# Patient Record
Sex: MALE | Race: WHITE | NOT HISPANIC OR LATINO | Employment: OTHER | URBAN - METROPOLITAN AREA
[De-identification: names, ages, dates, MRNs, and addresses within clinical notes are randomized per-mention and may not be internally consistent; named-entity substitution may affect disease eponyms.]

---

## 2018-04-09 ENCOUNTER — TELEPHONE (OUTPATIENT)
Dept: INTERNAL MEDICINE | Facility: CLINIC | Age: 68
End: 2018-04-09

## 2018-04-09 NOTE — TELEPHONE ENCOUNTER
Melanie is calling wanting to know if It is ok to take glucosamine with all his other meds for joint pain

## 2018-04-19 ENCOUNTER — HOSPITAL ENCOUNTER (OUTPATIENT)
Facility: HOSPITAL | Age: 68
Discharge: HOME | End: 2018-04-19
Attending: INTERNAL MEDICINE | Admitting: INTERNAL MEDICINE
Payer: COMMERCIAL

## 2018-04-19 ENCOUNTER — ANESTHESIA (OUTPATIENT)
Dept: ENDOSCOPY | Facility: HOSPITAL | Age: 68
End: 2018-04-19
Payer: COMMERCIAL

## 2018-04-19 ENCOUNTER — ANESTHESIA EVENT (OUTPATIENT)
Dept: ENDOSCOPY | Facility: HOSPITAL | Age: 68
End: 2018-04-19
Payer: COMMERCIAL

## 2018-04-19 VITALS
OXYGEN SATURATION: 93 % | DIASTOLIC BLOOD PRESSURE: 67 MMHG | WEIGHT: 184 LBS | BODY MASS INDEX: 29.57 KG/M2 | HEART RATE: 91 BPM | SYSTOLIC BLOOD PRESSURE: 135 MMHG | RESPIRATION RATE: 18 BRPM | HEIGHT: 66 IN | TEMPERATURE: 97.4 F

## 2018-04-19 DIAGNOSIS — K57.90 DIVERTICULOSIS OF INTESTINE WITHOUT BLEEDING, UNSPECIFIED INTESTINAL TRACT LOCATION: ICD-10-CM

## 2018-04-19 DIAGNOSIS — K64.9 HEMORRHOIDS, UNSPECIFIED HEMORRHOID TYPE: ICD-10-CM

## 2018-04-19 DIAGNOSIS — K63.5 POLYP OF COLON, UNSPECIFIED PART OF COLON, UNSPECIFIED TYPE: ICD-10-CM

## 2018-04-19 PROCEDURE — 0DBN8ZX EXCISION OF SIGMOID COLON, VIA NATURAL OR ARTIFICIAL OPENING ENDOSCOPIC, DIAGNOSTIC: ICD-10-PCS | Performed by: INTERNAL MEDICINE

## 2018-04-19 PROCEDURE — 25800000 HC PHARMACY IV SOLUTIONS: Performed by: INTERNAL MEDICINE

## 2018-04-19 PROCEDURE — 0DBK8ZX EXCISION OF ASCENDING COLON, VIA NATURAL OR ARTIFICIAL OPENING ENDOSCOPIC, DIAGNOSTIC: ICD-10-PCS | Performed by: INTERNAL MEDICINE

## 2018-04-19 PROCEDURE — 63600000 HC DRUGS/DETAIL CODE: Performed by: ANESTHESIOLOGY

## 2018-04-19 PROCEDURE — 0DBL8ZX EXCISION OF TRANSVERSE COLON, VIA NATURAL OR ARTIFICIAL OPENING ENDOSCOPIC, DIAGNOSTIC: ICD-10-PCS | Performed by: INTERNAL MEDICINE

## 2018-04-19 PROCEDURE — 37000001 HC ANESTHESIA GENERAL: Performed by: INTERNAL MEDICINE

## 2018-04-19 PROCEDURE — 25000000 HC PHARMACY GENERAL: Performed by: ANESTHESIOLOGY

## 2018-04-19 PROCEDURE — 75000020 HC COLONSCOPY SNARE: Performed by: INTERNAL MEDICINE

## 2018-04-19 PROCEDURE — 88305 TISSUE EXAM BY PATHOLOGIST: CPT | Performed by: INTERNAL MEDICINE

## 2018-04-19 RX ORDER — VALSARTAN AND HYDROCHLOROTHIAZIDE 320; 25 MG/1; MG/1
1 TABLET, FILM COATED ORAL DAILY
COMMUNITY
End: 2019-01-13 | Stop reason: CLARIF

## 2018-04-19 RX ORDER — ALPRAZOLAM 0.5 MG/1
0.5 TABLET ORAL 3 TIMES DAILY PRN
COMMUNITY
Start: 2018-01-18 | End: 2018-07-03 | Stop reason: SDUPTHER

## 2018-04-19 RX ORDER — SODIUM CHLORIDE 9 MG/ML
INJECTION, SOLUTION INTRAVENOUS CONTINUOUS
Status: DISCONTINUED | OUTPATIENT
Start: 2018-04-19 | End: 2018-04-19 | Stop reason: HOSPADM

## 2018-04-19 RX ORDER — FLUTICASONE PROPIONATE 50 MCG
1 SPRAY, SUSPENSION (ML) NASAL DAILY
COMMUNITY
Start: 2017-01-04 | End: 2018-05-17 | Stop reason: SDUPTHER

## 2018-04-19 RX ORDER — ALBUTEROL SULFATE 90 UG/1
2 INHALANT RESPIRATORY (INHALATION) 2 TIMES DAILY PRN
COMMUNITY
End: 2019-09-04 | Stop reason: SDUPTHER

## 2018-04-19 RX ORDER — MIDAZOLAM HYDROCHLORIDE 2 MG/2ML
INJECTION, SOLUTION INTRAMUSCULAR; INTRAVENOUS AS NEEDED
Status: DISCONTINUED | OUTPATIENT
Start: 2018-04-19 | End: 2018-04-19 | Stop reason: SURG

## 2018-04-19 RX ORDER — LIDOCAINE HYDROCHLORIDE 10 MG/ML
INJECTION, SOLUTION INFILTRATION; PERINEURAL AS NEEDED
Status: DISCONTINUED | OUTPATIENT
Start: 2018-04-19 | End: 2018-04-19 | Stop reason: SURG

## 2018-04-19 RX ORDER — ATORVASTATIN CALCIUM 40 MG/1
40 TABLET, FILM COATED ORAL DAILY
COMMUNITY
End: 2018-05-15 | Stop reason: SDUPTHER

## 2018-04-19 RX ORDER — PROPOFOL 10 MG/ML
INJECTION, EMULSION INTRAVENOUS AS NEEDED
Status: DISCONTINUED | OUTPATIENT
Start: 2018-04-19 | End: 2018-04-19 | Stop reason: SURG

## 2018-04-19 RX ORDER — DABIGATRAN ETEXILATE 150 MG/1
150 CAPSULE ORAL 2 TIMES DAILY
Status: ON HOLD | COMMUNITY
End: 2021-09-02 | Stop reason: SDUPTHER

## 2018-04-19 RX ORDER — MULTIVITAMIN
1 TABLET ORAL DAILY
Status: ON HOLD | COMMUNITY
End: 2022-01-11 | Stop reason: SDUPTHER

## 2018-04-19 RX ORDER — PHENYLEPHRINE HYDROCHLORIDE 10 MG/ML
INJECTION INTRAVENOUS AS NEEDED
Status: DISCONTINUED | OUTPATIENT
Start: 2018-04-19 | End: 2018-04-19 | Stop reason: SURG

## 2018-04-19 RX ORDER — HYDROCHLOROTHIAZIDE 25 MG/1
25 TABLET ORAL DAILY
Status: ON HOLD | COMMUNITY
End: 2018-04-19 | Stop reason: ALTCHOICE

## 2018-04-19 RX ORDER — FENTANYL CITRATE 50 UG/ML
INJECTION, SOLUTION INTRAMUSCULAR; INTRAVENOUS AS NEEDED
Status: DISCONTINUED | OUTPATIENT
Start: 2018-04-19 | End: 2018-04-19 | Stop reason: SURG

## 2018-04-19 RX ORDER — METOPROLOL TARTRATE 50 MG/1
25 TABLET ORAL 2 TIMES DAILY
COMMUNITY
End: 2018-09-07 | Stop reason: SDUPTHER

## 2018-04-19 RX ORDER — CETIRIZINE HYDROCHLORIDE 10 MG/1
10 TABLET ORAL DAILY PRN
COMMUNITY
Start: 2017-01-04 | End: 2018-05-15 | Stop reason: SDUPTHER

## 2018-04-19 RX ORDER — UMECLIDINIUM BROMIDE AND VILANTEROL TRIFENATATE 62.5; 25 UG/1; UG/1
1 POWDER RESPIRATORY (INHALATION) DAILY
COMMUNITY
Start: 2017-09-07 | End: 2022-10-25 | Stop reason: SDUPTHER

## 2018-04-19 RX ORDER — ESCITALOPRAM OXALATE 10 MG/1
10 TABLET ORAL DAILY
COMMUNITY
Start: 2017-07-18 | End: 2018-07-23 | Stop reason: SDUPTHER

## 2018-04-19 RX ADMIN — LIDOCAINE HYDROCHLORIDE 5 ML: 10 INJECTION, SOLUTION INFILTRATION; PERINEURAL at 10:36

## 2018-04-19 RX ADMIN — FENTANYL CITRATE 100 MCG: 50 INJECTION INTRAMUSCULAR; INTRAVENOUS at 10:36

## 2018-04-19 RX ADMIN — PHENYLEPHRINE HYDROCHLORIDE 200 MCG: 10 INJECTION INTRAVENOUS at 10:46

## 2018-04-19 RX ADMIN — PHENYLEPHRINE HYDROCHLORIDE 200 MCG: 10 INJECTION INTRAVENOUS at 10:53

## 2018-04-19 RX ADMIN — PROPOFOL 50 MG: 10 INJECTION, EMULSION INTRAVENOUS at 10:47

## 2018-04-19 RX ADMIN — SODIUM CHLORIDE: 9 INJECTION, SOLUTION INTRAVENOUS at 08:56

## 2018-04-19 RX ADMIN — PROPOFOL 100 MG: 10 INJECTION, EMULSION INTRAVENOUS at 10:36

## 2018-04-19 RX ADMIN — MIDAZOLAM HYDROCHLORIDE 2 MG: 1 INJECTION, SOLUTION INTRAMUSCULAR; INTRAVENOUS at 10:36

## 2018-04-19 ASSESSMENT — PAIN - FUNCTIONAL ASSESSMENT
PAIN_FUNCTIONAL_ASSESSMENT: NO/DENIES PAIN

## 2018-04-19 ASSESSMENT — ENCOUNTER SYMPTOMS: DYSRHYTHMIAS: 1

## 2018-04-19 ASSESSMENT — PAIN SCALES - GENERAL: PAIN_LEVEL: 1

## 2018-04-19 ASSESSMENT — COPD QUESTIONNAIRES: COPD: 1

## 2018-04-19 NOTE — ANESTHESIA POSTPROCEDURE EVALUATION
Patient: Melanie Zelaya    Procedure Summary     Date:  04/19/18 Room / Location:   GI 1 / [facility]    Anesthesia Start:  1034 Anesthesia Stop:  1109    Procedure:  Colonoscopy (N/A Anus) Diagnosis:  (Pers Hx Polyps, Family Hx CRC)    Provider:  Violet Zavala DO Responsible Provider:  Flores Martinez MD    Anesthesia Type:  general ASA Status:  3          Anesthesia Type: general  PACU Vitals  4/19/2018 1059 - 4/19/2018 1109      4/19/2018 1105             BP: (!)  (P)  103/55    Temp: 36.3 °C (97.4 °F)            Anesthesia Post Evaluation    Pain score: 1  Pain management: adequate  Patient location during evaluation: PACU  Patient participation: complete - patient participated  Level of consciousness: awake and alert  Cardiovascular status: acceptable  Airway Patency: adequate  Respiratory status: acceptable  Hydration status: acceptable  Anesthetic complications: no
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
## 2018-04-19 NOTE — OP NOTE
_______________________________________________________________________________  Patient Name: Melanie Zelaya         Procedure Date: 4/19/2018 10:30 AM  MRN: [id number]                     Account Number: [id number]  YOB: 1950              Age: 68  Gender: Male                          Note Status: Finalized  Attending MD: MARY KAY FLEMING DO  _______________________________________________________________________________  Procedure:            Colonoscopy  Indications:          High risk colon cancer surveillance: Personal history  of colonic polyps  Providers:            MARY KAY FLEMING DO (Doctor)  Referring MD:         ALEXY ACHARYA DO  Requesting Provider:  Medicines:            See the Anesthesia note for documentation of the  administered medications  Complications:        No immediate complications. Estimated blood loss: None.  _______________________________________________________________________________  Procedure:            After I obtained informed consent, the scope was passed  under direct vision. Throughout the procedure, the  patient's blood pressure, pulse, and oxygen saturations  were monitored continuously. The Colonoscope was  introduced through the anus and advanced to the cecum,  identified by appendiceal orifice and ileocecal valve.  The colonoscopy was performed without difficulty. The  patient tolerated the procedure well. The quality of  the bowel preparation was adequate to identify polyps 6  mm and larger in size.  Estimated Blood Loss: Estimated blood loss: none.  Findings:  A 4 mm polyp was found in the proximal ascending colon. The polyp was  sessile. The polyp was removed with a cold snare. Resection and  retrieval were complete. Estimated blood loss was minimal.  A 5 mm polyp was found in the proximal transverse colon. The polyp was  sessile. The polyp was removed with a cold snare. Resection and  retrieval were complete. Estimated blood loss was minimal.  A 3  mm polyp was found in the sigmoid colon. The polyp was sessile. The  polyp was removed with a cold snare. Resection was complete, but the  polyp tissue was not retrieved. Estimated blood loss was minimal.  Scattered small and large-mouthed diverticula were found in the entire  colon.  Non-bleeding internal hemorrhoids were found during retroflexion. The  hemorrhoids were small and Grade I (internal hemorrhoids that do not  prolapse).  No other significant abnormalities were identified in a careful  examination of the remainder of the colon.  The exam was otherwise without abnormality on direct and retroflexion  views.  Impression:           - One 4 mm polyp in the proximal ascending colon,  removed with a cold snare. Resected and retrieved.  - One 5 mm polyp in the proximal transverse colon,  removed with a cold snare. Resected and retrieved.  - One 3 mm polyp in the sigmoid colon, removed with a  cold snare. Complete resection. Polyp tissue not  retrieved.  - Diverticulosis in the entire examined colon.  - Non-bleeding internal hemorrhoids.  - The examination was otherwise normal on direct and  retroflexion views.  Recommendation:       - Patient has a contact number available for  emergencies. The signs and symptoms of potential  delayed complications were discussed with the patient.  Return to normal activities tomorrow. Written discharge  instructions were provided to the patient.  - High fiber diet daily.  - Repeat colonoscopy in 3 years for surveillance based  on pathology results.  - Return to my office PRN.  Procedure Code(s):    --- Professional ---  63258, Colonoscopy, flexible; with removal of tumor(s),  polyp(s), or other lesion(s) by snare technique  Diagnosis Code(s):    --- Professional ---  Z86.010, Personal history of colonic polyps  D12.2, Benign neoplasm of ascending colon  D12.3, Benign neoplasm of transverse colon (hepatic  flexure or splenic flexure)  D12.5, Benign neoplasm of sigmoid  colon  K64.0, First degree hemorrhoids  K57.30, Diverticulosis of large intestine without  perforation or abscess without bleeding  CPT copyright 2015 [employer]. All rights reserved.  The codes documented in this report are preliminary and upon  review may  be revised to meet current compliance requirements.  _________________  MARY KAY FLEMING DO  4/19/2018 10:59:52 AM  This report has been signed electronically.  Number of Addenda: 0  Note Initiated On: 4/19/2018 10:30 AM

## 2018-04-19 NOTE — ANESTHESIA PREPROCEDURE EVALUATION
Anesthesia ROS/MED HX      Pulmonary    COPD  Cardiovascular   hypertension   Dysrhythmias (A-fib)  GI/Hepatic   GERD      Past Surgical History:   Procedure Laterality Date   • LUNG LOBECTOMY Left 2016   • ROTATOR CUFF REPAIR Right 2001   • SHOULDER SURGERY  1998       Physical Exam    Airway   Mallampati: IV   TM distance: <3 FB   Neck ROM: full  Cardiovascular - normal   Rhythm: regular   Rate: normal  Pulmonary    Decreased breath sounds  Dental - normal        Anesthesia Plan    Plan: general    Technique: total IV anesthesia   ASA 3

## 2018-04-20 ENCOUNTER — TRANSCRIBE ORDERS (OUTPATIENT)
Dept: SCHEDULING | Age: 68
End: 2018-04-20

## 2018-04-20 DIAGNOSIS — C34.32 PRIMARY MALIGNANT NEOPLASM OF BRONCHUS OF LEFT LOWER LOBE (CMS/HCC): Primary | ICD-10-CM

## 2018-04-20 LAB
CASE RPRT: NORMAL
CLINICAL INFO: NORMAL
PATH REPORT.FINAL DX SPEC: NORMAL
PATH REPORT.GROSS SPEC: NORMAL

## 2018-04-23 ENCOUNTER — APPOINTMENT (OUTPATIENT)
Dept: RADIOLOGY | Age: 68
End: 2018-04-23
Attending: THORACIC SURGERY (CARDIOTHORACIC VASCULAR SURGERY)
Payer: COMMERCIAL

## 2018-04-23 DIAGNOSIS — C34.32 PRIMARY MALIGNANT NEOPLASM OF BRONCHUS OF LEFT LOWER LOBE (CMS/HCC): ICD-10-CM

## 2018-04-23 PROCEDURE — 71250 CT THORAX DX C-: CPT

## 2018-05-09 ENCOUNTER — OFFICE VISIT (OUTPATIENT)
Dept: THORACIC SURGERY | Facility: CLINIC | Age: 68
End: 2018-05-09
Payer: COMMERCIAL

## 2018-05-09 VITALS
DIASTOLIC BLOOD PRESSURE: 72 MMHG | WEIGHT: 192.6 LBS | HEART RATE: 60 BPM | OXYGEN SATURATION: 97 % | SYSTOLIC BLOOD PRESSURE: 136 MMHG | BODY MASS INDEX: 30.95 KG/M2 | RESPIRATION RATE: 16 BRPM | HEIGHT: 66 IN

## 2018-05-09 DIAGNOSIS — C34.32 MALIGNANT NEOPLASM OF LOWER LOBE OF LEFT LUNG (CMS/HCC): Primary | ICD-10-CM

## 2018-05-09 PROCEDURE — 99213 OFFICE O/P EST LOW 20 MIN: CPT | Performed by: THORACIC SURGERY (CARDIOTHORACIC VASCULAR SURGERY)

## 2018-05-09 NOTE — PROGRESS NOTES
Patient ID: Melanie Zelaya                              : 1950  MRN: [id number]                                            Visit Date: 2018  Encounter Provider: Blaise Guallpa  Referring Provider: Killian Cotter DO Subjective      Patient ID: Melanie Zelaya is a 68 y.o. male.  Chief Complaint: Follow-up (With CT Chest)      Melanie Zelaya is a 68 y.o. old male presenting today for follow up after VATS left lower lobectomy in 2016 which revealed a 3.2 cm squamous cell carcinoma with visceral pleural invasion.  Nodes were negative.  He did have severe adhesion requiring lysis at that time.  He has had a bit of a clear to light yellow mucus cough over the last 2-3 months and is presently taking Mucinex.  There is no dyspnea at rest, weight loss, new persistent neurological or bony complaints or hemoptysis.    Past Medical History:  has a past medical history of Anxiety; Atrial fibrillation (CMS/HCC) ([facility]); Depression; Fracture of pelvis (CMS/HCC) (HCC) (); GERD (gastroesophageal reflux disease); History of colon polyps; Hyperlipidemia; Hypertension; Lung cancer (CMS/HCC) ([facility]); Lung cancer (CMS/HCC) (HCC); Pneumonia (); and Seasonal allergies.  Past Surgical History:  has a past surgical history that includes Lung lobectomy (Left, ); Shoulder surgery (); Rotator cuff repair (Right, ); Tonsillectomy; and Nasal polyp surgery.  Social History:  reports that he quit smoking about 8 years ago. His smoking use included Cigarettes. He smoked 2.00 packs per day. He has never used smokeless tobacco. He reports that he does not drink alcohol or use drugs.  Family History: family history includes Cirrhosis in his father; Colon cancer in his brother; Diabetes in his mother; Lung cancer in his brother; Prostate cancer in his brother.  Medications:   Current Outpatient Prescriptions:   •  ALPRAZolam (XANAX) 0.5 mg tablet, Take 0.5 mg by mouth 3 (three) times a day as  "needed., Disp: , Rfl:   •  atorvastatin (LIPITOR) 40 mg tablet, Take 40 mg by mouth daily., Disp: , Rfl:   •  cetirizine (ZyrTEC) 10 mg tablet, Take 10 mg by mouth daily as needed., Disp: , Rfl:   •  dabigatran etexilate (PRADAXA) 150 mg capsu, Take 150 mg by mouth 2 (two) times a day., Disp: , Rfl:   •  escitalopram (LEXAPRO) 10 mg tablet, Take 10 mg by mouth daily., Disp: , Rfl:   •  fluticasone (FLONASE) 50 mcg/actuation nasal spray, Administer 1 spray into each nostril daily., Disp: , Rfl:   •  metoprolol tartrate (LOPRESSOR) 50 mg tablet, Take 25 mg by mouth 2 (two) times a day.  , Disp: , Rfl:   •  multivitamin (THERAGRAN) tablet, Take 1 tablet by mouth daily., Disp: , Rfl:   •  omega-3 fatty acids-fish oil 340-1,000 mg capsule, Take 1 capsule by mouth 2 (two) times a day., Disp: , Rfl:   •  ranitidine (ZANTAC) 150 mg tablet, Take 150 mg by mouth 2 (two) times a day., Disp: , Rfl:   •  umeclidinium-vilanterol (ANORO ELLIPTA) 62.5-25 mcg/actuation blister with device, Inhale 1 puff daily., Disp: , Rfl:   •  valsartan-hydrochlorothiazide (DIOVAN-HCT) 320-25 mg per tablet, Take 1 tablet by mouth daily., Disp: , Rfl:   •  albuterol HFA (PROAIR HFA) 90 mcg/actuation inhaler, Inhale 2 puffs 2 (two) times a day as needed., Disp: , Rfl:   •  DOCOSAHEXANOIC ACID/EPA (FISH OIL ORAL), Take 1 tablet by mouth daily. OTC suppliement, Disp: , Rfl:   Allergies: is allergic to no known allergies.        The following have been reviewed and updated as appropriate in this visit:          Review of Systems   All other systems reviewed and are negative.      Objective   Vitals: /72 (BP Location: Right upper arm, Patient Position: Sitting)   Pulse 60   Resp 16   Ht 1.676 m (5' 6\")   Wt 87.4 kg (192 lb 9.6 oz)   SpO2 97%   BMI 31.09 kg/m²     Physical Exam   Constitutional: He appears well-developed and well-nourished.   HENT:   Head: Normocephalic.   Eyes: EOM are normal. Pupils are equal, round, and reactive to light. "   Cardiovascular: Normal rate and regular rhythm.    Pulmonary/Chest: Effort normal and breath sounds normal.   Musculoskeletal: Normal range of motion.   Neurological: He is alert.   Skin: Skin is warm and dry.   Psychiatric: He has a normal mood and affect.   Vitals reviewed.      Assessment/Plan   Independent review of all imaging was done by myself as well as review of the radiologists readings and comparison to prior films.  CAT scan of the chest on April 23 shows a number of very small nodules in the inferior remaining left upper lobe which likely looks infectious or inflammatory to me.  It is new compared to the prior film though.    At the present time I think we should see him in a short interval back in 6-8 weeks with a CAT scan to assure resolution of these nodules.  I spoke patient and wife they are agreeable to this.       Problem List Items Addressed This Visit     None      Visit Diagnoses     Malignant neoplasm of lower lobe of left lung (CMS/HCC) (HCC)    -  Primary          Blaise Guallpa MD

## 2018-05-14 ENCOUNTER — OFFICE VISIT (OUTPATIENT)
Dept: INTERNAL MEDICINE | Facility: CLINIC | Age: 68
End: 2018-05-14
Payer: COMMERCIAL

## 2018-05-14 VITALS
RESPIRATION RATE: 12 BRPM | DIASTOLIC BLOOD PRESSURE: 70 MMHG | HEART RATE: 74 BPM | SYSTOLIC BLOOD PRESSURE: 132 MMHG | WEIGHT: 193.8 LBS | HEIGHT: 66 IN | BODY MASS INDEX: 31.15 KG/M2 | OXYGEN SATURATION: 97 % | TEMPERATURE: 98 F

## 2018-05-14 DIAGNOSIS — J30.2 ACUTE SEASONAL ALLERGIC RHINITIS, UNSPECIFIED TRIGGER: ICD-10-CM

## 2018-05-14 DIAGNOSIS — R05.9 COUGH: Primary | ICD-10-CM

## 2018-05-14 DIAGNOSIS — C34.32 MALIGNANT NEOPLASM OF LOWER LOBE OF LEFT LUNG (CMS/HCC): Chronic | ICD-10-CM

## 2018-05-14 DIAGNOSIS — I50.32 CHRONIC DIASTOLIC HEART FAILURE (CMS/HCC): Chronic | ICD-10-CM

## 2018-05-14 DIAGNOSIS — I10 ESSENTIAL HYPERTENSION: Chronic | ICD-10-CM

## 2018-05-14 DIAGNOSIS — K21.9 GASTROESOPHAGEAL REFLUX DISEASE WITHOUT ESOPHAGITIS: Chronic | ICD-10-CM

## 2018-05-14 DIAGNOSIS — I51.7 LEFT ATRIAL ENLARGEMENT: Chronic | ICD-10-CM

## 2018-05-14 PROBLEM — Z86.0100 HISTORY OF COLON POLYPS: Status: ACTIVE | Noted: 2018-05-14

## 2018-05-14 PROBLEM — F41.9 ANXIETY: Status: ACTIVE | Noted: 2017-05-23

## 2018-05-14 PROBLEM — F32.A DEPRESSION: Status: ACTIVE | Noted: 2017-05-23

## 2018-05-14 PROBLEM — Z86.0100 HISTORY OF COLON POLYPS: Chronic | Status: ACTIVE | Noted: 2018-05-14

## 2018-05-14 PROBLEM — F32.A DEPRESSION: Chronic | Status: ACTIVE | Noted: 2017-05-23

## 2018-05-14 PROCEDURE — 99214 OFFICE O/P EST MOD 30 MIN: CPT | Performed by: NURSE PRACTITIONER

## 2018-05-14 ASSESSMENT — ENCOUNTER SYMPTOMS
EYE ITCHING: 0
HEARTBURN: 0
FEVER: 0
RHINORRHEA: 0
ABDOMINAL PAIN: 0
SWEATS: 0
CHILLS: 0
FATIGUE: 1
SHORTNESS OF BREATH: 0
COUGH: 1
HEMOPTYSIS: 0
WHEEZING: 0
EYE DISCHARGE: 0
DIZZINESS: 0

## 2018-05-14 NOTE — ASSESSMENT & PLAN NOTE
Reviewed last CT chest together today. Advised and he agrees with plan to have reassessed as per Dr. Guallpa. Today's cough appears c/w constant post nasal drip.Advised and he agrees to restart the flonase nasal spray and the zyrtec 10mg one tab at hs.  We talked about allergen avoidance such as showering before bed and closing the windows at night. If no better or worse in 2-3 days, he is aware to let me know. Otherwise, RTO in August as already scheduled.

## 2018-05-14 NOTE — ASSESSMENT & PLAN NOTE
Patient advised and agreed to follow low salt diet (less than 1500mg per day), increase exercise and continue with current medications. Advised and agreed to get labs and RTO as discussed.

## 2018-05-14 NOTE — ASSESSMENT & PLAN NOTE
Patient advised and agrees to follow GERD diet.  We talked about the importance of avoiding fried fatty foods.  Acidic foods such as tomato sauce pizza oranges grapefruit etc.  Encouraged healthy weight to keep BMI less than 25.  Advised not to eat too fast and not to eat 2-3 hours before bedtime. Take medications as prescribed and reviewed today. Advised and patient agrees to let me know if no better or worse in 1-2 weeks.

## 2018-05-15 RX ORDER — CETIRIZINE HYDROCHLORIDE 10 MG/1
TABLET ORAL
Qty: 30 TABLET | Refills: 6 | Status: SHIPPED | OUTPATIENT
Start: 2018-05-15 | End: 2018-08-23 | Stop reason: SDUPTHER

## 2018-05-15 RX ORDER — ATORVASTATIN CALCIUM 40 MG/1
40 TABLET, FILM COATED ORAL DAILY
Qty: 90 TABLET | Refills: 2 | Status: SHIPPED | OUTPATIENT
Start: 2018-05-15 | End: 2019-01-29 | Stop reason: SDUPTHER

## 2018-05-17 RX ORDER — FLUTICASONE PROPIONATE 50 MCG
SPRAY, SUSPENSION (ML) NASAL
Qty: 3 BOTTLE | Refills: 2 | Status: SHIPPED | OUTPATIENT
Start: 2018-05-17 | End: 2018-08-23 | Stop reason: SDUPTHER

## 2018-05-29 ENCOUNTER — TELEPHONE (OUTPATIENT)
Dept: INTERNAL MEDICINE | Facility: CLINIC | Age: 68
End: 2018-05-29

## 2018-05-29 NOTE — TELEPHONE ENCOUNTER
Melanie called and said that he thinks that the zyrtec is causing some weakness and lethargy. He is taking it at bedtime around 8:30pm. He said that he is feeling very drowsy. He is also taking mucinex to help clear up the mucus. He would like to know if it could be the combination with his other meds and what he can do about it.

## 2018-06-08 ENCOUNTER — HOSPITAL ENCOUNTER (OUTPATIENT)
Dept: RADIOLOGY | Age: 68
Discharge: HOME | End: 2018-06-08
Attending: THORACIC SURGERY (CARDIOTHORACIC VASCULAR SURGERY)
Payer: COMMERCIAL

## 2018-06-08 DIAGNOSIS — C34.32 MALIGNANT NEOPLASM OF LOWER LOBE OF LEFT LUNG (CMS/HCC): ICD-10-CM

## 2018-06-08 PROCEDURE — 71250 CT THORAX DX C-: CPT

## 2018-06-13 ENCOUNTER — TELEPHONE (OUTPATIENT)
Dept: INTERNAL MEDICINE | Facility: CLINIC | Age: 68
End: 2018-06-13

## 2018-07-03 RX ORDER — ALPRAZOLAM 0.5 MG/1
0.5 TABLET ORAL 2 TIMES DAILY PRN
Qty: 30 TABLET | Refills: 1 | Status: SHIPPED | OUTPATIENT
Start: 2018-07-03 | End: 2018-10-25 | Stop reason: SDUPTHER

## 2018-07-11 ENCOUNTER — OFFICE VISIT (OUTPATIENT)
Dept: THORACIC SURGERY | Facility: CLINIC | Age: 68
End: 2018-07-11
Payer: COMMERCIAL

## 2018-07-11 VITALS
HEIGHT: 66 IN | HEART RATE: 60 BPM | SYSTOLIC BLOOD PRESSURE: 124 MMHG | RESPIRATION RATE: 18 BRPM | BODY MASS INDEX: 30.15 KG/M2 | OXYGEN SATURATION: 97 % | DIASTOLIC BLOOD PRESSURE: 74 MMHG | WEIGHT: 187.6 LBS

## 2018-07-11 DIAGNOSIS — C34.32 MALIGNANT NEOPLASM OF LOWER LOBE OF LEFT LUNG (CMS/HCC): Primary | Chronic | ICD-10-CM

## 2018-07-11 PROCEDURE — 99213 OFFICE O/P EST LOW 20 MIN: CPT | Performed by: THORACIC SURGERY (CARDIOTHORACIC VASCULAR SURGERY)

## 2018-07-11 NOTE — PROGRESS NOTES
Patient ID: Melanie Zelaya                              : 1950  MRN: [id number]                                            Visit Date: 2018  Encounter Provider: Blaise Guallpa  Referring Provider: No ref. provider found    Subjective      Patient ID: Melanie Zelaya is a 68 y.o. male.  Chief Complaint: Follow-up (CT Chest)      Melanie Zelaya is a 68 y.o. 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  Nodes were negative.  Last time we saw him he had a bunch of little nodules at the inferior portion of the remaining left upper lobe and he had bit of a clear to yellow mucus cough for several months.  Because it looked inflammatory infectious we did a short-term follow-up.  He has seen his pulmonologist at [facility] who is apparently looked at the first disc and the second disc and is not concerned.  He is purposely eating better and much more active and has had a few pound weight loss.  There is no cough or hemoptysis at this time.  There is no new persistent bone or neurological complaints.  He is having no dyspnea on exertion.    Past Medical History:  has a past medical history of Anxiety; Atrial fibrillation (CMS/HCC) ([facility]); Depression; Fracture of pelvis (CMS/HCC) (HCC) (); GERD (gastroesophageal reflux disease); History of colon polyps; Hyperlipidemia; Hypertension; Lung cancer (CMS/HCC) (HCC); Lung cancer (CMS/HCC) (HCC); Pneumonia (); and Seasonal allergies.  Past Surgical History:  has a past surgical history that includes Lung lobectomy (Left, ); Shoulder surgery (); Rotator cuff repair (Right, ); Tonsillectomy; and Nasal polyp surgery.  Social History:  reports that he quit smoking about 8 years ago. His smoking use included Cigarettes. He smoked 2.00 packs per day. He has never used smokeless tobacco. He reports  that he does not drink alcohol or use drugs.  Family History: family history includes Cirrhosis in his father; Colon cancer in his brother; Diabetes in his mother; Lung cancer in his brother; Prostate cancer in his brother.  Medications:   Current Outpatient Prescriptions:   •  albuterol HFA (PROAIR HFA) 90 mcg/actuation inhaler, Inhale 2 puffs 2 (two) times a day as needed., Disp: , Rfl:   •  ALPRAZolam (XANAX) 0.5 mg tablet, Take 1 tablet (0.5 mg total) by mouth 2 (two) times a day as needed for anxiety., Disp: 30 tablet, Rfl: 1  •  atorvastatin (LIPITOR) 40 mg tablet, Take 1 tablet (40 mg total) by mouth daily., Disp: 90 tablet, Rfl: 2  •  cetirizine (ZyrTEC) 10 mg tablet, TAKE ONE TABLET BY MOUTH ONCE DAILY AS NEEDED FOR ALLERGIES, Disp: 30 tablet, Rfl: 6  •  dabigatran etexilate (PRADAXA) 150 mg capsu, Take 150 mg by mouth 2 (two) times a day., Disp: , Rfl:   •  DOCOSAHEXANOIC ACID/EPA (FISH OIL ORAL), Take 4 capsules by mouth daily. OTC suppliement , Disp: , Rfl:   •  escitalopram (LEXAPRO) 10 mg tablet, Take 10 mg by mouth daily., Disp: , Rfl:   •  fluticasone (FLONASE) 50 mcg/actuation nasal spray, USE TWO SPRAYS IN EACH NOSTRIL ONCE DAILY AS NEEDED, Disp: 3 Bottle, Rfl: 2  •  metoprolol tartrate (LOPRESSOR) 50 mg tablet, Take 25 mg by mouth 2 (two) times a day.  , Disp: , Rfl:   •  multivitamin (THERAGRAN) tablet, Take 1 tablet by mouth daily., Disp: , Rfl:   •  omega-3 fatty acids-fish oil 340-1,000 mg capsule, Take 1 capsule by mouth 2 (two) times a day., Disp: , Rfl:   •  ranitidine (ZANTAC) 150 mg tablet, Take 150 mg by mouth 2 (two) times a day., Disp: , Rfl:   •  umeclidinium-vilanterol (ANORO ELLIPTA) 62.5-25 mcg/actuation blister with device, Inhale 1 puff daily., Disp: , Rfl:   •  valsartan-hydrochlorothiazide (DIOVAN-HCT) 320-25 mg per tablet, Take 1 tablet by mouth daily., Disp: , Rfl:   Allergies: is allergic to no known allergies.        The following have been reviewed and updated as  "appropriate in this visit:     Allergies  Meds  Problems       Review of Systems   All other systems reviewed and are negative.      Objective   Vitals: /74 (BP Location: Left upper arm, Patient Position: Sitting)   Pulse 60   Resp 18   Ht 1.676 m (5' 6\")   Wt 85.1 kg (187 lb 9.6 oz)   SpO2 97%   BMI 30.28 kg/m²     Physical Exam   Constitutional: He appears well-developed and well-nourished.   HENT:   Head: Normocephalic.   Eyes: EOM are normal. Pupils are equal, round, and reactive to light.   Cardiovascular: Normal rate and regular rhythm.    Pulmonary/Chest: Effort normal and breath sounds normal.   Musculoskeletal: Normal range of motion.   Neurological: He is alert.   Skin: Skin is warm and dry.   Psychiatric: He has a normal mood and affect.   Vitals reviewed.      Assessment/Plan   Independent review of all imaging was done by myself as well as review of the radiologists readings and comparison to prior films.  Short interval CAT scan of the chest without contrast on June 8 shows a little more inflammatory or infectious abnormalities with a tree in bud pattern of the remaining left upper lobe inferiorly and posteriorly as well as and now in the right lower lobe.  Cancer should not grow this quickly.  His pulmonologist is aware of this and looked at scans according to what Mr. Zelaya says.  We will see him back in 3 months with a CAT scan of the chest without contrast.  If it gets worse I wonder if someone would want to do a bronchoscopy or not, but will defer to the pulmonologist.       Problem List Items Addressed This Visit     Malignant neoplasm of lower lobe of left lung (CMS/HCC) (HCC) - Primary (Chronic)            Blaise Guallpa MD  "

## 2018-07-11 NOTE — LETTER
2018     SANTOS Porter  [address]  [facility], [address]  [facility] [address]    Patient: Melanie Zelaya   YOB: 1950   Date of Visit: 2018       Dear Dr. Gutierrez:    Thank you for referring Melanie Zelaya to me for evaluation. Below are my notes for this consultation.    If you have questions, please do not hesitate to call me. I look forward to following your patient along with you.         Sincerely,        Blaise Guallpa MD        CC: MD Blaise Perera MD  2018  2:26 PM  Sign at close encounter  Patient ID: Melanie Zelaya                              : 1950  MRN: [id number]                                            Visit Date: 2018  Encounter Provider: Blaise Guallpa  Referring Provider: No ref. provider found    Subjective      Patient ID: Melanie Zelaya is a 68 y.o. male.  Chief Complaint: Follow-up (CT Chest)      Melanie Zelaya is a 68 y.o. 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  Nodes were negative.  Last time we saw him he had a bunch of little nodules at the inferior portion of the remaining left upper lobe and he had bit of a clear to yellow mucus cough for several months.  Because it looked inflammatory infectious we did a short-term follow-up.  He has seen his pulmonologist at [facility] who is apparently looked at the first disc and the second disc and is not concerned.  He is purposely eating better and much more active and has had a few pound weight loss.  There is no cough or hemoptysis at this time.  There is no new persistent bone or neurological complaints.  He is having no dyspnea on exertion.    Past Medical History:  has a past medical history of Anxiety; Atrial fibrillation (CMS/HCC) (HCC); Depression; Fracture of pelvis (CMS/HCC) (HCC) ();  GERD (gastroesophageal reflux disease); History of colon polyps; Hyperlipidemia; Hypertension; Lung cancer (CMS/HCC) (HCC); Lung cancer (CMS/HCC) (HCC); Pneumonia (2011); and Seasonal allergies.  Past Surgical History:  has a past surgical history that includes Lung lobectomy (Left, 2016); Shoulder surgery (1998); Rotator cuff repair (Right, 2001); Tonsillectomy; and Nasal polyp surgery.  Social History:  reports that he quit smoking about 8 years ago. His smoking use included Cigarettes. He smoked 2.00 packs per day. He has never used smokeless tobacco. He reports that he does not drink alcohol or use drugs.  Family History: family history includes Cirrhosis in his father; Colon cancer in his brother; Diabetes in his mother; Lung cancer in his brother; Prostate cancer in his brother.  Medications:   Current Outpatient Prescriptions:   •  albuterol HFA (PROAIR HFA) 90 mcg/actuation inhaler, Inhale 2 puffs 2 (two) times a day as needed., Disp: , Rfl:   •  ALPRAZolam (XANAX) 0.5 mg tablet, Take 1 tablet (0.5 mg total) by mouth 2 (two) times a day as needed for anxiety., Disp: 30 tablet, Rfl: 1  •  atorvastatin (LIPITOR) 40 mg tablet, Take 1 tablet (40 mg total) by mouth daily., Disp: 90 tablet, Rfl: 2  •  cetirizine (ZyrTEC) 10 mg tablet, TAKE ONE TABLET BY MOUTH ONCE DAILY AS NEEDED FOR ALLERGIES, Disp: 30 tablet, Rfl: 6  •  dabigatran etexilate (PRADAXA) 150 mg capsu, Take 150 mg by mouth 2 (two) times a day., Disp: , Rfl:   •  DOCOSAHEXANOIC ACID/EPA (FISH OIL ORAL), Take 4 capsules by mouth daily. OTC suppliement , Disp: , Rfl:   •  escitalopram (LEXAPRO) 10 mg tablet, Take 10 mg by mouth daily., Disp: , Rfl:   •  fluticasone (FLONASE) 50 mcg/actuation nasal spray, USE TWO SPRAYS IN EACH NOSTRIL ONCE DAILY AS NEEDED, Disp: 3 Bottle, Rfl: 2  •  metoprolol tartrate (LOPRESSOR) 50 mg tablet, Take 25 mg by mouth 2 (two) times a day.  , Disp: , Rfl:   •  multivitamin (THERAGRAN) tablet, Take 1 tablet by mouth daily.,  "Disp: , Rfl:   •  omega-3 fatty acids-fish oil 340-1,000 mg capsule, Take 1 capsule by mouth 2 (two) times a day., Disp: , Rfl:   •  ranitidine (ZANTAC) 150 mg tablet, Take 150 mg by mouth 2 (two) times a day., Disp: , Rfl:   •  umeclidinium-vilanterol (ANORO ELLIPTA) 62.5-25 mcg/actuation blister with device, Inhale 1 puff daily., Disp: , Rfl:   •  valsartan-hydrochlorothiazide (DIOVAN-HCT) 320-25 mg per tablet, Take 1 tablet by mouth daily., Disp: , Rfl:   Allergies: is allergic to no known allergies.        The following have been reviewed and updated as appropriate in this visit:     Allergies  Meds  Problems       Review of Systems   All other systems reviewed and are negative.      Objective   Vitals: /74 (BP Location: Left upper arm, Patient Position: Sitting)   Pulse 60   Resp 18   Ht 1.676 m (5' 6\")   Wt 85.1 kg (187 lb 9.6 oz)   SpO2 97%   BMI 30.28 kg/m²      Physical Exam   Constitutional: He appears well-developed and well-nourished.   HENT:   Head: Normocephalic.   Eyes: EOM are normal. Pupils are equal, round, and reactive to light.   Cardiovascular: Normal rate and regular rhythm.    Pulmonary/Chest: Effort normal and breath sounds normal.   Musculoskeletal: Normal range of motion.   Neurological: He is alert.   Skin: Skin is warm and dry.   Psychiatric: He has a normal mood and affect.   Vitals reviewed.      Assessment/Plan   Independent review of all imaging was done by myself as well as review of the radiologists readings and comparison to prior films.  Short interval CAT scan of the chest without contrast on June 8 shows a little more inflammatory or infectious abnormalities with a tree in bud pattern of the remaining left upper lobe inferiorly and posteriorly as well as and now in the right lower lobe.  Cancer should not grow this quickly.  His pulmonologist is aware of this and looked at scans according to what Mr. Zelaya says.  We will see him back in 3 months with a CAT scan " of the chest without contrast.  If it gets worse I wonder if someone would want to do a bronchoscopy or not, but will defer to the pulmonologist.       Problem List Items Addressed This Visit     Malignant neoplasm of lower lobe of left lung (CMS/HCC) (HCC) - Primary (Chronic)            Blaise Guallpa MD

## 2018-07-23 RX ORDER — ESCITALOPRAM OXALATE 10 MG/1
10 TABLET ORAL DAILY
Qty: 90 TABLET | Refills: 0 | Status: SHIPPED | OUTPATIENT
Start: 2018-07-23 | End: 2018-10-22 | Stop reason: SDUPTHER

## 2018-08-20 ENCOUNTER — TELEPHONE (OUTPATIENT)
Dept: THORACIC SURGERY | Facility: CLINIC | Age: 68
End: 2018-08-20

## 2018-08-20 NOTE — TELEPHONE ENCOUNTER
AMI FOR REF. REQ TODAY FOR APPT. ON 10.24.18- NPI GIVEN WITH ALL PT INFORMATION. I ASKED THAT REF., ONCE COMPLETED, BE FAXED TO US .[phone]

## 2018-08-23 ENCOUNTER — OFFICE VISIT (OUTPATIENT)
Dept: INTERNAL MEDICINE | Facility: CLINIC | Age: 68
End: 2018-08-23
Payer: COMMERCIAL

## 2018-08-23 VITALS
TEMPERATURE: 98.1 F | RESPIRATION RATE: 12 BRPM | BODY MASS INDEX: 29.89 KG/M2 | SYSTOLIC BLOOD PRESSURE: 136 MMHG | OXYGEN SATURATION: 97 % | WEIGHT: 186 LBS | HEIGHT: 66 IN | HEART RATE: 68 BPM | DIASTOLIC BLOOD PRESSURE: 74 MMHG

## 2018-08-23 DIAGNOSIS — C34.32 MALIGNANT NEOPLASM OF LOWER LOBE OF LEFT LUNG (CMS/HCC): Chronic | ICD-10-CM

## 2018-08-23 DIAGNOSIS — E78.2 MIXED HYPERLIPIDEMIA: Chronic | ICD-10-CM

## 2018-08-23 DIAGNOSIS — I51.7 LEFT ATRIAL ENLARGEMENT: Chronic | ICD-10-CM

## 2018-08-23 DIAGNOSIS — Z12.5 SCREENING FOR PROSTATE CANCER: ICD-10-CM

## 2018-08-23 DIAGNOSIS — R73.9 HIGH BLOOD SUGAR: ICD-10-CM

## 2018-08-23 DIAGNOSIS — Z86.0100 HISTORY OF COLON POLYPS: Chronic | ICD-10-CM

## 2018-08-23 DIAGNOSIS — K21.9 GASTROESOPHAGEAL REFLUX DISEASE WITHOUT ESOPHAGITIS: Chronic | ICD-10-CM

## 2018-08-23 DIAGNOSIS — I50.32 CHRONIC DIASTOLIC HEART FAILURE (CMS/HCC): Chronic | ICD-10-CM

## 2018-08-23 DIAGNOSIS — I10 ESSENTIAL HYPERTENSION: Chronic | ICD-10-CM

## 2018-08-23 DIAGNOSIS — I48.0 PAROXYSMAL ATRIAL FIBRILLATION (CMS/HCC): Primary | Chronic | ICD-10-CM

## 2018-08-23 PROCEDURE — 99214 OFFICE O/P EST MOD 30 MIN: CPT | Performed by: NURSE PRACTITIONER

## 2018-08-23 RX ORDER — FLUTICASONE PROPIONATE 50 MCG
2 SPRAY, SUSPENSION (ML) NASAL DAILY
Qty: 3 BOTTLE | Refills: 2 | Status: SHIPPED | OUTPATIENT
Start: 2018-08-23 | End: 2019-11-05 | Stop reason: SDUPTHER

## 2018-08-23 RX ORDER — CETIRIZINE HYDROCHLORIDE 10 MG/1
10 TABLET ORAL DAILY
Qty: 30 TABLET | Refills: 6 | Status: SHIPPED | OUTPATIENT
Start: 2018-08-23 | End: 2019-03-15 | Stop reason: SDUPTHER

## 2018-08-23 ASSESSMENT — ENCOUNTER SYMPTOMS
VOMITING: 0
COUGH: 0
ACTIVITY CHANGE: 0
PALPITATIONS: 0
SHORTNESS OF BREATH: 0
HEADACHES: 0
HYPERTENSION: 1
ADENOPATHY: 0
NAUSEA: 0
LIGHT-HEADEDNESS: 0
FREQUENCY: 0
ABDOMINAL PAIN: 0
FEVER: 0
COLOR CHANGE: 0
SORE THROAT: 0

## 2018-08-23 NOTE — ASSESSMENT & PLAN NOTE
Patient advised to keep the fat to less than 30 g per day, the sugars to less than 30 g per day and the carbohydrates to less than 300 g per day. Encouraged to add 30-40 minutes of walking or exercise at least 3-4 times per week.  We reviewed how to read food labels.  I wrote down what to look for and gave written instructions on how to read food labels.  Patient stated adequate understanding and all questions were answered.  Next labs due in the near future-slip given today.

## 2018-08-23 NOTE — ASSESSMENT & PLAN NOTE
Advised to follow a low-fat diet.  We talked about avoiding fried, fatty foods as well as any red meat.  Advised keeping the fat to less than 30 g per day.  We discussed reading food labels.  I gave written instructions on how to read food labels. Patient aware and agrees when to get next labs as discussed today.
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
## 2018-08-23 NOTE — ASSESSMENT & PLAN NOTE
Asymptomatic.   Patient advised and agreed to follow low salt diet (less than 1500mg per day), increase exercise and continue with current medications. Advised and agreed to get labs and RTO as discussed.

## 2018-09-07 RX ORDER — METOPROLOL TARTRATE 50 MG/1
TABLET ORAL
Qty: 135 TABLET | Refills: 3 | Status: SHIPPED | OUTPATIENT
Start: 2018-09-07 | End: 2019-02-21 | Stop reason: SDUPTHER

## 2018-09-11 ENCOUNTER — TELEPHONE (OUTPATIENT)
Dept: INTERNAL MEDICINE | Facility: CLINIC | Age: 68
End: 2018-09-11

## 2018-09-11 DIAGNOSIS — E78.00 PURE HYPERCHOLESTEROLEMIA: Primary | ICD-10-CM

## 2018-09-11 DIAGNOSIS — I10 ESSENTIAL HYPERTENSION: ICD-10-CM

## 2018-09-11 LAB
ALBUMIN SERPL-MCNC: 4.4 G/DL (ref 3.6–5.1)
ALBUMIN/GLOB SERPL: 1.9 (CALC) (ref 1–2.5)
ALP SERPL-CCNC: 55 U/L (ref 40–115)
ALT SERPL-CCNC: 16 U/L (ref 9–46)
APPEARANCE UR: CLEAR
AST SERPL-CCNC: 16 U/L (ref 10–35)
BILIRUB SERPL-MCNC: 0.7 MG/DL (ref 0.2–1.2)
BILIRUB UR QL STRIP: NEGATIVE
BUN SERPL-MCNC: 28 MG/DL (ref 7–25)
BUN/CREAT SERPL: 27 (CALC) (ref 6–22)
CALCIUM SERPL-MCNC: 9.3 MG/DL (ref 8.6–10.3)
CHLORIDE SERPL-SCNC: 102 MMOL/L (ref 98–110)
CHOLEST SERPL-MCNC: 131 MG/DL
CHOLEST/HDLC SERPL: 3.6 (CALC)
CO2 SERPL-SCNC: 31 MMOL/L (ref 20–32)
COLOR UR: YELLOW
CREAT SERPL-MCNC: 1.02 MG/DL (ref 0.7–1.18)
ERYTHROCYTE [DISTWIDTH] IN BLOOD BY AUTOMATED COUNT: 13.5 % (ref 11–15)
GFR SERPL CREATININE-BSD FRML MDRD: 74 ML/MIN/1.73M2
GLOBULIN SER CALC-MCNC: 2.3 G/DL (CALC) (ref 1.9–3.7)
GLUCOSE SERPL-MCNC: 94 MG/DL (ref 65–99)
GLUCOSE UR QL STRIP: NEGATIVE
HBA1C MFR BLD: 5.4 % OF TOTAL HGB
HCT VFR BLD AUTO: 42.3 % (ref 38.5–50)
HDLC SERPL-MCNC: 36 MG/DL
HGB BLD-MCNC: 14.5 G/DL (ref 13.2–17.1)
HGB UR QL STRIP: NEGATIVE
KETONES UR QL STRIP: NEGATIVE
LDLC SERPL CALC-MCNC: 73 MG/DL (CALC)
LEUKOCYTE ESTERASE UR QL STRIP: NEGATIVE
MCH RBC QN AUTO: 30.5 PG (ref 27–33)
MCHC RBC AUTO-ENTMCNC: 34.3 G/DL (ref 32–36)
MCV RBC AUTO: 89.1 FL (ref 80–100)
NITRITE UR QL STRIP: NEGATIVE
NONHDLC SERPL-MCNC: 95 MG/DL (CALC)
PH UR STRIP: (no result) [PH] (ref 5–8)
PLATELET # BLD AUTO: 180 THOUSAND/UL (ref 140–400)
PMV BLD REES-ECKER: 10.5 FL (ref 7.5–12.5)
POTASSIUM SERPL-SCNC: 4 MMOL/L (ref 3.5–5.3)
PROT SERPL-MCNC: 6.7 G/DL (ref 6.1–8.1)
PROT UR QL STRIP: NEGATIVE
PSA SERPL-MCNC: 0.9 NG/ML
RBC # BLD AUTO: 4.75 MILLION/UL (ref 4.2–5.8)
SODIUM SERPL-SCNC: 139 MMOL/L (ref 135–146)
SP GR UR STRIP: 1.02 (ref 1–1.03)
TRIGL SERPL-MCNC: 141 MG/DL
WBC # BLD AUTO: 5 THOUSAND/UL (ref 3.8–10.8)

## 2018-09-11 NOTE — TELEPHONE ENCOUNTER
----- Message from SANTOS Porter sent at 9/11/2018  7:58 AM EDT -----  Please tell Melanie that his labs are stable. The one kidney test is just slightly higher than what it was before. Recommend he make sure he is staying adequately hydrated. Recommend he c/w same meds, healthy diet and activities as tolerated. Next labs due in 6 months. Ok to forward the lab slip. RTO 2/2019 as scheduled.

## 2018-09-11 NOTE — PROGRESS NOTES
Please tell Melanie that his labs are stable. The one kidney test is just slightly higher than what it was before. Recommend he make sure he is staying adequately hydrated. Recommend he c/w same meds, healthy diet and activities as tolerated. Next labs due in 6 months. Ok to forward the lab slip. RTO 2/2019 as scheduled.

## 2018-10-08 ENCOUNTER — TELEPHONE (OUTPATIENT)
Dept: THORACIC SURGERY | Facility: CLINIC | Age: 68
End: 2018-10-08

## 2018-10-11 ENCOUNTER — HOSPITAL ENCOUNTER (OUTPATIENT)
Dept: RADIOLOGY | Age: 68
Discharge: HOME | End: 2018-10-11
Attending: THORACIC SURGERY (CARDIOTHORACIC VASCULAR SURGERY)
Payer: COMMERCIAL

## 2018-10-11 DIAGNOSIS — C34.32 MALIGNANT NEOPLASM OF LOWER LOBE OF LEFT LUNG (CMS/HCC): Chronic | ICD-10-CM

## 2018-10-11 PROCEDURE — 71250 CT THORAX DX C-: CPT

## 2018-10-13 ENCOUNTER — HOSPITAL ENCOUNTER (EMERGENCY)
Facility: HOSPITAL | Age: 68
Discharge: HOME | End: 2018-10-13
Attending: FAMILY MEDICINE | Admitting: FAMILY MEDICINE
Payer: COMMERCIAL

## 2018-10-13 VITALS
OXYGEN SATURATION: 97 % | DIASTOLIC BLOOD PRESSURE: 78 MMHG | HEART RATE: 53 BPM | BODY MASS INDEX: 29.09 KG/M2 | WEIGHT: 181 LBS | TEMPERATURE: 98.4 F | HEIGHT: 66 IN | SYSTOLIC BLOOD PRESSURE: 137 MMHG | RESPIRATION RATE: 18 BRPM

## 2018-10-13 DIAGNOSIS — H93.8X1 EAR SWELLING, RIGHT: Primary | ICD-10-CM

## 2018-10-13 DIAGNOSIS — H91.90 HEARING IMPAIRED: ICD-10-CM

## 2018-10-13 PROCEDURE — 96372 THER/PROPH/DIAG INJ SC/IM: CPT

## 2018-10-13 PROCEDURE — 99282 EMERGENCY DEPT VISIT SF MDM: CPT

## 2018-10-13 RX ORDER — NEOMYCIN SULFATE, POLYMYXIN B SULFATE AND HYDROCORTISONE 10; 3.5; 1 MG/ML; MG/ML; [USP'U]/ML
4 SUSPENSION/ DROPS AURICULAR (OTIC) ONCE
Status: COMPLETED | OUTPATIENT
Start: 2018-10-13 | End: 2018-10-13

## 2018-10-13 RX ORDER — FLUTICASONE PROPIONATE 50 MCG
2 SPRAY, SUSPENSION (ML) NASAL
COMMUNITY
Start: 2018-08-23

## 2018-10-13 RX ORDER — CETIRIZINE HYDROCHLORIDE 10 MG/1
10 TABLET ORAL
COMMUNITY
Start: 2018-08-23

## 2018-10-13 RX ORDER — NEOMYCIN SULFATE, POLYMYXIN B SULFATE AND HYDROCORTISONE 10; 3.5; 1 MG/ML; MG/ML; [USP'U]/ML
4 SUSPENSION/ DROPS AURICULAR (OTIC) 3 TIMES DAILY
Qty: 10 ML | Refills: 0 | Status: SHIPPED | OUTPATIENT
Start: 2018-10-13

## 2018-10-13 RX ORDER — DABIGATRAN ETEXILATE 150 MG/1
150 CAPSULE, COATED PELLETS ORAL
COMMUNITY

## 2018-10-13 RX ORDER — ESCITALOPRAM OXALATE 10 MG/1
10 TABLET ORAL
COMMUNITY
Start: 2018-07-23 | End: 2018-10-21

## 2018-10-13 RX ORDER — RANITIDINE 150 MG/1
150 TABLET ORAL
COMMUNITY

## 2018-10-13 RX ORDER — METOPROLOL TARTRATE 50 MG/1
TABLET, FILM COATED ORAL
COMMUNITY
Start: 2018-09-07

## 2018-10-13 RX ORDER — ALPRAZOLAM 0.5 MG/1
0.5 TABLET ORAL
COMMUNITY
Start: 2018-07-03

## 2018-10-13 RX ORDER — VALSARTAN AND HYDROCHLOROTHIAZIDE 320; 25 MG/1; MG/1
1 TABLET, FILM COATED ORAL
COMMUNITY

## 2018-10-13 RX ORDER — ATORVASTATIN CALCIUM 40 MG/1
40 TABLET, FILM COATED ORAL
COMMUNITY
Start: 2018-05-15

## 2018-10-13 RX ADMIN — NEOMYCIN SULFATE, POLYMYXIN B SULFATE AND HYDROCORTISONE 4 DROP: 3.5; 10000; 1 SUSPENSION AURICULAR (OTIC) at 11:15

## 2018-10-13 RX ADMIN — DEXAMETHASONE SODIUM PHOSPHATE 10 MG: 10 INJECTION INTRAMUSCULAR; INTRAVENOUS at 11:12

## 2018-10-13 NOTE — ED PROVIDER NOTES
History  Chief Complaint   Patient presents with   Radha Munoz     pt reports 2 days worth ear pain and hearing loss  states the same thing happened to the lt ear a couple weeks ago, was prescribed antibiotic drops, and it is better       History provided by:  Patient   used: No    Earache   Location:  Right  Behind ear:  Redness and swelling  Quality:  Dull  Severity:  Mild  Onset quality:  Gradual  Duration:  4 days  Timing:  Constant  Progression:  Waxing and waning  Chronicity:  New  Context: water in ear    Context: not direct blow, not elevation change, not loud noise and not recent URI    Relieved by:  Nothing  Worsened by:  Nothing  Ineffective treatments:  OTC medications  Associated symptoms: congestion and hearing loss    Associated symptoms: no cough, no diarrhea, no ear discharge, no rhinorrhea, no sore throat and no tinnitus        Prior to Admission Medications   Prescriptions Last Dose Informant Patient Reported? Taking?    ALPRAZolam (XANAX) 0 5 mg tablet   Yes Yes   Sig: Take 0 5 mg by mouth   Multiple Vitamins-Minerals (MULTIVITAMIN ADULT EXTRA C PO)   Yes No   Sig: Take 1 tablet by mouth   atorvastatin (LIPITOR) 40 mg tablet   Yes Yes   Sig: Take 40 mg by mouth   cetirizine (ZyrTEC) 10 mg tablet   Yes Yes   Sig: Take 10 mg by mouth   dabigatran etexilate (PRADAXA) 150 mg capsu   Yes No   Sig: Take 150 mg by mouth   escitalopram (LEXAPRO) 10 mg tablet   Yes Yes   Sig: Take 10 mg by mouth   fluticasone (FLONASE) 50 mcg/act nasal spray   Yes Yes   Si sprays into each nostril   metoprolol tartrate (LOPRESSOR) 50 mg tablet   Yes Yes   Sig: TAKE ONE TABLET BY MOUTH ONCE DAILY IN THE MORNING (50MG) AND ONE-HALF(25MG) ONCE DAILY IN THE EVENING   ranitidine (ZANTAC) 150 mg tablet   Yes No   Sig: Take 150 mg by mouth   umeclidinium-vilanterol (ANORO ELLIPTA) 62 5-25 MCG/INH inhaler   Yes Yes   Sig: Inhale 1 puff   valsartan-hydrochlorothiazide (DIOVAN-HCT) 320-25 MG per tablet   Yes No   Sig: Take 1 tablet by mouth      Facility-Administered Medications: None       Past Medical History:   Diagnosis Date    Anxiety     Atrial fibrillation (HCC)     Cancer ([place] [address] )     Cardiac disease     CHF (congestive heart failure) (HCC)     Diabetes mellitus ([place] [address] )     Hearing loss depression    Hyperlipidemia     Hypertension        Past Surgical History:   Procedure Laterality Date    LUNG REMOVAL, PARTIAL      SHOULDER SURGERY Right     THROAT SURGERY      TONSILLECTOMY         History reviewed  No pertinent family history  I have reviewed and agree with the history as documented  Social History   Substance Use Topics    Smoking status: Former Smoker    Smokeless tobacco: Never Used    Alcohol use Yes      Comment: occasional        Review of Systems   HENT: Positive for congestion, ear pain and hearing loss  Negative for ear discharge, rhinorrhea, sore throat and tinnitus  Respiratory: Negative for cough  Gastrointestinal: Negative for diarrhea  Genitourinary: Negative  Musculoskeletal: Negative  Physical Exam  Physical Exam   Constitutional: He is oriented to person, place, and time  He appears well-developed and well-nourished  HENT:   Head: Normocephalic and atraumatic  Right Ear: There is drainage, swelling and tenderness  Decreased hearing is noted  Eyes: Pupils are equal, round, and reactive to light  EOM are normal    Cardiovascular: Normal rate, regular rhythm and normal heart sounds  Pulmonary/Chest: Effort normal and breath sounds normal    Neurological: He is alert and oriented to person, place, and time  Skin: Skin is warm  Nursing note and vitals reviewed        Vital Signs  ED Triage Vitals [10/13/18 1044]   Temperature Pulse Respirations Blood Pressure SpO2   98 4 °F (36 9 °C) (!) 54 18 135/78 97 %      Temp Source Heart Rate Source Patient Position - Orthostatic VS BP Location FiO2 (%)   Temporal Monitor Sitting Left arm --      Pain Score 8           Vitals:    10/13/18 1044   BP: 135/78   Pulse: (!) 54   Patient Position - Orthostatic VS: Sitting       Visual Acuity      ED Medications  Medications   neomycin-polymyxin-hydrocortisone (CORTISPORIN) 0 35%-10,000 units/mL-1% otic suspension 4 drop (not administered)   dexamethasone (DECADRON) injection 10 mg (10 mg Intramuscular Given 10/13/18 1112)       Diagnostic Studies  Results Reviewed     None                 No orders to display              Procedures  Procedures       Phone Contacts  ED Phone Contact    ED Course                               MDM  CritCare Time    Disposition  Final diagnoses:   Ear swelling, right   Hearing impaired     Time reflects when diagnosis was documented in both MDM as applicable and the Disposition within this note     Time User Action Codes Description Comment    10/13/2018 11:11 AM Puneet Guerra [S30 4M5] Ear swelling, right     10/13/2018 11:11 AM Puneet Guerra [H91 90] Hearing impaired       ED Disposition     ED Disposition Condition Comment    Discharge  Sam Moseley discharge to home/self care  Condition at discharge: Stable        Follow-up Information     Follow up With Specialties Details Why Contact Info      In 2 days If symptoms worsen ENT          Patient's Medications   Discharge Prescriptions    NEOMYCIN-POLYMYXIN-HYDROCORTISONE (CORTISPORIN) 0 35%-10,000 UNITS/ML-1% OTIC SUSPENSION    Administer 4 drops to the right ear 3 (three) times a day       Start Date: 10/13/2018End Date: --       Order Dose: 4 drops       Quantity: 10 mL    Refills: 0     No discharge procedures on file      ED Provider  Electronically Signed by           Sally Thomas MD  10/13/18 9129

## 2018-10-13 NOTE — DISCHARGE INSTRUCTIONS
Hearing Loss   WHAT YOU SHOULD KNOW:   Hearing loss means you have trouble hearing or you cannot hear at all in one or both ears  Hearing loss can happen suddenly or slowly over time  AFTER YOU LEAVE:   Follow up with your primary healthcare provider or audiologist as directed:  Write down your questions so you remember to ask them during your visits  Manage your hearing loss:   · Protect your hearing:  Use ear plugs or ear protectors if you do activities that are very loud  These include using a lawnmower and power tools or going to a concert that has loud music  Use well-fitting foam earplugs that completely block your ear canal  Do not listen to loud music through headphones or earphones  · If you have hearing aids, wear them regularly:  Talk to your primary healthcare provider if you are having trouble using your hearing aid  · Consider assistive listening devices (ALDs): Whether you wear hearing aids or not, ALDs can help you hear the TV, phone, and doorbell better  You can use ALDs in places such as classrooms, movie theaters, and museums  · Ask about cochlear implants:  If hearing aids do not help you, talk to your primary healthcare provider  Ask if cochlear implants can help you hear better  These implants are placed in your inner ear during surgery  · Tell people that you have hearing loss:  Ask people to face you directly when they speak to you  Ask people not to cover their mouths as they speak  When you are in a group setting, sit in a location where you can clearly see the faces of the people who are talking  Ask people not to speak loudly or shout when they speak to you if they do this  People should speak using their usual tone and volume  Try to talk to others in a quiet place  Background noise makes it harder for you to hear  · Pay close attention to your surroundings when you drive:  Do not talk to people in your car while you are driving   Watch for problems on the road or approaching emergency vehicles  For support and more information:   · [employer]  [address] , [address] [facility]  Phone: [phone]  Web Address: [employer]  · [facility]  [address], [address] [facility] , [address] [facility]  Phone: [phone]  Web Address: [facility]  org  Contact your primary healthcare provider if:   · You have a fever  · You have ear pain that is getting worse  · You have ringing in your ears or dizziness that will not go away  · You have questions or concerns about your condition or care  Seek care immediately or call 911 if:   · You have fluid, pus, or blood leaking from your ear  · You have sudden, severe hearing loss  © 2014 [address] is for End User's use only and may not be sold, redistributed or otherwise used for commercial purposes  All illustrations and images included in CareNotes® are the copyrighted property of A [employer] "[employer] , Inc  or Rodriguez Terrell  The above information is an  only  It is not intended as medical advice for individual conditions or treatments  Talk to your doctor, nurse or pharmacist before following any medical regimen to see if it is safe and effective for you

## 2018-10-19 ENCOUNTER — TELEPHONE (OUTPATIENT)
Dept: INTERNAL MEDICINE | Facility: CLINIC | Age: 68
End: 2018-10-19

## 2018-10-19 NOTE — TELEPHONE ENCOUNTER
Randyronda states he has been having difficulty with his ears.  It started a few weeks ago (9/25)where he states he had fluid in his Left ear and it became painful so he went to [facility] in [facility] and they prescribed him drops.  Then this past weekend (10/13) he had the same problem with his Right ear and he went to [facility] and they also prescribed him drops.  States that his ears are fine now, but he is concerned since there has been 2 episodes.  Wants to know if Shanti thinks he should follow up with ENT? States he also uses hearing aids.

## 2018-10-22 RX ORDER — ESCITALOPRAM OXALATE 10 MG/1
10 TABLET ORAL DAILY
Qty: 90 TABLET | Refills: 0 | Status: SHIPPED | OUTPATIENT
Start: 2018-10-22 | End: 2019-01-21 | Stop reason: SDUPTHER

## 2018-10-24 ENCOUNTER — OFFICE VISIT (OUTPATIENT)
Dept: THORACIC SURGERY | Facility: CLINIC | Age: 68
End: 2018-10-24
Payer: COMMERCIAL

## 2018-10-24 VITALS
DIASTOLIC BLOOD PRESSURE: 66 MMHG | SYSTOLIC BLOOD PRESSURE: 122 MMHG | WEIGHT: 185.8 LBS | HEART RATE: 60 BPM | BODY MASS INDEX: 29.86 KG/M2 | RESPIRATION RATE: 18 BRPM | OXYGEN SATURATION: 97 % | HEIGHT: 66 IN

## 2018-10-24 DIAGNOSIS — R91.8 MULTIPLE PULMONARY NODULES: ICD-10-CM

## 2018-10-24 DIAGNOSIS — C34.32 PRIMARY MALIGNANT NEOPLASM OF LEFT LOWER LOBE OF LUNG (CMS/HCC): Primary | Chronic | ICD-10-CM

## 2018-10-24 PROCEDURE — 99213 OFFICE O/P EST LOW 20 MIN: CPT | Performed by: THORACIC SURGERY (CARDIOTHORACIC VASCULAR SURGERY)

## 2018-10-24 NOTE — LETTER
2018     SANTOS Porter  [address]  [facility]  [facility] [address]    Patient: Melanie Zelaya   YOB: 1950   Date of Visit: 10/24/2018       Dear Dr. Gutierrez:    Thank you for referring Melanie Zelaya to me for evaluation. Below are my notes for this consultation.    If you have questions, please do not hesitate to call me. I look forward to following your patient along with you.         Sincerely,        Blaise Guallpa MD        CC: MD Ramu Perera Michael J, MD  10/24/2018  2:38 PM  Sign at close encounter  Patient ID: Melanie Zelaya                              : 1950  MRN: [id number]                                            Visit Date: 10/24/2018  Encounter Provider: Blaise Guallpa  Referring Provider: No ref. provider found    Subjective      Patient ID: Melanie Zelaya is a 68 y.o. male.  Chief Complaint: Follow-up (CT Chest)      Melanie Zelaya is a 68 y.o. 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  Nodes were negative.  He had some abnormalities which were likely inflammatory infectious last time so we saw him back in a shorter time interval with a CAT scan.  The present time he has no dyspnea on exertion.  When he was doing heavy exertion had a little dyspnea as he was putting up a fence with his wife.  There is no cough, hemoptysis, weight loss, new persistent bony or neurological complaints.    Past Medical History:  has a past medical history of Anxiety; Atrial fibrillation (CMS/HCC); Depression; Fracture of pelvis (CMS/HCC) (HCC) (); GERD (gastroesophageal reflux disease); Hearing loss; History of colon polyps; Hyperlipidemia; Hypertension; Left atrial enlargement; Lung cancer (CMS/HCC) (HCC); Lung cancer (CMS/HCC) (HCC); Pneumonia (); and Seasonal allergies.  Past Surgical History:  has a  past surgical history that includes Lung lobectomy (Left, 2016); Shoulder surgery (1998); Rotator cuff repair (Right, 2001); Tonsillectomy; and Nasal polyp surgery.  Social History:  reports that he quit smoking about 8 years ago. His smoking use included Cigarettes. He smoked 2.00 packs per day. He has never used smokeless tobacco. He reports that he does not drink alcohol or use drugs.  Family History: family history includes Cirrhosis in his father; Colon cancer in his brother; Diabetes in his mother; Lung cancer in his brother; Prostate cancer in his brother.  Medications:   Current Outpatient Prescriptions:   •  albuterol HFA (PROAIR HFA) 90 mcg/actuation inhaler, Inhale 2 puffs 2 (two) times a day as needed., Disp: , Rfl:   •  atorvastatin (LIPITOR) 40 mg tablet, Take 1 tablet (40 mg total) by mouth daily., Disp: 90 tablet, Rfl: 2  •  cetirizine (ZyrTEC) 10 mg tablet, Take 1 tablet (10 mg total) by mouth daily., Disp: 30 tablet, Rfl: 6  •  dabigatran etexilate (PRADAXA) 150 mg capsu, Take 150 mg by mouth 2 (two) times a day., Disp: , Rfl:   •  DOCOSAHEXANOIC ACID/EPA (FISH OIL ORAL), Take 4 capsules by mouth daily. OTC suppliement , Disp: , Rfl:   •  escitalopram (LEXAPRO) 10 mg tablet, Take 1 tablet (10 mg total) by mouth daily., Disp: 90 tablet, Rfl: 0  •  fluticasone (FLONASE) 50 mcg/actuation nasal spray, Administer 2 sprays into each nostril daily., Disp: 3 Bottle, Rfl: 2  •  metoprolol tartrate (LOPRESSOR) 50 mg tablet, TAKE ONE TABLET BY MOUTH ONCE DAILY IN THE MORNING (50MG) AND ONE-HALF(25MG) ONCE DAILY IN THE EVENING, Disp: 135 tablet, Rfl: 3  •  multivitamin (THERAGRAN) tablet, Take 1 tablet by mouth daily., Disp: , Rfl:   •  ranitidine (ZANTAC) 150 mg tablet, Take 150 mg by mouth 2 (two) times a day., Disp: , Rfl:   •  umeclidinium-vilanterol (ANORO ELLIPTA) 62.5-25 mcg/actuation blister with device, Inhale 1 puff daily., Disp: , Rfl:   •  valsartan-hydrochlorothiazide (DIOVAN-HCT) 320-25 mg per  "tablet, Take 1 tablet by mouth daily., Disp: , Rfl:   •  ALPRAZolam (XANAX) 0.5 mg tablet, Take 1 tablet (0.5 mg total) by mouth 2 (two) times a day as needed for anxiety. (Patient not taking: Reported on 10/24/2018 ), Disp: 30 tablet, Rfl: 1  Allergies: is allergic to no known allergies.        The following have been reviewed and updated as appropriate in this visit:     Allergies  Meds  Problems       Review of Systems   All other systems reviewed and are negative.      Objective   Vitals: /66 (BP Location: Left upper arm, Patient Position: Sitting)   Pulse 60   Resp 18   Ht 1.676 m (5' 6\")   Wt 84.3 kg (185 lb 12.8 oz)   SpO2 97%   BMI 29.99 kg/m²      Physical Exam   Constitutional: He appears well-developed and well-nourished.   HENT:   Head: Normocephalic.   Eyes: EOM are normal. Pupils are equal, round, and reactive to light.   Cardiovascular: Normal rate and regular rhythm.    Pulmonary/Chest: Effort normal and breath sounds normal.   Musculoskeletal: Normal range of motion.   Neurological: He is alert.   Skin: Skin is warm and dry.   Psychiatric: He has a normal mood and affect.   Vitals reviewed.      Assessment/Plan   Independent review of all imaging was done by myself as well as review of the radiologists readings and comparison to prior films.  Recent CAT scan performed October 11 shows improvement in the tree in bud nodularity of the remaining upper lobe.  There is a little bit of a central nodule which may be bigger but this could be a coalescence of the inflammatory abnormalities but will still have to keep an eye on it.  There is to minuscule groundglass opacities that developed on the right side which are almost certainly inflammatory infectious.    We will see him back in 4 months with another CAT scan of the chest without contrast mainly to follow the left upper lobe nodule       Problem List Items Addressed This Visit     Primary malignant neoplasm of left lower lobe of lung " (CMS/HCC) (HCC) - Primary (Chronic)    Relevant Orders    CT CHEST WITHOUT IV CONTRAST    Multiple pulmonary nodules            Blaise Guallpa MD

## 2018-10-24 NOTE — PROGRESS NOTES
Patient ID: Melanie Zelaya                              : 1950  MRN: [id number]                                            Visit Date: 10/24/2018  Encounter Provider: Blaise Guallpa  Referring Provider: No ref. provider found    Subjective      Patient ID: Melanie Zelaya is a 68 y.o. male.  Chief Complaint: Follow-up (CT Chest)      Melanie Zelaya is a 68 y.o. 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  Nodes were negative.  He had some abnormalities which were likely inflammatory infectious last time so we saw him back in a shorter time interval with a CAT scan.  The present time he has no dyspnea on exertion.  When he was doing heavy exertion had a little dyspnea as he was putting up a fence with his wife.  There is no cough, hemoptysis, weight loss, new persistent bony or neurological complaints.    Past Medical History:  has a past medical history of Anxiety; Atrial fibrillation (CMS/HCC); Depression; Fracture of pelvis (CMS/HCC) (HCC) (); GERD (gastroesophageal reflux disease); Hearing loss; History of colon polyps; Hyperlipidemia; Hypertension; Left atrial enlargement; Lung cancer (CMS/HCC) (HCC); Lung cancer (CMS/HCC) (HCC); Pneumonia (); and Seasonal allergies.  Past Surgical History:  has a past surgical history that includes Lung lobectomy (Left, ); Shoulder surgery (); Rotator cuff repair (Right, ); Tonsillectomy; and Nasal polyp surgery.  Social History:  reports that he quit smoking about 8 years ago. His smoking use included Cigarettes. He smoked 2.00 packs per day. He has never used smokeless tobacco. He reports that he does not drink alcohol or use drugs.  Family History: family history includes Cirrhosis in his father; Colon cancer in his brother; Diabetes in his mother; Lung cancer in his brother; Prostate cancer in his  brother.  Medications:   Current Outpatient Prescriptions:   •  albuterol HFA (PROAIR HFA) 90 mcg/actuation inhaler, Inhale 2 puffs 2 (two) times a day as needed., Disp: , Rfl:   •  atorvastatin (LIPITOR) 40 mg tablet, Take 1 tablet (40 mg total) by mouth daily., Disp: 90 tablet, Rfl: 2  •  cetirizine (ZyrTEC) 10 mg tablet, Take 1 tablet (10 mg total) by mouth daily., Disp: 30 tablet, Rfl: 6  •  dabigatran etexilate (PRADAXA) 150 mg capsu, Take 150 mg by mouth 2 (two) times a day., Disp: , Rfl:   •  DOCOSAHEXANOIC ACID/EPA (FISH OIL ORAL), Take 4 capsules by mouth daily. OTC suppliement , Disp: , Rfl:   •  escitalopram (LEXAPRO) 10 mg tablet, Take 1 tablet (10 mg total) by mouth daily., Disp: 90 tablet, Rfl: 0  •  fluticasone (FLONASE) 50 mcg/actuation nasal spray, Administer 2 sprays into each nostril daily., Disp: 3 Bottle, Rfl: 2  •  metoprolol tartrate (LOPRESSOR) 50 mg tablet, TAKE ONE TABLET BY MOUTH ONCE DAILY IN THE MORNING (50MG) AND ONE-HALF(25MG) ONCE DAILY IN THE EVENING, Disp: 135 tablet, Rfl: 3  •  multivitamin (THERAGRAN) tablet, Take 1 tablet by mouth daily., Disp: , Rfl:   •  ranitidine (ZANTAC) 150 mg tablet, Take 150 mg by mouth 2 (two) times a day., Disp: , Rfl:   •  umeclidinium-vilanterol (ANORO ELLIPTA) 62.5-25 mcg/actuation blister with device, Inhale 1 puff daily., Disp: , Rfl:   •  valsartan-hydrochlorothiazide (DIOVAN-HCT) 320-25 mg per tablet, Take 1 tablet by mouth daily., Disp: , Rfl:   •  ALPRAZolam (XANAX) 0.5 mg tablet, Take 1 tablet (0.5 mg total) by mouth 2 (two) times a day as needed for anxiety. (Patient not taking: Reported on 10/24/2018 ), Disp: 30 tablet, Rfl: 1  Allergies: is allergic to no known allergies.        The following have been reviewed and updated as appropriate in this visit:     Allergies  Meds  Problems       Review of Systems   All other systems reviewed and are negative.      Objective   Vitals: /66 (BP Location: Left upper arm, Patient Position:  "Sitting)   Pulse 60   Resp 18   Ht 1.676 m (5' 6\")   Wt 84.3 kg (185 lb 12.8 oz)   SpO2 97%   BMI 29.99 kg/m²     Physical Exam   Constitutional: He appears well-developed and well-nourished.   HENT:   Head: Normocephalic.   Eyes: EOM are normal. Pupils are equal, round, and reactive to light.   Cardiovascular: Normal rate and regular rhythm.    Pulmonary/Chest: Effort normal and breath sounds normal.   Musculoskeletal: Normal range of motion.   Neurological: He is alert.   Skin: Skin is warm and dry.   Psychiatric: He has a normal mood and affect.   Vitals reviewed.      Assessment/Plan   Independent review of all imaging was done by myself as well as review of the radiologists readings and comparison to prior films.  Recent CAT scan performed October 11 shows improvement in the tree in bud nodularity of the remaining upper lobe.  There is a little bit of a central nodule which may be bigger but this could be a coalescence of the inflammatory abnormalities but will still have to keep an eye on it.  There is to minuscule groundglass opacities that developed on the right side which are almost certainly inflammatory infectious.    We will see him back in 4 months with another CAT scan of the chest without contrast mainly to follow the left upper lobe nodule       Problem List Items Addressed This Visit     Primary malignant neoplasm of left lower lobe of lung (CMS/HCC) (HCC) - Primary (Chronic)    Relevant Orders    CT CHEST WITHOUT IV CONTRAST    Multiple pulmonary nodules            Blaise Guallpa MD  "

## 2018-10-25 RX ORDER — ALPRAZOLAM 0.5 MG/1
0.5 TABLET ORAL 2 TIMES DAILY PRN
Qty: 30 TABLET | Refills: 1 | Status: SHIPPED | OUTPATIENT
Start: 2018-10-25 | End: 2020-10-13 | Stop reason: SDUPTHER

## 2019-01-11 ENCOUNTER — TELEPHONE (OUTPATIENT)
Dept: INTERNAL MEDICINE | Facility: CLINIC | Age: 69
End: 2019-01-11

## 2019-01-11 NOTE — TELEPHONE ENCOUNTER
Melanie called and said that since the valsartan is recalled his Cardiologist prescribed Ibersartan/hctz 300-12.5mg in its place.

## 2019-01-21 RX ORDER — ESCITALOPRAM OXALATE 10 MG/1
10 TABLET ORAL DAILY
Qty: 90 TABLET | Refills: 0 | Status: SHIPPED | OUTPATIENT
Start: 2019-01-21 | End: 2019-01-29 | Stop reason: SDUPTHER

## 2019-01-21 NOTE — TELEPHONE ENCOUNTER
Also, Melanie is looking for clarification on his metoprolol.  He believes he is supposed to take 25 mg in the morning and 25 mg in the evening.  Prescription directions state for him to take 50 mg in the morning and 25 mg in the evening.

## 2019-01-29 RX ORDER — ESCITALOPRAM OXALATE 10 MG/1
10 TABLET ORAL DAILY
Qty: 90 TABLET | Refills: 0 | Status: SHIPPED | OUTPATIENT
Start: 2019-01-29 | End: 2019-04-19 | Stop reason: SDUPTHER

## 2019-01-29 RX ORDER — ATORVASTATIN CALCIUM 40 MG/1
40 TABLET, FILM COATED ORAL DAILY
Qty: 90 TABLET | Refills: 2 | Status: SHIPPED | OUTPATIENT
Start: 2019-01-29 | End: 2020-01-20 | Stop reason: SDUPTHER

## 2019-01-29 NOTE — TELEPHONE ENCOUNTER
[facility] pharmacy called asking for refill for patient.       LOV: 8/23/18.    Labs: 09/10/18.

## 2019-01-31 LAB
ALBUMIN SERPL-MCNC: 4.7 G/DL (ref 3.6–5.1)
ALBUMIN/GLOB SERPL: 2.1 (CALC) (ref 1–2.5)
ALP SERPL-CCNC: 62 U/L (ref 40–115)
ALT SERPL-CCNC: 23 U/L (ref 9–46)
AST SERPL-CCNC: 23 U/L (ref 10–35)
BILIRUB SERPL-MCNC: 1 MG/DL (ref 0.2–1.2)
BUN SERPL-MCNC: 23 MG/DL (ref 7–25)
BUN/CREAT SERPL: ABNORMAL (CALC) (ref 6–22)
CALCIUM SERPL-MCNC: 9.6 MG/DL (ref 8.6–10.3)
CHLORIDE SERPL-SCNC: 104 MMOL/L (ref 98–110)
CHOLEST SERPL-MCNC: 140 MG/DL
CHOLEST/HDLC SERPL: 3.6 (CALC)
CO2 SERPL-SCNC: 30 MMOL/L (ref 20–32)
CREAT SERPL-MCNC: 1.04 MG/DL (ref 0.7–1.18)
GLOBULIN SER CALC-MCNC: 2.2 G/DL (CALC) (ref 1.9–3.7)
GLUCOSE SERPL-MCNC: 110 MG/DL (ref 65–99)
HDLC SERPL-MCNC: 39 MG/DL
LDLC SERPL CALC-MCNC: 79 MG/DL (CALC)
NONHDLC SERPL-MCNC: 101 MG/DL (CALC)
POTASSIUM SERPL-SCNC: 4.5 MMOL/L (ref 3.5–5.3)
PROT SERPL-MCNC: 6.9 G/DL (ref 6.1–8.1)
QUEST EGFR NON-AFR. AMERICAN: 72 ML/MIN/1.73M2
SODIUM SERPL-SCNC: 142 MMOL/L (ref 135–146)
TRIGL SERPL-MCNC: 119 MG/DL

## 2019-01-31 NOTE — PROGRESS NOTES
Please tell Melanie that his labs show his sugar is higher than what it was before. The rest of the labs are stable. Recommend he follow up with Dr. Guallpa and me as scheduled in Feb.

## 2019-02-01 ENCOUNTER — TELEPHONE (OUTPATIENT)
Dept: INTERNAL MEDICINE | Facility: CLINIC | Age: 69
End: 2019-02-01

## 2019-02-01 NOTE — TELEPHONE ENCOUNTER
----- Message from SANTOS Porter sent at 1/31/2019  7:57 AM EST -----  Please tell Melanie that his labs show his sugar is higher than what it was before. The rest of the labs are stable. Recommend he follow up with Dr. Guallpa and me as scheduled in Feb.

## 2019-02-11 ENCOUNTER — HOSPITAL ENCOUNTER (OUTPATIENT)
Dept: RADIOLOGY | Age: 69
Discharge: HOME | End: 2019-02-11
Attending: THORACIC SURGERY (CARDIOTHORACIC VASCULAR SURGERY)
Payer: COMMERCIAL

## 2019-02-11 DIAGNOSIS — C34.32 PRIMARY MALIGNANT NEOPLASM OF LEFT LOWER LOBE OF LUNG (CMS/HCC): Chronic | ICD-10-CM

## 2019-02-11 PROCEDURE — 71250 CT THORAX DX C-: CPT

## 2019-02-13 ENCOUNTER — OFFICE VISIT (OUTPATIENT)
Dept: THORACIC SURGERY | Facility: CLINIC | Age: 69
End: 2019-02-13
Payer: COMMERCIAL

## 2019-02-13 VITALS
DIASTOLIC BLOOD PRESSURE: 64 MMHG | WEIGHT: 190 LBS | RESPIRATION RATE: 18 BRPM | TEMPERATURE: 97.6 F | OXYGEN SATURATION: 97 % | HEIGHT: 66 IN | HEART RATE: 58 BPM | BODY MASS INDEX: 30.53 KG/M2 | SYSTOLIC BLOOD PRESSURE: 124 MMHG

## 2019-02-13 DIAGNOSIS — C34.32 PRIMARY MALIGNANT NEOPLASM OF LEFT LOWER LOBE OF LUNG (CMS/HCC): Primary | Chronic | ICD-10-CM

## 2019-02-13 PROCEDURE — 99213 OFFICE O/P EST LOW 20 MIN: CPT | Performed by: THORACIC SURGERY (CARDIOTHORACIC VASCULAR SURGERY)

## 2019-02-13 RX ORDER — IRBESARTAN AND HYDROCHLOROTHIAZIDE 300; 12.5 MG/1; MG/1
1 TABLET, FILM COATED ORAL
Refills: 3 | COMMUNITY
Start: 2019-02-01 | End: 2020-11-12 | Stop reason: ALTCHOICE

## 2019-02-13 NOTE — PROGRESS NOTES
Patient ID: Melanie Zelaya                              : 1950  MRN: [id number]                                            Visit Date: 2019  Encounter Provider: Blaise Guallpa  Referring Provider: No ref. provider found    Subjective      Patient ID: Melanie Zelaya is a 68 y.o. male.  Chief Complaint: Follow-up (CT Chest)      Melanie Zelaya is a 68 y.o. 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  He had a new inferior small spiculated lesion for which we had a shorter term 4-month follow-up with imaging.  He has no cough, hemoptysis, weight loss, new persistent neurologic or bony complaints.  There is some dyspnea with exertion and is gaining weight over the wintertime    Past Medical History:  has a past medical history of Anxiety; Atrial fibrillation (CMS/HCC); Depression; Fracture of pelvis (CMS/HCC) (HCC) (); GERD (gastroesophageal reflux disease); Hearing loss; History of colon polyps; Hyperlipidemia; Hypertension; Left atrial enlargement; Lung cancer (CMS/HCC) (HCC); Lung cancer (CMS/HCC) (HCC); Pneumonia (); and Seasonal allergies.  Past Surgical History:  has a past surgical history that includes Lung lobectomy (Left, ); Shoulder surgery (); Rotator cuff repair (Right, ); Tonsillectomy; and Nasal polyp surgery.  Social History:  reports that he quit smoking about 8 years ago. His smoking use included Cigarettes. He smoked 2.00 packs per day. He has never used smokeless tobacco. He reports that he does not drink alcohol or use drugs.  Family History: family history includes Cirrhosis in his father; Colon cancer in his brother; Diabetes in his mother; Lung cancer in his brother; Prostate cancer in his brother.  Medications:   Current Outpatient Prescriptions:   •  ALPRAZolam (XANAX) 0.5 mg tablet, Take 1 tablet (0.5 mg total)  "by mouth 2 (two) times a day as needed for anxiety., Disp: 30 tablet, Rfl: 1  •  atorvastatin (LIPITOR) 40 mg tablet, Take 1 tablet (40 mg total) by mouth daily., Disp: 90 tablet, Rfl: 2  •  cetirizine (ZyrTEC) 10 mg tablet, Take 1 tablet (10 mg total) by mouth daily., Disp: 30 tablet, Rfl: 6  •  dabigatran etexilate (PRADAXA) 150 mg capsu, Take 150 mg by mouth 2 (two) times a day., Disp: , Rfl:   •  DOCOSAHEXANOIC ACID/EPA (FISH OIL ORAL), Take 4 capsules by mouth daily. OTC suppliement , Disp: , Rfl:   •  escitalopram (LEXAPRO) 10 mg tablet, Take 1 tablet (10 mg total) by mouth daily., Disp: 90 tablet, Rfl: 0  •  fluticasone (FLONASE) 50 mcg/actuation nasal spray, Administer 2 sprays into each nostril daily., Disp: 3 Bottle, Rfl: 2  •  irbesartan-hydrochlorothiazide (AVALIDE) 300-12.5 mg per tablet, Take 1 tablet by mouth once daily., Disp: , Rfl: 3  •  metoprolol tartrate (LOPRESSOR) 50 mg tablet, TAKE ONE TABLET BY MOUTH ONCE DAILY IN THE MORNING (50MG) AND ONE-HALF(25MG) ONCE DAILY IN THE EVENING, Disp: 135 tablet, Rfl: 3  •  multivitamin (THERAGRAN) tablet, Take 1 tablet by mouth daily., Disp: , Rfl:   •  ranitidine (ZANTAC) 150 mg tablet, Take 150 mg by mouth 2 (two) times a day., Disp: , Rfl:   •  umeclidinium-vilanterol (ANORO ELLIPTA) 62.5-25 mcg/actuation blister with device, Inhale 1 puff daily., Disp: , Rfl:   •  albuterol HFA (PROAIR HFA) 90 mcg/actuation inhaler, Inhale 2 puffs 2 (two) times a day as needed., Disp: , Rfl:   Allergies: is allergic to no known allergies.        The following have been reviewed and updated as appropriate in this visit:     Allergies  Meds  Problems       Review of Systems   All other systems reviewed and are negative.      Objective   Vitals: /64 (BP Location: Right upper arm, Patient Position: Sitting)   Pulse (!) 58   Temp 36.4 °C (97.6 °F) (Oral)   Resp 18   Ht 1.676 m (5' 6\")   Wt 86.2 kg (190 lb)   SpO2 97%   BMI 30.67 kg/m²     Physical Exam "   Constitutional: He appears well-developed and well-nourished.   HENT:   Head: Normocephalic.   Eyes: EOM are normal. Pupils are equal, round, and reactive to light.   Cardiovascular: Normal rate and regular rhythm.    Pulmonary/Chest: Effort normal and breath sounds normal.   Musculoskeletal: Normal range of motion.   Neurological: He is alert.   Skin: Skin is warm and dry.   Psychiatric: He has a normal mood and affect.   Vitals reviewed.      Assessment/Plan   Independent review of all imaging was done by myself as well as review of the radiologists readings and comparison to prior films.  CAT scan of the chest on February 11 shows resolution of the inferior left nodule.  There is a little atelectasis versus a minuscule groundglass opacity of the right lower lobe.  There is no evidence recurrence.  As per NCC and guidelines we will go back to yearly imaging and see him back with a CAT scan of the chest without contrast at that time.       Problem List Items Addressed This Visit     Primary malignant neoplasm of left lower lobe of lung (CMS/HCC) (HCC) - Primary (Chronic)    Relevant Orders    CT CHEST WITHOUT IV CONTRAST          Blaise Guallpa MD

## 2019-02-13 NOTE — LETTER
2019     SANTOS Porter  [address]  [facility]  [facility] [address]    Patient: Melanie Zelaya   YOB: 1950   Date of Visit: 2019       Dear Dr. Gutierrez:    Thank you for referring Melanie Zelaya to me for evaluation. Below are my notes for this consultation.    If you have questions, please do not hesitate to call me. I look forward to following your patient along with you.         Sincerely,        Blaise Guallpa MD        CC: MD Ramu Perera Michael J, MD  2019  1:16 PM  Sign at close encounter  Patient ID: Melanie Zelaya                              : 1950  MRN: [id number]                                            Visit Date: 2019  Encounter Provider: Blaise Guallpa  Referring Provider: No ref. provider found    Subjective      Patient ID: Melanie Zelaya is a 68 y.o. male.  Chief Complaint: Follow-up (CT Chest)      Melanie Zelaya is a 68 y.o. 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  He had a new inferior small spiculated lesion for which we had a shorter term 4-month follow-up with imaging.  He has no cough, hemoptysis, weight loss, new persistent neurologic or bony complaints.  There is some dyspnea with exertion and is gaining weight over the wintertime    Past Medical History:  has a past medical history of Anxiety; Atrial fibrillation (CMS/HCC); Depression; Fracture of pelvis (CMS/HCC) (HCC) (); GERD (gastroesophageal reflux disease); Hearing loss; History of colon polyps; Hyperlipidemia; Hypertension; Left atrial enlargement; Lung cancer (CMS/HCC) (HCC); Lung cancer (CMS/HCC) (HCC); Pneumonia (); and Seasonal allergies.  Past Surgical History:  has a past surgical history that includes Lung lobectomy (Left, ); Shoulder surgery (); Rotator cuff repair  (Right, 2001); Tonsillectomy; and Nasal polyp surgery.  Social History:  reports that he quit smoking about 8 years ago. His smoking use included Cigarettes. He smoked 2.00 packs per day. He has never used smokeless tobacco. He reports that he does not drink alcohol or use drugs.  Family History: family history includes Cirrhosis in his father; Colon cancer in his brother; Diabetes in his mother; Lung cancer in his brother; Prostate cancer in his brother.  Medications:   Current Outpatient Prescriptions:   •  ALPRAZolam (XANAX) 0.5 mg tablet, Take 1 tablet (0.5 mg total) by mouth 2 (two) times a day as needed for anxiety., Disp: 30 tablet, Rfl: 1  •  atorvastatin (LIPITOR) 40 mg tablet, Take 1 tablet (40 mg total) by mouth daily., Disp: 90 tablet, Rfl: 2  •  cetirizine (ZyrTEC) 10 mg tablet, Take 1 tablet (10 mg total) by mouth daily., Disp: 30 tablet, Rfl: 6  •  dabigatran etexilate (PRADAXA) 150 mg capsu, Take 150 mg by mouth 2 (two) times a day., Disp: , Rfl:   •  DOCOSAHEXANOIC ACID/EPA (FISH OIL ORAL), Take 4 capsules by mouth daily. OTC suppliement , Disp: , Rfl:   •  escitalopram (LEXAPRO) 10 mg tablet, Take 1 tablet (10 mg total) by mouth daily., Disp: 90 tablet, Rfl: 0  •  fluticasone (FLONASE) 50 mcg/actuation nasal spray, Administer 2 sprays into each nostril daily., Disp: 3 Bottle, Rfl: 2  •  irbesartan-hydrochlorothiazide (AVALIDE) 300-12.5 mg per tablet, Take 1 tablet by mouth once daily., Disp: , Rfl: 3  •  metoprolol tartrate (LOPRESSOR) 50 mg tablet, TAKE ONE TABLET BY MOUTH ONCE DAILY IN THE MORNING (50MG) AND ONE-HALF(25MG) ONCE DAILY IN THE EVENING, Disp: 135 tablet, Rfl: 3  •  multivitamin (THERAGRAN) tablet, Take 1 tablet by mouth daily., Disp: , Rfl:   •  ranitidine (ZANTAC) 150 mg tablet, Take 150 mg by mouth 2 (two) times a day., Disp: , Rfl:   •  umeclidinium-vilanterol (ANORO ELLIPTA) 62.5-25 mcg/actuation blister with device, Inhale 1 puff daily., Disp: , Rfl:   •  albuterol HFA (PROAIR  "HFA) 90 mcg/actuation inhaler, Inhale 2 puffs 2 (two) times a day as needed., Disp: , Rfl:   Allergies: is allergic to no known allergies.        The following have been reviewed and updated as appropriate in this visit:     Allergies  Meds  Problems       Review of Systems   All other systems reviewed and are negative.      Objective   Vitals: /64 (BP Location: Right upper arm, Patient Position: Sitting)   Pulse (!) 58   Temp 36.4 °C (97.6 °F) (Oral)   Resp 18   Ht 1.676 m (5' 6\")   Wt 86.2 kg (190 lb)   SpO2 97%   BMI 30.67 kg/m²      Physical Exam   Constitutional: He appears well-developed and well-nourished.   HENT:   Head: Normocephalic.   Eyes: EOM are normal. Pupils are equal, round, and reactive to light.   Cardiovascular: Normal rate and regular rhythm.    Pulmonary/Chest: Effort normal and breath sounds normal.   Musculoskeletal: Normal range of motion.   Neurological: He is alert.   Skin: Skin is warm and dry.   Psychiatric: He has a normal mood and affect.   Vitals reviewed.      Assessment/Plan   Independent review of all imaging was done by myself as well as review of the radiologists readings and comparison to prior films.  CAT scan of the chest on February 11 shows resolution of the inferior left nodule.  There is a little atelectasis versus a minuscule groundglass opacity of the right lower lobe.  There is no evidence recurrence.  As per NCC and guidelines we will go back to yearly imaging and see him back with a CAT scan of the chest without contrast at that time.       Problem List Items Addressed This Visit     Primary malignant neoplasm of left lower lobe of lung (CMS/HCC) (HCC) - Primary (Chronic)    Relevant Orders    CT CHEST WITHOUT IV CONTRAST          Blaise Guallpa MD                 "

## 2019-02-21 ENCOUNTER — OFFICE VISIT (OUTPATIENT)
Dept: INTERNAL MEDICINE | Facility: CLINIC | Age: 69
End: 2019-02-21
Payer: COMMERCIAL

## 2019-02-21 VITALS
RESPIRATION RATE: 16 BRPM | HEART RATE: 74 BPM | DIASTOLIC BLOOD PRESSURE: 80 MMHG | HEIGHT: 66 IN | SYSTOLIC BLOOD PRESSURE: 146 MMHG | WEIGHT: 196 LBS | OXYGEN SATURATION: 97 % | TEMPERATURE: 97.8 F | BODY MASS INDEX: 31.5 KG/M2

## 2019-02-21 DIAGNOSIS — Z86.0100 HISTORY OF COLON POLYPS: Chronic | ICD-10-CM

## 2019-02-21 DIAGNOSIS — F41.9 ANXIETY: ICD-10-CM

## 2019-02-21 DIAGNOSIS — I48.0 PAROXYSMAL ATRIAL FIBRILLATION (CMS/HCC): Chronic | ICD-10-CM

## 2019-02-21 DIAGNOSIS — R73.09 INCREASED GLUCOSE LEVEL: Chronic | ICD-10-CM

## 2019-02-21 DIAGNOSIS — Z11.59 NEED FOR HEPATITIS C SCREENING TEST: ICD-10-CM

## 2019-02-21 DIAGNOSIS — Z00.00 ENCOUNTER FOR GENERAL ADULT MEDICAL EXAMINATION WITHOUT ABNORMAL FINDINGS: Primary | ICD-10-CM

## 2019-02-21 DIAGNOSIS — E78.2 MIXED HYPERLIPIDEMIA: Chronic | ICD-10-CM

## 2019-02-21 DIAGNOSIS — C34.32 PRIMARY MALIGNANT NEOPLASM OF LEFT LOWER LOBE OF LUNG (CMS/HCC): Chronic | ICD-10-CM

## 2019-02-21 DIAGNOSIS — J43.8 OTHER EMPHYSEMA (CMS/HCC): ICD-10-CM

## 2019-02-21 DIAGNOSIS — I50.32 CHRONIC DIASTOLIC HEART FAILURE (CMS/HCC): Chronic | ICD-10-CM

## 2019-02-21 DIAGNOSIS — I10 ESSENTIAL HYPERTENSION: Chronic | ICD-10-CM

## 2019-02-21 DIAGNOSIS — R73.9 HIGH BLOOD SUGAR: Chronic | ICD-10-CM

## 2019-02-21 DIAGNOSIS — K21.9 GASTROESOPHAGEAL REFLUX DISEASE WITHOUT ESOPHAGITIS: Chronic | ICD-10-CM

## 2019-02-21 PROCEDURE — 99397 PER PM REEVAL EST PAT 65+ YR: CPT | Performed by: NURSE PRACTITIONER

## 2019-02-21 RX ORDER — METOPROLOL TARTRATE 50 MG/1
50 TABLET ORAL DAILY
Qty: 135 TABLET | Refills: 3 | Status: SHIPPED | OUTPATIENT
Start: 2019-02-21 | End: 2019-02-21 | Stop reason: SDUPTHER

## 2019-02-21 RX ORDER — METOPROLOL TARTRATE 50 MG/1
TABLET ORAL
Qty: 135 TABLET | Refills: 3 | Status: SHIPPED | OUTPATIENT
Start: 2019-02-21 | End: 2019-10-22

## 2019-02-21 ASSESSMENT — ENCOUNTER SYMPTOMS
ACTIVITY CHANGE: 0
ABDOMINAL PAIN: 0
FEVER: 0
HYPERTENSION: 1
VOMITING: 0
PALPITATIONS: 0
SORE THROAT: 0
NAUSEA: 0
FREQUENCY: 0
COLOR CHANGE: 0
SHORTNESS OF BREATH: 0
HEADACHES: 0
ADENOPATHY: 0
LIGHT-HEADEDNESS: 0
COUGH: 0

## 2019-02-21 ASSESSMENT — VISUAL ACUITY
OD_CC: 20/20
OS_CC: 20/25

## 2019-02-21 NOTE — ASSESSMENT & PLAN NOTE
Anticipatory guidance was given and reviewed.  Immunizations were addressed and reviewed today as well.  Advised to continue with healthy diet and increased exercise.  Will send Hep C test out today as he is due.  Next PE due in one year.

## 2019-02-21 NOTE — ASSESSMENT & PLAN NOTE
Stable.  Rate controlled.  Taking and tolerating pradaxa.  Patient advised and agreed to follow low salt diet (less than 1500mg per day), increase exercise and continue with current medications. Advised and agreed to get labs and RTO as discussed.

## 2019-02-21 NOTE — ASSESSMENT & PLAN NOTE
Higher than before. Agree to getting back on track.  Patient advised to keep the fat to less than 30 g per day, the sugars to less than 30 g per day and the carbohydrates to less than 300 g per day. Encouraged to add 30-40 minutes of walking or exercise at least 3-4 times per week.  We reviewed how to read food labels.  I wrote down what to look for and gave written instructions on how to read food labels.  Patient stated adequate understanding and all questions were answered.

## 2019-02-21 NOTE — ASSESSMENT & PLAN NOTE
Controlled on current regimen.  Patient advised and agrees to continue with current medication regimen.  Patient is aware to let me know if any side effects develop.   Patient aware to let me know if there is any thoughts of harming themselves or others.  Patient understands and is agreeable to the treatment plan.  All questions were answered.

## 2019-02-21 NOTE — ASSESSMENT & PLAN NOTE
Patient advised and agrees to continue with current medication regimen.  Patient is aware to let me know if any side effects develop.  Encouraged to follow-up with the therapist as well.  Patient aware to let me know if there is any thoughts of harming themselves or others.  Patient understands and is agreeable to the treatment plan.  All questions were answered.

## 2019-02-21 NOTE — ASSESSMENT & PLAN NOTE
Reviewed the most recent lab test results.  Advised to follow a low-fat diet.  We talked about avoiding fried, fatty foods as well as any red meat.  Advised keeping the fat to less than 30 g per day.  We discussed reading food labels.  I gave written instructions on how to read food labels.
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
## 2019-02-26 DIAGNOSIS — R76.8 POSITIVE HEPATITIS C ANTIBODY TEST: Primary | ICD-10-CM

## 2019-02-26 LAB
HCV AB S/CO SERPL IA: 7.57
HCV AB SERPL QL IA: REACTIVE
HCV RNA SERPL NAA+PROBE-ACNC: NORMAL IU/ML
HCV RNA SERPL NAA+PROBE-LOG IU: NORMAL LOG IU/ML
QUEST (ALWAYS MESSAGE): NORMAL

## 2019-02-27 NOTE — PROGRESS NOTES
Please tell Melanie that his Hepatitis C test (HCV RNA PCR QN ) is negative.   (FYI-initial Hep C screen test was reactive, but confirmation test was negative.LFT's are normal.  Will repeat the Hep C test in one year)

## 2019-02-28 ENCOUNTER — TELEPHONE (OUTPATIENT)
Dept: INTERNAL MEDICINE | Facility: CLINIC | Age: 69
End: 2019-02-28

## 2019-03-15 RX ORDER — CETIRIZINE HYDROCHLORIDE 10 MG/1
10 TABLET ORAL DAILY
Qty: 90 TABLET | Refills: 1 | Status: SHIPPED | OUTPATIENT
Start: 2019-03-15 | End: 2019-09-13 | Stop reason: SDUPTHER

## 2019-04-19 RX ORDER — ESCITALOPRAM OXALATE 10 MG/1
10 TABLET ORAL DAILY
Qty: 90 TABLET | Refills: 0 | Status: SHIPPED | OUTPATIENT
Start: 2019-04-19 | End: 2019-07-25 | Stop reason: SDUPTHER

## 2019-05-06 RX ORDER — METOPROLOL TARTRATE 50 MG/1
TABLET ORAL
Qty: 135 TABLET | Refills: 3 | Status: SHIPPED | OUTPATIENT
Start: 2019-05-06 | End: 2019-09-04 | Stop reason: SDUPTHER

## 2019-07-25 RX ORDER — ESCITALOPRAM OXALATE 10 MG/1
10 TABLET ORAL DAILY
Qty: 90 TABLET | Refills: 0 | Status: SHIPPED | OUTPATIENT
Start: 2019-07-25 | End: 2019-10-21 | Stop reason: SDUPTHER

## 2019-07-25 NOTE — TELEPHONE ENCOUNTER
Medicine Refill Request    Last Office Visit: 2/21/2019  Next Office Visit: 8/21/2019        Current Outpatient Prescriptions:   •  albuterol HFA (PROAIR HFA) 90 mcg/actuation inhaler, Inhale 2 puffs 2 (two) times a day as needed., Disp: , Rfl:   •  ALPRAZolam (XANAX) 0.5 mg tablet, Take 1 tablet (0.5 mg total) by mouth 2 (two) times a day as needed for anxiety., Disp: 30 tablet, Rfl: 1  •  atorvastatin (LIPITOR) 40 mg tablet, Take 1 tablet (40 mg total) by mouth daily., Disp: 90 tablet, Rfl: 2  •  cetirizine (ZyrTEC) 10 mg tablet, Take 1 tablet (10 mg total) by mouth daily., Disp: 90 tablet, Rfl: 1  •  dabigatran etexilate (PRADAXA) 150 mg capsu, Take 150 mg by mouth 2 (two) times a day., Disp: , Rfl:   •  DOCOSAHEXANOIC ACID/EPA (FISH OIL ORAL), Take 4 capsules by mouth daily. OTC suppliement , Disp: , Rfl:   •  escitalopram (LEXAPRO) 10 mg tablet, Take 1 tablet (10 mg total) by mouth daily., Disp: 90 tablet, Rfl: 0  •  fluticasone (FLONASE) 50 mcg/actuation nasal spray, Administer 2 sprays into each nostril daily., Disp: 3 Bottle, Rfl: 2  •  irbesartan-hydrochlorothiazide (AVALIDE) 300-12.5 mg per tablet, Take 1 tablet by mouth once daily., Disp: , Rfl: 3  •  metoprolol tartrate (LOPRESSOR) 50 mg tablet, Take one tab (50mg ) and then one-half (25mg) tab at hs, Disp: 135 tablet, Rfl: 3  •  metoprolol tartrate (LOPRESSOR) 50 mg tablet, TAKE 1 TABLET BY MOUTH ONCE DAILY IN THE MORNING AND 1/2 (ONE-HALF) TABLET IN THE EVENING, Disp: 135 tablet, Rfl: 3  •  multivitamin (THERAGRAN) tablet, Take 1 tablet by mouth daily., Disp: , Rfl:   •  ranitidine (ZANTAC) 150 mg tablet, Take 150 mg by mouth 2 (two) times a day., Disp: , Rfl:   •  umeclidinium-vilanterol (ANORO ELLIPTA) 62.5-25 mcg/actuation blister with device, Inhale 1 puff daily., Disp: , Rfl:       BP Readings from Last 3 Encounters:   02/21/19 (!) 146/80   02/13/19 124/64   10/24/18 122/66       Recent Lab results:  Lab Results   Component Value Date    CHOL 140  01/30/2019   ,   Lab Results   Component Value Date    HDL 39 (L) 01/30/2019   ,   Lab Results   Component Value Date    LDLCALC 79 01/30/2019   ,   Lab Results   Component Value Date    TRIG 119 01/30/2019        Lab Results   Component Value Date    GLUCOSE 110 (H) 01/30/2019   ,   Lab Results   Component Value Date    HGBA1C 5.4 09/10/2018         Lab Results   Component Value Date    CREATININE 1.04 01/30/2019       Lab Results   Component Value Date    TSH 2.09 02/23/2018

## 2019-09-04 ENCOUNTER — OFFICE VISIT (OUTPATIENT)
Dept: INTERNAL MEDICINE | Facility: CLINIC | Age: 69
End: 2019-09-04
Payer: COMMERCIAL

## 2019-09-04 VITALS
TEMPERATURE: 98 F | DIASTOLIC BLOOD PRESSURE: 72 MMHG | HEART RATE: 72 BPM | OXYGEN SATURATION: 97 % | RESPIRATION RATE: 14 BRPM | BODY MASS INDEX: 30.44 KG/M2 | HEIGHT: 66 IN | SYSTOLIC BLOOD PRESSURE: 106 MMHG | WEIGHT: 189.4 LBS

## 2019-09-04 DIAGNOSIS — K21.9 GASTROESOPHAGEAL REFLUX DISEASE WITHOUT ESOPHAGITIS: ICD-10-CM

## 2019-09-04 DIAGNOSIS — M79.642 PAIN IN BOTH HANDS: ICD-10-CM

## 2019-09-04 DIAGNOSIS — E78.2 MIXED HYPERLIPIDEMIA: Chronic | ICD-10-CM

## 2019-09-04 DIAGNOSIS — M79.641 PAIN IN BOTH HANDS: ICD-10-CM

## 2019-09-04 DIAGNOSIS — Z12.5 SCREENING FOR PROSTATE CANCER: ICD-10-CM

## 2019-09-04 DIAGNOSIS — I10 ESSENTIAL HYPERTENSION: Chronic | ICD-10-CM

## 2019-09-04 DIAGNOSIS — C34.32 PRIMARY MALIGNANT NEOPLASM OF LEFT LOWER LOBE OF LUNG (CMS/HCC): Chronic | ICD-10-CM

## 2019-09-04 DIAGNOSIS — F41.9 ANXIETY: ICD-10-CM

## 2019-09-04 DIAGNOSIS — J43.8 OTHER EMPHYSEMA (CMS/HCC): ICD-10-CM

## 2019-09-04 DIAGNOSIS — I51.7 LEFT ATRIAL ENLARGEMENT: Chronic | ICD-10-CM

## 2019-09-04 DIAGNOSIS — I48.0 PAROXYSMAL ATRIAL FIBRILLATION (CMS/HCC): Chronic | ICD-10-CM

## 2019-09-04 DIAGNOSIS — Z00.00 MEDICARE ANNUAL WELLNESS VISIT, SUBSEQUENT: Primary | ICD-10-CM

## 2019-09-04 PROBLEM — R05.9 COUGH: Status: RESOLVED | Noted: 2018-05-14 | Resolved: 2019-09-04

## 2019-09-04 PROCEDURE — G0008 ADMIN INFLUENZA VIRUS VAC: HCPCS | Performed by: NURSE PRACTITIONER

## 2019-09-04 PROCEDURE — G0439 PPPS, SUBSEQ VISIT: HCPCS | Performed by: NURSE PRACTITIONER

## 2019-09-04 PROCEDURE — 99214 OFFICE O/P EST MOD 30 MIN: CPT | Mod: 25 | Performed by: NURSE PRACTITIONER

## 2019-09-04 PROCEDURE — 90686 IIV4 VACC NO PRSV 0.5 ML IM: CPT | Performed by: NURSE PRACTITIONER

## 2019-09-04 RX ORDER — METHYLPREDNISOLONE 4 MG/1
TABLET ORAL
Qty: 21 TABLET | Refills: 0 | Status: SHIPPED | OUTPATIENT
Start: 2019-09-04 | End: 2019-10-22 | Stop reason: SDUPTHER

## 2019-09-04 RX ORDER — ALBUTEROL SULFATE 90 UG/1
2 INHALANT RESPIRATORY (INHALATION) 2 TIMES DAILY PRN
Qty: 1 INHALER | Refills: 3 | Status: SHIPPED | OUTPATIENT
Start: 2019-09-04 | End: 2019-10-22 | Stop reason: SDUPTHER

## 2019-09-04 ASSESSMENT — ENCOUNTER SYMPTOMS
LIGHT-HEADEDNESS: 0
SORE THROAT: 0
NAUSEA: 0
ACTIVITY CHANGE: 0
TINGLING: 0
COUGH: 0
VOMITING: 0
NUMBNESS: 1
ADENOPATHY: 0
SHORTNESS OF BREATH: 0
FREQUENCY: 0
COLOR CHANGE: 0
ABDOMINAL PAIN: 0
PALPITATIONS: 0
FEVER: 0
HEADACHES: 0

## 2019-09-04 ASSESSMENT — MINI COG
COMPLETED: YES
TOTAL SCORE: 4

## 2019-09-04 NOTE — ASSESSMENT & PLAN NOTE
New. Worsening.  Advised and he agrees to start Medrol dose pack #1 as directed with food.  Will send for xray of both hands and give referral to hand specialist as well.

## 2019-09-04 NOTE — ASSESSMENT & PLAN NOTE
Patient advised to continue with same inhalers as reviewed today. Advised and agrees to let me know if using the rescue inhaler more than 2 times a week that is unexplained.  Discussed the importance of annual flu shot and pneumonia immunizations as appropriate.

## 2019-09-04 NOTE — ASSESSMENT & PLAN NOTE
Will call for test results from Dr. Kern.  Advised to follow a low-fat diet.  We talked about avoiding fried, fatty foods as well as any red meat.  Advised keeping the fat to less than 30 g per day.  We discussed reading food labels.  I gave written instructions on how to read food labels. Patient aware and agrees when to get next labs as discussed today.
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
## 2019-09-04 NOTE — PATIENT INSTRUCTIONS
Men[facility] Personalized Prevention Plan Services (PPPS)        Preventive Services Checklist (Assumes Average Risk Unless Otherwise Noted)  Preventive Service Target Population and Frequency Last Done Date Due   Abdominal Aortic Aneurysm Screening Any 1 risk factor: 1) family history of AAA or 2) 65-76yo and smoked 100+ cigarettes in a lifetime (covered once) 9/2019 no risk 9/2020   Alcohol Misuse Screening As necessary for those at risk (covered annually) 9/2019 no risk  9/2020   Cholesterol Screening As necessary >=36yo; 20-36yo if increased risk coronary artery disease (covered every 5 years)  1/2019 9/2019   Colorectal Cancer Screening >=49yo: Colonoscopy every 10 years, FIT annually or Cologuard every 3 years (up to 86yo for Cologuard) 4/2011 2021   Diabetes Screening Any 1 risk factor: hypertension, dyslipidemia, obesity, high glucose; or Any 2 risk factors: >=64 yo, overweight, family history diabetes (covered every 6 months) 1/2019 9/2019   Glaucoma Screening Any 1 risk factor: 1) diabetes, 2) family history glaucoma, 3)  >=49yo, 4)  American >=64yo (covered annually) 6/2019 [facility] 6/2020   Hepatitis C Screening Any 1 risk factor: 1) born between 0765-8267, 2) blood transfusion before 1992, 3) current or past injection drug use (covered once for average risk, annually for high risk) 2/2019    Lung Cancer Screening Low-dose chest CT if all 3 risk factors: 1) 55-76yo, 2) smoker or quit within last 15y, 3) >=30 pack years (covered annually) Followed by Dr. Guallpa annually with CT chest     Prostate Cancer Screening 55-69 years old if patient desires test after weighing potential harms and benefits (covered annually) 9/2018 9/2019   Sexually Transmitted Diseases (STDs) As necessary chlamydia, gonorrhea, syphilis, hepatitis B (covered annually)  HIV if any 1 risk factor present: 1) <14yo or >64yo and at increased risk or 2) 15-64yo and ask for it (covered  "annually) 9/2019 no risk 9/2020   Vaccine: Hepatitis B As necessary if medium or high risk (series covered once)     Vaccine: Influenza All without contraindications (covered annually.)     Vaccine: Pneumococcal Pneumovax + Prevnar (both covered, >=11 months apart) Prevnar 9/20/2015  Pneumovax   10/31/2016    Vaccine: Shingrix (Shingles) >= 49yo without contraindications   (2 doses, 2 months apart) Shingrix 10/31/2016    Other Services:                     Men's Personalized Prevention Plan Services (PPPS)                                          Health Risk Factors and Interventions  \"x\" Indicates risk is associated with this patient  Risk Factor Recommended Interventions     Family history of      Obesity     Hypertension     Diabetes     Fall Risk     Tobacco Use     Other:       VACCINE INFORMATION STATEMENT     Influenza (Flu) Vaccine (Inactivated or Recombinant): What you need to know     Many Vaccine Information Statements are available in Urdu and other languages. See  www.immunize.org/vis    Hojas de información sobre vacunas están disponibles en español y en muchos otrosidiomas. Visite www.immunize.org/vis     1. Why Get Vaccinated?  Influenza vaccine can prevent influenza (flu).  Flu is a contagious disease that spreads around the [place] every year, usually between October and May. Anyone can get the flu, but it is more dangerous for some people. Infants and young children, people 65 years of age and older, pregnant women, and people with certain health conditions or a weakened immune system are at greatest risk of flu complications.  Pneumonia, bronchitis, sinus infections and ear infections are examples of flu-related complications. If you have a medical condition, such as heart disease, cancer or diabetes, flu can make it worse.  Flu can cause fever and chills, sore throat, muscle aches, fatigue, cough, headache, and runny or stuffy nose. Some people may have vomiting and diarrhea, though " this is more common in children than adults.  Each year thousands of people in the [place] die from flu, and many more are hospitalized. Flu vaccine prevents millions of illnesses and flu-related visits to the doctor each year.    2. Influenza vaccine  CDC recommends everyone 6 months of age and older get vaccinated every flu season. Children  6 months through 8 years of age may need 2 doses during a single flu season. Everyone else needs only 1 dose each flu season.  It takes about 2 weeks for protection to develop after vaccination.  There are many flu viruses, and they are always changing. Each year a new flu vaccine is made to protect against three or four viruses that are likely to cause disease in the upcoming flu season. Even when the vaccine doesn't exactly match these viruses, it may still provide some protection.  Influenza vaccine does not cause flu.  Influenza vaccine may be given at the same time as other vaccines.    3. Talk with your health care provider   Tell your vaccine provider if the person getting the vaccine:  Has had an allergic reaction after a previous dose of influenza vaccine, or has any severe, life- threatening allergies.  Has ever had Guillain-Barré Syndrome (also called GBS).  In some cases, your health care provider may decide to postpone influenza vaccination to a future visit.  People with minor illnesses, such as a cold, may be vaccinated. People who are moderately or severely ill should usually wait until they recover before getting influenza vaccine.  Your health care provider can give you more information.    4. Risks of vaccine reaction  Soreness, redness, and swelling where shot is given, fever, muscle aches, and headache can happen after influenza vaccine.  There may be a very small increased risk of Guillain-Barré Syndrome (GBS) after inactivated influenza vaccine (the flu shot).      Young children who get the flu shot along with pneumococcal vaccine (PCV13), and/or  DTaP vaccine at the same time might be slightly more likely to have a seizure caused by fever. Tell your health care provider if a child who is getting flu vaccine has ever had a seizure.  People sometimes faint after medical procedures, including vaccination. Tell your provider if you feel dizzy or have vision changes or ringing in the ears.  As with any medicine, there is a very remote chance of a vaccine causing a severe allergic reaction, other serious injury, or death.     5. What if there is a serious problem?  An allergic reaction could occur after the vaccinated person leaves the clinic. If you see signs of a  severe allergic reaction (hives, swelling of the face and throat, difficulty breathing, a fast heartbeat, dizziness, or weakness), call 9-1-1 and get the person to the nearest hospital.  For other signs that concern you, call your health care provider.  Adverse reactions should be reported to the Vaccine Adverse Event Reporting System (VAERS). Your health care provider will usually file this report, or you can do it yourself. Visit the VAERS website at www.vaers.hhs.gov or call 1-398.574.7711. VAERS is only for reporting reactions, and VAERS staff do not give medical advice.    6. The National Vaccine Injury Compensation Program  The National Vaccine Injury Compensation Program (VICP) is a federal program that was created to compensate people who may have been injured by certain vaccines. Visit the VICP website at www.hrsa.gov/vaccinecompensation or call 1-188.491.1563 to learn about the program and about filing a claim. There is a time limit to file a claim for compensation.    7. How can I learn more?  Ask your healthcare provider.  Call your local or state health department.  Contact the Centers for Disease Control and Prevention (CDC):  - Call 1-922.641.5517 (5-385-WVM-INFO) or  - Visit CDC's www.cdc.gov/flu       U.S. Department of  Health and Human Services  Centers for Disease Control and  Prevention  Vaccine Information Statement (Interim)  Inactivated Influenza Vaccine  8/15/2019  42 U.S.C. § 300aa-26         Patient Education

## 2019-09-04 NOTE — ASSESSMENT & PLAN NOTE
Anticipatory guidance was given and reviewed.  Immunizations were addressed and reviewed today as well.  Advised to continue with healthy diet and increased exercise.  Next MAW due in one year.

## 2019-09-06 LAB — PSA SERPL-MCNC: 0.9 NG/ML

## 2019-09-09 ENCOUNTER — TELEPHONE (OUTPATIENT)
Dept: INTERNAL MEDICINE | Facility: CLINIC | Age: 69
End: 2019-09-09

## 2019-09-09 NOTE — TELEPHONE ENCOUNTER
----- Message from SANTOS Porter sent at 9/6/2019 10:14 AM EDT -----  Please tell Tuillio the PSA is normal.

## 2019-09-13 RX ORDER — CETIRIZINE HYDROCHLORIDE 10 MG/1
10 TABLET ORAL DAILY
Qty: 90 TABLET | Refills: 1 | Status: SHIPPED | OUTPATIENT
Start: 2019-09-13 | End: 2019-12-23 | Stop reason: CLARIF

## 2019-09-13 NOTE — TELEPHONE ENCOUNTER
Medicine Refill Request    Last Office Visit: 9/4/2019  Next Office Visit: 3/4/2020        Current Outpatient Prescriptions:   •  albuterol HFA (PROAIR HFA) 90 mcg/actuation inhaler, Inhale 2 puffs 2 (two) times a day as needed for wheezing or shortness of breath., Disp: 1 Inhaler, Rfl: 3  •  ALPRAZolam (XANAX) 0.5 mg tablet, Take 1 tablet (0.5 mg total) by mouth 2 (two) times a day as needed for anxiety., Disp: 30 tablet, Rfl: 1  •  atorvastatin (LIPITOR) 40 mg tablet, Take 1 tablet (40 mg total) by mouth daily., Disp: 90 tablet, Rfl: 2  •  cetirizine (ZyrTEC) 10 mg tablet, Take 1 tablet (10 mg total) by mouth daily., Disp: 90 tablet, Rfl: 1  •  dabigatran etexilate (PRADAXA) 150 mg capsu, Take 150 mg by mouth 2 (two) times a day., Disp: , Rfl:   •  DOCOSAHEXANOIC ACID/EPA (FISH OIL ORAL), Take 4 capsules by mouth daily. OTC suppliement , Disp: , Rfl:   •  escitalopram (LEXAPRO) 10 mg tablet, Take 1 tablet (10 mg total) by mouth daily., Disp: 90 tablet, Rfl: 0  •  fluticasone (FLONASE) 50 mcg/actuation nasal spray, Administer 2 sprays into each nostril daily., Disp: 3 Bottle, Rfl: 2  •  irbesartan-hydrochlorothiazide (AVALIDE) 300-12.5 mg per tablet, Take 1 tablet by mouth once daily., Disp: , Rfl: 3  •  metoprolol tartrate (LOPRESSOR) 50 mg tablet, Take one tab (50mg ) and then one-half (25mg) tab at hs, Disp: 135 tablet, Rfl: 3  •  multivitamin (THERAGRAN) tablet, Take 1 tablet by mouth daily., Disp: , Rfl:   •  ranitidine (ZANTAC) 150 mg tablet, Take 150 mg by mouth 2 (two) times a day., Disp: , Rfl:   •  umeclidinium-vilanterol (ANORO ELLIPTA) 62.5-25 mcg/actuation blister with device, Inhale 1 puff daily., Disp: , Rfl:       BP Readings from Last 3 Encounters:   09/04/19 106/72   02/21/19 (!) 146/80   02/13/19 124/64       Recent Lab results:  Lab Results   Component Value Date    CHOL 140 01/30/2019   ,   Lab Results   Component Value Date    HDL 39 (L) 01/30/2019   ,   Lab Results   Component Value Date     LDLCALC 79 01/30/2019   ,   Lab Results   Component Value Date    TRIG 119 01/30/2019        Lab Results   Component Value Date    GLUCOSE 110 (H) 01/30/2019   ,   Lab Results   Component Value Date    HGBA1C 5.4 09/10/2018         Lab Results   Component Value Date    CREATININE 1.04 01/30/2019       Lab Results   Component Value Date    TSH 2.09 02/23/2018

## 2019-10-17 ENCOUNTER — TELEPHONE (OUTPATIENT)
Dept: INTERNAL MEDICINE | Facility: CLINIC | Age: 69
End: 2019-10-17

## 2019-10-17 NOTE — TELEPHONE ENCOUNTER
Patient called says that he has gone to three different pharmacy to try and get zantac but that they do not have it in stock so instead he bought omeprazole 20 mg. Patient would like to know if this is okay to take and also will it be enough since the zantac was 150 mg? If this medication is okay to take he would like to know how much should he take of this and how many times a day? Please advise, thank you!

## 2019-10-21 RX ORDER — ESCITALOPRAM OXALATE 10 MG/1
10 TABLET ORAL DAILY
Qty: 90 TABLET | Refills: 0 | Status: SHIPPED | OUTPATIENT
Start: 2019-10-21 | End: 2020-01-20 | Stop reason: SDUPTHER

## 2019-10-21 NOTE — TELEPHONE ENCOUNTER
Medicine Refill Request    Last Office Visit: 9/4/2019  Next Office Visit: 3/4/2020        Current Outpatient Medications:   •  albuterol HFA (PROAIR HFA) 90 mcg/actuation inhaler, Inhale 2 puffs 2 (two) times a day as needed for wheezing or shortness of breath., Disp: 1 Inhaler, Rfl: 3  •  ALPRAZolam (XANAX) 0.5 mg tablet, Take 1 tablet (0.5 mg total) by mouth 2 (two) times a day as needed for anxiety., Disp: 30 tablet, Rfl: 1  •  atorvastatin (LIPITOR) 40 mg tablet, Take 1 tablet (40 mg total) by mouth daily., Disp: 90 tablet, Rfl: 2  •  cetirizine (ZyrTEC) 10 mg tablet, Take 1 tablet (10 mg total) by mouth daily., Disp: 90 tablet, Rfl: 1  •  dabigatran etexilate (PRADAXA) 150 mg capsu, Take 150 mg by mouth 2 (two) times a day., Disp: , Rfl:   •  DOCOSAHEXANOIC ACID/EPA (FISH OIL ORAL), Take 4 capsules by mouth daily. OTC suppliement , Disp: , Rfl:   •  escitalopram (LEXAPRO) 10 mg tablet, Take 1 tablet (10 mg total) by mouth daily., Disp: 90 tablet, Rfl: 0  •  fluticasone (FLONASE) 50 mcg/actuation nasal spray, Administer 2 sprays into each nostril daily., Disp: 3 Bottle, Rfl: 2  •  irbesartan-hydrochlorothiazide (AVALIDE) 300-12.5 mg per tablet, Take 1 tablet by mouth once daily., Disp: , Rfl: 3  •  metoprolol tartrate (LOPRESSOR) 50 mg tablet, Take one tab (50mg ) and then one-half (25mg) tab at hs, Disp: 135 tablet, Rfl: 3  •  multivitamin (THERAGRAN) tablet, Take 1 tablet by mouth daily., Disp: , Rfl:   •  ranitidine (ZANTAC) 150 mg tablet, Take 150 mg by mouth 2 (two) times a day., Disp: , Rfl:   •  umeclidinium-vilanterol (ANORO ELLIPTA) 62.5-25 mcg/actuation blister with device, Inhale 1 puff daily., Disp: , Rfl:       BP Readings from Last 3 Encounters:   09/04/19 106/72   02/21/19 (!) 146/80   02/13/19 124/64       Recent Lab results:  Lab Results   Component Value Date    CHOL 140 01/30/2019   ,   Lab Results   Component Value Date    HDL 39 (L) 01/30/2019   ,   Lab Results   Component Value Date     LDLCALC 79 01/30/2019   ,   Lab Results   Component Value Date    TRIG 119 01/30/2019        Lab Results   Component Value Date    GLUCOSE 110 (H) 01/30/2019   ,   Lab Results   Component Value Date    HGBA1C 5.4 09/10/2018         Lab Results   Component Value Date    CREATININE 1.04 01/30/2019       Lab Results   Component Value Date    TSH 2.09 02/23/2018

## 2019-10-22 ENCOUNTER — OFFICE VISIT (OUTPATIENT)
Dept: INTERNAL MEDICINE | Facility: CLINIC | Age: 69
End: 2019-10-22
Payer: COMMERCIAL

## 2019-10-22 VITALS
RESPIRATION RATE: 14 BRPM | TEMPERATURE: 98.1 F | HEART RATE: 74 BPM | SYSTOLIC BLOOD PRESSURE: 106 MMHG | HEIGHT: 66 IN | DIASTOLIC BLOOD PRESSURE: 68 MMHG | WEIGHT: 191.2 LBS | BODY MASS INDEX: 30.73 KG/M2 | OXYGEN SATURATION: 98 %

## 2019-10-22 DIAGNOSIS — J45.40 MODERATE PERSISTENT ASTHMATIC BRONCHITIS WITHOUT COMPLICATION: Primary | ICD-10-CM

## 2019-10-22 DIAGNOSIS — I48.0 PAROXYSMAL ATRIAL FIBRILLATION (CMS/HCC): Chronic | ICD-10-CM

## 2019-10-22 DIAGNOSIS — J43.8 OTHER EMPHYSEMA (CMS/HCC): ICD-10-CM

## 2019-10-22 DIAGNOSIS — I50.32 CHRONIC DIASTOLIC HEART FAILURE (CMS/HCC): Chronic | ICD-10-CM

## 2019-10-22 PROBLEM — J45.909 ASTHMATIC BRONCHITIS: Status: ACTIVE | Noted: 2019-10-22

## 2019-10-22 PROBLEM — J45.909 ASTHMATIC BRONCHITIS WITHOUT COMPLICATION: Status: ACTIVE | Noted: 2019-10-22

## 2019-10-22 PROBLEM — M79.642 PAIN IN BOTH HANDS: Status: RESOLVED | Noted: 2019-09-04 | Resolved: 2019-10-22

## 2019-10-22 PROBLEM — J45.909 ASTHMATIC BRONCHITIS WITHOUT COMPLICATION: Status: RESOLVED | Noted: 2019-10-22 | Resolved: 2019-10-22

## 2019-10-22 PROBLEM — M79.641 PAIN IN BOTH HANDS: Status: RESOLVED | Noted: 2019-09-04 | Resolved: 2019-10-22

## 2019-10-22 PROCEDURE — 99214 OFFICE O/P EST MOD 30 MIN: CPT | Performed by: NURSE PRACTITIONER

## 2019-10-22 RX ORDER — METHYLPREDNISOLONE 4 MG/1
TABLET ORAL
Qty: 21 TABLET | Refills: 0 | Status: SHIPPED | OUTPATIENT
Start: 2019-10-22 | End: 2019-12-05 | Stop reason: ALTCHOICE

## 2019-10-22 RX ORDER — ALBUTEROL SULFATE 90 UG/1
2 INHALANT RESPIRATORY (INHALATION) 2 TIMES DAILY PRN
Qty: 1 INHALER | Refills: 3 | Status: SHIPPED | OUTPATIENT
Start: 2019-10-22 | End: 2023-01-18 | Stop reason: SDUPTHER

## 2019-10-22 RX ORDER — CEFUROXIME AXETIL 500 MG/1
500 TABLET ORAL 2 TIMES DAILY
Qty: 20 TABLET | Refills: 0 | Status: SHIPPED | OUTPATIENT
Start: 2019-10-22 | End: 2019-12-05 | Stop reason: ALTCHOICE

## 2019-10-22 RX ORDER — FAMOTIDINE 20 MG/1
20 TABLET, FILM COATED ORAL 2 TIMES DAILY
COMMUNITY
End: 2020-11-12 | Stop reason: ALTCHOICE

## 2019-10-22 RX ORDER — METOPROLOL TARTRATE 50 MG/1
TABLET ORAL
Qty: 135 TABLET | Refills: 3 | Status: SHIPPED | OUTPATIENT
Start: 2019-10-22 | End: 2020-11-12 | Stop reason: ALTCHOICE

## 2019-10-22 ASSESSMENT — ENCOUNTER SYMPTOMS
EYE DISCHARGE: 0
EYE ITCHING: 0
SPUTUM PRODUCTION: 1
HEMOPTYSIS: 0
COUGH: 1
WHEEZING: 1
FATIGUE: 1
ABDOMINAL PAIN: 0
SHORTNESS OF BREATH: 0
DIZZINESS: 0
RHINORRHEA: 1

## 2019-10-22 ASSESSMENT — COPD QUESTIONNAIRES: COPD: 1

## 2019-10-22 NOTE — ASSESSMENT & PLAN NOTE
Advised and agrees to take antibiotics (Ceftin) as prescribed.  Recommend decongestant (muincex) and fever reducer as needed.   Advised and he agrees to start Medrol dose pack #1 to take as directed with food.    Rest.  Increase fluids and let us know if no better or worse in 2-3 days
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
## 2019-11-05 RX ORDER — FLUTICASONE PROPIONATE 50 MCG
2 SPRAY, SUSPENSION (ML) NASAL DAILY
Qty: 3 BOTTLE | Refills: 2 | Status: SHIPPED | OUTPATIENT
Start: 2019-11-05 | End: 2020-12-29 | Stop reason: SDUPTHER

## 2019-11-05 NOTE — TELEPHONE ENCOUNTER
Medicine Refill Request    Last Office Visit: 10/22/2019  Next Office Visit: 3/4/2020        Current Outpatient Medications:   •  albuterol HFA (PROAIR HFA) 90 mcg/actuation inhaler, Inhale 2 puffs 2 (two) times a day as needed for wheezing or shortness of breath., Disp: 1 Inhaler, Rfl: 3  •  ALPRAZolam (XANAX) 0.5 mg tablet, Take 1 tablet (0.5 mg total) by mouth 2 (two) times a day as needed for anxiety., Disp: 30 tablet, Rfl: 1  •  atorvastatin (LIPITOR) 40 mg tablet, Take 1 tablet (40 mg total) by mouth daily., Disp: 90 tablet, Rfl: 2  •  cetirizine (ZyrTEC) 10 mg tablet, Take 1 tablet (10 mg total) by mouth daily., Disp: 90 tablet, Rfl: 1  •  dabigatran etexilate (PRADAXA) 150 mg capsu, Take 150 mg by mouth 2 (two) times a day., Disp: , Rfl:   •  DOCOSAHEXANOIC ACID/EPA (FISH OIL ORAL), Take 4 capsules by mouth daily. OTC suppliement , Disp: , Rfl:   •  escitalopram (LEXAPRO) 10 mg tablet, Take 1 tablet (10 mg total) by mouth daily., Disp: 90 tablet, Rfl: 0  •  famotidine (PEPCID) 20 mg tablet, Take 20 mg by mouth 2 (two) times a day., Disp: , Rfl:   •  fluticasone (FLONASE) 50 mcg/actuation nasal spray, Administer 2 sprays into each nostril daily., Disp: 3 Bottle, Rfl: 2  •  irbesartan-hydrochlorothiazide (AVALIDE) 300-12.5 mg per tablet, Take 1 tablet by mouth once daily., Disp: , Rfl: 3  •  metoprolol tartrate (LOPRESSOR) 50 mg tablet, Take one tab (50mg ) and then one-half (25mg) tab at hs, Disp: 135 tablet, Rfl: 3  •  multivitamin (THERAGRAN) tablet, Take 1 tablet by mouth daily., Disp: , Rfl:   •  umeclidinium-vilanterol (ANORO ELLIPTA) 62.5-25 mcg/actuation blister with device, Inhale 1 puff daily., Disp: , Rfl:       BP Readings from Last 3 Encounters:   10/22/19 106/68   09/04/19 106/72   02/21/19 (!) 146/80       Recent Lab results:  Lab Results   Component Value Date    CHOL 140 01/30/2019   ,   Lab Results   Component Value Date    HDL 39 (L) 01/30/2019   ,   Lab Results   Component Value Date     LDLCALC 79 01/30/2019   ,   Lab Results   Component Value Date    TRIG 119 01/30/2019        Lab Results   Component Value Date    GLUCOSE 110 (H) 01/30/2019   ,   Lab Results   Component Value Date    HGBA1C 5.4 09/10/2018         Lab Results   Component Value Date    CREATININE 1.04 01/30/2019       Lab Results   Component Value Date    TSH 2.09 02/23/2018

## 2019-12-05 ENCOUNTER — OFFICE VISIT (OUTPATIENT)
Dept: INTERNAL MEDICINE | Facility: CLINIC | Age: 69
End: 2019-12-05
Payer: COMMERCIAL

## 2019-12-05 VITALS
DIASTOLIC BLOOD PRESSURE: 70 MMHG | HEART RATE: 78 BPM | SYSTOLIC BLOOD PRESSURE: 130 MMHG | BODY MASS INDEX: 32.11 KG/M2 | RESPIRATION RATE: 14 BRPM | TEMPERATURE: 97.9 F | WEIGHT: 199.8 LBS | OXYGEN SATURATION: 97 % | HEIGHT: 66 IN

## 2019-12-05 DIAGNOSIS — R14.0 ABDOMINAL BLOATING: ICD-10-CM

## 2019-12-05 DIAGNOSIS — C34.32 PRIMARY MALIGNANT NEOPLASM OF LEFT LOWER LOBE OF LUNG (CMS/HCC): Chronic | ICD-10-CM

## 2019-12-05 DIAGNOSIS — R05.9 COUGH: Primary | ICD-10-CM

## 2019-12-05 DIAGNOSIS — J43.8 OTHER EMPHYSEMA (CMS/HCC): ICD-10-CM

## 2019-12-05 DIAGNOSIS — I48.0 PAROXYSMAL ATRIAL FIBRILLATION (CMS/HCC): Chronic | ICD-10-CM

## 2019-12-05 DIAGNOSIS — I50.32 CHRONIC DIASTOLIC HEART FAILURE (CMS/HCC): Chronic | ICD-10-CM

## 2019-12-05 PROCEDURE — 99213 OFFICE O/P EST LOW 20 MIN: CPT | Performed by: NURSE PRACTITIONER

## 2019-12-05 ASSESSMENT — ENCOUNTER SYMPTOMS
SHORTNESS OF BREATH: 0
ABDOMINAL PAIN: 0
BELCHING: 0
FREQUENCY: 0
COUGH: 1
WHEEZING: 0
HEARTBURN: 0
NAUSEA: 0
PALPITATIONS: 0
RHINORRHEA: 0
CONSTIPATION: 0
ADENOPATHY: 0
VOMITING: 0
SORE THROAT: 0
FEVER: 0
COLOR CHANGE: 0
LIGHT-HEADEDNESS: 0
DIARRHEA: 0
HEADACHES: 0
ACTIVITY CHANGE: 0
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
## 2019-12-05 NOTE — ASSESSMENT & PLAN NOTE
Intermittent, mild and only after he overeats. Offered to send for US abdomen, which he declined today. Agree with watching portions and getting weight back down. Ok to get labs as scheduled in the new year. Advised and he agrees to let me know if anything changes or worsens in the meantime.

## 2019-12-05 NOTE — ASSESSMENT & PLAN NOTE
Already greatly improved. Taking and tolerating his inhalers and not requiring the use of his rescue inhaler.Advised and he agrees to c/w same meds, activities as tolerated and let me know if anything changes or worsens.

## 2019-12-23 ENCOUNTER — TELEPHONE (OUTPATIENT)
Dept: INTERNAL MEDICINE | Facility: CLINIC | Age: 69
End: 2019-12-23

## 2019-12-23 RX ORDER — LORATADINE 10 MG/1
10 TABLET ORAL DAILY
Qty: 90 TABLET | Refills: 1 | Status: SHIPPED | OUTPATIENT
Start: 2019-12-23 | End: 2020-10-13 | Stop reason: SDUPTHER

## 2019-12-23 NOTE — TELEPHONE ENCOUNTER
Melanie's insurance will no longer cover his Cetirizine 10mg tablet. He is asking if there is anything similar that can be sent in for him.

## 2019-12-23 NOTE — TELEPHONE ENCOUNTER
Ok to get zyrtec (Cetirizine 10 mg) one tab daily which is available OTC   OR  Ok to take the Claritin 10 mg one tab daily prn allergies. I sent RX over for the Claritin.

## 2020-01-20 RX ORDER — ATORVASTATIN CALCIUM 40 MG/1
40 TABLET, FILM COATED ORAL DAILY
Qty: 90 TABLET | Refills: 0 | Status: SHIPPED | OUTPATIENT
Start: 2020-01-20 | End: 2020-04-14 | Stop reason: SDUPTHER

## 2020-01-20 RX ORDER — ESCITALOPRAM OXALATE 10 MG/1
10 TABLET ORAL DAILY
Qty: 90 TABLET | Refills: 0 | Status: SHIPPED | OUTPATIENT
Start: 2020-01-20 | End: 2020-04-14 | Stop reason: SDUPTHER

## 2020-01-20 NOTE — TELEPHONE ENCOUNTER
Medicine Refill Request    Last Office Visit: 12/5/2019  Next Office Visit: 3/4/2020        Current Outpatient Medications:   •  albuterol HFA (PROAIR HFA) 90 mcg/actuation inhaler, Inhale 2 puffs 2 (two) times a day as needed for wheezing or shortness of breath., Disp: 1 Inhaler, Rfl: 3  •  ALPRAZolam (XANAX) 0.5 mg tablet, Take 1 tablet (0.5 mg total) by mouth 2 (two) times a day as needed for anxiety., Disp: 30 tablet, Rfl: 1  •  atorvastatin (LIPITOR) 40 mg tablet, Take 1 tablet (40 mg total) by mouth daily., Disp: 90 tablet, Rfl: 2  •  dabigatran etexilate (PRADAXA) 150 mg capsu, Take 150 mg by mouth 2 (two) times a day., Disp: , Rfl:   •  DOCOSAHEXANOIC ACID/EPA (FISH OIL ORAL), Take 4 capsules by mouth daily. OTC suppliement , Disp: , Rfl:   •  escitalopram (LEXAPRO) 10 mg tablet, Take 1 tablet (10 mg total) by mouth daily., Disp: 90 tablet, Rfl: 0  •  famotidine (PEPCID) 20 mg tablet, Take 20 mg by mouth 2 (two) times a day., Disp: , Rfl:   •  fluticasone propionate (FLONASE) 50 mcg/actuation nasal spray, Administer 2 sprays into each nostril daily., Disp: 3 Bottle, Rfl: 2  •  irbesartan-hydrochlorothiazide (AVALIDE) 300-12.5 mg per tablet, Take 1 tablet by mouth once daily., Disp: , Rfl: 3  •  loratadine (CLARITIN) 10 mg tablet, Take 1 tablet (10 mg total) by mouth daily., Disp: 90 tablet, Rfl: 1  •  metoprolol tartrate (LOPRESSOR) 50 mg tablet, Take one tab (50mg ) and then one-half (25mg) tab at hs, Disp: 135 tablet, Rfl: 3  •  multivitamin (THERAGRAN) tablet, Take 1 tablet by mouth daily., Disp: , Rfl:   •  umeclidinium-vilanterol (ANORO ELLIPTA) 62.5-25 mcg/actuation blister with device, Inhale 1 puff daily., Disp: , Rfl:       BP Readings from Last 3 Encounters:   12/05/19 130/70   10/22/19 106/68   09/04/19 106/72       Recent Lab results:  Lab Results   Component Value Date    CHOL 140 01/30/2019   ,   Lab Results   Component Value Date    HDL 39 (L) 01/30/2019   ,   Lab Results   Component Value  Date    LDLCALC 79 01/30/2019   ,   Lab Results   Component Value Date    TRIG 119 01/30/2019        Lab Results   Component Value Date    GLUCOSE 110 (H) 01/30/2019   ,   Lab Results   Component Value Date    HGBA1C 5.4 09/10/2018         Lab Results   Component Value Date    CREATININE 1.04 01/30/2019       Lab Results   Component Value Date    TSH 2.09 02/23/2018

## 2020-02-03 ENCOUNTER — TELEPHONE (OUTPATIENT)
Dept: THORACIC SURGERY | Facility: CLINIC | Age: 70
End: 2020-02-03

## 2020-02-14 ENCOUNTER — APPOINTMENT (OUTPATIENT)
Dept: LAB | Age: 70
End: 2020-02-14
Attending: NURSE PRACTITIONER
Payer: COMMERCIAL

## 2020-02-14 ENCOUNTER — HOSPITAL ENCOUNTER (OUTPATIENT)
Dept: RADIOLOGY | Age: 70
Discharge: HOME | End: 2020-02-14
Attending: THORACIC SURGERY (CARDIOTHORACIC VASCULAR SURGERY)
Payer: COMMERCIAL

## 2020-02-14 ENCOUNTER — TRANSCRIBE ORDERS (OUTPATIENT)
Dept: REGISTRATION | Age: 70
End: 2020-02-14

## 2020-02-14 DIAGNOSIS — I48.0 PAROXYSMAL ATRIAL FIBRILLATION (CMS/HCC): ICD-10-CM

## 2020-02-14 DIAGNOSIS — E78.00 PURE HYPERCHOLESTEROLEMIA: Primary | ICD-10-CM

## 2020-02-14 DIAGNOSIS — R14.0 ABDOMINAL BLOATING: ICD-10-CM

## 2020-02-14 DIAGNOSIS — E78.00 PURE HYPERCHOLESTEROLEMIA: ICD-10-CM

## 2020-02-14 DIAGNOSIS — C34.32 PRIMARY MALIGNANT NEOPLASM OF LEFT LOWER LOBE OF LUNG (CMS/HCC): Chronic | ICD-10-CM

## 2020-02-14 LAB
ALBUMIN SERPL-MCNC: 4.3 G/DL (ref 3.4–5)
ALP SERPL-CCNC: 56 IU/L (ref 35–126)
ALT SERPL-CCNC: 27 IU/L (ref 16–63)
ANION GAP SERPL CALC-SCNC: 10 MEQ/L (ref 3–15)
AST SERPL-CCNC: 26 IU/L (ref 15–41)
BACTERIA URNS QL MICRO: NORMAL /HPF
BILIRUB SERPL-MCNC: 0.9 MG/DL (ref 0.3–1.2)
BILIRUB UR QL STRIP.AUTO: NEGATIVE MG/DL
BUN SERPL-MCNC: 14 MG/DL (ref 8–20)
CALCIUM SERPL-MCNC: 9.7 MG/DL (ref 8.9–10.3)
CHLORIDE SERPL-SCNC: 107 MEQ/L (ref 98–109)
CHOLEST SERPL-MCNC: 152 MG/DL
CLARITY UR REFRACT.AUTO: CLEAR
CO2 SERPL-SCNC: 26 MEQ/L (ref 22–32)
COLOR UR AUTO: YELLOW
CREAT SERPL-MCNC: 1.1 MG/DL (ref 0.8–1.3)
ERYTHROCYTE [DISTWIDTH] IN BLOOD BY AUTOMATED COUNT: 13.3 % (ref 11.6–14.4)
GFR SERPL CREATININE-BSD FRML MDRD: >60 ML/MIN/1.73M*2
GLUCOSE SERPL-MCNC: 119 MG/DL (ref 70–99)
GLUCOSE UR STRIP.AUTO-MCNC: NEGATIVE MG/DL
HCT VFR BLDCO AUTO: 45.4 % (ref 40.1–51)
HDLC SERPL-MCNC: 37 MG/DL
HDLC SERPL: 4.1 {RATIO}
HGB BLD-MCNC: 15 G/DL (ref 13.7–17.5)
HGB UR QL STRIP.AUTO: NEGATIVE
HYALINE CASTS #/AREA URNS LPF: NORMAL /LPF
KETONES UR STRIP.AUTO-MCNC: NEGATIVE MG/DL
LDLC SERPL CALC-MCNC: 95 MG/DL
LEUKOCYTE ESTERASE UR QL STRIP.AUTO: NEGATIVE
MCH RBC QN AUTO: 29.5 PG (ref 28–33.2)
MCHC RBC AUTO-ENTMCNC: 33 G/DL (ref 32.2–36.5)
MCV RBC AUTO: 89.2 FL (ref 83–98)
NITRITE UR QL STRIP.AUTO: NEGATIVE
NONHDLC SERPL-MCNC: 115 MG/DL
PDW BLD AUTO: 10.5 FL (ref 9.4–12.4)
PH UR STRIP.AUTO: 6 [PH]
PLATELET # BLD AUTO: 202 K/UL (ref 150–350)
POTASSIUM SERPL-SCNC: 5.1 MEQ/L (ref 3.6–5.1)
PROT SERPL-MCNC: 6.2 G/DL (ref 6–8.2)
PROT UR QL STRIP.AUTO: ABNORMAL
RBC # BLD AUTO: 5.09 M/UL (ref 4.5–5.8)
RBC #/AREA URNS HPF: NORMAL /HPF
SODIUM SERPL-SCNC: 143 MEQ/L (ref 136–144)
SP GR UR REFRACT.AUTO: 1.02
SQUAMOUS URNS QL MICRO: NORMAL /HPF
TRIGL SERPL-MCNC: 102 MG/DL (ref 30–149)
UROBILINOGEN UR STRIP-ACNC: 0.2 EU/DL
WBC # BLD AUTO: 4.98 K/UL (ref 3.8–10.5)
WBC #/AREA URNS HPF: NORMAL /HPF

## 2020-02-14 PROCEDURE — 36415 COLL VENOUS BLD VENIPUNCTURE: CPT

## 2020-02-14 PROCEDURE — 80061 LIPID PANEL: CPT

## 2020-02-14 PROCEDURE — 71250 CT THORAX DX C-: CPT

## 2020-02-14 PROCEDURE — 81001 URINALYSIS AUTO W/SCOPE: CPT

## 2020-02-14 PROCEDURE — 80053 COMPREHEN METABOLIC PANEL: CPT

## 2020-02-14 PROCEDURE — 85027 COMPLETE CBC AUTOMATED: CPT

## 2020-03-11 ENCOUNTER — OFFICE VISIT (OUTPATIENT)
Dept: PRIMARY CARE | Facility: CLINIC | Age: 70
End: 2020-03-11
Payer: COMMERCIAL

## 2020-03-11 VITALS
HEART RATE: 61 BPM | TEMPERATURE: 97.6 F | BODY MASS INDEX: 31.82 KG/M2 | DIASTOLIC BLOOD PRESSURE: 72 MMHG | OXYGEN SATURATION: 96 % | RESPIRATION RATE: 13 BRPM | WEIGHT: 198 LBS | HEIGHT: 66 IN | SYSTOLIC BLOOD PRESSURE: 126 MMHG

## 2020-03-11 DIAGNOSIS — I10 ESSENTIAL HYPERTENSION: Chronic | ICD-10-CM

## 2020-03-11 DIAGNOSIS — I48.0 PAROXYSMAL ATRIAL FIBRILLATION (CMS/HCC): Primary | Chronic | ICD-10-CM

## 2020-03-11 DIAGNOSIS — I50.32 CHRONIC DIASTOLIC HEART FAILURE (CMS/HCC): Chronic | ICD-10-CM

## 2020-03-11 DIAGNOSIS — Z12.11 COLON CANCER SCREENING: ICD-10-CM

## 2020-03-11 DIAGNOSIS — R73.9 HIGH BLOOD SUGAR: Chronic | ICD-10-CM

## 2020-03-11 DIAGNOSIS — C34.32 PRIMARY MALIGNANT NEOPLASM OF LEFT LOWER LOBE OF LUNG (CMS/HCC): Chronic | ICD-10-CM

## 2020-03-11 DIAGNOSIS — J43.8 OTHER EMPHYSEMA (CMS/HCC): ICD-10-CM

## 2020-03-11 DIAGNOSIS — E78.2 MIXED HYPERLIPIDEMIA: Chronic | ICD-10-CM

## 2020-03-11 DIAGNOSIS — Z12.5 SCREENING FOR PROSTATE CANCER: ICD-10-CM

## 2020-03-11 PROCEDURE — 99214 OFFICE O/P EST MOD 30 MIN: CPT | Performed by: INTERNAL MEDICINE

## 2020-03-11 ASSESSMENT — ENCOUNTER SYMPTOMS
COUGH: 0
HEADACHES: 0
PALPITATIONS: 0
EYE PAIN: 0
DIZZINESS: 0
ACTIVITY CHANGE: 0
ENDOCRINE NEGATIVE: 1
WEAKNESS: 0
FEVER: 0
SHORTNESS OF BREATH: 0
FATIGUE: 0
APPETITE CHANGE: 0
CHEST TIGHTNESS: 0
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
## 2020-03-13 PROBLEM — Z12.11 COLON CANCER SCREENING: Status: ACTIVE | Noted: 2020-03-13
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
## 2020-04-02 ENCOUNTER — TELEPHONE (OUTPATIENT)
Dept: THORACIC SURGERY | Facility: CLINIC | Age: 70
End: 2020-04-02

## 2020-04-02 NOTE — TELEPHONE ENCOUNTER
LVM FOR PT TO CALL OUR OFFICE. WOULD LIKE TO CONVERT TO TELE-MED VISIT ON 4.14.20 AFTER 10:45 AM

## 2020-04-14 ENCOUNTER — TELEMEDICINE (OUTPATIENT)
Dept: SURGERY | Facility: CLINIC | Age: 70
End: 2020-04-14
Payer: COMMERCIAL

## 2020-04-14 ENCOUNTER — TELEPHONE (OUTPATIENT)
Dept: PRIMARY CARE | Facility: CLINIC | Age: 70
End: 2020-04-14

## 2020-04-14 DIAGNOSIS — C34.32 PRIMARY MALIGNANT NEOPLASM OF LEFT LOWER LOBE OF LUNG (CMS/HCC): Primary | Chronic | ICD-10-CM

## 2020-04-14 PROCEDURE — 99441 PR PHYS/QHP TELEPHONE EVALUATION 5-10 MIN: CPT | Performed by: THORACIC SURGERY (CARDIOTHORACIC VASCULAR SURGERY)

## 2020-04-14 NOTE — TELEPHONE ENCOUNTER
Do you have enough medication for the next 5 days?: NO     Did you request this medication through your pharmacy or our patient portal in the last day or two? no    Name of medication requested: escitalopram (LEXAPRO) 10 mg tablet   Mediation Directions:  1 daily   Quantity Requested (example 30/90): 90  Number of refills requested:     System Generated Preferred Pharmacy(s)  are as follows.  If more than one pharmacy displays please identify which one should be used for this call    Has the pharmacy information below been confirmed with the patient?    Yes [facility] [address] [place] [address]  Phone: [phone] Fax: [phone]        If necessary, provide pharmacy details below.     Is this pharmacy a mail order pharmacy?:   Pharmacy Name:   Pharmacy City:   Pharmacy Telephone:     Additional Comments:    Next Encounter with this provider: 9/8/2020
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
## 2020-05-19 ENCOUNTER — TELEPHONE (OUTPATIENT)
Dept: PRIMARY CARE | Facility: CLINIC | Age: 70
End: 2020-05-19

## 2020-05-19 NOTE — TELEPHONE ENCOUNTER
Patient calling to inform  Dr Cotter that he will be getting surgery this Friday on his right wrist.

## 2020-05-29 NOTE — TELEPHONE ENCOUNTER
Pt called again to let office know he had a second surgery on his right hand for carpal tunnel on Friday 5/22/2020 at [facility] and is doing fine so far.  Also wanted to let office know that he tested negative for covid19.

## 2020-08-28 LAB
ALBUMIN SERPL-MCNC: 4.5 G/DL (ref 3.6–5.1)
ALBUMIN/GLOB SERPL: 2.3 (CALC) (ref 1–2.5)
ALP SERPL-CCNC: 61 U/L (ref 35–144)
ALT SERPL-CCNC: 20 U/L (ref 9–46)
APPEARANCE UR: CLEAR
AST SERPL-CCNC: 20 U/L (ref 10–35)
BACTERIA #/AREA URNS HPF: NORMAL /HPF
BACTERIA UR CULT: NORMAL
BASOPHILS # BLD AUTO: 11 CELLS/UL (ref 0–200)
BASOPHILS NFR BLD AUTO: 0.2 %
BILIRUB SERPL-MCNC: 0.9 MG/DL (ref 0.2–1.2)
BILIRUB UR QL STRIP: NEGATIVE
BUN SERPL-MCNC: 23 MG/DL (ref 7–25)
BUN/CREAT SERPL: ABNORMAL (CALC) (ref 6–22)
CALCIUM SERPL-MCNC: 9.1 MG/DL (ref 8.6–10.3)
CHLORIDE SERPL-SCNC: 104 MMOL/L (ref 98–110)
CHOLEST SERPL-MCNC: 137 MG/DL
CHOLEST/HDLC SERPL: 3.3 (CALC)
CO2 SERPL-SCNC: 32 MMOL/L (ref 20–32)
COLOR UR: YELLOW
CREAT SERPL-MCNC: 1.11 MG/DL (ref 0.7–1.18)
EOSINOPHIL # BLD AUTO: 32 CELLS/UL (ref 15–500)
EOSINOPHIL NFR BLD AUTO: 0.6 %
ERYTHROCYTE [DISTWIDTH] IN BLOOD BY AUTOMATED COUNT: 14 % (ref 11–15)
GLOBULIN SER CALC-MCNC: 2 G/DL (CALC) (ref 1.9–3.7)
GLUCOSE SERPL-MCNC: 101 MG/DL (ref 65–99)
GLUCOSE UR QL STRIP: NEGATIVE
HBA1C MFR BLD: 5.6 % OF TOTAL HGB
HCT VFR BLD AUTO: 45 % (ref 38.5–50)
HDLC SERPL-MCNC: 42 MG/DL
HGB BLD-MCNC: 15.2 G/DL (ref 13.2–17.1)
HGB UR QL STRIP: NEGATIVE
HYALINE CASTS #/AREA URNS LPF: NORMAL /LPF
KETONES UR QL STRIP: NEGATIVE
LDLC SERPL CALC-MCNC: 75 MG/DL (CALC)
LEUKOCYTE ESTERASE UR QL STRIP: NEGATIVE
LYMPHOCYTES # BLD AUTO: 1542 CELLS/UL (ref 850–3900)
LYMPHOCYTES NFR BLD AUTO: 29.1 %
MCH RBC QN AUTO: 29.8 PG (ref 27–33)
MCHC RBC AUTO-ENTMCNC: 33.8 G/DL (ref 32–36)
MCV RBC AUTO: 88.2 FL (ref 80–100)
MONOCYTES # BLD AUTO: 588 CELLS/UL (ref 200–950)
MONOCYTES NFR BLD AUTO: 11.1 %
NEUTROPHILS # BLD AUTO: 3127 CELLS/UL (ref 1500–7800)
NEUTROPHILS NFR BLD AUTO: 59 %
NITRITE UR QL STRIP: NEGATIVE
NONHDLC SERPL-MCNC: 95 MG/DL (CALC)
PH UR STRIP: 5.5 [PH] (ref 5–8)
PLATELET # BLD AUTO: 184 THOUSAND/UL (ref 140–400)
PMV BLD REES-ECKER: 10.1 FL (ref 7.5–12.5)
POTASSIUM SERPL-SCNC: 4.3 MMOL/L (ref 3.5–5.3)
PROT SERPL-MCNC: 6.5 G/DL (ref 6.1–8.1)
PROT UR QL STRIP: NEGATIVE
PSA SERPL-MCNC: 1.2 NG/ML
QUEST EGFR NON-AFR. AMERICAN: 66 ML/MIN/1.73M2
RBC # BLD AUTO: 5.1 MILLION/UL (ref 4.2–5.8)
RBC #/AREA URNS HPF: NORMAL /HPF
SODIUM SERPL-SCNC: 142 MMOL/L (ref 135–146)
SP GR UR STRIP: 1.01 (ref 1–1.03)
SQUAMOUS #/AREA URNS HPF: NORMAL /HPF
T4 FREE SERPL-MCNC: 1.1 NG/DL (ref 0.8–1.8)
TRIGL SERPL-MCNC: 117 MG/DL
TSH SERPL-ACNC: 5.26 MIU/L (ref 0.4–4.5)
WBC # BLD AUTO: 5.3 THOUSAND/UL (ref 3.8–10.8)
WBC #/AREA URNS HPF: NORMAL /HPF

## 2020-09-24 ENCOUNTER — TELEPHONE (OUTPATIENT)
Dept: PRIMARY CARE | Facility: CLINIC | Age: 70
End: 2020-09-24

## 2020-09-29 ENCOUNTER — OFFICE VISIT (OUTPATIENT)
Dept: PRIMARY CARE | Facility: CLINIC | Age: 70
End: 2020-09-29
Payer: COMMERCIAL

## 2020-09-29 VITALS
SYSTOLIC BLOOD PRESSURE: 130 MMHG | TEMPERATURE: 98.1 F | WEIGHT: 189.4 LBS | BODY MASS INDEX: 30.44 KG/M2 | RESPIRATION RATE: 14 BRPM | OXYGEN SATURATION: 97 % | DIASTOLIC BLOOD PRESSURE: 80 MMHG | HEART RATE: 76 BPM | HEIGHT: 66 IN

## 2020-09-29 DIAGNOSIS — I10 ESSENTIAL HYPERTENSION: Chronic | ICD-10-CM

## 2020-09-29 DIAGNOSIS — K21.9 GASTROESOPHAGEAL REFLUX DISEASE WITHOUT ESOPHAGITIS: ICD-10-CM

## 2020-09-29 DIAGNOSIS — F33.0 MILD EPISODE OF RECURRENT MAJOR DEPRESSIVE DISORDER (CMS/HCC): Chronic | ICD-10-CM

## 2020-09-29 DIAGNOSIS — Z00.00 MEDICARE ANNUAL WELLNESS VISIT, SUBSEQUENT: Primary | ICD-10-CM

## 2020-09-29 DIAGNOSIS — I48.0 PAROXYSMAL ATRIAL FIBRILLATION (CMS/HCC): Chronic | ICD-10-CM

## 2020-09-29 DIAGNOSIS — J43.8 OTHER EMPHYSEMA (CMS/HCC): ICD-10-CM

## 2020-09-29 DIAGNOSIS — Z23 NEED FOR IMMUNIZATION AGAINST INFLUENZA: ICD-10-CM

## 2020-09-29 DIAGNOSIS — F41.9 ANXIETY: ICD-10-CM

## 2020-09-29 DIAGNOSIS — R20.0 NUMBNESS AND TINGLING IN RIGHT HAND: ICD-10-CM

## 2020-09-29 DIAGNOSIS — H91.92 HEARING LOSS OF LEFT EAR, UNSPECIFIED HEARING LOSS TYPE: Chronic | ICD-10-CM

## 2020-09-29 DIAGNOSIS — C34.32 PRIMARY MALIGNANT NEOPLASM OF LEFT LOWER LOBE OF LUNG (CMS/HCC): Chronic | ICD-10-CM

## 2020-09-29 DIAGNOSIS — R20.2 NUMBNESS AND TINGLING IN RIGHT HAND: ICD-10-CM

## 2020-09-29 DIAGNOSIS — I50.32 CHRONIC DIASTOLIC HEART FAILURE (CMS/HCC): Chronic | ICD-10-CM

## 2020-09-29 DIAGNOSIS — Z86.0100 HISTORY OF COLON POLYPS: Chronic | ICD-10-CM

## 2020-09-29 PROBLEM — Z12.5 SCREENING FOR PROSTATE CANCER: Status: RESOLVED | Noted: 2018-08-23 | Resolved: 2020-09-29

## 2020-09-29 PROBLEM — Z11.59 NEED FOR HEPATITIS C SCREENING TEST: Status: RESOLVED | Noted: 2019-02-21 | Resolved: 2020-09-29

## 2020-09-29 PROBLEM — R73.9 HIGH BLOOD SUGAR: Chronic | Status: RESOLVED | Noted: 2018-08-23 | Resolved: 2020-09-29

## 2020-09-29 PROBLEM — R05.9 COUGH: Status: RESOLVED | Noted: 2018-05-14 | Resolved: 2020-09-29

## 2020-09-29 PROBLEM — J30.2 ACUTE SEASONAL ALLERGIC RHINITIS: Status: RESOLVED | Noted: 2018-05-14 | Resolved: 2020-09-29

## 2020-09-29 PROBLEM — R14.0 ABDOMINAL BLOATING: Status: RESOLVED | Noted: 2019-12-05 | Resolved: 2020-09-29

## 2020-09-29 PROBLEM — Z12.11 COLON CANCER SCREENING: Status: RESOLVED | Noted: 2020-03-13 | Resolved: 2020-09-29

## 2020-09-29 PROCEDURE — 90662 IIV NO PRSV INCREASED AG IM: CPT | Performed by: NURSE PRACTITIONER

## 2020-09-29 PROCEDURE — G0008 ADMIN INFLUENZA VIRUS VAC: HCPCS | Performed by: NURSE PRACTITIONER

## 2020-09-29 PROCEDURE — G0439 PPPS, SUBSEQ VISIT: HCPCS | Performed by: NURSE PRACTITIONER

## 2020-09-29 RX ORDER — OFLOXACIN 3 MG/ML
SOLUTION AURICULAR (OTIC)
Qty: 5 ML | Refills: 0 | Status: SHIPPED | OUTPATIENT
Start: 2020-09-29 | End: 2020-11-12 | Stop reason: ALTCHOICE

## 2020-09-29 RX ORDER — ESCITALOPRAM OXALATE 5 MG/1
5 TABLET ORAL DAILY
Qty: 30 TABLET | Refills: 0 | Status: SHIPPED | OUTPATIENT
Start: 2020-09-29 | End: 2020-11-02

## 2020-09-29 SDOH — SOCIAL STABILITY: SOCIAL INSECURITY
WITHIN THE LAST YEAR, HAVE YOU BEEN KICKED, HIT, SLAPPED, OR OTHERWISE PHYSICALLY HURT BY YOUR PARTNER OR EX-PARTNER?: NO

## 2020-09-29 SDOH — HEALTH STABILITY: MENTAL HEALTH
DO YOU FEEL STRESS - TENSE, RESTLESS, NERVOUS, OR ANXIOUS, OR UNABLE TO SLEEP AT NIGHT BECAUSE YOUR MIND IS TROUBLED ALL THE TIME - THESE DAYS?: NOT AT ALL

## 2020-09-29 SDOH — SOCIAL STABILITY: SOCIAL INSECURITY: WITHIN THE LAST YEAR, HAVE YOU BEEN AFRAID OF YOUR PARTNER OR EX-PARTNER?: NO

## 2020-09-29 SDOH — SOCIAL STABILITY: SOCIAL INSECURITY
WITHIN THE LAST YEAR, HAVE YOU BEEN RAPED OR FORCED TO HAVE ANY KIND OF SEXUAL ACTIVITY BY YOUR PARTNER OR EX-PARTNER?: NO

## 2020-09-29 SDOH — HEALTH STABILITY: PHYSICAL HEALTH: ON AVERAGE, HOW MANY MINUTES DO YOU ENGAGE IN EXERCISE AT THIS LEVEL?: 60 MIN

## 2020-09-29 SDOH — ECONOMIC STABILITY: FOOD INSECURITY: WITHIN THE PAST 12 MONTHS, THE FOOD YOU BOUGHT JUST DIDN'T LAST AND YOU DIDN'T HAVE MONEY TO GET MORE.: NEVER TRUE

## 2020-09-29 SDOH — SOCIAL STABILITY: SOCIAL INSECURITY: WITHIN THE LAST YEAR, HAVE YOU BEEN HUMILIATED OR EMOTIONALLY ABUSED IN OTHER WAYS BY YOUR PARTNER OR EX-PARTNER?: NO

## 2020-09-29 SDOH — SOCIAL STABILITY: SOCIAL NETWORK: HOW OFTEN DO YOU GET TOGETHER WITH FRIENDS OR RELATIVES?: MORE THAN THREE TIMES A WEEK

## 2020-09-29 SDOH — SOCIAL STABILITY: SOCIAL NETWORK
DO YOU BELONG TO ANY CLUBS OR ORGANIZATIONS SUCH AS CHURCH GROUPS, UNIONS, FRATERNAL OR ATHLETIC GROUPS, OR SCHOOL GROUPS?: YES

## 2020-09-29 SDOH — SOCIAL STABILITY: SOCIAL INSECURITY: ARE YOU MARRIED, WIDOWED, DIVORCED, SEPARATED, NEVER MARRIED, OR LIVING WITH A PARTNER?: DIVORCED

## 2020-09-29 SDOH — SOCIAL STABILITY: SOCIAL NETWORK
IN A TYPICAL WEEK, HOW MANY TIMES DO YOU TALK ON THE PHONE WITH FAMILY, FRIENDS, OR NEIGHBORS?: MORE THAN THREE TIMES A WEEK

## 2020-09-29 SDOH — ECONOMIC STABILITY: FOOD INSECURITY: WITHIN THE PAST 12 MONTHS, YOU WORRIED THAT YOUR FOOD WOULD RUN OUT BEFORE YOU GOT THE MONEY TO BUY MORE.: NEVER TRUE

## 2020-09-29 SDOH — SOCIAL STABILITY: SOCIAL NETWORK: HOW OFTEN DO YOU ATTEND MEETINGS OF THE CLUBS OR ORGANIZATIONS YOU BELONG TO?: MORE THAN 4 TIMES PER YEAR

## 2020-09-29 SDOH — ECONOMIC STABILITY: TRANSPORTATION INSECURITY: IN THE PAST 12 MONTHS, HAS LACK OF TRANSPORTATION KEPT YOU FROM MEDICAL APPOINTMENTS OR FROM GETTING MEDICATIONS?: NO

## 2020-09-29 SDOH — HEALTH STABILITY: PHYSICAL HEALTH: ON AVERAGE, HOW MANY DAYS PER WEEK DO YOU ENGAGE IN MODERATE TO STRENUOUS EXERCISE (LIKE A BRISK WALK)?: 5 DAYS

## 2020-09-29 SDOH — SOCIAL STABILITY: SOCIAL NETWORK: HOW OFTEN DO YOU ATTEND CHURCH OR RELIGIOUS SERVICES?: NEVER

## 2020-09-29 SDOH — ECONOMIC STABILITY: FOOD INSECURITY: HOW HARD IS IT FOR YOU TO PAY FOR THE VERY BASICS LIKE FOOD, HOUSING, MEDICAL CARE, AND HEATING?: NOT HARD AT ALL

## 2020-09-29 ASSESSMENT — ENCOUNTER SYMPTOMS
SHORTNESS OF BREATH: 0
COUGH: 0
BRUISES/BLEEDS EASILY: 0
FEVER: 0
HEADACHES: 0
PALPITATIONS: 0
LIGHT-HEADEDNESS: 0
NECK STIFFNESS: 0
CHILLS: 0
DIZZINESS: 0
BACK PAIN: 0
ADENOPATHY: 0
ABDOMINAL PAIN: 0
ARTHRALGIAS: 0
DYSURIA: 0
FATIGUE: 0
TROUBLE SWALLOWING: 0
NECK PAIN: 0

## 2020-09-29 ASSESSMENT — MINI COG
COMPLETED: YES
TOTAL SCORE: 5

## 2020-09-29 ASSESSMENT — ACTIVITIES OF DAILY LIVING (ADL): LACK_OF_TRANSPORTATION: NO
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
## 2020-09-29 NOTE — ASSESSMENT & PLAN NOTE
Will give trial of decreasing lexapro from 10mg to 5mg.  He will follow up in 4-6 weeks to see how he is feeling and if doing well we may discontinue.

## 2020-09-29 NOTE — PATIENT INSTRUCTIONS
We will decrease lexapro (escitalopram) from 10mg to 5mg- This is for your mood.     Colonoscopy due again April 2021                            Your Personalized Prevention Plan Services (PPPS)    Preventive Services Checklist (Assumes Average Risk Unless Otherwise Noted):    Health Maintenance Topics with due status: Overdue       Topic Date Due    Varicella Vaccines 11/30/2015    Annual Falls Risk Screening 01/01/2020    Influenza Vaccine 08/01/2020    Medicare Annual Wellness Visit 09/04/2020     Health Maintenance Topics with due status: Due Soon       Topic Date Due    Colonoscopy 04/19/2021     Health Maintenance Topics with due status: Not Due       Topic Last Completion Date    DTaP, Tdap, and Td Vaccines 10/26/2015     Health Maintenance Topics with due status: Completed       Topic Last Completion Date    Pneumococcal 10/31/2016    Pneumococcal (65 years and older) 10/31/2016    Hepatitis C Screening 02/21/2019    Zoster Vaccine 12/28/2019     Health Maintenance Topics with due status: Aged Out       Topic Date Due    Meningococcal ACWY Aged Out    HIB Vaccines Aged Out    IPV Vaccines Aged Out    HPV Vaccines Aged Out       You May Be Eligible for These Additional Preventive Services   (Assumes Average Risk Unless Otherwise Noted)  Diabetes Screening Any 1 risk factor: hypertension, dyslipidemia, obesity, high glucose; or Any 2 risk factors: >=66yo, overweight, family history diabetes (covered every 6 months)   Hepatitis C Screening Any 1 risk factor: 1) blood transfusion before 1992,   2) current or past injection drug use (annually for high risk; if born between [birth year]47-68[birth year], see above for status).   Vaccine: Hepatitis B As necessary if at-risk: hemophilia, ESRD, diabetes, living with individual infected with hep B, healthcare worker with frequent contact with blood/bodily fluids (series covered once)   Sexually Transmitted Diseases (STDs) As necessary chlamydia, gonorrhea, syphilis, hepatitis B (covered  annually)  HIV if any 1 risk factor present: 1) <14yo or >64yo and at increased risk or 2) 15-64yo and ask for it (covered annually)   Lung Cancer Screening Low dose chest CT if all 3 risk factors: 1) 55-78yo, 2) smoker or quit within last 15y, 3) >=30 pack years (covered annually).  No results found for this or any previous visit.     Cholesterol Screening No signs or symptoms of cardiovascular disease (screening covered every 5 years).     Prostate Cancer Screening >= 51yo if patient desires test after weighting potential harms and benefits (covered annually)     Abdominal Aortic Aneurysm Screening Any 1 risk factor: 1) family history of AAA or 2) 65-76yo and smoked 100+ cigarettes in a lifetime (covered once).   No results found for this or any previous visit. No results found for this or any previous visit.         Health Risk Factors with Personalized Education:  ----------------------------------------------------------------------------------------------------------------------  Stress management:  Understanding Your Stress  · Think about how you know when you’re stressed.  · Think about how your thoughts or behaviors are different when you’re stressed.  · Think about what triggers stress for you.  · Think about how you handle stress, and whether you’re making unhealthy choices in response to stress (like smoking, drinking alcohol or overeating).  Managing Stress  · Take a break from the stressful situation.  · Reduce your stress levels by exercising, talking with family/friends, ensuring adequate sleep.  · Consider meditation or yoga.  · Make sure you plan time to do things you enjoy.  · Let your PCP know if you’re having problems limiting your stress.  ----------------------------------------------------------------------------------------------------------------------  Controlling Your Blood Pressure  · Maintain a normal weight (body mass index between 18.5 and 24.9).  · Eat more fruit, vegetables and  low-fat dairy.  · Eat less saturated fat and total fat.  · Lower your sodium (salt) intake.  Try to stay under 1500 mg per day, but if you cannot get your intake to be that low, at least lower it by 1000 mg.  · Stay active.  Try to get at least 90 to 150 minutes of exercise per week.  Try brisk walking, swimming, bicycling or dancing.  · Limit alcohol intake.  When you do consume alcohol, drink no more than 2 drinks per day.  · If you have been prescribed medication, take it regularly and exactly as prescribed.  Let your PCP know if you have any problems or questions about your medication.  · Check your blood pressure at home or at the store.  Write down your readings and share them with your PCP  ----------------------------------------------------------------------------------------------------------------------  Controlling Your Cholesterol  · Reduce the amount of saturated and trans fat in your diet.  Limit intake of red meat.  Consume only low-fat or non-fat/skim dairy.  Limit fried food.  Cook with vegetable oils.  · Reduce your intake of sugary foods, sugary drinks and alcohol.  · Eat a diet high in fruit, vegetables and whole grains.  · Get protein from fish, poultry and a small portion of nuts.  · Stay active.  Try to get at least 90 to 150 minutes of exercise per week.  Try brisk walking, swimming, bicycling or dancing.  · Maintain a healthy weight by balancing your diet and exercise.  · If you have been prescribed medication, take it regularly and exactly as prescribed. Let your PCP know if you have any problems or questions about your medication.  · It’s important to know your cholesterol numbers.  When recommended by your PCP, get the cholesterol blood test.  ----------------------------------------------------------------------------------------------------------------------  Reducing Your Risk of Falls  · Tell your PCP if any of your medications make you feel tired, dizzy, lightheaded or  off-balance.  · Maintain coordination, flexibility and balance by ensuring regular physical activity.  · Limit alcohol intake to 1 drink per day.  Consider avoiding all alcohol intake.  · Ensure good vision.  Visit an ophthalmologist or optometrist regularly for vision screening or to make sure your glasses / contact lens prescription is correct.  If you need glasses or contacts, wear them.  When you get new glasses or contacts, take time to get used to them.  Do not wear sunglasses or tinted lenses when indoors.  · Ensure good hearing.  Have your hearing checked if you are having trouble hearing, or family and friends think you cannot hear them.  If you need a hearing aid, be sure it fits well and wear it.  · Get enough rest.  Ensure about 7-9 hours of sleep every day.  · Get up slowly from your bed or chairs.  Do not start walking until you are sure you feel steady.  · Wear non-skid, rubber-soled, low-heeled shoes.  Do not walk in socks, or in shoes and slippers with smooth soles.  · If your PCP or therapist recommends using a cane or walker, use it regularly.  · Make your home safer.  Increase lighting throughout the house, especially at the top and bottom of stairs.  Ensure lighting is easily turned on when getting up in the middle of the night.  Make sure there are two secure rails on all stairs.  Install grab bars in the bathtub / shower and near the toilet.  Consider using a shower chair and / or a hand-held shower.  · Spread sand or salt on icy surfaces.  Beware of wet surfaces, which can be icy.  · Tell your PCP if you have fallen.

## 2020-09-30 NOTE — ASSESSMENT & PLAN NOTE
Will give trial of ofloxacin ear drops given possible perforated TM.  He will keep me posted if any worsening symptoms and avoid hearing aids until improved.  Aware if still no improvement, he will need to return to ENT.

## 2020-10-13 RX ORDER — ATORVASTATIN CALCIUM 40 MG/1
40 TABLET, FILM COATED ORAL DAILY
Qty: 90 TABLET | Refills: 1 | Status: SHIPPED | OUTPATIENT
Start: 2020-10-13 | End: 2021-04-09

## 2020-10-13 RX ORDER — ALPRAZOLAM 0.5 MG/1
0.5 TABLET ORAL 2 TIMES DAILY PRN
Qty: 30 TABLET | Refills: 1 | Status: SHIPPED | OUTPATIENT
Start: 2020-10-13 | End: 2021-09-29 | Stop reason: ALTCHOICE

## 2020-10-13 RX ORDER — LORATADINE 10 MG/1
10 TABLET ORAL DAILY
Qty: 90 TABLET | Refills: 1 | Status: SHIPPED | OUTPATIENT
Start: 2020-10-13 | End: 2022-01-25 | Stop reason: HOSPADM

## 2020-10-13 NOTE — TELEPHONE ENCOUNTER
Patient needs a medication refill on his....    (Atorvastatin 40 mg)  (Loratadine 10 mg)    He also would like to speak to someone about continuing his Xanax.    He uses the [facility] pharmacy in [place] ([phone]).

## 2020-10-13 NOTE — TELEPHONE ENCOUNTER
Pt called back. Per Pt he states his Xanax  is as needed.  He wanted to see if he needs to continue taking it as such. Pt states he is available all day for a return call.

## 2020-10-13 NOTE — TELEPHONE ENCOUNTER
I called patient to clarify what questions/concerns he may have had regarding his Xanax however, he did not answer.  I did leave a vm for him to call back for clarification.

## 2020-10-13 NOTE — TELEPHONE ENCOUNTER
LVM for patient to call back so that we can clarify any questions or concerns he may have regarding his Xanax

## 2020-10-16 NOTE — TELEPHONE ENCOUNTER
Mr. Zelaya returned call to the office as requested from the message left regarding the Xanax. Mr. Zelaya also has a question pertaining to his allergy medication.    Mr. Zelaya said his sister passed last night, so if he doesn't answer, he has asked for the person to please leave a message and he will callback as soon as he can.

## 2020-10-27 ENCOUNTER — TELEPHONE (OUTPATIENT)
Dept: THORACIC SURGERY | Facility: CLINIC | Age: 70
End: 2020-10-27

## 2020-10-27 NOTE — TELEPHONE ENCOUNTER
JUDY-Pt called to let you know his sister just passed from pancreatic cancer, and his brother in on hospice and going down hill fast. PT asked that you be told in the event you would like any other tests ordered for him?

## 2020-10-30 ENCOUNTER — TELEPHONE (OUTPATIENT)
Dept: PRIMARY CARE | Facility: CLINIC | Age: 70
End: 2020-10-30

## 2020-10-30 NOTE — TELEPHONE ENCOUNTER
Do you have enough medication for the next 5 days?: No    Did you request this medication through your pharmacy or our patient portal in the last day or two? No    Name of medication requested: Loratadine  Medication Strength: 10mg  Mediation Directions: 1 per day  Quantity Requested (example 30/90): 30  Number of refills requested: 1      System Generated Preferred Pharmacy(s)  are as follows.  If more than one pharmacy displays please identify which one should be used for this call    Has the pharmacy information below been confirmed with the patient?  Yes      [facility] [address] [place] [address]  Phone: [phone] Fax: [phone]       If necessary, provide pharmacy details below.     Is this pharmacy a mail order pharmacy?:   Pharmacy Name:   Pharmacy City:   Pharmacy Telephone:     Additional Comments:    Next Encounter with this provider: 11/12/2020

## 2020-10-30 NOTE — TELEPHONE ENCOUNTER
Spoke with Melanie , he did decrease his Lexapro script to 5 mg. He states that he only has 1 pill left. He is unsure of the name of the medication that was prescribed by [facility]. He wants to come completely off that medication. Melanie reports that he feels great. No problems currently. Will be coming in for a follow up 11/12

## 2020-10-30 NOTE — TELEPHONE ENCOUNTER
Pt wanted to let Veena know that he got her message and will call Dr. South.  Also wants to know if he should still take the allergy medication.    Also does not feel that he needs the medication he was taking for his mood.  He does not remember what it is called.    Does not want to continue medication he was taking for pain in his hand prescribed by [facility].  He does not want to deal with that office anymore.

## 2020-10-30 NOTE — TELEPHONE ENCOUNTER
Right hand numb an tingling  Had surgery earlier this year an when he comes in to see rosio he wants to see about PT or getting other options are see another hand doctor

## 2020-10-30 NOTE — TELEPHONE ENCOUNTER
Left a detailed message for Melanie , let him know that Domonique recommends Dr. South. Had a location in [place] , [place], [place]. Phone number is [phone]

## 2020-10-30 NOTE — TELEPHONE ENCOUNTER
Please let him know that at the last visit we discussed decreaseing the lexapro from 10mg ot 5mg and he is following up in 2 weeks--did he stop taking it all together now?   I would rather him use the lexapro than xanax daily.     What med was he given?

## 2020-10-31 NOTE — TELEPHONE ENCOUNTER
Ok, have him bring his meds with him to his appointment so we can make sure he is taking everything he is supposed to be taking!

## 2020-11-02 RX ORDER — ESCITALOPRAM OXALATE 5 MG/1
TABLET ORAL
Qty: 30 TABLET | Refills: 0 | Status: SHIPPED | OUTPATIENT
Start: 2020-11-02 | End: 2020-12-04 | Stop reason: SDUPTHER

## 2020-11-02 NOTE — TELEPHONE ENCOUNTER
Spoke with Melanie , he will bring all his medications and a list of medications that he has been taking since his last visit

## 2020-11-03 NOTE — TELEPHONE ENCOUNTER
Patient  Call to ask adriana if he should Take the OTC meds for his allergies or get his Flonase  and  His other allergy medicine filled. He is starting to have a runny nose.

## 2020-11-12 ENCOUNTER — OFFICE VISIT (OUTPATIENT)
Dept: PRIMARY CARE | Facility: CLINIC | Age: 70
End: 2020-11-12
Payer: COMMERCIAL

## 2020-11-12 VITALS
HEART RATE: 73 BPM | OXYGEN SATURATION: 95 % | DIASTOLIC BLOOD PRESSURE: 110 MMHG | TEMPERATURE: 97.7 F | BODY MASS INDEX: 31.6 KG/M2 | SYSTOLIC BLOOD PRESSURE: 160 MMHG | HEIGHT: 66 IN | RESPIRATION RATE: 14 BRPM | WEIGHT: 196.6 LBS

## 2020-11-12 DIAGNOSIS — F33.0 MILD EPISODE OF RECURRENT MAJOR DEPRESSIVE DISORDER (CMS/HCC): Chronic | ICD-10-CM

## 2020-11-12 DIAGNOSIS — I10 ESSENTIAL HYPERTENSION: Chronic | ICD-10-CM

## 2020-11-12 DIAGNOSIS — I50.32 CHRONIC DIASTOLIC HEART FAILURE (CMS/HCC): Chronic | ICD-10-CM

## 2020-11-12 DIAGNOSIS — M79.641 RIGHT HAND PAIN: ICD-10-CM

## 2020-11-12 DIAGNOSIS — F41.9 ANXIETY: ICD-10-CM

## 2020-11-12 DIAGNOSIS — K21.9 GASTROESOPHAGEAL REFLUX DISEASE WITHOUT ESOPHAGITIS: Primary | ICD-10-CM

## 2020-11-12 DIAGNOSIS — I48.0 PAROXYSMAL ATRIAL FIBRILLATION (CMS/HCC): Chronic | ICD-10-CM

## 2020-11-12 PROCEDURE — 99213 OFFICE O/P EST LOW 20 MIN: CPT | Performed by: NURSE PRACTITIONER

## 2020-11-12 RX ORDER — HYDROCHLOROTHIAZIDE 12.5 MG/1
12.5 TABLET ORAL DAILY
COMMUNITY
End: 2022-01-25 | Stop reason: HOSPADM

## 2020-11-12 RX ORDER — ZINC GLUCONATE 13.3 MG
200 LOZENGE ORAL AS NEEDED
COMMUNITY
End: 2022-01-25 | Stop reason: HOSPADM

## 2020-11-12 RX ORDER — IRBESARTAN 300 MG/1
300 TABLET ORAL DAILY
COMMUNITY
End: 2022-01-25 | Stop reason: HOSPADM

## 2020-11-12 RX ORDER — METOPROLOL TARTRATE 25 MG/1
25 TABLET, FILM COATED ORAL 2 TIMES DAILY
COMMUNITY
End: 2021-12-01

## 2020-11-12 NOTE — ASSESSMENT & PLAN NOTE
Elevated today but admits he had trouble parking.  He will check at home and let me know if still elevated.
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
## 2020-12-04 ENCOUNTER — TELEPHONE (OUTPATIENT)
Dept: PRIMARY CARE | Facility: CLINIC | Age: 70
End: 2020-12-04

## 2020-12-04 RX ORDER — ESCITALOPRAM OXALATE 5 MG/1
5 TABLET ORAL
Qty: 90 TABLET | Refills: 1 | Status: SHIPPED | OUTPATIENT
Start: 2020-12-04 | End: 2021-03-01 | Stop reason: SDUPTHER

## 2020-12-04 NOTE — TELEPHONE ENCOUNTER
Do you have enough medication for the next 5 days?: NO    Did you request this medication through your pharmacy or our patient portal in the last day or two? NO    Name of medication requested: escitalopram (LEXAPRO)  Medication Strength: 5 mg  Mediation Directions: Take 1 tablet by mouth once daily  Quantity Requested (example 30/90):   Number of refills requested: 0      System Generated Preferred Pharmacy(s)  are as follows.  If more than one pharmacy displays please identify which one should be used for this call    Has the pharmacy information below been confirmed with the patient?  YES        [facility] [address]  Phone: [phone] Fax: [phone]      If necessary, provide pharmacy details below.     Is this pharmacy a mail order pharmacy?:   Pharmacy Name:   Pharmacy City:   Pharmacy Telephone:     Additional Comments:    Next Encounter with this provider: Visit date not found

## 2020-12-08 ENCOUNTER — TELEMEDICINE (OUTPATIENT)
Dept: PRIMARY CARE | Facility: CLINIC | Age: 70
End: 2020-12-08
Payer: COMMERCIAL

## 2020-12-08 DIAGNOSIS — I10 ESSENTIAL HYPERTENSION: Chronic | ICD-10-CM

## 2020-12-08 DIAGNOSIS — R51.9 NONINTRACTABLE HEADACHE, UNSPECIFIED CHRONICITY PATTERN, UNSPECIFIED HEADACHE TYPE: Primary | ICD-10-CM

## 2020-12-08 PROCEDURE — 99212 OFFICE O/P EST SF 10 MIN: CPT | Mod: 95 | Performed by: NURSE PRACTITIONER

## 2020-12-08 ASSESSMENT — ENCOUNTER SYMPTOMS
SHORTNESS OF BREATH: 0
COUGH: 1
CHILLS: 0
MYALGIAS: 1
SORE THROAT: 0
FATIGUE: 0
DIARRHEA: 0
SINUS PRESSURE: 0
FEVER: 0
LIGHT-HEADEDNESS: 1
PALPITATIONS: 0
HEADACHES: 1
ABDOMINAL PAIN: 0
NAUSEA: 0
DIZZINESS: 0
RHINORRHEA: 1
ADENOPATHY: 0
VOMITING: 0
BRUISES/BLEEDS EASILY: 0
SINUS PAIN: 0

## 2020-12-08 NOTE — PROGRESS NOTES
Verification of Patient Location:  The patient affirms they are currently located in the following state: [place]    Request for Consent:    Audio Only Encounter   You and I are about to have a telemedicine check-in or visit. This is allowed because you have requested it. This telemedicine visit will be billed to your health insurance or you, if you are self-insured. You understand you will be responsible for any copayments or coinsurances that apply to your telemedicine visit. Before starting our telemedicine visit, I am required to get your consent for this virtual check-in or visit by telemedicine. Do you consent?    Patient Response to Request for Consent:  Yes      Visit Documentation:  Subjective     Patient ID: Melanie Zelaya is a 70 y.o. male.  1950      HPI    The following have been reviewed and updated as appropriate in this visit:  Tobacco  Allergies  Meds  Problems  Med Hx  Surg Hx  Fam Hx  Soc Hx        Review of Systems      Assessment/Plan   Diagnoses and all orders for this visit:    Nonintractable headache, unspecified chronicity pattern, unspecified headache type (Primary)  Comments:  Given exposure and mild symptoms, will test for COVID and Flu. Recommend to remain self-isolate until results have come. If worsening Shortness of breath, go di  Orders:  -     COVID-19 [facility] ([facility]); Future  -     POCT Influenza A/B    Essential hypertension  Assessment & Plan:  Blood pressures are still elevated, will schedule sooner appointment with cardiology once COVID test has resulted.           Time Spent in Medical Discussion During This Encounter:     10 minutes

## 2020-12-08 NOTE — ASSESSMENT & PLAN NOTE
Blood pressures are still elevated, will schedule sooner appointment with cardiology once COVID test has resulted.

## 2020-12-12 LAB — SARS-COV-2 RNA RESP QL NAA+PROBE: DETECTED

## 2020-12-29 RX ORDER — FLUTICASONE PROPIONATE 50 MCG
2 SPRAY, SUSPENSION (ML) NASAL DAILY
Qty: 3 BOTTLE | Refills: 1 | Status: SHIPPED | OUTPATIENT
Start: 2020-12-29 | End: 2021-05-26 | Stop reason: SDUPTHER

## 2020-12-29 NOTE — TELEPHONE ENCOUNTER
Do you have enough medication for the next 5 days?: Yes    Did you request this medication through your pharmacy or our patient portal in the last day or two? No    Name of medication requested: fluticasone propionate (FLONASE)   Medication Strength: 50mcg  Mediation Directions: Administer 2 sprays into each nostril daily  Quantity Requested (example 30/90): 3 bottle  Number of refills requested: 2      System Generated Preferred Pharmacy(s)  are as follows.  If more than one pharmacy displays please identify which one should be used for this call    Has the pharmacy information below been confirmed with the patient?       [facility] [address] [place] [address]  Phone: [phone] Fax: [phone]        If necessary, provide pharmacy details below.     Is this pharmacy a mail order pharmacy?:   Pharmacy Name:   Pharmacy City:   Pharmacy Telephone:     Additional Comments:    Next Encounter with this provider: Visit date not found

## 2021-01-18 ENCOUNTER — TELEPHONE (OUTPATIENT)
Dept: PRIMARY CARE | Facility: CLINIC | Age: 71
End: 2021-01-18

## 2021-01-18 DIAGNOSIS — U07.1 COVID-19: Primary | ICD-10-CM

## 2021-01-18 NOTE — TELEPHONE ENCOUNTER
- Patient had covid in November. He wants to know if you think he should take the Covid Antibody test. Please call him at [phone]

## 2021-01-18 NOTE — TELEPHONE ENCOUNTER
Please let Melanie know that evidence has shown that people can have immunity for COVID up to 90 days after COVID infection--if he wants to check, we can order, but I would make sure insurance will cover; and it is still recommended he receive the COVID vaccine even if he had COVID

## 2021-01-18 NOTE — TELEPHONE ENCOUNTER
Tanesha Navarro and he will check with his insurance to see if they cover the antibody test and he will let us know he states that he is on the list for the covid vaccine

## 2021-01-20 ENCOUNTER — HOSPITAL ENCOUNTER (OUTPATIENT)
Dept: RADIOLOGY | Age: 71
Discharge: HOME | End: 2021-01-20
Attending: THORACIC SURGERY (CARDIOTHORACIC VASCULAR SURGERY)
Payer: COMMERCIAL

## 2021-01-20 ENCOUNTER — APPOINTMENT (OUTPATIENT)
Dept: LAB | Age: 71
End: 2021-01-20
Attending: NURSE PRACTITIONER
Payer: COMMERCIAL

## 2021-01-20 DIAGNOSIS — C34.32 PRIMARY MALIGNANT NEOPLASM OF LEFT LOWER LOBE OF LUNG (CMS/HCC): Chronic | ICD-10-CM

## 2021-01-20 DIAGNOSIS — U07.1 COVID-19: ICD-10-CM

## 2021-01-20 PROCEDURE — 36415 COLL VENOUS BLD VENIPUNCTURE: CPT

## 2021-01-20 PROCEDURE — 86769 SARS-COV-2 COVID-19 ANTIBODY: CPT

## 2021-01-20 PROCEDURE — G1004 CDSM NDSC: HCPCS

## 2021-01-21 LAB — SARS-COV-2 IGG SERPL QL IA: POSITIVE

## 2021-02-05 ENCOUNTER — TELEPHONE (OUTPATIENT)
Dept: PRIMARY CARE | Facility: CLINIC | Age: 71
End: 2021-02-05

## 2021-02-05 NOTE — TELEPHONE ENCOUNTER
Patient wants Dr Schultz to know he received his first round of the vaccine today and he is scheduled for the 2nd round on 3/5.

## 2021-02-05 NOTE — TELEPHONE ENCOUNTER
Spoke with Melanie , he is unsure which COVID 19 vaccine he got but will call us back in the office with that information

## 2021-02-08 ENCOUNTER — DOCUMENTATION (OUTPATIENT)
Dept: PRIMARY CARE | Facility: CLINIC | Age: 71
End: 2021-02-08

## 2021-02-08 ENCOUNTER — TELEPHONE (OUTPATIENT)
Dept: PRIMARY CARE | Facility: CLINIC | Age: 71
End: 2021-02-08

## 2021-02-08 NOTE — TELEPHONE ENCOUNTER
Myriam called to advise he has gotten his first dose of the vaccine  Which was moderma  Which he got on 02/05/2021 second will be 03/05/2021 at  [facility]

## 2021-02-10 ENCOUNTER — OFFICE VISIT (OUTPATIENT)
Dept: THORACIC SURGERY | Facility: CLINIC | Age: 71
End: 2021-02-10
Payer: COMMERCIAL

## 2021-02-10 VITALS
HEIGHT: 66 IN | SYSTOLIC BLOOD PRESSURE: 142 MMHG | TEMPERATURE: 98.1 F | DIASTOLIC BLOOD PRESSURE: 88 MMHG | OXYGEN SATURATION: 98 % | BODY MASS INDEX: 31.02 KG/M2 | HEART RATE: 61 BPM | WEIGHT: 193 LBS | RESPIRATION RATE: 16 BRPM

## 2021-02-10 DIAGNOSIS — C34.32 PRIMARY MALIGNANT NEOPLASM OF LEFT LOWER LOBE OF LUNG (CMS/HCC): Primary | Chronic | ICD-10-CM

## 2021-02-10 PROCEDURE — 99213 OFFICE O/P EST LOW 20 MIN: CPT | Performed by: THORACIC SURGERY (CARDIOTHORACIC VASCULAR SURGERY)

## 2021-02-10 NOTE — PROGRESS NOTES
Patient ID: Melanie Zelaya                              : 1950  MRN: [id number]                                            Visit Date: 2/10/2021  Encounter Provider: Blaise Guallpa  Referring Provider: Killian Cotter DO Subjective      Patient ID: Melanie Zelaya is a 70 y.o. male.  Chief Complaint: follow up      Melanie Zelaya is a 70 y.o. 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  There was indeterminate lymphovascular invasion and no lymph node involvement.  At the present time he has no cough, hemoptysis, significant weight loss over the last 3 months, new persistent bony or neurological complaints, fever, chills, sweats.  6 to 8 weeks ago he had Covid and his symptoms were mostly headache and nausea.    Past Medical History:  has a past medical history of Anxiety, Atrial fibrillation (CMS/HCC), Depression, Fracture of pelvis (CMS/HCC) (), GERD (gastroesophageal reflux disease), Hearing loss, History of colon polyps, Hyperlipidemia, Hypertension, Left atrial enlargement, Lung cancer (CMS/HCC), Lung cancer (CMS/HCC), Pneumonia (), and Seasonal allergies.  Past Surgical History:  has a past surgical history that includes Lung lobectomy (Left, ); Shoulder surgery (); Rotator cuff repair (Right, ); Tonsillectomy; Nasal polyp surgery; and Hand surgery (Bilateral).  Social History:  reports that he quit smoking about 10 years ago. His smoking use included cigarettes. He smoked 2.00 packs per day. He has never used smokeless tobacco. He reports that he does not drink alcohol or use drugs.  Family History: family history includes Cancer in his nephew; Cirrhosis in his biological father; Colon cancer in his biological brother; Diabetes in his biological mother; Lung cancer in his biological brother; No Known Problems in his maternal  grandfather, maternal grandmother, paternal grandfather, and paternal grandmother; Pancreatic cancer in his biological sister; Prostate cancer in his biological brother.  Medications:   Current Outpatient Medications:   •  albuterol HFA (PROAIR HFA) 90 mcg/actuation inhaler, Inhale 2 puffs 2 (two) times a day as needed for wheezing or shortness of breath. (Patient taking differently: Inhale 2 puffs as needed for wheezing or shortness of breath.  ), Disp: 1 Inhaler, Rfl: 3  •  ALPRAZolam (XANAX) 0.5 mg tablet, Take 1 tablet (0.5 mg total) by mouth 2 (two) times a day as needed for anxiety., Disp: 30 tablet, Rfl: 1  •  atorvastatin (LIPITOR) 40 mg tablet, Take 1 tablet (40 mg total) by mouth daily., Disp: 90 tablet, Rfl: 1  •  cimetidine (TAGAMET HB) 200 mg tablet, Take 200 mg by mouth as needed., Disp: , Rfl:   •  dabigatran etexilate (PRADAXA) 150 mg capsu, Take 150 mg by mouth 2 (two) times a day., Disp: , Rfl:   •  DOCOSAHEXANOIC ACID/EPA (FISH OIL ORAL), Take 4 capsules by mouth daily. OTC suppliement , Disp: , Rfl:   •  escitalopram (LEXAPRO) 5 mg tablet, Take 1 tablet (5 mg total) by mouth once daily., Disp: 90 tablet, Rfl: 1  •  fluticasone propionate (FLONASE) 50 mcg/actuation nasal spray, Administer 2 sprays into each nostril daily., Disp: 3 Bottle, Rfl: 1  •  hydrochlorothiazide (HYDRODIURIL) 12.5 mg tablet, Take 12.5 mg by mouth daily., Disp: , Rfl:   •  irbesartan (AVAPRO) 300 mg tablet, Take 300 mg by mouth nightly., Disp: , Rfl:   •  loratadine (CLARITIN) 10 mg tablet, Take 1 tablet (10 mg total) by mouth daily. (Patient taking differently: Take 10 mg by mouth daily as needed.  ), Disp: 90 tablet, Rfl: 1  •  metoprolol tartrate (LOPRESSOR) 25 mg tablet, Take 25 mg by mouth 2 (two) times a day., Disp: , Rfl:   •  multivitamin (THERAGRAN) tablet, Take 1 tablet by mouth daily., Disp: , Rfl:   •  umeclidinium-vilanterol (ANORO ELLIPTA) 62.5-25 mcg/actuation blister with device, Inhale 1 puff daily., Disp: ,  "Rfl:   Allergies: is allergic to no known allergies.   E-cigarette/Vaping     Questions Responses    E-cigarette/Vaping Use Never User           The following have been reviewed and updated as appropriate in this visit:     Allergies  Meds  Problems       Review of Systems   All other systems reviewed and are negative.      Objective   Vitals:   Visit Vitals  BP (!) 142/88 (BP Location: Right upper arm, Patient Position: Sitting)   Pulse 61   Temp 36.7 °C (98.1 °F) (Temporal)   Resp 16   Ht 1.676 m (5' 6\")   Wt 87.5 kg (193 lb)   SpO2 98%   BMI 31.15 kg/m²       Physical Exam  Vitals signs reviewed.   Constitutional:       Appearance: He is well-developed.   HENT:      Head: Normocephalic.   Eyes:      Pupils: Pupils are equal, round, and reactive to light.   Cardiovascular:      Rate and Rhythm: Normal rate and regular rhythm.   Pulmonary:      Effort: Pulmonary effort is normal.      Breath sounds: Normal breath sounds.   Musculoskeletal: Normal range of motion.   Skin:     General: Skin is warm and dry.   Neurological:      Mental Status: He is alert.         Assessment/Plan   Independent review of all imaging was done by myself as well as review of the radiologists readings and comparison to prior films.  CAT scan on January 20 shows no evidence of definitive recurrence but there is hazy opacities bilaterally.  I think we need to perform a short interval CAT scan of the chest without contrast so we will see him in 3 months with that.  This may well be due to his prior Covid infection.       Problem List Items Addressed This Visit        Respiratory    Primary malignant neoplasm of left lower lobe of lung (CMS/HCC) - Primary (Chronic)    Relevant Orders    CT CHEST WITHOUT IV CONTRAST            Blaise Guallpa MD  "

## 2021-02-11 ENCOUNTER — TELEPHONE (OUTPATIENT)
Dept: PULMONOLOGY | Facility: CLINIC | Age: 71
End: 2021-02-11

## 2021-02-11 NOTE — TELEPHONE ENCOUNTER
Patient was seen yesterday but has additional questions regarding his CT results. Would like a call back at your convenience.

## 2021-03-08 ENCOUNTER — TELEPHONE (OUTPATIENT)
Dept: PRIMARY CARE | Facility: CLINIC | Age: 71
End: 2021-03-08

## 2021-03-08 NOTE — TELEPHONE ENCOUNTER
Patient needs a call back regarding the  Date of Service on 1.20.21  For the Antibody test. He received  EOB from [facility] and they will be sending one to the office as well  He was denied coverage for the study.

## 2021-03-09 NOTE — TELEPHONE ENCOUNTER
It is noted in Patient's chart that he checked with his insurance and says he was told that they would cover this test. Is there anything we need to do on our end or does Melanie have to take this up with his insurance?

## 2021-03-09 NOTE — TELEPHONE ENCOUNTER
TANESHA Basilio V from [facility] and she states that the covid antibodies test was covered    Tanesha Monteiro and he is aware covid testing was covered

## 2021-03-09 NOTE — TELEPHONE ENCOUNTER
Would you mind taking a look at this? Is this just a routine billing item that can be reviewed by the MA team?

## 2021-04-09 RX ORDER — ATORVASTATIN CALCIUM 40 MG/1
TABLET, FILM COATED ORAL
Qty: 90 TABLET | Refills: 0 | Status: SHIPPED | OUTPATIENT
Start: 2021-04-09 | End: 2021-07-14

## 2021-04-23 ENCOUNTER — APPOINTMENT (EMERGENCY)
Dept: RADIOLOGY | Facility: HOSPITAL | Age: 71
DRG: 086 | End: 2021-04-23
Attending: EMERGENCY MEDICINE
Payer: COMMERCIAL

## 2021-04-23 ENCOUNTER — HOSPITAL ENCOUNTER (INPATIENT)
Facility: HOSPITAL | Age: 71
LOS: 5 days | Discharge: HOME HEALTH | DRG: 086 | End: 2021-04-28
Attending: EMERGENCY MEDICINE | Admitting: SURGERY
Payer: COMMERCIAL

## 2021-04-23 DIAGNOSIS — S12.091K: ICD-10-CM
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
## 2021-04-23 DIAGNOSIS — S42.101A CLOSED FRACTURE OF RIGHT SCAPULA, UNSPECIFIED PART OF SCAPULA, INITIAL ENCOUNTER: ICD-10-CM

## 2021-04-23 DIAGNOSIS — I48.20 CHRONIC ATRIAL FIBRILLATION (CMS/HCC): ICD-10-CM

## 2021-04-23 DIAGNOSIS — R03.0 ELEVATED BLOOD PRESSURE READING: ICD-10-CM

## 2021-04-23 DIAGNOSIS — I16.0 HYPERTENSIVE URGENCY: ICD-10-CM

## 2021-04-23 DIAGNOSIS — I10 ESSENTIAL HYPERTENSION: ICD-10-CM

## 2021-04-23 DIAGNOSIS — S09.90XA CLOSED HEAD INJURY, INITIAL ENCOUNTER: ICD-10-CM

## 2021-04-23 DIAGNOSIS — S12.191A: ICD-10-CM

## 2021-04-23 PROBLEM — S12.9XXA CERVICAL SPINE FRACTURE (CMS/HCC): Status: ACTIVE | Noted: 2021-04-23

## 2021-04-23 LAB
ABO + RH BLD: NORMAL
ALBUMIN SERPL-MCNC: 4.7 G/DL (ref 3.4–5)
ALP SERPL-CCNC: 56 IU/L (ref 35–126)
ALT SERPL-CCNC: 27 IU/L (ref 16–63)
ANION GAP SERPL CALC-SCNC: 10 MEQ/L (ref 3–15)
APTT PPP: 40 SEC (ref 23–35)
AST SERPL-CCNC: 31 IU/L (ref 15–41)
BASOPHILS # BLD: 0.01 K/UL (ref 0.01–0.1)
BASOPHILS NFR BLD: 0.1 %
BILIRUB SERPL-MCNC: 0.9 MG/DL (ref 0.3–1.2)
BLD GP AB SCN SERPL QL: NEGATIVE
BUN SERPL-MCNC: 19 MG/DL (ref 8–20)
CALCIUM SERPL-MCNC: 9.3 MG/DL (ref 8.9–10.3)
CHLORIDE SERPL-SCNC: 101 MEQ/L (ref 98–109)
CO2 SERPL-SCNC: 27 MEQ/L (ref 22–32)
CREAT SERPL-MCNC: 1 MG/DL (ref 0.8–1.3)
D AG BLD QL: POSITIVE
DIFFERENTIAL METHOD BLD: ABNORMAL
EOSINOPHIL # BLD: 0 K/UL (ref 0.04–0.54)
EOSINOPHIL NFR BLD: 0 %
ERYTHROCYTE [DISTWIDTH] IN BLOOD BY AUTOMATED COUNT: 14 % (ref 11.6–14.4)
ETHANOL SERPL-MCNC: <5 MG/DL
GFR SERPL CREATININE-BSD FRML MDRD: >60 ML/MIN/1.73M*2
GLUCOSE SERPL-MCNC: 123 MG/DL (ref 70–99)
HCT VFR BLDCO AUTO: 45.9 % (ref 40.1–51)
HGB BLD-MCNC: 15.4 G/DL (ref 13.7–17.5)
IMM GRANULOCYTES # BLD AUTO: 0.04 K/UL (ref 0–0.08)
IMM GRANULOCYTES NFR BLD AUTO: 0.4 %
INR PPP: 1.2
INR PPP: 1.3
LABORATORY COMMENT REPORT: NORMAL
LIPASE SERPL-CCNC: 37 U/L (ref 20–51)
LYMPHOCYTES # BLD: 0.95 K/UL (ref 1.2–3.5)
LYMPHOCYTES NFR BLD: 8.5 %
MCH RBC QN AUTO: 29.6 PG (ref 28–33.2)
MCHC RBC AUTO-ENTMCNC: 33.6 G/DL (ref 32.2–36.5)
MCV RBC AUTO: 88.1 FL (ref 83–98)
MONOCYTES # BLD: 0.73 K/UL (ref 0.3–1)
MONOCYTES NFR BLD: 6.5 %
NEUTROPHILS # BLD: 9.45 K/UL (ref 1.7–7)
NEUTS SEG NFR BLD: 84.5 %
NRBC BLD-RTO: 0 %
PDW BLD AUTO: 9.9 FL (ref 9.4–12.4)
PLATELET # BLD AUTO: 198 K/UL (ref 150–350)
POTASSIUM SERPL-SCNC: 3.5 MEQ/L (ref 3.6–5.1)
PROT SERPL-MCNC: 7.5 G/DL (ref 6–8.2)
PROTHROMBIN TIME: 15.6 SEC (ref 12.2–14.5)
PROTHROMBIN TIME: 16.2 SEC (ref 12.2–14.5)
RBC # BLD AUTO: 5.21 M/UL (ref 4.5–5.8)
SARS-COV-2 RNA RESP QL NAA+PROBE: NEGATIVE
SODIUM SERPL-SCNC: 138 MEQ/L (ref 136–144)
WBC # BLD AUTO: 11.18 K/UL (ref 3.8–10.5)

## 2021-04-23 PROCEDURE — 96375 TX/PRO/DX INJ NEW DRUG ADDON: CPT

## 2021-04-23 PROCEDURE — 85610 PROTHROMBIN TIME: CPT | Performed by: PHYSICIAN ASSISTANT

## 2021-04-23 PROCEDURE — 70450 CT HEAD/BRAIN W/O DYE: CPT | Mod: ME

## 2021-04-23 PROCEDURE — 72125 CT NECK SPINE W/O DYE: CPT | Mod: ME

## 2021-04-23 PROCEDURE — 63600000 HC DRUGS/DETAIL CODE: Performed by: PHYSICIAN ASSISTANT

## 2021-04-23 PROCEDURE — 90715 TDAP VACCINE 7 YRS/> IM: CPT | Performed by: PHYSICIAN ASSISTANT

## 2021-04-23 PROCEDURE — 99285 EMERGENCY DEPT VISIT HI MDM: CPT | Mod: 25

## 2021-04-23 PROCEDURE — 63600000 HC DRUGS/DETAIL CODE: Performed by: EMERGENCY MEDICINE

## 2021-04-23 PROCEDURE — 85730 THROMBOPLASTIN TIME PARTIAL: CPT | Performed by: EMERGENCY MEDICINE

## 2021-04-23 PROCEDURE — 85610 PROTHROMBIN TIME: CPT | Performed by: EMERGENCY MEDICINE

## 2021-04-23 PROCEDURE — G1004 CDSM NDSC: HCPCS

## 2021-04-23 PROCEDURE — U0002 COVID-19 LAB TEST NON-CDC: HCPCS | Performed by: PHYSICIAN ASSISTANT

## 2021-04-23 PROCEDURE — 90471 IMMUNIZATION ADMIN: CPT | Performed by: PHYSICIAN ASSISTANT

## 2021-04-23 PROCEDURE — 96374 THER/PROPH/DIAG INJ IV PUSH: CPT

## 2021-04-23 PROCEDURE — 83690 ASSAY OF LIPASE: CPT | Performed by: EMERGENCY MEDICINE

## 2021-04-23 PROCEDURE — 70486 CT MAXILLOFACIAL W/O DYE: CPT | Mod: MG

## 2021-04-23 PROCEDURE — G0480 DRUG TEST DEF 1-7 CLASSES: HCPCS | Performed by: EMERGENCY MEDICINE

## 2021-04-23 PROCEDURE — 74177 CT ABD & PELVIS W/CONTRAST: CPT | Mod: ME

## 2021-04-23 PROCEDURE — 20000000 HC ROOM AND CARE ICU

## 2021-04-23 PROCEDURE — 93005 ELECTROCARDIOGRAM TRACING: CPT | Performed by: EMERGENCY MEDICINE

## 2021-04-23 PROCEDURE — 86901 BLOOD TYPING SEROLOGIC RH(D): CPT

## 2021-04-23 PROCEDURE — 36415 COLL VENOUS BLD VENIPUNCTURE: CPT | Performed by: EMERGENCY MEDICINE

## 2021-04-23 PROCEDURE — 63700000 HC SELF-ADMINISTRABLE DRUG: Performed by: PHYSICIAN ASSISTANT

## 2021-04-23 PROCEDURE — 80053 COMPREHEN METABOLIC PANEL: CPT | Performed by: EMERGENCY MEDICINE

## 2021-04-23 PROCEDURE — 72220 X-RAY EXAM SACRUM TAILBONE: CPT

## 2021-04-23 PROCEDURE — 86900 BLOOD TYPING SEROLOGIC ABO: CPT

## 2021-04-23 PROCEDURE — 73030 X-RAY EXAM OF SHOULDER: CPT | Mod: RT

## 2021-04-23 PROCEDURE — L0172 CERV COL SR FOAM 2PC PRE OTS: HCPCS

## 2021-04-23 PROCEDURE — 71260 CT THORAX DX C+: CPT | Mod: ME

## 2021-04-23 PROCEDURE — 85025 COMPLETE CBC W/AUTO DIFF WBC: CPT | Performed by: EMERGENCY MEDICINE

## 2021-04-23 PROCEDURE — 93005 ELECTROCARDIOGRAM TRACING: CPT | Performed by: PHYSICIAN ASSISTANT

## 2021-04-23 PROCEDURE — 63600105 HC IODINE BASED CONTRAST: Performed by: PHYSICIAN ASSISTANT

## 2021-04-23 PROCEDURE — 73502 X-RAY EXAM HIP UNI 2-3 VIEWS: CPT | Mod: LT

## 2021-04-23 RX ORDER — HYDROMORPHONE HYDROCHLORIDE 1 MG/ML
0.5 INJECTION, SOLUTION INTRAMUSCULAR; INTRAVENOUS; SUBCUTANEOUS ONCE
Status: COMPLETED | OUTPATIENT
Start: 2021-04-23 | End: 2021-04-23

## 2021-04-23 RX ORDER — DEXTROSE 40 %
15-30 GEL (GRAM) ORAL AS NEEDED
Status: DISCONTINUED | OUTPATIENT
Start: 2021-04-23 | End: 2021-04-28 | Stop reason: HOSPADM

## 2021-04-23 RX ORDER — OXYCODONE HYDROCHLORIDE 5 MG/1
5 TABLET ORAL EVERY 4 HOURS PRN
Status: DISCONTINUED | OUTPATIENT
Start: 2021-04-23 | End: 2021-04-24

## 2021-04-23 RX ORDER — DEXTROSE 50 % IN WATER (D50W) INTRAVENOUS SYRINGE
25 AS NEEDED
Status: DISCONTINUED | OUTPATIENT
Start: 2021-04-23 | End: 2021-04-28 | Stop reason: HOSPADM

## 2021-04-23 RX ORDER — POTASSIUM CHLORIDE 750 MG/1
20 TABLET, FILM COATED, EXTENDED RELEASE ORAL AS NEEDED
Status: DISCONTINUED | OUTPATIENT
Start: 2021-04-23 | End: 2021-04-28 | Stop reason: HOSPADM

## 2021-04-23 RX ORDER — HYDROMORPHONE HYDROCHLORIDE 1 MG/ML
.25-.5 INJECTION, SOLUTION INTRAMUSCULAR; INTRAVENOUS; SUBCUTANEOUS EVERY 4 HOURS PRN
Status: DISCONTINUED | OUTPATIENT
Start: 2021-04-23 | End: 2021-04-23

## 2021-04-23 RX ORDER — HYDRALAZINE HYDROCHLORIDE 20 MG/ML
10 INJECTION INTRAMUSCULAR; INTRAVENOUS ONCE
Status: COMPLETED | OUTPATIENT
Start: 2021-04-23 | End: 2021-04-23

## 2021-04-23 RX ORDER — POTASSIUM CHLORIDE 750 MG/1
40 TABLET, FILM COATED, EXTENDED RELEASE ORAL AS NEEDED
Status: DISCONTINUED | OUTPATIENT
Start: 2021-04-23 | End: 2021-04-28 | Stop reason: HOSPADM

## 2021-04-23 RX ORDER — POLYETHYLENE GLYCOL 3350 17 G/17G
17 POWDER, FOR SOLUTION ORAL DAILY
Status: DISCONTINUED | OUTPATIENT
Start: 2021-04-23 | End: 2021-04-28 | Stop reason: HOSPADM

## 2021-04-23 RX ORDER — ACETAMINOPHEN 325 MG/1
975 TABLET ORAL EVERY 6 HOURS
Status: DISCONTINUED | OUTPATIENT
Start: 2021-04-23 | End: 2021-04-28 | Stop reason: HOSPADM

## 2021-04-23 RX ORDER — AMOXICILLIN 250 MG
1 CAPSULE ORAL 2 TIMES DAILY
Status: DISCONTINUED | OUTPATIENT
Start: 2021-04-23 | End: 2021-04-28 | Stop reason: HOSPADM

## 2021-04-23 RX ORDER — HEPARIN SODIUM 5000 [USP'U]/ML
5000 INJECTION, SOLUTION INTRAVENOUS; SUBCUTANEOUS EVERY 8 HOURS
Status: DISCONTINUED | OUTPATIENT
Start: 2021-04-24 | End: 2021-04-25

## 2021-04-23 RX ORDER — ONDANSETRON HYDROCHLORIDE 2 MG/ML
4 INJECTION, SOLUTION INTRAVENOUS EVERY 8 HOURS PRN
Status: DISCONTINUED | OUTPATIENT
Start: 2021-04-23 | End: 2021-04-28 | Stop reason: HOSPADM

## 2021-04-23 RX ORDER — OXYCODONE HYDROCHLORIDE 5 MG/1
5 TABLET ORAL EVERY 4 HOURS PRN
Status: DISCONTINUED | OUTPATIENT
Start: 2021-04-23 | End: 2021-04-23

## 2021-04-23 RX ORDER — ALBUTEROL SULFATE 0.83 MG/ML
2.5 SOLUTION RESPIRATORY (INHALATION) EVERY 6 HOURS PRN
Status: DISCONTINUED | OUTPATIENT
Start: 2021-04-23 | End: 2021-04-28 | Stop reason: HOSPADM

## 2021-04-23 RX ORDER — MORPHINE SULFATE 4 MG/ML
4 INJECTION, SOLUTION INTRAMUSCULAR; INTRAVENOUS ONCE
Status: COMPLETED | OUTPATIENT
Start: 2021-04-23 | End: 2021-04-23

## 2021-04-23 RX ORDER — SODIUM CHLORIDE, SODIUM LACTATE, POTASSIUM CHLORIDE, CALCIUM CHLORIDE 600; 310; 30; 20 MG/100ML; MG/100ML; MG/100ML; MG/100ML
INJECTION, SOLUTION INTRAVENOUS CONTINUOUS
Status: ACTIVE | OUTPATIENT
Start: 2021-04-23 | End: 2021-04-25

## 2021-04-23 RX ORDER — ONDANSETRON 4 MG/1
4 TABLET, ORALLY DISINTEGRATING ORAL EVERY 8 HOURS PRN
Status: DISCONTINUED | OUTPATIENT
Start: 2021-04-23 | End: 2021-04-28 | Stop reason: HOSPADM

## 2021-04-23 RX ORDER — POTASSIUM CHLORIDE 14.9 MG/ML
20 INJECTION INTRAVENOUS AS NEEDED
Status: DISCONTINUED | OUTPATIENT
Start: 2021-04-23 | End: 2021-04-28 | Stop reason: HOSPADM

## 2021-04-23 RX ORDER — IBUPROFEN 200 MG
16-32 TABLET ORAL AS NEEDED
Status: DISCONTINUED | OUTPATIENT
Start: 2021-04-23 | End: 2021-04-28 | Stop reason: HOSPADM

## 2021-04-23 RX ORDER — HYDROMORPHONE HYDROCHLORIDE 1 MG/ML
1 INJECTION, SOLUTION INTRAMUSCULAR; INTRAVENOUS; SUBCUTANEOUS
Status: DISCONTINUED | OUTPATIENT
Start: 2021-04-23 | End: 2021-04-24

## 2021-04-23 RX ADMIN — HYDROMORPHONE HYDROCHLORIDE 0.5 MG: 1 INJECTION, SOLUTION INTRAMUSCULAR; INTRAVENOUS; SUBCUTANEOUS at 18:59

## 2021-04-23 RX ADMIN — HYDRALAZINE HYDROCHLORIDE 10 MG: 20 INJECTION, SOLUTION INTRAMUSCULAR; INTRAVENOUS at 19:00

## 2021-04-23 RX ADMIN — IOHEXOL 135 ML: 350 INJECTION, SOLUTION INTRAVENOUS at 17:47

## 2021-04-23 RX ADMIN — ACETAMINOPHEN 975 MG: 325 TABLET ORAL at 20:42

## 2021-04-23 RX ADMIN — DOCUSATE SODIUM AND SENNOSIDES 1 TABLET: 50; 8.6 TABLET ORAL at 20:42

## 2021-04-23 RX ADMIN — MORPHINE SULFATE 4 MG: 4 INJECTION, SOLUTION INTRAMUSCULAR; INTRAVENOUS at 18:00

## 2021-04-23 RX ADMIN — TETANUS TOXOID, REDUCED DIPHTHERIA TOXOID AND ACELLULAR PERTUSSIS VACCINE, ADSORBED 0.5 ML: 5; 2.5; 8; 8; 2.5 SUSPENSION INTRAMUSCULAR at 18:00

## 2021-04-23 RX ADMIN — SODIUM CHLORIDE, POTASSIUM CHLORIDE, SODIUM LACTATE AND CALCIUM CHLORIDE: 600; 310; 30; 20 INJECTION, SOLUTION INTRAVENOUS at 20:42

## 2021-04-23 RX ADMIN — OXYCODONE HYDROCHLORIDE 5 MG: 5 TABLET ORAL at 22:29

## 2021-04-23 ASSESSMENT — ENCOUNTER SYMPTOMS
ARTHRALGIAS: 1
FACIAL SWELLING: 0
WHEEZING: 0
SORE THROAT: 0
DIARRHEA: 0
NAUSEA: 0
MYALGIAS: 1
COUGH: 0
FEVER: 0
SPEECH DIFFICULTY: 0
SEIZURES: 0
DIAPHORESIS: 0
DIFFICULTY URINATING: 0
AGITATION: 0
VOMITING: 0
BACK PAIN: 0
HEMATURIA: 0
WOUND: 1
ABDOMINAL PAIN: 0
ACTIVITY CHANGE: 0
NECK PAIN: 1
WEAKNESS: 0
COLOR CHANGE: 0
HEADACHES: 1
SHORTNESS OF BREATH: 0

## 2021-04-23 ASSESSMENT — COGNITIVE AND FUNCTIONAL STATUS - GENERAL
MOVING TO AND FROM BED TO CHAIR: 2 - A LOT
DRESSING REGULAR UPPER BODY CLOTHING: 2 - A LOT
TOILETING: 3 - A LITTLE
HELP NEEDED FOR PERSONAL GROOMING: 2 - A LOT
STANDING UP FROM CHAIR USING ARMS: 2 - A LOT
CLIMB 3 TO 5 STEPS WITH RAILING: 2 - A LOT
DRESSING REGULAR LOWER BODY CLOTHING: 2 - A LOT
EATING MEALS: 4 - NONE
HELP NEEDED FOR BATHING: 2 - A LOT
WALKING IN HOSPITAL ROOM: 2 - A LOT

## 2021-04-23 ASSESSMENT — PATIENT HEALTH QUESTIONNAIRE - PHQ9: SUM OF ALL RESPONSES TO PHQ9 QUESTIONS 1 & 2: 0

## 2021-04-23 NOTE — ASSESSMENT & PLAN NOTE
Pt with C1 and C2 fx and occipital condyle fracture  Pt is neuro intact  Cervical collar  Neurosurgery consult  Mri today

## 2021-04-23 NOTE — ED PROVIDER NOTES
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    COMMENT:    Cervicothoracic alignment: Anatomic.    Prevertebral soft tissues: There is prevertebral soft tissue thickening at the  C1-C2 articulation with right greater than left edema.    Vertebral bodies: Acute fractures involving the anterior and posterior arches of  C1.  Acute fracture involving the base of the dens with displaced osseous  fragments located adjacent to the bilateral there is osseous fusion of the C2  and C3 vertebra and its posterior elements.  There is a linear lucency along the  left occipital condyle likely representing a nondisplaced fracture deformity.  Remaining vertebral bodies are intact with degenerative changes.  Lateral mass  of C1 and C2 vertebra.    Intervertebral discs: Severe intervertebral disc space narrowing with partial  fusion at C5-C6.    Cervical and upper thoracic spinal canal: Patent the level of C5.  Below which,  evaluation for    Axial images:    Skull base: See above    C1-2: See above    C2-3: Fusion of the C2-C3 disc space with left greater than right uncovertebral  and facet hypertrophy.  Moderate left foraminal narrowing.    C3-4: Posterior disc osteophyte complex with uncovertebral and facet  hypertrophy.  Severe left and moderate right foraminal narrowing.  Moderate  central canal narrowing.    C4-5: Posterior disc osteophyte complex with uncovertebral and  facet  hypertrophy.  Severe left and moderate right foraminal narrowing.  Moderate  central canal narrowing..    C5-6: Fusion of the C5-C6 disc space with uncovertebral and facet hypertrophy.  Moderate left  greater right foraminal narrowing. Moderate central canal  narrowing.    C6-7: Posterior disc osteophyte complex with uncovertebral and facet  hypertrophy.  Severe left and moderate right foraminal narrowing.  Moderate  central canal narrowing..    C7-T1: Posterior disc osteophyte complex with uncovertebral and facet  hypertrophy.  Severe left and moderate right foraminal narrowing.  Moderate  central canal narrowing.    Extra vertebral soft tissues: Biapical pleural scarring.                               CT FACIAL BONES WITHOUT IV CONTRAST (Final result)  Result time 04/23/21 18:42:47    Final result                 Impression:    IMPRESSION:  1.  No acute facial bone fracture.  2.  Acute fracture involving the anterior and posterior arches of C1. Acute  fracture through the base of the dens of the C2 vertebra.  There is edema in the  central and right paracentral prevertebral soft tissues.    Finding:    New cervical spine fracture   Acuity: Critical  Status:  CLOSED    Critical read back was performed and results were read back by YESENIA Ruiz  in the emergency department on 4/23/2021.                     Narrative:    CLINICAL HISTORY: Status post fall from ladder    STUDY: Noncontrast CT examination of the facial bones performed by obtaining  direct axial images and generating coronal reformations. Images reviewed in bone  and soft tissue windows.    CT DOSE:  One or more dose reduction techniques (e.g. automated exposure  control, adjustment of the mA and/or kV according to patient size, use of  iterative reconstruction technique) utilized for this examination.    COMPARISON: None.    COMMENT:    Frontal bones:  Supraorbital soft tissues: Normal without swelling, laceration or foreign body.  Sinuses:  Mild mucosal thickening in the left frontal sinus.    Orbits:  Preseptal soft tissues: Normal without swelling, laceration or foreign body.  Walls: Intact without evidence of fracture.  Globes: Normal without proptosis or evidence of disruption or intraocular  foreign body.  Retrobulbar fat: Normal without mass or hematoma.  Extraocular muscles: Normal and symmetric without prolapse or evidence of  entrapment.  Optic nerves: Normal without mass-effect or evidence of disruption.    Ethmoid sinuses:  Lamina papyracea:  Intact bilaterally.  Air cells: Mild mucosal thickening is noted anteriorly/superiorly.    Nose:  Soft tissues: Normal.  Nasal bones: Intact without fracture or displacement bilaterally.  Nasal process of maxilla: Intact bilaterally.  Nasal Septum: Nasal septum is intact but deviated to the left    Maxillary bones:  Premalar soft tissues:  Normal without swelling, laceration or foreign body.  Sinuses: Left greater than right mild mucosal thickening.  Alveolus: Intact bilaterally without fracture or avulsed teeth.    Zygoma bones: Intact bilaterally.  Pterygoid plates: Intact bilaterally.    Visualized mandible: Intact bilaterally without fracture, dislocation, or  avulsed teeth.    Visualized brain:  Parenchyma: Normal CT appearance.  Ventricles, cisterns, and sulci: Prominent, consistent with involutional  changes.  Partially visualized cervical spine: There is prevertebral and right  paravertebral edema with an underlying acute fracture through the anterior and  posterior arches of C1.  Additional acute fracture is noted through the base of  the dens.  The fracture line does not appear to extend into the body of the  dens.                                Electrocardiogram, 12-lead    (Results Pending)       Scoring tools                                 ED Course & MDM   MDM / ED COURSE and CLINICAL IMPRESSIONS     [facility]    ED Course as of Apr 23 2127 Fri Apr 23, 2021   1700 Impression: Fall with head  injury, neck pain, right shoulder pain, tailbone pain, and left buttock pain following fall from 6 feet on ladder 2 hours prior to arrivalPlan: Tier 3 trauma; patient examined by attending physician; CT noncontrast facial bones, head, C-spine, x-rays right shoulder, sacrum and coccyx, left hip and pelvis, labs, coags, reeval; unknown last tetanus administer booster    [RH]   1710 We will continue to monitor   BP(!): 214/102 [RH]   1720 Mildly elevated   WBC(!): 11.18 [RH]   1728 Last creatinine from August 2020 was 1.1 and GFR greater than 60 instructed to scan without waiting for labs as patient is a tier 3 trauma and head injury on Pradaxa    [RH]   1813 Mildly low   Potassium(!): 3.5 [RH]   1840 C1 and c2 fx; avulsion fx left occipital condyle; cta of vertebral arteries    R scapula linear irregularities suspect nondisplaced fx    Call from radiologist    [RH]   1843 Paged trauma and neurosurgery; patient and family updated on findings; patient to be placed in [place] collar    Patient admits that pain has returned and ordered 0.5 Dilaudid.  Patient continues to be hypertensive even with pain meds and 10 mg IV hydralazine ordered    [RH]   1900 Case was discussed with trauma surgeon, Dr. Bowling, who will evaluate patient    [RH]   1910 I discussed with neurosurgery. Recommended MRI and MRA of brain and neck without contrast non-emergently; trauma surgeon requests ICU bed.    [RH]      ED Course User Index  [RH] Angeli Florence PA C         Clinical Impressions as of Apr 23 2127   Fall, initial encounter   Closed head injury, initial encounter   Elevated blood pressure reading   Hypertensive urgency   Other closed nondisplaced fracture of first cervical vertebra with nonunion, subsequent encounter   Other nondisplaced fracture of second cervical vertebra, initial encounter for closed fracture (CMS/HCC)   Closed fracture of right scapula, unspecified part of scapula, initial encounter            Angeli Florence  YESENIA KOROMA  04/23/21 [id number]

## 2021-04-23 NOTE — ED ATTESTATION NOTE
Procedures  Physical Exam  Review of Systems    4/23/20215:17 PM  I have personally seen and examined the patient.  I reviewed and agree with the PA/NP/Resident's assessment and plan of care.    I examined the patient immediately upon presentation alongside the physician assistant.  My examination, assessment, and plan of care of Melanie Zelaya is as follows:  The patient presents with injuries from a fall out of a tree.  This is a 71-year-[facility] who was on a ladder approximately 6 feet up trying to trim his [place].  He fell off the ladder into the posterior and then rolled over his neighbors fence and fell 6 feet onto his face and his chest.  He states he lost consciousness for an uncertain amount of time.  When he first came to, he could not move his legs.  He is now moving his legs normally and was able to walk to her car and have his son bring him to the hospital.  He has multiple abrasions and is not up-to-date with tetanus prophylaxis.  Exam: The patient is alert in no acute distress.  He is complaining of neck pain and headache.  He denies chest or abdominal pain, he denies back pain.  Examination reveals abrasions over the forehead and nasal bridge without active bleeding.  There is no bony deformity or tenderness.  The patient's cervical spine is tender in the midline without deformity.  His heart is regular and his lungs are clear.  His chest is intact and nontender.  He has a protuberant abdomen that is soft and nontender.  His pelvis is nontender and he is moving all extremities equally.  He is neurovascularly intact.  Impression/Plan: The patient will be evaluated with pan scanning, lab work and he will be updated with Boostrix.  We will give him a dose of morphine for pain.    7:20 PM  The patient's CAT scans revealed a C1 arch fracture and there is a type II dens fracture.  We have discussed the case with the on-call neurosurgeon who is requesting MRI/MRA without contrast to  assess the spinal cord and the vasculature in the neck.  The patient will be admitted to the trauma service.  Critical care was provided for 30 minutes.    Vital Sign Review: Vital signs have been ordered and reviewed. The oxygen saturation is 97% on room air, normal.     I was physically present for the key/critical portions of the following procedures: None    This document was created using dragon dictation software.  There might be some typographical errors due to this technology.     Paco Brice, DO  04/23/21 1921
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
## 2021-04-24 ENCOUNTER — APPOINTMENT (INPATIENT)
Dept: RADIOLOGY | Facility: HOSPITAL | Age: 71
DRG: 086 | End: 2021-04-24
Attending: PHYSICIAN ASSISTANT
Payer: COMMERCIAL

## 2021-04-24 LAB
ANION GAP SERPL CALC-SCNC: 9 MEQ/L (ref 3–15)
ATRIAL RATE: 49
BASOPHILS # BLD: 0.01 K/UL (ref 0.01–0.1)
BASOPHILS NFR BLD: 0.1 %
BUN SERPL-MCNC: 16 MG/DL (ref 8–20)
CA-I BLD-SCNC: 1.14 MMOL/L (ref 1.15–1.27)
CALCIUM SERPL-MCNC: 9 MG/DL (ref 8.9–10.3)
CHLORIDE SERPL-SCNC: 101 MEQ/L (ref 98–109)
CO2 SERPL-SCNC: 27 MEQ/L (ref 22–32)
CREAT SERPL-MCNC: 0.9 MG/DL (ref 0.8–1.3)
DIFFERENTIAL METHOD BLD: ABNORMAL
EOSINOPHIL # BLD: 0.01 K/UL (ref 0.04–0.54)
EOSINOPHIL NFR BLD: 0.1 %
ERYTHROCYTE [DISTWIDTH] IN BLOOD BY AUTOMATED COUNT: 14.2 % (ref 11.6–14.4)
GFR SERPL CREATININE-BSD FRML MDRD: >60 ML/MIN/1.73M*2
GLUCOSE SERPL-MCNC: 114 MG/DL (ref 70–99)
HCT VFR BLDCO AUTO: 44 % (ref 40.1–51)
HGB BLD-MCNC: 14.4 G/DL (ref 13.7–17.5)
IMM GRANULOCYTES # BLD AUTO: 0.02 K/UL (ref 0–0.08)
IMM GRANULOCYTES NFR BLD AUTO: 0.2 %
LYMPHOCYTES # BLD: 1.15 K/UL (ref 1.2–3.5)
LYMPHOCYTES NFR BLD: 14.2 %
MAGNESIUM SERPL-MCNC: 2.1 MG/DL (ref 1.8–2.5)
MCH RBC QN AUTO: 29.2 PG (ref 28–33.2)
MCHC RBC AUTO-ENTMCNC: 32.7 G/DL (ref 32.2–36.5)
MCV RBC AUTO: 89.2 FL (ref 83–98)
MONOCYTES # BLD: 1.2 K/UL (ref 0.3–1)
MONOCYTES NFR BLD: 14.9 %
NEUTROPHILS # BLD: 5.69 K/UL (ref 1.7–7)
NEUTS SEG NFR BLD: 70.5 %
NRBC BLD-RTO: 0 %
PDW BLD AUTO: 9.8 FL (ref 9.4–12.4)
PHOSPHATE SERPL-MCNC: 3.8 MG/DL (ref 2.4–4.7)
PLATELET # BLD AUTO: 168 K/UL (ref 150–350)
POTASSIUM SERPL-SCNC: 3.8 MEQ/L (ref 3.6–5.1)
QRS DURATION: 84
QT INTERVAL: 452
QTC CALCULATION(BAZETT): 466
R AXIS: 36
RBC # BLD AUTO: 4.93 M/UL (ref 4.5–5.8)
SODIUM SERPL-SCNC: 137 MEQ/L (ref 136–144)
T WAVE AXIS: -28
VENTRICULAR RATE: 64
WBC # BLD AUTO: 8.08 K/UL (ref 3.8–10.5)

## 2021-04-24 PROCEDURE — 84100 ASSAY OF PHOSPHORUS: CPT | Performed by: PHYSICIAN ASSISTANT

## 2021-04-24 PROCEDURE — 25000000 HC PHARMACY GENERAL: Performed by: PHYSICIAN ASSISTANT

## 2021-04-24 PROCEDURE — 63600000 HC DRUGS/DETAIL CODE: Performed by: PHYSICIAN ASSISTANT

## 2021-04-24 PROCEDURE — 72141 MRI NECK SPINE W/O DYE: CPT | Mod: MG

## 2021-04-24 PROCEDURE — 99223 1ST HOSP IP/OBS HIGH 75: CPT | Performed by: NEUROLOGICAL SURGERY

## 2021-04-24 PROCEDURE — G1004 CDSM NDSC: HCPCS

## 2021-04-24 PROCEDURE — 83735 ASSAY OF MAGNESIUM: CPT | Performed by: PHYSICIAN ASSISTANT

## 2021-04-24 PROCEDURE — 80048 BASIC METABOLIC PNL TOTAL CA: CPT | Performed by: PHYSICIAN ASSISTANT

## 2021-04-24 PROCEDURE — 63700000 HC SELF-ADMINISTRABLE DRUG: Performed by: PHYSICIAN ASSISTANT

## 2021-04-24 PROCEDURE — 94640 AIRWAY INHALATION TREATMENT: CPT

## 2021-04-24 PROCEDURE — L0172 CERV COL SR FOAM 2PC PRE OTS: HCPCS

## 2021-04-24 PROCEDURE — 63700000 HC SELF-ADMINISTRABLE DRUG: Performed by: INTERNAL MEDICINE

## 2021-04-24 PROCEDURE — 82330 ASSAY OF CALCIUM: CPT | Performed by: PHYSICIAN ASSISTANT

## 2021-04-24 PROCEDURE — 20600000 HC ROOM AND CARE INTERMEDIATE/TELEMETRY

## 2021-04-24 PROCEDURE — 36415 COLL VENOUS BLD VENIPUNCTURE: CPT | Performed by: PHYSICIAN ASSISTANT

## 2021-04-24 PROCEDURE — 85025 COMPLETE CBC W/AUTO DIFF WBC: CPT | Performed by: PHYSICIAN ASSISTANT

## 2021-04-24 RX ORDER — FLUTICASONE PROPIONATE 50 MCG
2 SPRAY, SUSPENSION (ML) NASAL DAILY
Status: DISCONTINUED | OUTPATIENT
Start: 2021-04-24 | End: 2021-04-28 | Stop reason: HOSPADM

## 2021-04-24 RX ORDER — METOPROLOL TARTRATE 25 MG/1
25 TABLET, FILM COATED ORAL 2 TIMES DAILY
Status: DISCONTINUED | OUTPATIENT
Start: 2021-04-24 | End: 2021-04-24

## 2021-04-24 RX ORDER — FAMOTIDINE 20 MG/1
20 TABLET, FILM COATED ORAL DAILY
Status: DISCONTINUED | OUTPATIENT
Start: 2021-04-24 | End: 2021-04-28 | Stop reason: HOSPADM

## 2021-04-24 RX ORDER — METOPROLOL TARTRATE 25 MG/1
25 TABLET, FILM COATED ORAL 4 TIMES DAILY
Status: DISCONTINUED | OUTPATIENT
Start: 2021-04-24 | End: 2021-04-28 | Stop reason: HOSPADM

## 2021-04-24 RX ORDER — ATORVASTATIN CALCIUM 40 MG/1
40 TABLET, FILM COATED ORAL DAILY
Status: DISCONTINUED | OUTPATIENT
Start: 2021-04-24 | End: 2021-04-28 | Stop reason: HOSPADM

## 2021-04-24 RX ORDER — HYDROCHLOROTHIAZIDE 12.5 MG/1
12.5 TABLET ORAL DAILY
Status: DISCONTINUED | OUTPATIENT
Start: 2021-04-24 | End: 2021-04-28 | Stop reason: HOSPADM

## 2021-04-24 RX ORDER — HYDROMORPHONE HYDROCHLORIDE 2 MG/1
2-4 TABLET ORAL EVERY 4 HOURS PRN
Status: DISCONTINUED | OUTPATIENT
Start: 2021-04-24 | End: 2021-04-28 | Stop reason: HOSPADM

## 2021-04-24 RX ORDER — FORMOTEROL FUMARATE DIHYDRATE 20 UG/2ML
20 SOLUTION RESPIRATORY (INHALATION) 2 TIMES DAILY
Status: DISCONTINUED | OUTPATIENT
Start: 2021-04-24 | End: 2021-04-28 | Stop reason: HOSPADM

## 2021-04-24 RX ORDER — LOSARTAN POTASSIUM 100 MG/1
100 TABLET ORAL DAILY
Status: DISCONTINUED | OUTPATIENT
Start: 2021-04-24 | End: 2021-04-28 | Stop reason: HOSPADM

## 2021-04-24 RX ORDER — HYDROMORPHONE HYDROCHLORIDE 1 MG/ML
.25-.5 INJECTION, SOLUTION INTRAMUSCULAR; INTRAVENOUS; SUBCUTANEOUS
Status: DISCONTINUED | OUTPATIENT
Start: 2021-04-24 | End: 2021-04-28 | Stop reason: HOSPADM

## 2021-04-24 RX ORDER — ESCITALOPRAM OXALATE 5 MG/1
5 TABLET ORAL DAILY
Status: DISCONTINUED | OUTPATIENT
Start: 2021-04-24 | End: 2021-04-28 | Stop reason: HOSPADM

## 2021-04-24 RX ADMIN — ATORVASTATIN CALCIUM 40 MG: 40 TABLET, FILM COATED ORAL at 09:01

## 2021-04-24 RX ADMIN — OXYCODONE HYDROCHLORIDE 5 MG: 5 TABLET ORAL at 05:28

## 2021-04-24 RX ADMIN — HYDROMORPHONE HYDROCHLORIDE 0.5 MG: 1 INJECTION, SOLUTION INTRAMUSCULAR; INTRAVENOUS; SUBCUTANEOUS at 23:00

## 2021-04-24 RX ADMIN — ACETAMINOPHEN 975 MG: 325 TABLET ORAL at 02:40

## 2021-04-24 RX ADMIN — METOPROLOL TARTRATE 25 MG: 25 TABLET, FILM COATED ORAL at 09:01

## 2021-04-24 RX ADMIN — HYDROMORPHONE HYDROCHLORIDE 1 MG: 1 INJECTION, SOLUTION INTRAMUSCULAR; INTRAVENOUS; SUBCUTANEOUS at 12:30

## 2021-04-24 RX ADMIN — METOPROLOL TARTRATE 25 MG: 25 TABLET, FILM COATED ORAL at 21:23

## 2021-04-24 RX ADMIN — HYDROMORPHONE HYDROCHLORIDE 1 MG: 1 INJECTION, SOLUTION INTRAMUSCULAR; INTRAVENOUS; SUBCUTANEOUS at 09:00

## 2021-04-24 RX ADMIN — FORMOTEROL FUMARATE DIHYDRATE 20 MCG: 20 SOLUTION RESPIRATORY (INHALATION) at 09:01

## 2021-04-24 RX ADMIN — TIOTROPIUM BROMIDE INHALATION SPRAY 2 PUFF: 3.12 SPRAY, METERED RESPIRATORY (INHALATION) at 09:08

## 2021-04-24 RX ADMIN — HYDROCHLOROTHIAZIDE 12.5 MG: 12.5 TABLET ORAL at 09:01

## 2021-04-24 RX ADMIN — HEPARIN SODIUM 5000 UNITS: 5000 INJECTION INTRAVENOUS; SUBCUTANEOUS at 05:29

## 2021-04-24 RX ADMIN — DOCUSATE SODIUM AND SENNOSIDES 1 TABLET: 50; 8.6 TABLET ORAL at 20:20

## 2021-04-24 RX ADMIN — ACETAMINOPHEN 975 MG: 325 TABLET ORAL at 21:22

## 2021-04-24 RX ADMIN — OXYCODONE HYDROCHLORIDE 5 MG: 5 TABLET ORAL at 12:05

## 2021-04-24 RX ADMIN — ACETAMINOPHEN 975 MG: 325 TABLET ORAL at 09:00

## 2021-04-24 RX ADMIN — HEPARIN SODIUM 5000 UNITS: 5000 INJECTION INTRAVENOUS; SUBCUTANEOUS at 21:22

## 2021-04-24 RX ADMIN — HYDROMORPHONE HYDROCHLORIDE 2 MG: 2 TABLET ORAL at 20:07

## 2021-04-24 RX ADMIN — HEPARIN SODIUM 5000 UNITS: 5000 INJECTION INTRAVENOUS; SUBCUTANEOUS at 13:47

## 2021-04-24 RX ADMIN — ACETAMINOPHEN 975 MG: 325 TABLET ORAL at 13:45

## 2021-04-24 RX ADMIN — FLUTICASONE PROPIONATE 2 SPRAY: 50 SPRAY, METERED NASAL at 20:00

## 2021-04-24 RX ADMIN — FAMOTIDINE 20 MG: 20 TABLET ORAL at 09:01

## 2021-04-24 RX ADMIN — LOSARTAN POTASSIUM 100 MG: 100 TABLET, FILM COATED ORAL at 09:01

## 2021-04-24 RX ADMIN — METOPROLOL TARTRATE 25 MG: 25 TABLET, FILM COATED ORAL at 17:21

## 2021-04-24 RX ADMIN — POLYETHYLENE GLYCOL 3350 17 G: 17 POWDER, FOR SOLUTION ORAL at 09:01

## 2021-04-24 RX ADMIN — HYDROMORPHONE HYDROCHLORIDE 1 MG: 1 INJECTION, SOLUTION INTRAMUSCULAR; INTRAVENOUS; SUBCUTANEOUS at 06:19

## 2021-04-24 RX ADMIN — HYDROMORPHONE HYDROCHLORIDE 2 MG: 2 TABLET ORAL at 17:20

## 2021-04-24 RX ADMIN — ESCITALOPRAM 5 MG: 5 TABLET, FILM COATED ORAL at 09:01

## 2021-04-24 RX ADMIN — HYDROMORPHONE HYDROCHLORIDE 1 MG: 1 INJECTION, SOLUTION INTRAMUSCULAR; INTRAVENOUS; SUBCUTANEOUS at 00:08
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
## 2021-04-24 NOTE — PROGRESS NOTES
Patient: Melanie Zelaya  Location: [facility] Intensive Care Unit 3206  MRN: [id number]  Today's date: 4/24/2021    Attempted to see patient for therapy. Unable due to medical hold (discussed w/RN, leaving for MRI shortly. also awaiting clarification of NWB Status BUE/BLE. Neurosurgery consult pending. Paged trauma PA. Will follow for therapy needs.).

## 2021-04-24 NOTE — PROGRESS NOTES
Patient: Melanie Zelaya  Location: [facility] Intensive Care Unit 3206  MRN: [id number]  Today's date: 4/24/2021    Attempted to see patient for therapy. Unable due to medical hold (Await MRI and clarification of WB status. OT to f/u as able.).

## 2021-04-24 NOTE — PROGRESS NOTES
"Daily Progress Note    Daily Progress Note    Subjective     Interval History: has no complaint of shortness of breath or difficulty breathing.     Complete Review of Systems - Negative except as stated in interval history      Objective     Vital signs in last 24 hours:  Temp:  [36.6 °C (97.9 °F)-36.9 °C (98.5 °F)] 36.6 °C (97.9 °F)  Heart Rate:  [60-79] 79  Resp:  [12-21] 20  BP: (118-243)/() 162/76      Intake/Output Summary (Last 24 hours) at 4/24/2021 0733  Last data filed at 4/24/2021 0700  Gross per 24 hour   Intake 824 ml   Output 600 ml   Net 224 ml     Intake/Output this shift:  I/O this shift:  In: 80 [I.V.:80]  Out: -     Labs  Reviewed  Cr=0.9    Imaging  I have indepedently reviewed patient's imaging; any concerning findings adressed below    VTE Assessment  Increased risk from baseline; VTE Prophylaxis as ordered      Physical Exam:  Visit Vitals  BP (!) 162/76   Pulse 79   Temp 36.6 °C (97.9 °F) (Oral)   Resp 20   Ht 1.676 m (5' 6\")   Wt 88.7 kg (195 lb 8.8 oz)   SpO2 94%   BMI 31.56 kg/m²       General Appearance:    Alert, cooperative, no distress, appears stated age   Head:    Normocephalic, without obvious abnormality, atraumatic   Eyes:    EOM's intact, fundi     benign, both eyes        Ears:    Normal external ear canals, both ears   Nose:   Nares normal, septum midline, mucosa normal, no drainage    or sinus   tenderness   Throat:   Lips, mucosa, and tongue normal; teeth and gums normal   Neck:   Supple, symmetrical, trachea midline, collar in place   Back:     Symmetric, no curvature, ROM normal, no CVA tenderness   Lungs:     Clear to auscultation bilaterally, respirations unlabored   Chest wall:    No tenderness or deformity   Heart:    Regular rate and rhythm, S1 and S2 normal, no murmur, rub   or gallop   Abdomen:     Soft, non-tender, bowel sounds active all four quadrants,     no masses, no organomegaly           Extremities:  Musculoskeletal:   Extremities normal, atraumatic, no " cyanosis or edema    No injury or deformity   Pulses:   2+ and symmetric all extremities   Skin:   Skin color, texture, turgor normal, no rashes or lesions       Neurologic:    Behavior/  Emotional:   CNII-XII intact. Normal strength, sensation and reflexes       throughout    Appropriate, cooperative         Assessment & Plan  Fall  Assessment & Plan  Mechanical fall  Pt/ot    Hypertension  Assessment & Plan  Continue irbesartan and hydrochlorothiazide  Hemodynamic monitoring  Cardiology consult    Fracture of right scapula  Assessment & Plan  Analgesia  Orthopedic consult    Cervical spine fracture (CMS/[facility])  Assessment & Plan  Pt with C1 and C2 fx and occipital condyle fracture  Pt is neuro intact  Cervical collar  Neurosurgery consult  Mri today    Asthma  Assessment & Plan  Continue albuterol and other nebs    Hyperlipidemia  Assessment & Plan  Continue statin    A-fib (CMS/[facility])  Assessment & Plan  Continue beta blocker  Will be able to resume pradaxa if okay with neurosurgery        Erlin Bowling MD
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
## 2021-04-24 NOTE — SUBJECTIVE & OBJECTIVE
"Daily Progress Note    Subjective     Interval History: has no complaint of shortness of breath or difficulty breathing.     Complete Review of Systems - Negative except as stated in interval history      Objective     Vital signs in last 24 hours:  Temp:  [36.6 °C (97.9 °F)-36.9 °C (98.5 °F)] 36.6 °C (97.9 °F)  Heart Rate:  [60-79] 79  Resp:  [12-21] 20  BP: (118-243)/() 162/76      Intake/Output Summary (Last 24 hours) at 4/24/2021 0733  Last data filed at 4/24/2021 0700  Gross per 24 hour   Intake 824 ml   Output 600 ml   Net 224 ml     Intake/Output this shift:  I/O this shift:  In: 80 [I.V.:80]  Out: -     Labs  Reviewed  Cr=0.9    Imaging  I have indepedently reviewed patient's imaging; any concerning findings adressed below    VTE Assessment  Increased risk from baseline; VTE Prophylaxis as ordered      Physical Exam:  Visit Vitals  BP (!) 162/76   Pulse 79   Temp 36.6 °C (97.9 °F) (Oral)   Resp 20   Ht 1.676 m (5' 6\")   Wt 88.7 kg (195 lb 8.8 oz)   SpO2 94%   BMI 31.56 kg/m²       General Appearance:    Alert, cooperative, no distress, appears stated age   Head:    Normocephalic, without obvious abnormality, atraumatic   Eyes:    EOM's intact, fundi     benign, both eyes        Ears:    Normal external ear canals, both ears   Nose:   Nares normal, septum midline, mucosa normal, no drainage    or sinus   tenderness   Throat:   Lips, mucosa, and tongue normal; teeth and gums normal   Neck:   Supple, symmetrical, trachea midline, collar in place   Back:     Symmetric, no curvature, ROM normal, no CVA tenderness   Lungs:     Clear to auscultation bilaterally, respirations unlabored   Chest wall:    No tenderness or deformity   Heart:    Regular rate and rhythm, S1 and S2 normal, no murmur, rub   or gallop   Abdomen:     Soft, non-tender, bowel sounds active all four quadrants,     no masses, no organomegaly           Extremities:  Musculoskeletal:   Extremities normal, atraumatic, no cyanosis or edema    " No injury or deformity   Pulses:   2+ and symmetric all extremities   Skin:   Skin color, texture, turgor normal, no rashes or lesions       Neurologic:    Behavior/  Emotional:   CNII-XII intact. Normal strength, sensation and reflexes       throughout    Appropriate, cooperative

## 2021-04-25 LAB
ANION GAP SERPL CALC-SCNC: 9 MEQ/L (ref 3–15)
ATRIAL RATE: 51
BASOPHILS # BLD: 0.02 K/UL (ref 0.01–0.1)
BASOPHILS NFR BLD: 0.3 %
BUN SERPL-MCNC: 13 MG/DL (ref 8–20)
CA-I BLD-SCNC: 1.11 MMOL/L (ref 1.15–1.27)
CALCIUM SERPL-MCNC: 9.1 MG/DL (ref 8.9–10.3)
CHLORIDE SERPL-SCNC: 98 MEQ/L (ref 98–109)
CO2 SERPL-SCNC: 29 MEQ/L (ref 22–32)
CREAT SERPL-MCNC: 0.9 MG/DL (ref 0.8–1.3)
DIFFERENTIAL METHOD BLD: ABNORMAL
EOSINOPHIL # BLD: 0.01 K/UL (ref 0.04–0.54)
EOSINOPHIL NFR BLD: 0.1 %
ERYTHROCYTE [DISTWIDTH] IN BLOOD BY AUTOMATED COUNT: 14.3 % (ref 11.6–14.4)
GFR SERPL CREATININE-BSD FRML MDRD: >60 ML/MIN/1.73M*2
GLUCOSE SERPL-MCNC: 115 MG/DL (ref 70–99)
HCT VFR BLDCO AUTO: 43.9 % (ref 40.1–51)
HGB BLD-MCNC: 14.5 G/DL (ref 13.7–17.5)
IMM GRANULOCYTES # BLD AUTO: 0.02 K/UL (ref 0–0.08)
IMM GRANULOCYTES NFR BLD AUTO: 0.3 %
LYMPHOCYTES # BLD: 1.27 K/UL (ref 1.2–3.5)
LYMPHOCYTES NFR BLD: 16.1 %
MAGNESIUM SERPL-MCNC: 2.1 MG/DL (ref 1.8–2.5)
MCH RBC QN AUTO: 29.3 PG (ref 28–33.2)
MCHC RBC AUTO-ENTMCNC: 33 G/DL (ref 32.2–36.5)
MCV RBC AUTO: 88.7 FL (ref 83–98)
MONOCYTES # BLD: 1.39 K/UL (ref 0.3–1)
MONOCYTES NFR BLD: 17.6 %
NEUTROPHILS # BLD: 5.17 K/UL (ref 1.7–7)
NEUTS SEG NFR BLD: 65.6 %
NRBC BLD-RTO: 0 %
PDW BLD AUTO: 10.2 FL (ref 9.4–12.4)
PHOSPHATE SERPL-MCNC: 3.1 MG/DL (ref 2.4–4.7)
PLATELET # BLD AUTO: 179 K/UL (ref 150–350)
POTASSIUM SERPL-SCNC: 3.9 MEQ/L (ref 3.6–5.1)
QRS DURATION: 76
QT INTERVAL: 416
QTC CALCULATION(BAZETT): 425
R AXIS: 65
RBC # BLD AUTO: 4.95 M/UL (ref 4.5–5.8)
SODIUM SERPL-SCNC: 136 MEQ/L (ref 136–144)
T WAVE AXIS: 0
TROPONIN I SERPL-MCNC: <0.02 NG/ML
TROPONIN I SERPL-MCNC: <0.02 NG/ML
VENTRICULAR RATE: 63
WBC # BLD AUTO: 7.88 K/UL (ref 3.8–10.5)

## 2021-04-25 PROCEDURE — 20600000 HC ROOM AND CARE INTERMEDIATE/TELEMETRY

## 2021-04-25 PROCEDURE — 84100 ASSAY OF PHOSPHORUS: CPT | Performed by: PHYSICIAN ASSISTANT

## 2021-04-25 PROCEDURE — 63700000 HC SELF-ADMINISTRABLE DRUG: Performed by: INTERNAL MEDICINE

## 2021-04-25 PROCEDURE — 63600000 HC DRUGS/DETAIL CODE: Performed by: PHYSICIAN ASSISTANT

## 2021-04-25 PROCEDURE — 80048 BASIC METABOLIC PNL TOTAL CA: CPT | Performed by: PHYSICIAN ASSISTANT

## 2021-04-25 PROCEDURE — 36415 COLL VENOUS BLD VENIPUNCTURE: CPT | Performed by: PHYSICIAN ASSISTANT

## 2021-04-25 PROCEDURE — 25000000 HC PHARMACY GENERAL: Performed by: PHYSICIAN ASSISTANT

## 2021-04-25 PROCEDURE — 82330 ASSAY OF CALCIUM: CPT | Performed by: PHYSICIAN ASSISTANT

## 2021-04-25 PROCEDURE — 63700000 HC SELF-ADMINISTRABLE DRUG: Performed by: SURGERY

## 2021-04-25 PROCEDURE — 84484 ASSAY OF TROPONIN QUANT: CPT | Performed by: INTERNAL MEDICINE

## 2021-04-25 PROCEDURE — 85025 COMPLETE CBC W/AUTO DIFF WBC: CPT | Performed by: PHYSICIAN ASSISTANT

## 2021-04-25 PROCEDURE — 83735 ASSAY OF MAGNESIUM: CPT | Performed by: PHYSICIAN ASSISTANT

## 2021-04-25 PROCEDURE — 97165 OT EVAL LOW COMPLEX 30 MIN: CPT | Mod: GO

## 2021-04-25 PROCEDURE — 63700000 HC SELF-ADMINISTRABLE DRUG: Performed by: PHYSICIAN ASSISTANT

## 2021-04-25 PROCEDURE — L0172 CERV COL SR FOAM 2PC PRE OTS: HCPCS

## 2021-04-25 PROCEDURE — 97162 PT EVAL MOD COMPLEX 30 MIN: CPT | Mod: GP

## 2021-04-25 RX ORDER — HYDRALAZINE HYDROCHLORIDE 25 MG/1
25 TABLET, FILM COATED ORAL EVERY 8 HOURS
Status: DISCONTINUED | OUTPATIENT
Start: 2021-04-25 | End: 2021-04-28 | Stop reason: HOSPADM

## 2021-04-25 RX ORDER — DABIGATRAN ETEXILATE 75 MG/1
150 CAPSULE ORAL 2 TIMES DAILY
Status: DISCONTINUED | OUTPATIENT
Start: 2021-04-25 | End: 2021-04-28 | Stop reason: HOSPADM

## 2021-04-25 RX ORDER — HYDROCORTISONE AND ACETIC ACID 20.75; 10.375 MG/ML; MG/ML
4 SOLUTION AURICULAR (OTIC) 3 TIMES DAILY
Status: COMPLETED | OUTPATIENT
Start: 2021-04-25 | End: 2021-04-28

## 2021-04-25 RX ADMIN — ALBUTEROL SULFATE 2.5 MG: 2.5 SOLUTION RESPIRATORY (INHALATION) at 05:26

## 2021-04-25 RX ADMIN — METOPROLOL TARTRATE 25 MG: 25 TABLET, FILM COATED ORAL at 14:45

## 2021-04-25 RX ADMIN — HYDROMORPHONE HYDROCHLORIDE 2 MG: 2 TABLET ORAL at 12:25

## 2021-04-25 RX ADMIN — FLUTICASONE PROPIONATE 2 SPRAY: 50 SPRAY, METERED NASAL at 07:52

## 2021-04-25 RX ADMIN — LOSARTAN POTASSIUM 100 MG: 100 TABLET, FILM COATED ORAL at 07:50

## 2021-04-25 RX ADMIN — TIOTROPIUM BROMIDE INHALATION SPRAY 2 PUFF: 3.12 SPRAY, METERED RESPIRATORY (INHALATION) at 10:19

## 2021-04-25 RX ADMIN — ACETAMINOPHEN 975 MG: 325 TABLET ORAL at 21:43

## 2021-04-25 RX ADMIN — DABIGATRAN ETEXILATE MESYLATE 150 MG: 75 CAPSULE ORAL at 12:25

## 2021-04-25 RX ADMIN — HYDRALAZINE HYDROCHLORIDE 25 MG: 25 TABLET, FILM COATED ORAL at 21:45

## 2021-04-25 RX ADMIN — HYDROMORPHONE HYDROCHLORIDE 2 MG: 2 TABLET ORAL at 05:20

## 2021-04-25 RX ADMIN — POLYETHYLENE GLYCOL 3350 17 G: 17 POWDER, FOR SOLUTION ORAL at 07:52

## 2021-04-25 RX ADMIN — ACETAMINOPHEN 975 MG: 325 TABLET ORAL at 14:44

## 2021-04-25 RX ADMIN — HYDROMORPHONE HYDROCHLORIDE 2 MG: 2 TABLET ORAL at 18:24

## 2021-04-25 RX ADMIN — METOPROLOL TARTRATE 25 MG: 25 TABLET, FILM COATED ORAL at 07:51

## 2021-04-25 RX ADMIN — POTASSIUM CHLORIDE 20 MEQ: 750 TABLET, EXTENDED RELEASE ORAL at 18:25

## 2021-04-25 RX ADMIN — HYDRALAZINE HYDROCHLORIDE 25 MG: 25 TABLET, FILM COATED ORAL at 12:24

## 2021-04-25 RX ADMIN — HYDROCHLOROTHIAZIDE 12.5 MG: 12.5 TABLET ORAL at 07:51

## 2021-04-25 RX ADMIN — HYDROMORPHONE HYDROCHLORIDE 2 MG: 2 TABLET ORAL at 17:10

## 2021-04-25 RX ADMIN — DOCUSATE SODIUM AND SENNOSIDES 1 TABLET: 50; 8.6 TABLET ORAL at 07:50

## 2021-04-25 RX ADMIN — METOPROLOL TARTRATE 25 MG: 25 TABLET, FILM COATED ORAL at 18:25

## 2021-04-25 RX ADMIN — FAMOTIDINE 20 MG: 20 TABLET ORAL at 07:51

## 2021-04-25 RX ADMIN — FORMOTEROL FUMARATE DIHYDRATE 20 MCG: 20 SOLUTION RESPIRATORY (INHALATION) at 07:52

## 2021-04-25 RX ADMIN — METOPROLOL TARTRATE 25 MG: 25 TABLET, FILM COATED ORAL at 21:44

## 2021-04-25 RX ADMIN — DABIGATRAN ETEXILATE MESYLATE 150 MG: 75 CAPSULE ORAL at 20:09

## 2021-04-25 RX ADMIN — ATORVASTATIN CALCIUM 40 MG: 40 TABLET, FILM COATED ORAL at 07:50

## 2021-04-25 RX ADMIN — FORMOTEROL FUMARATE DIHYDRATE 20 MCG: 20 SOLUTION RESPIRATORY (INHALATION) at 20:14

## 2021-04-25 RX ADMIN — ACETAMINOPHEN 975 MG: 325 TABLET ORAL at 07:49

## 2021-04-25 RX ADMIN — HEPARIN SODIUM 5000 UNITS: 5000 INJECTION INTRAVENOUS; SUBCUTANEOUS at 05:19

## 2021-04-25 RX ADMIN — ESCITALOPRAM 5 MG: 5 TABLET, FILM COATED ORAL at 07:50

## 2021-04-25 RX ADMIN — DOCUSATE SODIUM AND SENNOSIDES 1 TABLET: 50; 8.6 TABLET ORAL at 20:09

## 2021-04-25 RX ADMIN — HYDROCORTISONE AND ACETIC ACID 4 DROP: 20.75; 10.375 SOLUTION AURICULAR (OTIC) at 21:58

## 2021-04-25 ASSESSMENT — COGNITIVE AND FUNCTIONAL STATUS - GENERAL
CLIMB 3 TO 5 STEPS WITH RAILING: 3 - A LITTLE
TOILETING: 3 - A LITTLE
DRESSING REGULAR LOWER BODY CLOTHING: 3 - A LITTLE
HELP NEEDED FOR BATHING: 3 - A LITTLE
MOVING TO AND FROM BED TO CHAIR: 3 - A LITTLE
AFFECT: WFL
HELP NEEDED FOR PERSONAL GROOMING: 3 - A LITTLE
WALKING IN HOSPITAL ROOM: 3 - A LITTLE
EATING MEALS: 4 - NONE
STANDING UP FROM CHAIR USING ARMS: 3 - A LITTLE
AFFECT: WFL
DRESSING REGULAR UPPER BODY CLOTHING: 2 - A LOT

## 2021-04-25 NOTE — ASSESSMENT & PLAN NOTE
Pt with C1 and C2 fx and occipital condyle fracture  Pt is neuro intact  Cervical collar  Neurosurgery following-nonop

## 2021-04-25 NOTE — PROGRESS NOTES
SUBJECTIVE     Sitting in his bedside chair with [place] collar on reports congestion and chronic pain from the traumatic fall-denies chest pain, shortness of breath, orthopnea, lightheadedness or palpitations.    Telemetry demonstrated 27 beats of nonsustained monomorphic ventricular tachycardia noted at 11:41 PM on April 24, 2021.  The patient reported no symptoms and was alerted only by the nursing staff came to check on him.  Review of 7-lead telemetry tracing suggests right bundle branch block morphology, superior axis suggesting perhaps left posterior fascicular source.    OBJECTIVE     VITAL SIGNS:  Temp:  [36.7 °C (98.1 °F)-37.1 °C (98.7 °F)] 37 °C (98.6 °F)  Heart Rate:  [60-78] 72  Resp:  [18-24] 20  BP: (141-207)/() 167/77  SPO2 96%    Intake/Output Summary (Last 24 hours) at 4/25/2021 0911  Last data filed at 4/24/2021 1000  Gross per 24 hour   Intake 80 ml   Output --   Net 80 ml     PHYSICAL EXAM:  General Appearance: No apparent distress.  Cooperative.  Neck: [place] collar.  Unable to assess jugular venous pulse.  Lungs: Clear to auscultation bilaterally  Heart: Irregular S1 and S2 without new / changing murmur, gallop or rub  Abdomen: Bowel sounds are present.  Extremities: No significant peripheral edema is appreciated.  Skin: Warm.  Neurologic: Alert and oriented.    LABS / IMAGING / EKG / TELEMETRY     LABS:  Results from last 7 days   Lab Units 04/25/21  0508 04/24/21  0540 04/23/21  1700   SODIUM mEQ/L 136 137 138   POTASSIUM mEQ/L 3.9 3.8 3.5*   MAGNESIUM mg/dL 2.1 2.1  --    CALCIUM mg/dL 9.1 9.0 9.3   CHLORIDE mEQ/L 98 101 101   CO2 mEQ/L 29 27 27   BUN mg/dL 13 16 19   CREATININE mg/dL 0.9 0.9 1.0   AST IU/L  --   --  31   ALT IU/L  --   --  27     Results from last 7 days   Lab Units 04/25/21  0508 04/24/21  0540 04/23/21  2140 04/23/21  1700   WBC K/uL 7.88 8.08  --  11.18*   HEMOGLOBIN g/dL 14.5 14.4  --  15.4   HEMATOCRIT % 43.9 44.0  --  45.9   PLATELETS K/uL 179 168  --  198    INR   --   --  1.2 1.3     Lab Results   Component Value Date    HGBA1C 5.6 08/27/2020    TSH 5.26 (H) 08/27/2020     Lab Results   Component Value Date    CHOL 137 08/27/2020    LDLCALC 75 08/27/2020    HDL 42 08/27/2020    TRIG 117 08/27/2020     No results found for: BNP    TELEMETRY:  Atrial fibrillation with controlled ventricular response.  On 4/24/2021, 11:41 PM, 27 beat caroline of monomorphic nonsustained ventricular tachycardia with a right bundle branch block superior axis    Echo 3/6/2020:  1. Normal LV size, with mild LVH, with normal LV function with ejection fraction 55-60%.  2. No regional wall motion abnormalities seen within limits of the study.  3. Moderate biatrial enlargement.  4. No evidence pulmonary hypertension.  5. Focally calcified, trileaflet aortic valve with adequate opening and mild AI.  6. Low septal e' consistent with abnormal diastolic function.  7. Atrial fibrillation noted.     Stress test 12/22/2015:  1. Fair performance status.  2. Borderline hypertensive response to exercise.  3. Normal myocardial perfusion.  4. EF of 54% with normal segmental wall motion.    MEDICATIONS        • acetaminophen  975 mg oral q6h MARIAN   • atorvastatin  40 mg oral Daily   • escitalopram  5 mg oral Daily   • famotidine  20 mg oral Daily   • fluticasone propionate  2 spray Each Nostril Daily   • formoterol  20 mcg nebulization BID   • heparin (porcine)  5,000 Units subcutaneous q8h MARIAN   • hydrochlorothiazide  12.5 mg oral Daily   • losartan  100 mg oral Daily   • metoprolol tartrate  25 mg oral QID   • polyethylene glycol  17 g oral Daily   • sennosides-docusate sodium  1 tablet oral BID   • tiotropium bromide  2 puff inhalation Daily       ASSESSMENT AND PLAN     Fall versus syncope: The patient had posttraumatic syncope, however, based on the history he did not have loss of consciousness leading to the fall.  Loss of consciousness followed striking the head on the ground.      NSVT: 27 beat caroline of  monomorphic nonsustained ventricular tachycardia identified on telemetry on 4/24/2021, 11:41 PM.  This was asymptomatic.  This may be secondary to pain and catecholamine release.  Potassium and magnesium levels reasonable.  At present, telemetry demonstrates atrial fibrillation with controlled ventricular response.  The patient denies anginal symptoms and I have a low suspicion that this was ischemic especially in the context of monomorphic, not polymorphic, VT.  Nonetheless the patient has had no assessment of troponin levels and 2 will be ordered at this time.  An echocardiogram to reassess cardiac structure and function is recommended.  Dose of metoprolol was increased from 25 mg twice daily to 25 mg every 6 hours on 4/24/2021 to optimize blood pressure management.  The patient informs me that he had an outpatient sleep apnea assessment which was not consistent with LIYHA.  EP consultation will also be requested in the context of the patient's traumatic fall.  Continue telemetry monitoring.     Coronary artery calcification: Noted incidentally on CT imaging.  By nuclear stress test performed on 12/22/2015 the LVEF was 54% with normal segmental wall motion and normal myocardial perfusion.  The patient denies anginal symptoms.  Medical management includes statin, beta-blocker, and ARB.  The patient is currently not on aspirin since on systemic anticoagulation to mitigate risks of bleeding.  Consideration may be given to stress testing when the patient able.       Atrial fibrillation: Permanent.  [id number] score 3 (hypertension, age, vascular).  Managed with rate control and anticoagulation.  The patient is prescribed metoprolol 25 mg twice daily and Pradaxa 150 mg twice daily (on hold).     Anticoagulation: Pradaxa 150 mg twice daily, on hold.  Resume when the benefits exceed the risks from the trauma and neurosurgical services standpoint.     Hypertension: The patient is managed with irbesartan 300 mg daily,  hydrochlorothiazide 12.5 mg daily, and metoprolol tartrate 25 mg twice daily daily.  In the hospital irbesartan has been replaced by losartan.  Blood pressures are elevated but may be related to increased physiologic stress, pain, etc.  Increased metoprolol to 25 mg every 6 hours on 4/24/2021.  Optimize pain management and observe for now on the higher dose of metoprolol with plans to titrate medical therapy, if indicated, for persistent hypertension.     Hyperlipidemia: The patient is managed with atorvastatin 40 mg daily    The patient will be followed by Dr. Kern on his return 4/26/2021.    Unruly Apple MD  4/25/2021    Primary Care Doctor: Keisha Schultz CRNP

## 2021-04-25 NOTE — HOSPITAL COURSE
Melanie is a 71 y.o. male admitted on 4/23/2021 with Essential hypertension [I10]  Elevated blood pressure reading [R03.0]  Hypertensive urgency [I16.0]  Closed head injury, initial encounter [S09.90XA]  Fall, initial encounter [W19.XXXA]  Other nondisplaced fracture of second cervical vertebra, initial encounter for closed fracture (CMS/HCC) [S12.191A]  Closed fracture of right scapula, unspecified part of scapula, initial encounter [S42.101A]  Other closed nondisplaced fracture of first cervical vertebra with nonunion, subsequent encounter [S12.091K]. Principal problem is Fall.    Past Medical History  Melanie has a past medical history of Anxiety, Atrial fibrillation (CMS/HCC), Depression, Fracture of pelvis (CMS/HCC) (1968), GERD (gastroesophageal reflux disease), Hearing loss, History of colon polyps, Hyperlipidemia, Hypertension, Left atrial enlargement, Lung cancer (CMS/HCC), Lung cancer (CMS/HCC), Pneumonia (2011), and Seasonal allergies.    History of Present Illness  Melanie Zelaya is a 71 y.o. male who unfortunately fell off a ladder while doing yard work yesterday.  Patient states that he lost consciousness after the fall.  Patient is complaining of right shoulder pain along with neck pain.  Patient denies any other injuries.  Patient denies any numbness or paresthesias that are new since the fall.  The patient does have a history of carpal tunnel syndrome bilaterally at which he has residual decreased sensation on the right hand in the median distribution.  Also has a history of a open right shoulder rotator cuff repair and distal clavicle resection performed by Dr. Shepherd.  Orthopedics was consulted for possible right scapular fracture read on the CT chest by radiology. Right shoulder contusion with no evidence of scapular fracture.  Clinical correlation to chest CT does not indicate the patient has a right scapular body fracture. CT imaging of the cervical spine disclosed C1, C2 fractures. Rigidly  immobilized in a hard cervical collar, non-op interventions

## 2021-04-25 NOTE — NURSING NOTE
Pt had 27 beats of V tach on the monitor. When entered the room pt alert and oriented. B/p initially elevated all other v/s stable. Trauma NP paged, called and aware. No further interventions needed at this time. Will continue to monitor.

## 2021-04-25 NOTE — PLAN OF CARE
Problem: Adult Inpatient Plan of Care  Goal: Plan of Care Review  Outcome: Progressing  Flowsheets (Taken 4/25/2021 4846)  Progress: improving  Plan of Care Reviewed With: patient  Outcome Summary:   PT evaluation completed   supervision functional transfers   limited by hypertension   acute PT to follow

## 2021-04-25 NOTE — PROGRESS NOTES
"Daily Progress Note    Daily Progress Note    Subjective     Interval History: Pt with mild pain in neck. He has no problem breathing or swallowing.    Complete Review of Systems - Negative except as stated in interval history        Objective     Vital signs in last 24 hours:  Temp:  [36.7 °C (98.1 °F)-37.1 °C (98.7 °F)] 37 °C (98.6 °F)  Heart Rate:  [56-78] 56  Resp:  [18-24] 20  BP: (141-207)/() 170/78    No intake or output data in the 24 hours ending 04/25/21 1135  Intake/Output this shift:  No intake/output data recorded.    Labs    Reviewed  Hgb=14.5    Imaging  I have indepedently reviewed patient's imaging; any concerning findings adressed below    VTE Assessment  Increased risk from baseline; VTE Prophylaxis as ordered      Physical Exam:  Visit Vitals  BP (!) 192/78 (BP Location: Right upper arm, Patient Position: Sitting)   Pulse (!) 56   Temp 37 °C (98.6 °F) (Oral)   Resp 20   Ht 1.676 m (5' 6\")   Wt 88.7 kg (195 lb 8.8 oz)   SpO2 96%   BMI 31.56 kg/m²       General Appearance:    Alert, cooperative, no distress, appears stated age   Head:    Normocephalic, without obvious abnormality, atraumatic   Eyes:    EOM's intact, fundi     benign, both eyes        Ears:    Normal external ear canals, both ears   Nose:   Nares normal, septum midline, mucosa normal, no drainage    or sinus   tenderness   Throat:   Lips, mucosa, and tongue normal; teeth and gums normal   Neck:   Supple, symmetrical, trachea midline, collar in place   Back:     Symmetric, no curvature, ROM normal, no CVA tenderness   Lungs:     Clear to auscultation bilaterally, respirations unlabored   Chest wall:    No tenderness or deformity   Heart:    Regular rate and rhythm, S1 and S2 normal, no murmur, rub   or gallop   Abdomen:     Soft, non-tender, bowel sounds active all four quadrants,     no masses, no organomegaly           Extremities:  Musculoskeletal:   Extremities normal, atraumatic, no cyanosis or edema    No injury or " deformity   Pulses:   2+ and symmetric all extremities   Skin:   Skin color, texture, turgor normal, no rashes or lesions       Neurologic:    Behavior/  Emotional:   Normal strength, sensation and reflexes       throughout    Appropriate, cooperative         Assessment & Plan  Fall  Assessment & Plan  Mechanical fall  Pt/ot    Hypertension  Assessment & Plan  Continue irbesartan and hydrochlorothiazide  Hemodynamic monitoring  Cardiology consult    Fracture of right scapula  Assessment & Plan  Analgesia  Orthopedic evaluated  Non op    Cervical spine fracture (CMS/[facility])  Assessment & Plan  Pt with C1 and C2 fx and occipital condyle fracture  Pt is neuro intact  Cervical collar  Neurosurgery following-nonop      Asthma  Assessment & Plan  Continue albuterol and other nebs    Hyperlipidemia  Assessment & Plan  Continue statin    A-fib (CMS/[facility])  Assessment & Plan  Continue beta blocker  PT is non op per neurosurgery - will resume pradaxa        Erlin Bowling MD

## 2021-04-25 NOTE — PLAN OF CARE
Problem: Adult Inpatient Plan of Care  Goal: Readiness for Transition of Care  Intervention: Mutually Develop Transition Plan  Flowsheets (Taken 4/25/2021 1704)  Anticipated Discharge Disposition: home without services  Discharge Coordination/Progress: pt denies any d/c needs at this time.  Pt was cleared by OT/PT  Assistive Device/Animal Currently Used at Home: none  Concerns Comments: pt denies any d/c needs at this time  Readmission Within the Last 30 Days: no previous admission in last 30 days  Patient/Family Anticipated Services at Transition: none  Patient/Family Anticipates Transition to: home with family  Transportation Anticipated: family or friend will provide  Concerns to be Addressed: denies needs/concerns at this time   Chart reviewed and met with pt.  Pt resides with his girlfriend in a 2 story home with 3 steps into it.  Pt's bed and bath are on 2nd floor.  Pt does NOT have a bathroom on 1st floor.  Pt was I amb and ADL prior to admission.  Pt drives and is retired.  Pt had homecare in past after a lung surgery and pt stated he had [facility] at that time.  Pt denies any DME at home.  Pt was cleared for home by OT/PT, no needs.  PLAN home with significant other, no needs.  Cee davidson MSW LSW

## 2021-04-25 NOTE — SUBJECTIVE & OBJECTIVE
"Daily Progress Note    Subjective     Interval History: Pt with mild pain in neck. He has no problem breathing or swallowing.    Complete Review of Systems - Negative except as stated in interval history        Objective     Vital signs in last 24 hours:  Temp:  [36.7 °C (98.1 °F)-37.1 °C (98.7 °F)] 37 °C (98.6 °F)  Heart Rate:  [56-78] 56  Resp:  [18-24] 20  BP: (141-207)/() 170/78    No intake or output data in the 24 hours ending 04/25/21 1135  Intake/Output this shift:  No intake/output data recorded.    Labs    Reviewed  Hgb=14.5    Imaging  I have indepedently reviewed patient's imaging; any concerning findings adressed below    VTE Assessment  Increased risk from baseline; VTE Prophylaxis as ordered      Physical Exam:  Visit Vitals  BP (!) 192/78 (BP Location: Right upper arm, Patient Position: Sitting)   Pulse (!) 56   Temp 37 °C (98.6 °F) (Oral)   Resp 20   Ht 1.676 m (5' 6\")   Wt 88.7 kg (195 lb 8.8 oz)   SpO2 96%   BMI 31.56 kg/m²       General Appearance:    Alert, cooperative, no distress, appears stated age   Head:    Normocephalic, without obvious abnormality, atraumatic   Eyes:    EOM's intact, fundi     benign, both eyes        Ears:    Normal external ear canals, both ears   Nose:   Nares normal, septum midline, mucosa normal, no drainage    or sinus   tenderness   Throat:   Lips, mucosa, and tongue normal; teeth and gums normal   Neck:   Supple, symmetrical, trachea midline, collar in place   Back:     Symmetric, no curvature, ROM normal, no CVA tenderness   Lungs:     Clear to auscultation bilaterally, respirations unlabored   Chest wall:    No tenderness or deformity   Heart:    Regular rate and rhythm, S1 and S2 normal, no murmur, rub   or gallop   Abdomen:     Soft, non-tender, bowel sounds active all four quadrants,     no masses, no organomegaly           Extremities:  Musculoskeletal:   Extremities normal, atraumatic, no cyanosis or edema    No injury or deformity   Pulses:   2+ and " symmetric all extremities   Skin:   Skin color, texture, turgor normal, no rashes or lesions       Neurologic:    Behavior/  Emotional:   Normal strength, sensation and reflexes       throughout    Appropriate, cooperative

## 2021-04-25 NOTE — PLAN OF CARE
Problem: Adult Inpatient Plan of Care  Goal: Plan of Care Review  Outcome: Progressing  Flowsheets (Taken 4/25/2021 1303)  Progress: improving  Plan of Care Reviewed With: patient  Outcome Summary: SHAHNAZ santillan complete.

## 2021-04-25 NOTE — PROGRESS NOTES
Patient: Melanie Zelaya  Location: [facility] 4A 4021  MRN: [id number]  Today's date: 4/25/2021    Attempted to see patient for therapy. Unable due to medical hold ; still awaiting clarification of WB status. Secure chat to trauma PA and discussed w/ Dr. Bowling for clairification. OT will continue to follow for therapy needs.

## 2021-04-25 NOTE — PROGRESS NOTES
Patient: Melanie Zelaya  Location: [facility] 4A 4021  MRN: [id number]  Today's date: 4/25/2021    Melanie is a 71 y.o. male admitted on 4/23/2021 with Essential hypertension [I10]  Elevated blood pressure reading [R03.0]  Hypertensive urgency [I16.0]  Closed head injury, initial encounter [S09.90XA]  Fall, initial encounter [W19.XXXA]  Other nondisplaced fracture of second cervical vertebra, initial encounter for closed fracture (CMS/HCC) [S12.191A]  Closed fracture of right scapula, unspecified part of scapula, initial encounter [S42.101A]  Other closed nondisplaced fracture of first cervical vertebra with nonunion, subsequent encounter [S12.091K]. Principal problem is Fall.    Past Medical History  Melanie has a past medical history of Anxiety, Atrial fibrillation (CMS/HCC), Depression, Fracture of pelvis (CMS/HCC) (1968), GERD (gastroesophageal reflux disease), Hearing loss, History of colon polyps, Hyperlipidemia, Hypertension, Left atrial enlargement, Lung cancer (CMS/HCC), Lung cancer (CMS/HCC), Pneumonia (2011), and Seasonal allergies.    History of Present Illness  Melanie Zelaya is a 71 y.o. male who unfortunately fell off a ladder while doing yard work yesterday.  Patient states that he lost consciousness after the fall.  Patient is complaining of right shoulder pain along with neck pain.  Patient denies any other injuries.  Patient denies any numbness or paresthesias that are new since the fall.  The patient does have a history of carpal tunnel syndrome bilaterally at which he has residual decreased sensation on the right hand in the median distribution.  Also has a history of a open right shoulder rotator cuff repair and distal clavicle resection performed by Dr. Shepherd.  Orthopedics was consulted for possible right scapular fracture read on the CT chest by radiology. Right shoulder contusion with no evidence of scapular fracture.  Clinical correlation to chest CT does not indicate the patient  has a right scapular body fracture. CT imaging of the cervical spine disclosed C1, C2 fractures. Rigidly immobilized in a hard cervical collar, non-op interventions     RN cleared pt for PT evaluation; upon completion of task, pt seated in chair, chair alarm activated, call bell in reach, RN aware of elevated BP     PT Vitals    Date/Time BP BP Location BP Method Pt Position Observations [facility]   04/25/21 1123 192/78 Right upper arm Manual Sitting after ambulating from bathroom, PT/OT  [facility]   04/25/21 1125 190/79 Right upper arm Automatic Sitting -- [facility]      PT Pain    Date/Time Pain Type Pref Pain Scale Orientation Location Rating: [facility]   04/25/21 1123 Pain Assessment number (Numeric Rating Pain Scale) neck 5 5 [facility]          Prior Living Environment      Most Recent Value   Current Living Arrangements  home/apartment/condo   Living Environment Comment  [facility] w/ girlfriend steps to bed/bath 2nd floor           Prior Level of Function      Most Recent Value   Ambulation  independent   Transferring  independent   Toileting  independent   Bathing  independent   Dressing  independent   Prior Level of Function Comment  No AD, retired and IND ADL/IADL    Assistive Device Currently Used at Home  none          PT Evaluation and Treatment - 04/25/21 1121        PT Time Calculation    Start Time  1121     Stop Time  1134     Time Calculation (min)  13 min        Session Details    Document Type  initial evaluation     Mode of Treatment  physical therapy        General Information    Patient Profile Reviewed  yes     General Observations of Patient  pt received ambulating from bathroom with PCT, agreeable to PT     Existing Precautions/Restrictions  brace worn at all times;weight bearing;other (see comments)    hard collar       Cognition/Psychosocial    Affect/Mental Status (Cognition)  WFL     Orientation Status (Cognition)  oriented x 4     Follows Commands (Cognition)  WFL     Cognitive Function  WFL     Comment, Cognition   pleasant and cooperative        Sensory    Hearing Status  other (see comments)     Impact of Hearing Impairment on Functional Status  [place]        Sensory Assessment (Somatosensory)    Sensory Assessment (Somatosensory)  LE sensation intact;bilateral LE     Bilateral LE Sensory Assessment  light touch awareness;intact     Sensory Subjective Reports  other (see comments)    denies       Range of Motion (ROM)    Range of Motion  ROM is WFL;bilateral lower extremities        Strength (Manual Muscle Testing)    Strength (Manual Muscle Testing)  strength is WFL;bilateral lower extremities        Bed Mobility    Comment (Bed Mobility)  NT, pt received OOB        Sit to Stand Transfer    [place], Sit to Stand Transfer  supervision;1 person assist     Assistive Device  none     Comment  from recliner        Stand to Sit Transfer    [place], Stand to Sit Transfer  supervision;1 person assist     Assistive Device  none     Comment  to recliner        Gait Training    [place], Gait  supervision     Assistive Device  none     Distance in Feet  10 feet     Comment (Gait/Stairs)  ambulation distance limited by hypertension, observed pt ambulating from bathroom to recliner        Balance    Static Standing Balance  WFL;unsupported;standing     Dynamic Standing Balance  WFL;unsupported;standing        Orthotics & Prosthetics Management    Orthosis Location  spinal orthosis        Spinal Orthosis Management    Type (Spinal Orthosis)  cervical collar, hard     Functional Design (Spinal Orthosis)  serial casting     Therapeutic Indications Spinal Orthosis  fracture immobilization;rest/inflammation reduction     Wearing Schedule (Spinal Orthosis)  wear full-time     Orthosis Training (Spinal Orthosis)  patient;donning/doffing orthosis;orthosis adjustment;able to verbalize training     Compliance/Wearing Issues (Spinal Orthosis)  follow-up training required        AM-PAC (TM) - Mobility (Current Function)    Turning from  your back to your side while in a flat bed without using bedrails?  3 - A Little     Moving from lying on your back to sitting on the side of a flat bed without using bedrails?  3 - A Little     Moving to and from a bed to a chair?  3 - A Little     Standing up from a chair using your arms?  3 - A Little     To walk in a hospital room?  3 - A Little     Climbing 3-5 steps with a railing?  3 - A Little     AM-PAC (TM) Mobility Score  18        Therapy Assessment/Plan (PT)    Rehab Potential (PT)  good, to achieve stated therapy goals     Therapy Frequency (PT)  5 times/wk        Progress Summary (PT)    Daily Outcome Statement (PT)  Mr. Zelaya seen for PT evaluation. Supervision functional transfers and ambulation. Progression of functional ambulation assessment limited by hypertension, RN present and aware. Will require further assessment. Acute PT to follow.     Symptoms Noted During/After Treatment  significant change in vital signs;other (see comments)    hypertension, RN present       Therapy Plan Review/Discharge Plan (PT)    PT Recommended Discharge Disposition  home;home with assist;other (see comments)    per pt, his girlfriend can assist prn    Anticipated Equipment Needs at Discharge (PT)  none                       Education provided this session. See the Patient Education summary report for full details.    PT Goals      Most Recent Value   Bed Mobility Goal 1   Activity/Assistive Device  sit to supine/supine to sit at 04/25/2021 1121   [place]  modified independence at 04/25/2021 1121   Time Frame  by discharge at 04/25/2021 1121   Progress/Outcome  goal ongoing at 04/25/2021 1121   Transfer Goal 1   Activity/Assistive Device  sit-to-stand/stand-to-sit, bed-to-chair/chair-to-bed at 04/25/2021 1121   [place]  modified independence at 04/25/2021 1121   Time Frame  by discharge at 04/25/2021 1121   Progress/Outcome  goal ongoing at 04/25/2021 1121   Gait Training Goal 1   Activity/Assistive  Device  gait (walking locomotion) at 04/25/2021 1121   [place]  modified independence at 04/25/2021 1121   Distance  50 at 04/25/2021 1121   Time Frame  by discharge at 04/25/2021 1121   Progress/Outcome  goal ongoing at 04/25/2021 1121   Stairs Goal 1   Activity/Assistive Device  ascending stairs, descending stairs at 04/25/2021 1121   [place]  supervision required at 04/25/2021 1121   Number of Stairs  12 at 04/25/2021 1121   Time Frame  by discharge at 04/25/2021 1121   Progress/Outcome  goal ongoing at 04/25/2021 1121

## 2021-04-25 NOTE — PLAN OF CARE
Problem: Adult Inpatient Plan of Care  Goal: Plan of Care Review  Outcome: Progressing  Flowsheets (Taken 4/25/2021 0230)  Progress: no change  Plan of Care Reviewed With: patient  Outcome Summary: Pt expressed discomfort r/t Aspen collar in use. Pt states pain to neck and back. Dilaudid given. A fib on the monitor  Goal: Patient-Specific Goal (Individualized)  Outcome: Progressing  Goal: Absence of Hospital-Acquired Illness or Injury  Outcome: Progressing  Goal: Optimal Comfort and Wellbeing  Outcome: Progressing  Goal: Readiness for Transition of Care  Outcome: Progressing     Problem: Fall Injury Risk  Goal: Absence of Fall and Fall-Related Injury  Outcome: Progressing   Plan of Care Review  Plan of Care Reviewed With: patient  Progress: no change  Outcome Summary: Pt expressed discomfort r/t [place]en collar in use. Pt states pain to neck and back. Dilaudid given. A fib on the monitor

## 2021-04-25 NOTE — PROGRESS NOTES
Patient: Melanie Zelaya  Location: [facility] 4A 4021  MRN: [id number]  Today's date: 4/25/2021     Pt resting in chair at end of session with chair alarm on, incontinence pad in place and call bell in reach. RN notified ; c-collar in place       Melanie is a 71 y.o. male admitted on 4/23/2021 with Essential hypertension [I10]  Elevated blood pressure reading [R03.0]  Hypertensive urgency [I16.0]  Closed head injury, initial encounter [S09.90XA]  Fall, initial encounter [W19.XXXA]  Other nondisplaced fracture of second cervical vertebra, initial encounter for closed fracture (CMS/HCC) [S12.191A]  Closed fracture of right scapula, unspecified part of scapula, initial encounter [S42.101A]  Other closed nondisplaced fracture of first cervical vertebra with nonunion, subsequent encounter [S12.091K]. Principal problem is Fall.    Past Medical History  Melanie has a past medical history of Anxiety, Atrial fibrillation (CMS/HCC), Depression, Fracture of pelvis (CMS/HCC) (1968), GERD (gastroesophageal reflux disease), Hearing loss, History of colon polyps, Hyperlipidemia, Hypertension, Left atrial enlargement, Lung cancer (CMS/HCC), Lung cancer (CMS/HCC), Pneumonia (2011), and Seasonal allergies.    History of Present Illness  Melanie Zelaya is a 71 y.o. male who unfortunately fell off a ladder while doing yard work yesterday.  Patient states that he lost consciousness after the fall.  Patient is complaining of right shoulder pain along with neck pain.  Patient denies any other injuries.  Patient denies any numbness or paresthesias that are new since the fall.  The patient does have a history of carpal tunnel syndrome bilaterally at which he has residual decreased sensation on the right hand in the median distribution.  Also has a history of a open right shoulder rotator cuff repair and distal clavicle resection performed by Dr. Shepherd.  Orthopedics was consulted for possible right scapular fracture read on the CT  chest by radiology. Right shoulder contusion with no evidence of scapular fracture.  Clinical correlation to chest CT does not indicate the patient has a right scapular body fracture. CT imaging of the cervical spine disclosed C1, C2 fractures. Rigidly immobilized in a hard cervical collar, non-op interventions       OT Vitals    Date/Time BP BP Location BP Method Pt Position Observations [facility]   04/25/21 1123 192/78 Right upper arm Manual Sitting after ambulating from bathroom, PT/OT  [facility]   04/25/21 1125 190/79 Right upper arm Automatic Sitting -- ECM      OT Pain    Date/Time Pain Type Pref Pain Scale Orientation Location Rating: Rest [facility]   04/25/21 1123 Pain Assessment number (Numeric Rating Pain Scale) neck 5 5 ECM          Prior Living Environment      Most Recent Value   Current Living Arrangements  home/apartment/condo   Living Environment Comment  [facility] w/ girlfriend steps to bed/bath 2nd floor           Prior Level of Function      Most Recent Value   Ambulation  independent   Transferring  independent   Toileting  independent   Bathing  independent   Dressing  independent   Prior Level of Function Comment  No AD, retired and IND ADL/IADL    Assistive Device Currently Used at Home  none          Occupational Profile      Most Recent Value   Reason for Services/Referral  s/p fall from a ladder    Successful Occupations  retired    Occupational History/Life Experiences  lives w/ GF   Performance Patterns  reports IND and active    Patient Goals  go home soon          OT Evaluation and Treatment - 04/25/21 1123        OT Time Calculation    Start Time  1123     Stop Time  1135     Time Calculation (min)  12 min        Session Details    Document Type  initial evaluation     Mode of Treatment  occupational therapy        General Information    Patient Profile Reviewed  yes     Onset of Illness/Injury or Date of Surgery  04/23/21     Referring Physician  trauma     Patient/Family/Caregiver Comments/Observations  RN  cleared for OT/PT      General Observations of Patient  Pt rec'd c-collar donned, ambulating from toilet w/ PCT, agreeable to OT/PT      Existing Precautions/Restrictions  weight bearing;brace worn at all times    c-collar; R UE sling for comfort        Weight-bearing Status    Left UE Weight-Bearing Status  weight-bearing as tolerated (WBAT)     Right UE Weight-Bearing Status  weight-bearing as tolerated (WBAT)     Left LE Weight-Bearing Status  weight-bearing as tolerated (WBAT)     Right LE Weight-Bearing Status  weight-bearing as tolerated (WBAT)        Cognition/Psychosocial    Affect/Mental Status (Cognition)  WFL     Orientation Status (Cognition)  oriented x 4     Follows Commands (Cognition)  WFL     Cognitive Function  WFL     Comment, Cognition  very pleasant and cooperative      Cognitive Interventions/Strategies  occupation/activity based interventions        Hearing Assessment    Hearing Status  hearing impairment, bilaterally     Impact of Hearing Impairment on Functional Status  MILD [place] ; no augmentation        Vision Assessment/Intervention    Visual Impairment/Limitations  corrective lenses full-time        Sensory Assessment (Somatosensory)    Sensory Assessment (Somatosensory)  right UE     Left UE Sensory Assessment  general sensation;intact     Right UE Sensory Assessment  general sensation;impaired    baseline carpal tunnel h/o surgery       Range of Motion (ROM)    Range of Motion  bilateral upper extremities;ROM is WFL        Strength (Manual Muscle Testing)    Strength (Manual Muscle Testing)  bilateral upper extremities;strength is WFL    observed in function       Bed Mobility    Comment (Bed Mobility)  rec'd OOB        Transfers    Transfers  toilet transfer     Comment  Pt performs short household distance mobility w/ SPV no device from bathroom        Bed to Chair Transfer    [place], Bed to Chair  supervision     Verbal Cues  safety     Comment  no device         Sit to Stand  Transfer    [place], Sit to Stand Transfer  supervision     Comment  from EOBx2        Stand to Sit Transfer    [place], Stand to Sit Transfer  supervision     Assistive Device  none     Comment  to EOB, chair         Toilet Transfer    Transfer Technique  stand-sit     [place], Toilet Transfer  supervision     Comment  at start of session observed standing from toilet w/ PCT SPV level        Safety Issues, Functional Mobility    Impairments Affecting Function (Mobility)  pain        Balance    Balance Assessment  sitting static balance;sitting dynamic balance;sit to stand dynamic balance;standing static balance;standing dynamic balance     Static Sitting Balance  WFL;sitting, edge of bed     Dynamic Sitting Balance  WFL;sitting, edge of bed     Sit to Stand Dynamic Balance  WFL;unsupported     Static Standing Balance  mild impairment;unsupported     Dynamic Standing Balance  mild impairment;unsupported     Comment, Balance  slight sway in stance         Motor Skills    Motor Skills  functional endurance     Functional Endurance  fair ; limited by hypertension on eval        Lower Body Dressing    Tasks  underwear     Self-Performance  threads left leg, underpants;threads right leg, underpants;pulls underpants up or down     [place] Assistance  threads left leg, underpants     [place]  minimum assist (75% or more patient effort)     Position  edge of bed sitting        BADL Safety/Performance    Impairments, BADL Safety/Performance  pain;range of motion     Skilled BADL Treatment/Intervention  BADL process/adaptation training     Progress in BADL Status  level of supervision required        Orthotics & Prosthetics Management    Orthosis Location  spinal orthosis        Spinal Orthosis Management    Type (Spinal Orthosis)  cervical collar, hard     Functional Design (Spinal Orthosis)  static orthosis     Therapeutic Indications Spinal Orthosis  fracture immobilization     Wearing Schedule (Spinal  Orthosis)  wear full-time     Orthosis Training (Spinal Orthosis)  patient;able to verbalize training;activity limitations/precautions;donning/doffing orthosis;purpose/goals of orthosis     Compliance/Wearing Issues (Spinal Orthosis)  follow-up training required        Coping    Observed Emotional State  calm;cooperative     Verbalized Emotional State  acceptance     Trust Relationship/Rapport  care explained        AM-PAC (TM) - ADL (Current Function)    Putting on and taking off regular lower body clothing?  3 - A Little     Bathing?  3 - A Little     Toileting?  3 - A Little     Putting on/taking off regular upper body clothing?  2 - A Lot     How much help for taking care of personal grooming?  3 - A Little     Eating meals?  4 - None     AM-PAC (TM) ADL Score  18        Therapy Assessment/Plan (OT)    Rehab Potential (OT)  good, to achieve stated therapy goals     Therapy Frequency (OT)  3 times/wk        Progress Summary (OT)    Daily Outcome Statement (OT)  OT eval complete. Pt admitted s/p fall from ladder w/ C1, C2 fxs and R scap fx non-op mgmt. Pt currently in c-collar w/ limitations in pain and ROM  d/t c-collar. Eval limited d/t hypertension, observed at start of session to be ambulating from bathroom w/ PCT SPV level, had pt sit in chair and then assessed vitals ; hold further mobility d/t hypertension. Pt completes LBD w/ MIN A . Will continue to follow for d/c needs.      Symptoms Noted During/After Treatment  none        Therapy Plan Review/Discharge Plan (OT)    OT Recommended Discharge Disposition  home with assist     Anticipated Equipment Needs At Discharge (OT)  none                  Education provided this session. See the Patient Education summary report for full details.         OT Goals      Most Recent Value   Bed Mobility Goal 1   Activity/Assistive Device  bed mobility activities, all at 04/25/2021 1123   [place]  supervision required at 04/25/2021 1123   Time Frame  by discharge at  04/25/2021 1123   Progress/Outcome  goal ongoing at 04/25/2021 1123   Transfer Goal 1   Activity/Assistive Device  sit-to-stand/stand-to-sit, bed-to-chair/chair-to-bed, toilet at 04/25/2021 1123   [place]  modified independence at 04/25/2021 1123   Time Frame  by discharge at 04/25/2021 1123   Progress/Outcome  goal ongoing at 04/25/2021 1123   Dressing Goal 1   Activity/Adaptive Equipment  lower body dressing, sock-aid, reacher at 04/25/2021 1123   [place]  supervision required at 04/25/2021 1123   Time Frame  by discharge at 04/25/2021 1123   Progress/Outcome  goal ongoing at 04/25/2021 1123   Toileting Goal 1   Activity/Assistive Device  perform perineal hygiene, adjust/manage clothing at 04/25/2021 1123   [place]  supervision required at 04/25/2021 1123   Time Frame  by discharge at 04/25/2021 1123   Progress/Outcome  goal ongoing at 04/25/2021 1123

## 2021-04-25 NOTE — PROGRESS NOTES
Patient: Melanie Zelaya  Location: [facility] 4A 4021  MRN: [id number]  Today's date: 4/25/2021    Attempted to see patient for therapy. Unable due to medical hold.     Continue to await clarification of NWB Status BUE/BLE and Neurosurgery consult pending. Paged trauma PA.

## 2021-04-26 ENCOUNTER — APPOINTMENT (INPATIENT)
Dept: CARDIOLOGY | Facility: HOSPITAL | Age: 71
DRG: 086 | End: 2021-04-26
Attending: INTERNAL MEDICINE
Payer: COMMERCIAL

## 2021-04-26 LAB
ANION GAP SERPL CALC-SCNC: 8 MEQ/L (ref 3–15)
AORTIC ROOT ANNULUS: 3 CM
ASCENDING AORTA: 3.6 CM
AV REG PEAK VEL: 4.15 M/S
AV REGURGITATION PRESSURE HALF TIME: 1380 MS
BASOPHILS # BLD: 0.01 K/UL (ref 0.01–0.1)
BASOPHILS NFR BLD: 0.1 %
BSA FOR ECHO PROCEDURE: 2.03 M2
BUN SERPL-MCNC: 14 MG/DL (ref 8–20)
CA-I BLD-SCNC: 1.15 MMOL/L (ref 1.15–1.27)
CALCIUM SERPL-MCNC: 9 MG/DL (ref 8.9–10.3)
CHLORIDE SERPL-SCNC: 100 MEQ/L (ref 98–109)
CO2 SERPL-SCNC: 31 MEQ/L (ref 22–32)
CREAT SERPL-MCNC: 0.8 MG/DL (ref 0.8–1.3)
DIFFERENTIAL METHOD BLD: ABNORMAL
EOSINOPHIL # BLD: 0.02 K/UL (ref 0.04–0.54)
EOSINOPHIL NFR BLD: 0.3 %
ERYTHROCYTE [DISTWIDTH] IN BLOOD BY AUTOMATED COUNT: 14.3 % (ref 11.6–14.4)
EST RIGHT VENT SYSTOLIC PRESSURE BY TRICUSPID REGURGITATION JET: 45 MMHG
FRACTIONAL SHORTENING: 24.8 %
GFR SERPL CREATININE-BSD FRML MDRD: >60 ML/MIN/1.73M*2
GLUCOSE SERPL-MCNC: 125 MG/DL (ref 70–99)
HCT VFR BLDCO AUTO: 43.6 % (ref 40.1–51)
HGB BLD-MCNC: 14.4 G/DL (ref 13.7–17.5)
IMM GRANULOCYTES # BLD AUTO: 0.01 K/UL (ref 0–0.08)
IMM GRANULOCYTES NFR BLD AUTO: 0.1 %
INTERVENTRICULAR SEPTUM: 1.26 CM
LA ESV (BP): 81.6 CM3
LA ESV INDEX (A2C): 34.53 CM3/M2
LA ESV INDEX (BP): 40.2 CM3/M2
LA/AORTA RATIO: 1.77
LAAS-AP2: 24.2 CM2
LAAS-AP4: 28.2 CM2
LAD 2D: 5.3 CM
LALD A4C: 6.84 CM
LALD A4C: 7.2 CM
LAV-S: 70.1 CM3
LEFT ATRIUM VOLUME INDEX: 45.12 CM3/M2
LEFT ATRIUM VOLUME: 91.6 CM3
LEFT INTERNAL DIMENSION IN SYSTOLE: 3.58 CM (ref 2.86–4.32)
LEFT VENTRICULAR INTERNAL DIMENSION IN DIASTOLE: 4.76 CM (ref 4.85–6.74)
LEFT VENTRICULAR POSTERIOR WALL IN END DIASTOLE: 1.23 CM (ref 0.63–1.17)
LYMPHOCYTES # BLD: 1.3 K/UL (ref 1.2–3.5)
LYMPHOCYTES NFR BLD: 19.3 %
MAGNESIUM SERPL-MCNC: 2.2 MG/DL (ref 1.8–2.5)
MCH RBC QN AUTO: 29.3 PG (ref 28–33.2)
MCHC RBC AUTO-ENTMCNC: 33 G/DL (ref 32.2–36.5)
MCV RBC AUTO: 88.8 FL (ref 83–98)
MONOCYTES # BLD: 1.05 K/UL (ref 0.3–1)
MONOCYTES NFR BLD: 15.6 %
NEUTROPHILS # BLD: 4.34 K/UL (ref 1.7–7)
NEUTS SEG NFR BLD: 64.6 %
NRBC BLD-RTO: 0 %
PDW BLD AUTO: 10 FL (ref 9.4–12.4)
PHOSPHATE SERPL-MCNC: 3.1 MG/DL (ref 2.4–4.7)
PLATELET # BLD AUTO: 183 K/UL (ref 150–350)
POSTERIOR WALL: 1.23 CM
POTASSIUM SERPL-SCNC: 3.9 MEQ/L (ref 3.6–5.1)
RBC # BLD AUTO: 4.91 M/UL (ref 4.5–5.8)
SODIUM SERPL-SCNC: 139 MEQ/L (ref 136–144)
TR MAX PG: 30 MMHG
TRICUSPID VALVE PEAK REGURGITATION VELOCITY: 2.74 M/S
WBC # BLD AUTO: 6.73 K/UL (ref 3.8–10.5)
Z-SCORE OF LEFT VENTRICULAR DIMENSION IN END DIASTOLE: -1.83
Z-SCORE OF LEFT VENTRICULAR DIMENSION IN END SYSTOLE: 0.15
Z-SCORE OF LEFT VENTRICULAR POSTERIOR WALL IN END DIASTOLE: 1.87

## 2021-04-26 PROCEDURE — 63700000 HC SELF-ADMINISTRABLE DRUG: Performed by: PHYSICIAN ASSISTANT

## 2021-04-26 PROCEDURE — 63700000 HC SELF-ADMINISTRABLE DRUG: Performed by: INTERNAL MEDICINE

## 2021-04-26 PROCEDURE — 97530 THERAPEUTIC ACTIVITIES: CPT | Mod: GP

## 2021-04-26 PROCEDURE — 85025 COMPLETE CBC W/AUTO DIFF WBC: CPT | Performed by: PHYSICIAN ASSISTANT

## 2021-04-26 PROCEDURE — 84100 ASSAY OF PHOSPHORUS: CPT | Performed by: PHYSICIAN ASSISTANT

## 2021-04-26 PROCEDURE — 83735 ASSAY OF MAGNESIUM: CPT | Performed by: PHYSICIAN ASSISTANT

## 2021-04-26 PROCEDURE — 63700000 HC SELF-ADMINISTRABLE DRUG: Performed by: SURGERY

## 2021-04-26 PROCEDURE — 93306 TTE W/DOPPLER COMPLETE: CPT

## 2021-04-26 PROCEDURE — 20600000 HC ROOM AND CARE INTERMEDIATE/TELEMETRY

## 2021-04-26 PROCEDURE — 25000000 HC PHARMACY GENERAL: Performed by: PHYSICIAN ASSISTANT

## 2021-04-26 PROCEDURE — 80048 BASIC METABOLIC PNL TOTAL CA: CPT | Performed by: PHYSICIAN ASSISTANT

## 2021-04-26 PROCEDURE — 82330 ASSAY OF CALCIUM: CPT | Performed by: PHYSICIAN ASSISTANT

## 2021-04-26 PROCEDURE — 97535 SELF CARE MNGMENT TRAINING: CPT | Mod: GO

## 2021-04-26 PROCEDURE — 36415 COLL VENOUS BLD VENIPUNCTURE: CPT | Performed by: PHYSICIAN ASSISTANT

## 2021-04-26 RX ADMIN — HYDRALAZINE HYDROCHLORIDE 25 MG: 25 TABLET, FILM COATED ORAL at 21:19

## 2021-04-26 RX ADMIN — DOCUSATE SODIUM AND SENNOSIDES 1 TABLET: 50; 8.6 TABLET ORAL at 21:20

## 2021-04-26 RX ADMIN — METOPROLOL TARTRATE 25 MG: 25 TABLET, FILM COATED ORAL at 09:11

## 2021-04-26 RX ADMIN — HYDROMORPHONE HYDROCHLORIDE 2 MG: 2 TABLET ORAL at 02:06

## 2021-04-26 RX ADMIN — FORMOTEROL FUMARATE DIHYDRATE 20 MCG: 20 SOLUTION RESPIRATORY (INHALATION) at 09:11

## 2021-04-26 RX ADMIN — DABIGATRAN ETEXILATE MESYLATE 150 MG: 75 CAPSULE ORAL at 21:07

## 2021-04-26 RX ADMIN — ACETAMINOPHEN 975 MG: 325 TABLET ORAL at 09:12

## 2021-04-26 RX ADMIN — ESCITALOPRAM 5 MG: 5 TABLET, FILM COATED ORAL at 09:11

## 2021-04-26 RX ADMIN — HYDRALAZINE HYDROCHLORIDE 25 MG: 25 TABLET, FILM COATED ORAL at 15:10

## 2021-04-26 RX ADMIN — FORMOTEROL FUMARATE DIHYDRATE 20 MCG: 20 SOLUTION RESPIRATORY (INHALATION) at 21:14

## 2021-04-26 RX ADMIN — ACETAMINOPHEN 975 MG: 325 TABLET ORAL at 02:07

## 2021-04-26 RX ADMIN — HYDROMORPHONE HYDROCHLORIDE 4 MG: 2 TABLET ORAL at 21:13

## 2021-04-26 RX ADMIN — HYDRALAZINE HYDROCHLORIDE 25 MG: 25 TABLET, FILM COATED ORAL at 05:47

## 2021-04-26 RX ADMIN — DABIGATRAN ETEXILATE MESYLATE 150 MG: 75 CAPSULE ORAL at 09:11

## 2021-04-26 RX ADMIN — HYDROMORPHONE HYDROCHLORIDE 4 MG: 2 TABLET ORAL at 16:41

## 2021-04-26 RX ADMIN — FAMOTIDINE 20 MG: 20 TABLET ORAL at 09:11

## 2021-04-26 RX ADMIN — FLUTICASONE PROPIONATE 2 SPRAY: 50 SPRAY, METERED NASAL at 09:12

## 2021-04-26 RX ADMIN — DOCUSATE SODIUM AND SENNOSIDES 1 TABLET: 50; 8.6 TABLET ORAL at 09:11

## 2021-04-26 RX ADMIN — HYDROMORPHONE HYDROCHLORIDE 4 MG: 2 TABLET ORAL at 05:47

## 2021-04-26 RX ADMIN — LOSARTAN POTASSIUM 100 MG: 100 TABLET, FILM COATED ORAL at 09:11

## 2021-04-26 RX ADMIN — HYDROCHLOROTHIAZIDE 12.5 MG: 12.5 TABLET ORAL at 09:11

## 2021-04-26 RX ADMIN — TIOTROPIUM BROMIDE INHALATION SPRAY 2 PUFF: 3.12 SPRAY, METERED RESPIRATORY (INHALATION) at 09:12

## 2021-04-26 RX ADMIN — HYDROCORTISONE AND ACETIC ACID 4 DROP: 20.75; 10.375 SOLUTION AURICULAR (OTIC) at 09:13

## 2021-04-26 RX ADMIN — POLYETHYLENE GLYCOL 3350 17 G: 17 POWDER, FOR SOLUTION ORAL at 09:12

## 2021-04-26 RX ADMIN — ATORVASTATIN CALCIUM 40 MG: 40 TABLET, FILM COATED ORAL at 09:11

## 2021-04-26 RX ADMIN — HYDROMORPHONE HYDROCHLORIDE 2 MG: 2 TABLET ORAL at 01:28

## 2021-04-26 RX ADMIN — ACETAMINOPHEN 975 MG: 325 TABLET ORAL at 15:09

## 2021-04-26 RX ADMIN — HYDROCORTISONE AND ACETIC ACID 4 DROP: 20.75; 10.375 SOLUTION AURICULAR (OTIC) at 15:11

## 2021-04-26 RX ADMIN — HYDROCORTISONE AND ACETIC ACID 4 DROP: 20.75; 10.375 SOLUTION AURICULAR (OTIC) at 21:14

## 2021-04-26 ASSESSMENT — COGNITIVE AND FUNCTIONAL STATUS - GENERAL
TOILETING: 4 - NONE
DRESSING REGULAR UPPER BODY CLOTHING: 3 - A LITTLE
MOVING TO AND FROM BED TO CHAIR: 3 - A LITTLE
EATING MEALS: 4 - NONE
HELP NEEDED FOR BATHING: 3 - A LITTLE
AFFECT: WFL
CLIMB 3 TO 5 STEPS WITH RAILING: 3 - A LITTLE
STANDING UP FROM CHAIR USING ARMS: 3 - A LITTLE
WALKING IN HOSPITAL ROOM: 3 - A LITTLE
DRESSING REGULAR LOWER BODY CLOTHING: 3 - A LITTLE
HELP NEEDED FOR PERSONAL GROOMING: 4 - NONE

## 2021-04-26 NOTE — PLAN OF CARE
Plan of Care Review  Plan of Care Reviewed With: patient  Progress: improving  Outcome Summary: Pt pain will be controlled at 4/10 or less

## 2021-04-26 NOTE — PLAN OF CARE
Plan of Care Review  Plan of Care Reviewed With: patient  Progress: improving  Outcome Summary: Patient resting in bed. Patient continues to have left shoulder pain; scheduled tylenol and prn pain meds given. Afib with HR 40-50s at times. Aspen collar in place. Echo this morning. VSS. Will continue to monitor patient.

## 2021-04-26 NOTE — PROGRESS NOTES
"Daily Progress Note    Daily Progress Note    Subjective     Interval History: complains of mild pain.     Complete Review of Systems - Negative except as stated in interval history      Objective     Vital signs in last 24 hours:  Temp:  [36.3 °C (97.4 °F)-37.1 °C (98.7 °F)] 36.3 °C (97.4 °F)  Heart Rate:  [50-72] 65  Resp:  [20] 20  BP: (147-200)/(67-99) 164/79      Intake/Output Summary (Last 24 hours) at 4/26/2021 0716  Last data filed at 4/25/2021 2143  Gross per 24 hour   Intake --   Output 1 ml   Net -1 ml     Intake/Output this shift:  No intake/output data recorded.    Labs    Reviewed  Hgb=14.4    Imaging  I have indepedently reviewed patient's imaging; any concerning findings adressed below    VTE Assessment  Increased risk from baseline; VTE Prophylaxis as ordered      Physical Exam:  Visit Vitals  BP (!) 164/79 (BP Location: Left upper arm, Patient Position: Lying)   Pulse 65   Temp 36.3 °C (97.4 °F) (Oral)   Resp 20   Ht 1.676 m (5' 6\")   Wt 88.7 kg (195 lb 8.8 oz)   SpO2 98%   BMI 31.56 kg/m²       General Appearance:    Alert, cooperative, no distress, appears stated age   Head:    Normocephalic, without obvious abnormality, atraumatic   Eyes:    EOM's intact, fundi     benign, both eyes        Ears:    Normal external ear canals, both ears   Nose:   Nares normal, septum midline, mucosa normal, no drainage    or sinus   tenderness   Throat:   Lips, mucosa, and tongue normal; teeth and gums normal   Neck:   Supple, symmetrical, trachea midline   Back:     Symmetric, no curvature, ROM normal, no CVA tenderness   Lungs:     Clear to auscultation bilaterally, respirations unlabored   Chest wall:    No tenderness or deformity   Heart:    Regular rate and rhythm, S1 and S2 normal, no murmur, rub   or gallop   Abdomen:     Soft, non-tender, bowel sounds active all four quadrants,     no masses, no organomegaly           Extremities:  Musculoskeletal:   Extremities normal, atraumatic, no cyanosis or edema    " No injury or deformity   Pulses:   2+ and symmetric all extremities   Skin:   Skin color, texture, turgor normal, no rashes or lesions       Neurologic:    Behavior/  Emotional:   Normal strength, sensation and reflexes       throughout    Appropriate, cooperative         Assessment & Plan  Fall  Assessment & Plan  Mechanical fall  Pt/ot    Hypertension  Assessment & Plan  Continue irbesartan and hydrochlorothiazide  Cardiology following    Fracture of right scapula  Assessment & Plan  Analgesia  Orthopedic evaluated  Non op    Cervical spine fracture (CMS/[facility])  Assessment & Plan  Pt with C1 and C2 fx and occipital condyle fracture  Pt is neuro intact  Cervical collar  Neurosurgery following-nonop      Asthma  Assessment & Plan  Continue albuterol and other nebs    Hyperlipidemia  Assessment & Plan  Continue statin    A-fib (CMS/[facility])  Assessment & Plan  Continue beta blocker  continue pradaxa        Erlin Bowling MD

## 2021-04-26 NOTE — PATIENT CARE CONFERENCE
Care Progression Rounds Note  Date: 4/26/2021  Time: 10:14 AM     Patient Name: Melanie Zelaya     Medical Record Number: [id number]   YOB: 1950  Sex: Male      Room/Bed: 4021    Admitting Diagnosis: Essential hypertension [I10]  Elevated blood pressure reading [R03.0]  Hypertensive urgency [I16.0]  Closed head injury, initial encounter [S09.90XA]  Fall, initial encounter [W19.XXXA]  Other nondisplaced fracture of second cervical vertebra, initial encounter for closed fracture (CMS/HCC) [S12.191A]  Closed fracture of right scapula, unspecified part of scapula, initial encounter [S42.101A]  Other closed nondisplaced fracture of first cervical vertebra with nonunion, subsequent encounter [S12.091K]   Admit Date/Time: 4/23/2021  4:49 PM    Primary Diagnosis: Fall  Principal Problem: Fall    GMLOS: 3  Anticipated Discharge Date: 4/27/2021    AM-PAC:  Mobility Score: 18    Discharge Planning:  Current Living Arrangements: home/apartment/condo  Anticipated Discharge Disposition: home without services    Barriers to Discharge:  Barriers to Discharge: Medical issues not resolved    Participants:  nursing, social work/services,

## 2021-04-26 NOTE — PLAN OF CARE
Problem: Adult Inpatient Plan of Care  Goal: Plan of Care Review  Outcome: Progressing  Flowsheets (Taken 4/26/2021 1235)  Progress: improving  Plan of Care Reviewed With: patient  Outcome Summary:   OT tx complete   Anticipate home w/ family assist as needed for ADL/IADL

## 2021-04-26 NOTE — SUBJECTIVE & OBJECTIVE
"Daily Progress Note    Subjective     Interval History: complains of mild pain.     Complete Review of Systems - Negative except as stated in interval history      Objective     Vital signs in last 24 hours:  Temp:  [36.3 °C (97.4 °F)-37.1 °C (98.7 °F)] 36.3 °C (97.4 °F)  Heart Rate:  [50-72] 65  Resp:  [20] 20  BP: (147-200)/(67-99) 164/79      Intake/Output Summary (Last 24 hours) at 4/26/2021 0716  Last data filed at 4/25/2021 2143  Gross per 24 hour   Intake --   Output 1 ml   Net -1 ml     Intake/Output this shift:  No intake/output data recorded.    Labs    Reviewed  Hgb=14.4    Imaging  I have indepedently reviewed patient's imaging; any concerning findings adressed below    VTE Assessment  Increased risk from baseline; VTE Prophylaxis as ordered      Physical Exam:  Visit Vitals  BP (!) 164/79 (BP Location: Left upper arm, Patient Position: Lying)   Pulse 65   Temp 36.3 °C (97.4 °F) (Oral)   Resp 20   Ht 1.676 m (5' 6\")   Wt 88.7 kg (195 lb 8.8 oz)   SpO2 98%   BMI 31.56 kg/m²       General Appearance:    Alert, cooperative, no distress, appears stated age   Head:    Normocephalic, without obvious abnormality, atraumatic   Eyes:    EOM's intact, fundi     benign, both eyes        Ears:    Normal external ear canals, both ears   Nose:   Nares normal, septum midline, mucosa normal, no drainage    or sinus   tenderness   Throat:   Lips, mucosa, and tongue normal; teeth and gums normal   Neck:   Supple, symmetrical, trachea midline   Back:     Symmetric, no curvature, ROM normal, no CVA tenderness   Lungs:     Clear to auscultation bilaterally, respirations unlabored   Chest wall:    No tenderness or deformity   Heart:    Regular rate and rhythm, S1 and S2 normal, no murmur, rub   or gallop   Abdomen:     Soft, non-tender, bowel sounds active all four quadrants,     no masses, no organomegaly           Extremities:  Musculoskeletal:   Extremities normal, atraumatic, no cyanosis or edema    No injury or deformity "   Pulses:   2+ and symmetric all extremities   Skin:   Skin color, texture, turgor normal, no rashes or lesions       Neurologic:    Behavior/  Emotional:   Normal strength, sensation and reflexes       throughout    Appropriate, cooperative
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
## 2021-04-26 NOTE — PLAN OF CARE
Problem: Adult Inpatient Plan of Care  Goal: Plan of Care Review  Outcome: Progressing  Flowsheets (Taken 4/26/2021 1316)  Progress: improving  Plan of Care Reviewed With: patient  Outcome Summary: Sup for amb w/o AD

## 2021-04-26 NOTE — NURSING NOTE
Pt reporting moderate neck pain today, administered PRN Dilaudid and scheduled Tylenol as ordered. Pt on tele - afib, HR sharifa at times as low as 44 - Dr. Apple aware. Per orders, RUE is to be in sling - applied sling as ordered. Pt OOB to chair today for most of shift. Pt impulsive at times and attempts to ambulate independently. Reviewed fall precautions with pt, and reminded pt to ring for assistance.  Pt's daughter at bedside today. Reviewed plan of care with pt and daughter - WCM.

## 2021-04-26 NOTE — PROGRESS NOTES
Patient: Melanie Zelaya  Location: [facility] 4A 4021  MRN: [id number]  Today's date: 4/26/2021     Pt resting in chair at end of session with chair alarm on, incontinence pad in place and call bell in reach. RN notified      Melanie is a 71 y.o. male admitted on 4/23/2021 with Essential hypertension [I10]  Elevated blood pressure reading [R03.0]  Hypertensive urgency [I16.0]  Closed head injury, initial encounter [S09.90XA]  Fall, initial encounter [W19.XXXA]  Other nondisplaced fracture of second cervical vertebra, initial encounter for closed fracture (CMS/HCC) [S12.191A]  Closed fracture of right scapula, unspecified part of scapula, initial encounter [S42.101A]  Other closed nondisplaced fracture of first cervical vertebra with nonunion, subsequent encounter [S12.091K]. Principal problem is Fall.    Past Medical History  Melanie has a past medical history of Anxiety, Atrial fibrillation (CMS/HCC), Depression, Fracture of pelvis (CMS/HCC) (1968), GERD (gastroesophageal reflux disease), Hearing loss, History of colon polyps, Hyperlipidemia, Hypertension, Left atrial enlargement, Lung cancer (CMS/HCC), Lung cancer (CMS/HCC), Pneumonia (2011), and Seasonal allergies.    History of Present Illness  Melanie Zelaya is a 71 y.o. male who unfortunately fell off a ladder while doing yard work yesterday.  Patient states that he lost consciousness after the fall.  Patient is complaining of right shoulder pain along with neck pain.  Patient denies any other injuries.  Patient denies any numbness or paresthesias that are new since the fall.  The patient does have a history of carpal tunnel syndrome bilaterally at which he has residual decreased sensation on the right hand in the median distribution.  Also has a history of a open right shoulder rotator cuff repair and distal clavicle resection performed by Dr. Shepherd.  Orthopedics was consulted for possible right scapular fracture read on the CT chest by radiology.  Right shoulder contusion with no evidence of scapular fracture.  Clinical correlation to chest CT does not indicate the patient has a right scapular body fracture. CT imaging of the cervical spine disclosed C1, C2 fractures. Rigidly immobilized in a hard cervical collar, non-op interventions       OT Vitals    Date/Time Pulse SpO2 Pt Activity O2 Therapy BP BP Location BP Method Pt Position [facility]   04/26/21 1103 46 98 % At rest None (Room air) 174/80 Left upper arm Automatic Sitting ECM   04/26/21 1114 55 -- -- -- 148/74 Left upper arm Automatic Sitting ECM      OT Pain    Date/Time Pain Type Pref Pain Scale Side Orientation Location Rating: Rest Onset/Duration [facility]   04/26/21 1103 Pain Assessment number (Numeric Rating Pain Scale) right shoulder 5 6 premedicated for activity ECM          Prior Living Environment      Most Recent Value   Current Living Arrangements  home/apartment/condo   Living Environment Comment  [facility] w/ girlfriend steps to bed/bath 2nd floor           Prior Level of Function      Most Recent Value   Ambulation  independent   Transferring  independent   Toileting  independent   Bathing  independent   Dressing  independent   Prior Level of Function Comment  No AD, retired and IND ADL/IADL    Assistive Device Currently Used at Home  none          Occupational Profile      Most Recent Value   Reason for Services/Referral  s/p fall from a ladder    Successful Occupations  retired    Occupational History/Life Experiences  lives w/ GF   Performance Patterns  reports IND and active    Patient Goals  go home soon          OT Evaluation and Treatment - 04/26/21 1059        OT Time Calculation    Start Time  1059     Stop Time  1119     Time Calculation (min)  20 min        Session Details    Document Type  daily treatment/progress note     Mode of Treatment  occupational therapy        General Information    Patient Profile Reviewed  yes     Onset of Illness/Injury or Date of Surgery  04/23/21     Referring  Physician  trauma     Patient/Family/Caregiver Comments/Observations  RN cleared for OT/PT      General Observations of Patient  Pt resting in bedside chair c-collar donned R UE in sling      Existing Precautions/Restrictions  weight bearing;brace worn at all times    R UE sling        Weight-bearing Status    Left UE Weight-Bearing Status  weight-bearing as tolerated (WBAT)     Right UE Weight-Bearing Status  weight-bearing as tolerated (WBAT)     Left LE Weight-Bearing Status  weight-bearing as tolerated (WBAT)     Right LE Weight-Bearing Status  weight-bearing as tolerated (WBAT)        Bed Mobility    Comment (Bed Mobility)  rec'd OOB ; reports sleeping in recliner at home         Transfers    Transfers  tub transfer        Bed to Chair Transfer    [place], Bed to Chair  supervision     Verbal Cues  safety     Comment  from mobility to chair cues for safe hand placement         Sit to Stand Transfer    [place], Sit to Stand Transfer  supervision     Assistive Device  none     Comment  from chair         Stand to Sit Transfer    [place], Stand to Sit Transfer  supervision     Assistive Device  none     Comment  to chair         Tub Transfer    Comment  reports walk-in shower         Balance    Balance Assessment  sitting static balance;sitting dynamic balance;sit to stand dynamic balance;standing static balance;standing dynamic balance     Static Sitting Balance  WFL;sitting in chair     Dynamic Sitting Balance  WFL;sitting in chair     Sit to Stand Dynamic Balance  WFL;supported     Static Standing Balance  WFL;unsupported     Dynamic Standing Balance  WFL;unsupported     Comment, Balance  no LOB noted         Motor Skills    Motor Skills  functional endurance     Functional Endurance  fair ; fatigues following mobiltiy         Upper Body Dressing    Tasks  --    R UE sling    [place]  maximum assist (25-49% patient effort)     Position  supported sitting     Comment  educated on UBD dressing  technique and donning/doffing sling; verbalizes understanding         Lower Body Dressing    Tasks  underwear     Self-Performance  threads left leg, underpants;pulls underpants up or down     [place] Assistance  threads left leg, underpants;threads right leg, underpants;pulls underpants up or down     [place]  moderate assist (50-74% patient effort)     Position  unsupported standing;unsupported sitting     Comment  assist to thread L LE and hike to waist, reports assist at home if needed         BADL Safety/Performance    Impairments, BADL Safety/Performance  endurance/activity tolerance;pain;range of motion     Skilled BADL Treatment/Intervention  BADL process/adaptation training     Progress in BADL Status  level of supervision required        Spinal Orthosis Management    Type (Spinal Orthosis)  cervical collar, hard     Functional Design (Spinal Orthosis)  static orthosis     Therapeutic Indications Spinal Orthosis  fracture immobilization;pain management;stabilization and support     Wearing Schedule (Spinal Orthosis)  wear full-time     Orthosis Training (Spinal Orthosis)  patient;able to verbalize training     Compliance/Wearing Issues (Spinal Orthosis)  patient/caregiver comprehend strategies        Coping    Observed Emotional State  cooperative     Verbalized Emotional State  acceptance        AM-PAC (TM) - ADL (Current Function)    Putting on and taking off regular lower body clothing?  3 - A Little     Bathing?  3 - A Little     Toileting?  4 - None     Putting on/taking off regular upper body clothing?  3 - A Little     How much help for taking care of personal grooming?  4 - None     Eating meals?  4 - None     AM-PAC (TM) ADL Score  21        Therapy Assessment/Plan (OT)    Rehab Potential (OT)  good, to achieve stated therapy goals     Therapy Frequency (OT)  3 times/wk        Progress Summary (OT)    Daily Outcome Statement (OT)  OT tx complete. Pt completing mobility w/ SPV for safety no  devices. Pt educated on c-collar wearing schedule verbalizing understanding. Pt educated on UBD technique and donning/doffing sling rewq'ing assist reports assist as home as needed. Anticipate d/c home w/ A from family as needed for IADL/ADL tasks.      Symptoms Noted During/After Treatment  none        Therapy Plan Review/Discharge Plan (OT)    OT Recommended Discharge Disposition  home with assist     Anticipated Equipment Needs At Discharge (OT)  none                  Education Documentation  Treatment Plan, taught by Latia Hearn, OT at 4/26/2021 12:36 PM.  Learner: Patient  Readiness: Acceptance  Method: Explanation  Response: Verbalizes Understanding, Needs Reinforcement  Comment: donning/doffing sling, c-collar AAT, safe txfer technique, ADL safety               OT Goals      Most Recent Value   Bed Mobility Goal 1   Activity/Assistive Device  bed mobility activities, all at 04/25/2021 1123   [place]  supervision required at 04/25/2021 1123   Time Frame  by discharge at 04/25/2021 1123   Progress/Outcome  goal ongoing at 04/26/2021 1059   Transfer Goal 1   Activity/Assistive Device  sit-to-stand/stand-to-sit, bed-to-chair/chair-to-bed, toilet at 04/25/2021 1123   [place]  modified independence at 04/25/2021 1123   Time Frame  by discharge at 04/25/2021 1123   Progress/Outcome  good progress toward goal at 04/26/2021 1059   Dressing Goal 1   Activity/Adaptive Equipment  lower body dressing, sock-aid, reacher at 04/25/2021 1123   [place]  supervision required at 04/25/2021 1123   Time Frame  by discharge at 04/25/2021 1123   Progress/Outcome  good progress toward goal at 04/26/2021 1059   Toileting Goal 1   Activity/Assistive Device  perform perineal hygiene, adjust/manage clothing at 04/25/2021 1123   [place]  supervision required at 04/25/2021 1123   Time Frame  by discharge at 04/25/2021 1123   Progress/Outcome  good progress toward goal at 04/26/2021 1059

## 2021-04-26 NOTE — PROGRESS NOTES
Patient: Melanie Zelaya  Location: [facility] 4A 4021  MRN: [id number]  Today's date: 4/26/2021  Pt left seated in chair as found, no alarm on prior to session or post session,incont pad underneath, call bell in reach. RN notified.    Melanie is a 71 y.o. male admitted on 4/23/2021 with Essential hypertension [I10]  Elevated blood pressure reading [R03.0]  Hypertensive urgency [I16.0]  Closed head injury, initial encounter [S09.90XA]  Fall, initial encounter [W19.XXXA]  Other nondisplaced fracture of second cervical vertebra, initial encounter for closed fracture (CMS/HCC) [S12.191A]  Closed fracture of right scapula, unspecified part of scapula, initial encounter [S42.101A]  Other closed nondisplaced fracture of first cervical vertebra with nonunion, subsequent encounter [S12.091K]. Principal problem is Fall.    Past Medical History  Melanie has a past medical history of Anxiety, Atrial fibrillation (CMS/HCC), Depression, Fracture of pelvis (CMS/HCC) (1968), GERD (gastroesophageal reflux disease), Hearing loss, History of colon polyps, Hyperlipidemia, Hypertension, Left atrial enlargement, Lung cancer (CMS/HCC), Lung cancer (CMS/HCC), Pneumonia (2011), and Seasonal allergies.    History of Present Illness  Melanie Zelaya is a 71 y.o. male who unfortunately fell off a ladder while doing yard work yesterday.  Patient states that he lost consciousness after the fall.  Patient is complaining of right shoulder pain along with neck pain.  Patient denies any other injuries.  Patient denies any numbness or paresthesias that are new since the fall.  The patient does have a history of carpal tunnel syndrome bilaterally at which he has residual decreased sensation on the right hand in the median distribution.  Also has a history of a open right shoulder rotator cuff repair and distal clavicle resection performed by Dr. Shepherd.  Orthopedics was consulted for possible right scapular fracture read on the CT chest by  radiology. Right shoulder contusion with no evidence of scapular fracture.  Clinical correlation to chest CT does not indicate the patient has a right scapular body fracture. CT imaging of the cervical spine disclosed C1, C2 fractures. Rigidly immobilized in a hard cervical collar, non-op interventions       PT Vitals    Date/Time Pulse SpO2 Pt Activity O2 Therapy BP BP Location BP Method Pt Position [facility]   04/26/21 1103 46 98 % At rest None (Room air) 174/80 Left upper arm Automatic Sitting ECM   04/26/21 1114 55 -- -- -- 148/74 Left upper arm Automatic Sitting ECM      PT Pain    Date/Time Pain Type Pref Pain Scale Side Orientation Location Rating: Rest Onset/Duration [facility]   04/26/21 1103 Pain Assessment number (Numeric Rating Pain Scale) right shoulder 5 6 premedicated for activity ECM          Prior Living Environment      Most Recent Value   Current Living Arrangements  home/apartment/condo   Living Environment Comment  [facility] w/ girlfriend steps to bed/bath 2nd floor           Prior Level of Function      Most Recent Value   Ambulation  independent   Transferring  independent   Toileting  independent   Bathing  independent   Dressing  independent   Prior Level of Function Comment  No AD, retired and IND ADL/IADL    Assistive Device Currently Used at Home  none          PT Evaluation and Treatment - 04/26/21 1100        PT Time Calculation    Start Time  1059     Stop Time  1119     Time Calculation (min)  20 min        Session Details    Document Type  daily treatment/progress note     Mode of Treatment  physical therapy        General Information    Patient Profile Reviewed  yes     Onset of Illness/Injury or Date of Surgery  04/23/21     Referring Physician  trauma     Patient/Family/Caregiver Comments/Observations  cleared for session by RN.      General Observations of Patient  resting in chair, hard collar in place, sling RUE. willing to participate     Existing Precautions/Restrictions  brace worn at all  times;weight bearing        Weight-bearing Status    Left UE Weight-Bearing Status  weight-bearing as tolerated (WBAT)    sling    Right UE Weight-Bearing Status  weight-bearing as tolerated (WBAT)     Left LE Weight-Bearing Status  weight-bearing as tolerated (WBAT)     Right LE Weight-Bearing Status  weight-bearing as tolerated (WBAT)        Cognition/Psychosocial    Affect/Mental Status (Cognition)  WFL     Orientation Status (Cognition)  oriented x 4        Sensory    Hearing Status  hearing impairment, bilaterally        Vision Assessment/Intervention    Visual Impairment/Limitations  --    pt repairing glasses upon PT entrance to room       Sensory Assessment (Somatosensory)    Sensory Assessment (Somatosensory)  --    pt denied altered LE sensation       Range of Motion (ROM)    Range of Motion  ROM is WFL;bilateral lower extremities        Strength (Manual Muscle Testing)    Strength (Manual Muscle Testing)  strength is WFL;bilateral lower extremities    >3/5 t/o       Bed Mobility    Comment (Bed Mobility)  OOB in chair at time of session. pt verbalizing plan to sleep in recliner for comfort upon return home        Sit to Stand Transfer    [place], Sit to Stand Transfer  supervision     Assistive Device  none     Comment  from chair. 2 trials        Stand to Sit Transfer    [place], Stand to Sit Transfer  supervision     Assistive Device  none     Comment  to chair, 2trials        Gait Training    [place], Gait  supervision     Assistive Device  none     Distance in Feet  84 feet     Deviations/Abnormal Patterns (Gait)  gait speed decreased     Comment (Gait/Stairs)  gait steady w/o AD, distance limited by fatigue. cues for direction in unfamilar envrionment        Stairs Training    [place], Stairs  supervision;verbal cues     Assistive Device  railing     Handrail Location (Stairs)  left side (ascending);left side (descending)     Number of Stairs  5     Ascending Stairs Technique   step-over-step     Descending Stairs Technique  step-over-step     Comment  2 trials of stairs, cues to count stairs w/ascent so as to not miss w/descent due to impaired ability to look down secondary to hard collar in place. pt verbalized understanding        Balance    Balance Assessment  sitting static balance;sitting dynamic balance;sit to stand dynamic balance;standing static balance;standing dynamic balance     Static Sitting Balance  WFL;sitting in chair;supported;unsupported     Dynamic Sitting Balance  WFL;supported;unsupported;sitting in chair     Sit to Stand Dynamic Balance  WFL;unsupported     Static Standing Balance  WFL;unsupported     Dynamic Standing Balance  WFL;unsupported        Spinal Orthosis Management    Type (Spinal Orthosis)  cervical collar, hard     Functional Design (Spinal Orthosis)  static orthosis     Therapeutic Indications Spinal Orthosis  fracture immobilization     Wearing Schedule (Spinal Orthosis)  wear full-time     Orthosis Training (Spinal Orthosis)  patient;able to verbalize training        AM-PAC (TM) - Mobility (Current Function)    Turning from your back to your side while in a flat bed without using bedrails?  3 - A Little     Moving from lying on your back to sitting on the side of a flat bed without using bedrails?  3 - A Little     Moving to and from a bed to a chair?  3 - A Little     Standing up from a chair using your arms?  3 - A Little     To walk in a hospital room?  3 - A Little     Climbing 3-5 steps with a railing?  3 - A Little     AM-PAC (TM) Mobility Score  18        Therapy Assessment/Plan (PT)    Rehab Potential (PT)  good, to achieve stated therapy goals     Therapy Frequency (PT)  5 times/wk     Planned Therapy Interventions (PT)  balance training;gait training;patient/family education;transfer training;stair training        Progress Summary (PT)    Daily Outcome Statement (PT)  pt w/improving mobility/activity tolerance w/o AD. will benefit from cont  skilled therapy to maximize independence prior to antiicpated return home upon discharge. [facility] 18     Symptoms Noted During/After Treatment  none        Therapy Plan Review/Discharge Plan (PT)    PT Recommended Discharge Disposition  home     Anticipated Equipment Needs at Discharge (PT)  none                       Education Documentation  Signs/Symptoms, taught by Mirella Irby, PT at 4/26/2021  1:17 PM.  Learner: Patient  Readiness: Eager  Method: Explanation  Response: Verbalizes Understanding  Comment: sequencing w/stairs, technique for stair safety, amb w/assist in hospital, use of hard collar    Risk Factors, taught by Mirella Irby, PT at 4/26/2021  1:17 PM.  Learner: Patient  Readiness: Eager  Method: Explanation  Response: Verbalizes Understanding  Comment: sequencing w/stairs, technique for stair safety, amb w/assist in hospital, use of hard collar          PT Goals      Most Recent Value   Bed Mobility Goal 1   Activity/Assistive Device  sit to supine/supine to sit at 04/25/2021 1121   [place]  modified independence at 04/25/2021 1121   Time Frame  by discharge at 04/25/2021 1121   Progress/Outcome  goal ongoing at 04/26/2021 1100   Transfer Goal 1   Activity/Assistive Device  sit-to-stand/stand-to-sit, bed-to-chair/chair-to-bed at 04/25/2021 1121   [place]  modified independence at 04/25/2021 1121   Time Frame  by discharge at 04/25/2021 1121   Progress/Outcome  goal ongoing at 04/25/2021 1121   Gait Training Goal 1   Activity/Assistive Device  gait (walking locomotion) at 04/25/2021 1121   [place]  modified independence at 04/25/2021 1121   Distance  50 at 04/25/2021 1121   Time Frame  by discharge at 04/25/2021 1121   Progress/Outcome  goal ongoing at 04/26/2021 1100   Stairs Goal 1   Activity/Assistive Device  ascending stairs, descending stairs at 04/25/2021 1121   [place]  supervision required at 04/25/2021 1121   Number of Stairs  12 at 04/25/2021 1121   Time Frame  by  discharge at 04/25/2021 1121   Progress/Outcome  goal ongoing at 04/26/2021 1100

## 2021-04-27 LAB
BASOPHILS # BLD: 0.01 K/UL (ref 0.01–0.1)
BASOPHILS NFR BLD: 0.2 %
DIFFERENTIAL METHOD BLD: NORMAL
EOSINOPHIL # BLD: 0.04 K/UL (ref 0.04–0.54)
EOSINOPHIL NFR BLD: 0.7 %
ERYTHROCYTE [DISTWIDTH] IN BLOOD BY AUTOMATED COUNT: 14.2 % (ref 11.6–14.4)
HCT VFR BLDCO AUTO: 45.6 % (ref 40.1–51)
HGB BLD-MCNC: 15.3 G/DL (ref 13.7–17.5)
IMM GRANULOCYTES # BLD AUTO: 0.02 K/UL (ref 0–0.08)
IMM GRANULOCYTES NFR BLD AUTO: 0.3 %
LYMPHOCYTES # BLD: 1.31 K/UL (ref 1.2–3.5)
LYMPHOCYTES NFR BLD: 21.5 %
MCH RBC QN AUTO: 29.4 PG (ref 28–33.2)
MCHC RBC AUTO-ENTMCNC: 33.6 G/DL (ref 32.2–36.5)
MCV RBC AUTO: 87.5 FL (ref 83–98)
MONOCYTES # BLD: 0.77 K/UL (ref 0.3–1)
MONOCYTES NFR BLD: 12.6 %
NEUTROPHILS # BLD: 3.95 K/UL (ref 1.7–7)
NEUTS SEG NFR BLD: 64.7 %
NRBC BLD-RTO: 0 %
PDW BLD AUTO: 9.7 FL (ref 9.4–12.4)
PLATELET # BLD AUTO: 196 K/UL (ref 150–350)
RBC # BLD AUTO: 5.21 M/UL (ref 4.5–5.8)
WBC # BLD AUTO: 6.1 K/UL (ref 3.8–10.5)

## 2021-04-27 PROCEDURE — 63700000 HC SELF-ADMINISTRABLE DRUG: Performed by: PHYSICIAN ASSISTANT

## 2021-04-27 PROCEDURE — 63700000 HC SELF-ADMINISTRABLE DRUG: Performed by: SURGERY

## 2021-04-27 PROCEDURE — L0172 CERV COL SR FOAM 2PC PRE OTS: HCPCS

## 2021-04-27 PROCEDURE — 36415 COLL VENOUS BLD VENIPUNCTURE: CPT | Performed by: PHYSICIAN ASSISTANT

## 2021-04-27 PROCEDURE — 20600000 HC ROOM AND CARE INTERMEDIATE/TELEMETRY

## 2021-04-27 PROCEDURE — 63700000 HC SELF-ADMINISTRABLE DRUG: Performed by: INTERNAL MEDICINE

## 2021-04-27 PROCEDURE — 85025 COMPLETE CBC W/AUTO DIFF WBC: CPT | Performed by: PHYSICIAN ASSISTANT

## 2021-04-27 PROCEDURE — 25000000 HC PHARMACY GENERAL: Performed by: PHYSICIAN ASSISTANT

## 2021-04-27 PROCEDURE — 63600000 HC DRUGS/DETAIL CODE: Performed by: SURGERY

## 2021-04-27 RX ORDER — HYDRALAZINE HYDROCHLORIDE 20 MG/ML
10 INJECTION INTRAMUSCULAR; INTRAVENOUS EVERY 2 HOUR PRN
Status: DISCONTINUED | OUTPATIENT
Start: 2021-04-27 | End: 2021-04-28 | Stop reason: HOSPADM

## 2021-04-27 RX ADMIN — HYDROCHLOROTHIAZIDE 12.5 MG: 12.5 TABLET ORAL at 08:21

## 2021-04-27 RX ADMIN — HYDROMORPHONE HYDROCHLORIDE 4 MG: 2 TABLET ORAL at 01:56

## 2021-04-27 RX ADMIN — FLUTICASONE PROPIONATE 2 SPRAY: 50 SPRAY, METERED NASAL at 08:24

## 2021-04-27 RX ADMIN — ACETAMINOPHEN 975 MG: 325 TABLET ORAL at 21:40

## 2021-04-27 RX ADMIN — HYDRALAZINE HYDROCHLORIDE 25 MG: 25 TABLET, FILM COATED ORAL at 21:41

## 2021-04-27 RX ADMIN — ATORVASTATIN CALCIUM 40 MG: 40 TABLET, FILM COATED ORAL at 08:20

## 2021-04-27 RX ADMIN — METOPROLOL TARTRATE 25 MG: 25 TABLET, FILM COATED ORAL at 08:22

## 2021-04-27 RX ADMIN — ESCITALOPRAM 5 MG: 5 TABLET, FILM COATED ORAL at 08:20

## 2021-04-27 RX ADMIN — DOCUSATE SODIUM AND SENNOSIDES 1 TABLET: 50; 8.6 TABLET ORAL at 08:20

## 2021-04-27 RX ADMIN — HYDROMORPHONE HYDROCHLORIDE 2 MG: 2 TABLET ORAL at 05:27

## 2021-04-27 RX ADMIN — HYDROMORPHONE HYDROCHLORIDE 2 MG: 2 TABLET ORAL at 18:52

## 2021-04-27 RX ADMIN — ACETAMINOPHEN 975 MG: 325 TABLET ORAL at 08:21

## 2021-04-27 RX ADMIN — METOPROLOL TARTRATE 25 MG: 25 TABLET, FILM COATED ORAL at 14:15

## 2021-04-27 RX ADMIN — HYDROCORTISONE AND ACETIC ACID 4 DROP: 20.75; 10.375 SOLUTION AURICULAR (OTIC) at 08:27

## 2021-04-27 RX ADMIN — HYDRALAZINE HYDROCHLORIDE 10 MG: 20 INJECTION INTRAMUSCULAR; INTRAVENOUS at 03:28

## 2021-04-27 RX ADMIN — POLYETHYLENE GLYCOL 3350 17 G: 17 POWDER, FOR SOLUTION ORAL at 08:24

## 2021-04-27 RX ADMIN — DOCUSATE SODIUM AND SENNOSIDES 1 TABLET: 50; 8.6 TABLET ORAL at 21:41

## 2021-04-27 RX ADMIN — ACETAMINOPHEN 975 MG: 325 TABLET ORAL at 14:15

## 2021-04-27 RX ADMIN — TIOTROPIUM BROMIDE INHALATION SPRAY 2 PUFF: 3.12 SPRAY, METERED RESPIRATORY (INHALATION) at 08:28

## 2021-04-27 RX ADMIN — HYDROCORTISONE AND ACETIC ACID 4 DROP: 20.75; 10.375 SOLUTION AURICULAR (OTIC) at 14:16

## 2021-04-27 RX ADMIN — FAMOTIDINE 20 MG: 20 TABLET ORAL at 08:20

## 2021-04-27 RX ADMIN — HYDROMORPHONE HYDROCHLORIDE 2 MG: 2 TABLET ORAL at 21:53

## 2021-04-27 RX ADMIN — HYDRALAZINE HYDROCHLORIDE 25 MG: 25 TABLET, FILM COATED ORAL at 14:15

## 2021-04-27 RX ADMIN — DABIGATRAN ETEXILATE MESYLATE 150 MG: 75 CAPSULE ORAL at 08:20

## 2021-04-27 RX ADMIN — FORMOTEROL FUMARATE DIHYDRATE 20 MCG: 20 SOLUTION RESPIRATORY (INHALATION) at 08:23

## 2021-04-27 RX ADMIN — DABIGATRAN ETEXILATE MESYLATE 150 MG: 75 CAPSULE ORAL at 21:40

## 2021-04-27 RX ADMIN — HYDROCORTISONE AND ACETIC ACID 4 DROP: 20.75; 10.375 SOLUTION AURICULAR (OTIC) at 21:57

## 2021-04-27 RX ADMIN — ACETAMINOPHEN 975 MG: 325 TABLET ORAL at 02:00

## 2021-04-27 RX ADMIN — LOSARTAN POTASSIUM 100 MG: 100 TABLET, FILM COATED ORAL at 08:20

## 2021-04-27 NOTE — PROGRESS NOTES
Referral received and chart reviewed. Spoke with patient and agreeable to services with [facility] for RN/PT when discharged. Will follow until discharged.

## 2021-04-27 NOTE — PROGRESS NOTES
TRAUMA SURGERY DAILY PROGRESS NOTE     PATIENT NAME:  Melanie Zelaya        YOB: 1950  AGE:  71 y.o.     GENDER: male  MRN:  [id number]          PATIENT #: [id number]    CHIEF COMPLAINT     Chief Complaint   Patient presents with   • Fall     6 ft fall       PRIMARY CARE PHYSICIAN   Keisha Schultz CRNP    SUBJECTIVE   Pain improving slowly.   No events.     REVIEW OF SYSTEMS   Review of Systems    As above.   VITAL SIGNS   Temperature: Temp (24hrs), Av.4 °C (97.6 °F), Min:36.3 °C (97.3 °F), Max:36.7 °C (98 °F)     BP Max:  Systolic (24hrs), Av , Min:135 , Max:192      BP [place]:  Diastolic (24hrs), Av, Min:74, Max:89    Recent:    Patient Vitals for the past 4 hrs:   BP Pulse   21 0822 138/78 64   21 0801 -- 74        I/Os:  I/O last 3 completed shifts:  In: 360 [P.O.:360]  Out: 1 [Urine:1]  No intake/output data recorded.     MEDICATIONS     Current Facility-Administered Medications:   •  acetaminophen (TYLENOL) tablet 975 mg, 975 mg, oral, q6h MARIAN, Juan Jose Aldana PA C, 975 mg at 21 0821  •  acetic acid-hydrocortisone (VOSOL-HC) otic solution 4 drop, 4 drop, Right Ear, TID, Janice Spencer PA C, 4 drop at 21 0827  •  albuterol nebulizer solution 2.5 mg, 2.5 mg, nebulization, q6h PRN, Juan Jose Aldana PA C, 2.5 mg at 21 0526  •  atorvastatin (LIPITOR) tablet 40 mg, 40 mg, oral, Daily, Juan Jose Aldana PA C, 40 mg at 21 0820  •  calcium gluconate 1,000 mg in sodium chloride 0.9 % 50 mL IVPB, 1 g, intravenous, PRN, Juan Jose Aldana PA C  •  dabigatran etexilate (PRADAXA) capsule 150 mg, 150 mg, oral, BID, Erlin Bowling MD, 150 mg at 21 0820  •  glucose chewable tablet 16-32 g of dextrose, 16-32 g of dextrose, oral, PRN **OR** dextrose 40 % oral gel 15-30 g of dextrose, 15-30 g of dextrose, oral, PRN **OR** glucagon (GLUCAGEN) injection 1 mg, 1 mg, intramuscular, PRN **OR** dextrose in water injection 12.5 g, 25 mL, intravenous, PRN, Sale,  YESENIA Don  •  escitalopram (LEXAPRO) tablet 5 mg, 5 mg, oral, Daily, Juan Jose Aldana PA C, 5 mg at 04/27/21 0820  •  famotidine (PEPCID) tablet 20 mg, 20 mg, oral, Daily, Juan Jose Aldana, YESENIA KOROMA, 20 mg at 04/27/21 0820  •  fluticasone propionate (FLONASE) 50 mcg/actuation nasal spray 2 spray, 2 spray, Each Nostril, Daily, Juan Jose Aldana PA C, 2 spray at 04/27/21 0824  •  formoterol (PERFOROMIST) 20 mcg/2 mL nebulizer solution 20 mcg, 20 mcg, nebulization, BID, Juan Jose Aldana PA C, 20 mcg at 04/27/21 0823  •  hydrALAZINE (APRESOLINE) injection 10 mg, 10 mg, intravenous, q2h PRN, Erlin Bowling MD, 10 mg at 04/27/21 0328  •  hydrALAZINE (APRESOLINE) tablet 25 mg, 25 mg, oral, q8h MARIAN, Unruly Apple MD, 25 mg at 04/26/21 2119  •  hydrochlorothiazide (HYDRODIURIL) tablet 12.5 mg, 12.5 mg, oral, Daily, Juan Jose Aldana PA C, 12.5 mg at 04/27/21 0821  •  HYDROmorphone (DILAUDID) tablet 2-4 mg, 2-4 mg, oral, q4h PRN, 2 mg at 04/27/21 0527 **OR** HYDROmorphone (DILAUDID) injection 0.25-0.5 mg, 0.25-0.5 mg, intravenous, q3h PRN, Juan Jose Aldana PA C, 0.5 mg at 04/24/21 2300  •  losartan (COZAAR) tablet 100 mg, 100 mg, oral, Daily, Juan Jose Aldana PA C, 100 mg at 04/27/21 0820  •  magnesium sulfate IVPB 1g in 100 mL NSS/D5W/SWFI, 1 g, intravenous, PRN **OR** magnesium sulfate IVPB 2g in 50 mL NSS/D5W/SWFI, 2 g, intravenous, PRN, Juan Jose Aldana PA C  •  metoprolol tartrate (LOPRESSOR) tablet 25 mg, 25 mg, oral, QID, Unruly Apple MD, 25 mg at 04/27/21 0822  •  ondansetron ODT (ZOFRAN-ODT) disintegrating tablet 4 mg, 4 mg, oral, q8h PRN **OR** ondansetron (ZOFRAN) injection 4 mg, 4 mg, intravenous, q8h PRN, Juan Jose Aldana PA C  •  polyethylene glycol (MIRALAX) 17 gram packet 17 g, 17 g, oral, Daily, Juan Jose Aldana PA C, 17 g at 04/27/21 0824  •  potassium chloride (KLOR-CON) tablet extended release 20 mEq, 20 mEq, oral, PRN, 20 mEq at 04/25/21 1825 **OR** potassium chloride (KLOR-CON) tablet extended release 40 mEq, 40 mEq, oral, PRN **OR**  potassium chloride 20 mEq in 100 mL IVPB  (premix), 20 mEq, intravenous, PRN **OR** potassium chloride (KCL) 40 mEq/250 mL IVPB in NSS 40 mEq, 40 mEq, intravenous, PRN, Juan Jose Aldana PA C  •  sennosides-docusate sodium (SENOKOT-S) 8.6-50 mg per tablet 1 tablet, 1 tablet, oral, BID, Juan Jose Aldana PA C, 1 tablet at 04/27/21 0820  •  tiotropium bromide (SPIRIVA RESPIMAT) 2.5 mcg/actuation inhaler 2 puff, 2 puff, inhalation, Daily, Juan Jose Aldana PA C, 2 puff at 04/27/21 0828    MEDICATIONS:  Infusions:         Scheduled:   • acetaminophen  975 mg oral q6h MARIAN   • acetic acid-hydrocortisone  4 drop Right Ear TID   • atorvastatin  40 mg oral Daily   • dabigatran etexilate  150 mg oral BID   • escitalopram  5 mg oral Daily   • famotidine  20 mg oral Daily   • fluticasone propionate  2 spray Each Nostril Daily   • formoterol  20 mcg nebulization BID   • hydrALAZINE  25 mg oral q8h MARIAN   • hydrochlorothiazide  12.5 mg oral Daily   • losartan  100 mg oral Daily   • metoprolol tartrate  25 mg oral QID   • polyethylene glycol  17 g oral Daily   • sennosides-docusate sodium  1 tablet oral BID   • tiotropium bromide  2 puff inhalation Daily     PRN:   albuterol, 2.5 mg, q6h PRN  calcium gluconate, 1 g, PRN  glucose, 16-32 g of dextrose, PRN   Or  dextrose, 15-30 g of dextrose, PRN   Or  glucagon, 1 mg, PRN   Or  dextrose in water, 25 mL, PRN  hydrALAZINE, 10 mg, q2h PRN  HYDROmorphone, 2-4 mg, q4h PRN   Or  HYDROmorphone, 0.25-0.5 mg, q3h PRN  magnesium sulfate, 1 g, PRN   Or  magnesium sulfate, 2 g, PRN  ondansetron ODT, 4 mg, q8h PRN   Or  ondansetron, 4 mg, q8h PRN  potassium chloride, 20 mEq, PRN   Or  potassium chloride, 40 mEq, PRN   Or  potassium chloride, 20 mEq, PRN   Or  potassium chloride, 40 mEq, PRN         PHYSICAL EXAM    Physical Exam    NAD; AAO x 3  RRR  CTAB/L  Soft, NT  Extremities NVI.   IMPRESSION/PLAN   71 y.o. y/o male s/p fall from ladder.   1. Fall from ladder.   2. Cardiology input appreciated - likely not  syncope.   3. C1/C2 fracture - neurosurgery input appreciated. Non-operative mgmt. SANDOVAL - no evidence of SCI.   4. Scapula fracture - sling. Non-operative mgmt.   5. Regular diet.  6. Atrial fibrillation - back on Pradaxa.   7. VTE prophylaxis - Pradaxa on for atrial fibrillation.   8. PT/OT  9. Disposition      AUTHOR:  Pillo Ortiz MD  Trauma Service pager #[phone]  4/27/2021  9:34 AM

## 2021-04-27 NOTE — PLAN OF CARE
Plan of Care Review  Plan of Care Reviewed With: patient  Progress: improving  Outcome Summary: Pt with c/o of pain in L shoulder but states that its controlled with scheduled tylenol. Aspen collar and sling remain in place. Pts VSS this shift. Pt has been OOB in chair tolerating well. All meals eaten while OOB in chair. Chair alarm on for safety. Call bell within reach. Will continue to monitor pt.

## 2021-04-27 NOTE — PATIENT CARE CONFERENCE
Care Progression Rounds Note  Date: 4/27/2021  Time: 10:22 AM     Patient Name: Melanie Zelaya     Medical Record Number: [id number]   YOB: 1950  Sex: Male      Room/Bed: 4021    Admitting Diagnosis: Essential hypertension [I10]  Elevated blood pressure reading [R03.0]  Hypertensive urgency [I16.0]  Closed head injury, initial encounter [S09.90XA]  Fall, initial encounter [W19.XXXA]  Other nondisplaced fracture of second cervical vertebra, initial encounter for closed fracture (CMS/HCC) [S12.191A]  Closed fracture of right scapula, unspecified part of scapula, initial encounter [S42.101A]  Other closed nondisplaced fracture of first cervical vertebra with nonunion, subsequent encounter [S12.091K]   Admit Date/Time: 4/23/2021  4:49 PM    Primary Diagnosis: Fall  Principal Problem: Fall    GMLOS: 3  Anticipated Discharge Date: 4/29/2021    AM-PAC:  Mobility Score: 18    Discharge Planning:  Current Living Arrangements: home/apartment/condo  Anticipated Discharge Disposition: home without services    Barriers to Discharge:  Barriers to Discharge: Medical issues not resolved    Participants:  , social work/services, nursing

## 2021-04-27 NOTE — PLAN OF CARE
Problem: Adult Inpatient Plan of Care  Goal: Plan of Care Review  Outcome: Progressing  Flowsheets (Taken 4/27/2021 2246)  Progress: improving  Plan of Care Reviewed With: patient  Outcome Summary: Pt had restful night. Scheduled tylenol and PRN diluadid given this shift. Aspen collar and sling in place. 10mg PRN hydralazine given for HTN. Bed alarm on, call bell in reach, will monitor.  Goal: Patient-Specific Goal (Individualized)  Outcome: Progressing  Goal: Absence of Hospital-Acquired Illness or Injury  Outcome: Progressing  Goal: Optimal Comfort and Wellbeing  Outcome: Progressing  Goal: Readiness for Transition of Care  Outcome: Progressing     Problem: Fall Injury Risk  Goal: Absence of Fall and Fall-Related Injury  Outcome: Progressing     Problem: Skin Injury Risk Increased  Goal: Skin Health and Integrity  Outcome: Progressing

## 2021-04-28 ENCOUNTER — APPOINTMENT (INPATIENT)
Dept: RADIOLOGY | Facility: HOSPITAL | Age: 71
DRG: 086 | End: 2021-04-28
Attending: SURGERY
Payer: COMMERCIAL

## 2021-04-28 ENCOUNTER — APPOINTMENT (INPATIENT)
Dept: RADIOLOGY | Facility: HOSPITAL | Age: 71
DRG: 086 | End: 2021-04-28
Attending: NURSE PRACTITIONER
Payer: COMMERCIAL

## 2021-04-28 VITALS
HEIGHT: 66 IN | DIASTOLIC BLOOD PRESSURE: 78 MMHG | RESPIRATION RATE: 20 BRPM | HEART RATE: 79 BPM | TEMPERATURE: 97.8 F | WEIGHT: 195 LBS | OXYGEN SATURATION: 97 % | SYSTOLIC BLOOD PRESSURE: 160 MMHG | BODY MASS INDEX: 31.34 KG/M2
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
## 2021-04-28 LAB
ANION GAP SERPL CALC-SCNC: 12 MEQ/L (ref 3–15)
BASOPHILS # BLD: 0.03 K/UL (ref 0.01–0.1)
BASOPHILS NFR BLD: 0.5 %
BUN SERPL-MCNC: 16 MG/DL (ref 8–20)
CALCIUM SERPL-MCNC: 9.2 MG/DL (ref 8.9–10.3)
CHLORIDE SERPL-SCNC: 98 MEQ/L (ref 98–109)
CO2 SERPL-SCNC: 26 MEQ/L (ref 22–32)
CREAT SERPL-MCNC: 0.8 MG/DL (ref 0.8–1.3)
DIFFERENTIAL METHOD BLD: ABNORMAL
EOSINOPHIL # BLD: 0.04 K/UL (ref 0.04–0.54)
EOSINOPHIL NFR BLD: 0.6 %
ERYTHROCYTE [DISTWIDTH] IN BLOOD BY AUTOMATED COUNT: 14.2 % (ref 11.6–14.4)
GFR SERPL CREATININE-BSD FRML MDRD: >60 ML/MIN/1.73M*2
GLUCOSE SERPL-MCNC: 124 MG/DL (ref 70–99)
HCT VFR BLDCO AUTO: 45.7 % (ref 40.1–51)
HGB BLD-MCNC: 15.3 G/DL (ref 13.7–17.5)
IMM GRANULOCYTES # BLD AUTO: 0.02 K/UL (ref 0–0.08)
IMM GRANULOCYTES NFR BLD AUTO: 0.3 %
LYMPHOCYTES # BLD: 1.07 K/UL (ref 1.2–3.5)
LYMPHOCYTES NFR BLD: 16.7 %
MCH RBC QN AUTO: 29.7 PG (ref 28–33.2)
MCHC RBC AUTO-ENTMCNC: 33.5 G/DL (ref 32.2–36.5)
MCV RBC AUTO: 88.6 FL (ref 83–98)
MONOCYTES # BLD: 0.78 K/UL (ref 0.3–1)
MONOCYTES NFR BLD: 12.2 %
NEUTROPHILS # BLD: 4.46 K/UL (ref 1.7–7)
NEUTS SEG NFR BLD: 69.7 %
NRBC BLD-RTO: 0 %
PDW BLD AUTO: 9.5 FL (ref 9.4–12.4)
PLATELET # BLD AUTO: 217 K/UL (ref 150–350)
POTASSIUM SERPL-SCNC: 3.9 MEQ/L (ref 3.6–5.1)
RBC # BLD AUTO: 5.16 M/UL (ref 4.5–5.8)
SODIUM SERPL-SCNC: 136 MEQ/L (ref 136–144)
WBC # BLD AUTO: 6.4 K/UL (ref 3.8–10.5)

## 2021-04-28 PROCEDURE — 25000000 HC PHARMACY GENERAL: Performed by: PHYSICIAN ASSISTANT

## 2021-04-28 PROCEDURE — 73020 X-RAY EXAM OF SHOULDER: CPT | Mod: LT

## 2021-04-28 PROCEDURE — 80048 BASIC METABOLIC PNL TOTAL CA: CPT | Performed by: PHYSICIAN ASSISTANT

## 2021-04-28 PROCEDURE — 36415 COLL VENOUS BLD VENIPUNCTURE: CPT | Performed by: PHYSICIAN ASSISTANT

## 2021-04-28 PROCEDURE — 63700000 HC SELF-ADMINISTRABLE DRUG: Performed by: INTERNAL MEDICINE

## 2021-04-28 PROCEDURE — 63700000 HC SELF-ADMINISTRABLE DRUG: Performed by: SURGERY

## 2021-04-28 PROCEDURE — 63700000 HC SELF-ADMINISTRABLE DRUG: Performed by: PHYSICIAN ASSISTANT

## 2021-04-28 PROCEDURE — 85025 COMPLETE CBC W/AUTO DIFF WBC: CPT | Performed by: PHYSICIAN ASSISTANT

## 2021-04-28 PROCEDURE — 71045 X-RAY EXAM CHEST 1 VIEW: CPT

## 2021-04-28 PROCEDURE — L0172 CERV COL SR FOAM 2PC PRE OTS: HCPCS

## 2021-04-28 RX ORDER — AMOXICILLIN 250 MG
1 CAPSULE ORAL 2 TIMES DAILY
Qty: 60 TABLET | Refills: 0 | COMMUNITY
Start: 2021-04-28 | End: 2022-03-15 | Stop reason: ALTCHOICE

## 2021-04-28 RX ORDER — ONDANSETRON 4 MG/1
4 TABLET, ORALLY DISINTEGRATING ORAL EVERY 8 HOURS PRN
Qty: 15 TABLET | Refills: 0 | Status: SHIPPED | OUTPATIENT
Start: 2021-04-28 | End: 2021-09-29 | Stop reason: ALTCHOICE

## 2021-04-28 RX ORDER — HYDROMORPHONE HYDROCHLORIDE 2 MG/1
2-4 TABLET ORAL EVERY 4 HOURS PRN
Qty: 30 TABLET | Refills: 0 | Status: SHIPPED | OUTPATIENT
Start: 2021-04-28 | End: 2021-05-03

## 2021-04-28 RX ORDER — POLYETHYLENE GLYCOL 3350 17 G/17G
17 POWDER, FOR SOLUTION ORAL DAILY
Qty: 3 PACKET | Refills: 0 | COMMUNITY
Start: 2021-04-28 | End: 2021-05-03 | Stop reason: ALTCHOICE

## 2021-04-28 RX ORDER — ACETAMINOPHEN 325 MG/1
650 TABLET ORAL EVERY 6 HOURS
Qty: 240 TABLET | Refills: 0 | COMMUNITY
Start: 2021-04-28 | End: 2021-05-28

## 2021-04-28 RX ADMIN — METOPROLOL TARTRATE 25 MG: 25 TABLET, FILM COATED ORAL at 08:57

## 2021-04-28 RX ADMIN — FORMOTEROL FUMARATE DIHYDRATE 20 MCG: 20 SOLUTION RESPIRATORY (INHALATION) at 06:56

## 2021-04-28 RX ADMIN — DABIGATRAN ETEXILATE MESYLATE 150 MG: 75 CAPSULE ORAL at 08:57

## 2021-04-28 RX ADMIN — TIOTROPIUM BROMIDE INHALATION SPRAY 2 PUFF: 3.12 SPRAY, METERED RESPIRATORY (INHALATION) at 09:06

## 2021-04-28 RX ADMIN — HYDROCORTISONE AND ACETIC ACID 4 DROP: 20.75; 10.375 SOLUTION AURICULAR (OTIC) at 13:56

## 2021-04-28 RX ADMIN — HYDROMORPHONE HYDROCHLORIDE 4 MG: 2 TABLET ORAL at 06:00

## 2021-04-28 RX ADMIN — ESCITALOPRAM 5 MG: 5 TABLET, FILM COATED ORAL at 08:57

## 2021-04-28 RX ADMIN — HYDRALAZINE HYDROCHLORIDE 25 MG: 25 TABLET, FILM COATED ORAL at 05:59

## 2021-04-28 RX ADMIN — ACETAMINOPHEN 975 MG: 325 TABLET ORAL at 03:00

## 2021-04-28 RX ADMIN — HYDROCORTISONE AND ACETIC ACID 4 DROP: 20.75; 10.375 SOLUTION AURICULAR (OTIC) at 09:03

## 2021-04-28 RX ADMIN — HYDROCHLOROTHIAZIDE 12.5 MG: 12.5 TABLET ORAL at 08:57

## 2021-04-28 RX ADMIN — ATORVASTATIN CALCIUM 40 MG: 40 TABLET, FILM COATED ORAL at 08:58

## 2021-04-28 RX ADMIN — LOSARTAN POTASSIUM 100 MG: 100 TABLET, FILM COATED ORAL at 08:57

## 2021-04-28 RX ADMIN — FAMOTIDINE 20 MG: 20 TABLET ORAL at 08:57

## 2021-04-28 RX ADMIN — METOPROLOL TARTRATE 25 MG: 25 TABLET, FILM COATED ORAL at 13:55

## 2021-04-28 RX ADMIN — ACETAMINOPHEN 975 MG: 325 TABLET ORAL at 15:25

## 2021-04-28 RX ADMIN — HYDRALAZINE HYDROCHLORIDE 25 MG: 25 TABLET, FILM COATED ORAL at 13:55

## 2021-04-28 RX ADMIN — FLUTICASONE PROPIONATE 2 SPRAY: 50 SPRAY, METERED NASAL at 09:03

## 2021-04-28 RX ADMIN — ACETAMINOPHEN 975 MG: 325 TABLET ORAL at 09:01

## 2021-04-28 RX ADMIN — HYDROMORPHONE HYDROCHLORIDE 4 MG: 2 TABLET ORAL at 13:59

## 2021-04-28 RX ADMIN — DOCUSATE SODIUM AND SENNOSIDES 1 TABLET: 50; 8.6 TABLET ORAL at 09:08

## 2021-04-28 RX ADMIN — HYDROMORPHONE HYDROCHLORIDE 4 MG: 2 TABLET ORAL at 01:50

## 2021-04-28 RX ADMIN — POLYETHYLENE GLYCOL 3350 17 G: 17 POWDER, FOR SOLUTION ORAL at 09:08

## 2021-04-28 NOTE — PLAN OF CARE
Problem: Adult Inpatient Plan of Care  Goal: Plan of Care Review  Outcome: Progressing  Flowsheets (Taken 4/28/2021 0442)  Progress: improving  Plan of Care Reviewed With: patient  Outcome Summary: Pt had restful night. Pain managed w/ sched tylenol and PRN 2-4 mg PO dilaudid. Ambulated x1 asisst to bathroom. Bed alarm on, call bell in reach, will monitor.  Goal: Patient-Specific Goal (Individualized)  Outcome: Progressing  Goal: Absence of Hospital-Acquired Illness or Injury  Outcome: Progressing  Goal: Optimal Comfort and Wellbeing  Outcome: Progressing  Goal: Readiness for Transition of Care  Outcome: Progressing     Problem: Fall Injury Risk  Goal: Absence of Fall and Fall-Related Injury  Outcome: Progressing     Problem: Self-Care Deficit  Goal: Improved Ability to Complete Activities of Daily Living  Outcome: Progressing     Problem: Mobility Impairment  Goal: Optimal Mobility  Outcome: Progressing     Problem: Skin Injury Risk Increased  Goal: Skin Health and Integrity  Outcome: Progressing

## 2021-04-28 NOTE — PROGRESS NOTES
TRAUMA SURGERY DAILY PROGRESS NOTE     PATIENT NAME:  Melanie Zelaya        YOB: 1950  AGE:  71 y.o.     GENDER: male  MRN:  [id number]          PATIENT #: [id number]    CHIEF COMPLAINT     Chief Complaint   Patient presents with   • Fall     6 ft fall       PRIMARY CARE PHYSICIAN   Keisha Schultz CRNP    SUBJECTIVE   Ongoing left shoulder pain but able to find comfortable positions.   Otherwise feeling better.   Tolerating diet.     REVIEW OF SYSTEMS   Review of Systems    As above.     VITAL SIGNS   Temperature: Temp (24hrs), Av.4 °C (97.6 °F), Min:36.1 °C (97 °F), Max:36.8 °C (98.2 °F)     BP Max:  Systolic (24hrs), Av , Min:114 , Max:201      BP [place]:  Diastolic (24hrs), Av, Min:58, Max:94    Recent:    Patient Vitals for the past 4 hrs:   BP Temp Temp src Pulse Resp SpO2   21 0648 127/64 -- -- 66 -- --   21 0547 (!) 201/94 36.4 °C (97.5 °F) Oral 79 20 95 %        I/Os:  I/O last 3 completed shifts:  In: 720 [P.O.:720]  Out: -   No intake/output data recorded.     MEDICATIONS     Current Facility-Administered Medications:   •  acetaminophen (TYLENOL) tablet 975 mg, 975 mg, oral, q6h MARIAN, Juan Jose Aldana PA C, 975 mg at 21 0300  •  acetic acid-hydrocortisone (VOSOL-HC) otic solution 4 drop, 4 drop, Right Ear, TID, Janice Spencer PA C, 4 drop at 21 2157  •  albuterol nebulizer solution 2.5 mg, 2.5 mg, nebulization, q6h PRN, Juan Jose Aldana PA C, 2.5 mg at 21 0526  •  atorvastatin (LIPITOR) tablet 40 mg, 40 mg, oral, Daily, Juan Jose Aldana PA C, 40 mg at 21 0820  •  calcium gluconate 1,000 mg in sodium chloride 0.9 % 50 mL IVPB, 1 g, intravenous, PRN, Juan Jose Aldana PA C  •  dabigatran etexilate (PRADAXA) capsule 150 mg, 150 mg, oral, BID, Erlin Bowling MD, 150 mg at 210  •  glucose chewable tablet 16-32 g of dextrose, 16-32 g of dextrose, oral, PRN **OR** dextrose 40 % oral gel 15-30 g of dextrose, 15-30 g of dextrose, oral,  PRN **OR** glucagon (GLUCAGEN) injection 1 mg, 1 mg, intramuscular, PRN **OR** dextrose in water injection 12.5 g, 25 mL, intravenous, PRN, Juan Jose Aldana PA C  •  escitalopram (LEXAPRO) tablet 5 mg, 5 mg, oral, Daily, Juan Jose Aladna PA GA, 5 mg at 04/27/21 0820  •  famotidine (PEPCID) tablet 20 mg, 20 mg, oral, Daily, Juan Jose Aldana PA C, 20 mg at 04/27/21 0820  •  fluticasone propionate (FLONASE) 50 mcg/actuation nasal spray 2 spray, 2 spray, Each Nostril, Daily, Juan Jose Aldana PA C, 2 spray at 04/27/21 0824  •  formoterol (PERFOROMIST) 20 mcg/2 mL nebulizer solution 20 mcg, 20 mcg, nebulization, BID, Juan Jose Aldana PA C, 20 mcg at 04/28/21 0656  •  hydrALAZINE (APRESOLINE) injection 10 mg, 10 mg, intravenous, q2h PRN, Erlin Bowling MD, 10 mg at 04/27/21 0328  •  hydrALAZINE (APRESOLINE) tablet 25 mg, 25 mg, oral, q8h MARIAN, Unruly Apple MD, 25 mg at 04/28/21 0559  •  hydrochlorothiazide (HYDRODIURIL) tablet 12.5 mg, 12.5 mg, oral, Daily, Juan Jose Aldana PA C, 12.5 mg at 04/27/21 0821  •  HYDROmorphone (DILAUDID) tablet 2-4 mg, 2-4 mg, oral, q4h PRN, 4 mg at 04/28/21 0600 **OR** HYDROmorphone (DILAUDID) injection 0.25-0.5 mg, 0.25-0.5 mg, intravenous, q3h PRN, Juan Jose Aldana PA GA, 0.5 mg at 04/24/21 2300  •  losartan (COZAAR) tablet 100 mg, 100 mg, oral, Daily, Juan Jose Aldana PA C, 100 mg at 04/27/21 0820  •  magnesium sulfate IVPB 1g in 100 mL NSS/D5W/SWFI, 1 g, intravenous, PRN **OR** magnesium sulfate IVPB 2g in 50 mL NSS/D5W/SWFI, 2 g, intravenous, PRN, Juan Jose Aldana PA C  •  metoprolol tartrate (LOPRESSOR) tablet 25 mg, 25 mg, oral, QID, Unruly Apple MD, 25 mg at 04/27/21 1415  •  ondansetron ODT (ZOFRAN-ODT) disintegrating tablet 4 mg, 4 mg, oral, q8h PRN **OR** ondansetron (ZOFRAN) injection 4 mg, 4 mg, intravenous, q8h PRN, Juan Jose Aldana PA C  •  polyethylene glycol (MIRALAX) 17 gram packet 17 g, 17 g, oral, Daily, Juan Jose Aldana PA C, 17 g at 04/27/21 0824  •  potassium chloride (KLOR-CON) tablet extended release  20 mEq, 20 mEq, oral, PRN, 20 mEq at 04/25/21 1825 **OR** potassium chloride (KLOR-CON) tablet extended release 40 mEq, 40 mEq, oral, PRN **OR** potassium chloride 20 mEq in 100 mL IVPB  (premix), 20 mEq, intravenous, PRN **OR** potassium chloride (KCL) 40 mEq/250 mL IVPB in NSS 40 mEq, 40 mEq, intravenous, PRN, Juan Jose Aldana PA C  •  sennosides-docusate sodium (SENOKOT-S) 8.6-50 mg per tablet 1 tablet, 1 tablet, oral, BID, Juan Jose Aldana PA C, 1 tablet at 04/27/21 2141  •  tiotropium bromide (SPIRIVA RESPIMAT) 2.5 mcg/actuation inhaler 2 puff, 2 puff, inhalation, Daily, Juan Jose Aldana PA C, 2 puff at 04/27/21 0828    MEDICATIONS:  Infusions:         Scheduled:   • acetaminophen  975 mg oral q6h MARIAN   • acetic acid-hydrocortisone  4 drop Right Ear TID   • atorvastatin  40 mg oral Daily   • dabigatran etexilate  150 mg oral BID   • escitalopram  5 mg oral Daily   • famotidine  20 mg oral Daily   • fluticasone propionate  2 spray Each Nostril Daily   • formoterol  20 mcg nebulization BID   • hydrALAZINE  25 mg oral q8h MARIAN   • hydrochlorothiazide  12.5 mg oral Daily   • losartan  100 mg oral Daily   • metoprolol tartrate  25 mg oral QID   • polyethylene glycol  17 g oral Daily   • sennosides-docusate sodium  1 tablet oral BID   • tiotropium bromide  2 puff inhalation Daily     PRN:   albuterol, 2.5 mg, q6h PRN  calcium gluconate, 1 g, PRN  glucose, 16-32 g of dextrose, PRN   Or  dextrose, 15-30 g of dextrose, PRN   Or  glucagon, 1 mg, PRN   Or  dextrose in water, 25 mL, PRN  hydrALAZINE, 10 mg, q2h PRN  HYDROmorphone, 2-4 mg, q4h PRN   Or  HYDROmorphone, 0.25-0.5 mg, q3h PRN  magnesium sulfate, 1 g, PRN   Or  magnesium sulfate, 2 g, PRN  ondansetron ODT, 4 mg, q8h PRN   Or  ondansetron, 4 mg, q8h PRN  potassium chloride, 20 mEq, PRN   Or  potassium chloride, 40 mEq, PRN   Or  potassium chloride, 20 mEq, PRN   Or  potassium chloride, 40 mEq, PRN         PHYSICAL EXAM    Physical Exam    NAD: AAO x 3  GCS  15  Irreg  CTAB/L  Soft  No real tenderness on palpation of left shoulder.   IMPRESSION/PLAN   71 y.o. y/o male s/p ladder.   1. Fall from ladder.   2. C1/C2 fracture - neurosurgery input appreciated. Non-operative mgmt.   3. Scapula fracture - right. Sling. Non-operative mgmt. RUE NVI.   4. Left shoulder pain on ROM - will check plain film to ensure no fracture. If not, he may need outpatient follow-up with orthopedic surgery.   5. Continue regular diet - tolerating.   6. Atrial fibrillation - on Pradaxa for CVA prevention.   7. PT/OT  8. Disposition  9. No separate VTE prophylaxis given Pradaxa use for atrial fibrillation.   10. Laboratory evaluation reviewed to time of note.       AUTHOR:  Pillo Ortiz MD  Trauma Service pager #[phone], [phone]  4/28/2021  8:44 AM

## 2021-04-28 NOTE — PROGRESS NOTES
1439- Confirmed with pt discharge plan.  Pt is home with [facility].  IMM reviewed with pt, pt verbalized understanding and signed.  Elyssa Mckeon RN

## 2021-04-28 NOTE — DISCHARGE INSTRUCTIONS
Trauma  Discharge Instructions      Patient Active Problem List   Diagnosis   • Anxiety   • Chronic diastolic heart failure (CMS/HCC)   • Depression   • Hearing loss   • Hyperlipidemia   • Hypertension   • Primary malignant neoplasm of left lower lobe of lung (CMS/HCC)   • History of colon polyps   • Multiple pulmonary nodules   • Other emphysema (CMS/HCC)   • Gastroesophageal reflux disease without esophagitis   • Asthma   • Cervical spine fracture (CMS/HCC)   • Fracture of right scapula   • Hypertension   • Fall         YOU WERE GIVEN A PRESCRIPTION FOR:      ___ X-ray for Cervical Spine -AP, Lateral, Flexion and Extension      **Please have the above tests done before follow-up visit**      ACTIVITY:     Avoid strenuous activity, no heavy lifting and no straining.                Sling for comfort to right arm.     Do not drive or operate heavy machinery until cleared by physician.     You may have a mild headache, mild depression or trouble concentrating or dizziness.               If symptoms get worse or last more than 2 weeks call physician.     Walk as much as possible. Your goal should be 150 feet 3 times a day.     You have an [place], wear at all times.     Use incentive spirometer as directed 10 times every hour while awake.     DO NOT DRINK ALCOHOL!      DO NOT SMOKE! Smoking delays healing and increases the risk of complications.       MEDICATIONS:    If you were given a prescription for pain medication, take your pain medicine as prescribed with food if possible. You can expect to have pain during the healing process and it will improve.     DO NOT TAKE PAIN OR SEDATIVE MEDICATIONS NOT PRESCRIBED AT YOUR DISCHARGE.    DO NOT DRIVE WHILE TAKING NARCOTIC PAIN MEDICATION                                **See electronic medication reconciliation form**                 **Please be aware, the TRAUMA team does NOT refill prescriptions without an in-person follow up visit**                 **Routine prescriptions  should be resume and refilled by your Primary Care Team, please make the your primary pharmacy aware**      You may take Motrin, Advil, Ibuprofen every 4-6 hours if you DO NOT have a head bleed. If you DO have a head bleed - you will need to be cleared by Neurosurgery prior using any NSAID medication.    Tylenol every 4 - 6 hours as needed for pain. DO NOT EXCEED 4000 MG OR 4 GRAMS OF TYLENOL IN 1 DAY.     Some patients find that they have some difficulty with constipation. This is due to  a combination of things but mostly due to the pain medication you are taking. The pain medications, along with a decrease in activity, can sometimes lead to discomfort from constipation. You should eat plenty of fresh fruits, vegetables, drink fruit juices and drink plenty of water.    You may take Colace 1 tablet 2 times a day as need for constipation. DO NOT TAKE IF DIARRHEA DEVELOPS.    You may take Senokot 1 tablet 2 times a day as needed for constipation. DO NOT TAKE IF DIARRHEA DEVELOPS.        FOLLOW-UP:    Call Nick Saavedra MD (Trauma) at [phone] (office number) to make an appointment in 2 weeks or if you have primary care only as needed.    Call Elgin Morejon MD (Neurosurgery) at [phone] to make an appointment in 6 weeks. Inquire regarding X-ray orders.    Call Alejandro Butler MD (Orthopedics) at [phone] for follow up appointment as needed.    [facility]: [phone], please call with any questions.  PT and Nurse visit.     Call your family doctor in 1-2 weeks for general follow up of your injuries and admission to the hospital.    Call your primary cardiologist for outpatient follow up as previously scheduled.    Call [phone] to schedule any future radiology studies.      Incidental Findings:       The following will require follow up with your Primary Care Physician (if applicable)      Call your doctor or return to the Emergency Room of you develop any of the following:     Persistent  nausea and vomiting   Increasing headache pain   Chest Pain   Shortness of Breath   Drainage from incision or wound   Increased redness around incision or wound   Foul odor from incision or wound   Fever above 101 degrees or chills   Difficulty or inability to urinate   Intense pain not relieved with pain medication   Change in mental status    Difficulty or decreased ability performing activities of daily living

## 2021-04-28 NOTE — PLAN OF CARE
Problem: Adult Inpatient Plan of Care  Goal: Plan of Care Review  Outcome: Progressing  Flowsheets  Taken 4/28/2021 1505 by Kadi Alvares, RN  Progress: improving  Outcome Summary: Patient resting comfortably. Minimal discomfort with aspen collar but tolerable. Sling in place. DC to home this afternoon  Taken 4/28/2021 0442 by Kamila Nguyen, RN  Plan of Care Reviewed With: patient

## 2021-04-28 NOTE — PATIENT CARE CONFERENCE
Care Progression Rounds Note  Date: 4/28/2021  Time: 10:24 AM     Patient Name: Melanie Zelaya     Medical Record Number: [id number]   YOB: 1950  Sex: Male      Room/Bed: 4021    Admitting Diagnosis: Essential hypertension [I10]  Elevated blood pressure reading [R03.0]  Hypertensive urgency [I16.0]  Closed head injury, initial encounter [S09.90XA]  Fall, initial encounter [W19.XXXA]  Other nondisplaced fracture of second cervical vertebra, initial encounter for closed fracture (CMS/HCC) [S12.191A]  Closed fracture of right scapula, unspecified part of scapula, initial encounter [S42.101A]  Other closed nondisplaced fracture of first cervical vertebra with nonunion, subsequent encounter [S12.091K]   Admit Date/Time: 4/23/2021  4:49 PM    Primary Diagnosis: Fall  Principal Problem: Fall    GMLOS: 3  Anticipated Discharge Date: 4/28/2021    AM-PAC:  Mobility Score: 18    Discharge Planning:  Current Living Arrangements: home/apartment/condo  Anticipated Discharge Disposition: home without services    Barriers to Discharge:  Barriers to Discharge: None    Participants:  nursing, social work/services,

## 2021-04-28 NOTE — DISCHARGE SUMMARY
[facility] DISCHARGE SUMMARY     PATIENT NAME:  Melanie Zelaya YOB: 1950    AGE:  71 y.o.  GENDER: male   MRN:  [id number]  PATIENT #: [id number]     Admission date: 4/23/2021    Discharge date: 4/28/2021     Admitting Physician: Erlin Bowling MD     Discharge Providers: SANTOS Riley; Pillo Ortiz MD     Admitting Diagnoses:   1. Fall, initial encounter    2. Closed head injury, initial encounter    3. Elevated blood pressure reading    4. Hypertensive urgency    5. Other closed nondisplaced fracture of first cervical vertebra with nonunion, subsequent encounter    6. Other nondisplaced fracture of second cervical vertebra, initial encounter for closed fracture (CMS/HCC)    7. Closed fracture of right scapula, unspecified part of scapula, initial encounter    8. Essential hypertension    9. Chronic atrial fibrillation (CMS/HCC)        Discharge Diagnoses:    Patient Active Problem List   Diagnosis   • Anxiety   • Chronic diastolic heart failure (CMS/HCC)   • Depression   • Hearing loss   • Hyperlipidemia   • Hypertension   • Primary malignant neoplasm of left lower lobe of lung (CMS/HCC)   • History of colon polyps   • Multiple pulmonary nodules   • Other emphysema (CMS/HCC)   • Gastroesophageal reflux disease without esophagitis   • Asthma   • Cervical spine fracture (CMS/HCC)   • Fracture of right scapula   • Hypertension   • Fall       Procedures:    * Surgery not found *    Allergies:  No Known Allergies    Hospital Course: 71 y.o. male who was brought to [facility]'s ED on 4/23/2021 s/p fall from ladder with a positive loss of consciousness. Workup revealed fractures of anterior, posterior arch of C1, base of the dens of C2, left occipital condyle as well as a right shoulder continues    Neurosurgery was consulted and recommended non-operative treatment. Aspen collar at all times and follow up X-rays in 6 weeks.    Orthopedics was consulted and recommended slight for  comfort to the right shoulder. Left should was evaluated and no definitive fracture identified.       PT/OT/PMR was consulted and recommended home health.      The patient was discharged on 4/28/2021 to home health in stable condition.     Trauma CPGs followed as per Protocol:    Emergent reversal of OAC in life-threatening bleeds:  N/A     Emergent reversal of antiplatelets in life-threatening bleeds:  N/A     Appropriate Antibiotics for Open fractures: N/A     C-Spine Clearance:  No- [place] at all times    VTE Prophylaxis:  YES.  If NO, why? Patients Pradaxa restarted    Antibiotic Use in Abdominal Trauma:  N/A     Splenic Vaccines given:  N/A     Discharge Medications:     Medication List      START taking these medications    acetaminophen 325 mg tablet  Commonly known as: TYLENOL  Take 2 tablets (650 mg total) by mouth every 6 (six) hours.  Dose: 650 mg     HYDROmorphone 2 mg tablet  Commonly known as: DILAUDID  Take 1-2 tablets (2-4 mg total) by mouth every 4 (four) hours as needed for moderate pain or severe pain for up to 5 days.  Dose: 2-4 mg     ondansetron ODT 4 mg disintegrating tablet  Commonly known as: ZOFRAN-ODT  Take 1 tablet (4 mg total) by mouth every 8 (eight) hours as needed for nausea or vomiting (Nausea/Vomiting) for up to 7 days.  Dose: 4 mg     polyethylene glycol 17 gram packet  Commonly known as: MIRALAX  Take 17 g by mouth daily for 3 days.  Dose: 17 g     sennosides-docusate sodium 8.6-50 mg  Commonly known as: SENOKOT-S  Take 1 tablet by mouth 2 (two) times a day.  Dose: 1 tablet        CONTINUE taking these medications    albuterol HFA 90 mcg/actuation inhaler  Commonly known as: PROAIR HFA  Inhale 2 puffs 2 (two) times a day as needed for wheezing or shortness of breath.  Dose: 2 puff     ALPRAZolam 0.5 mg tablet  Commonly known as: XANAX  Take 1 tablet (0.5 mg total) by mouth 2 (two) times a day as needed for anxiety.  Dose: 0.5 mg     ANORO ELLIPTA 62.5-25 mcg/actuation blister with  device  Inhale 1 puff daily.  Dose: 1 puff  Generic drug: umeclidinium-vilanteroL     atorvastatin 40 mg tablet  Commonly known as: LIPITOR  Take 1 tablet by mouth once daily     dabigatran etexilate 150 mg capsu  Commonly known as: PRADAXA  Take 150 mg by mouth 2 (two) times a day.  Dose: 150 mg     escitalopram 5 mg tablet  Commonly known as: LEXAPRO  Take 1 tablet (5 mg total) by mouth once daily.  Dose: 5 mg     FISH OIL ORAL  Take 4 capsules by mouth daily. OTC suppliement  Dose: 4 capsule     fluticasone propionate 50 mcg/actuation nasal spray  Commonly known as: FLONASE  Administer 2 sprays into each nostril daily.  Dose: 2 spray     hydrochlorothiazide 12.5 mg tablet  Commonly known as: HYDRODIURIL  Take 12.5 mg by mouth daily.  Dose: 12.5 mg     HYDROCORTISONE-ACETIC ACID OTIC  Administer into affected ear(s).     irbesartan 300 mg tablet  Commonly known as: AVAPRO  Take 300 mg by mouth nightly.  Dose: 300 mg     loratadine 10 mg tablet  Commonly known as: CLARITIN  Take 1 tablet (10 mg total) by mouth daily.  Dose: 10 mg     metoprolol tartrate 25 mg tablet  Commonly known as: LOPRESSOR  Take 25 mg by mouth 2 (two) times a day.  Dose: 25 mg     multivitamin tablet  Commonly known as: THERAGRAN  Take 1 tablet by mouth daily.  Dose: 1 tablet     TAGAMET  mg tablet  Take 200 mg by mouth as needed.  Dose: 200 mg  Generic drug: cimetidine             Home Medications:  •  atorvastatin, Take 1 tablet by mouth once daily  •  dabigatran etexilate, Take 150 mg by mouth 2 (two) times a day.  •  DOCOSAHEXANOIC ACID/EPA (FISH OIL ORAL), Take 4 capsules by mouth daily. OTC suppliement   •  escitalopram, Take 1 tablet (5 mg total) by mouth once daily.  •  hydrochlorothiazide, Take 12.5 mg by mouth daily.  •  HYDROCORTISONE-ACETIC ACID OTIC, Administer into affected ear(s).  •  irbesartan, Take 300 mg by mouth nightly.  •  metoprolol tartrate, Take 25 mg by mouth 2 (two) times a day.  •  multivitamin, Take 1 tablet  by mouth daily.  •  albuterol HFA, Inhale 2 puffs 2 (two) times a day as needed for wheezing or shortness of breath. (Patient taking differently: Inhale 2 puffs as needed for wheezing or shortness of breath.  )  •  ALPRAZolam, Take 1 tablet (0.5 mg total) by mouth 2 (two) times a day as needed for anxiety.  •  cimetidine, Take 200 mg by mouth as needed.  •  fluticasone propionate, Administer 2 sprays into each nostril daily.  •  loratadine, Take 1 tablet (10 mg total) by mouth daily. (Patient taking differently: Take 10 mg by mouth daily as needed.  )  •  umeclidinium-vilanteroL, Inhale 1 puff daily.    Imaging:  X-RAY SACRUM AND COCCYX    Result Date: 4/23/2021  CLINICAL HISTORY: Status post fall. COMPARISON:  None available TECHNIQUE:  AP and left lateral decubitus radiograph of the sacrum and coccyx were obtained.  AP radiograph the pelvis as well as AP and lateral radiographs of the left hip joint was obtained. FINDINGS:  No obvious acute fracture in the sacrum or coccyx..  Sacral foramina are symmetric.  Degenerative changes are noted in the lumbosacral spine. Bilateral pars defects are noted at L5-S1. No obvious pelvic bone fracture.  Bilateral hip joints are intact with degenerative joint space narrowing and sclerosis.  No soft tissue abnormality.     IMPRESSION: 1.  No acute sacrococcygeal or pelvic bone abnormality. 2.  No hip dislocation.    X-RAY SHOULDER LEFT 1 VIEW    Result Date: 4/28/2021  CLINICAL HISTORY: Trauma PRIOR STUDY:  None relevant TECHNIQUE:  AP image of the left shoulder COMMENT:  No definite fracture or dislocation.  Mild degenerative changes noted. There is poor visualization of the left lung base.     IMPRESSION: 1.  No definite fracture noted. 2.  Poor visualization of the left lung base which may be secondary to overlying soft tissues but precautionary chest x-ray suggested.    X-RAY SHOULDER RIGHT 2+ VIEWS    Result Date: 4/23/2021  CLINICAL HISTORY:   Status post fall. COMMENT: AP,  lateral, and Y view of the right shoulder was obtained. COMPARISON: None available FINDINGS: No acute fracture or dislocation.  Right glenohumeral and acromioclavicular articulations appear normal.  No degenerative or destructive changes.  No soft tissue calcification is noted.     IMPRESSION: No acute right shoulder fracture or dislocation.    CT HEAD WITHOUT IV CONTRAST    Result Date: 4/23/2021  CLINICAL HISTORY: Status post fall STUDY: CT examination of the head was performed without the administration of intravenous contrast. Sagittal and coronal reformations  were rendered from axial source images, and all images were reviewed in bone and soft tissue windows. CT DOSE:  One or more dose reduction techniques (e.g. automated exposure control, adjustment of the mA and/or kV according to patient size, use of iterative reconstruction technique) utilized for this examination. COMPARISON: No prior studies are available for comparison. COMMENT: Brain parenchyma demonstrates maintenance of gray-white matter differentiation.  No acute intracranial parenchymal hemorrhage, mass effect, midline shift, or extra-axial fluid collection.  No large vascular territorial infarct.  Ventricles, basal cisterns and cortical sulci are prominent, consistent with involutional changes.  Hypodensity in the periventricular and subcortical white matter, consistent with microangiopathic changes. Visualized paranasal sinuses and mastoid air cells are clear bilaterally. The calvarium is unremarkable.  Partially visualized fractures of the anterior and posterior arches of C1 and the base of the dens.     IMPRESSION: 1.  No acute intracranial hemorrhage, large vascular territorial infarct, or mass effect. 2.  Partially visualized presumed acute fractures of the anterior and posterior arches of C1 and base of dens. Finding:    New cervical spine fracture   Acuity: Critical  Status:  CLOSED Critical read back was performed and results were read  back by YESENIA Ruiz in the emergency department on 4/23/2021.    CT FACIAL BONES WITHOUT IV CONTRAST    Result Date: 4/23/2021  CLINICAL HISTORY: Status post fall from ladder STUDY: Noncontrast CT examination of the facial bones performed by obtaining direct axial images and generating coronal reformations. Images reviewed in bone and soft tissue windows. CT DOSE:  One or more dose reduction techniques (e.g. automated exposure control, adjustment of the mA and/or kV according to patient size, use of iterative reconstruction technique) utilized for this examination. COMPARISON: None. COMMENT: Frontal bones: Supraorbital soft tissues: Normal without swelling, laceration or foreign body. Sinuses: Mild mucosal thickening in the left frontal sinus. Orbits: Preseptal soft tissues: Normal without swelling, laceration or foreign body. Walls: Intact without evidence of fracture. Globes: Normal without proptosis or evidence of disruption or intraocular foreign body. Retrobulbar fat: Normal without mass or hematoma. Extraocular muscles: Normal and symmetric without prolapse or evidence of entrapment. Optic nerves: Normal without mass-effect or evidence of disruption. Ethmoid sinuses: Lamina papyracea:  Intact bilaterally. Air cells: Mild mucosal thickening is noted anteriorly/superiorly. Nose: Soft tissues: Normal. Nasal bones: Intact without fracture or displacement bilaterally. Nasal process of maxilla: Intact bilaterally. Nasal Septum: Nasal septum is intact but deviated to the left Maxillary bones: Premalar soft tissues:  Normal without swelling, laceration or foreign body. Sinuses: Left greater than right mild mucosal thickening. Alveolus: Intact bilaterally without fracture or avulsed teeth. Zygoma bones: Intact bilaterally. Pterygoid plates: Intact bilaterally. Visualized mandible: Intact bilaterally without fracture, dislocation, or avulsed teeth. Visualized brain: Parenchyma: Normal CT appearance. Ventricles,  cisterns, and sulci: Prominent, consistent with involutional changes. Partially visualized cervical spine: There is prevertebral and right paravertebral edema with an underlying acute fracture through the anterior and posterior arches of C1.  Additional acute fracture is noted through the base of the dens.  The fracture line does not appear to extend into the body of the dens.     IMPRESSION: 1.  No acute facial bone fracture. 2.  Acute fracture involving the anterior and posterior arches of C1. Acute fracture through the base of the dens of the C2 vertebra.  There is edema in the central and right paracentral prevertebral soft tissues. Finding:    New cervical spine fracture   Acuity: Critical  Status:  CLOSED Critical read back was performed and results were read back by YESENIA Ruiz in the emergency department on 4/23/2021.     CT CHEST WITH IV CONTRAST    Result Date: 4/23/2021  CLINICAL HISTORY: Blunt abdominal trauma. COMMENT: Technique: CT examination of the chest, abdomen, and pelvis was performed utilizing contiguous transaxial sections. Images were acquired following the administration of 130 cc Omnipaque 350 intravenous contrast.  Oral contrast was not administered. Coronal and sagittal reformations are obtained. CT DOSE:  One or more dose reduction techniques (e.g. automated exposure control, adjustment of the mA and/or kV according to patient size, use of iterative reconstruction technique) utilized for this examination Comparison studies: None. Heart and pericardium:The heart is normal in size.  There is no pericardial effusion.  Atherosclerotic vascular calcifications are noted. Great vessels:There is no evidence for thoracic aortic aneurysm.  The main pulmonary artery is normal in caliber.  No large or central filling defects are identified. Mediastinum:No mediastinal adenopathy Riya:No hilar adenopathy. Axilla:No axillary adenopathy. Lung, airway, and pleura:Biapical pleural parenchymal  scarring.  Trace paraseptal emphysematous changes.  No consolidation.  Trachea and bronchi are patent.  Linear scarring results atelectasis in the left lung base.  Mild bullous emphysematous changes are noted in the left lower lobe.  There is mild pleural parenchymal scarring in the left lung with volume loss compared to the right lung. Chest wall: Within normal limits. Liver: The liver is normal in size.  There is no focal mass.  Hepatic veins and portal veins are patent without focal filling defects to suggest thrombosis.. Gallbladder:  No gallstones.  No gallbladder wall thickening.. Spleen: Spleen is normal in size without focal mass lesion.. Pancreas: Cystic lesions are noted in the pancreatic head measuring 2.3 x 1.7 cm (images 199 and 200) and measuring 1.6 x 1.0 cm (image 205).  These may represent sidebranch IPMN.  No pancreatic ductal dilatation. Adrenals: The adrenals are normal bilaterally without focal mass.. Kidneys, ureters and bladder: No hydronephrosis.  No renal calculi.  No evidence for focal mass lesion.. Hypodensity is noted in the right upper pole which may represent a small cyst.  Bladder is fluid-filled. Retroperitoneal structures: There is no abdominal aortic aneurysm.  There is no retroperitoneal adenopathy or retroperitoneal mass.. Atherosclerotic vascular calcifications are noted. Bowel and mesentery: Esophagus is within normal limits.  Stomach, small bowel loops and colon are normal in appearance.  Scattered few colonic diverticula. No acute diverticulitis.  Terminal ileum is normal.  Appendix is normal.  There is mild stranding in the mesentery with a few scattered mesenteric lymph nodes. This could be secondary to mesenteric panniculitis.. Lymph nodes: No pathologic lymphadenopathy.. Pelvis: No pelvic mass. Bones: There are linear lucencies noted through the right scapula without significant displacement that could represent nondisplaced fracture deformities. Multilevel degenerative  changes of thoracolumbar spine with anterior bridging osteophytes, which can be associated with diffuse idiopathic skeletal hyperostosis.  No pars defects are noted in the bilateral L5-S1 region. Subcutaneous tissues: Bilateral fat-containing inguinal hernias..     IMPRESSION: 1.  Linear irregularities in the right scapula, suspicious for nondisplaced right scapular fracture. 2.  Otherwise, no acute posttraumatic abnormalities in the chest, abdomen, or pelvis. 3.  Please see the body of the report for additional chronic findings Finding: Other Acuity: Critical  Status:  CLOSED Critical read back was performed and results were read back by YESENIA Ruiz in the emergency department on 4/23/2021.    CT CERVICAL SPINE WITHOUT IV CONTRAST    Result Date: 4/23/2021  CLINICAL HISTORY: Status post fall from ladder COMPARISON: None. STUDY: Noncontrast CT examination of the cervical spine performed following the standard protocol. Sagittal and coronal reformations rendered from axial source images. Images reviewed in bone and soft tissue windows. CT DOSE:  One or more dose reduction techniques (e.g. automated exposure control, adjustment of the mA and/or kV according to patient size, use of iterative reconstruction technique) utilized for this examination. COMMENT: Cervicothoracic alignment: Anatomic. Prevertebral soft tissues: There is prevertebral soft tissue thickening at the C1-C2 articulation with right greater than left edema. Vertebral bodies: Acute fractures involving the anterior and posterior arches of C1.  Acute fracture involving the base of the dens with displaced osseous fragments located adjacent to the bilateral there is osseous fusion of the C2 and C3 vertebra and its posterior elements.  There is a linear lucency along the left occipital condyle likely representing a nondisplaced fracture deformity. Remaining vertebral bodies are intact with degenerative changes.  Lateral mass of C1 and C2 vertebra.  Intervertebral discs: Severe intervertebral disc space narrowing with partial fusion at C5-C6. Cervical and upper thoracic spinal canal: Patent the level of C5.  Below which, evaluation for Axial images: Skull base: See above C1-2: See above C2-3: Fusion of the C2-C3 disc space with left greater than right uncovertebral and facet hypertrophy.  Moderate left foraminal narrowing. C3-4: Posterior disc osteophyte complex with uncovertebral and facet hypertrophy.  Severe left and moderate right foraminal narrowing.  Moderate central canal narrowing. C4-5: Posterior disc osteophyte complex with uncovertebral and facet hypertrophy.  Severe left and moderate right foraminal narrowing.  Moderate central canal narrowing.. C5-6: Fusion of the C5-C6 disc space with uncovertebral and facet hypertrophy. Moderate left  greater right foraminal narrowing. Moderate central canal narrowing. C6-7: Posterior disc osteophyte complex with uncovertebral and facet hypertrophy.  Severe left and moderate right foraminal narrowing.  Moderate central canal narrowing.. C7-T1: Posterior disc osteophyte complex with uncovertebral and facet hypertrophy.  Severe left and moderate right foraminal narrowing.  Moderate central canal narrowing. Extra vertebral soft tissues: Biapical pleural scarring.     IMPRESSION: 1.  Acute fractures of the anterior and posterior arches of C1 vertebra. Acute fracture through the base of the dens at the C2 vertebra.  Fractured bone fragments are noted adjacent to the bilateral lateral masses of C1 and C2 vertebra. 2.  Linear lucency through the left occipital condyle, suspicious for an avulsion fracture. 3.  Prevertebral soft tissue edema with extension of the right paravertebral region at C1-C2. 4.  Multilevel cervical spondylosis. Finding:    New cervical spine fracture   Acuity: Critical  Status:  CLOSED Critical read back was performed and results were read back by YESENIA Ruiz in the emergency department on  4/23/2021.    MRI ANGIOGRAM NECK WITHOUT CONTRAST    Result Date: 4/24/2021  MR angiography neck without contrast CLINICAL HISTORY: C-spine fracture. COMMENT: Technique: An MRA of the neck was performed utilizing a 2-D and 3D time-of-flight technique.  Axial T1 fat sat images of the neck for dissection protocol were also obtained. No prior studies are available for comparison.  Cervical spine CT from previous day. Findings: MR angiography of the neck demonstrates patency of the common carotid arteries, internal carotid arteries bilaterally as well as the left vertebral artery.  No evidence for vessel dissection, cutoff, hemodynamically significant stenoses by NASCET criteria.  Although no definite intramural hematoma identified, difficult to evaluation of the right vertebral artery due to its marked attenuation and diminished caliber, felt to be throughout the entire length of the vertebral artery although there is increased T1 signal on the fat sat images in the V3 and V4 segment of the right vertebral artery, again this is likely due to the chronic appearing attenuation in the right vertebral artery.     IMPRESSION: The right vertebral artery is markedly attenuated and diminutive throughout its entire length, felt to be chronic or congenital in nature.  No definite intramural hematoma to suggest acute dissection.  Carotid arteries are patent.    MRI CERVICAL SPINE WITHOUT CONTRAST    Result Date: 4/24/2021  Cervical spine MRI CLINICAL HISTORY: Trauma. COMPARISON: 4/23/2021. TECHNIQUE: Sagittal T1, T2, STIR, axial 2D MEDIC, T2, coronal STIR, sagittal T1 fat sat images of the cervical spine acquired. COMMENT: Fusion of C2 and C3 as well as C5 and C6.  Fracture deformity is noted through C2 as well as arch of C1.  There is a large amount of prevertebral soft tissue fluid from C2 to C5, consistent with anterior longitudinal ligament injury.  No cord signal abnormality.  No mark anthony cord compression.  Disc osteophyte  complex at C4-C5 with severe facet arthrosis, and uncovertebral hypertrophic changes, contributing to severe left and mild right foraminal narrowing.  Disc osteophyte complex at C6-C7 with severe left facet arthrosis contributing to moderate foraminal narrowing.  Disc osteophyte complex at C3-C4 with facet arthrosis bilaterally, contributing to moderate left foraminal narrowing.  No epidural collection or hematoma.     IMPRESSION: C1 and C2 fractures with findings concerning for anterior longitudinal ligament rupture.  Degenerative spondylosis at C4-C5.    CT ABDOMEN PELVIS WITH IV CONTRAST    Result Date: 4/23/2021  CLINICAL HISTORY: Blunt abdominal trauma. COMMENT: Technique: CT examination of the chest, abdomen, and pelvis was performed utilizing contiguous transaxial sections. Images were acquired following the administration of 130 cc Omnipaque 350 intravenous contrast.  Oral contrast was not administered. Coronal and sagittal reformations are obtained. CT DOSE:  One or more dose reduction techniques (e.g. automated exposure control, adjustment of the mA and/or kV according to patient size, use of iterative reconstruction technique) utilized for this examination Comparison studies: None. Heart and pericardium:The heart is normal in size.  There is no pericardial effusion.  Atherosclerotic vascular calcifications are noted. Great vessels:There is no evidence for thoracic aortic aneurysm.  The main pulmonary artery is normal in caliber.  No large or central filling defects are identified. Mediastinum:No mediastinal adenopathy Irya:No hilar adenopathy. Axilla:No axillary adenopathy. Lung, airway, and pleura:Biapical pleural parenchymal scarring.  Trace paraseptal emphysematous changes.  No consolidation.  Trachea and bronchi are patent.  Linear scarring results atelectasis in the left lung base.  Mild bullous emphysematous changes are noted in the left lower lobe.  There is mild pleural parenchymal scarring in the  left lung with volume loss compared to the right lung. Chest wall: Within normal limits. Liver: The liver is normal in size.  There is no focal mass.  Hepatic veins and portal veins are patent without focal filling defects to suggest thrombosis.. Gallbladder:  No gallstones.  No gallbladder wall thickening.. Spleen: Spleen is normal in size without focal mass lesion.. Pancreas: Cystic lesions are noted in the pancreatic head measuring 2.3 x 1.7 cm (images 199 and 200) and measuring 1.6 x 1.0 cm (image 205).  These may represent sidebranch IPMN.  No pancreatic ductal dilatation. Adrenals: The adrenals are normal bilaterally without focal mass.. Kidneys, ureters and bladder: No hydronephrosis.  No renal calculi.  No evidence for focal mass lesion.. Hypodensity is noted in the right upper pole which may represent a small cyst.  Bladder is fluid-filled. Retroperitoneal structures: There is no abdominal aortic aneurysm.  There is no retroperitoneal adenopathy or retroperitoneal mass.. Atherosclerotic vascular calcifications are noted. Bowel and mesentery: Esophagus is within normal limits.  Stomach, small bowel loops and colon are normal in appearance.  Scattered few colonic diverticula. No acute diverticulitis.  Terminal ileum is normal.  Appendix is normal.  There is mild stranding in the mesentery with a few scattered mesenteric lymph nodes. This could be secondary to mesenteric panniculitis.. Lymph nodes: No pathologic lymphadenopathy.. Pelvis: No pelvic mass. Bones: There are linear lucencies noted through the right scapula without significant displacement that could represent nondisplaced fracture deformities. Multilevel degenerative changes of thoracolumbar spine with anterior bridging osteophytes, which can be associated with diffuse idiopathic skeletal hyperostosis.  No pars defects are noted in the bilateral L5-S1 region. Subcutaneous tissues: Bilateral fat-containing inguinal hernias..     IMPRESSION: 1.   Linear irregularities in the right scapula, suspicious for nondisplaced right scapular fracture. 2.  Otherwise, no acute posttraumatic abnormalities in the chest, abdomen, or pelvis. 3.  Please see the body of the report for additional chronic findings Finding: Other Acuity: Critical  Status:  CLOSED Critical read back was performed and results were read back by YESENIA Ruiz in the emergency department on 4/23/2021.    X-RAY CHEST 1 VIEW    Result Date: 4/28/2021  CLINICAL HISTORY: Poor visualization of the left lung base   . COMMENT: Comparison: Multiple prior studies, the most recent being the CT 4/23/2021. Frontal view chest x-ray was obtained. Lungs/Pleura: There is stable chronic volume loss within the left hemithorax in keeping with the patient's history of prior left lower lobectomy.  There is stable mild chronic left basilar pleural thickening.  No focal consolidation is identified.  No pulmonary edema or significant pleural effusion is identified. There is no pneumothorax. Cardiomediastinal and hilar silhouettes: Grossly stable Bones: No acute abnormality seen.  Recently suggested right scapular fracture is not well-demonstrated on this exam.     IMPRESSION: No acute pulmonary disease seen.    Transthoracic echo (TTE) complete    Result Date: 4/26/2021  1.  Good quality study. 2.  Normal LV size, with mild LVH, with normal LV function with ejection fraction of 60 to 65% 3.  No regional wall motion abnormalities identified . 4.  Mild to moderate tricuspid regurgitation with mild estimated pulmonary hypertension. 5.  Moderate biatrial enlargement. 6.  Focally thickened mitral valve with adequate excursion mild to moderate mitral regurgutation. 7.  Small, inferolateral pericardial effusion.    X-RAY HIP WITH OR WITHOUT PELVIS 2-3 VW LEFT    Result Date: 4/23/2021  CLINICAL HISTORY: Status post fall. COMPARISON:  None available TECHNIQUE:  AP and left lateral decubitus radiograph of the sacrum and coccyx  were obtained.  AP radiograph the pelvis as well as AP and lateral radiographs of the left hip joint was obtained. FINDINGS:  No obvious acute fracture in the sacrum or coccyx..  Sacral foramina are symmetric.  Degenerative changes are noted in the lumbosacral spine. Bilateral pars defects are noted at L5-S1. No obvious pelvic bone fracture.  Bilateral hip joints are intact with degenerative joint space narrowing and sclerosis.  No soft tissue abnormality.     IMPRESSION: 1.  No acute sacrococcygeal or pelvic bone abnormality. 2.  No hip dislocation.      Follow-Up Appointments:    Call Nick Saavedra MD (Trauma) at [phone] (office number) to make an appointment in 2 weeks or if you have primary care only as needed.     Call Elgin Morejon MD (Neurosurgery) at [phone] to make an appointment in 6 weeks. Inquire regarding X-ray orders.     Call Alejandro Butler MD (Orthopedics) at [phone] for follow up appointment as needed.     [facility]: [phone], please call with any questions.  PT and Nurse visit.      Call your family doctor in 1-2 weeks for general follow up of your injuries and admission to the hospital.     Call your primary cardiologist for outpatient follow up as previously scheduled.     Call [phone] to schedule any future radiology studies.       Disposition:   home    The patient's medication instructions, follow-up instructions, activity restrictions, and symptom management were explained to the patient and/or family prior to discharge and patient/family demonstrated a satisfactory understanding.

## 2021-04-29 ENCOUNTER — PATIENT OUTREACH (OUTPATIENT)
Dept: CASE MANAGEMENT | Facility: CLINIC | Age: 71
End: 2021-04-29

## 2021-04-29 ASSESSMENT — ENCOUNTER SYMPTOMS
FATIGUE: 0
DIZZINESS: 0
WEAKNESS: 0
SHORTNESS OF BREATH: 0
PALPITATIONS: 0
FEVER: 0
DIARRHEA: 0
LIGHT-HEADEDNESS: 0
NUMBNESS: 0
HEADACHES: 0
NAUSEA: 0
CONSTIPATION: 0
NECK PAIN: 1
VOMITING: 0

## 2021-04-29 NOTE — PROGRESS NOTES
NAME: Melanie Zelaya    MRN: [id number]    YOB: 1950    Event Review:    Assessment completed with: Patient  Patient stated reason for hospitalization: Fall  Discharge Diagnosis: Fall    Patient readmitted in the last 30 days: No  Discharging Facility: [facility]  Date of Admission: 04/23/21  Date of Discharge: 04/28/21      Patient's perception of their health status since discharge: Improving    HPI: Spoke with pt. Pt says he was up on a ladder doing some work outside. He fell backwards landing in the bushes, then hit the ground. Thinks he fell about 6 feet. Pt says he lost consciousness. Pt found to have closed non-displaced fracture of first and second cervical vertebra, closed fracture of right scapula.     Pt says he is feeling ok. Denies significant neck pain or numbness into arms/hands. Pt says his shoulder hurts more. He is wearing an Aspen collar and a right sling.     Review of Systems   Constitutional: Negative for fatigue and fever.   Respiratory: Negative for shortness of breath.    Cardiovascular: Negative for chest pain and palpitations.   Gastrointestinal: Negative for constipation, diarrhea, nausea and vomiting.   Musculoskeletal: Positive for neck pain.        Right scapular pain   Neurological: Negative for dizziness, weakness, light-headedness, numbness and headaches.       Medication Review:    Medication Review: Yes     Reported by: Patient  Any new medications prescribed at discharge?: Yes  Is the patient having any side effects they believe may be caused by any medication additions or changes?: No  New prescriptions filled?: Yes     Do you have enough of your regularly prescribed medications?: Yes       Medication adherence problem?: No  Was a medication discrepancy indentified?: No          Reconciled the current and discharge medications: Yes  Reviewed AVS (Discharge Instructions)?: Yes         Acute Pain:    Acute pain: Yes     Acute pain severity: 3  Acute pain  timing: intermittent  Location of acute pain: Right shoulder    Chronic Pain:    Chronic pain: No     Diet/Nutrition:    Type of Diet: Regular, Cardiac 2mg sodium  Diet Adherence: Adherent with diet    Home Care Services:    Home Care Agency: [facility]  Type of Home Care Services: Home PT, Home Nursing  Home Care Interventions: No intervention required     Post-Discharge Durable Medical Equipment::    Durable Medical Equipment: None  Oxygen Use: No              DME Interventions: No intervention required    Home Management:    Living Arrangement: Significant Other (Pt lives with his girlfriend.)  Support System:: Significant Other, Child/Children  Type of Residence: [address] [facility]\""  Home Monitoring: None  Any patient reported falls in the last 3 months?: No  Bahai or spiritual beliefs that impact treatment?: No    Appointment Scheduling:     Patient Scheduling Dispositions: Pt prefers to see specialist     Follow-Ups:    Dr. Morejon (Neurosurgeon): needs to schedule  Dr. Butler (Ortho): needs to schedule  Dr. Kern (Cardio): needs to schedule  X-ray C-spine 4-6 week    Interventions/ Care Coordination:    Interventions/ Care Coordination: Addressed a knowledge deficit, Encouraged patient to schedule with the PCP/Specialist  General Education: Falls/Home safety       Reviewed signs/symptoms of worsening condition or complication that necessitate a call to the Physician's office.  Educated patient on access to care.  RN phone number given for future care management.    Mary Olvera RN  [phone]

## 2021-04-30 ENCOUNTER — TELEPHONE (OUTPATIENT)
Dept: PRIMARY CARE | Facility: CLINIC | Age: 71
End: 2021-04-30

## 2021-04-30 DIAGNOSIS — S32.9XXS CLOSED NONDISPLACED FRACTURE OF PELVIS, UNSPECIFIED PART OF PELVIS, SEQUELA: ICD-10-CM

## 2021-04-30 DIAGNOSIS — M48.42XS STRESS FRACTURE OF CERVICAL VERTEBRA, SEQUELA: Primary | ICD-10-CM

## 2021-04-30 NOTE — TELEPHONE ENCOUNTER
I called Melanie and his daughter. They were very unhappy and frustrated. Lita is need of a camode and a hospital bed. He can not sleep in a regular bed because he thinks he is still in the hospital and they are afraid he will fall off the bed.  They said the  was supposed to meet them at home which that did not happen. The visit nurses will not come till Monday and they will not be able to get a bed order till 4 days after that.  They also said Melanie is taking is diluadid 4-6 times a day due to the pain and the pharmacy dispensed 30 tablets which is not enough because he is in pain.    I told them I would call them when I find out more information and spoke with Keisha.  I also called earlier in the day and left a voicemail but they said no one called him.

## 2021-04-30 NOTE — TELEPHONE ENCOUNTER
Brian Curiel and/clinical team. Please see previous note. Patient is asking for a bed and commode to be ordered today. They need to call the orthopedic doctor from the hospital if Keisha Schultz as per patient can not order bed.

## 2021-04-30 NOTE — TELEPHONE ENCOUNTER
Detail Level: Detailed I placed ordered for bedside commode and we may need to check with [employer] or a supply company, but we cannot guarantee delivery for today.     -If the pain is severe that is he requiring that much dilaudid he may need to return back to the hospital or call ortho/neurosurgery. Is he taking the Tylenol?  The only thing we can try is tramadol, as he should not be continuing to take dilaudid.

## 2021-04-30 NOTE — TELEPHONE ENCOUNTER
Attn Veena or Keisha, Please call patient back about ordering a  Hospital Bed and bed side commode. They are trying to order before the end of the day and need to call Orthopedic doctor from hospital if Keisha can not order.

## 2021-04-30 NOTE — TELEPHONE ENCOUNTER
Do you have enough medication for the next 5 days?: no    Did you request this medication through your pharmacy or our patient portal in the last day or two? no    Name of medication requested: HYDROmorphone (DILAUDID)  Medication Strength: 2 mg tablet  Mediation Directions: Take 1-2 tablets (2-4 mg total) by mouth every 4 (four) hours as needed for moderate pain or severe pain    Quantity Requested (example 30/90): 30  Number of refills requested: 1      System Generated Preferred Pharmacy(s)  are as follows.  If more than one pharmacy displays please identify which one should be used for this call    Has the pharmacy information below been confirmed with the patient?  yes       [facility] [address] [facility] [address] [address]  Phone: [phone] Fax: [phone]          Additional Comments: If possible can more prescribable per refill?    Next Encounter with this provider: Visit date not found

## 2021-04-30 NOTE — TELEPHONE ENCOUNTER
Discharged from [facility] on 4/28/21 with with broken neck due to a fall. Requesting a hospital bed. At this time patient can not come to the office or equipped to have a video call. Please call patient directly at [phone]

## 2021-04-30 NOTE — TELEPHONE ENCOUNTER
Called and talked to patient and Toro, patient family member. Aware that paperwork and orders for commode and hospital bed faxed to [facility] DME supply. Called the DME and next step is they will call the patient and look at insurance information. If patient qualifies and approved with insurance. Unsure when delivery of products will be. Called [facility] and PT and nursing will be out to see patient Sunday 5/2/21 to evaluate patient. Instructed patient that a commode can be bought at local pharmacy as well. Patient states that a  from the hospital was to set all this up and was supposed to have the commode and bed delivered Wednesday to home after discharge. Instructed patient to look through discharge instructions for case workers number and give them a call to follow up as well. Did go over usage of dilaudid and our office could order tramadol for pain. Patient states he does not want the tramadol at this time, but pain is severe. Instructed patient if pain continues and worsening to go back to ER for treatment.

## 2021-04-30 NOTE — TELEPHONE ENCOUNTER
Ruthy called and is adding to her initial not that she put in that patient was discharged from hospital and needs a hospital bed and commode.  Patients family would like to take to you to see if you can help with these issues.

## 2021-05-03 ENCOUNTER — TELEMEDICINE (OUTPATIENT)
Dept: PRIMARY CARE | Facility: CLINIC | Age: 71
End: 2021-05-03
Payer: COMMERCIAL

## 2021-05-03 ENCOUNTER — TELEPHONE (OUTPATIENT)
Dept: THORACIC SURGERY | Facility: CLINIC | Age: 71
End: 2021-05-03

## 2021-05-03 DIAGNOSIS — I50.32 CHRONIC DIASTOLIC HEART FAILURE (CMS/HCC): ICD-10-CM

## 2021-05-03 DIAGNOSIS — J43.8 OTHER EMPHYSEMA (CMS/HCC): ICD-10-CM

## 2021-05-03 DIAGNOSIS — S42.101D CLOSED FRACTURE OF RIGHT SCAPULA WITH ROUTINE HEALING, UNSPECIFIED PART OF SCAPULA, SUBSEQUENT ENCOUNTER: ICD-10-CM

## 2021-05-03 DIAGNOSIS — S12.112A CLOSED NONDISPLACED ODONTOID FRACTURE WITH TYPE II MORPHOLOGY, INITIAL ENCOUNTER (CMS/HCC): Primary | ICD-10-CM

## 2021-05-03 PROBLEM — F33.0 MILD EPISODE OF RECURRENT MAJOR DEPRESSIVE DISORDER (CMS/HCC): Status: ACTIVE | Noted: 2021-05-03

## 2021-05-03 PROCEDURE — 99443 PR PHYS/QHP TELEPHONE EVALUATION 21-30 MIN: CPT | Performed by: NURSE PRACTITIONER

## 2021-05-03 RX ORDER — TRAMADOL HYDROCHLORIDE 50 MG/1
50 TABLET ORAL EVERY 8 HOURS PRN
Qty: 25 TABLET | Refills: 0 | Status: SHIPPED | OUTPATIENT
Start: 2021-05-03 | End: 2021-05-13

## 2021-05-03 ASSESSMENT — ENCOUNTER SYMPTOMS
BRUISES/BLEEDS EASILY: 0
FATIGUE: 0
HEADACHES: 0
COUGH: 0
ADENOPATHY: 0
SHORTNESS OF BREATH: 0
ARTHRALGIAS: 1
LIGHT-HEADEDNESS: 0
ABDOMINAL PAIN: 0
PALPITATIONS: 0
CHILLS: 0
FEVER: 0
DIZZINESS: 0
NECK PAIN: 1
NECK STIFFNESS: 1
DYSURIA: 0

## 2021-05-03 NOTE — PROGRESS NOTES
Verification of Patient Location:  The patient affirms they are currently located in the following state:[place]     Request for Consent:  You and I are about to have a telemedicine check-in or visit. This is allowed because you are already my patient, and you have requested it.  This telemedicine visit will be billed to your health insurance or you, if you are self-insured.  You understand you will be responsible for any copayments or coinsurances that apply to your telemedicine visit.  Before starting our telemedicine visit, I am required to get your consent for this virtual check-in or visit by telemedicine. Do you consent?      Patient Response to Request for Consent: Yes    The following have been reviewed and updated as appropriate in this visit:  Tobacco  Allergies  Meds  Problems  Med Hx  Surg Hx  Fam Hx  Soc Hx          Visit Documentation:          Time Spent:  I spent 27 minutes on this date of service performing the following activities: obtaining history, entering orders, documenting, preparing for visit, obtaining / reviewing records and providing counseling and education.
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
## 2021-05-03 NOTE — TELEPHONE ENCOUNTER
[phone], FRACTURED NECK, IN A SOLID BRACE, FELL OFF LADDER, JUST GOT OUT OF [facility], AND HE IS INTERESTED IN HIS CT AND HOW HIS LUNGS ARE DOING AND ASKED THAT YOU PLEASE REVIEW AND CALL.    PT IS R/S IN June WITH US AT THE PH OFFICE.      I spoke with patient and he is now home the hospital after falling and breaking his neck and scapula.  He is quite sore but he is doing okay.    We did discuss the fact that Dr. Guallpa wanted him to have a 3-month office visit and he has been scheduled in early June for the [facility] office.  I do not think that he will need a new scan given he was just scanned after the fall.

## 2021-05-03 NOTE — TELEPHONE ENCOUNTER
Mary from [facility] called, commode and hospital bed not being covered by insurance because they need more information on why these are being ordered, hospital records not sufficient, needs to have an office visit to explain why. [facility] number is [phone]. Patient has telephone visit today. JASON Domínguez aware more documentation needed on why patient would need these supplies.

## 2021-05-05 ENCOUNTER — TELEPHONE (OUTPATIENT)
Dept: PRIMARY CARE | Facility: CLINIC | Age: 71
End: 2021-05-05

## 2021-05-05 NOTE — TELEPHONE ENCOUNTER
Gracia Case Care Nurse    Kavon; called to request office contact the patient to set up a HFU.    Discharged from [facility]. Date of  4-32-4-28 for Hypertension     No recommended time frame     [employer]: [phone]    I called the patent to schedule an appointment, but had to leave him a message to call us back.

## 2021-05-05 NOTE — TELEPHONE ENCOUNTER
Spoke to Gracia, Melanie had a telemed medicine with Domonique on May 3rd as his HFU. Confirmed with Keisha that he does not need to be seen again for this. Gracia will reach out to patient to let him that he does not need another appointment.

## 2021-05-18 ENCOUNTER — TELEPHONE (OUTPATIENT)
Dept: PRIMARY CARE | Facility: CLINIC | Age: 71
End: 2021-05-18

## 2021-05-18 NOTE — TELEPHONE ENCOUNTER
Spoke with tae and informed her that per the tele med visit with rosio 5/3 a hospital bed was not needed for the patient.

## 2021-05-18 NOTE — TELEPHONE ENCOUNTER
- Mary from [facility] called for Visit notes from 5/3 for hospital bed to be faxed to [phone].

## 2021-05-26 ENCOUNTER — TELEPHONE (OUTPATIENT)
Dept: PRIMARY CARE | Facility: CLINIC | Age: 71
End: 2021-05-26

## 2021-05-26 RX ORDER — FLUTICASONE PROPIONATE 50 MCG
2 SPRAY, SUSPENSION (ML) NASAL DAILY
Qty: 16 G | Refills: 2 | Status: SHIPPED | OUTPATIENT
Start: 2021-05-26 | End: 2021-08-20 | Stop reason: SDUPTHER

## 2021-05-26 NOTE — TELEPHONE ENCOUNTER
fluticasone propionate (FLONASE) 50 mcg/actuation nasal spray     [facility] [address] [place] [address] - [address] [place] [id number]   Phone:  [phone]  Fax:  [phone]

## 2021-06-03 ENCOUNTER — TELEPHONE (OUTPATIENT)
Dept: THORACIC SURGERY | Facility: CLINIC | Age: 71
End: 2021-06-03

## 2021-06-03 NOTE — TELEPHONE ENCOUNTER
Melanie called the office in [facility] to our phones calls confirming the appointment and CT Scan scheduling. He asked about the CT Scan that was done during his hospital admission. Dr. Guallpa reviewed the CT Chest films from 4/23 and compared the films to his 1/2021 films. He has instructed the patient to repeat CT Chest w/o 6 months from the 4/23 CT Scan.

## 2021-06-04 DIAGNOSIS — C34.32 PRIMARY MALIGNANT NEOPLASM OF LEFT LOWER LOBE OF LUNG (CMS/HCC): Primary | ICD-10-CM

## 2021-06-09 ENCOUNTER — OFFICE VISIT (OUTPATIENT)
Dept: NEUROSURGERY | Facility: CLINIC | Age: 71
End: 2021-06-09
Payer: COMMERCIAL

## 2021-06-09 VITALS
BODY MASS INDEX: 30.53 KG/M2 | DIASTOLIC BLOOD PRESSURE: 79 MMHG | HEIGHT: 66 IN | SYSTOLIC BLOOD PRESSURE: 160 MMHG | OXYGEN SATURATION: 96 % | HEART RATE: 70 BPM | WEIGHT: 190 LBS

## 2021-06-09 DIAGNOSIS — S12.000A CLOSED DISPLACED FRACTURE OF FIRST CERVICAL VERTEBRA, UNSPECIFIED FRACTURE MORPHOLOGY, INITIAL ENCOUNTER (CMS/HCC): Primary | ICD-10-CM

## 2021-06-09 PROCEDURE — 3008F BODY MASS INDEX DOCD: CPT | Performed by: NEUROLOGICAL SURGERY

## 2021-06-09 PROCEDURE — 99215 OFFICE O/P EST HI 40 MIN: CPT | Performed by: NEUROLOGICAL SURGERY

## 2021-06-09 RX ORDER — CYCLOBENZAPRINE HCL 5 MG
5 TABLET ORAL 3 TIMES DAILY PRN
Qty: 30 TABLET | Refills: 0 | Status: SHIPPED | OUTPATIENT
Start: 2021-06-09 | End: 2021-09-29 | Stop reason: ALTCHOICE

## 2021-06-09 RX ORDER — TRAMADOL HYDROCHLORIDE 50 MG/1
50 TABLET ORAL EVERY 6 HOURS PRN
Qty: 30 TABLET | Refills: 0 | Status: SHIPPED | OUTPATIENT
Start: 2021-06-09 | End: 2021-06-19

## 2021-06-09 NOTE — LETTER
2021     SANTOS Giles  [address] [facility] [address]  [facility]     Patient: Melanie Zelaya  YOB: 1950  Date of Visit: 2021      Dear Dr. Schultz:    Thank you for referring Melanie Zelaya to me for evaluation. Below are my notes for this consultation.    If you have questions, please do not hesitate to call me. I look forward to following your patient along with you.         Sincerely,        Chaparro Morejon MD        CC: Chaparro Schneider MD  2021  1:52 PM  Signed      [facility]  [facility]  Medical Office [address], [address]  [place] [address]  Phone: [phone]  Fax: [phone]        21      Re: Melanie Zelaya  : 1950      Chief Complaint:  Hospital follow up    History of Present Illness:   Melanie Zelaya is a 71 y.o. right handed male who presents in neurosurgical follow up consultation. The patient presented to [facility] after a fall from a ladder on 21.  He sustained loss of consciousness.  He was able to arise on his own volition thereafter.  He was triaged to the emergency department by his son.  On arrival he was at his neurologic baseline.  CT imaging of the cervical spine disclosed C1, C2 fractures.  He was rigidly immobilized in a hard cervical collar.  He presents today for follow up.     Since last being seen he reports moderate intermittent neck pain.  There is no radicular extension into his upper extremities.  His pain vacillates in severity between a 3-7 out of 10.  He has been faithfully utilizing his rigid cervical collar.  He denies any change in his strength, sensation, bowel, bladder, gait function.  He ambulates without the need for an assistive device.    Medical History:  has a past medical history of Anxiety, Atrial fibrillation (CMS/HCC), Depression, Fracture of pelvis (CMS/HCC) (), GERD (gastroesophageal reflux disease), Hearing loss,  History of colon polyps, Hyperlipidemia, Hypertension, Left atrial enlargement, Lung cancer (CMS/HCC), Lung cancer (CMS/HCC), Pneumonia (2011), and Seasonal allergies.    Surgical History:  has a past surgical history that includes Lung lobectomy (Left, 2016); Shoulder surgery (); Rotator cuff repair (Right, ); Tonsillectomy; Nasal polyp surgery; and Hand surgery (Bilateral).    Family History: family history includes Cancer in his nephew; Cirrhosis in his biological father; Colon cancer in his biological brother; Diabetes in his biological mother; Lung cancer in his biological brother; No Known Problems in his maternal grandfather, maternal grandmother, paternal grandfather, and paternal grandmother; Pancreatic cancer in his biological sister; Prostate cancer in his biological brother.  Family history non-contributory to neurological condition.    Social History:   Social History     Socioeconomic History   • Marital status:      Spouse name: None   • Number of children: None   • Years of education: None   • Highest education level: None   Occupational History   • None   Tobacco Use   • Smoking status: Former Smoker     Packs/day: 2.00     Types: Cigarettes     Quit date: 2010     Years since quittin.1   • Smokeless tobacco: Never Used   Vaping Use   • Vaping Use: Never used   Substance and Sexual Activity   • Alcohol use: No   • Drug use: No   • Sexual activity: Yes     Partners: Female     Birth control/protection: None   Other Topics Concern   • None   Social History Narrative    Retired     Social Determinants of Health     Financial Resource Strain: Low Risk    • Difficulty of Paying Living Expenses: Not hard at all   Food Insecurity: No Food Insecurity   • Worried About Running Out of Food in the Last Year: Never true   • Ran Out of Food in the Last Year: Never true   Transportation Needs: No Transportation Needs   • Lack of Transportation (Medical): No   • Lack of Transportation  (Non-Medical): No   Physical Activity: Sufficiently Active   • Days of Exercise per Week: 5 days   • Minutes of Exercise per Session: 60 min   Stress: No Stress Concern Present   • Feeling of Stress : Not at all   Social Connections: Moderately Isolated   • Frequency of Communication with Friends and Family: More than three times a week   • Frequency of Social Gatherings with Friends and Family: More than three times a week   • Attends [employer] Services: Never   • Active Member of Clubs or Organizations: Yes   • Attends Club or Organization Meetings: More than 4 times per year   • Marital Status:    Intimate Partner Violence: Not At Risk   • Fear of Current or Ex-Partner: No   • Emotionally Abused: No   • Physically Abused: No   • Sexually Abused: No        Allergies: No Known Allergies    Medications:   Current Outpatient Medications   Medication Sig Dispense Refill   • albuterol HFA (PROAIR HFA) 90 mcg/actuation inhaler Inhale 2 puffs 2 (two) times a day as needed for wheezing or shortness of breath. (Patient taking differently: Inhale 2 puffs as needed for wheezing or shortness of breath.  ) 1 Inhaler 3   • ALPRAZolam (XANAX) 0.5 mg tablet Take 1 tablet (0.5 mg total) by mouth 2 (two) times a day as needed for anxiety. 30 tablet 1   • atorvastatin (LIPITOR) 40 mg tablet Take 1 tablet by mouth once daily 90 tablet 0   • cimetidine (TAGAMET HB) 200 mg tablet Take 200 mg by mouth as needed.     • dabigatran etexilate (PRADAXA) 150 mg capsu Take 150 mg by mouth 2 (two) times a day.     • DOCOSAHEXANOIC ACID/EPA (FISH OIL ORAL) Take 4 capsules by mouth daily. OTC suppliement      • escitalopram (LEXAPRO) 5 mg tablet Take 1 tablet (5 mg total) by mouth once daily. 90 tablet 1   • fluticasone propionate (FLONASE) 50 mcg/actuation nasal spray Administer 2 sprays into each nostril daily. 16 g 2   • hydrochlorothiazide (HYDRODIURIL) 12.5 mg tablet Take 12.5 mg by mouth daily.     • HYDROCORTISONE-ACETIC ACID OTIC  "Administer into affected ear(s).     • irbesartan (AVAPRO) 300 mg tablet Take 300 mg by mouth nightly.     • loratadine (CLARITIN) 10 mg tablet Take 1 tablet (10 mg total) by mouth daily. (Patient taking differently: Take 10 mg by mouth daily as needed.  ) 90 tablet 1   • metoprolol tartrate (LOPRESSOR) 25 mg tablet Take 25 mg by mouth 2 (two) times a day.     • multivitamin (THERAGRAN) tablet Take 1 tablet by mouth daily.     • ondansetron ODT (ZOFRAN-ODT) 4 mg disintegrating tablet Take 1 tablet (4 mg total) by mouth every 8 (eight) hours as needed for nausea or vomiting (Nausea/Vomiting) for up to 7 days. 15 tablet 0   • sennosides-docusate sodium (SENOKOT-S) 8.6-50 mg Take 1 tablet by mouth 2 (two) times a day. 60 tablet 0   • umeclidinium-vilanterol (ANORO ELLIPTA) 62.5-25 mcg/actuation blister with device Inhale 1 puff daily.       No current facility-administered medications for this visit.       Review of Systems:   A 14 point review of systems was performed and aside from what is mentioned above is otherwise negative.    General physical examination:  The patient is well appearing male, sitting upright in his chair in no acute distress, he appears his stated age.  His head is atraumatic, normocephalic. His neck is supple without obvious adenopathy. Oral mucosa is moist. His peripheral pulses are symmetric and palpable. Extremities without peripheral edema. His breathing is normal and unlabored.     Vitals:    06/09/21 1302   BP: (!) 160/79   BP Location: Right upper arm   Patient Position: Sitting   Pulse: 70   SpO2: 96%   Weight: 86.2 kg (190 lb)   Height: 1.676 m (5' 6\")          Neurologic examination:  Mental status:  The patient is alert, attentive, and oriented. Speech is clear and fluent with good repetition, comprehension, and naming.  Remote and recent memory are normal as well as fund of knowledge.    Cranial nerves:  CN II: Visual fields are full to confrontation.  Pupils are equal and briskly " reactive to light.   CN III, IV, VI: Extra-occular muscles are intact  CN V: Facial sensation is intact and equal in V1-V3 distributions bilaterally.   CN VII: Face is symmetric with normal eye closure and smile.  CN VIII: Hearing is normal to rubbing fingers  CN IX, X: Palate elevates symmetrically. Phonation is normal.  CN XI: Head turning and shoulder shrug are intact  CN XII: Tongue is midline with normal movements and no atrophy.    Motor:  There is no pronator drift.  Muscle bulk and tone are normal.    Deltoid Biceps Triceps Wrist ext Hand  Finger Spread Hip flexion Knee ext Dorsi-  flexion EHL Plantar Flexion   L 5 5 5 5 5 5 5 5 5 5 5   R 5 5 5 5 5 5 5 5 5 5 5     Reflexes:  Reflexes are 2+ and symmetric at the biceps, brachialis, triceps, patellar, and Achilli's. There is no Romo's sign or ankle clonus.    Sensory:   Intact light touch, pinprick, and position sense, throughout all 4 extremities.    Coordination:  There is no dysmetria on finger-to-nose testing.  Romberg is absent.    Gait:  Gait is steady with normal steps.  Heel and toe walking are normal. Tandem gait is normal.      Data Review:    MRI Cervical Spine 4/24/21  COMMENT: Fusion of C2 and C3 as well as C5 and C6.  Fracture deformity is noted through C2 as well as arch of C1.  There is a large amount of prevertebral soft tissue fluid from C2 to C5, consistent with anterior longitudinal ligament injury.  No cord signal abnormality.  No mark anthony cord compression.  Disc osteophyte complex at C4-C5 with severe facet arthrosis, and uncovertebral hypertrophic changes, contributing to severe left and mild right foraminal narrowing.  Disc osteophyte complex at C6-C7 with severe left facet arthrosis contributing to moderate foraminal narrowing.  Disc osteophyte complex at C3-C4 with facet arthrosis bilaterally, contributing to moderate left foraminal narrowing.  No epidural collection or hematoma.   --  IMPRESSION: C1 and C2 fractures with findings  concerning for anterior  longitudinal ligament rupture.  Degenerative spondylosis at C4-C5.    MRI Angiogram Neck wo contrast 4/24/21  Findings: MR angiography of the neck demonstrates patency of the common carotid arteries, internal carotid arteries bilaterally as well as the left vertebral artery.  No evidence for vessel dissection, cutoff, hemodynamically significant stenoses by NASCET criteria.  Although no definite intramural hematoma identified, difficult to evaluation of the right vertebral artery due to its marked attenuation and diminished caliber, felt to be throughout the entire length of the vertebral artery although there is increased T1 signal on the fat sat images in the V3 and V4 segment of the right vertebral artery, again this is likely due to the chronic appearing attenuation in the right vertebral artery.  --  IMPRESSION: The right vertebral artery is markedly attenuated and diminutive throughout its entire length, felt to be chronic or congenital in nature.  No definite intramural hematoma to suggest acute dissection.  Carotid arteries are patent.    CT Cervical spine 4/23/21  COMMENT:  Cervicothoracic alignment: Anatomic.  Prevertebral soft tissues: There is prevertebral soft tissue thickening at the  C1-C2 articulation with right greater than left edema.  Vertebral bodies: Acute fractures involving the anterior and posterior arches of C1.  Acute fracture involving the base of the dens with displaced osseous  fragments located adjacent to the bilateral there is osseous fusion of the C2  and C3 vertebra and its posterior elements.  There is a linear lucency along the left occipital condyle likely representing a nondisplaced fracture deformity.  Remaining vertebral bodies are intact with degenerative changes.  Lateral mass of C1 and C2 vertebra.  Intervertebral discs: Severe intervertebral disc space narrowing with partial  fusion at C5-C6.  Cervical and upper thoracic spinal canal: Patent the  level of C5.  Below which, evaluation for  Axial images:   Skull base: See above  C1-2: See above   C2-3: Fusion of the C2-C3 disc space with left greater than right uncovertebral and facet hypertrophy.  Moderate left foraminal narrowing.  C3-4: Posterior disc osteophyte complex with uncovertebral and facet  hypertrophy.  Severe left and moderate right foraminal narrowing.  Moderate  central canal narrowing.  C4-5: Posterior disc osteophyte complex with uncovertebral and facet  hypertrophy.  Severe left and moderate right foraminal narrowing.  Moderate  central canal narrowing.  C5-6: Fusion of the C5-C6 disc space with uncovertebral and facet hypertrophy.  Moderate left  greater right foraminal narrowing. Moderate central canal  narrowing.   C6-7: Posterior disc osteophyte complex with uncovertebral and facet  hypertrophy.  Severe left and moderate right foraminal narrowing.  Moderate  central canal narrowing..  C7-T1: Posterior disc osteophyte complex with uncovertebral and facet  hypertrophy.  Severe left and moderate right foraminal narrowing.  Moderate  central canal narrowing.  Extra vertebral soft tissues: Biapical pleural scarring.  --  IMPRESSION:  1.  Acute fractures of the anterior and posterior arches of C1 vertebra. Acute  fracture through the base of the dens at the C2 vertebra.  Fractured bone  fragments are noted adjacent to the bilateral lateral masses of C1 and C2  vertebra.  2.  Linear lucency through the left occipital condyle, suspicious for an  avulsion fracture.  3.  Prevertebral soft tissue edema with extension of the right paravertebral  region at C1-C2.  4.  Multilevel cervical spondylosis.     CT Head without contrast 4/23/21  COMMENT: Brain parenchyma demonstrates maintenance of gray-white matter differentiation.  No acute intracranial parenchymal hemorrhage, mass effect, midline shift, or extra-axial fluid collection.  No large vascular territorial infarct.  Ventricles, basal cisterns  and cortical sulci are prominent,  consistent with involutional changes.  Hypodensity in the periventricular and  subcortical white matter, consistent with microangiopathic changes.     Visualized paranasal sinuses and mastoid air cells are clear bilaterally. The  calvarium is unremarkable.  Partially visualized fractures of the anterior and  posterior arches of C1 and the base of the dens.  --  IMPRESSION:  1.  No acute intracranial hemorrhage, large vascular territorial infarct, or  mass effect.  2.  Partially visualized presumed acute fractures of the anterior and posterior  arches of C1 and base of dens.    Images were personally reviewed by myself and with the patient.      Assessment and Plan:    In summary, Melanie Zelaya is a 71 y.o. male who fell on April 2021 sustained significant head, neck trauma.  He has radiographic evidence of C1, C2 fractures.  Specifically his C2 fracture involves the odontoid process.  There is mild distraction of the odontoid process from the body of C2 with posterior angulation.  He was advised to remain rigidly immobilized in a hard cervical collar.  We will assess for interval osseous healing in 6 weeks time with a CT scan of the cervical spine.    The patient was counseled length around the natural history of C1, C2 fractures.  He was asked to utilize his collar at all times as this is an unstable fracture pattern.  We have prescribed him tramadol, Flexeril to assist with pain relief.  In the interim should his symptomatology evolve or worsen, I have asked he return sooner for prompt reevaluation.    Thank you for referring Melanie Zelaya  to my attention.  Please feel free to contact me anytime if I can be of further assistance.      IVanessa PA-C, am scribing for, and in the presence of, Dr. Chaparro Morejon MD.    Chaparro HARDY MD, personally performed the services described in this documentation as scribed by YESENIA Orr in my presence, and it is accurate and  complete.     I spent 40 minutes on this date of service performing the following activities: obtaining history, performing examination, entering orders, documenting, preparing for visit, obtaining / reviewing records, providing counseling and education, independently reviewing study/studies, communicating results and coordinating care.     Patient seen earlier today. Signature timestamp does not reflect patient encounter time.   More than 50% of the time is spent counseling the patient and family and coordinating care.

## 2021-06-09 NOTE — PROGRESS NOTES
[facility]  Medical Office [address], [address]  [place] [address]  Phone: [phone]  Fax: [phone]        21      Re: Melanie Zelaya  : 1950      Chief Complaint:  Hospital follow up    History of Present Illness:   Melanie Zelaya is a 71 y.o. right handed male who presents in neurosurgical follow up consultation. The patient presented to [facility] after a fall from a ladder on 21.  He sustained loss of consciousness.  He was able to arise on his own volition thereafter.  He was triaged to the emergency department by his son.  On arrival he was at his neurologic baseline.  CT imaging of the cervical spine disclosed C1, C2 fractures.  He was rigidly immobilized in a hard cervical collar.  He presents today for follow up.     Since last being seen he reports moderate intermittent neck pain.  There is no radicular extension into his upper extremities.  His pain vacillates in severity between a 3-7 out of 10.  He has been faithfully utilizing his rigid cervical collar.  He denies any change in his strength, sensation, bowel, bladder, gait function.  He ambulates without the need for an assistive device.    Medical History:  has a past medical history of Anxiety, Atrial fibrillation (CMS/HCC), Depression, Fracture of pelvis (CMS/HCC) (), GERD (gastroesophageal reflux disease), Hearing loss, History of colon polyps, Hyperlipidemia, Hypertension, Left atrial enlargement, Lung cancer (CMS/HCC), Lung cancer (CMS/HCC), Pneumonia (), and Seasonal allergies.    Surgical History:  has a past surgical history that includes Lung lobectomy (Left, ); Shoulder surgery (); Rotator cuff repair (Right, ); Tonsillectomy; Nasal polyp surgery; and Hand surgery (Bilateral).    Family History: family history includes Cancer in his nephew; Cirrhosis in his biological father; Colon cancer in his biological brother; Diabetes in  his biological mother; Lung cancer in his biological brother; No Known Problems in his maternal grandfather, maternal grandmother, paternal grandfather, and paternal grandmother; Pancreatic cancer in his biological sister; Prostate cancer in his biological brother.  Family history non-contributory to neurological condition.    Social History:   Social History     Socioeconomic History   • Marital status:      Spouse name: None   • Number of children: None   • Years of education: None   • Highest education level: None   Occupational History   • None   Tobacco Use   • Smoking status: Former Smoker     Packs/day: 2.00     Types: Cigarettes     Quit date: 2010     Years since quittin.1   • Smokeless tobacco: Never Used   Vaping Use   • Vaping Use: Never used   Substance and Sexual Activity   • Alcohol use: No   • Drug use: No   • Sexual activity: Yes     Partners: Female     Birth control/protection: None   Other Topics Concern   • None   Social History Narrative    Retired     Social Determinants of Health     Financial Resource Strain: Low Risk    • Difficulty of Paying Living Expenses: Not hard at all   Food Insecurity: No Food Insecurity   • Worried About Running Out of Food in the Last Year: Never true   • Ran Out of Food in the Last Year: Never true   Transportation Needs: No Transportation Needs   • Lack of Transportation (Medical): No   • Lack of Transportation (Non-Medical): No   Physical Activity: Sufficiently Active   • Days of Exercise per Week: 5 days   • Minutes of Exercise per Session: 60 min   Stress: No Stress Concern Present   • Feeling of Stress : Not at all   Social Connections: Moderately Isolated   • Frequency of Communication with Friends and Family: More than three times a week   • Frequency of Social Gatherings with Friends and Family: More than three times a week   • Attends Anabaptism Services: Never   • Active Member of Clubs or Organizations: Yes   • Attends Club or  Organization Meetings: More than 4 times per year   • Marital Status:    Intimate Partner Violence: Not At Risk   • Fear of Current or Ex-Partner: No   • Emotionally Abused: No   • Physically Abused: No   • Sexually Abused: No        Allergies: No Known Allergies    Medications:   Current Outpatient Medications   Medication Sig Dispense Refill   • albuterol HFA (PROAIR HFA) 90 mcg/actuation inhaler Inhale 2 puffs 2 (two) times a day as needed for wheezing or shortness of breath. (Patient taking differently: Inhale 2 puffs as needed for wheezing or shortness of breath.  ) 1 Inhaler 3   • ALPRAZolam (XANAX) 0.5 mg tablet Take 1 tablet (0.5 mg total) by mouth 2 (two) times a day as needed for anxiety. 30 tablet 1   • atorvastatin (LIPITOR) 40 mg tablet Take 1 tablet by mouth once daily 90 tablet 0   • cimetidine (TAGAMET HB) 200 mg tablet Take 200 mg by mouth as needed.     • dabigatran etexilate (PRADAXA) 150 mg capsu Take 150 mg by mouth 2 (two) times a day.     • DOCOSAHEXANOIC ACID/EPA (FISH OIL ORAL) Take 4 capsules by mouth daily. OTC suppliement      • escitalopram (LEXAPRO) 5 mg tablet Take 1 tablet (5 mg total) by mouth once daily. 90 tablet 1   • fluticasone propionate (FLONASE) 50 mcg/actuation nasal spray Administer 2 sprays into each nostril daily. 16 g 2   • hydrochlorothiazide (HYDRODIURIL) 12.5 mg tablet Take 12.5 mg by mouth daily.     • HYDROCORTISONE-ACETIC ACID OTIC Administer into affected ear(s).     • irbesartan (AVAPRO) 300 mg tablet Take 300 mg by mouth nightly.     • loratadine (CLARITIN) 10 mg tablet Take 1 tablet (10 mg total) by mouth daily. (Patient taking differently: Take 10 mg by mouth daily as needed.  ) 90 tablet 1   • metoprolol tartrate (LOPRESSOR) 25 mg tablet Take 25 mg by mouth 2 (two) times a day.     • multivitamin (THERAGRAN) tablet Take 1 tablet by mouth daily.     • ondansetron ODT (ZOFRAN-ODT) 4 mg disintegrating tablet Take 1 tablet (4 mg total) by mouth every 8  "(eight) hours as needed for nausea or vomiting (Nausea/Vomiting) for up to 7 days. 15 tablet 0   • sennosides-docusate sodium (SENOKOT-S) 8.6-50 mg Take 1 tablet by mouth 2 (two) times a day. 60 tablet 0   • umeclidinium-vilanterol (ANORO ELLIPTA) 62.5-25 mcg/actuation blister with device Inhale 1 puff daily.       No current facility-administered medications for this visit.       Review of Systems:   A 14 point review of systems was performed and aside from what is mentioned above is otherwise negative.    General physical examination:  The patient is well appearing male, sitting upright in his chair in no acute distress, he appears his stated age.  His head is atraumatic, normocephalic. His neck is supple without obvious adenopathy. Oral mucosa is moist. His peripheral pulses are symmetric and palpable. Extremities without peripheral edema. His breathing is normal and unlabored.     Vitals:    06/09/21 1302   BP: (!) 160/79   BP Location: Right upper arm   Patient Position: Sitting   Pulse: 70   SpO2: 96%   Weight: 86.2 kg (190 lb)   Height: 1.676 m (5' 6\")          Neurologic examination:  Mental status:  The patient is alert, attentive, and oriented. Speech is clear and fluent with good repetition, comprehension, and naming.  Remote and recent memory are normal as well as fund of knowledge.    Cranial nerves:  CN II: Visual fields are full to confrontation.  Pupils are equal and briskly reactive to light.   CN III, IV, VI: Extra-occular muscles are intact  CN V: Facial sensation is intact and equal in V1-V3 distributions bilaterally.   CN VII: Face is symmetric with normal eye closure and smile.  CN VIII: Hearing is normal to rubbing fingers  CN IX, X: Palate elevates symmetrically. Phonation is normal.  CN XI: Head turning and shoulder shrug are intact  CN XII: Tongue is midline with normal movements and no atrophy.    Motor:  There is no pronator drift.  Muscle bulk and tone are normal.    Deltoid Biceps " Triceps Wrist ext Hand  Finger Spread Hip flexion Knee ext Dorsi-  flexion EHL Plantar Flexion   L 5 5 5 5 5 5 5 5 5 5 5   R 5 5 5 5 5 5 5 5 5 5 5     Reflexes:  Reflexes are 2+ and symmetric at the biceps, brachialis, triceps, patellar, and Achilli's. There is no Romo's sign or ankle clonus.    Sensory:   Intact light touch, pinprick, and position sense, throughout all 4 extremities.    Coordination:  There is no dysmetria on finger-to-nose testing.  Romberg is absent.    Gait:  Gait is steady with normal steps.  Heel and toe walking are normal. Tandem gait is normal.      Data Review:    MRI Cervical Spine 4/24/21  COMMENT: Fusion of C2 and C3 as well as C5 and C6.  Fracture deformity is noted through C2 as well as arch of C1.  There is a large amount of prevertebral soft tissue fluid from C2 to C5, consistent with anterior longitudinal ligament injury.  No cord signal abnormality.  No mark anthony cord compression.  Disc osteophyte complex at C4-C5 with severe facet arthrosis, and uncovertebral hypertrophic changes, contributing to severe left and mild right foraminal narrowing.  Disc osteophyte complex at C6-C7 with severe left facet arthrosis contributing to moderate foraminal narrowing.  Disc osteophyte complex at C3-C4 with facet arthrosis bilaterally, contributing to moderate left foraminal narrowing.  No epidural collection or hematoma.   --  IMPRESSION: C1 and C2 fractures with findings concerning for anterior  longitudinal ligament rupture.  Degenerative spondylosis at C4-C5.    MRI Angiogram Neck wo contrast 4/24/21  Findings: MR angiography of the neck demonstrates patency of the common carotid arteries, internal carotid arteries bilaterally as well as the left vertebral artery.  No evidence for vessel dissection, cutoff, hemodynamically significant stenoses by NASCET criteria.  Although no definite intramural hematoma identified, difficult to evaluation of the right vertebral artery due to its marked  attenuation and diminished caliber, felt to be throughout the entire length of the vertebral artery although there is increased T1 signal on the fat sat images in the V3 and V4 segment of the right vertebral artery, again this is likely due to the chronic appearing attenuation in the right vertebral artery.  --  IMPRESSION: The right vertebral artery is markedly attenuated and diminutive throughout its entire length, felt to be chronic or congenital in nature.  No definite intramural hematoma to suggest acute dissection.  Carotid arteries are patent.    CT Cervical spine 4/23/21  COMMENT:  Cervicothoracic alignment: Anatomic.  Prevertebral soft tissues: There is prevertebral soft tissue thickening at the  C1-C2 articulation with right greater than left edema.  Vertebral bodies: Acute fractures involving the anterior and posterior arches of C1.  Acute fracture involving the base of the dens with displaced osseous  fragments located adjacent to the bilateral there is osseous fusion of the C2  and C3 vertebra and its posterior elements.  There is a linear lucency along the left occipital condyle likely representing a nondisplaced fracture deformity.  Remaining vertebral bodies are intact with degenerative changes.  Lateral mass of C1 and C2 vertebra.  Intervertebral discs: Severe intervertebral disc space narrowing with partial  fusion at C5-C6.  Cervical and upper thoracic spinal canal: Patent the level of C5.  Below which, evaluation for  Axial images:   Skull base: See above  C1-2: See above   C2-3: Fusion of the C2-C3 disc space with left greater than right uncovertebral and facet hypertrophy.  Moderate left foraminal narrowing.  C3-4: Posterior disc osteophyte complex with uncovertebral and facet  hypertrophy.  Severe left and moderate right foraminal narrowing.  Moderate  central canal narrowing.  C4-5: Posterior disc osteophyte complex with uncovertebral and facet  hypertrophy.  Severe left and moderate right  foraminal narrowing.  Moderate  central canal narrowing.  C5-6: Fusion of the C5-C6 disc space with uncovertebral and facet hypertrophy.  Moderate left  greater right foraminal narrowing. Moderate central canal  narrowing.   C6-7: Posterior disc osteophyte complex with uncovertebral and facet  hypertrophy.  Severe left and moderate right foraminal narrowing.  Moderate  central canal narrowing..  C7-T1: Posterior disc osteophyte complex with uncovertebral and facet  hypertrophy.  Severe left and moderate right foraminal narrowing.  Moderate  central canal narrowing.  Extra vertebral soft tissues: Biapical pleural scarring.  --  IMPRESSION:  1.  Acute fractures of the anterior and posterior arches of C1 vertebra. Acute  fracture through the base of the dens at the C2 vertebra.  Fractured bone  fragments are noted adjacent to the bilateral lateral masses of C1 and C2  vertebra.  2.  Linear lucency through the left occipital condyle, suspicious for an  avulsion fracture.  3.  Prevertebral soft tissue edema with extension of the right paravertebral  region at C1-C2.  4.  Multilevel cervical spondylosis.     CT Head without contrast 4/23/21  COMMENT: Brain parenchyma demonstrates maintenance of gray-white matter differentiation.  No acute intracranial parenchymal hemorrhage, mass effect, midline shift, or extra-axial fluid collection.  No large vascular territorial infarct.  Ventricles, basal cisterns and cortical sulci are prominent,  consistent with involutional changes.  Hypodensity in the periventricular and  subcortical white matter, consistent with microangiopathic changes.     Visualized paranasal sinuses and mastoid air cells are clear bilaterally. The  calvarium is unremarkable.  Partially visualized fractures of the anterior and  posterior arches of C1 and the base of the dens.  --  IMPRESSION:  1.  No acute intracranial hemorrhage, large vascular territorial infarct, or  mass effect.  2.  Partially visualized  presumed acute fractures of the anterior and posterior  arches of C1 and base of dens.    Images were personally reviewed by myself and with the patient.      Assessment and Plan:    In summary, Melanie Zelaya is a 71 y.o. male who fell on April 2021 sustained significant head, neck trauma.  He has radiographic evidence of C1, C2 fractures.  Specifically his C2 fracture involves the odontoid process.  There is mild distraction of the odontoid process from the body of C2 with posterior angulation.  He was advised to remain rigidly immobilized in a hard cervical collar.  We will assess for interval osseous healing in 6 weeks time with a CT scan of the cervical spine.    The patient was counseled length around the natural history of C1, C2 fractures.  He was asked to utilize his collar at all times as this is an unstable fracture pattern.  We have prescribed him tramadol, Flexeril to assist with pain relief.  In the interim should his symptomatology evolve or worsen, I have asked he return sooner for prompt reevaluation.    Thank you for referring Melanie Zelaya  to my attention.  Please feel free to contact me anytime if I can be of further assistance.      Vanessa HARDY PA-C, am scribing for, and in the presence of, Dr. Chaparro Morejon MD.    IChaparro MD, personally performed the services described in this documentation as scribed by YESENIA Orr in my presence, and it is accurate and complete.     I spent 40 minutes on this date of service performing the following activities: obtaining history, performing examination, entering orders, documenting, preparing for visit, obtaining / reviewing records, providing counseling and education, independently reviewing study/studies, communicating results and coordinating care.     Patient seen earlier today. Signature timestamp does not reflect patient encounter time.   More than 50% of the time is spent counseling the patient and family and coordinating care.

## 2021-06-14 ENCOUNTER — TRANSCRIBE ORDERS (OUTPATIENT)
Dept: SCHEDULING | Age: 71
End: 2021-06-14

## 2021-06-14 DIAGNOSIS — Z11.59 ENCOUNTER FOR SCREENING FOR OTHER VIRAL DISEASES: Primary | ICD-10-CM

## 2021-06-15 ENCOUNTER — TELEPHONE (OUTPATIENT)
Dept: PRIMARY CARE | Facility: CLINIC | Age: 71
End: 2021-06-15

## 2021-06-15 NOTE — TELEPHONE ENCOUNTER
Pt would like a callback he is unsure as to when he needs to schedule his next visit with Keisha . Please call Pt back [phone]

## 2021-06-16 ENCOUNTER — HOSPITAL ENCOUNTER (OUTPATIENT)
Dept: RADIOLOGY | Age: 71
Discharge: HOME | End: 2021-06-16
Attending: NEUROLOGICAL SURGERY
Payer: COMMERCIAL

## 2021-06-16 DIAGNOSIS — S12.000A CLOSED DISPLACED FRACTURE OF FIRST CERVICAL VERTEBRA, UNSPECIFIED FRACTURE MORPHOLOGY, INITIAL ENCOUNTER (CMS/HCC): ICD-10-CM

## 2021-06-16 PROCEDURE — 72040 X-RAY EXAM NECK SPINE 2-3 VW: CPT

## 2021-06-16 NOTE — TELEPHONE ENCOUNTER
Left a detailed message. He is due for his MAW in September.  Unless there is something specific he needs to go over with Keisha earlier.

## 2021-07-14 RX ORDER — ATORVASTATIN CALCIUM 40 MG/1
TABLET, FILM COATED ORAL
Qty: 90 TABLET | Refills: 0 | Status: SHIPPED | OUTPATIENT
Start: 2021-07-14 | End: 2021-10-14 | Stop reason: SDUPTHER

## 2021-07-20 NOTE — TELEPHONE ENCOUNTER
Does not look like Keisha has ever ordered this medication, called and left message to discuss with patient

## 2021-07-20 NOTE — TELEPHONE ENCOUNTER
** PLEASE SEND TO [facility] [address] [place] [address]**        Do you have enough medication for the next 5 days?: yes    Did you request this medication through your pharmacy or our patient portal in the last day or two? yes    Name of medication requested:   hydrochlorothiazide (HYDRODIURIL) 12.5 mg tablet       Medication Strength:   Mediation Directions:   Quantity Requested (example 30/90):   Number of refills requested:       System Generated Preferred Pharmacy(s)  are as follows.  If more than one pharmacy displays please identify which one should be used for this call    Has the pharmacy information below been confirmed with the patient?        ** PLEASE SEND TO [facility] [address] [place] [address]**         [facility] [address] [place] [address]  Phone: [phone] Fax: [phone]    [facility] [address] [place] [address]  Phone: [phone] Fax: [phone]          If necessary, provide pharmacy details below.     Is this pharmacy a mail order pharmacy?:   Pharmacy Name:   Pharmacy City:   Pharmacy Telephone:     Additional Comments:    Next Encounter with this provider: 9/29/2021

## 2021-07-21 RX ORDER — HYDROCHLOROTHIAZIDE 12.5 MG/1
12.5 TABLET ORAL DAILY
OUTPATIENT
Start: 2021-07-21

## 2021-07-21 NOTE — TELEPHONE ENCOUNTER
Talked to patient. Ordered by cardiologist. Was able to get medication refilled from pharmacy and does not need medication at this time.

## 2021-07-29 ENCOUNTER — HOSPITAL ENCOUNTER (OUTPATIENT)
Dept: RADIOLOGY | Age: 71
Discharge: HOME | End: 2021-07-29
Attending: NEUROLOGICAL SURGERY
Payer: COMMERCIAL

## 2021-07-29 DIAGNOSIS — S12.000A CLOSED DISPLACED FRACTURE OF FIRST CERVICAL VERTEBRA, UNSPECIFIED FRACTURE MORPHOLOGY, INITIAL ENCOUNTER (CMS/HCC): ICD-10-CM

## 2021-07-29 PROCEDURE — 72125 CT NECK SPINE W/O DYE: CPT | Mod: MG

## 2021-08-09 ENCOUNTER — TELEPHONE (OUTPATIENT)
Dept: NEUROSURGERY | Facility: CLINIC | Age: 71
End: 2021-08-09

## 2021-08-09 ENCOUNTER — OFFICE VISIT (OUTPATIENT)
Dept: NEUROSURGERY | Facility: CLINIC | Age: 71
End: 2021-08-09
Payer: COMMERCIAL

## 2021-08-09 ENCOUNTER — HOSPITAL ENCOUNTER (OUTPATIENT)
Dept: RADIOLOGY | Facility: HOSPITAL | Age: 71
Discharge: HOME | End: 2021-08-09
Attending: PHYSICIAN ASSISTANT
Payer: COMMERCIAL

## 2021-08-09 DIAGNOSIS — S12.112A CLOSED NONDISPLACED ODONTOID FRACTURE WITH TYPE II MORPHOLOGY, INITIAL ENCOUNTER (CMS/HCC): Primary | ICD-10-CM

## 2021-08-09 DIAGNOSIS — S12.112A CLOSED NONDISPLACED ODONTOID FRACTURE WITH TYPE II MORPHOLOGY, INITIAL ENCOUNTER (CMS/HCC): ICD-10-CM

## 2021-08-09 PROCEDURE — 99214 OFFICE O/P EST MOD 30 MIN: CPT | Performed by: NEUROLOGICAL SURGERY

## 2021-08-09 PROCEDURE — 72050 X-RAY EXAM NECK SPINE 4/5VWS: CPT

## 2021-08-09 NOTE — TELEPHONE ENCOUNTER
Reviewed x-rays with Dr. Morejon no abnormal movement on flex/ex's. Okay with removing collar when sedentary. Relayed to patient.     Please schedule him for 3 months from now. He should obtain CT prior. Please let him know when you schedule him to obtain this CT right before his visit. Order placed.

## 2021-08-09 NOTE — LETTER
2021     SANTOS Giles  [address] [facility] [address]  [facility]     Patient: Melanie Zelaya  YOB: 1950  Date of Visit: 2021      Dear Dr. Schultz:    Thank you for referring Melanie Zelaya to me for evaluation. Below are my notes for this consultation.    If you have questions, please do not hesitate to call me. I look forward to following your patient along with you.         Sincerely,        Chaparro Morejon MD        CC: Chaparro Schneider MD  2021  8:15 AM  Signed      [facility]  [facility]  Medical [address], [address]  [place] [address]  Phone: [phone]  Fax: [phone]        21      Re: Melanie Zelaya  : 1950      Chief Complaint:  Hospital follow up    History of Present Illness:   Melanie Zelaya is a 71 y.o. right handed male who presents in neurosurgical follow up consultation. The patient presented to [facility] after a fall from a ladder on 21.  He sustained loss of consciousness.  He was able to arise on his own volition thereafter.  He was triaged to the emergency department by his son.  On arrival he was at his neurologic baseline.  CT imaging of the cervical spine disclosed C1, C2 fractures.  He was rigidly immobilized in a hard cervical collar.  He presents today for follow up.     Since last being seen he reports minimal neck discomfort. There is no radicular extension into his upper extremities.  He has been faithfully utilizing his rigid cervical collar.  He denies changes in his strength, sensation, bowel, bladder, gait function.  He ambulates without the need for an assistive device.    Medical History:  has a past medical history of Anxiety, Atrial fibrillation (CMS/HCC), Depression, Fracture of pelvis (CMS/HCC) (), GERD (gastroesophageal reflux disease), Hearing loss, History of colon polyps, Hyperlipidemia, Hypertension, Left atrial  enlargement, Lung cancer (CMS/HCC), Lung cancer (CMS/HCC), Pneumonia (), and Seasonal allergies.    Surgical History:  has a past surgical history that includes Lung lobectomy (Left, 2016); Shoulder surgery (); Rotator cuff repair (Right, ); Tonsillectomy; Nasal polyp surgery; and Hand surgery (Bilateral).    Family History: family history includes Cancer in his nephew; Cirrhosis in his biological father; Colon cancer in his biological brother; Diabetes in his biological mother; Lung cancer in his biological brother; No Known Problems in his maternal grandfather, maternal grandmother, paternal grandfather, and paternal grandmother; Pancreatic cancer in his biological sister; Prostate cancer in his biological brother.  Family history non-contributory to neurological condition.    Social History:   Social History     Socioeconomic History   • Marital status:      Spouse name: Not on file   • Number of children: Not on file   • Years of education: Not on file   • Highest education level: Not on file   Occupational History   • Not on file   Tobacco Use   • Smoking status: Former Smoker     Packs/day: 2.00     Types: Cigarettes     Quit date: 2010     Years since quittin.3   • Smokeless tobacco: Never Used   Vaping Use   • Vaping Use: Never used   Substance and Sexual Activity   • Alcohol use: No   • Drug use: No   • Sexual activity: Yes     Partners: Female     Birth control/protection: None   Other Topics Concern   • Not on file   Social History Narrative    Retired     Social Determinants of Health     Financial Resource Strain: Low Risk    • Difficulty of Paying Living Expenses: Not hard at all   Food Insecurity: No Food Insecurity   • Worried About Running Out of Food in the Last Year: Never true   • Ran Out of Food in the Last Year: Never true   Transportation Needs: No Transportation Needs   • Lack of Transportation (Medical): No   • Lack of Transportation (Non-Medical): No   Physical  Activity: Sufficiently Active   • Days of Exercise per Week: 5 days   • Minutes of Exercise per Session: 60 min   Stress: No Stress Concern Present   • Feeling of Stress : Not at all   Social Connections: Moderately Isolated   • Frequency of Communication with Friends and Family: More than three times a week   • Frequency of Social Gatherings with Friends and Family: More than three times a week   • Attends Yarsani Services: Never   • Active Member of Clubs or Organizations: Yes   • Attends Club or Organization Meetings: More than 4 times per year   • Marital Status:    Intimate Partner Violence: Not At Risk   • Fear of Current or Ex-Partner: No   • Emotionally Abused: No   • Physically Abused: No   • Sexually Abused: No        Allergies: No Known Allergies    Medications:   Current Outpatient Medications   Medication Sig Dispense Refill   • albuterol HFA (PROAIR HFA) 90 mcg/actuation inhaler Inhale 2 puffs 2 (two) times a day as needed for wheezing or shortness of breath. (Patient taking differently: Inhale 2 puffs as needed for wheezing or shortness of breath.  ) 1 Inhaler 3   • ALPRAZolam (XANAX) 0.5 mg tablet Take 1 tablet (0.5 mg total) by mouth 2 (two) times a day as needed for anxiety. 30 tablet 1   • atorvastatin (LIPITOR) 40 mg tablet Take 1 tablet by mouth once daily 90 tablet 0   • cimetidine (TAGAMET HB) 200 mg tablet Take 200 mg by mouth as needed.     • cyclobenzaprine (FLEXERIL) 5 mg tablet Take 1 tablet (5 mg total) by mouth 3 (three) times a day as needed for muscle spasms for up to 14 days. 30 tablet 0   • dabigatran etexilate (PRADAXA) 150 mg capsu Take 150 mg by mouth 2 (two) times a day.     • DOCOSAHEXANOIC ACID/EPA (FISH OIL ORAL) Take 4 capsules by mouth daily. OTC suppliement      • escitalopram (LEXAPRO) 5 mg tablet Take 1 tablet (5 mg total) by mouth once daily. 90 tablet 1   • fluticasone propionate (FLONASE) 50 mcg/actuation nasal spray Administer 2 sprays into each nostril  daily. 16 g 2   • hydrochlorothiazide (HYDRODIURIL) 12.5 mg tablet Take 12.5 mg by mouth daily.     • HYDROCORTISONE-ACETIC ACID OTIC Administer into affected ear(s).     • irbesartan (AVAPRO) 300 mg tablet Take 300 mg by mouth nightly.     • loratadine (CLARITIN) 10 mg tablet Take 1 tablet (10 mg total) by mouth daily. (Patient taking differently: Take 10 mg by mouth daily as needed.  ) 90 tablet 1   • metoprolol tartrate (LOPRESSOR) 25 mg tablet Take 25 mg by mouth 2 (two) times a day.     • multivitamin (THERAGRAN) tablet Take 1 tablet by mouth daily.     • ondansetron ODT (ZOFRAN-ODT) 4 mg disintegrating tablet Take 1 tablet (4 mg total) by mouth every 8 (eight) hours as needed for nausea or vomiting (Nausea/Vomiting) for up to 7 days. 15 tablet 0   • sennosides-docusate sodium (SENOKOT-S) 8.6-50 mg Take 1 tablet by mouth 2 (two) times a day. 60 tablet 0   • umeclidinium-vilanterol (ANORO ELLIPTA) 62.5-25 mcg/actuation blister with device Inhale 1 puff daily.       No current facility-administered medications for this visit.       Review of Systems:   A 14 point review of systems was performed and aside from what is mentioned above is otherwise negative.    General physical examination:  The patient is well appearing male, sitting upright in his chair in no acute distress, he appears his stated age.  His head is atraumatic, normocephalic. His neck is supple without obvious adenopathy. Oral mucosa is moist. His peripheral pulses are symmetric and palpable. Extremities without peripheral edema. His breathing is normal and unlabored.     Vital signs were reviewed and within normal limits.    Neurologic examination:  Mental status:  The patient is alert, attentive, and oriented. Speech is clear and fluent with good repetition, comprehension, and naming.  Remote and recent memory are normal as well as fund of knowledge.    Cranial nerves:  CN II: Visual fields are full to confrontation.  Pupils are equal and briskly  reactive to light.   CN III, IV, VI: Extra-occular muscles are intact  CN V: Facial sensation is intact and equal in V1-V3 distributions bilaterally.   CN VII: Face is symmetric with normal eye closure and smile.  CN VIII: Hearing is normal to rubbing fingers  CN IX, X: Palate elevates symmetrically. Phonation is normal.  CN XI: Head turning and shoulder shrug are intact  CN XII: Tongue is midline with normal movements and no atrophy.    Motor:  There is no pronator drift.  Muscle bulk and tone are normal.    Deltoid Biceps Triceps Wrist ext Hand  Finger Spread Hip flexion Knee ext Dorsi-  flexion EHL Plantar Flexion   L 5 5 5 5 5 5 5 5 5 5 5   R 5 5 5 5 5 5 5 5 5 5 5     Reflexes:  Reflexes are 2+ and symmetric at the biceps, brachialis, triceps, patellar, and Achilli's. There is no Romo's sign or ankle clonus.    Sensory:   Intact light touch, pinprick, and position sense, throughout all 4 extremities.    Coordination:  There is no dysmetria on finger-to-nose testing.  Romberg is absent.    Gait:  Gait is steady with normal steps.  Heel and toe walking are normal. Tandem gait is normal.      Data Review:  CT cervical spine 7/29/21  CT DOSE:  One or more dose reduction techniques (e.g. automated exposure  control, adjustment of the mA and/or kV according to patient size, use of  iterative reconstruction technique) utilized for this examination.     COMMENT: There is a normal cervical lordosis.  The vertebral bodies are  maintained in height and alignment.  Again noted is a type II dens fracture with  improvement in angulation and alignment but no significant osseous bridging  noted.  There is improved alignment and healing of the posterior elements and  anterior arch of C1.  No other definite fracture is noted.  There is fusion of  C2-C3 and C5-C6..  Multilevel degenerative change noted.  There is no  prevertebral soft tissue swelling obvious epidural hemorrhage.     Evaluation the intraspinal soft tissues  is highly limited on non-myelographic CT  examination.     IMPRESSION:  Healing C1 and C2 fractures.    MRI Cervical Spine 4/24/21  COMMENT: Fusion of C2 and C3 as well as C5 and C6.  Fracture deformity is noted through C2 as well as arch of C1.  There is a large amount of prevertebral soft tissue fluid from C2 to C5, consistent with anterior longitudinal ligament injury.  No cord signal abnormality.  No mark anthony cord compression.  Disc osteophyte complex at C4-C5 with severe facet arthrosis, and uncovertebral hypertrophic changes, contributing to severe left and mild right foraminal narrowing.  Disc osteophyte complex at C6-C7 with severe left facet arthrosis contributing to moderate foraminal narrowing.  Disc osteophyte complex at C3-C4 with facet arthrosis bilaterally, contributing to moderate left foraminal narrowing.  No epidural collection or hematoma.     IMPRESSION: C1 and C2 fractures with findings concerning for anterior  longitudinal ligament rupture.  Degenerative spondylosis at C4-C5.    MRI Angiogram Neck wo contrast 4/24/21  Findings: MR angiography of the neck demonstrates patency of the common carotid arteries, internal carotid arteries bilaterally as well as the left vertebral artery.  No evidence for vessel dissection, cutoff, hemodynamically significant stenoses by NASCET criteria.  Although no definite intramural hematoma identified, difficult to evaluation of the right vertebral artery due to its marked attenuation and diminished caliber, felt to be throughout the entire length of the vertebral artery although there is increased T1 signal on the fat sat images in the V3 and V4 segment of the right vertebral artery, again this is likely due to the chronic appearing attenuation in the right vertebral artery.  --  IMPRESSION: The right vertebral artery is markedly attenuated and diminutive throughout its entire length, felt to be chronic or congenital in nature.  No definite intramural hematoma to  suggest acute dissection.  Carotid arteries are patent.    CT Cervical spine 4/23/21  COMMENT:  Cervicothoracic alignment: Anatomic.  Prevertebral soft tissues: There is prevertebral soft tissue thickening at the  C1-C2 articulation with right greater than left edema.  Vertebral bodies: Acute fractures involving the anterior and posterior arches of C1.  Acute fracture involving the base of the dens with displaced osseous  fragments located adjacent to the bilateral there is osseous fusion of the C2  and C3 vertebra and its posterior elements.  There is a linear lucency along the left occipital condyle likely representing a nondisplaced fracture deformity.  Remaining vertebral bodies are intact with degenerative changes.  Lateral mass of C1 and C2 vertebra.  Intervertebral discs: Severe intervertebral disc space narrowing with partial  fusion at C5-C6.  Cervical and upper thoracic spinal canal: Patent the level of C5.  Below which, evaluation for  Axial images:   Skull base: See above  C1-2: See above   C2-3: Fusion of the C2-C3 disc space with left greater than right uncovertebral and facet hypertrophy.  Moderate left foraminal narrowing.  C3-4: Posterior disc osteophyte complex with uncovertebral and facet  hypertrophy.  Severe left and moderate right foraminal narrowing.  Moderate  central canal narrowing.  C4-5: Posterior disc osteophyte complex with uncovertebral and facet  hypertrophy.  Severe left and moderate right foraminal narrowing.  Moderate  central canal narrowing.  C5-6: Fusion of the C5-C6 disc space with uncovertebral and facet hypertrophy.  Moderate left  greater right foraminal narrowing. Moderate central canal  narrowing.   C6-7: Posterior disc osteophyte complex with uncovertebral and facet  hypertrophy.  Severe left and moderate right foraminal narrowing.  Moderate  central canal narrowing..  C7-T1: Posterior disc osteophyte complex with uncovertebral and facet  hypertrophy.  Severe left and  moderate right foraminal narrowing.  Moderate  central canal narrowing.  Extra vertebral soft tissues: Biapical pleural scarring.  --  IMPRESSION:  1.  Acute fractures of the anterior and posterior arches of C1 vertebra. Acute  fracture through the base of the dens at the C2 vertebra.  Fractured bone  fragments are noted adjacent to the bilateral lateral masses of C1 and C2  vertebra.  2.  Linear lucency through the left occipital condyle, suspicious for an  avulsion fracture.  3.  Prevertebral soft tissue edema with extension of the right paravertebral  region at C1-C2.  4.  Multilevel cervical spondylosis.     CT Head without contrast 4/23/21  COMMENT: Brain parenchyma demonstrates maintenance of gray-white matter differentiation.  No acute intracranial parenchymal hemorrhage, mass effect, midline shift, or extra-axial fluid collection.  No large vascular territorial infarct.  Ventricles, basal cisterns and cortical sulci are prominent,  consistent with involutional changes.  Hypodensity in the periventricular and  subcortical white matter, consistent with microangiopathic changes.     Visualized paranasal sinuses and mastoid air cells are clear bilaterally. The  calvarium is unremarkable.  Partially visualized fractures of the anterior and  posterior arches of C1 and the base of the dens.  --  IMPRESSION:  1.  No acute intracranial hemorrhage, large vascular territorial infarct, or  mass effect.  2.  Partially visualized presumed acute fractures of the anterior and posterior  arches of C1 and base of dens.    Images were personally reviewed by myself and with the patient.      Assessment and Plan:    In summary, Melanie Zelaya is a 71 y.o. male who fell on April 2021 sustained significant head, neck trauma.  He has radiographic evidence of C1, C2 fractures.  Specifically his C2 fracture involves the odontoid process.  There is mild distraction of the odontoid process from the body of C2 with posterior  angulation.  CT scan demonstrated osseous healing of C1 however the there is still minimal healing of the odontoid process.    We have asked he obtain a flexion / extension x-ray to evaluate for dynamic instability. We have asked he utilize his rigid cervical collar until completion of the aforementioned study.      The patient was counseled length around the natural history of C1, C2 fractures.  He was asked to utilize his collar at all times as this is an unstable fracture pattern. In the interim should his symptomatology evolve or worsen, I have asked he return sooner for prompt reevaluation.    Thank you for referring Melanie Zelaya  to my attention.  Please feel free to contact me anytime if I can be of further assistance.      Mariaa HARDY PA-C, am scribing for, and in the presence of, Dr. Chaparro Morejon MD.    Chaparro HARDY MD, personally performed the services described in this documentation as scribed by YESENIA Ortiz in my presence, and it is accurate and complete.     I spent 30 minutes on this date of service performing the following activities: obtaining history, performing examination, entering orders, documenting, preparing for visit, obtaining / reviewing records, providing counseling and education, independently reviewing study/studies, communicating results and coordinating care.

## 2021-08-09 NOTE — PROGRESS NOTES
[facility]  [facility], [place]  Medical Office [address], [address]  [place] [address]  Phone: [phone]  Fax: [phone]        21      Re: Melanie Zelaya  : 1950      Chief Complaint:  Hospital follow up    History of Present Illness:   Melanie Zelaya is a 71 y.o. right handed male who presents in neurosurgical follow up consultation. The patient presented to [facility] after a fall from a ladder on 21.  He sustained loss of consciousness.  He was able to arise on his own volition thereafter.  He was triaged to the emergency department by his son.  On arrival he was at his neurologic baseline.  CT imaging of the cervical spine disclosed C1, C2 fractures.  He was rigidly immobilized in a hard cervical collar.  He presents today for follow up.     Since last being seen he reports minimal neck discomfort. There is no radicular extension into his upper extremities.  He has been faithfully utilizing his rigid cervical collar.  He denies changes in his strength, sensation, bowel, bladder, gait function.  He ambulates without the need for an assistive device.    Medical History:  has a past medical history of Anxiety, Atrial fibrillation (CMS/HCC), Depression, Fracture of pelvis (CMS/HCC) (), GERD (gastroesophageal reflux disease), Hearing loss, History of colon polyps, Hyperlipidemia, Hypertension, Left atrial enlargement, Lung cancer (CMS/HCC), Lung cancer (CMS/HCC), Pneumonia (), and Seasonal allergies.    Surgical History:  has a past surgical history that includes Lung lobectomy (Left, ); Shoulder surgery (); Rotator cuff repair (Right, ); Tonsillectomy; Nasal polyp surgery; and Hand surgery (Bilateral).    Family History: family history includes Cancer in his nephew; Cirrhosis in his biological father; Colon cancer in his biological brother; Diabetes in his biological mother; Lung cancer in his biological brother; No Known  Problems in his maternal grandfather, maternal grandmother, paternal grandfather, and paternal grandmother; Pancreatic cancer in his biological sister; Prostate cancer in his biological brother.  Family history non-contributory to neurological condition.    Social History:   Social History     Socioeconomic History   • Marital status:      Spouse name: Not on file   • Number of children: Not on file   • Years of education: Not on file   • Highest education level: Not on file   Occupational History   • Not on file   Tobacco Use   • Smoking status: Former Smoker     Packs/day: 2.00     Types: Cigarettes     Quit date: 2010     Years since quittin.3   • Smokeless tobacco: Never Used   Vaping Use   • Vaping Use: Never used   Substance and Sexual Activity   • Alcohol use: No   • Drug use: No   • Sexual activity: Yes     Partners: Female     Birth control/protection: None   Other Topics Concern   • Not on file   Social History Narrative    Retired     Social Determinants of Health     Financial Resource Strain: Low Risk    • Difficulty of Paying Living Expenses: Not hard at all   Food Insecurity: No Food Insecurity   • Worried About Running Out of Food in the Last Year: Never true   • Ran Out of Food in the Last Year: Never true   Transportation Needs: No Transportation Needs   • Lack of Transportation (Medical): No   • Lack of Transportation (Non-Medical): No   Physical Activity: Sufficiently Active   • Days of Exercise per Week: 5 days   • Minutes of Exercise per Session: 60 min   Stress: No Stress Concern Present   • Feeling of Stress : Not at all   Social Connections: Moderately Isolated   • Frequency of Communication with Friends and Family: More than three times a week   • Frequency of Social Gatherings with Friends and Family: More than three times a week   • Attends Jew Services: Never   • Active Member of Clubs or Organizations: Yes   • Attends Club or Organization Meetings: More than 4  times per year   • Marital Status:    Intimate Partner Violence: Not At Risk   • Fear of Current or Ex-Partner: No   • Emotionally Abused: No   • Physically Abused: No   • Sexually Abused: No        Allergies: No Known Allergies    Medications:   Current Outpatient Medications   Medication Sig Dispense Refill   • albuterol HFA (PROAIR HFA) 90 mcg/actuation inhaler Inhale 2 puffs 2 (two) times a day as needed for wheezing or shortness of breath. (Patient taking differently: Inhale 2 puffs as needed for wheezing or shortness of breath.  ) 1 Inhaler 3   • ALPRAZolam (XANAX) 0.5 mg tablet Take 1 tablet (0.5 mg total) by mouth 2 (two) times a day as needed for anxiety. 30 tablet 1   • atorvastatin (LIPITOR) 40 mg tablet Take 1 tablet by mouth once daily 90 tablet 0   • cimetidine (TAGAMET HB) 200 mg tablet Take 200 mg by mouth as needed.     • cyclobenzaprine (FLEXERIL) 5 mg tablet Take 1 tablet (5 mg total) by mouth 3 (three) times a day as needed for muscle spasms for up to 14 days. 30 tablet 0   • dabigatran etexilate (PRADAXA) 150 mg capsu Take 150 mg by mouth 2 (two) times a day.     • DOCOSAHEXANOIC ACID/EPA (FISH OIL ORAL) Take 4 capsules by mouth daily. OTC suppliement      • escitalopram (LEXAPRO) 5 mg tablet Take 1 tablet (5 mg total) by mouth once daily. 90 tablet 1   • fluticasone propionate (FLONASE) 50 mcg/actuation nasal spray Administer 2 sprays into each nostril daily. 16 g 2   • hydrochlorothiazide (HYDRODIURIL) 12.5 mg tablet Take 12.5 mg by mouth daily.     • HYDROCORTISONE-ACETIC ACID OTIC Administer into affected ear(s).     • irbesartan (AVAPRO) 300 mg tablet Take 300 mg by mouth nightly.     • loratadine (CLARITIN) 10 mg tablet Take 1 tablet (10 mg total) by mouth daily. (Patient taking differently: Take 10 mg by mouth daily as needed.  ) 90 tablet 1   • metoprolol tartrate (LOPRESSOR) 25 mg tablet Take 25 mg by mouth 2 (two) times a day.     • multivitamin (THERAGRAN) tablet Take 1 tablet  by mouth daily.     • ondansetron ODT (ZOFRAN-ODT) 4 mg disintegrating tablet Take 1 tablet (4 mg total) by mouth every 8 (eight) hours as needed for nausea or vomiting (Nausea/Vomiting) for up to 7 days. 15 tablet 0   • sennosides-docusate sodium (SENOKOT-S) 8.6-50 mg Take 1 tablet by mouth 2 (two) times a day. 60 tablet 0   • umeclidinium-vilanterol (ANORO ELLIPTA) 62.5-25 mcg/actuation blister with device Inhale 1 puff daily.       No current facility-administered medications for this visit.       Review of Systems:   A 14 point review of systems was performed and aside from what is mentioned above is otherwise negative.    General physical examination:  The patient is well appearing male, sitting upright in his chair in no acute distress, he appears his stated age.  His head is atraumatic, normocephalic. His neck is supple without obvious adenopathy. Oral mucosa is moist. His peripheral pulses are symmetric and palpable. Extremities without peripheral edema. His breathing is normal and unlabored.     Vital signs were reviewed and within normal limits.    Neurologic examination:  Mental status:  The patient is alert, attentive, and oriented. Speech is clear and fluent with good repetition, comprehension, and naming.  Remote and recent memory are normal as well as fund of knowledge.    Cranial nerves:  CN II: Visual fields are full to confrontation.  Pupils are equal and briskly reactive to light.   CN III, IV, VI: Extra-occular muscles are intact  CN V: Facial sensation is intact and equal in V1-V3 distributions bilaterally.   CN VII: Face is symmetric with normal eye closure and smile.  CN VIII: Hearing is normal to rubbing fingers  CN IX, X: Palate elevates symmetrically. Phonation is normal.  CN XI: Head turning and shoulder shrug are intact  CN XII: Tongue is midline with normal movements and no atrophy.    Motor:  There is no pronator drift.  Muscle bulk and tone are normal.    Deltoid Biceps Triceps Wrist  ext Hand  Finger Spread Hip flexion Knee ext Dorsi-  flexion EHL Plantar Flexion   L 5 5 5 5 5 5 5 5 5 5 5   R 5 5 5 5 5 5 5 5 5 5 5     Reflexes:  Reflexes are 2+ and symmetric at the biceps, brachialis, triceps, patellar, and Achilli's. There is no Romo's sign or ankle clonus.    Sensory:   Intact light touch, pinprick, and position sense, throughout all 4 extremities.    Coordination:  There is no dysmetria on finger-to-nose testing.  Romberg is absent.    Gait:  Gait is steady with normal steps.  Heel and toe walking are normal. Tandem gait is normal.      Data Review:  CT cervical spine 7/29/21  CT DOSE:  One or more dose reduction techniques (e.g. automated exposure  control, adjustment of the mA and/or kV according to patient size, use of  iterative reconstruction technique) utilized for this examination.     COMMENT: There is a normal cervical lordosis.  The vertebral bodies are  maintained in height and alignment.  Again noted is a type II dens fracture with  improvement in angulation and alignment but no significant osseous bridging  noted.  There is improved alignment and healing of the posterior elements and  anterior arch of C1.  No other definite fracture is noted.  There is fusion of  C2-C3 and C5-C6..  Multilevel degenerative change noted.  There is no  prevertebral soft tissue swelling obvious epidural hemorrhage.     Evaluation the intraspinal soft tissues is highly limited on non-myelographic CT  examination.     IMPRESSION:  Healing C1 and C2 fractures.    MRI Cervical Spine 4/24/21  COMMENT: Fusion of C2 and C3 as well as C5 and C6.  Fracture deformity is noted through C2 as well as arch of C1.  There is a large amount of prevertebral soft tissue fluid from C2 to C5, consistent with anterior longitudinal ligament injury.  No cord signal abnormality.  No mark anthony cord compression.  Disc osteophyte complex at C4-C5 with severe facet arthrosis, and uncovertebral hypertrophic changes,  contributing to severe left and mild right foraminal narrowing.  Disc osteophyte complex at C6-C7 with severe left facet arthrosis contributing to moderate foraminal narrowing.  Disc osteophyte complex at C3-C4 with facet arthrosis bilaterally, contributing to moderate left foraminal narrowing.  No epidural collection or hematoma.     IMPRESSION: C1 and C2 fractures with findings concerning for anterior  longitudinal ligament rupture.  Degenerative spondylosis at C4-C5.    MRI Angiogram Neck wo contrast 4/24/21  Findings: MR angiography of the neck demonstrates patency of the common carotid arteries, internal carotid arteries bilaterally as well as the left vertebral artery.  No evidence for vessel dissection, cutoff, hemodynamically significant stenoses by NASCET criteria.  Although no definite intramural hematoma identified, difficult to evaluation of the right vertebral artery due to its marked attenuation and diminished caliber, felt to be throughout the entire length of the vertebral artery although there is increased T1 signal on the fat sat images in the V3 and V4 segment of the right vertebral artery, again this is likely due to the chronic appearing attenuation in the right vertebral artery.  --  IMPRESSION: The right vertebral artery is markedly attenuated and diminutive throughout its entire length, felt to be chronic or congenital in nature.  No definite intramural hematoma to suggest acute dissection.  Carotid arteries are patent.    CT Cervical spine 4/23/21  COMMENT:  Cervicothoracic alignment: Anatomic.  Prevertebral soft tissues: There is prevertebral soft tissue thickening at the  C1-C2 articulation with right greater than left edema.  Vertebral bodies: Acute fractures involving the anterior and posterior arches of C1.  Acute fracture involving the base of the dens with displaced osseous  fragments located adjacent to the bilateral there is osseous fusion of the C2  and C3 vertebra and its  posterior elements.  There is a linear lucency along the left occipital condyle likely representing a nondisplaced fracture deformity.  Remaining vertebral bodies are intact with degenerative changes.  Lateral mass of C1 and C2 vertebra.  Intervertebral discs: Severe intervertebral disc space narrowing with partial  fusion at C5-C6.  Cervical and upper thoracic spinal canal: Patent the level of C5.  Below which, evaluation for  Axial images:   Skull base: See above  C1-2: See above   C2-3: Fusion of the C2-C3 disc space with left greater than right uncovertebral and facet hypertrophy.  Moderate left foraminal narrowing.  C3-4: Posterior disc osteophyte complex with uncovertebral and facet  hypertrophy.  Severe left and moderate right foraminal narrowing.  Moderate  central canal narrowing.  C4-5: Posterior disc osteophyte complex with uncovertebral and facet  hypertrophy.  Severe left and moderate right foraminal narrowing.  Moderate  central canal narrowing.  C5-6: Fusion of the C5-C6 disc space with uncovertebral and facet hypertrophy.  Moderate left  greater right foraminal narrowing. Moderate central canal  narrowing.   C6-7: Posterior disc osteophyte complex with uncovertebral and facet  hypertrophy.  Severe left and moderate right foraminal narrowing.  Moderate  central canal narrowing..  C7-T1: Posterior disc osteophyte complex with uncovertebral and facet  hypertrophy.  Severe left and moderate right foraminal narrowing.  Moderate  central canal narrowing.  Extra vertebral soft tissues: Biapical pleural scarring.  --  IMPRESSION:  1.  Acute fractures of the anterior and posterior arches of C1 vertebra. Acute  fracture through the base of the dens at the C2 vertebra.  Fractured bone  fragments are noted adjacent to the bilateral lateral masses of C1 and C2  vertebra.  2.  Linear lucency through the left occipital condyle, suspicious for an  avulsion fracture.  3.  Prevertebral soft tissue edema with extension  of the right paravertebral  region at C1-C2.  4.  Multilevel cervical spondylosis.     CT Head without contrast 4/23/21  COMMENT: Brain parenchyma demonstrates maintenance of gray-white matter differentiation.  No acute intracranial parenchymal hemorrhage, mass effect, midline shift, or extra-axial fluid collection.  No large vascular territorial infarct.  Ventricles, basal cisterns and cortical sulci are prominent,  consistent with involutional changes.  Hypodensity in the periventricular and  subcortical white matter, consistent with microangiopathic changes.     Visualized paranasal sinuses and mastoid air cells are clear bilaterally. The  calvarium is unremarkable.  Partially visualized fractures of the anterior and  posterior arches of C1 and the base of the dens.  --  IMPRESSION:  1.  No acute intracranial hemorrhage, large vascular territorial infarct, or  mass effect.  2.  Partially visualized presumed acute fractures of the anterior and posterior  arches of C1 and base of dens.    Images were personally reviewed by myself and with the patient.      Assessment and Plan:    In summary, Melanie Zelaya is a 71 y.o. male who fell on April 2021 sustained significant head, neck trauma.  He has radiographic evidence of C1, C2 fractures.  Specifically his C2 fracture involves the odontoid process.  There is mild distraction of the odontoid process from the body of C2 with posterior angulation.  CT scan demonstrated osseous healing of C1 however the there is still minimal healing of the odontoid process.    We have asked he obtain a flexion / extension x-ray to evaluate for dynamic instability. We have asked he utilize his rigid cervical collar until completion of the aforementioned study.      The patient was counseled length around the natural history of C1, C2 fractures.  He was asked to utilize his collar at all times as this is an unstable fracture pattern. In the interim should his symptomatology evolve or  worsen, I have asked he return sooner for prompt reevaluation.    Thank you for referring Melanie Zelaya  to my attention.  Please feel free to contact me anytime if I can be of further assistance.      I, Mariaa Davalos PA-C, am scribing for, and in the presence of, Dr. Chaparro Morejon MD.    I, Chaparro Morejon MD, personally performed the services described in this documentation as scribed by YESENIA Ortiz in my presence, and it is accurate and complete.     I spent 30 minutes on this date of service performing the following activities: obtaining history, performing examination, entering orders, documenting, preparing for visit, obtaining / reviewing records, providing counseling and education, independently reviewing study/studies, communicating results and coordinating care.

## 2021-08-10 NOTE — TELEPHONE ENCOUNTER
Delayed entry  S/w patient, wants to schedule later, told him to get CT scan 1-2 weeks prior to visit and schedule appt in November

## 2021-08-20 ENCOUNTER — TELEPHONE (OUTPATIENT)
Dept: NEUROLOGY | Facility: CLINIC | Age: 71
End: 2021-08-20

## 2021-08-20 RX ORDER — FLUTICASONE PROPIONATE 50 MCG
2 SPRAY, SUSPENSION (ML) NASAL DAILY
Qty: 16 G | Refills: 2 | Status: SHIPPED | OUTPATIENT
Start: 2021-08-20 | End: 2022-04-14 | Stop reason: SDUPTHER

## 2021-08-20 NOTE — TELEPHONE ENCOUNTER
Do you have enough medication for the next 5 days?: no    Did you request this medication through your pharmacy or our patient portal in the last day or two? Pharmacy said they had something ready but they did not have anytihng so he called to request flonase.     Name of medication requested: fluticasone propionate (FLONASE) 50 mcg/actuation nasal spray  Medication Strength:   Mediation Directions: 2 spray each nostril   Quantity Requested (example 30/90): 90  Number of refills requested:     [facility] [address] [place] [id number]  Phone: [phone] Fax: [phone]    Additional Comments:    Next Encounter with this provider: 9/29/2021

## 2021-08-20 NOTE — TELEPHONE ENCOUNTER
DR CRUMP  CT CERVICAL SPINE W/O CONTRAST   AUTHORIZATION #: [id number]   EXP: 11/15/2021  [facility] [phone]

## 2021-08-24 ENCOUNTER — TELEPHONE (OUTPATIENT)
Dept: NEUROSURGERY | Facility: CLINIC | Age: 71
End: 2021-08-24

## 2021-08-24 RX ORDER — TRAMADOL HYDROCHLORIDE 50 MG/1
50 TABLET ORAL EVERY 8 HOURS PRN
Qty: 30 TABLET | Refills: 0 | Status: SHIPPED | OUTPATIENT
Start: 2021-08-24 | End: 2021-09-03

## 2021-08-24 NOTE — TELEPHONE ENCOUNTER
Prescription electronically sent to pharmacy on file. The [place] PDMP was queried prior to providing the patient with a prescription for a controlled substance.

## 2021-08-30 ENCOUNTER — APPOINTMENT (OUTPATIENT)
Dept: LAB | Facility: HOSPITAL | Age: 71
End: 2021-08-30
Attending: INTERNAL MEDICINE
Payer: COMMERCIAL

## 2021-08-30 ENCOUNTER — TELEPHONE (OUTPATIENT)
Dept: PULMONOLOGY | Facility: CLINIC | Age: 71
End: 2021-08-30

## 2021-08-30 ENCOUNTER — TELEPHONE (OUTPATIENT)
Dept: THORACIC SURGERY | Facility: CLINIC | Age: 71
End: 2021-08-30

## 2021-08-30 DIAGNOSIS — Z11.59 ENCOUNTER FOR SCREENING FOR OTHER VIRAL DISEASES: ICD-10-CM

## 2021-08-30 LAB — SARS-COV-2 RNA RESP QL NAA+PROBE: NEGATIVE

## 2021-08-30 PROCEDURE — U0003 INFECTIOUS AGENT DETECTION BY NUCLEIC ACID (DNA OR RNA); SEVERE ACUTE RESPIRATORY SYNDROME CORONAVIRUS 2 (SARS-COV-2) (CORONAVIRUS DISEASE [COVID-19]), AMPLIFIED PROBE TECHNIQUE, MAKING USE OF HIGH THROUGHPUT TECHNOLOGIES AS DESCRIBED BY CMS-2020-01-R: HCPCS

## 2021-08-30 NOTE — TELEPHONE ENCOUNTER
Pt is calling to find out why he needs a pre-cert he stated he never needed one. Before. He would like a callback at [phone]

## 2021-09-02 ENCOUNTER — ANESTHESIA EVENT (OUTPATIENT)
Dept: ENDOSCOPY | Facility: HOSPITAL | Age: 71
End: 2021-09-02
Payer: COMMERCIAL

## 2021-09-02 ENCOUNTER — HOSPITAL ENCOUNTER (OUTPATIENT)
Facility: HOSPITAL | Age: 71
Discharge: HOME | End: 2021-09-02
Attending: INTERNAL MEDICINE | Admitting: INTERNAL MEDICINE
Payer: COMMERCIAL

## 2021-09-02 ENCOUNTER — ANESTHESIA (OUTPATIENT)
Dept: ENDOSCOPY | Facility: HOSPITAL | Age: 71
End: 2021-09-02
Payer: COMMERCIAL

## 2021-09-02 VITALS
BODY MASS INDEX: 30.53 KG/M2 | HEART RATE: 63 BPM | OXYGEN SATURATION: 97 % | TEMPERATURE: 97 F | WEIGHT: 190 LBS | DIASTOLIC BLOOD PRESSURE: 80 MMHG | HEIGHT: 66 IN | SYSTOLIC BLOOD PRESSURE: 140 MMHG | RESPIRATION RATE: 18 BRPM

## 2021-09-02 DIAGNOSIS — Z12.11 SCREEN FOR COLON CANCER: ICD-10-CM

## 2021-09-02 PROCEDURE — 0DBM8ZX EXCISION OF DESCENDING COLON, VIA NATURAL OR ARTIFICIAL OPENING ENDOSCOPIC, DIAGNOSTIC: ICD-10-PCS | Performed by: INTERNAL MEDICINE

## 2021-09-02 PROCEDURE — 25800000 HC PHARMACY IV SOLUTIONS: Performed by: INTERNAL MEDICINE

## 2021-09-02 PROCEDURE — 75000011 HC COLONSCOPY BIOPSY: Performed by: INTERNAL MEDICINE

## 2021-09-02 PROCEDURE — 88305 TISSUE EXAM BY PATHOLOGIST: CPT | Performed by: INTERNAL MEDICINE

## 2021-09-02 PROCEDURE — 75000020 HC COLONSCOPY SNARE: Performed by: INTERNAL MEDICINE

## 2021-09-02 PROCEDURE — 71000001 HC PACU PHASE 1 INITIAL 30MIN: Performed by: INTERNAL MEDICINE

## 2021-09-02 PROCEDURE — 37000002 HC ANESTHESIA MAC: Performed by: INTERNAL MEDICINE

## 2021-09-02 PROCEDURE — 63600000 HC DRUGS/DETAIL CODE: Performed by: ANESTHESIOLOGY

## 2021-09-02 PROCEDURE — 71000011 HC PACU PHASE 1 EA ADDL MIN: Performed by: INTERNAL MEDICINE

## 2021-09-02 PROCEDURE — 0DBK8ZX EXCISION OF ASCENDING COLON, VIA NATURAL OR ARTIFICIAL OPENING ENDOSCOPIC, DIAGNOSTIC: ICD-10-PCS | Performed by: INTERNAL MEDICINE

## 2021-09-02 PROCEDURE — 27200000 HC STERILE SUPPLY: Performed by: INTERNAL MEDICINE

## 2021-09-02 RX ORDER — SODIUM CHLORIDE 9 MG/ML
INJECTION, SOLUTION INTRAVENOUS CONTINUOUS
Status: DISCONTINUED | OUTPATIENT
Start: 2021-09-02 | End: 2021-09-02 | Stop reason: HOSPADM

## 2021-09-02 RX ORDER — PROPOFOL 200MG/20ML
SYRINGE (ML) INTRAVENOUS AS NEEDED
Status: DISCONTINUED | OUTPATIENT
Start: 2021-09-02 | End: 2021-09-02 | Stop reason: SURG

## 2021-09-02 RX ORDER — DABIGATRAN ETEXILATE 150 MG/1
150 CAPSULE ORAL 2 TIMES DAILY
Qty: 60 CAPSULE | Refills: 0 | Status: ON HOLD | OUTPATIENT
Start: 2021-09-04 | End: 2022-01-11

## 2021-09-02 RX ORDER — PROPOFOL 10 MG/ML
INJECTION, EMULSION INTRAVENOUS CONTINUOUS PRN
Status: DISCONTINUED | OUTPATIENT
Start: 2021-09-02 | End: 2021-09-02 | Stop reason: SURG

## 2021-09-02 RX ADMIN — PROPOFOL 50 MG: 10 INJECTION, EMULSION INTRAVENOUS at 08:43

## 2021-09-02 RX ADMIN — SODIUM CHLORIDE: 9 INJECTION, SOLUTION INTRAVENOUS at 07:57

## 2021-09-02 RX ADMIN — SODIUM CHLORIDE: 9 INJECTION, SOLUTION INTRAVENOUS at 08:44

## 2021-09-02 RX ADMIN — PROPOFOL 100 MCG/KG/MIN: 10 INJECTION, EMULSION INTRAVENOUS at 08:42

## 2021-09-02 ASSESSMENT — COPD QUESTIONNAIRES: COPD: 1

## 2021-09-02 NOTE — ANESTHESIA POSTPROCEDURE EVALUATION
"Patient: Melanie Zelaya    Procedure Summary     Date: 09/02/21 Room / Location:  GI 1 / [facility]    Anesthesia Start: 0835 Anesthesia Stop: 0919    Procedures:       Colonoscopy (N/A Anus)      FLEXIBLE COLONOSCOPY PROXIMAL TO SPLENIC FLEXURE WITH REMOVAL OF TUMOR USING SNARE (N/A Anus) Diagnosis:       Screen for colon cancer      (Screening Colon)    Providers: Violet Zavala DO Responsible Provider: Kiran Yip DO    Anesthesia Type: MAC ASA Status: 3          Anesthesia Type: MAC  PACU Vitals  9/2/2021 0902 - 9/2/2021 0919      9/2/2021  0915             Temp:  36.1 °C (97 °F)  (Pended)     Resp:  18  (Pended)           Blood pressure (!) 164/79, pulse 78, temperature (P) 36.1 °C (97 °F), temperature source (P) Temporal, resp. rate (P) 18, height 1.676 m (5' 6\"), weight 86.2 kg (190 lb), SpO2 98 %.      Anesthesia Post Evaluation    Pain management: adequate  Patient location during evaluation: PACU  Patient participation: complete - patient participated  Level of consciousness: awake and alert  Cardiovascular status: acceptable  Airway Patency: adequate  Respiratory status: acceptable  Hydration status: acceptable  Anesthetic complications: no      "

## 2021-09-02 NOTE — ANESTHESIA PREPROCEDURE EVALUATION
Relevant Problems   CARDIOVASCULAR   (+) Hypertension      GASTROINTESTINAL   (+) Gastroesophageal reflux disease without esophagitis      NEUROLOGY   (+) Anxiety   (+) Depression   (+) History of colon polyps   (+) Mild episode of recurrent major depressive disorder (CMS/HCC)      RESPIRATORY SYSTEM   (+) Asthma   (+) Other emphysema (CMS/HCC)      Other   (+) Primary malignant neoplasm of left lower lobe of lung (CMS/HCC)       Anesthesia ROS/MED HX      Pulmonary    Pneumonia   asthma   COPD  Cardiovascular   hypertension  GI/Hepatic   GERD       Past Surgical History:   Procedure Laterality Date   • COLONOSCOPY     • HAND SURGERY Bilateral    • LUNG LOBECTOMY Left 2016   • NASAL POLYP SURGERY     • ROTATOR CUFF REPAIR Right 2001   • SHOULDER SURGERY  1998   • TONSILLECTOMY       Patient Active Problem List   Diagnosis   • Anxiety   • Chronic diastolic heart failure (CMS/HCC)   • Depression   • Hearing loss   • Hyperlipidemia   • Hypertension   • Primary malignant neoplasm of left lower lobe of lung (CMS/HCC)   • History of colon polyps   • Multiple pulmonary nodules   • Other emphysema (CMS/HCC)   • Gastroesophageal reflux disease without esophagitis   • Asthma   • Cervical spine fracture (CMS/HCC)   • Fracture of right scapula   • Hypertension   • Fall   • Mild episode of recurrent major depressive disorder (CMS/HCC)       Past Surgical History:   Procedure Laterality Date   • COLONOSCOPY     • HAND SURGERY Bilateral    • LUNG LOBECTOMY Left 2016   • NASAL POLYP SURGERY     • ROTATOR CUFF REPAIR Right 2001   • SHOULDER SURGERY  1998   • TONSILLECTOMY         Current Facility-Administered Medications   Medication Dose Route Frequency   • sodium chloride 0.9 %   intravenous Continuous       Prior to Admission medications    Medication Sig Start Date End Date Taking? Authorizing Provider   atorvastatin (LIPITOR) 40 mg tablet Take 1 tablet by mouth once daily 7/14/21  Yes Keisha Schultz CRNP   escitalopram  (LEXAPRO) 5 mg tablet Take 1 tablet (5 mg total) by mouth once daily. 3/1/21  Yes Salina Ernandez CRNP   fluticasone propionate (FLONASE) 50 mcg/actuation nasal spray Administer 2 sprays into each nostril daily. 8/20/21  Yes Keisha Schultz CRNP   hydrochlorothiazide (HYDRODIURIL) 12.5 mg tablet Take 12.5 mg by mouth daily.   Yes ProviderPete MD   irbesartan (AVAPRO) 300 mg tablet Take 300 mg by mouth nightly.   Yes Provider, MD Pete   metoprolol tartrate (LOPRESSOR) 25 mg tablet Take 25 mg by mouth 2 (two) times a day.   Yes ProviderPete MD   multivitamin (THERAGRAN) tablet Take 1 tablet by mouth daily.   Yes ProviderPete MD   traMADoL (ULTRAM) 50 mg tablet Take 1 tablet (50 mg total) by mouth every 8 (eight) hours as needed for moderate pain for up to 10 days. 8/24/21 9/3/21 Yes Vanessa Spain PA C   umeclidinium-vilanterol (ANORO ELLIPTA) 62.5-25 mcg/actuation blister with device Inhale 1 puff daily. 9/7/17  Yes Pete Foote MD   albuterol HFA (PROAIR HFA) 90 mcg/actuation inhaler Inhale 2 puffs 2 (two) times a day as needed for wheezing or shortness of breath.  Patient taking differently: Inhale 2 puffs as needed for wheezing or shortness of breath.   10/22/19   Shanti Gutierrez CRNP   ALPRAZolam (XANAX) 0.5 mg tablet Take 1 tablet (0.5 mg total) by mouth 2 (two) times a day as needed for anxiety. 10/13/20   Keisha Schultz CRNP   cimetidine (TAGAMET HB) 200 mg tablet Take 200 mg by mouth as needed.    ProviderPete MD   cyclobenzaprine (FLEXERIL) 5 mg tablet Take 1 tablet (5 mg total) by mouth 3 (three) times a day as needed for muscle spasms for up to 14 days. 6/9/21 6/23/21  Yochim, Vanessa J, PA C   dabigatran etexilate (PRADAXA) 150 mg capsu Take 150 mg by mouth 2 (two) times a day.    Provider, MD Pete   DOCOSAHEXANOIC ACID/EPA (FISH OIL ORAL) Take 4 capsules by mouth daily. OTC suppliement     Provider, MD Pete   loratadine  (CLARITIN) 10 mg tablet Take 1 tablet (10 mg total) by mouth daily.  Patient taking differently: Take 10 mg by mouth daily as needed.   10/13/20 5/3/21  Keisha Schultz CRNP   ondansetron ODT (ZOFRAN-ODT) 4 mg disintegrating tablet Take 1 tablet (4 mg total) by mouth every 8 (eight) hours as needed for nausea or vomiting (Nausea/Vomiting) for up to 7 days. 4/28/21 5/5/21  Dyan Caruos CRNP   sennosides-docusate sodium (SENOKOT-S) 8.6-50 mg Take 1 tablet by mouth 2 (two) times a day. 4/28/21 5/28/21  Dyan Caruso CRNP   HYDROCORTISONE-ACETIC ACID OTIC Administer into affected ear(s).  9/2/21  Provider, MD Pete       CBC Results       04/28/21 04/27/21 04/26/21                    0553 0437 0500         WBC 6.40 6.10 6.73         RBC 5.16 5.21 4.91         HGB 15.3 15.3 14.4         HCT 45.7 45.6 43.6         MCV 88.6 87.5 88.8         MCH 29.7 29.4 29.3         MCHC 33.5 33.6 33.0          196 183                       BMP Results       04/28/21 04/26/21 04/25/21                    0553 0500 0508          139 136         K 3.9 3.9 3.9         Cl 98 100 98         CO2 26 31 29         Glucose 124 125 115         BUN 16 14 13         Creatinine 0.8 0.8 0.9         Calcium 9.2 9.0 9.1         Anion Gap 12 8 9         EGFR >60.0 >60.0 >60.0                       No results found for: HCGPREGUR, PREGSERUM, HCG, HCGQUANT          No orders to display         Physical Exam    Airway   Mallampati: II   TM distance: <3 FB   Neck ROM: full        Anesthesia Plan    Plan: MAC    Technique: total IV anesthesia   ASA 3  Anesthetic plan and risks discussed with: patient

## 2021-09-02 NOTE — OP NOTE
_______________________________________________________________________________  Patient Name: Melanie Zelaya           Procedure Date: 9/2/2021 8:25 AM  MRN: [id number]                     Account Number: [id number]  YOB: 1950              Age: 71  Gender: Male                          Note Status: Finalized  Attending MD: SANTOS BOYD  _______________________________________________________________________________  Procedure:             Colonoscopy  Indications:           High risk colon cancer surveillance: Personal history  of colonic polyps  Providers:             SANTOS ARIAS (Doctor)  Referring MD:          SANTOS RAY  Requesting Provider:  Medicines:             See the Anesthesia note for documentation of the  administered medications  Complications:         No immediate complications. Estimated blood loss: None.  _______________________________________________________________________________  Procedure:             After I obtained informed consent, the scope was  passed under direct vision. Throughout the procedure,  the patient's blood pressure, pulse, and oxygen  saturations were monitored continuously. The  Colonoscope was introduced through the anus and  advanced to the cecum, identified by appendiceal  orifice and ileocecal valve. The colonoscopy was  performed without difficulty. The patient tolerated  the procedure well. The quality of the bowel  preparation was adequate to identify polyps 6 mm and  larger in size.  Estimated Blood Loss:  Estimated blood loss: none.  Findings:  Two sessile polyps were found in the proximal ascending colon. The  polyps were 2 to 3 mm in size. These polyps were removed with a cold  biopsy forceps. Resection and retrieval were complete. Estimated blood  loss was minimal.  Two sessile polyps were found in the descending colon. The polyps were 6  to 8 mm in size. These polyps were removed with a cold snare.  Resection  and retrieval were complete. To prevent bleeding after the polypectomy,  two hemostatic clips were successfully placed. There was no bleeding at  the end of the procedure.  Small and large-mouthed diverticula were found in the entire colon.  Non-bleeding external and internal hemorrhoids were found during  retroflexion. The hemorrhoids were small and Grade I (internal  hemorrhoids that do not prolapse).  No other significant abnormalities were identified in a careful  examination of the remainder of the colon.  The exam was otherwise without abnormality on direct and retroflexion  views.  Impression:            - Two 2 to 3 mm polyps in the proximal ascending  colon, removed with a cold biopsy forceps. Resected  and retrieved.  - Two 6 to 8 mm polyps in the descending colon,  removed with a cold snare. Resected and retrieved.  Clips were placed.  - Diverticulosis in the entire examined colon.  - Non-bleeding external and internal hemorrhoids.  - The examination was otherwise normal on direct and  retroflexion views.  Recommendation:        - Patient has a contact number available for  emergencies. The signs and symptoms of potential  delayed complications were discussed with the patient.  Return to normal activities tomorrow. Written  discharge instructions were provided to the patient.  - High fiber diet daily.  - Repeat colonoscopy in 3 years for surveillance.  - Resume Pradaxa (dabigatran) at prior dose in 2 days.  Refer to managing physician for further adjustment of  therapy.  - Return to my office PRN.  Procedure Code(s):     --- Professional ---  24480, Colonoscopy, flexible; with removal of  tumor(s), polyp(s), or other lesion(s) by snare  technique  62162, 59, Colonoscopy, flexible; with biopsy, single  or multiple  Diagnosis Code(s):     --- Professional ---  Z86.010, Personal history of colonic polyps  K63.5, Polyp of colon  K64.0, First degree hemorrhoids  K57.30, Diverticulosis of large  intestine without  perforation or abscess without bleeding  CPT copyright 2020 [employer]. All rights reserved.  The codes documented in this report are preliminary and upon  review may  be revised to meet current compliance requirements.  ___________________________  MARY KAY FLEMING DO~[place]  9/2/2021 9:11:23 AM  This report has been signed electronically.  Number of Addenda: 0  Note Initiated On: 9/2/2021 8:25 AM

## 2021-09-02 NOTE — ANESTHESIOLOGIST PRE-PROCEDURE ATTESTATION
Pre-Procedure Patient Identification:  I am the Primary Anesthesiologist and have identified the patient on 09/02/21 at 7:58 AM.   I have confirmed the procedure(s) will be performed by the following surgeon/proceduralist Violet Zavala DO.
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
## 2021-09-07 LAB
CASE RPRT: NORMAL
CLINICAL INFO: NORMAL
PATH REPORT.FINAL DX SPEC: NORMAL
PATH REPORT.FINAL DX SPEC: NORMAL
PATH REPORT.GROSS SPEC: NORMAL

## 2021-09-28 ENCOUNTER — APPOINTMENT (OUTPATIENT)
Dept: LAB | Age: 71
End: 2021-09-28
Attending: INTERNAL MEDICINE
Payer: COMMERCIAL

## 2021-09-28 ENCOUNTER — HOSPITAL ENCOUNTER (OUTPATIENT)
Dept: RADIOLOGY | Age: 71
Discharge: HOME | End: 2021-09-28
Attending: PHYSICIAN ASSISTANT
Payer: COMMERCIAL

## 2021-09-28 ENCOUNTER — TRANSCRIBE ORDERS (OUTPATIENT)
Dept: LAB | Age: 71
End: 2021-09-28
Payer: COMMERCIAL

## 2021-09-28 DIAGNOSIS — I34.0 NONRHEUMATIC MITRAL (VALVE) INSUFFICIENCY: ICD-10-CM

## 2021-09-28 DIAGNOSIS — I10 ESSENTIAL (PRIMARY) HYPERTENSION: ICD-10-CM

## 2021-09-28 DIAGNOSIS — E78.00 PURE HYPERCHOLESTEROLEMIA: ICD-10-CM

## 2021-09-28 DIAGNOSIS — C34.32 PRIMARY MALIGNANT NEOPLASM OF LEFT LOWER LOBE OF LUNG (CMS/HCC): ICD-10-CM

## 2021-09-28 LAB
ALBUMIN SERPL-MCNC: 4.2 G/DL (ref 3.4–5)
ALP SERPL-CCNC: 54 IU/L (ref 35–126)
ALT SERPL-CCNC: 21 IU/L (ref 16–63)
ANION GAP SERPL CALC-SCNC: 9 MEQ/L (ref 3–15)
AST SERPL-CCNC: 23 IU/L (ref 15–41)
BASOPHILS # BLD: 0.01 K/UL (ref 0.01–0.1)
BASOPHILS NFR BLD: 0.2 %
BILIRUB SERPL-MCNC: 0.8 MG/DL (ref 0.3–1.2)
BUN SERPL-MCNC: 18 MG/DL (ref 8–20)
CALCIUM SERPL-MCNC: 9.5 MG/DL (ref 8.9–10.3)
CHLORIDE SERPL-SCNC: 102 MEQ/L (ref 98–109)
CHOLEST SERPL-MCNC: 141 MG/DL
CO2 SERPL-SCNC: 30 MEQ/L (ref 22–32)
CREAT SERPL-MCNC: 1 MG/DL (ref 0.8–1.3)
DIFFERENTIAL METHOD BLD: ABNORMAL
EOSINOPHIL # BLD: 0.02 K/UL (ref 0.04–0.54)
EOSINOPHIL NFR BLD: 0.4 %
ERYTHROCYTE [DISTWIDTH] IN BLOOD BY AUTOMATED COUNT: 13.8 % (ref 11.6–14.4)
GFR SERPL CREATININE-BSD FRML MDRD: >60 ML/MIN/1.73M*2
GLUCOSE SERPL-MCNC: 97 MG/DL (ref 70–99)
HCT VFR BLDCO AUTO: 43.9 % (ref 40.1–51)
HDLC SERPL-MCNC: 40 MG/DL
HDLC SERPL: 3.5 {RATIO}
HGB BLD-MCNC: 14.3 G/DL (ref 13.7–17.5)
IMM GRANULOCYTES # BLD AUTO: 0.02 K/UL (ref 0–0.08)
IMM GRANULOCYTES NFR BLD AUTO: 0.4 %
LDLC SERPL CALC-MCNC: 82 MG/DL
LYMPHOCYTES # BLD: 1.02 K/UL (ref 1.2–3.5)
LYMPHOCYTES NFR BLD: 20.6 %
MCH RBC QN AUTO: 29 PG (ref 28–33.2)
MCHC RBC AUTO-ENTMCNC: 32.6 G/DL (ref 32.2–36.5)
MCV RBC AUTO: 89 FL (ref 83–98)
MONOCYTES # BLD: 0.54 K/UL (ref 0.3–1)
MONOCYTES NFR BLD: 10.9 %
NEUTROPHILS # BLD: 3.34 K/UL (ref 1.7–7)
NEUTS SEG NFR BLD: 67.5 %
NONHDLC SERPL-MCNC: 101 MG/DL
NRBC BLD-RTO: 0 %
PDW BLD AUTO: 10.6 FL (ref 9.4–12.4)
PLATELET # BLD AUTO: 197 K/UL (ref 150–350)
POTASSIUM SERPL-SCNC: 3.9 MEQ/L (ref 3.6–5.1)
PROT SERPL-MCNC: 6.5 G/DL (ref 6–8.2)
RBC # BLD AUTO: 4.93 M/UL (ref 4.5–5.8)
SODIUM SERPL-SCNC: 141 MEQ/L (ref 136–144)
TRIGL SERPL-MCNC: 95 MG/DL (ref 30–149)
WBC # BLD AUTO: 4.95 K/UL (ref 3.8–10.5)

## 2021-09-28 PROCEDURE — 80053 COMPREHEN METABOLIC PANEL: CPT

## 2021-09-28 PROCEDURE — 80061 LIPID PANEL: CPT

## 2021-09-28 PROCEDURE — 36415 COLL VENOUS BLD VENIPUNCTURE: CPT

## 2021-09-28 PROCEDURE — G1004 CDSM NDSC: HCPCS

## 2021-09-28 PROCEDURE — 85025 COMPLETE CBC W/AUTO DIFF WBC: CPT

## 2021-09-29 ENCOUNTER — OFFICE VISIT (OUTPATIENT)
Dept: PRIMARY CARE | Facility: CLINIC | Age: 71
End: 2021-09-29
Payer: COMMERCIAL

## 2021-09-29 VITALS
OXYGEN SATURATION: 98 % | BODY MASS INDEX: 30.86 KG/M2 | HEIGHT: 66 IN | SYSTOLIC BLOOD PRESSURE: 140 MMHG | HEART RATE: 62 BPM | DIASTOLIC BLOOD PRESSURE: 84 MMHG | WEIGHT: 192 LBS

## 2021-09-29 DIAGNOSIS — J43.8 OTHER EMPHYSEMA (CMS/HCC): ICD-10-CM

## 2021-09-29 DIAGNOSIS — I10 HYPERTENSION, UNSPECIFIED TYPE: ICD-10-CM

## 2021-09-29 DIAGNOSIS — I50.32 CHRONIC DIASTOLIC HEART FAILURE (CMS/HCC): Chronic | ICD-10-CM

## 2021-09-29 DIAGNOSIS — F41.9 ANXIETY: ICD-10-CM

## 2021-09-29 DIAGNOSIS — R20.0 NUMBNESS AND TINGLING IN RIGHT HAND: ICD-10-CM

## 2021-09-29 DIAGNOSIS — Z00.00 MEDICARE ANNUAL WELLNESS VISIT, SUBSEQUENT: Primary | ICD-10-CM

## 2021-09-29 DIAGNOSIS — R20.2 NUMBNESS AND TINGLING IN RIGHT HAND: ICD-10-CM

## 2021-09-29 DIAGNOSIS — S12.112A CLOSED NONDISPLACED ODONTOID FRACTURE WITH TYPE II MORPHOLOGY, INITIAL ENCOUNTER (CMS/HCC): ICD-10-CM

## 2021-09-29 DIAGNOSIS — K21.9 GASTROESOPHAGEAL REFLUX DISEASE WITHOUT ESOPHAGITIS: ICD-10-CM

## 2021-09-29 DIAGNOSIS — H91.92 HEARING LOSS OF LEFT EAR, UNSPECIFIED HEARING LOSS TYPE: Chronic | ICD-10-CM

## 2021-09-29 DIAGNOSIS — F33.0 MILD EPISODE OF RECURRENT MAJOR DEPRESSIVE DISORDER (CMS/HCC): ICD-10-CM

## 2021-09-29 DIAGNOSIS — Z23 NEED FOR VACCINATION: ICD-10-CM

## 2021-09-29 DIAGNOSIS — J45.40 MODERATE PERSISTENT ASTHMA WITHOUT COMPLICATION: ICD-10-CM

## 2021-09-29 DIAGNOSIS — C34.32 PRIMARY MALIGNANT NEOPLASM OF LEFT LOWER LOBE OF LUNG (CMS/HCC): Chronic | ICD-10-CM

## 2021-09-29 PROBLEM — F32.A DEPRESSION: Chronic | Status: RESOLVED | Noted: 2017-05-23 | Resolved: 2021-09-29

## 2021-09-29 PROBLEM — Z86.0100 HISTORY OF COLON POLYPS: Chronic | Status: RESOLVED | Noted: 2018-05-14 | Resolved: 2021-09-29

## 2021-09-29 PROBLEM — S42.101A FRACTURE OF RIGHT SCAPULA: Status: RESOLVED | Noted: 2021-04-23 | Resolved: 2021-09-29
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
## 2021-09-29 PROCEDURE — G0008 ADMIN INFLUENZA VIRUS VAC: HCPCS | Performed by: NURSE PRACTITIONER

## 2021-09-29 PROCEDURE — 90694 VACC AIIV4 NO PRSRV 0.5ML IM: CPT | Performed by: NURSE PRACTITIONER

## 2021-09-29 PROCEDURE — G0439 PPPS, SUBSEQ VISIT: HCPCS | Performed by: NURSE PRACTITIONER

## 2021-09-29 RX ORDER — ESCITALOPRAM OXALATE 10 MG/1
10 TABLET ORAL DAILY
Qty: 90 TABLET | Refills: 1 | Status: SHIPPED | OUTPATIENT
Start: 2021-09-29 | End: 2022-03-15 | Stop reason: SDUPTHER

## 2021-09-29 ASSESSMENT — MINI COG
TOTAL SCORE: 5
COMPLETED: YES

## 2021-09-29 ASSESSMENT — ENCOUNTER SYMPTOMS
DYSURIA: 0
COUGH: 0
NECK STIFFNESS: 0
HEADACHES: 0
NECK PAIN: 0
FEVER: 0
SHORTNESS OF BREATH: 0
ABDOMINAL PAIN: 0
LIGHT-HEADEDNESS: 0
PALPITATIONS: 0
ADENOPATHY: 0
ARTHRALGIAS: 0
BRUISES/BLEEDS EASILY: 0
DIZZINESS: 0
CHILLS: 0
BACK PAIN: 0
FATIGUE: 0

## 2021-09-29 ASSESSMENT — PATIENT HEALTH QUESTIONNAIRE - PHQ9: SUM OF ALL RESPONSES TO PHQ9 QUESTIONS 1 & 2: 0
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
## 2021-10-04 ENCOUNTER — TELEPHONE (OUTPATIENT)
Dept: PRIMARY CARE | Facility: CLINIC | Age: 71
End: 2021-10-04

## 2021-10-04 NOTE — TELEPHONE ENCOUNTER
Please call patient to confirm it is ok to  completed form/letter to submit to his insurance for his wellness 360. Patient dropped form off 9/30 for SANTOS Jeffries to complete.

## 2021-10-18 ENCOUNTER — TELEPHONE (OUTPATIENT)
Dept: PRIMARY CARE | Facility: CLINIC | Age: 71
End: 2021-10-18

## 2021-10-18 NOTE — TELEPHONE ENCOUNTER
Patient was advised by [facility] physical therapy that they are unable to provide therapy for his hand and he is requesting a call back with the doctors recommendation for another physical therapist .

## 2021-10-19 ENCOUNTER — OFFICE VISIT (OUTPATIENT)
Dept: THORACIC SURGERY | Facility: CLINIC | Age: 71
End: 2021-10-19
Payer: COMMERCIAL

## 2021-10-19 VITALS
HEIGHT: 66 IN | OXYGEN SATURATION: 98 % | RESPIRATION RATE: 20 BRPM | DIASTOLIC BLOOD PRESSURE: 60 MMHG | HEART RATE: 77 BPM | WEIGHT: 194 LBS | BODY MASS INDEX: 31.18 KG/M2 | SYSTOLIC BLOOD PRESSURE: 142 MMHG

## 2021-10-19 DIAGNOSIS — C34.32 PRIMARY MALIGNANT NEOPLASM OF LEFT LOWER LOBE OF LUNG (CMS/HCC): Primary | Chronic | ICD-10-CM

## 2021-10-19 PROCEDURE — 99213 OFFICE O/P EST LOW 20 MIN: CPT | Performed by: THORACIC SURGERY (CARDIOTHORACIC VASCULAR SURGERY)

## 2021-10-19 PROCEDURE — 3008F BODY MASS INDEX DOCD: CPT | Performed by: THORACIC SURGERY (CARDIOTHORACIC VASCULAR SURGERY)

## 2021-10-19 NOTE — PROGRESS NOTES
Patient ID: Melanie Zelaya                              : 1950  MRN: [id number]                                            Visit Date: 10/19/2021  Encounter Provider: Blaise Guallpa  Referring Provider: No ref. provider found    Subjective      Patient ID: Melanie Zelaya is a 71 y.o. male.  Chief Complaint: Follow-up (Primary malignant neoplasm left lower lobe of lung)      Melanie Zelaya is a 71 y.o. 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  Fell off a ladder earlier t his year with C1 fx trerated with collar so f/u was delayed.  No f/c/s/wt loss, linares, new pers bone pain or neuro symptoms, cough, hemoptysis.  He had Covid and his last imaging done in January showed new changes so he was supposed to get a short-term interval but because of his injury were seeing him now with a CAT scan.    Past Medical History:  has a past medical history of Anxiety, Atrial fibrillation (CMS/HCC), C1 cervical fracture (CMS/HCC), Colon polyp, COPD (chronic obstructive pulmonary disease) (CMS/HCC), Depression, Diverticulosis, Fracture of pelvis (CMS/HCC) (), GERD (gastroesophageal reflux disease), Hearing loss, History of colon polyps, History of COVID-19 (), Hyperlipidemia, Hypertension, Left atrial enlargement, Lung cancer (CMS/HCC), Lung cancer (CMS/HCC), Pneumonia (), Seasonal allergies, and Wrist fracture.  Past Surgical History:  has a past surgical history that includes Lung lobectomy (Left, ); Shoulder surgery (); Rotator cuff repair (Right, ); Tonsillectomy; Nasal polyp surgery; Hand surgery (Bilateral); and Colonoscopy.  Social History:  reports that he quit smoking about 11 years ago. His smoking use included cigarettes. He smoked 2.00 packs per day. He has never used smokeless tobacco. He reports that he does not drink alcohol and does not use drugs.  Family History: family  history includes Cancer in his nephew; Cirrhosis in his biological father; Colon cancer in his biological brother; Diabetes in his biological mother; Lung cancer in his biological brother; No Known Problems in his maternal grandfather, maternal grandmother, paternal grandfather, and paternal grandmother; Pancreatic cancer in his biological sister; Prostate cancer in his biological brother.  Medications:   Current Outpatient Medications:   •  albuterol HFA (PROAIR HFA) 90 mcg/actuation inhaler, Inhale 2 puffs 2 (two) times a day as needed for wheezing or shortness of breath. (Patient taking differently: Inhale 2 puffs as needed for wheezing or shortness of breath.  ), Disp: 1 Inhaler, Rfl: 3  •  atorvastatin 40 mg tablet, Take 1 tablet (40 mg total) by mouth once daily., Disp: 90 tablet, Rfl: 1  •  cimetidine (TAGAMET HB) 200 mg tablet, Take 200 mg by mouth as needed., Disp: , Rfl:   •  DOCOSAHEXANOIC ACID/EPA (FISH OIL ORAL), Take 4 capsules by mouth daily. OTC suppliement , Disp: , Rfl:   •  escitalopram (LEXAPRO) 10 mg tablet, Take 1 tablet (10 mg total) by mouth daily., Disp: 90 tablet, Rfl: 1  •  fluticasone propionate (FLONASE) 50 mcg/actuation nasal spray, Administer 2 sprays into each nostril daily., Disp: 16 g, Rfl: 2  •  hydrochlorothiazide (HYDRODIURIL) 12.5 mg tablet, Take 12.5 mg by mouth daily., Disp: , Rfl:   •  irbesartan (AVAPRO) 300 mg tablet, Take 300 mg by mouth nightly., Disp: , Rfl:   •  metoprolol tartrate (LOPRESSOR) 25 mg tablet, Take 25 mg by mouth 2 (two) times a day., Disp: , Rfl:   •  multivitamin (THERAGRAN) tablet, Take 1 tablet by mouth daily., Disp: , Rfl:   •  umeclidinium-vilanterol (ANORO ELLIPTA) 62.5-25 mcg/actuation blister with device, Inhale 1 puff daily., Disp: , Rfl:   •  dabigatran etexilate (PRADAXA) 150 mg capsu, Take 1 capsule (150 mg total) by mouth 2 (two) times a day., Disp: 60 capsule, Rfl: 0  •  loratadine (CLARITIN) 10 mg tablet, Take 1 tablet (10 mg total) by  "mouth daily. (Patient taking differently: Take 10 mg by mouth daily as needed.  ), Disp: 90 tablet, Rfl: 1  •  sennosides-docusate sodium (SENOKOT-S) 8.6-50 mg, Take 1 tablet by mouth 2 (two) times a day., Disp: 60 tablet, Rfl: 0  Allergies: has No Known Allergies.   E-cigarette/Vaping     Questions Responses    E-cigarette/Vaping Use Never User           The following have been reviewed and updated as appropriate in this visit:          Review of Systems   All other systems reviewed and are negative.      Objective   Vitals:   Visit Vitals  BP (!) 142/60 (BP Location: Right upper arm, Patient Position: Sitting)   Pulse 77   Resp 20   Ht 1.676 m (5' 6\")   Wt 88 kg (194 lb)   SpO2 98%   BMI 31.31 kg/m²       Physical Exam  Vitals reviewed.   Constitutional:       Appearance: He is well-developed.   HENT:      Head: Normocephalic.   Eyes:      Pupils: Pupils are equal, round, and reactive to light.   Cardiovascular:      Rate and Rhythm: Normal rate and regular rhythm.   Pulmonary:      Effort: Pulmonary effort is normal.      Breath sounds: Normal breath sounds.   Musculoskeletal:         General: Normal range of motion.   Skin:     General: Skin is warm and dry.   Neurological:      Mental Status: He is alert.         Assessment/Plan   Independent review of all imaging was done by myself as well as review of the radiologists readings and comparison to prior films.  Independent review of the September 28 CAT scan shows no evidence recurrence.  Small areas of inflammatory changes likely related to his Covid have resolved.  We will continue our follow-up and see him back in a year with a CAT scan of the chest without contrast low-dose protocol to assess for new primaries.       Problem List Items Addressed This Visit     None            Blaise Guallpa MD  "

## 2021-10-19 NOTE — LETTER
2021     SANTOS Giles  [address] [facility] [address]  [facility]     Patient: Melanie Zelaya  YOB: 1950  Date of Visit: 10/19/2021      Dear Dr. Schultz:    Thank you for referring Melanie Zelaya to me for evaluation. Below are my notes for this consultation.    If you have questions, please do not hesitate to call me. I look forward to following your patient along with you.         Sincerely,        Blaise Guallpa MD        CC: MD Ramu Perera Michael J, MD  10/19/2021  2:54 PM  Sign when Signing Visit  Patient ID: Melanie Zelaya                              : 1950  MRN: [id number]                                            Visit Date: 10/19/2021  Encounter Provider: Blaise Guallpa  Referring Provider: No ref. provider found    Subjective      Patient ID: Melanie Zelaya is a 71 y.o. male.  Chief Complaint: Follow-up (Primary malignant neoplasm left lower lobe of lung)      Melanie Zelaya is a 71 y.o. 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  Fell off a ladder earlier t his year with C1 fx trerated with collar so f/u was delayed.  No f/c/s/wt loss, linares, new pers bone pain or neuro symptoms, cough, hemoptysis.  He had Covid and his last imaging done in January showed new changes so he was supposed to get a short-term interval but because of his injury were seeing him now with a CAT scan.    Past Medical History:  has a past medical history of Anxiety, Atrial fibrillation (CMS/HCC), C1 cervical fracture (CMS/HCC), Colon polyp, COPD (chronic obstructive pulmonary disease) (CMS/HCC), Depression, Diverticulosis, Fracture of pelvis (CMS/HCC) (), GERD (gastroesophageal reflux disease), Hearing loss, History of colon polyps, History of COVID-19 (), Hyperlipidemia, Hypertension, Left atrial enlargement, Lung cancer  (CMS/[facility]), Lung cancer (CMS/[facility]), Pneumonia (2011), Seasonal allergies, and Wrist fracture.  Past Surgical History:  has a past surgical history that includes Lung lobectomy (Left, 2016); Shoulder surgery (1998); Rotator cuff repair (Right, 2001); Tonsillectomy; Nasal polyp surgery; Hand surgery (Bilateral); and Colonoscopy.  Social History:  reports that he quit smoking about 11 years ago. His smoking use included cigarettes. He smoked 2.00 packs per day. He has never used smokeless tobacco. He reports that he does not drink alcohol and does not use drugs.  Family History: family history includes Cancer in his nephew; Cirrhosis in his biological father; Colon cancer in his biological brother; Diabetes in his biological mother; Lung cancer in his biological brother; No Known Problems in his maternal grandfather, maternal grandmother, paternal grandfather, and paternal grandmother; Pancreatic cancer in his biological sister; Prostate cancer in his biological brother.  Medications:   Current Outpatient Medications:   •  albuterol HFA (PROAIR HFA) 90 mcg/actuation inhaler, Inhale 2 puffs 2 (two) times a day as needed for wheezing or shortness of breath. (Patient taking differently: Inhale 2 puffs as needed for wheezing or shortness of breath.  ), Disp: 1 Inhaler, Rfl: 3  •  atorvastatin 40 mg tablet, Take 1 tablet (40 mg total) by mouth once daily., Disp: 90 tablet, Rfl: 1  •  cimetidine (TAGAMET HB) 200 mg tablet, Take 200 mg by mouth as needed., Disp: , Rfl:   •  DOCOSAHEXANOIC ACID/EPA (FISH OIL ORAL), Take 4 capsules by mouth daily. OTC suppliement , Disp: , Rfl:   •  escitalopram (LEXAPRO) 10 mg tablet, Take 1 tablet (10 mg total) by mouth daily., Disp: 90 tablet, Rfl: 1  •  fluticasone propionate (FLONASE) 50 mcg/actuation nasal spray, Administer 2 sprays into each nostril daily., Disp: 16 g, Rfl: 2  •  hydrochlorothiazide (HYDRODIURIL) 12.5 mg tablet, Take 12.5 mg by mouth daily., Disp: , Rfl:   •  irbesartan  "(AVAPRO) 300 mg tablet, Take 300 mg by mouth nightly., Disp: , Rfl:   •  metoprolol tartrate (LOPRESSOR) 25 mg tablet, Take 25 mg by mouth 2 (two) times a day., Disp: , Rfl:   •  multivitamin (THERAGRAN) tablet, Take 1 tablet by mouth daily., Disp: , Rfl:   •  umeclidinium-vilanterol (ANORO ELLIPTA) 62.5-25 mcg/actuation blister with device, Inhale 1 puff daily., Disp: , Rfl:   •  dabigatran etexilate (PRADAXA) 150 mg capsu, Take 1 capsule (150 mg total) by mouth 2 (two) times a day., Disp: 60 capsule, Rfl: 0  •  loratadine (CLARITIN) 10 mg tablet, Take 1 tablet (10 mg total) by mouth daily. (Patient taking differently: Take 10 mg by mouth daily as needed.  ), Disp: 90 tablet, Rfl: 1  •  sennosides-docusate sodium (SENOKOT-S) 8.6-50 mg, Take 1 tablet by mouth 2 (two) times a day., Disp: 60 tablet, Rfl: 0  Allergies: has No Known Allergies.   E-cigarette/Vaping     Questions Responses    E-cigarette/Vaping Use Never User           The following have been reviewed and updated as appropriate in this visit:          Review of Systems   All other systems reviewed and are negative.      Objective   Vitals:   Visit Vitals  BP (!) 142/60 (BP Location: Right upper arm, Patient Position: Sitting)   Pulse 77   Resp 20   Ht 1.676 m (5' 6\")   Wt 88 kg (194 lb)   SpO2 98%   BMI 31.31 kg/m²       Physical Exam  Vitals reviewed.   Constitutional:       Appearance: He is well-developed.   HENT:      Head: Normocephalic.   Eyes:      Pupils: Pupils are equal, round, and reactive to light.   Cardiovascular:      Rate and Rhythm: Normal rate and regular rhythm.   Pulmonary:      Effort: Pulmonary effort is normal.      Breath sounds: Normal breath sounds.   Musculoskeletal:         General: Normal range of motion.   Skin:     General: Skin is warm and dry.   Neurological:      Mental Status: He is alert.         Assessment/Plan   Independent review of all imaging was done by myself as well as review of the radiologists readings and " comparison to prior films.  Independent review of the September 28 CAT scan shows no evidence recurrence.  Small areas of inflammatory changes likely related to his Covid have resolved.  We will continue our follow-up and see him back in a year with a CAT scan of the chest without contrast low-dose protocol to assess for new primaries.       Problem List Items Addressed This Visit     None            Blaise Guallpa MD

## 2021-10-25 NOTE — TELEPHONE ENCOUNTER
Left a message explaining pt should call insurance company to see if they suggest anywhere that takes insurance.  I suggested [facility], [facility] physical therapy.

## 2021-10-25 NOTE — TELEPHONE ENCOUNTER
Pt called awaiting call back with alternate  Physical Therapy location . Please call Pt back phone # [phone]

## 2021-11-01 ENCOUNTER — HOSPITAL ENCOUNTER (OUTPATIENT)
Dept: RADIOLOGY | Age: 71
Discharge: HOME | End: 2021-11-01
Attending: PHYSICIAN ASSISTANT
Payer: COMMERCIAL

## 2021-11-01 DIAGNOSIS — S12.112A CLOSED NONDISPLACED ODONTOID FRACTURE WITH TYPE II MORPHOLOGY, INITIAL ENCOUNTER (CMS/HCC): ICD-10-CM

## 2021-11-01 PROCEDURE — G1004 CDSM NDSC: HCPCS

## 2021-11-02 ENCOUNTER — TELEPHONE (OUTPATIENT)
Dept: PRIMARY CARE | Facility: CLINIC | Age: 71
End: 2021-11-02

## 2021-11-02 NOTE — TELEPHONE ENCOUNTER
Myriam called and states he tried to get PT but they told him he would have to go to [place] which is to far. He called his insurance and would like to do home care therapy for his hand but would like to speak to Domonique about getting approved

## 2021-11-03 NOTE — TELEPHONE ENCOUNTER
Patient is requesting a call back today in regards to rx for home therapy , states hand is in a lot of pain.

## 2021-11-08 ENCOUNTER — OFFICE VISIT (OUTPATIENT)
Dept: NEUROSURGERY | Facility: CLINIC | Age: 71
End: 2021-11-08
Payer: COMMERCIAL

## 2021-11-08 DIAGNOSIS — S12.112K CLOSED NONDISPLACED ODONTOID FRACTURE WITH TYPE II MORPHOLOGY AND NONUNION, SUBSEQUENT ENCOUNTER: ICD-10-CM

## 2021-11-08 DIAGNOSIS — S12.112A CLOSED NONDISPLACED ODONTOID FRACTURE WITH TYPE II MORPHOLOGY, INITIAL ENCOUNTER (CMS/HCC): Primary | ICD-10-CM

## 2021-11-08 PROCEDURE — 99214 OFFICE O/P EST MOD 30 MIN: CPT | Performed by: NEUROLOGICAL SURGERY

## 2021-11-08 NOTE — LETTER
2021     SANTOS Giles  [address] [facility] [address]  [facility]     Patient: Melanie Zelaya  YOB: 1950  Date of Visit: 2021      Dear Dr. Schultz:    Thank you for referring Melanie Zelaya to me for evaluation. Below are my notes for this consultation.    If you have questions, please do not hesitate to call me. I look forward to following your patient along with you.         Sincerely,        Chaparro Morejon MD        CC: MD Jean-Pierre Perera Gaurav, MD  2021  8:40 AM  Signed      [facility]  [facility]  Medical Office Building [address], [address]  [place] [address]  Phone: [phone]  Fax: [phone]        21      Re: Melanie Zelaya  : 1950      Chief Complaint:  Hospital follow up     History of Present Illness:   Melanie Zelaya is a 71 y.o. right handed male who presents in neurosurgical follow up consultation. The patient presented to [facility] after a fall from a ladder on 21.  He sustained loss of consciousness.  He was able to arise on his own volition thereafter.  He was triaged to the emergency department by his son.  On arrival he was at his neurologic baseline.  CT imaging of the cervical spine disclosed C1, C2 fractures.  He was rigidly immobilized in a hard cervical collar.  He presents today for follow up.     Since last being seen he reports minimal neck discomfort about 1 - 3 out 10 on average. There is no radicular extension into his upper extremities.  He has been faithfully utilizing his rigid cervical collar. Today he is mostly bothered by pain in right shoulder. He has limited ability to reach behind his back due to discomfort. He denies changes in his strength, sensation, bowel, bladder, gait function.  He ambulates without the need for an assistive device.    Medical History:  has a past medical history of Anxiety, Atrial fibrillation (CMS/HCC), C1  cervical fracture (CMS/HCC), Colon polyp, COPD (chronic obstructive pulmonary disease) (CMS/HCC), Depression, Diverticulosis, Fracture of pelvis (CMS/HCC) (1968), GERD (gastroesophageal reflux disease), Hearing loss, History of colon polyps, History of COVID-19 (), Hyperlipidemia, Hypertension, Left atrial enlargement, Lung cancer (CMS/HCC), Lung cancer (CMS/HCC), Pneumonia (), Seasonal allergies, and Wrist fracture.    Surgical History:  has a past surgical history that includes Lung lobectomy (Left, ); Shoulder surgery (); Rotator cuff repair (Right, ); Tonsillectomy; Nasal polyp surgery; Hand surgery (Bilateral); and Colonoscopy.    Family History: family history includes Cancer in his nephew; Cirrhosis in his biological father; Colon cancer in his biological brother; Diabetes in his biological mother; Lung cancer in his biological brother; No Known Problems in his maternal grandfather, maternal grandmother, paternal grandfather, and paternal grandmother; Pancreatic cancer in his biological sister; Prostate cancer in his biological brother.  Family history non-contributory to neurological condition.    Social History:   Social History     Socioeconomic History   • Marital status:      Spouse name: Not on file   • Number of children: Not on file   • Years of education: Not on file   • Highest education level: Not on file   Occupational History   • Not on file   Tobacco Use   • Smoking status: Former Smoker     Packs/day: 2.00     Types: Cigarettes     Quit date: 2010     Years since quittin.5   • Smokeless tobacco: Never Used   Vaping Use   • Vaping Use: Never used   Substance and Sexual Activity   • Alcohol use: No     Comment: RARE   • Drug use: No   • Sexual activity: Yes     Partners: Female     Birth control/protection: None   Other Topics Concern   • Not on file   Social History Narrative    Retired     Social Determinants of Health     Financial Resource Strain:    •  Difficulty of Paying Living Expenses: Not on file   Food Insecurity:    • Worried About Running Out of Food in the Last Year: Not on file   • Ran Out of Food in the Last Year: Not on file   Transportation Needs:    • Lack of Transportation (Medical): Not on file   • Lack of Transportation (Non-Medical): Not on file   Physical Activity:    • Days of Exercise per Week: Not on file   • Minutes of Exercise per Session: Not on file   Stress:    • Feeling of Stress : Not on file   Social Connections:    • Frequency of Communication with Friends and Family: Not on file   • Frequency of Social Gatherings with Friends and Family: Not on file   • Attends [employer] Services: Not on file   • Active Member of Clubs or Organizations: Not on file   • Attends Club or Organization Meetings: Not on file   • Marital Status: Not on file   Intimate Partner Violence:    • Fear of Current or Ex-Partner: Not on file   • Emotionally Abused: Not on file   • Physically Abused: Not on file   • Sexually Abused: Not on file   Housing Stability:    • Unable to Pay for Housing in the Last Year: Not on file   • Number of Places Lived in the Last Year: Not on file   • Unstable Housing in the Last Year: Not on file        Allergies: No Known Allergies    Medications:   Current Outpatient Medications   Medication Sig Dispense Refill   • albuterol HFA (PROAIR HFA) 90 mcg/actuation inhaler Inhale 2 puffs 2 (two) times a day as needed for wheezing or shortness of breath. (Patient taking differently: Inhale 2 puffs as needed for wheezing or shortness of breath.  ) 1 Inhaler 3   • atorvastatin 40 mg tablet Take 1 tablet (40 mg total) by mouth once daily. 90 tablet 1   • cimetidine (TAGAMET HB) 200 mg tablet Take 200 mg by mouth as needed.     • dabigatran etexilate (PRADAXA) 150 mg capsu Take 1 capsule (150 mg total) by mouth 2 (two) times a day. 60 capsule 0   • DOCOSAHEXANOIC ACID/EPA (FISH OIL ORAL) Take 4 capsules by mouth daily. OTC suppliement      •  escitalopram (LEXAPRO) 10 mg tablet Take 1 tablet (10 mg total) by mouth daily. 90 tablet 1   • fluticasone propionate (FLONASE) 50 mcg/actuation nasal spray Administer 2 sprays into each nostril daily. 16 g 2   • hydrochlorothiazide (HYDRODIURIL) 12.5 mg tablet Take 12.5 mg by mouth daily.     • irbesartan (AVAPRO) 300 mg tablet Take 300 mg by mouth nightly.     • loratadine (CLARITIN) 10 mg tablet Take 1 tablet (10 mg total) by mouth daily. (Patient taking differently: Take 10 mg by mouth daily as needed.  ) 90 tablet 1   • metoprolol tartrate (LOPRESSOR) 25 mg tablet Take 25 mg by mouth 2 (two) times a day.     • multivitamin (THERAGRAN) tablet Take 1 tablet by mouth daily.     • sennosides-docusate sodium (SENOKOT-S) 8.6-50 mg Take 1 tablet by mouth 2 (two) times a day. 60 tablet 0   • umeclidinium-vilanterol (ANORO ELLIPTA) 62.5-25 mcg/actuation blister with device Inhale 1 puff daily.       No current facility-administered medications for this visit.       Review of Systems:   A 14 point review of systems was performed and aside from what is mentioned above is otherwise negative.    General physical examination:  The patient is well appearing male, sitting upright in his chair in no acute distress, he appears his stated age.  His head is atraumatic, normocephalic. His neck is supple without obvious adenopathy. Oral mucosa is moist. His peripheral pulses are symmetric and palpable. Extremities without peripheral edema. His breathing is normal and unlabored.     Vital signs were reviewed and within normal limits.    Neurologic examination:  Mental status:  The patient is alert, attentive, and oriented. Speech is clear and fluent with good repetition, comprehension, and naming.  Remote and recent memory are normal as well as fund of knowledge.    Cranial nerves:  CN II: Visual fields are full to confrontation.  Pupils are equal and briskly reactive to light.   CN III, IV, VI: Extra-occular muscles are intact  CN  V: Facial sensation is intact and equal in V1-V3 distributions bilaterally.   CN VII: Face is symmetric with normal eye closure and smile.  CN VIII: Hearing is normal to rubbing fingers  CN IX, X: Palate elevates symmetrically. Phonation is normal.  CN XI: Head turning and shoulder shrug are intact  CN XII: Tongue is midline with normal movements and no atrophy.    Motor:  There is no pronator drift.  Muscle bulk and tone are normal.    Deltoid Biceps Triceps Wrist ext Hand  Finger Spread Hip flexion Knee ext Dorsi-  flexion EHL Plantar Flexion   L 5 5 5 5 5 5 5 5 5 5 5   R 4+ PL 5 5 5 5 5 5 5 5 5 5     Reflexes:  Reflexes are 2+ and symmetric at the biceps, brachialis, triceps, patellar, and Achilli's. There is no Romo's sign or ankle clonus.    Sensory:   Intact light touch, pinprick, and position sense, throughout all 4 extremities.    Coordination:  There is no dysmetria on finger-to-nose testing.  Romberg is absent.    Gait:  Gait is steady with normal steps.  Heel and toe walking are normal. Tandem gait is normal.      Data Review:  CT CERVICAL SPINE WITHOUT IV CONTRAST (11/1/2021)    Narrative: CLINICAL HISTORY:  C-spine fracture, known (Age => 12y)  c1/c2   .    COMMENT:    Comparison: CT cervical spine from July 20 9/21 and 4/23/2021    Technique: CT of the cervical spine was performed without contrast. Sagittal and  coronal reconstructions were obtained. CT DOSE:  One or more dose reduction  techniques (e.g. automated exposure control, adjustment of the mA and/or kV  according to patient size, use of iterative reconstruction technique) was  utilized for this examination.    Findings:  Cervical lordosis is slightly straightened similar to previous studies.  Redemonstration of fractures at C1 and C2.  The posterior bilateral C1 laminar  fractures demonstrate further osseous bridging compared to the 7/29/2021 study.  An anterior ring C1 fracture also appears to demonstrate some further  osseous  bridging is seen in the coronal series.  There is developing fusion between the  anterior arch of C1 and the upper portions of the fractured dens.  The dens fracture is again noted to have improved alignment compared to the  4/23/2021 CT although unchanged in alignment and appearance compared to the  7/29/2021 study with no appreciable osseous bridging across the fracture site.  C2-C3 vertebral bodies and posterior elements are again noted to be fused.  Large bridging osteophytes noted extending from C4 through C7 on the right  anteriorly.  Bony fusion of the C5 and C6 vertebral bodies are also  redemonstrated.  Partially visualized bridging osteophytes at T2-T3.  No evidence of new fracture.  No prevertebral soft tissue swelling.  No obvious epidural fluid collection.  Assessment of the central canal is very  limited with this non-myelographic CT.  No high-grade bony stenosis of the  central canal.    No findings of concern in the upper lung fields.  Limited assessment of the  unenhanced soft tissues of the neck reveals atherosclerotic changes in the  carotids.  Impression: IMPRESSION:  Further osseous bridging across the anterior and posterior C1 fractures.  No significant interval change in the appearance of the dens fracture which  demonstrates no appreciable osseous bridging.    X-ray Cervical (08/09/2021):    C7 is completely obscured on lateral projections due to shoulder density. The  known dens fracture is again noted without gross change within the limitations  of plain radiographs. The C1 fracture is not well evaluated by plain  radiographs. No gross identifiable motion of the fracture fragment or gross  cervical instability is noted in the attempted flexion and extension views.  There unchanged minimal prevertebral soft tissue prominence anterior to C1.  Cervical disc spaces are mildly reduced, probably greatest at C5-C6, with  partial fusion of C5 and C6 again noted. There is advanced  multilevel cervical  facet arthrosis.     --  IMPRESSION: Dens fracture again noted, grossly unchanged from prior CT, with no  obvious motion in flexion and extension. C1 fracture not well evaluated by plain  radiographs. C7 completely obscured on lateral views due to shoulder density.    CT cervical spine 7/29/21  CT DOSE:  One or more dose reduction techniques (e.g. automated exposure  control, adjustment of the mA and/or kV according to patient size, use of  iterative reconstruction technique) utilized for this examination.     COMMENT: There is a normal cervical lordosis.  The vertebral bodies are  maintained in height and alignment.  Again noted is a type II dens fracture with  improvement in angulation and alignment but no significant osseous bridging  noted.  There is improved alignment and healing of the posterior elements and  anterior arch of C1.  No other definite fracture is noted.  There is fusion of  C2-C3 and C5-C6..  Multilevel degenerative change noted.  There is no  prevertebral soft tissue swelling obvious epidural hemorrhage.     Evaluation the intraspinal soft tissues is highly limited on non-myelographic CT  examination.     IMPRESSION:  Healing C1 and C2 fractures.    MRI Cervical Spine 4/24/21  COMMENT: Fusion of C2 and C3 as well as C5 and C6.  Fracture deformity is noted through C2 as well as arch of C1.  There is a large amount of prevertebral soft tissue fluid from C2 to C5, consistent with anterior longitudinal ligament injury.  No cord signal abnormality.  No mark anthony cord compression.  Disc osteophyte complex at C4-C5 with severe facet arthrosis, and uncovertebral hypertrophic changes, contributing to severe left and mild right foraminal narrowing.  Disc osteophyte complex at C6-C7 with severe left facet arthrosis contributing to moderate foraminal narrowing.  Disc osteophyte complex at C3-C4 with facet arthrosis bilaterally, contributing to moderate left foraminal narrowing.  No epidural  collection or hematoma.     IMPRESSION: C1 and C2 fractures with findings concerning for anterior  longitudinal ligament rupture.  Degenerative spondylosis at C4-C5.    MRI Angiogram Neck wo contrast 4/24/21  Findings: MR angiography of the neck demonstrates patency of the common carotid arteries, internal carotid arteries bilaterally as well as the left vertebral artery.  No evidence for vessel dissection, cutoff, hemodynamically significant stenoses by NASCET criteria.  Although no definite intramural hematoma identified, difficult to evaluation of the right vertebral artery due to its marked attenuation and diminished caliber, felt to be throughout the entire length of the vertebral artery although there is increased T1 signal on the fat sat images in the V3 and V4 segment of the right vertebral artery, again this is likely due to the chronic appearing attenuation in the right vertebral artery.  --  IMPRESSION: The right vertebral artery is markedly attenuated and diminutive throughout its entire length, felt to be chronic or congenital in nature.  No definite intramural hematoma to suggest acute dissection.  Carotid arteries are patent.    CT Cervical spine 4/23/21  COMMENT:  Cervicothoracic alignment: Anatomic.  Prevertebral soft tissues: There is prevertebral soft tissue thickening at the  C1-C2 articulation with right greater than left edema.  Vertebral bodies: Acute fractures involving the anterior and posterior arches of C1.  Acute fracture involving the base of the dens with displaced osseous  fragments located adjacent to the bilateral there is osseous fusion of the C2  and C3 vertebra and its posterior elements.  There is a linear lucency along the left occipital condyle likely representing a nondisplaced fracture deformity.  Remaining vertebral bodies are intact with degenerative changes.  Lateral mass of C1 and C2 vertebra.  Intervertebral discs: Severe intervertebral disc space narrowing with  partial  fusion at C5-C6.  Cervical and upper thoracic spinal canal: Patent the level of C5.  Below which, evaluation for  Axial images:   Skull base: See above  C1-2: See above   C2-3: Fusion of the C2-C3 disc space with left greater than right uncovertebral and facet hypertrophy.  Moderate left foraminal narrowing.  C3-4: Posterior disc osteophyte complex with uncovertebral and facet  hypertrophy.  Severe left and moderate right foraminal narrowing.  Moderate  central canal narrowing.  C4-5: Posterior disc osteophyte complex with uncovertebral and facet  hypertrophy.  Severe left and moderate right foraminal narrowing.  Moderate  central canal narrowing.  C5-6: Fusion of the C5-C6 disc space with uncovertebral and facet hypertrophy.  Moderate left  greater right foraminal narrowing. Moderate central canal  narrowing.   C6-7: Posterior disc osteophyte complex with uncovertebral and facet  hypertrophy.  Severe left and moderate right foraminal narrowing.  Moderate  central canal narrowing..  C7-T1: Posterior disc osteophyte complex with uncovertebral and facet  hypertrophy.  Severe left and moderate right foraminal narrowing.  Moderate  central canal narrowing.  Extra vertebral soft tissues: Biapical pleural scarring.  --  IMPRESSION:  1.  Acute fractures of the anterior and posterior arches of C1 vertebra. Acute  fracture through the base of the dens at the C2 vertebra.  Fractured bone  fragments are noted adjacent to the bilateral lateral masses of C1 and C2  vertebra.  2.  Linear lucency through the left occipital condyle, suspicious for an  avulsion fracture.  3.  Prevertebral soft tissue edema with extension of the right paravertebral  region at C1-C2.  4.  Multilevel cervical spondylosis.     CT Head without contrast 4/23/21  COMMENT: Brain parenchyma demonstrates maintenance of gray-white matter differentiation.  No acute intracranial parenchymal hemorrhage, mass effect, midline shift, or extra-axial fluid  collection.  No large vascular territorial infarct.  Ventricles, basal cisterns and cortical sulci are prominent,  consistent with involutional changes.  Hypodensity in the periventricular and  subcortical white matter, consistent with microangiopathic changes.     Visualized paranasal sinuses and mastoid air cells are clear bilaterally. The  calvarium is unremarkable.  Partially visualized fractures of the anterior and  posterior arches of C1 and the base of the dens.  --  IMPRESSION:  1.  No acute intracranial hemorrhage, large vascular territorial infarct, or  mass effect.  2.  Partially visualized presumed acute fractures of the anterior and posterior  arches of C1 and base of dens.    Images were personally reviewed by myself and with the patient.      Assessment and Plan:    In summary, Melanie Zelaya is a 71 y.o. male who fell on April 2021 sustained significant head, neck trauma.  He has radiographic evidence of C1, C2 fractures.  Specifically his C2 fracture involves the odontoid process.  There is mild distraction of the odontoid process from the body of C2 with posterior angulation.  CT scan demonstrated osseous healing of C1 however the there is still minimal healing of the odontoid process.    Patient was instructed to obtain MR images of the cervical spine and MR Angiogram of the neck to evaluate the course of his vertebral arteries. He was also instructed to discuss with his cardiologist regarding his risk stratification prior to proceeding with a surgical procedure. Thereafter I look forward to seeing him return for follow-up consultation to outline the most appropriate course of treatment.    The patient was counseled length around the natural history of C1, C2 fractures.  He was asked to utilize his collar at all times as this is an unstable fracture pattern. In the interim should his symptomatology evolve or worsen, I have asked he return sooner for prompt reevaluation.    Thank you for referring  Melanie Zelaya  to my attention.  Please feel free to contact me anytime if I can be of further assistance.      I, John Ogden PA-C, am scribing for and in the presence of Dr. Chaparro Morejon I, Chaparro Morejon MD, personally performed the services described in this documentation as scribed by John Ogden PA-C in my presence, and it is accurate and complete.     I spent 30 minutes on this date of service performing the following activities: obtaining history, performing examination, entering orders, documenting, preparing for visit, obtaining / reviewing records, providing counseling and education, independently reviewing study/studies, communicating results and coordinating care.

## 2021-11-08 NOTE — PROGRESS NOTES
[facility] Neurosurgery  [facility]  Medical Office Building [address], [address]  [place] [id number]  Phone: [phone]  Fax: [phone]        21      Re: Melanie Zelaya  : 1950      Chief Complaint:  Hospital follow up     History of Present Illness:   Melanie Zelaya is a 71 y.o. right handed male who presents in neurosurgical follow up consultation. The patient presented to [facility] after a fall from a ladder on 21.  He sustained loss of consciousness.  He was able to arise on his own volition thereafter.  He was triaged to the emergency department by his son.  On arrival he was at his neurologic baseline.  CT imaging of the cervical spine disclosed C1, C2 fractures.  He was rigidly immobilized in a hard cervical collar.  He presents today for follow up.     Since last being seen he reports minimal neck discomfort about 1 - 3 out 10 on average. There is no radicular extension into his upper extremities.  He has been faithfully utilizing his rigid cervical collar. Today he is mostly bothered by pain in right shoulder. He has limited ability to reach behind his back due to discomfort. He denies changes in his strength, sensation, bowel, bladder, gait function.  He ambulates without the need for an assistive device.    Medical History:  has a past medical history of Anxiety, Atrial fibrillation (CMS/HCC), C1 cervical fracture (CMS/HCC), Colon polyp, COPD (chronic obstructive pulmonary disease) (CMS/HCC), Depression, Diverticulosis, Fracture of pelvis (CMS/HCC) (), GERD (gastroesophageal reflux disease), Hearing loss, History of colon polyps, History of COVID-19 (), Hyperlipidemia, Hypertension, Left atrial enlargement, Lung cancer (CMS/HCC), Lung cancer (CMS/HCC), Pneumonia (), Seasonal allergies, and Wrist fracture.    Surgical History:  has a past surgical history that includes Lung lobectomy (Left, 2016); Shoulder surgery (); Rotator cuff  repair (Right, ); Tonsillectomy; Nasal polyp surgery; Hand surgery (Bilateral); and Colonoscopy.    Family History: family history includes Cancer in his nephew; Cirrhosis in his biological father; Colon cancer in his biological brother; Diabetes in his biological mother; Lung cancer in his biological brother; No Known Problems in his maternal grandfather, maternal grandmother, paternal grandfather, and paternal grandmother; Pancreatic cancer in his biological sister; Prostate cancer in his biological brother.  Family history non-contributory to neurological condition.    Social History:   Social History     Socioeconomic History   • Marital status:      Spouse name: Not on file   • Number of children: Not on file   • Years of education: Not on file   • Highest education level: Not on file   Occupational History   • Not on file   Tobacco Use   • Smoking status: Former Smoker     Packs/day: 2.00     Types: Cigarettes     Quit date: 2010     Years since quittin.5   • Smokeless tobacco: Never Used   Vaping Use   • Vaping Use: Never used   Substance and Sexual Activity   • Alcohol use: No     Comment: RARE   • Drug use: No   • Sexual activity: Yes     Partners: Female     Birth control/protection: None   Other Topics Concern   • Not on file   Social History Narrative    Retired     Social Determinants of Health     Financial Resource Strain:    • Difficulty of Paying Living Expenses: Not on file   Food Insecurity:    • Worried About Running Out of Food in the Last Year: Not on file   • Ran Out of Food in the Last Year: Not on file   Transportation Needs:    • Lack of Transportation (Medical): Not on file   • Lack of Transportation (Non-Medical): Not on file   Physical Activity:    • Days of Exercise per Week: Not on file   • Minutes of Exercise per Session: Not on file   Stress:    • Feeling of Stress : Not on file   Social Connections:    • Frequency of Communication with Friends and Family: Not  on file   • Frequency of Social Gatherings with Friends and Family: Not on file   • Attends Jehovah's witness Services: Not on file   • Active Member of Clubs or Organizations: Not on file   • Attends Club or Organization Meetings: Not on file   • Marital Status: Not on file   Intimate Partner Violence:    • Fear of Current or Ex-Partner: Not on file   • Emotionally Abused: Not on file   • Physically Abused: Not on file   • Sexually Abused: Not on file   Housing Stability:    • Unable to Pay for Housing in the Last Year: Not on file   • Number of Places Lived in the Last Year: Not on file   • Unstable Housing in the Last Year: Not on file        Allergies: No Known Allergies    Medications:   Current Outpatient Medications   Medication Sig Dispense Refill   • albuterol HFA (PROAIR HFA) 90 mcg/actuation inhaler Inhale 2 puffs 2 (two) times a day as needed for wheezing or shortness of breath. (Patient taking differently: Inhale 2 puffs as needed for wheezing or shortness of breath.  ) 1 Inhaler 3   • atorvastatin 40 mg tablet Take 1 tablet (40 mg total) by mouth once daily. 90 tablet 1   • cimetidine (TAGAMET HB) 200 mg tablet Take 200 mg by mouth as needed.     • dabigatran etexilate (PRADAXA) 150 mg capsu Take 1 capsule (150 mg total) by mouth 2 (two) times a day. 60 capsule 0   • DOCOSAHEXANOIC ACID/EPA (FISH OIL ORAL) Take 4 capsules by mouth daily. OTC suppliement      • escitalopram (LEXAPRO) 10 mg tablet Take 1 tablet (10 mg total) by mouth daily. 90 tablet 1   • fluticasone propionate (FLONASE) 50 mcg/actuation nasal spray Administer 2 sprays into each nostril daily. 16 g 2   • hydrochlorothiazide (HYDRODIURIL) 12.5 mg tablet Take 12.5 mg by mouth daily.     • irbesartan (AVAPRO) 300 mg tablet Take 300 mg by mouth nightly.     • loratadine (CLARITIN) 10 mg tablet Take 1 tablet (10 mg total) by mouth daily. (Patient taking differently: Take 10 mg by mouth daily as needed.  ) 90 tablet 1   • metoprolol tartrate  (LOPRESSOR) 25 mg tablet Take 25 mg by mouth 2 (two) times a day.     • multivitamin (THERAGRAN) tablet Take 1 tablet by mouth daily.     • sennosides-docusate sodium (SENOKOT-S) 8.6-50 mg Take 1 tablet by mouth 2 (two) times a day. 60 tablet 0   • umeclidinium-vilanterol (ANORO ELLIPTA) 62.5-25 mcg/actuation blister with device Inhale 1 puff daily.       No current facility-administered medications for this visit.       Review of Systems:   A 14 point review of systems was performed and aside from what is mentioned above is otherwise negative.    General physical examination:  The patient is well appearing male, sitting upright in his chair in no acute distress, he appears his stated age.  His head is atraumatic, normocephalic. His neck is supple without obvious adenopathy. Oral mucosa is moist. His peripheral pulses are symmetric and palpable. Extremities without peripheral edema. His breathing is normal and unlabored.     Vital signs were reviewed and within normal limits.    Neurologic examination:  Mental status:  The patient is alert, attentive, and oriented. Speech is clear and fluent with good repetition, comprehension, and naming.  Remote and recent memory are normal as well as fund of knowledge.    Cranial nerves:  CN II: Visual fields are full to confrontation.  Pupils are equal and briskly reactive to light.   CN III, IV, VI: Extra-occular muscles are intact  CN V: Facial sensation is intact and equal in V1-V3 distributions bilaterally.   CN VII: Face is symmetric with normal eye closure and smile.  CN VIII: Hearing is normal to rubbing fingers  CN IX, X: Palate elevates symmetrically. Phonation is normal.  CN XI: Head turning and shoulder shrug are intact  CN XII: Tongue is midline with normal movements and no atrophy.    Motor:  There is no pronator drift.  Muscle bulk and tone are normal.    Deltoid Biceps Triceps Wrist ext Hand  Finger Spread Hip flexion Knee ext Dorsi-  flexion EHL Plantar  Flexion   L 5 5 5 5 5 5 5 5 5 5 5   R 4+ PL 5 5 5 5 5 5 5 5 5 5     Reflexes:  Reflexes are 2+ and symmetric at the biceps, brachialis, triceps, patellar, and Achilli's. There is no Romo's sign or ankle clonus.    Sensory:   Intact light touch, pinprick, and position sense, throughout all 4 extremities.    Coordination:  There is no dysmetria on finger-to-nose testing.  Romberg is absent.    Gait:  Gait is steady with normal steps.  Heel and toe walking are normal. Tandem gait is normal.      Data Review:  CT CERVICAL SPINE WITHOUT IV CONTRAST (11/1/2021)    Narrative: CLINICAL HISTORY:  C-spine fracture, known (Age => 12y)  c1/c2   .    COMMENT:    Comparison: CT cervical spine from July 20 9/21 and 4/23/2021    Technique: CT of the cervical spine was performed without contrast. Sagittal and  coronal reconstructions were obtained. CT DOSE:  One or more dose reduction  techniques (e.g. automated exposure control, adjustment of the mA and/or kV  according to patient size, use of iterative reconstruction technique) was  utilized for this examination.    Findings:  Cervical lordosis is slightly straightened similar to previous studies.  Redemonstration of fractures at C1 and C2.  The posterior bilateral C1 laminar  fractures demonstrate further osseous bridging compared to the 7/29/2021 study.  An anterior ring C1 fracture also appears to demonstrate some further osseous  bridging is seen in the coronal series.  There is developing fusion between the  anterior arch of C1 and the upper portions of the fractured dens.  The dens fracture is again noted to have improved alignment compared to the  4/23/2021 CT although unchanged in alignment and appearance compared to the  7/29/2021 study with no appreciable osseous bridging across the fracture site.  C2-C3 vertebral bodies and posterior elements are again noted to be fused.  Large bridging osteophytes noted extending from C4 through C7 on the right  anteriorly.  Bony  fusion of the C5 and C6 vertebral bodies are also  redemonstrated.  Partially visualized bridging osteophytes at T2-T3.  No evidence of new fracture.  No prevertebral soft tissue swelling.  No obvious epidural fluid collection.  Assessment of the central canal is very  limited with this non-myelographic CT.  No high-grade bony stenosis of the  central canal.    No findings of concern in the upper lung fields.  Limited assessment of the  unenhanced soft tissues of the neck reveals atherosclerotic changes in the  carotids.  Impression: IMPRESSION:  Further osseous bridging across the anterior and posterior C1 fractures.  No significant interval change in the appearance of the dens fracture which  demonstrates no appreciable osseous bridging.    X-ray Cervical (08/09/2021):    C7 is completely obscured on lateral projections due to shoulder density. The  known dens fracture is again noted without gross change within the limitations  of plain radiographs. The C1 fracture is not well evaluated by plain  radiographs. No gross identifiable motion of the fracture fragment or gross  cervical instability is noted in the attempted flexion and extension views.  There unchanged minimal prevertebral soft tissue prominence anterior to C1.  Cervical disc spaces are mildly reduced, probably greatest at C5-C6, with  partial fusion of C5 and C6 again noted. There is advanced multilevel cervical  facet arthrosis.     --  IMPRESSION: Dens fracture again noted, grossly unchanged from prior CT, with no  obvious motion in flexion and extension. C1 fracture not well evaluated by plain  radiographs. C7 completely obscured on lateral views due to shoulder density.    CT cervical spine 7/29/21  CT DOSE:  One or more dose reduction techniques (e.g. automated exposure  control, adjustment of the mA and/or kV according to patient size, use of  iterative reconstruction technique) utilized for this examination.     COMMENT: There is a normal  cervical lordosis.  The vertebral bodies are  maintained in height and alignment.  Again noted is a type II dens fracture with  improvement in angulation and alignment but no significant osseous bridging  noted.  There is improved alignment and healing of the posterior elements and  anterior arch of C1.  No other definite fracture is noted.  There is fusion of  C2-C3 and C5-C6..  Multilevel degenerative change noted.  There is no  prevertebral soft tissue swelling obvious epidural hemorrhage.     Evaluation the intraspinal soft tissues is highly limited on non-myelographic CT  examination.     IMPRESSION:  Healing C1 and C2 fractures.    MRI Cervical Spine 4/24/21  COMMENT: Fusion of C2 and C3 as well as C5 and C6.  Fracture deformity is noted through C2 as well as arch of C1.  There is a large amount of prevertebral soft tissue fluid from C2 to C5, consistent with anterior longitudinal ligament injury.  No cord signal abnormality.  No mark anthony cord compression.  Disc osteophyte complex at C4-C5 with severe facet arthrosis, and uncovertebral hypertrophic changes, contributing to severe left and mild right foraminal narrowing.  Disc osteophyte complex at C6-C7 with severe left facet arthrosis contributing to moderate foraminal narrowing.  Disc osteophyte complex at C3-C4 with facet arthrosis bilaterally, contributing to moderate left foraminal narrowing.  No epidural collection or hematoma.     IMPRESSION: C1 and C2 fractures with findings concerning for anterior  longitudinal ligament rupture.  Degenerative spondylosis at C4-C5.    MRI Angiogram Neck wo contrast 4/24/21  Findings: MR angiography of the neck demonstrates patency of the common carotid arteries, internal carotid arteries bilaterally as well as the left vertebral artery.  No evidence for vessel dissection, cutoff, hemodynamically significant stenoses by NASCET criteria.  Although no definite intramural hematoma identified, difficult to evaluation of the  right vertebral artery due to its marked attenuation and diminished caliber, felt to be throughout the entire length of the vertebral artery although there is increased T1 signal on the fat sat images in the V3 and V4 segment of the right vertebral artery, again this is likely due to the chronic appearing attenuation in the right vertebral artery.  --  IMPRESSION: The right vertebral artery is markedly attenuated and diminutive throughout its entire length, felt to be chronic or congenital in nature.  No definite intramural hematoma to suggest acute dissection.  Carotid arteries are patent.    CT Cervical spine 4/23/21  COMMENT:  Cervicothoracic alignment: Anatomic.  Prevertebral soft tissues: There is prevertebral soft tissue thickening at the  C1-C2 articulation with right greater than left edema.  Vertebral bodies: Acute fractures involving the anterior and posterior arches of C1.  Acute fracture involving the base of the dens with displaced osseous  fragments located adjacent to the bilateral there is osseous fusion of the C2  and C3 vertebra and its posterior elements.  There is a linear lucency along the left occipital condyle likely representing a nondisplaced fracture deformity.  Remaining vertebral bodies are intact with degenerative changes.  Lateral mass of C1 and C2 vertebra.  Intervertebral discs: Severe intervertebral disc space narrowing with partial  fusion at C5-C6.  Cervical and upper thoracic spinal canal: Patent the level of C5.  Below which, evaluation for  Axial images:   Skull base: See above  C1-2: See above   C2-3: Fusion of the C2-C3 disc space with left greater than right uncovertebral and facet hypertrophy.  Moderate left foraminal narrowing.  C3-4: Posterior disc osteophyte complex with uncovertebral and facet  hypertrophy.  Severe left and moderate right foraminal narrowing.  Moderate  central canal narrowing.  C4-5: Posterior disc osteophyte complex with uncovertebral and  facet  hypertrophy.  Severe left and moderate right foraminal narrowing.  Moderate  central canal narrowing.  C5-6: Fusion of the C5-C6 disc space with uncovertebral and facet hypertrophy.  Moderate left  greater right foraminal narrowing. Moderate central canal  narrowing.   C6-7: Posterior disc osteophyte complex with uncovertebral and facet  hypertrophy.  Severe left and moderate right foraminal narrowing.  Moderate  central canal narrowing..  C7-T1: Posterior disc osteophyte complex with uncovertebral and facet  hypertrophy.  Severe left and moderate right foraminal narrowing.  Moderate  central canal narrowing.  Extra vertebral soft tissues: Biapical pleural scarring.  --  IMPRESSION:  1.  Acute fractures of the anterior and posterior arches of C1 vertebra. Acute  fracture through the base of the dens at the C2 vertebra.  Fractured bone  fragments are noted adjacent to the bilateral lateral masses of C1 and C2  vertebra.  2.  Linear lucency through the left occipital condyle, suspicious for an  avulsion fracture.  3.  Prevertebral soft tissue edema with extension of the right paravertebral  region at C1-C2.  4.  Multilevel cervical spondylosis.     CT Head without contrast 4/23/21  COMMENT: Brain parenchyma demonstrates maintenance of gray-white matter differentiation.  No acute intracranial parenchymal hemorrhage, mass effect, midline shift, or extra-axial fluid collection.  No large vascular territorial infarct.  Ventricles, basal cisterns and cortical sulci are prominent,  consistent with involutional changes.  Hypodensity in the periventricular and  subcortical white matter, consistent with microangiopathic changes.     Visualized paranasal sinuses and mastoid air cells are clear bilaterally. The  calvarium is unremarkable.  Partially visualized fractures of the anterior and  posterior arches of C1 and the base of the dens.  --  IMPRESSION:  1.  No acute intracranial hemorrhage, large vascular territorial  infarct, or  mass effect.  2.  Partially visualized presumed acute fractures of the anterior and posterior  arches of C1 and base of dens.    Images were personally reviewed by myself and with the patient.      Assessment and Plan:    In summary, Melanie Zelaya is a 71 y.o. male who fell on April 2021 sustained significant head, neck trauma.  He has radiographic evidence of C1, C2 fractures.  Specifically his C2 fracture involves the odontoid process.  There is mild distraction of the odontoid process from the body of C2 with posterior angulation.  CT scan demonstrated osseous healing of C1 however the there is still minimal healing of the odontoid process.    Patient was instructed to obtain MR images of the cervical spine and MR Angiogram of the neck to evaluate the course of his vertebral arteries. He was also instructed to discuss with his cardiologist regarding his risk stratification prior to proceeding with a surgical procedure. Thereafter I look forward to seeing him return for follow-up consultation to outline the most appropriate course of treatment.    The patient was counseled length around the natural history of C1, C2 fractures.  He was asked to utilize his collar at all times as this is an unstable fracture pattern. In the interim should his symptomatology evolve or worsen, I have asked he return sooner for prompt reevaluation.    Thank you for referring Melanie Zelaya  to my attention.  Please feel free to contact me anytime if I can be of further assistance.      I, John Ogden PA-C, am scribing for and in the presence of Chaparro Wang MD, personally performed the services described in this documentation as scribed by John Ogden PA-C in my presence, and it is accurate and complete.     I spent 30 minutes on this date of service performing the following activities: obtaining history, performing examination, entering orders, documenting, preparing for visit, obtaining /  reviewing records, providing counseling and education, independently reviewing study/studies, communicating results and coordinating care.

## 2021-11-10 NOTE — TELEPHONE ENCOUNTER
Patient called and is requesting that home therapy be set up for his right hand. He is not able to go to [facility] and He is explaining that it must be set up by his physician. Patient would like a call back with this information.

## 2021-11-10 NOTE — TELEPHONE ENCOUNTER
Chrissy,  Can you please assist with this?  Can [facility] evaluate him at home for outpatient therapy.  Pt is currently unable to drive due to multiple health conditions

## 2021-11-11 DIAGNOSIS — S12.112A CLOSED NONDISPLACED ODONTOID FRACTURE WITH TYPE II MORPHOLOGY, INITIAL ENCOUNTER (CMS/HCC): ICD-10-CM

## 2021-11-11 DIAGNOSIS — I50.32 CHRONIC DIASTOLIC HEART FAILURE (CMS/HCC): ICD-10-CM

## 2021-11-11 DIAGNOSIS — J43.8 OTHER EMPHYSEMA (CMS/HCC): ICD-10-CM

## 2021-11-11 DIAGNOSIS — R20.0 NUMBNESS AND TINGLING IN RIGHT HAND: ICD-10-CM

## 2021-11-11 DIAGNOSIS — C34.32 PRIMARY MALIGNANT NEOPLASM OF LEFT LOWER LOBE OF LUNG (CMS/HCC): ICD-10-CM

## 2021-11-11 DIAGNOSIS — R20.2 NUMBNESS AND TINGLING IN RIGHT HAND: ICD-10-CM

## 2021-11-11 DIAGNOSIS — I10 HYPERTENSION, UNSPECIFIED TYPE: Primary | ICD-10-CM

## 2021-11-11 DIAGNOSIS — I48.0 PAROXYSMAL ATRIAL FIBRILLATION (CMS/HCC): ICD-10-CM

## 2021-11-11 NOTE — TELEPHONE ENCOUNTER
Called [facility], hand specialist is out of [place] and is OT. Patient unable to drive due to cervical fracture and hard cast in place and medical conditions. Patient called insurance and was told he would qualify for home PT/OT for his right hand. Order placed and [facility] to [facility].

## 2021-11-12 ENCOUNTER — TELEPHONE (OUTPATIENT)
Dept: PRIMARY CARE | Facility: CLINIC | Age: 71
End: 2021-11-12

## 2021-11-12 NOTE — TELEPHONE ENCOUNTER
Pat called to speak to Keisha or Nurse about the patients order for home physical therapy for hand therapy. Please call today to discuss therapy options.

## 2021-11-12 NOTE — TELEPHONE ENCOUNTER
Per [facility], patient does not qualify for home PT for his hand. Given numbers for outpatient therapy that comes to the home that could possibly provide care to patient in the patient's home, but would be considered out patient therapy. Will contact offices Monday.

## 2021-11-15 NOTE — TELEPHONE ENCOUNTER
See message below.    Called [facility] and they do not take patient insurance.     Awaiting call back from [facility] home care to see if they take insurance.

## 2021-11-15 NOTE — TELEPHONE ENCOUNTER
[facility]waiting [facility] to call back. [facility]  [phone] and faxed referall, demographics and insurance information to their office. Will receive and try to process insurance tomorrow to see if patient qualifies. Will follow up with office if there is an issues.

## 2021-11-16 NOTE — TELEPHONE ENCOUNTER
Talked to patient and aware we are working on trying to get home PT. Awaiting call back from [facility].

## 2021-11-17 NOTE — TELEPHONE ENCOUNTER
Ayla from [facility] home care called, they do not take patient insurance. Re faxed request to [facility], awaiting response.

## 2021-11-18 NOTE — TELEPHONE ENCOUNTER
[facility], they received the paperwork and verifying information and insurance to see if patient qualifies. Awaiting call back.

## 2021-11-22 NOTE — TELEPHONE ENCOUNTER
[facility], patient insurance does not cover services. Patient aware. Patient given numbers to call for outpatient physical therapy. Unfortunately his insurance will not cover in home services. Patient to call [facility] physical therapy and [facility] that is close to home that he can get a ride to.

## 2021-11-26 ENCOUNTER — TELEPHONE (OUTPATIENT)
Dept: NEUROLOGY | Facility: CLINIC | Age: 71
End: 2021-11-26
Payer: COMMERCIAL

## 2021-11-26 DIAGNOSIS — S12.100K CLOSED ODONTOID FRACTURE WITH NONUNION, SUBSEQUENT ENCOUNTER: Primary | ICD-10-CM

## 2021-11-29 ENCOUNTER — TELEPHONE (OUTPATIENT)
Dept: PRIMARY CARE | Facility: CLINIC | Age: 71
End: 2021-11-29
Payer: COMMERCIAL

## 2021-11-29 NOTE — TELEPHONE ENCOUNTER
Patient is requesting to have pre-op clearance for neck surgery. Patient states he does not need to have a visit fotr this clearance from Dr Schultz ///He does not have an upcoming surgery date/// Stated Dr Morejon wants clearance from PCP before  Neck surgery is scheduled///patient does not have forms///Wants clearance sent to Dr Morejon.

## 2021-11-29 NOTE — TELEPHONE ENCOUNTER
Image report reviewed with Dr. Morejon. Will obtain CTA of the neck. Prescription generated. We will need to see the actual images.      Returned patient's phone call, no answer.     Patient will need to have a CTA performed.  Once obtained we will schedule him for an appointment.  He should call our office once images are obtained.  He will need to bring all images on a disc to be reviewed by Dr. Morejon personally.    Patient had BUN and creatinine performed on 9/28/2021, BUN 18, creatinine 1.0   Nausea

## 2021-11-30 NOTE — TELEPHONE ENCOUNTER
Spoke with pt and his wife. Explained that we need the disc of the MRI. They will drop that off to the office tomorrow. He is going to call and schedule the CTA and have that done asap within [facility].

## 2021-11-30 NOTE — TELEPHONE ENCOUNTER
Called and left a message for Melanie asking to call us back so I can go over his Pre Op instructions as per Keisha GRAHAM.

## 2021-12-01 NOTE — TELEPHONE ENCOUNTER
Pt called back to discuss pre-op questions for an upcoming surgery.  Looking at his surgeons note, he just needs to follow up with his cardiologist, get two scans done and follow up with his surgeon for a plan going forward, he does not need our clearance at this time since no surgery is scheduled.  Pt understood.  I also let him know if he does need clearance from Keisha GRAHAM he will need to be seen in the office and has to be within 30 days of actual surgery.  Pt understood.

## 2021-12-02 ENCOUNTER — TELEPHONE (OUTPATIENT)
Dept: NEUROSURGERY | Facility: CLINIC | Age: 71
End: 2021-12-02
Payer: COMMERCIAL

## 2021-12-02 DIAGNOSIS — Z01.812 PRE-PROCEDURE LAB EXAM: Primary | ICD-10-CM

## 2021-12-06 ENCOUNTER — APPOINTMENT (OUTPATIENT)
Dept: LAB | Facility: CLINIC | Age: 71
End: 2021-12-06
Attending: PHYSICIAN ASSISTANT
Payer: COMMERCIAL

## 2021-12-06 DIAGNOSIS — Z01.812 PRE-PROCEDURE LAB EXAM: ICD-10-CM

## 2021-12-06 LAB
ANION GAP SERPL CALC-SCNC: 9 MEQ/L (ref 3–15)
BUN SERPL-MCNC: 14 MG/DL (ref 8–20)
CALCIUM SERPL-MCNC: 9.5 MG/DL (ref 8.9–10.3)
CHLORIDE SERPL-SCNC: 101 MEQ/L (ref 98–109)
CO2 SERPL-SCNC: 31 MEQ/L (ref 22–32)
CREAT SERPL-MCNC: 1 MG/DL (ref 0.8–1.3)
GFR SERPL CREATININE-BSD FRML MDRD: >60 ML/MIN/1.73M*2
GLUCOSE SERPL-MCNC: 103 MG/DL (ref 70–99)
POTASSIUM SERPL-SCNC: 4.2 MEQ/L (ref 3.6–5.1)
SODIUM SERPL-SCNC: 141 MEQ/L (ref 136–144)

## 2021-12-06 PROCEDURE — 80048 BASIC METABOLIC PNL TOTAL CA: CPT

## 2021-12-06 PROCEDURE — 36415 COLL VENOUS BLD VENIPUNCTURE: CPT

## 2021-12-08 ENCOUNTER — HOSPITAL ENCOUNTER (OUTPATIENT)
Dept: RADIOLOGY | Age: 71
Discharge: HOME | End: 2021-12-08
Attending: PHYSICIAN ASSISTANT
Payer: COMMERCIAL

## 2021-12-08 DIAGNOSIS — S12.100K CLOSED ODONTOID FRACTURE WITH NONUNION, SUBSEQUENT ENCOUNTER: ICD-10-CM

## 2021-12-08 PROCEDURE — G1004 CDSM NDSC: HCPCS

## 2021-12-08 PROCEDURE — 63600105 HC IODINE BASED CONTRAST: Performed by: PHYSICIAN ASSISTANT

## 2021-12-08 RX ADMIN — IOHEXOL 125 ML: 350 INJECTION, SOLUTION INTRAVENOUS at 10:40

## 2021-12-09 ENCOUNTER — TELEPHONE (OUTPATIENT)
Dept: NEUROLOGY | Facility: CLINIC | Age: 71
End: 2021-12-09
Payer: COMMERCIAL

## 2021-12-09 NOTE — TELEPHONE ENCOUNTER
JUDY Ramirez I accidentally put this note on an addendum. Patient called and and said that he was asked to call the office after he had his CT scan. He said that he dropped off his MRI disc a week ago. He needs to still see his lung doctor but his primary will not schedule him until he knows his surgery date. Please call

## 2021-12-09 NOTE — TELEPHONE ENCOUNTER
Patient called and and said that he was asked to call the office after he had his CT scan. He said that he dropped off his MRI disc a week ago. He needs to still see his lung doctor but his primary will not schedule him until he knows his surgery date. Please call

## 2021-12-13 ENCOUNTER — TELEPHONE (OUTPATIENT)
Dept: PRIMARY CARE | Facility: CLINIC | Age: 71
End: 2021-12-13
Payer: COMMERCIAL

## 2021-12-13 NOTE — TELEPHONE ENCOUNTER
I returned call to the patient.  He was given a tentative surgical date of 1/13/2022 for C1-C3 posterior cervical fusion.  We also scheduled a follow-up visit on 1/5/2022 at 10:20 AM to review all of his questions prior to surgery.  In the time being, he is going to follow-up with the pulmonologist to obtain clearance.  He states that his cardiologist said he will need to stop the Pradaxa 3 days prior to surgery.   All questions were answered.

## 2021-12-13 NOTE — TELEPHONE ENCOUNTER
Myriam states he is having surgery 01/13/2022  For neck infusion at [facility] with Dr Beebe no sure who to schedule with since [place] is out can some please call and schedule

## 2021-12-16 NOTE — TELEPHONE ENCOUNTER
Pt wants to know if he would be able to get tested for COVID when he comes to the office for his Pre-Op visit on 1/4 . He has to et COVID Tested for his upcoming surgery and as limited transportation to keep going back and forth for appointments. Please call pt back phone # [phone]

## 2021-12-17 NOTE — TELEPHONE ENCOUNTER
Spoke to the patient and made him aware that he will have to get his pre-op covid swab completed at the hospital.  Patient reported that it's already scheduled.

## 2021-12-20 DIAGNOSIS — S12.112A CLOSED NONDISPLACED ODONTOID FRACTURE WITH TYPE II MORPHOLOGY, INITIAL ENCOUNTER (CMS/HCC): Primary | ICD-10-CM

## 2021-12-30 ENCOUNTER — TELEPHONE (OUTPATIENT)
Dept: NEUROSURGERY | Facility: CLINIC | Age: 71
End: 2021-12-30
Payer: COMMERCIAL

## 2021-12-30 NOTE — TELEPHONE ENCOUNTER
Contacted patient regarding surgical questions mostly surrounding post-op hospitalization, pain medications, and restrictions. All questions answered with patient and wife on the phone. They will call back with additional questions.     He is scheduled for:   PAT 1/3  PCP clearance 1/4  Appt with Dr. Morejon 1/6  Surgery 1/6

## 2022-01-03 ENCOUNTER — HOSPITAL ENCOUNTER (OUTPATIENT)
Dept: RADIOLOGY | Facility: HOSPITAL | Age: 72
Discharge: HOME | DRG: 472 | End: 2022-01-03
Attending: PHYSICIAN ASSISTANT
Payer: COMMERCIAL

## 2022-01-03 ENCOUNTER — ANESTHESIA EVENT (INPATIENT)
Dept: OPERATING ROOM | Facility: HOSPITAL | Age: 72
DRG: 472 | End: 2022-01-03
Payer: COMMERCIAL

## 2022-01-03 ENCOUNTER — APPOINTMENT (OUTPATIENT)
Dept: LAB | Facility: HOSPITAL | Age: 72
DRG: 472 | End: 2022-01-03
Attending: PHYSICIAN ASSISTANT
Payer: COMMERCIAL

## 2022-01-03 ENCOUNTER — OFFICE VISIT (OUTPATIENT)
Dept: PREADMISSION TESTING | Facility: HOSPITAL | Age: 72
DRG: 472 | End: 2022-01-03
Attending: NEUROLOGICAL SURGERY
Payer: COMMERCIAL

## 2022-01-03 VITALS
RESPIRATION RATE: 18 BRPM | SYSTOLIC BLOOD PRESSURE: 140 MMHG | OXYGEN SATURATION: 96 % | DIASTOLIC BLOOD PRESSURE: 81 MMHG | HEIGHT: 66 IN | TEMPERATURE: 97.6 F | BODY MASS INDEX: 32.14 KG/M2 | HEART RATE: 76 BPM | WEIGHT: 200 LBS

## 2022-01-03 DIAGNOSIS — Z01.818 PRE-OP TESTING: ICD-10-CM

## 2022-01-03 DIAGNOSIS — I48.21 PERMANENT ATRIAL FIBRILLATION (CMS/HCC): ICD-10-CM

## 2022-01-03 DIAGNOSIS — Z01.818 PREOP EXAMINATION: Primary | ICD-10-CM

## 2022-01-03 DIAGNOSIS — F33.0 MILD EPISODE OF RECURRENT MAJOR DEPRESSIVE DISORDER (CMS/HCC): ICD-10-CM

## 2022-01-03 DIAGNOSIS — I10 PRIMARY HYPERTENSION: ICD-10-CM

## 2022-01-03 DIAGNOSIS — J43.9 PULMONARY EMPHYSEMA, UNSPECIFIED EMPHYSEMA TYPE (CMS/HCC): ICD-10-CM

## 2022-01-03 DIAGNOSIS — I25.10 CORONARY ARTERY CALCIFICATION SEEN ON CT SCAN: ICD-10-CM

## 2022-01-03 DIAGNOSIS — R73.03 PREDIABETES: ICD-10-CM

## 2022-01-03 DIAGNOSIS — C34.32 PRIMARY MALIGNANT NEOPLASM OF LEFT LOWER LOBE OF LUNG (CMS/HCC): ICD-10-CM

## 2022-01-03 DIAGNOSIS — S12.112A CLOSED NONDISPLACED ODONTOID FRACTURE WITH TYPE II MORPHOLOGY, INITIAL ENCOUNTER (CMS/HCC): ICD-10-CM

## 2022-01-03 LAB
ABO + RH BLD: NORMAL
ALBUMIN SERPL-MCNC: 4.5 G/DL (ref 3.4–5)
ALP SERPL-CCNC: 58 IU/L (ref 35–126)
ALT SERPL-CCNC: 35 IU/L (ref 16–63)
ANION GAP SERPL CALC-SCNC: 11 MEQ/L (ref 3–15)
APTT PPP: 43 SEC (ref 23–35)
AST SERPL-CCNC: 32 IU/L (ref 15–41)
BASOPHILS # BLD: 0.01 K/UL (ref 0.01–0.1)
BASOPHILS NFR BLD: 0.2 %
BILIRUB SERPL-MCNC: 1.1 MG/DL (ref 0.3–1.2)
BILIRUB UR QL STRIP.AUTO: NEGATIVE MG/DL
BLD GP AB SCN SERPL QL: NEGATIVE
BLOOD BANK CMNT PATIENT-IMP: NORMAL
BUN SERPL-MCNC: 11 MG/DL (ref 8–20)
CALCIUM SERPL-MCNC: 10 MG/DL (ref 8.9–10.3)
CHLORIDE SERPL-SCNC: 103 MEQ/L (ref 98–109)
CLARITY UR REFRACT.AUTO: CLEAR
CO2 SERPL-SCNC: 26 MEQ/L (ref 22–32)
COLOR UR AUTO: YELLOW
CREAT SERPL-MCNC: 0.7 MG/DL (ref 0.8–1.3)
D AG BLD QL: POSITIVE
DIFFERENTIAL METHOD BLD: ABNORMAL
EOSINOPHIL # BLD: 0.02 K/UL (ref 0.04–0.54)
EOSINOPHIL NFR BLD: 0.3 %
ERYTHROCYTE [DISTWIDTH] IN BLOOD BY AUTOMATED COUNT: 14.1 % (ref 11.6–14.4)
EST. AVERAGE GLUCOSE BLD GHB EST-MCNC: 126 MG/DL
GFR SERPL CREATININE-BSD FRML MDRD: >60 ML/MIN/1.73M*2
GLUCOSE SERPL-MCNC: 90 MG/DL (ref 70–99)
GLUCOSE UR STRIP.AUTO-MCNC: NEGATIVE MG/DL
HBA1C MFR BLD HPLC: 6 %
HCT VFR BLDCO AUTO: 44.7 % (ref 40.1–51)
HGB BLD-MCNC: 15.3 G/DL (ref 13.7–17.5)
HGB UR QL STRIP.AUTO: NEGATIVE
IMM GRANULOCYTES # BLD AUTO: 0.03 K/UL (ref 0–0.08)
IMM GRANULOCYTES NFR BLD AUTO: 0.5 %
INR PPP: 1.3
KETONES UR STRIP.AUTO-MCNC: NEGATIVE MG/DL
LABORATORY COMMENT REPORT: NORMAL
LEUKOCYTE ESTERASE UR QL STRIP.AUTO: NEGATIVE
LYMPHOCYTES # BLD: 1.43 K/UL (ref 1.2–3.5)
LYMPHOCYTES NFR BLD: 23.6 %
MCH RBC QN AUTO: 29.1 PG (ref 28–33.2)
MCHC RBC AUTO-ENTMCNC: 34.2 G/DL (ref 32.2–36.5)
MCV RBC AUTO: 85.1 FL (ref 83–98)
MONOCYTES # BLD: 0.68 K/UL (ref 0.3–1)
MONOCYTES NFR BLD: 11.2 %
NEUTROPHILS # BLD: 3.89 K/UL (ref 1.7–7)
NEUTS SEG NFR BLD: 64.2 %
NITRITE UR QL STRIP.AUTO: NEGATIVE
NRBC BLD-RTO: 0 %
PDW BLD AUTO: 10.3 FL (ref 9.4–12.4)
PH UR STRIP.AUTO: 6 [PH]
PLATELET # BLD AUTO: 198 K/UL (ref 150–350)
POTASSIUM SERPL-SCNC: 4.1 MEQ/L (ref 3.6–5.1)
PROT SERPL-MCNC: 6.7 G/DL (ref 6–8.2)
PROT UR QL STRIP.AUTO: NEGATIVE
PROTHROMBIN TIME: 15.6 SEC (ref 12.2–14.5)
RBC # BLD AUTO: 5.25 M/UL (ref 4.5–5.8)
SARS-COV-2 RNA RESP QL NAA+PROBE: NEGATIVE
SODIUM SERPL-SCNC: 140 MEQ/L (ref 136–144)
SP GR UR REFRACT.AUTO: 1.01
SPECIMEN EXP DATE BLD: NORMAL
UROBILINOGEN UR STRIP-ACNC: 0.2 EU/DL
WBC # BLD AUTO: 6.06 K/UL (ref 3.8–10.5)

## 2022-01-03 PROCEDURE — 85730 THROMBOPLASTIN TIME PARTIAL: CPT

## 2022-01-03 PROCEDURE — 85025 COMPLETE CBC W/AUTO DIFF WBC: CPT

## 2022-01-03 PROCEDURE — C9803 HOPD COVID-19 SPEC COLLECT: HCPCS

## 2022-01-03 PROCEDURE — 86900 BLOOD TYPING SEROLOGIC ABO: CPT

## 2022-01-03 PROCEDURE — 36415 COLL VENOUS BLD VENIPUNCTURE: CPT

## 2022-01-03 PROCEDURE — 81003 URINALYSIS AUTO W/O SCOPE: CPT

## 2022-01-03 PROCEDURE — U0003 INFECTIOUS AGENT DETECTION BY NUCLEIC ACID (DNA OR RNA); SEVERE ACUTE RESPIRATORY SYNDROME CORONAVIRUS 2 (SARS-COV-2) (CORONAVIRUS DISEASE [COVID-19]), AMPLIFIED PROBE TECHNIQUE, MAKING USE OF HIGH THROUGHPUT TECHNOLOGIES AS DESCRIBED BY CMS-2020-01-R: HCPCS | Performed by: HOSPITALIST

## 2022-01-03 PROCEDURE — 3008F BODY MASS INDEX DOCD: CPT | Performed by: HOSPITALIST

## 2022-01-03 PROCEDURE — 99214 OFFICE O/P EST MOD 30 MIN: CPT | Performed by: HOSPITALIST

## 2022-01-03 PROCEDURE — 85610 PROTHROMBIN TIME: CPT

## 2022-01-03 PROCEDURE — 80053 COMPREHEN METABOLIC PANEL: CPT

## 2022-01-03 PROCEDURE — 83036 HEMOGLOBIN GLYCOSYLATED A1C: CPT

## 2022-01-03 PROCEDURE — 87081 CULTURE SCREEN ONLY: CPT

## 2022-01-03 PROCEDURE — 71046 X-RAY EXAM CHEST 2 VIEWS: CPT

## 2022-01-03 ASSESSMENT — PAIN SCALES - GENERAL: PAINLEVEL: 4
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
## 2022-01-03 NOTE — PRE-PROCEDURE INSTRUCTIONS
1. We will call you between 3 pm and 7 pm on January 5, 2022 to determine that arrival time for your procedure. If you do not hear by 6PM. Please call [phone] for arrival time.    2. Please report to Main Entrance near Parking lot A, walk into main lobby and report to the admission desk on the first floor on the day of your procedure.       3. Please follow the following fasting guidelines:         Nothing to eat after midnight.  Unlimited clear liquids, meaning water or PLAIN black coffee WITHOUT any milk or cream, are permitted up to TWO HOURS prior to arrival at the hospital.     4. On the morning of the procedure please take your usual dose of the listed medications amLODIPine, escitalopram (LEXAPRO)  with a sip of water:    PER ANESTHESIOLOGIST, DO NOT TAKE hydrochlorothiazide (HYDRODIURIL), irbesartan (AVAPRO)  ON THE MORNING OF SURGERY    PLEASE FOLLOW INSTRUCTION FROM YOUR CARDIOLOGIST, REGARDING THE PRADAXA    PLEASE USE YOUR umeclidinium-vilanterol (ANORO ELLIPTA) INHALER ON THE MORNING OF SURGERY    AVOID ASPIRIN, MOTRIN, ADVIL, ALEVE, VITAMIN E, HERBAL SUPPLEMENTS 1 WEEK PRIOR TO SURGERY    YOU MAY TAKE TYLENOL FOR PAIN     5. Other Instructions: You may brush your teeth the morning of the procedure. Rinse and spit, do not swallow.  Bring a list of your medications with dosages with you.    Please follow the hospital provided surgical wash instructions given to you today. If surgical procedure indicates the need for CHG anti-bacterial wash, please use provided soap and follow instructions.      6. If you develop a cold, cough, fever, rash, or other symptom prior to the data of the procedure, please report it to your physician immediately.   7. If you need to cancel the procedure for any reason, please contact your physician or call the unit listed above.   8. Make arrangements to have someone drive you home from the procedure. If you have not arranged for transportation home, your surgery may be  cancelled.    9. You may not take public transportation unless you are accompanied by a responsible person.   10. You may not drive a car or operate complex or potentially dangerous machinery for 24 hours following anesthesia and/or sedation.   11. If it is medically necessary for you to have a longer stay, you will be informed as soon as the decision is made.   12. Do not wear or bring anything of value to the hospital including jewelry of any kind, money, or wallet. Do not wear make-up or contact lenses. DO bring your glasses and a case. DO NOT BRING MEDICATIONS FROM HOME.   13. No lotion, creams, powders, or oils on skin the morning of procedure    14. Dress in comfortable clothes.   15.  If instructed, please bring a copy of your Advanced Directive (Living Will/Durable Power of ) on the day of your procedure.      Pre operative instructions given as per protocol.  Form explained by: SANTOS Casiano     I have read and understand the above information. I have had sufficient opportunity to ask questions I might have and they have been answered to my satisfaction. I agree to comply with the Patient Responsibilities listed above and have received a copy of this form.
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
## 2022-01-04 ENCOUNTER — TELEPHONE (OUTPATIENT)
Dept: PRIMARY CARE | Facility: CLINIC | Age: 72
End: 2022-01-04

## 2022-01-04 ENCOUNTER — CONSULT (OUTPATIENT)
Dept: PRIMARY CARE | Facility: CLINIC | Age: 72
End: 2022-01-04
Payer: COMMERCIAL

## 2022-01-04 VITALS
DIASTOLIC BLOOD PRESSURE: 88 MMHG | BODY MASS INDEX: 32.62 KG/M2 | OXYGEN SATURATION: 97 % | TEMPERATURE: 97.9 F | WEIGHT: 203 LBS | HEIGHT: 66 IN | RESPIRATION RATE: 16 BRPM | SYSTOLIC BLOOD PRESSURE: 122 MMHG | HEART RATE: 68 BPM

## 2022-01-04 DIAGNOSIS — Z01.818 PRE-OP EXAM: Primary | ICD-10-CM

## 2022-01-04 PROCEDURE — 93000 ELECTROCARDIOGRAM COMPLETE: CPT | Performed by: NURSE PRACTITIONER

## 2022-01-04 PROCEDURE — 99213 OFFICE O/P EST LOW 20 MIN: CPT | Performed by: NURSE PRACTITIONER

## 2022-01-04 PROCEDURE — 3008F BODY MASS INDEX DOCD: CPT | Performed by: NURSE PRACTITIONER

## 2022-01-04 ASSESSMENT — ENCOUNTER SYMPTOMS
NECK STIFFNESS: 1
NECK PAIN: 1

## 2022-01-04 NOTE — ASSESSMENT & PLAN NOTE
History of left lower lobectomy in 2016, SHAUNA on last CT 9/21, and follows annually with Dr. Guallpa.
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
## 2022-01-04 NOTE — ASSESSMENT & PLAN NOTE
Stable on lexapro which he will continue uninterrupted. Takes xanax very infrequently for anxiety and has not required this in quite a while; hold while patient is requiring post-op pain medications.

## 2022-01-04 NOTE — ASSESSMENT & PLAN NOTE
Patient reports stable symptoms and denies recent exacerbation. Follows with Dr. Holly who he saw recently for pre-operative clearance. Normal exam. Reports compliance with Anoro which he will continue uninterrupted and albuterol as needed. I would encourage incentive spirometry to assist with minimizing darlyn-operative pulmonary risk.

## 2022-01-04 NOTE — ASSESSMENT & PLAN NOTE
Chronic Atrial Fibrillation, asymptomatic. Acceptable rate off metoprolol which was discontinued due to bradycardia. Anticoagulation with pradaxa and denies bleeding complications or history of prior stroke. Patient instructed by cardiology to hold pradaxa starting today, to be resumed as soon as deemed safe from a surgical perspective.

## 2022-01-04 NOTE — ASSESSMENT & PLAN NOTE
Reviewed Dr. Kern's recent note and clearance. Stress PET 12/8/21 with normal perfusion and EF. Patient is asymptomatic. He will continue atorvastatin uninterrupted.

## 2022-01-04 NOTE — TELEPHONE ENCOUNTER
Patient called in stating that he doesn't have fax number for Kiki Quinones at [facility], so if office could find fax number to fax over clearance info

## 2022-01-04 NOTE — PATIENT INSTRUCTIONS
Patient presents for pre-op visit for surgery for C1-3 laminectomy cervical posterior fusion. History reviewed and physical performed. No abnormalities found in history or physical. Reviewed labs, within normal ranges. EKG comparable to previous EKGs.  Last visit with cardiologist Dr. Kern 10/20/2021. Last echo 12/8/2021.  Patient was cleared for surgery by cardiology and letter was sent to surgeon's office. Patient is medically cleared for surgery with cardiology's clearance.

## 2022-01-05 ENCOUNTER — OFFICE VISIT (OUTPATIENT)
Dept: NEUROSURGERY | Facility: CLINIC | Age: 72
End: 2022-01-05
Payer: COMMERCIAL

## 2022-01-05 VITALS — SYSTOLIC BLOOD PRESSURE: 138 MMHG | OXYGEN SATURATION: 97 % | DIASTOLIC BLOOD PRESSURE: 80 MMHG | HEART RATE: 94 BPM

## 2022-01-05 DIAGNOSIS — S12.112A CLOSED NONDISPLACED ODONTOID FRACTURE WITH TYPE II MORPHOLOGY, INITIAL ENCOUNTER (CMS/HCC): Primary | ICD-10-CM

## 2022-01-05 LAB — MICROORGANISM SPEC CULT: NORMAL

## 2022-01-05 PROCEDURE — 99214 OFFICE O/P EST MOD 30 MIN: CPT | Mod: 57 | Performed by: NEUROLOGICAL SURGERY

## 2022-01-05 RX ORDER — PHENYLEPHRINE HCL IN 0.9% NACL 50MG/250ML
10-200 PLASTIC BAG, INJECTION (ML) INTRAVENOUS
Status: DISCONTINUED | OUTPATIENT
Start: 2022-01-05 | End: 2022-01-06

## 2022-01-05 RX ORDER — PROPOFOL 10 MG/ML
5-80 INJECTION, EMULSION INTRAVENOUS
Status: DISCONTINUED | OUTPATIENT
Start: 2022-01-05 | End: 2022-01-06

## 2022-01-05 ASSESSMENT — ENCOUNTER SYMPTOMS
DEPRESSION: 1
DYSRHYTHMIAS: 1

## 2022-01-05 ASSESSMENT — COPD QUESTIONNAIRES: COPD: 1
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
## 2022-01-05 NOTE — TELEPHONE ENCOUNTER
Attempted to fax clearance paperwork over, unable to get paperwork to go through. [place] talked to office and they are able to pull information up.

## 2022-01-05 NOTE — LETTER
2022     SANTOS Giles  [address] [facility] [address]  [facility] [address]    Patient: Melanie Zelaya  YOB: 1950  Date of Visit: 2022      Dear Dr. Schultz:    Thank you for referring Melanie Zelaya to me for evaluation. Below are my notes for this consultation.    If you have questions, please do not hesitate to call me. I look forward to following your patient along with you.         Sincerely,        Chaparro Morejon MD        CC: Chaparro Schneider MD  2022 11:15 AM  Signed      [facility]  [facility]  [address], [address]  [place] [address]  Phone: [phone]  Fax: [phone]        22      Re: Melanie Zelaya  : 1950      Chief Complaint:  Neck injury, discuss surgery    History of Present Illness:   Melanie Zelaya is a 71 y.o. right handed male who presents in neurosurgical follow up consultation. The patient presented to [facility] after a fall from a ladder on 21.  He sustained loss of consciousness.  He was able to arise on his own volition thereafter.  He was triaged to the emergency department by his son.  On arrival he was at his neurologic baseline.  CT imaging of the cervical spine disclosed C1, C2 fractures.  He was rigidly immobilized in a hard cervical collar.  He presents today for follow up.     Since last being seen he reports minimal neck discomfort. He continues with pain in the right scapula where he was found to have a fracture. There is no radicular extension into his upper extremities.  He has been faithfully utilizing his rigid cervical collar.  He has limited ability to reach behind his back due to discomfort. He denies changes in his strength, sensation, bowel, bladder, gait function.  He ambulates without the need for an assistive device.    Medical History:  has a past medical history of Anxiety, Atrial fibrillation (CMS/HCC), Breast  cancer (CMS/HCC), C1 cervical fracture (CMS/HCC), Colon polyp, COPD (chronic obstructive pulmonary disease) (CMS/HCC), Coronary artery calcification seen on CT scan, COVID-19 vaccine series completed, Depression, Diverticulosis, Diverticulosis, Fracture of pelvis (CMS/HCC) (), GERD (gastroesophageal reflux disease), Hearing loss, History of colon polyps, History of COVID-19 (), Hyperlipidemia, Hypertension, Left atrial enlargement, Lung cancer (CMS/HCC), Lung cancer (CMS/HCC), Pneumonia (), Seasonal allergies, and Wrist fracture.    Surgical History:  has a past surgical history that includes Lung lobectomy (Left, 2016); Shoulder surgery (Right, ); Rotator cuff repair (Right, ); Tonsillectomy; Nasal polyp surgery; Hand surgery (Bilateral); and Colonoscopy.    Family History: family history includes Cancer in his nephew; Cirrhosis in his biological father; Colon cancer in his biological brother; Diabetes in his biological mother; Lung cancer in his biological brother; No Known Problems in his maternal grandfather, maternal grandmother, paternal grandfather, and paternal grandmother; Pancreatic cancer in his biological sister; Prostate cancer in his biological brother.  Family history non-contributory to neurological condition.    Social History:   Social History     Socioeconomic History   • Marital status: Legally      Spouse name: None   • Number of children: 3   • Years of education: None   • Highest education level: None   Occupational History   • None   Tobacco Use   • Smoking status: Former Smoker     Packs/day: 2.00     Types: Cigarettes     Quit date: 2010     Years since quittin.7   • Smokeless tobacco: Never Used   Vaping Use   • Vaping Use: Never used   Substance and Sexual Activity   • Alcohol use: No     Comment: RARE   • Drug use: No   • Sexual activity: Yes     Partners: Female     Birth control/protection: None   Other Topics Concern   • None   Social History  Narrative    Retired    Lives alone in a 3 story home     Social Determinants of Health     Financial Resource Strain: Not on file   Food Insecurity: Not on file   Transportation Needs: Not on file   Physical Activity: Not on file   Stress: Not on file   Social Connections: Not on file   Intimate Partner Violence: Not on file   Housing Stability: Not on file        Allergies: No Known Allergies    Medications:   Current Outpatient Medications   Medication Sig Dispense Refill   • albuterol HFA (PROAIR HFA) 90 mcg/actuation inhaler Inhale 2 puffs 2 (two) times a day as needed for wheezing or shortness of breath. (Patient taking differently: Inhale 2 puffs as needed for wheezing or shortness of breath.  ) 1 Inhaler 3   • alprazolam (XANAX ORAL) Take 1 tablet by mouth as needed.     • amLODIPine 5 mg tablet Take 5 mg by mouth daily.       • atorvastatin 40 mg tablet Take 1 tablet (40 mg total) by mouth once daily. (Patient taking differently: Take 40 mg by mouth nightly.  ) 90 tablet 1   • cimetidine (TAGAMET HB) 200 mg tablet Take 200 mg by mouth as needed.     • dabigatran etexilate (PRADAXA) 150 mg capsu Take 1 capsule (150 mg total) by mouth 2 (two) times a day. (Patient taking differently: Take 150 mg by mouth 2 (two) times a day. Stopped for surgery last dose taken 1/3/46385 at 0800 ) 60 capsule 0   • escitalopram (LEXAPRO) 10 mg tablet Take 1 tablet (10 mg total) by mouth daily. 90 tablet 1   • fluticasone propionate (FLONASE) 50 mcg/actuation nasal spray Administer 2 sprays into each nostril daily. (Patient taking differently: Administer 2 sprays into each nostril daily as needed.  ) 16 g 2   • hydrochlorothiazide (HYDRODIURIL) 12.5 mg tablet Take 12.5 mg by mouth daily.     • irbesartan (AVAPRO) 300 mg tablet Take 300 mg by mouth daily.       • loratadine (CLARITIN) 10 mg tablet Take 1 tablet (10 mg total) by mouth daily. (Patient taking differently: Take 10 mg by mouth daily as needed.  ) 90 tablet 1   •  multivitamin (THERAGRAN) tablet Take 1 tablet by mouth daily.     • sennosides-docusate sodium (SENOKOT-S) 8.6-50 mg Take 1 tablet by mouth 2 (two) times a day. (Patient taking differently: Take 1 tablet by mouth as needed.  ) 60 tablet 0   • umeclidinium-vilanterol (ANORO ELLIPTA) 62.5-25 mcg/actuation blister with device Inhale 1 puff daily.       No current facility-administered medications for this visit.       Review of Systems:   A 14 point review of systems was performed and aside from what is mentioned above is otherwise negative.    General physical examination:  The patient is well appearing male, sitting upright in his chair in no acute distress, he appears his stated age.  His head is atraumatic, normocephalic. His neck is supple without obvious adenopathy. Oral mucosa is moist. His peripheral pulses are symmetric and palpable. Extremities without peripheral edema. His breathing is normal and unlabored.     Vital signs were reviewed and within normal limits.    Neurologic examination:  Mental status:  The patient is alert, attentive, and oriented. Speech is clear and fluent with good repetition, comprehension, and naming.  Remote and recent memory are normal as well as fund of knowledge.    Cranial nerves:  CN II: Visual fields are full to confrontation.  Pupils are equal and briskly reactive to light.   CN III, IV, VI: Extra-occular muscles are intact  CN V: Facial sensation is intact and equal in V1-V3 distributions bilaterally.   CN VII: Face is symmetric with normal eye closure and smile.  CN VIII: Hearing is normal to rubbing fingers  CN IX, X: Palate elevates symmetrically. Phonation is normal.  CN XI: Head turning and shoulder shrug are intact  CN XII: Tongue is midline with normal movements and no atrophy.    Motor:  There is no pronator drift.  Muscle bulk and tone are normal.    Deltoid Biceps Triceps Wrist ext Hand  Finger Spread Hip flexion Knee ext Dorsi-  flexion EHL Plantar Flexion   L  5 5 5 5 5 5 5 5 5 5 5   R 4+ PL 5 5 5 5 5 5 5 5 5 5     Reflexes:  Reflexes are 2+ and symmetric at the biceps, brachialis, triceps, patellar, and Achilli's. There is no Romo's sign or ankle clonus.    Sensory:   Intact light touch, pinprick, and position sense, throughout all 4 extremities.    Coordination:  There is no dysmetria on finger-to-nose testing.  Romberg is absent.    Gait:  Gait is steady with normal steps.  Heel and toe walking are normal. Tandem gait is normal.      Data Review:      CTA Neck with and without IV contrast 12/8/21   There is atherosclerotic change within the aortic arch and origin of the great vessels. The origin of the great vessels from the aortic arch are patent. The common and internal carotid arteries are patent. There is atherosclerotic change within the bulbs bilaterally with mild narrowing. There is no hemodynamically significant stenosis.  The vertebral arteries are patent. There is moderate narrowing at the origin of the left vertebral artery. C2 fracture again seen.    --  IMPRESSION:   Patent cervical vasculature without hemodynamically significant stenosis.    CT CERVICAL SPINE WITHOUT IV CONTRAST 11/1/202  COMMENT:  Comparison: CT cervical spine from July 20 9/21 and 4/23/2021  Findings:  Cervical lordosis is slightly straightened similar to previous studies.  Redemonstration of fractures at C1 and C2.  The posterior bilateral C1 laminar fractures demonstrate further osseous bridging compared to the 7/29/2021 study.  An anterior ring C1 fracture also appears to demonstrate some further osseous bridging is seen in the coronal series.  There is developing fusion between the anterior arch of C1 and the upper portions of the fractured dens. The dens fracture is again noted to have improved alignment compared to the 4/23/2021 CT although unchanged in alignment and appearance compared to the 7/29/2021 study with no appreciable osseous bridging across the fracture site.  C2-C3  vertebral bodies and posterior elements are again noted to be fused.  Large bridging osteophytes noted extending from C4 through C7 on the right anteriorly.  Bony fusion of the C5 and C6 vertebral bodies are also redemonstrated.  Partially visualized bridging osteophytes at T2-T3.  No evidence of new fracture.  No prevertebral soft tissue swelling.  No obvious epidural fluid collection.  Assessment of the central canal is very limited with this non-myelographic CT.  No high-grade bony stenosis of the central canal.    No findings of concern in the upper lung fields.  Limited assessment of the unenhanced soft tissues of the neck reveals atherosclerotic changes in the carotids.  Impression: IMPRESSION:  Further osseous bridging across the anterior and posterior C1 fractures.  No significant interval change in the appearance of the dens fracture which demonstrates no appreciable osseous bridging.    X-ray Cervical 08/09/2021    C7 is completely obscured on lateral projections due to shoulder density. The known dens fracture is again noted without gross change within the limitations of plain radiographs. The C1 fracture is not well evaluated by plain radiographs. No gross identifiable motion of the fracture fragment or gross cervical instability is noted in the attempted flexion and extension views.  There unchanged minimal prevertebral soft tissue prominence anterior to C1.  Cervical disc spaces are mildly reduced, probably greatest at C5-C6, with partial fusion of C5 and C6 again noted. There is advanced multilevel cervical facet arthrosis.  --  IMPRESSION: Dens fracture again noted, grossly unchanged from prior CT, with no obvious motion in flexion and extension. C1 fracture not well evaluated by plain radiographs. C7 completely obscured on lateral views due to shoulder density.    CT cervical spine 7/29/21  COMMENT: There is a normal cervical lordosis.  The vertebral bodies are maintained in height and alignment.   Again noted is a type II dens fracture with improvement in angulation and alignment but no significant osseous bridging noted.  There is improved alignment and healing of the posterior elements and anterior arch of C1.  No other definite fracture is noted.  There is fusion of C2-C3 and C5-C6..  Multilevel degenerative change noted.  There is no prevertebral soft tissue swelling obvious epidural hemorrhage.     Evaluation the intraspinal soft tissues is highly limited on non-myelographic CT examination.  IMPRESSION:  Healing C1 and C2 fractures.    MRI Cervical Spine 4/24/21  COMMENT: Fusion of C2 and C3 as well as C5 and C6.  Fracture deformity is noted through C2 as well as arch of C1.  There is a large amount of prevertebral soft tissue fluid from C2 to C5, consistent with anterior longitudinal ligament injury.  No cord signal abnormality.  No mark anthony cord compression.  Disc osteophyte complex at C4-C5 with severe facet arthrosis, and uncovertebral hypertrophic changes, contributing to severe left and mild right foraminal narrowing.  Disc osteophyte complex at C6-C7 with severe left facet arthrosis contributing to moderate foraminal narrowing.  Disc osteophyte complex at C3-C4 with facet arthrosis bilaterally, contributing to moderate left foraminal narrowing.  No epidural collection or hematoma.     IMPRESSION: C1 and C2 fractures with findings concerning for anterior  longitudinal ligament rupture.  Degenerative spondylosis at C4-C5.    MRI Angiogram Neck wo contrast 4/24/21  Findings: MR angiography of the neck demonstrates patency of the common carotid arteries, internal carotid arteries bilaterally as well as the left vertebral artery.  No evidence for vessel dissection, cutoff, hemodynamically significant stenoses by NASCET criteria.  Although no definite intramural hematoma identified, difficult to evaluation of the right vertebral artery due to its marked attenuation and diminished caliber, felt to be  throughout the entire length of the vertebral artery although there is increased T1 signal on the fat sat images in the V3 and V4 segment of the right vertebral artery, again this is likely due to the chronic appearing attenuation in the right vertebral artery.  --  IMPRESSION: The right vertebral artery is markedly attenuated and diminutive throughout its entire length, felt to be chronic or congenital in nature.  No definite intramural hematoma to suggest acute dissection.  Carotid arteries are patent.    CT Cervical spine 4/23/21  COMMENT:  Cervicothoracic alignment: Anatomic.  Prevertebral soft tissues: There is prevertebral soft tissue thickening at the C1-C2 articulation with right greater than left edema.  Vertebral bodies: Acute fractures involving the anterior and posterior arches of C1.  Acute fracture involving the base of the dens with displaced osseous fragments located adjacent to the bilateral there is osseous fusion of the C2 and C3 vertebra and its posterior elements.  There is a linear lucency along the left occipital condyle likely representing a nondisplaced fracture deformity.  Remaining vertebral bodies are intact with degenerative changes.  Lateral mass of C1 and C2 vertebra.  Intervertebral discs: Severe intervertebral disc space narrowing with partial fusion at C5-C6.  Cervical and upper thoracic spinal canal: Patent the level of C5.  Below which, evaluation for  Axial images:   Skull base: See above  C1-2: See above   C2-3: Fusion of the C2-C3 disc space with left greater than right uncovertebral and facet hypertrophy.  Moderate left foraminal narrowing.  C3-4: Posterior disc osteophyte complex with uncovertebral and facet  hypertrophy.  Severe left and moderate right foraminal narrowing.  Moderate central canal narrowing.  C4-5: Posterior disc osteophyte complex with uncovertebral and facet  hypertrophy.  Severe left and moderate right foraminal narrowing.  Moderate central canal  narrowing.  C5-6: Fusion of the C5-C6 disc space with uncovertebral and facet hypertrophy.  Moderate left  greater right foraminal narrowing. Moderate central canal narrowing.   C6-7: Posterior disc osteophyte complex with uncovertebral and facet  hypertrophy.  Severe left and moderate right foraminal narrowing.  Moderate central canal narrowing..  C7-T1: Posterior disc osteophyte complex with uncovertebral and facet hypertrophy.  Severe left and moderate right foraminal narrowing.  Moderate central canal narrowing.  Extra vertebral soft tissues: Biapical pleural scarring.  --  IMPRESSION:  1.  Acute fractures of the anterior and posterior arches of C1 vertebra. Acute fracture through the base of the dens at the C2 vertebra.  Fractured bone fragments are noted adjacent to the bilateral lateral masses of C1 and C2 vertebra.  2.  Linear lucency through the left occipital condyle, suspicious for an  avulsion fracture.  3.  Prevertebral soft tissue edema with extension of the right paravertebral region at C1-C2.  4.  Multilevel cervical spondylosis.     CT Head without contrast 4/23/21  COMMENT: Brain parenchyma demonstrates maintenance of gray-white matter differentiation.  No acute intracranial parenchymal hemorrhage, mass effect, midline shift, or extra-axial fluid collection.  No large vascular territorial infarct.  Ventricles, basal cisterns and cortical sulci are prominent, consistent with involutional changes.  Hypodensity in the periventricular and subcortical white matter, consistent with microangiopathic changes.     Visualized paranasal sinuses and mastoid air cells are clear bilaterally. The calvarium is unremarkable.  Partially visualized fractures of the anterior and posterior arches of C1 and the base of the dens.  --  IMPRESSION:  1.  No acute intracranial hemorrhage, large vascular territorial infarct, or mass effect.  2.  Partially visualized presumed acute fractures of the anterior and posterior  arches of C1 and base of dens.    Images were personally reviewed by myself and with the patient.      Assessment and Plan:    In summary, Melanie Zelaya is a 71 y.o. male who fell on April 2021 sustained significant head, neck trauma.  He has radiographic evidence of C1, C2 fractures.  Specifically his C2 fracture involves the odontoid process.  There is mild distraction of the odontoid process from the body of C2 with posterior angulation.  CT scan demonstrated osseous healing of C1 however the there is still minimal healing of the odontoid process.  He wishes to pursue surgical fixation of his fracture.  This will be orchestrated on January 6, 2022 via a C1-C3 posterior lateral instrumented fusion, ORIF C2 fracture.    The patient was counseled length around the natural history of C1, C2 fractures.  He was asked to utilize his collar at all times as this is an unstable fracture pattern. In the interim should his symptomatology evolve or worsen, I have asked he return sooner for prompt reevaluation.    This patient is going to require an extended postoperative length of stay greater than 2 midnight stays.  In fact, this patient will require up to a 4 day hospital stay as a result of the need for closed suction drain, concerns over a possible perioperative neurologic deficit by way of either a hematoma and/or a compressive wound condition.  In addition, this patient is manifesting a number of medical comorbidities including the need for medication monitoring and pain management monitoring as a result of the extensive nature of their spinal surgical procedure.     It is my recommendation that this patient will likely require a greater than 24 hour hospital stay due to the risks to this patient of a perioperative or postoperative complication that would ultimately result in grave circumstances in the outpatient setting and increase this patient to a higher risk of postoperative morbidity and/or mortality.      Thank  you for referring Melanie Zelaya  to my attention.  Please feel free to contact me anytime if I can be of further assistance.      I, Vanessa Spain PA-C, am scribing for and in the presence of Dr. Chaparro Morejon I, Chaparro Morejon MD, personally performed the services described in this documentation as scribed by Vanessa Spain PA-C in my presence, and it is accurate and complete.     I spent 30 minutes on this date of service performing the following activities: obtaining history, performing examination, entering orders, documenting, preparing for visit, obtaining / reviewing records, providing counseling and education, independently reviewing study/studies, communicating results and coordinating care.

## 2022-01-05 NOTE — PROGRESS NOTES
[facility]  [address], [address]  [place] [address]  Phone: [phone]  Fax: [phone]        22      Re: Melanie Zelaya  : 1950      Chief Complaint:  Neck injury, discuss surgery    History of Present Illness:   Melanie Zelaya is a 71 y.o. right handed male who presents in neurosurgical follow up consultation. The patient presented to [facility] after a fall from a ladder on 21.  He sustained loss of consciousness.  He was able to arise on his own volition thereafter.  He was triaged to the emergency department by his son.  On arrival he was at his neurologic baseline.  CT imaging of the cervical spine disclosed C1, C2 fractures.  He was rigidly immobilized in a hard cervical collar.  He presents today for follow up.     Since last being seen he reports minimal neck discomfort. He continues with pain in the right scapula where he was found to have a fracture. There is no radicular extension into his upper extremities.  He has been faithfully utilizing his rigid cervical collar.  He has limited ability to reach behind his back due to discomfort. He denies changes in his strength, sensation, bowel, bladder, gait function.  He ambulates without the need for an assistive device.    Medical History:  has a past medical history of Anxiety, Atrial fibrillation (CMS/HCC), Breast cancer (CMS/HCC), C1 cervical fracture (CMS/HCC), Colon polyp, COPD (chronic obstructive pulmonary disease) (CMS/HCC), Coronary artery calcification seen on CT scan, COVID-19 vaccine series completed, Depression, Diverticulosis, Diverticulosis, Fracture of pelvis (CMS/HCC) (), GERD (gastroesophageal reflux disease), Hearing loss, History of colon polyps, History of COVID-19 (), Hyperlipidemia, Hypertension, Left atrial enlargement, Lung cancer (CMS/HCC), Lung cancer (CMS/HCC), Pneumonia (), Seasonal allergies, and Wrist  fracture.    Surgical History:  has a past surgical history that includes Lung lobectomy (Left, 2016); Shoulder surgery (Right, ); Rotator cuff repair (Right, ); Tonsillectomy; Nasal polyp surgery; Hand surgery (Bilateral); and Colonoscopy.    Family History: family history includes Cancer in his nephew; Cirrhosis in his biological father; Colon cancer in his biological brother; Diabetes in his biological mother; Lung cancer in his biological brother; No Known Problems in his maternal grandfather, maternal grandmother, paternal grandfather, and paternal grandmother; Pancreatic cancer in his biological sister; Prostate cancer in his biological brother.  Family history non-contributory to neurological condition.    Social History:   Social History     Socioeconomic History   • Marital status: Legally      Spouse name: None   • Number of children: 3   • Years of education: None   • Highest education level: None   Occupational History   • None   Tobacco Use   • Smoking status: Former Smoker     Packs/day: 2.00     Types: Cigarettes     Quit date: 2010     Years since quittin.7   • Smokeless tobacco: Never Used   Vaping Use   • Vaping Use: Never used   Substance and Sexual Activity   • Alcohol use: No     Comment: RARE   • Drug use: No   • Sexual activity: Yes     Partners: Female     Birth control/protection: None   Other Topics Concern   • None   Social History Narrative    Retired    Lives alone in a 3 story home     Social Determinants of Health     Financial Resource Strain: Not on file   Food Insecurity: Not on file   Transportation Needs: Not on file   Physical Activity: Not on file   Stress: Not on file   Social Connections: Not on file   Intimate Partner Violence: Not on file   Housing Stability: Not on file        Allergies: No Known Allergies    Medications:   Current Outpatient Medications   Medication Sig Dispense Refill   • albuterol HFA (PROAIR HFA) 90 mcg/actuation inhaler  Inhale 2 puffs 2 (two) times a day as needed for wheezing or shortness of breath. (Patient taking differently: Inhale 2 puffs as needed for wheezing or shortness of breath.  ) 1 Inhaler 3   • alprazolam (XANAX ORAL) Take 1 tablet by mouth as needed.     • amLODIPine 5 mg tablet Take 5 mg by mouth daily.       • atorvastatin 40 mg tablet Take 1 tablet (40 mg total) by mouth once daily. (Patient taking differently: Take 40 mg by mouth nightly.  ) 90 tablet 1   • cimetidine (TAGAMET HB) 200 mg tablet Take 200 mg by mouth as needed.     • dabigatran etexilate (PRADAXA) 150 mg capsu Take 1 capsule (150 mg total) by mouth 2 (two) times a day. (Patient taking differently: Take 150 mg by mouth 2 (two) times a day. Stopped for surgery last dose taken 1/3/81621 at 0800 ) 60 capsule 0   • escitalopram (LEXAPRO) 10 mg tablet Take 1 tablet (10 mg total) by mouth daily. 90 tablet 1   • fluticasone propionate (FLONASE) 50 mcg/actuation nasal spray Administer 2 sprays into each nostril daily. (Patient taking differently: Administer 2 sprays into each nostril daily as needed.  ) 16 g 2   • hydrochlorothiazide (HYDRODIURIL) 12.5 mg tablet Take 12.5 mg by mouth daily.     • irbesartan (AVAPRO) 300 mg tablet Take 300 mg by mouth daily.       • loratadine (CLARITIN) 10 mg tablet Take 1 tablet (10 mg total) by mouth daily. (Patient taking differently: Take 10 mg by mouth daily as needed.  ) 90 tablet 1   • multivitamin (THERAGRAN) tablet Take 1 tablet by mouth daily.     • sennosides-docusate sodium (SENOKOT-S) 8.6-50 mg Take 1 tablet by mouth 2 (two) times a day. (Patient taking differently: Take 1 tablet by mouth as needed.  ) 60 tablet 0   • umeclidinium-vilanterol (ANORO ELLIPTA) 62.5-25 mcg/actuation blister with device Inhale 1 puff daily.       No current facility-administered medications for this visit.       Review of Systems:   A 14 point review of systems was performed and aside from what is mentioned above is otherwise  negative.    General physical examination:  The patient is well appearing male, sitting upright in his chair in no acute distress, he appears his stated age.  His head is atraumatic, normocephalic. His neck is supple without obvious adenopathy. Oral mucosa is moist. His peripheral pulses are symmetric and palpable. Extremities without peripheral edema. His breathing is normal and unlabored.     Vital signs were reviewed and within normal limits.    Neurologic examination:  Mental status:  The patient is alert, attentive, and oriented. Speech is clear and fluent with good repetition, comprehension, and naming.  Remote and recent memory are normal as well as fund of knowledge.    Cranial nerves:  CN II: Visual fields are full to confrontation.  Pupils are equal and briskly reactive to light.   CN III, IV, VI: Extra-occular muscles are intact  CN V: Facial sensation is intact and equal in V1-V3 distributions bilaterally.   CN VII: Face is symmetric with normal eye closure and smile.  CN VIII: Hearing is normal to rubbing fingers  CN IX, X: Palate elevates symmetrically. Phonation is normal.  CN XI: Head turning and shoulder shrug are intact  CN XII: Tongue is midline with normal movements and no atrophy.    Motor:  There is no pronator drift.  Muscle bulk and tone are normal.    Deltoid Biceps Triceps Wrist ext Hand  Finger Spread Hip flexion Knee ext Dorsi-  flexion EHL Plantar Flexion   L 5 5 5 5 5 5 5 5 5 5 5   R 4+ PL 5 5 5 5 5 5 5 5 5 5     Reflexes:  Reflexes are 2+ and symmetric at the biceps, brachialis, triceps, patellar, and Achilli's. There is no Romo's sign or ankle clonus.    Sensory:   Intact light touch, pinprick, and position sense, throughout all 4 extremities.    Coordination:  There is no dysmetria on finger-to-nose testing.  Romberg is absent.    Gait:  Gait is steady with normal steps.  Heel and toe walking are normal. Tandem gait is normal.      Data Review:      CTA Neck with and without  IV contrast 12/8/21   There is atherosclerotic change within the aortic arch and origin of the great vessels. The origin of the great vessels from the aortic arch are patent. The common and internal carotid arteries are patent. There is atherosclerotic change within the bulbs bilaterally with mild narrowing. There is no hemodynamically significant stenosis.  The vertebral arteries are patent. There is moderate narrowing at the origin of the left vertebral artery. C2 fracture again seen.    --  IMPRESSION:   Patent cervical vasculature without hemodynamically significant stenosis.    CT CERVICAL SPINE WITHOUT IV CONTRAST 11/1/202  COMMENT:  Comparison: CT cervical spine from July 20 9/21 and 4/23/2021  Findings:  Cervical lordosis is slightly straightened similar to previous studies.  Redemonstration of fractures at C1 and C2.  The posterior bilateral C1 laminar fractures demonstrate further osseous bridging compared to the 7/29/2021 study.  An anterior ring C1 fracture also appears to demonstrate some further osseous bridging is seen in the coronal series.  There is developing fusion between the anterior arch of C1 and the upper portions of the fractured dens. The dens fracture is again noted to have improved alignment compared to the 4/23/2021 CT although unchanged in alignment and appearance compared to the 7/29/2021 study with no appreciable osseous bridging across the fracture site.  C2-C3 vertebral bodies and posterior elements are again noted to be fused.  Large bridging osteophytes noted extending from C4 through C7 on the right anteriorly.  Bony fusion of the C5 and C6 vertebral bodies are also redemonstrated.  Partially visualized bridging osteophytes at T2-T3.  No evidence of new fracture.  No prevertebral soft tissue swelling.  No obvious epidural fluid collection.  Assessment of the central canal is very limited with this non-myelographic CT.  No high-grade bony stenosis of the central canal.    No  findings of concern in the upper lung fields.  Limited assessment of the unenhanced soft tissues of the neck reveals atherosclerotic changes in the carotids.  Impression: IMPRESSION:  Further osseous bridging across the anterior and posterior C1 fractures.  No significant interval change in the appearance of the dens fracture which demonstrates no appreciable osseous bridging.    X-ray Cervical 08/09/2021    C7 is completely obscured on lateral projections due to shoulder density. The known dens fracture is again noted without gross change within the limitations of plain radiographs. The C1 fracture is not well evaluated by plain radiographs. No gross identifiable motion of the fracture fragment or gross cervical instability is noted in the attempted flexion and extension views.  There unchanged minimal prevertebral soft tissue prominence anterior to C1.  Cervical disc spaces are mildly reduced, probably greatest at C5-C6, with partial fusion of C5 and C6 again noted. There is advanced multilevel cervical facet arthrosis.  --  IMPRESSION: Dens fracture again noted, grossly unchanged from prior CT, with no obvious motion in flexion and extension. C1 fracture not well evaluated by plain radiographs. C7 completely obscured on lateral views due to shoulder density.    CT cervical spine 7/29/21  COMMENT: There is a normal cervical lordosis.  The vertebral bodies are maintained in height and alignment.  Again noted is a type II dens fracture with improvement in angulation and alignment but no significant osseous bridging noted.  There is improved alignment and healing of the posterior elements and anterior arch of C1.  No other definite fracture is noted.  There is fusion of C2-C3 and C5-C6..  Multilevel degenerative change noted.  There is no prevertebral soft tissue swelling obvious epidural hemorrhage.     Evaluation the intraspinal soft tissues is highly limited on non-myelographic CT examination.  IMPRESSION:   Healing C1 and C2 fractures.    MRI Cervical Spine 4/24/21  COMMENT: Fusion of C2 and C3 as well as C5 and C6.  Fracture deformity is noted through C2 as well as arch of C1.  There is a large amount of prevertebral soft tissue fluid from C2 to C5, consistent with anterior longitudinal ligament injury.  No cord signal abnormality.  No mark anthony cord compression.  Disc osteophyte complex at C4-C5 with severe facet arthrosis, and uncovertebral hypertrophic changes, contributing to severe left and mild right foraminal narrowing.  Disc osteophyte complex at C6-C7 with severe left facet arthrosis contributing to moderate foraminal narrowing.  Disc osteophyte complex at C3-C4 with facet arthrosis bilaterally, contributing to moderate left foraminal narrowing.  No epidural collection or hematoma.     IMPRESSION: C1 and C2 fractures with findings concerning for anterior  longitudinal ligament rupture.  Degenerative spondylosis at C4-C5.    MRI Angiogram Neck wo contrast 4/24/21  Findings: MR angiography of the neck demonstrates patency of the common carotid arteries, internal carotid arteries bilaterally as well as the left vertebral artery.  No evidence for vessel dissection, cutoff, hemodynamically significant stenoses by NASCET criteria.  Although no definite intramural hematoma identified, difficult to evaluation of the right vertebral artery due to its marked attenuation and diminished caliber, felt to be throughout the entire length of the vertebral artery although there is increased T1 signal on the fat sat images in the V3 and V4 segment of the right vertebral artery, again this is likely due to the chronic appearing attenuation in the right vertebral artery.  --  IMPRESSION: The right vertebral artery is markedly attenuated and diminutive throughout its entire length, felt to be chronic or congenital in nature.  No definite intramural hematoma to suggest acute dissection.  Carotid arteries are patent.    CT Cervical  spine 4/23/21  COMMENT:  Cervicothoracic alignment: Anatomic.  Prevertebral soft tissues: There is prevertebral soft tissue thickening at the C1-C2 articulation with right greater than left edema.  Vertebral bodies: Acute fractures involving the anterior and posterior arches of C1.  Acute fracture involving the base of the dens with displaced osseous fragments located adjacent to the bilateral there is osseous fusion of the C2 and C3 vertebra and its posterior elements.  There is a linear lucency along the left occipital condyle likely representing a nondisplaced fracture deformity.  Remaining vertebral bodies are intact with degenerative changes.  Lateral mass of C1 and C2 vertebra.  Intervertebral discs: Severe intervertebral disc space narrowing with partial fusion at C5-C6.  Cervical and upper thoracic spinal canal: Patent the level of C5.  Below which, evaluation for  Axial images:   Skull base: See above  C1-2: See above   C2-3: Fusion of the C2-C3 disc space with left greater than right uncovertebral and facet hypertrophy.  Moderate left foraminal narrowing.  C3-4: Posterior disc osteophyte complex with uncovertebral and facet  hypertrophy.  Severe left and moderate right foraminal narrowing.  Moderate central canal narrowing.  C4-5: Posterior disc osteophyte complex with uncovertebral and facet  hypertrophy.  Severe left and moderate right foraminal narrowing.  Moderate central canal narrowing.  C5-6: Fusion of the C5-C6 disc space with uncovertebral and facet hypertrophy.  Moderate left  greater right foraminal narrowing. Moderate central canal narrowing.   C6-7: Posterior disc osteophyte complex with uncovertebral and facet  hypertrophy.  Severe left and moderate right foraminal narrowing.  Moderate central canal narrowing..  C7-T1: Posterior disc osteophyte complex with uncovertebral and facet hypertrophy.  Severe left and moderate right foraminal narrowing.  Moderate central canal narrowing.  Extra  vertebral soft tissues: Biapical pleural scarring.  --  IMPRESSION:  1.  Acute fractures of the anterior and posterior arches of C1 vertebra. Acute fracture through the base of the dens at the C2 vertebra.  Fractured bone fragments are noted adjacent to the bilateral lateral masses of C1 and C2 vertebra.  2.  Linear lucency through the left occipital condyle, suspicious for an  avulsion fracture.  3.  Prevertebral soft tissue edema with extension of the right paravertebral region at C1-C2.  4.  Multilevel cervical spondylosis.     CT Head without contrast 4/23/21  COMMENT: Brain parenchyma demonstrates maintenance of gray-white matter differentiation.  No acute intracranial parenchymal hemorrhage, mass effect, midline shift, or extra-axial fluid collection.  No large vascular territorial infarct.  Ventricles, basal cisterns and cortical sulci are prominent, consistent with involutional changes.  Hypodensity in the periventricular and subcortical white matter, consistent with microangiopathic changes.     Visualized paranasal sinuses and mastoid air cells are clear bilaterally. The calvarium is unremarkable.  Partially visualized fractures of the anterior and posterior arches of C1 and the base of the dens.  --  IMPRESSION:  1.  No acute intracranial hemorrhage, large vascular territorial infarct, or mass effect.  2.  Partially visualized presumed acute fractures of the anterior and posterior arches of C1 and base of dens.    Images were personally reviewed by myself and with the patient.      Assessment and Plan:    In summary, Melanie Zelaya is a 71 y.o. male who fell on April 2021 sustained significant head, neck trauma.  He has radiographic evidence of C1, C2 fractures.  Specifically his C2 fracture involves the odontoid process.  There is mild distraction of the odontoid process from the body of C2 with posterior angulation.  CT scan demonstrated osseous healing of C1 however the there is still minimal healing  of the odontoid process.  He wishes to pursue surgical fixation of his fracture.  This will be orchestrated on January 6, 2022 via a C1-C3 posterior lateral instrumented fusion, ORIF C2 fracture.    The patient was counseled length around the natural history of C1, C2 fractures.  He was asked to utilize his collar at all times as this is an unstable fracture pattern. In the interim should his symptomatology evolve or worsen, I have asked he return sooner for prompt reevaluation.    This patient is going to require an extended postoperative length of stay greater than 2 midnight stays.  In fact, this patient will require up to a 4 day hospital stay as a result of the need for closed suction drain, concerns over a possible perioperative neurologic deficit by way of either a hematoma and/or a compressive wound condition.  In addition, this patient is manifesting a number of medical comorbidities including the need for medication monitoring and pain management monitoring as a result of the extensive nature of their spinal surgical procedure.     It is my recommendation that this patient will likely require a greater than 24 hour hospital stay due to the risks to this patient of a perioperative or postoperative complication that would ultimately result in grave circumstances in the outpatient setting and increase this patient to a higher risk of postoperative morbidity and/or mortality.      Thank you for referring Melanie Zelaya  to my attention.  Please feel free to contact me anytime if I can be of further assistance.      I, Vanessa Spain PA-C, am scribing for and in the presence of Chaparro Wang MD, personally performed the services described in this documentation as scribed by Vanessa Spain PA-C in my presence, and it is accurate and complete.     I spent 30 minutes on this date of service performing the following activities: obtaining history, performing examination, entering orders,  documenting, preparing for visit, obtaining / reviewing records, providing counseling and education, independently reviewing study/studies, communicating results and coordinating care.

## 2022-01-06 ENCOUNTER — APPOINTMENT (INPATIENT)
Dept: RADIOLOGY | Facility: HOSPITAL | Age: 72
DRG: 472 | End: 2022-01-06
Attending: NEUROLOGICAL SURGERY
Payer: COMMERCIAL

## 2022-01-06 ENCOUNTER — ANESTHESIA (INPATIENT)
Dept: OPERATING ROOM | Facility: HOSPITAL | Age: 72
DRG: 472 | End: 2022-01-06
Payer: COMMERCIAL

## 2022-01-06 ENCOUNTER — HOSPITAL ENCOUNTER (INPATIENT)
Facility: HOSPITAL | Age: 72
LOS: 5 days | DRG: 472 | End: 2022-01-11
Attending: NEUROLOGICAL SURGERY | Admitting: NEUROLOGICAL SURGERY
Payer: COMMERCIAL

## 2022-01-06 ENCOUNTER — APPOINTMENT (INPATIENT)
Dept: RADIOLOGY | Facility: HOSPITAL | Age: 72
DRG: 472 | End: 2022-01-06
Attending: PHYSICIAN ASSISTANT
Payer: COMMERCIAL

## 2022-01-06 PROBLEM — Z87.81 HISTORY OF CERVICAL FRACTURE: Status: ACTIVE | Noted: 2022-01-06

## 2022-01-06 PROBLEM — Z87.81 H/O CERVICAL FRACTURE: Status: ACTIVE | Noted: 2022-01-06

## 2022-01-06 LAB
ANION GAP SERPL CALC-SCNC: 12 MEQ/L (ref 3–15)
BUN SERPL-MCNC: 17 MG/DL (ref 8–20)
CA-I BLD-SCNC: 1.08 MMOL/L (ref 1.15–1.27)
CALCIUM SERPL-MCNC: 8.9 MG/DL (ref 8.9–10.3)
CHLORIDE SERPL-SCNC: 105 MEQ/L (ref 98–109)
CO2 SERPL-SCNC: 23 MEQ/L (ref 22–32)
CREAT SERPL-MCNC: 0.8 MG/DL (ref 0.8–1.3)
ERYTHROCYTE [DISTWIDTH] IN BLOOD BY AUTOMATED COUNT: 14.3 % (ref 11.6–14.4)
GFR SERPL CREATININE-BSD FRML MDRD: >60 ML/MIN/1.73M*2
GLUCOSE SERPL-MCNC: 160 MG/DL (ref 70–99)
HCT VFR BLDCO AUTO: 41.5 % (ref 40.1–51)
HGB BLD-MCNC: 13.9 G/DL (ref 13.7–17.5)
MAGNESIUM SERPL-MCNC: 1.9 MG/DL (ref 1.8–2.5)
MCH RBC QN AUTO: 29.2 PG (ref 28–33.2)
MCHC RBC AUTO-ENTMCNC: 33.5 G/DL (ref 32.2–36.5)
MCV RBC AUTO: 87.2 FL (ref 83–98)
PDW BLD AUTO: 10.1 FL (ref 9.4–12.4)
PHOSPHATE SERPL-MCNC: 4.1 MG/DL (ref 2.4–4.7)
PLATELET # BLD AUTO: 175 K/UL (ref 150–350)
POTASSIUM SERPL-SCNC: 4.3 MEQ/L (ref 3.6–5.1)
RBC # BLD AUTO: 4.76 M/UL (ref 4.5–5.8)
SODIUM SERPL-SCNC: 140 MEQ/L (ref 136–144)
WBC # BLD AUTO: 11 K/UL (ref 3.8–10.5)

## 2022-01-06 PROCEDURE — 63700000 HC SELF-ADMINISTRABLE DRUG: Performed by: PHYSICIAN ASSISTANT

## 2022-01-06 PROCEDURE — 82330 ASSAY OF CALCIUM: CPT | Performed by: NURSE PRACTITIONER

## 2022-01-06 PROCEDURE — 03HY32Z INSERTION OF MONITORING DEVICE INTO UPPER ARTERY, PERCUTANEOUS APPROACH: ICD-10-PCS | Performed by: ANESTHESIOLOGY

## 2022-01-06 PROCEDURE — 20936 SP BONE AGRFT LOCAL ADD-ON: CPT | Performed by: NEUROLOGICAL SURGERY

## 2022-01-06 PROCEDURE — 63600000 HC DRUGS/DETAIL CODE: Performed by: NURSE ANESTHETIST, CERTIFIED REGISTERED

## 2022-01-06 PROCEDURE — 36000004 HC OR LEVEL 4 INITIAL 30MIN: Performed by: NEUROLOGICAL SURGERY

## 2022-01-06 PROCEDURE — 84100 ASSAY OF PHOSPHORUS: CPT | Performed by: NURSE PRACTITIONER

## 2022-01-06 PROCEDURE — 63600000 HC DRUGS/DETAIL CODE: Performed by: PHYSICIAN ASSISTANT

## 2022-01-06 PROCEDURE — 25800000 HC PHARMACY IV SOLUTIONS: Performed by: NURSE ANESTHETIST, CERTIFIED REGISTERED

## 2022-01-06 PROCEDURE — 22600 ARTHRD PST TQ 1NTRSPC CRV: CPT | Mod: AS | Performed by: PHYSICIAN ASSISTANT

## 2022-01-06 PROCEDURE — 22328 TREAT EACH ADD SPINE FX: CPT | Performed by: NEUROLOGICAL SURGERY

## 2022-01-06 PROCEDURE — 72125 CT NECK SPINE W/O DYE: CPT

## 2022-01-06 PROCEDURE — C1713 ANCHOR/SCREW BN/BN,TIS/BN: HCPCS | Performed by: NEUROLOGICAL SURGERY

## 2022-01-06 PROCEDURE — 200200 PR NO CHARGE: Performed by: NEUROLOGICAL SURGERY

## 2022-01-06 PROCEDURE — 27200000 HC STERILE SUPPLY: Performed by: NEUROLOGICAL SURGERY

## 2022-01-06 PROCEDURE — 83735 ASSAY OF MAGNESIUM: CPT | Performed by: NURSE PRACTITIONER

## 2022-01-06 PROCEDURE — 80048 BASIC METABOLIC PNL TOTAL CA: CPT | Performed by: NURSE PRACTITIONER

## 2022-01-06 PROCEDURE — 27800000 HC SUPPLY/IMPLANTS: Performed by: NEUROLOGICAL SURGERY

## 2022-01-06 PROCEDURE — 25000000 HC PHARMACY GENERAL: Performed by: PHYSICIAN ASSISTANT

## 2022-01-06 PROCEDURE — 37000001 HC ANESTHESIA GENERAL: Performed by: NEUROLOGICAL SURGERY

## 2022-01-06 PROCEDURE — 22595 ARTHRD PST TQ ATLAS-AXIS: CPT | Mod: AS | Performed by: PHYSICIAN ASSISTANT

## 2022-01-06 PROCEDURE — 25800000 HC PHARMACY IV SOLUTIONS: Performed by: NURSE PRACTITIONER

## 2022-01-06 PROCEDURE — 22842 INSERT SPINE FIXATION DEVICE: CPT | Mod: AS | Performed by: PHYSICIAN ASSISTANT

## 2022-01-06 PROCEDURE — L0172 CERV COL SR FOAM 2PC PRE OTS: HCPCS

## 2022-01-06 PROCEDURE — 63700000 HC SELF-ADMINISTRABLE DRUG: Performed by: NEUROLOGICAL SURGERY

## 2022-01-06 PROCEDURE — 25800000 HC PHARMACY IV SOLUTIONS: Performed by: PHYSICIAN ASSISTANT

## 2022-01-06 PROCEDURE — 25000000 HC PHARMACY GENERAL: Performed by: NURSE ANESTHETIST, CERTIFIED REGISTERED

## 2022-01-06 PROCEDURE — 0RG2071 FUSION OF 2 OR MORE CERVICAL VERTEBRAL JOINTS WITH AUTOLOGOUS TISSUE SUBSTITUTE, POSTERIOR APPROACH, POSTERIOR COLUMN, OPEN APPROACH: ICD-10-PCS | Performed by: NEUROLOGICAL SURGERY

## 2022-01-06 PROCEDURE — 63600000 HC DRUGS/DETAIL CODE: Performed by: NURSE PRACTITIONER

## 2022-01-06 PROCEDURE — 85027 COMPLETE CBC AUTOMATED: CPT | Performed by: NURSE PRACTITIONER

## 2022-01-06 PROCEDURE — 25000000 HC PHARMACY GENERAL: Performed by: NEUROLOGICAL SURGERY

## 2022-01-06 PROCEDURE — 0PS304Z REPOSITION CERVICAL VERTEBRA WITH INTERNAL FIXATION DEVICE, OPEN APPROACH: ICD-10-PCS | Performed by: NEUROLOGICAL SURGERY

## 2022-01-06 PROCEDURE — 20930 SP BONE ALGRFT MORSEL ADD-ON: CPT | Performed by: NEUROLOGICAL SURGERY

## 2022-01-06 PROCEDURE — 22595 ARTHRD PST TQ ATLAS-AXIS: CPT | Performed by: NEUROLOGICAL SURGERY

## 2022-01-06 PROCEDURE — 22600 ARTHRD PST TQ 1NTRSPC CRV: CPT | Performed by: NEUROLOGICAL SURGERY

## 2022-01-06 PROCEDURE — 4A11X4G MONITORING OF PERIPHERAL NERVOUS ELECTRICAL ACTIVITY, INTRAOPERATIVE, EXTERNAL APPROACH: ICD-10-PCS | Performed by: NEUROLOGICAL SURGERY

## 2022-01-06 PROCEDURE — 22326 TREAT NECK SPINE FRACTURE: CPT | Mod: AS | Performed by: PHYSICIAN ASSISTANT

## 2022-01-06 PROCEDURE — 36000014 HC OR LEVEL 4 EA ADDL MIN: Performed by: NEUROLOGICAL SURGERY

## 2022-01-06 PROCEDURE — 63600000 HC DRUGS/DETAIL CODE: Performed by: NEUROLOGICAL SURGERY

## 2022-01-06 PROCEDURE — 22328 TREAT EACH ADD SPINE FX: CPT | Mod: AS | Performed by: PHYSICIAN ASSISTANT

## 2022-01-06 PROCEDURE — 22842 INSERT SPINE FIXATION DEVICE: CPT | Performed by: NEUROLOGICAL SURGERY

## 2022-01-06 PROCEDURE — 20000000 HC ROOM AND CARE ICU

## 2022-01-06 PROCEDURE — 72040 X-RAY EXAM NECK SPINE 2-3 VW: CPT

## 2022-01-06 PROCEDURE — 36415 COLL VENOUS BLD VENIPUNCTURE: CPT | Performed by: NURSE PRACTITIONER

## 2022-01-06 PROCEDURE — 22326 TREAT NECK SPINE FRACTURE: CPT | Performed by: NEUROLOGICAL SURGERY

## 2022-01-06 DEVICE — IMPLANTABLE DEVICE: Type: IMPLANTABLE DEVICE | Site: EPIDURAL SPACE | Status: FUNCTIONAL

## 2022-01-06 DEVICE — VITOSS BIMODAL 5CC FOAM PACK: Type: IMPLANTABLE DEVICE | Site: EPIDURAL SPACE | Status: FUNCTIONAL

## 2022-01-06 DEVICE — TAP 3.0MM: Type: IMPLANTABLE DEVICE | Site: EPIDURAL SPACE | Status: FUNCTIONAL

## 2022-01-06 DEVICE — SCREW SET: Type: IMPLANTABLE DEVICE | Site: EPIDURAL SPACE | Status: FUNCTIONAL

## 2022-01-06 DEVICE — SCREW 3.5 X 14MM: Type: IMPLANTABLE DEVICE | Site: EPIDURAL SPACE | Status: FUNCTIONAL

## 2022-01-06 DEVICE — MATRIX GRAFT DURAGEN 2 X 2IN: Type: IMPLANTABLE DEVICE | Site: EPIDURAL SPACE | Status: FUNCTIONAL

## 2022-01-06 DEVICE — PUTTY BONE DBX 5CC: Type: IMPLANTABLE DEVICE | Site: EPIDURAL SPACE | Status: FUNCTIONAL

## 2022-01-06 DEVICE — ROD 3.5X35MM: Type: IMPLANTABLE DEVICE | Site: EPIDURAL SPACE | Status: FUNCTIONAL

## 2022-01-06 RX ORDER — ACETAMINOPHEN 325 MG/1
975 TABLET ORAL
Status: DISCONTINUED | OUTPATIENT
Start: 2022-01-06 | End: 2022-01-11 | Stop reason: HOSPADM

## 2022-01-06 RX ORDER — HEPARIN SODIUM 5000 [USP'U]/ML
5000 INJECTION, SOLUTION INTRAVENOUS; SUBCUTANEOUS EVERY 8 HOURS
Status: DISCONTINUED | OUTPATIENT
Start: 2022-01-07 | End: 2022-01-11 | Stop reason: HOSPADM

## 2022-01-06 RX ORDER — AMOXICILLIN 250 MG
1 CAPSULE ORAL 2 TIMES DAILY
Status: DISCONTINUED | OUTPATIENT
Start: 2022-01-06 | End: 2022-01-11 | Stop reason: HOSPADM

## 2022-01-06 RX ORDER — ATORVASTATIN CALCIUM 40 MG/1
40 TABLET, FILM COATED ORAL NIGHTLY
Status: DISCONTINUED | OUTPATIENT
Start: 2022-01-06 | End: 2022-01-11 | Stop reason: HOSPADM

## 2022-01-06 RX ORDER — PHENYLEPHRINE HYDROCHLORIDE 10 MG/ML
INJECTION INTRAVENOUS AS NEEDED
Status: DISCONTINUED | OUTPATIENT
Start: 2022-01-06 | End: 2022-01-06 | Stop reason: SURG

## 2022-01-06 RX ORDER — FENTANYL CITRATE 50 UG/ML
50 INJECTION, SOLUTION INTRAMUSCULAR; INTRAVENOUS
Status: CANCELLED | OUTPATIENT
Start: 2022-01-06

## 2022-01-06 RX ORDER — SODIUM CHLORIDE, SODIUM GLUCONATE, SODIUM ACETATE, POTASSIUM CHLORIDE AND MAGNESIUM CHLORIDE 30; 37; 368; 526; 502 MG/100ML; MG/100ML; MG/100ML; MG/100ML; MG/100ML
INJECTION, SOLUTION INTRAVENOUS CONTINUOUS PRN
Status: DISCONTINUED | OUTPATIENT
Start: 2022-01-06 | End: 2022-01-06 | Stop reason: SURG

## 2022-01-06 RX ORDER — FLUTICASONE PROPIONATE 50 MCG
2 SPRAY, SUSPENSION (ML) NASAL DAILY PRN
Status: DISCONTINUED | OUTPATIENT
Start: 2022-01-06 | End: 2022-01-11 | Stop reason: HOSPADM

## 2022-01-06 RX ORDER — DEXTROSE 50 % IN WATER (D50W) INTRAVENOUS SYRINGE
25 AS NEEDED
Status: DISCONTINUED | OUTPATIENT
Start: 2022-01-06 | End: 2022-01-11 | Stop reason: HOSPADM

## 2022-01-06 RX ORDER — SODIUM CHLORIDE 9 MG/ML
INJECTION, SOLUTION INTRAVENOUS CONTINUOUS
Status: DISCONTINUED | OUTPATIENT
Start: 2022-01-06 | End: 2022-01-06 | Stop reason: HOSPADM

## 2022-01-06 RX ORDER — HYDROMORPHONE HCL IN 0.9% NACL 0.2 MG/ML
PLASTIC BAG, INJECTION (ML) INTRAVENOUS CONTINUOUS
Status: DISCONTINUED | OUTPATIENT
Start: 2022-01-06 | End: 2022-01-10

## 2022-01-06 RX ORDER — IBUPROFEN 200 MG
16-32 TABLET ORAL AS NEEDED
Status: CANCELLED | OUTPATIENT
Start: 2022-01-06

## 2022-01-06 RX ORDER — HYDRALAZINE HYDROCHLORIDE 20 MG/ML
5 INJECTION INTRAMUSCULAR; INTRAVENOUS
Status: CANCELLED | OUTPATIENT
Start: 2022-01-06

## 2022-01-06 RX ORDER — CEFAZOLIN SODIUM 2 G/100ML
2 INJECTION, SOLUTION INTRAVENOUS
Status: COMPLETED | OUTPATIENT
Start: 2022-01-06 | End: 2022-01-06

## 2022-01-06 RX ORDER — LABETALOL HCL 20 MG/4 ML
5 SYRINGE (ML) INTRAVENOUS AS NEEDED
Status: CANCELLED | OUTPATIENT
Start: 2022-01-06

## 2022-01-06 RX ORDER — HYDROMORPHONE HYDROCHLORIDE 1 MG/ML
INJECTION, SOLUTION INTRAMUSCULAR; INTRAVENOUS; SUBCUTANEOUS
Status: COMPLETED
Start: 2022-01-06 | End: 2022-01-06

## 2022-01-06 RX ORDER — ESCITALOPRAM OXALATE 10 MG/1
10 TABLET ORAL DAILY
Status: DISCONTINUED | OUTPATIENT
Start: 2022-01-07 | End: 2022-01-11 | Stop reason: HOSPADM

## 2022-01-06 RX ORDER — AMLODIPINE BESYLATE 5 MG/1
5 TABLET ORAL DAILY
Status: DISCONTINUED | OUTPATIENT
Start: 2022-01-06 | End: 2022-01-11 | Stop reason: HOSPADM

## 2022-01-06 RX ORDER — ALPRAZOLAM 0.5 MG/1
0.5 TABLET ORAL AS NEEDED
Status: DISCONTINUED | OUTPATIENT
Start: 2022-01-06 | End: 2022-01-06

## 2022-01-06 RX ORDER — POLYETHYLENE GLYCOL 3350 17 G/17G
17 POWDER, FOR SOLUTION ORAL DAILY
Status: DISCONTINUED | OUTPATIENT
Start: 2022-01-06 | End: 2022-01-11 | Stop reason: HOSPADM

## 2022-01-06 RX ORDER — CEFAZOLIN SODIUM 2 G/100ML
2 INJECTION, SOLUTION INTRAVENOUS
Status: COMPLETED | OUTPATIENT
Start: 2022-01-06 | End: 2022-01-07

## 2022-01-06 RX ORDER — BISACODYL 10 MG/1
10 SUPPOSITORY RECTAL DAILY PRN
Status: DISCONTINUED | OUTPATIENT
Start: 2022-01-06 | End: 2022-01-11 | Stop reason: HOSPADM

## 2022-01-06 RX ORDER — FENTANYL CITRATE 50 UG/ML
INJECTION, SOLUTION INTRAMUSCULAR; INTRAVENOUS AS NEEDED
Status: DISCONTINUED | OUTPATIENT
Start: 2022-01-06 | End: 2022-01-06 | Stop reason: SURG

## 2022-01-06 RX ORDER — LOSARTAN POTASSIUM 100 MG/1
100 TABLET ORAL DAILY
Status: DISCONTINUED | OUTPATIENT
Start: 2022-01-06 | End: 2022-01-11 | Stop reason: HOSPADM

## 2022-01-06 RX ORDER — LIDOCAINE HYDROCHLORIDE AND EPINEPHRINE 10; 10 UG/ML; MG/ML
INJECTION, SOLUTION INFILTRATION; PERINEURAL
Status: DISCONTINUED | OUTPATIENT
Start: 2022-01-06 | End: 2022-01-06 | Stop reason: HOSPADM

## 2022-01-06 RX ORDER — CYCLOBENZAPRINE HCL 5 MG
5 TABLET ORAL EVERY 8 HOURS PRN
Status: DISCONTINUED | OUTPATIENT
Start: 2022-01-06 | End: 2022-01-09

## 2022-01-06 RX ORDER — ALBUTEROL SULFATE 0.83 MG/ML
2.5 SOLUTION RESPIRATORY (INHALATION) EVERY 4 HOURS PRN
Status: DISCONTINUED | OUTPATIENT
Start: 2022-01-06 | End: 2022-01-11 | Stop reason: HOSPADM

## 2022-01-06 RX ORDER — DEXTROSE 50 % IN WATER (D50W) INTRAVENOUS SYRINGE
25 AS NEEDED
Status: CANCELLED | OUTPATIENT
Start: 2022-01-06

## 2022-01-06 RX ORDER — LIDOCAINE HYDROCHLORIDE 10 MG/ML
INJECTION, SOLUTION INFILTRATION; PERINEURAL AS NEEDED
Status: DISCONTINUED | OUTPATIENT
Start: 2022-01-06 | End: 2022-01-06 | Stop reason: SURG

## 2022-01-06 RX ORDER — DEXTROSE 40 %
15-30 GEL (GRAM) ORAL AS NEEDED
Status: DISCONTINUED | OUTPATIENT
Start: 2022-01-06 | End: 2022-01-07

## 2022-01-06 RX ORDER — HYDROMORPHONE HYDROCHLORIDE 1 MG/ML
0.5 INJECTION, SOLUTION INTRAMUSCULAR; INTRAVENOUS; SUBCUTANEOUS
Status: CANCELLED | OUTPATIENT
Start: 2022-01-06

## 2022-01-06 RX ORDER — ONDANSETRON 4 MG/1
4 TABLET, ORALLY DISINTEGRATING ORAL EVERY 8 HOURS PRN
Status: DISCONTINUED | OUTPATIENT
Start: 2022-01-06 | End: 2022-01-11 | Stop reason: HOSPADM

## 2022-01-06 RX ORDER — ONDANSETRON HYDROCHLORIDE 2 MG/ML
INJECTION, SOLUTION INTRAVENOUS AS NEEDED
Status: DISCONTINUED | OUTPATIENT
Start: 2022-01-06 | End: 2022-01-06 | Stop reason: SURG

## 2022-01-06 RX ORDER — DEXTROSE 40 %
15-30 GEL (GRAM) ORAL AS NEEDED
Status: CANCELLED | OUTPATIENT
Start: 2022-01-06

## 2022-01-06 RX ORDER — VANCOMYCIN HYDROCHLORIDE 500 MG/10ML
INJECTION, POWDER, LYOPHILIZED, FOR SOLUTION INTRAVENOUS
Status: DISCONTINUED | OUTPATIENT
Start: 2022-01-06 | End: 2022-01-06 | Stop reason: HOSPADM

## 2022-01-06 RX ORDER — PROPOFOL 10 MG/ML
INJECTION, EMULSION INTRAVENOUS AS NEEDED
Status: DISCONTINUED | OUTPATIENT
Start: 2022-01-06 | End: 2022-01-06 | Stop reason: SURG

## 2022-01-06 RX ORDER — OXYCODONE HYDROCHLORIDE 5 MG/1
5 TABLET ORAL EVERY 4 HOURS PRN
Status: DISCONTINUED | OUTPATIENT
Start: 2022-01-06 | End: 2022-01-11 | Stop reason: HOSPADM

## 2022-01-06 RX ORDER — BUPIVACAINE HYDROCHLORIDE 5 MG/ML
INJECTION, SOLUTION PERINEURAL
Status: DISCONTINUED | OUTPATIENT
Start: 2022-01-06 | End: 2022-01-06 | Stop reason: HOSPADM

## 2022-01-06 RX ORDER — ALBUTEROL SULFATE 90 UG/1
2 INHALANT RESPIRATORY (INHALATION) AS NEEDED
Status: DISCONTINUED | OUTPATIENT
Start: 2022-01-06 | End: 2022-01-06

## 2022-01-06 RX ORDER — ALPRAZOLAM 0.5 MG/1
0.5 TABLET ORAL 3 TIMES DAILY PRN
Status: DISCONTINUED | OUTPATIENT
Start: 2022-01-06 | End: 2022-01-11 | Stop reason: HOSPADM

## 2022-01-06 RX ORDER — EPHEDRINE SULFATE 50 MG/ML
INJECTION, SOLUTION INTRAVENOUS AS NEEDED
Status: DISCONTINUED | OUTPATIENT
Start: 2022-01-06 | End: 2022-01-06 | Stop reason: SURG

## 2022-01-06 RX ORDER — IBUPROFEN 200 MG
16-32 TABLET ORAL AS NEEDED
Status: DISCONTINUED | OUTPATIENT
Start: 2022-01-06 | End: 2022-01-07

## 2022-01-06 RX ORDER — DIPHENHYDRAMINE HCL 50 MG/ML
12.5 VIAL (ML) INJECTION
Status: CANCELLED | OUTPATIENT
Start: 2022-01-06

## 2022-01-06 RX ORDER — ALUMINUM HYDROXIDE, MAGNESIUM HYDROXIDE, AND SIMETHICONE 1200; 120; 1200 MG/30ML; MG/30ML; MG/30ML
30 SUSPENSION ORAL EVERY 4 HOURS PRN
Status: DISCONTINUED | OUTPATIENT
Start: 2022-01-06 | End: 2022-01-11 | Stop reason: HOSPADM

## 2022-01-06 RX ORDER — MIDAZOLAM HYDROCHLORIDE 2 MG/2ML
INJECTION, SOLUTION INTRAMUSCULAR; INTRAVENOUS AS NEEDED
Status: DISCONTINUED | OUTPATIENT
Start: 2022-01-06 | End: 2022-01-06 | Stop reason: SURG

## 2022-01-06 RX ORDER — HYDROCHLOROTHIAZIDE 25 MG/1
12.5 TABLET ORAL DAILY
Status: DISCONTINUED | OUTPATIENT
Start: 2022-01-06 | End: 2022-01-11 | Stop reason: HOSPADM

## 2022-01-06 RX ORDER — FAMOTIDINE 20 MG/1
40 TABLET, FILM COATED ORAL DAILY
Status: DISCONTINUED | OUTPATIENT
Start: 2022-01-06 | End: 2022-01-11 | Stop reason: HOSPADM

## 2022-01-06 RX ORDER — HYDROMORPHONE HYDROCHLORIDE 1 MG/ML
1 INJECTION, SOLUTION INTRAMUSCULAR; INTRAVENOUS; SUBCUTANEOUS ONCE
Status: COMPLETED | OUTPATIENT
Start: 2022-01-06 | End: 2022-01-06

## 2022-01-06 RX ORDER — ONDANSETRON HYDROCHLORIDE 2 MG/ML
4 INJECTION, SOLUTION INTRAVENOUS EVERY 8 HOURS PRN
Status: DISCONTINUED | OUTPATIENT
Start: 2022-01-06 | End: 2022-01-11 | Stop reason: HOSPADM

## 2022-01-06 RX ORDER — ONDANSETRON HYDROCHLORIDE 2 MG/ML
4 INJECTION, SOLUTION INTRAVENOUS
Status: CANCELLED | OUTPATIENT
Start: 2022-01-06

## 2022-01-06 RX ORDER — DEXAMETHASONE SODIUM PHOSPHATE 4 MG/ML
INJECTION, SOLUTION INTRA-ARTICULAR; INTRALESIONAL; INTRAMUSCULAR; INTRAVENOUS; SOFT TISSUE AS NEEDED
Status: DISCONTINUED | OUTPATIENT
Start: 2022-01-06 | End: 2022-01-06 | Stop reason: SURG

## 2022-01-06 RX ORDER — HYDROMORPHONE HYDROCHLORIDE 1 MG/ML
INJECTION, SOLUTION INTRAMUSCULAR; INTRAVENOUS; SUBCUTANEOUS AS NEEDED
Status: DISCONTINUED | OUTPATIENT
Start: 2022-01-06 | End: 2022-01-06 | Stop reason: SURG

## 2022-01-06 RX ADMIN — ACETAMINOPHEN 975 MG: 325 TABLET, FILM COATED ORAL at 20:19

## 2022-01-06 RX ADMIN — DEXAMETHASONE SODIUM PHOSPHATE 8 MG: 4 INJECTION, SOLUTION INTRAMUSCULAR; INTRAVENOUS at 07:45

## 2022-01-06 RX ADMIN — LIDOCAINE HYDROCHLORIDE 5 ML: 10 INJECTION, SOLUTION INFILTRATION; PERINEURAL at 07:37

## 2022-01-06 RX ADMIN — ACETAMINOPHEN 975 MG: 325 TABLET, FILM COATED ORAL at 15:29

## 2022-01-06 RX ADMIN — PROPOFOL 100 MCG/KG/MIN: 10 INJECTION, EMULSION INTRAVENOUS at 07:40

## 2022-01-06 RX ADMIN — HYDROCHLOROTHIAZIDE 12.5 MG: 25 TABLET ORAL at 15:29

## 2022-01-06 RX ADMIN — DEXTROSE MONOHYDRATE 2 G: 5 INJECTION, SOLUTION INTRAVENOUS at 17:24

## 2022-01-06 RX ADMIN — CEFAZOLIN SODIUM 2 G: 2 INJECTION, SOLUTION INTRAVENOUS at 08:30

## 2022-01-06 RX ADMIN — FENTANYL CITRATE 100 MCG: 50 INJECTION, SOLUTION INTRAMUSCULAR; INTRAVENOUS at 07:37

## 2022-01-06 RX ADMIN — AMLODIPINE BESYLATE 5 MG: 5 TABLET ORAL at 15:28

## 2022-01-06 RX ADMIN — Medication 120 MG: at 07:37

## 2022-01-06 RX ADMIN — SODIUM CHLORIDE: 9 INJECTION, SOLUTION INTRAVENOUS at 06:24

## 2022-01-06 RX ADMIN — CALCIUM GLUCONATE 2000 MG: 98 INJECTION, SOLUTION INTRAVENOUS at 21:50

## 2022-01-06 RX ADMIN — SODIUM CHLORIDE, SODIUM GLUCONATE, SODIUM ACETATE, POTASSIUM CHLORIDE AND MAGNESIUM CHLORIDE: 526; 502; 368; 37; 30 INJECTION, SOLUTION INTRAVENOUS at 08:30

## 2022-01-06 RX ADMIN — PROPOFOL INJECTABLE EMULSION 30 MG: 10 INJECTION, EMULSION INTRAVENOUS at 08:00

## 2022-01-06 RX ADMIN — ONDANSETRON HYDROCHLORIDE 4 MG: 2 SOLUTION INTRAMUSCULAR; INTRAVENOUS at 12:25

## 2022-01-06 RX ADMIN — ATORVASTATIN CALCIUM 40 MG: 40 TABLET, FILM COATED ORAL at 22:44

## 2022-01-06 RX ADMIN — OXYCODONE HYDROCHLORIDE 5 MG: 5 TABLET ORAL at 13:29

## 2022-01-06 RX ADMIN — PROPOFOL INJECTABLE EMULSION 200 MG: 10 INJECTION, EMULSION INTRAVENOUS at 07:37

## 2022-01-06 RX ADMIN — DOCUSATE SODIUM AND SENNOSIDES 1 TABLET: 8.6; 5 TABLET, FILM COATED ORAL at 20:20

## 2022-01-06 RX ADMIN — MIDAZOLAM HYDROCHLORIDE 2 MG: 1 INJECTION, SOLUTION INTRAMUSCULAR; INTRAVENOUS at 07:30

## 2022-01-06 RX ADMIN — REMIFENTANIL HYDROCHLORIDE 0.2 MCG/KG/MIN: 1 INJECTION, POWDER, LYOPHILIZED, FOR SOLUTION INTRAVENOUS at 07:40

## 2022-01-06 RX ADMIN — LOSARTAN POTASSIUM 100 MG: 100 TABLET, FILM COATED ORAL at 15:29

## 2022-01-06 RX ADMIN — FENTANYL CITRATE 100 MCG: 50 INJECTION, SOLUTION INTRAMUSCULAR; INTRAVENOUS at 08:00

## 2022-01-06 RX ADMIN — Medication: at 13:29

## 2022-01-06 RX ADMIN — OXYCODONE HYDROCHLORIDE 5 MG: 5 TABLET ORAL at 20:20

## 2022-01-06 RX ADMIN — HYDROMORPHONE HYDROCHLORIDE 1 MG: 1 INJECTION, SOLUTION INTRAMUSCULAR; INTRAVENOUS; SUBCUTANEOUS at 13:11

## 2022-01-06 RX ADMIN — HYDROMORPHONE HYDROCHLORIDE 0.5 MG: 1 INJECTION, SOLUTION INTRAMUSCULAR; INTRAVENOUS; SUBCUTANEOUS at 12:20

## 2022-01-06 RX ADMIN — MAGNESIUM SULFATE HEPTAHYDRATE 2 G: 2 INJECTION, SOLUTION INTRAVENOUS at 20:20

## 2022-01-06 RX ADMIN — PHENYLEPHRINE HYDROCHLORIDE 50 MCG/MIN: 10 INJECTION INTRAVENOUS at 07:46

## 2022-01-06 RX ADMIN — EPHEDRINE SULFATE 5 MG: 50 INJECTION, SOLUTION INTRAVENOUS at 07:52

## 2022-01-06 RX ADMIN — CYCLOBENZAPRINE HYDROCHLORIDE 5 MG: 5 TABLET, FILM COATED ORAL at 14:41

## 2022-01-06 RX ADMIN — PHENYLEPHRINE HYDROCHLORIDE 200 MCG: 10 INJECTION INTRAVENOUS at 07:45

## 2022-01-06 RX ADMIN — CYCLOBENZAPRINE HYDROCHLORIDE 5 MG: 5 TABLET, FILM COATED ORAL at 22:45

## 2022-01-06 NOTE — ANESTHESIOLOGIST PRE-PROCEDURE ATTESTATION
Pre-Procedure Patient Identification:  I am the Primary Anesthesiologist and have identified the patient on 01/06/22 at 6:52 AM.   I have confirmed the procedure(s) will be performed by the following surgeon/proceduralist Chaparro Morejon MD.

## 2022-01-06 NOTE — PROGRESS NOTES
Daily Neuro ICU Progress Note     No overnight events.  Patient is now s/p C1-C3 posterior fusion, ORIF C2, POD 0.    Patient is endorsing posterior incisional discomfort with radiation into his right shoulder. This is similar to pre-operative however he explains it may be slightly more intense. No new radicular pain, weakness, or paresthesias. Jauregui in situ.     Temp:  [36.7 °C (98 °F)] 36.7 °C (98 °F)  Heart Rate:  [81-85] 81  Resp:  [16] 16  BP: (179)/(87) 179/87  SpO2:  [98 %] 98 %  Oxygen Therapy: Supplemental oxygen  O2 Delivery Method: Nasal cannula  O2 Flow Rate (L/min):  [6 L/min] 6 L/min     Intake/Output                 01/06/22 0700 - 01/07/22 0659     [id number] [id number] Total              Intake    I.V.  2300  -- 2300    Volume (mL) (electrolyte-A (PLASMA-LYTE A) infusion) 1000 -- 1000    Volume (mL) (sodium chloride 0.9 % infusion) 1300 -- 1300    Total Intake 2300 -- 2300       Output    Urine  300  -- 300    Total Output 300 -- 300       Net I/O     2000 -- 2000          Drains: Hemovac drain in place     General: lying supine in bed in no acute distress    Neuro: A&O x 3, interacting appr with fluent speech  Cn: EOMI, no facial asymmetry, tongue midline  Motor:  There is no pronator drift.  Muscle bulk and tone are normal.     Deltoid Biceps Triceps Wrist ext Hand  Finger Spread Hip flexion Knee ext Dorsi-  flexion EHL Plantar Flexion   L 4+ PL 5 5 5 5 5 5 5 5 5 5   R 4 PL 5 5 5 5 5 5 5 5 5 5      Sensation: Intact and equal to light touch all 4 ext    Incision: covered with mepilex- clean and dry     Scheduled Meds:  • acetaminophen  975 mg oral q6h INT   • amLODIPine  5 mg oral Daily   • atorvastatin  40 mg oral Nightly   • ceFAZolin  2 g intravenous q8h INT   • [START ON 1/7/2022] escitalopram  10 mg oral Daily   • famotidine  40 mg oral Daily   • [START ON 1/7/2022] heparin (porcine)  5,000 Units subcutaneous q8h MARIAN   • hydrochlorothiazide  12.5 mg oral Daily   • losartan  100 mg oral  Daily   • NON FORMULARY MEDICATION REQUEST  1 puff inhalation Daily   • polyethylene glycol  17 g oral Daily   • sennosides-docusate sodium  1 tablet oral BID     Continuous Infusions:  • HYDROmorphone in 0.9 % NaCl         PRN Meds:.albuterol HFA  •  ALPRAZolam  •  alum-mag hydroxide-simeth  •  bisacodyL  •  cyclobenzaprine  •  glucose **OR** dextrose **OR** glucagon **OR** dextrose in water  •  fluticasone propionate  •  HYDROmorphone in 0.9 % NaCl **AND** naloxone (NARCAN) IV syringe 0.04 mg/mL **AND** Vital Signs, End Tidal Carbon Dioxide (EtCO2), O2 Saturation, Sedation Score (POSS), and pain score  (not required for Comfort Care patients) **AND** End Tidal CO2 Monitoring **AND** Stop PCA and notify physician of:Systolic blood pressure less than: 100; Respiratory rate less than: 6; Sedation Score: 3 or greater; EtCO2: an EtCO2 increase of 10% or greater, an EtCO2 change of 10 mmHg or greater, or a loss of waveform.  (demi... **AND** For PCA discontinuation: reassess patient and document within a minimum of 1 hour (refer to Patient Controlled Analgesia Policy)  •  ondansetron ODT **OR** ondansetron  •  oxyCODONE    CBC Results       01/03/22 09/28/21 04/28/21     1303 0832 0553    WBC 6.06 4.95 6.40    RBC 5.25 4.93 5.16    HGB 15.3 14.3 15.3    HCT 44.7 43.9 45.7    MCV 85.1 89.0 88.6    MCH 29.1 29.0 29.7    MCHC 34.2 32.6 33.5     197 217        BMP Results       01/03/22 12/06/21 09/28/21     1303 1006 0832     141 141    K 4.1 4.2 3.9    Cl 103 101 102    CO2 26 31 30    Glucose 90 103 97    BUN 11 14 18    Creatinine 0.7 1.0 1.0    Calcium 10.0 9.5 9.5    Anion Gap 11 9 9    EGFR >60.0 >60.0 >60.0        Results from last 7 days   Lab Units 01/03/22  1303   INR  1.3   PTT sec 43*       Microbiology Results     Procedure Component Value Units Date/Time    COVID-19 PAT/Pre-procedural [[id number]]  (Normal) Collected: 01/03/22 1303    Specimen: Nasopharyngeal Swab from Nasopharynx Updated:  01/03/22 1903    Narrative:      The following orders were created for panel order COVID-19 PAT/Pre-procedural.  Procedure                               Abnormality         Status                     ---------                               -----------         ------                     SARS-CoV-2 (COVID-19), PCR[[id number]]   Normal              Final result                 Please view results for these tests on the individual orders.    MRSA/MSSA Screen Culture (Outpatient and PAT Only) [[id number]]  (Normal) Collected: 01/03/22 1303    Specimen: Nasal Swab from Nose Updated: 01/05/22 1222     Culture No Staphylococcus Aureus Recovered    SARS-CoV-2 (COVID-19), PCR [[id number]]  (Normal) Collected: 01/03/22 1303    Specimen: Nasopharyngeal Swab from Nasopharynx Updated: 01/03/22 1903     SARS-CoV-2 (COVID-19) Negative        Assessment & Plan:  Patient is a 71 year old gentleman who is now s/p C1-C3 posterior fusion, ORIF C2, POD 0.    -neuro exam is stable  -please obtain post operative CT   -continue multimodal pain regimen  -maintain rigid c-collar at all times  -continue hemovac, record q shift   -patient seen and examined along with Dr. Morejon  -please call neurosurgery with questions, concerns or new symtpoms

## 2022-01-06 NOTE — OP NOTE
PREOPERATIVE DIAGNOSIS:   1. C1, C2 fractures  2. Spinal instability    POSTOPERATIVE DIAGNOSIS:   1. C1, C2 fractures   2. Spinal instability    PROCEDURE:   1.  Open reduction and internal fixation of the C1 fractures   2.  Open reduction and internal fixation of C2 odontoid fracture  3.  Bilateral posterolateral arthrodesis, fusion at C1, C2, C3 using allograft, autograft  3.  Bilateral instrumentation C1, C2, C3 using lateral mass and pars screw system with titanium screws and rods. ([employer] Spine)  4.  C1-2, C2-3 laminotomies  5.  Neurophysiologic monitoring.                    SURGEON:   Chaparro Morejon MD    ASSISTANT:  YESENIA Orr (a skilled assistant was required to aid in placement of instrumentation, retraction, decreased operative length of time, blood loss, complex closure)     POSITION:   Prone in [place] headholder    ANESTHESIA:   General endotracheal.    ESTIMATED BLOOD LOSS:   100 ml     COMPLICATIONS:   None.     DRAINS:  One subfascial medium Hemovac drain    INDICATION FOR PROCEDURE:   Melanie Zelaya is a 71 y.o. male who fell on April 2021 sustained significant head, neck trauma.  He has radiographic evidence of C1, C2 fractures.  Specifically his C2 fracture involves the odontoid process. There is mild distraction of the odontoid process from the body of C2 with posterior angulation.  He wishes to pursue surgical fixation of his fracture.     I discussed the procedure and as well as the complications in detail with patient and family. Risks included, but were not limited to, local bleeding; infection; cerebrospinal fluid leakage; epidural hemorrhage; hardware failure/malplacement/malalignment; adjacent segment degeneration; accelerated spinal degenerative changes; pseudoarthrosis; new or worsening symptoms of pain, weakness, sensory, bowel, bladder, sexual, gait dysfunction; paralysis; new nerve or spinal cord injury; epidural hematoma; cardiac, pulmonary, renal, hepatic event in the  perioperative period; allergic response to the anesthetic; prolonged need for mechanical ventilation; and perhaps even death.     OPERATIVE PROCEDURE:   The patient was brought to the operating room supine on a stretcher. General anesthesia was induced and he was, thereafter, intubated with little difficulty in an in-line position. Neurophysiologic monitors were placed. The patient received preoperative antibiotics with intermittent stocking compression devices which were applied to both lower extremities for DVT prophylaxis. A Hoskins device was applied to his head. He was subsequently positioned prone on the operating room table with his chest stationed on top of 2 gel rolls vertically oriented to his thorax.      The Hoskins device was fixed to the operating room table. Careful attention was paid to maintain his head was in a neutral position. All the pressure points were padded well. Neurophysiologic monitoring remained stable throughout the positioning procedure. The posterior cervical region was then exposed.  His shoulders were taped. A midline cervical incision was planned from C1-C3 region. This area was then prepped and draped according to standard sterile surgical technique. A Mainline timeout protocol was adhered to.     Fluoroscopy was utilized to delineate the boundaries of incision.  A #10 blade was used to make a vertical midline cervical incision spanning the C1-C3 segments. Bovie electrocautery was used to divide the subdermal layers. Two cerebellar retractors were placed. The paracervical muscles were dissected in a subperiosteal fashion from the posterior spinal elements with a Fowler elevator and Bovie electrocautery. Hemostasis was concurrently obtained with bipolar electrocautery and thrombin-soaked Gelfoam.       After all relevant anatomy was completely dissected free of soft tissue, bilateral C1 lateral mass screws were done under fluroscopic guidance by making the entry point below the  posterior arch and lateral mass junction and directing 20 degrees medial and superior trajectory. After confirming the adequate tapping a 3.5 mm x 34 mm long screw was placed on each side.  Next attention was drawn to placing the bilateral pars screws by making the entry point about 3 mm lateral to the midpoint of the C2-3 facet joint and directing it superiorly 5 degrees medial under fluoroscopic guidance.  After tapping the trajectory and confirming with a probe, 3.5 mm x 16 mm screws were placed.  At C3, the lateral mass was drilled to create a track 1 mm from the midpoint aiming in a cephalad, lateral direction. After tapping the trajectory and confirming with a probe, two 3.5 mm x 14 mm screws were placed with good purchase.     A C1-2, C2-3 laminotomies was subsequently performed with a Knok Adam drill with an attached AM-8 bit, Kerrison rongeurs #1, 2.  Minimal manipulation of the dural tube was performed.  Neurophysiologic monitoring remained stable throughout the course of the decompression.     Next attention was drawn to place rods. Two titanium rods were then measured and lordotically fashioned. They were affixed to the lateral mass and pars screw instrumentation and secured via setscrews from C1-C3. The screw finesse construct was final tightened. Final fluoroscopy AP and lateral x-rays confirmed excellent placement of the hardware.  C1-2 joints bilaterally were decorticated and allograft was used for arthrodesis. Decortication of the C1, C2, C3 lamina was also performed bilaterally and allograft, autograft, BMP, Vitoss was placed.  Neurophysiological monitoring was stable.     Good hemostasis was obtained prior to closure. AP and lateral fluoroscopy demonstrated excellent position of the hardware. Vancomycin powder 0.5 g was introduced into the operative site.  One medium subfascial Hemovac drain was tunneled into the operative bed.  Fascial and muscular layers were reapproximated using interrupted 0  Vicryl suture, 2-0 Vicryl suture was used to reapproximate the subdermal layers in interrupted fashion. A 3-0 nylon suture was utilized to tack the exiting drain to the skin. Staples were used to close the skin.  This was followed by dermabond and a silver-impregnated Mepilex dressing.      The Hoskins device was removed, and he was flipped into the supine position.  Patient was transferred to surgical ICU and hemodynamically stable.  All counts were correct. No complications were encountered.     Chaparro Morejon MD
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
## 2022-01-06 NOTE — ANESTHESIA PROCEDURE NOTES
Arterial Line:    Start time: 1/6/2018 7:23 AM  End time: 1/6/2022 7:23 AM    An arterial line was placed in the pre-op for the following indication(s): continuous blood pressure monitoring and blood sampling needed.    A 20 G (size), 1 and 3/4 inch (length), Angiocath (type) catheter was placed,   Seldinger technique used, into the Right radial artery, secured by Tegaderm.      Procedure performed using surface landmarks.        Events:  patient tolerated procedure well with no complications.    Additional notes:  Uneventful placement      The supervising anesthesiologist was   Attending: Roshan Monique MD

## 2022-01-06 NOTE — ANESTHESIA POSTPROCEDURE EVALUATION
Patient: Melanie Zelaya    Procedure Summary     Date: 01/06/22 Room / Location: [facility] OR 10 / [facility] OR    Anesthesia Start: 0730 Anesthesia Stop: 1301    Procedure: ORIF C2, Fracture, C1-3 LAMINECTOMY CERVICAL POSTERIOR FUSION INSTRUMENTATION MULTI-LEVEL (N/A Spine Cervical) Diagnosis:       Closed nondisplaced odontoid fracture with type II morphology and nonunion, subsequent encounter      (Closed nondisplaced odontoid fracture with type II morphology and nonunion, subsequent encounter [S12.112K])    Surgeons: Chaparro Morejon MD Responsible Provider: Roshan Monique MD    Anesthesia Type: general ASA Status: 4          Anesthesia Type: general  PACU Vitals  1/6/2022 1242 - 1/6/2022 1342      1/6/2022  1257 1/6/2022  1300          Arterial Line BP: -- 180/78      Pulse: 85 81      Resp: 16 --      SpO2: 98 % 98 %              Anesthesia Post Evaluation    Pain management: adequate  Mode of pain management: IV medication  Patient location during evaluation: ICU  Patient participation: complete - patient participated  Level of consciousness: awake and alert  Cardiovascular status: acceptable  Airway Patency: adequate  Respiratory status: acceptable, spontaneous ventilation, nonlabored ventilation and nasal cannula  Hydration status: acceptable  Anesthetic complications: no

## 2022-01-06 NOTE — ANESTHESIA PROCEDURE NOTES
Airway  Urgency: elective    Start Time: 1/6/2022 7:39 AM  Airway not difficult    General Information and Staff    Patient location during procedure: OR  Resident/CRNA: Landy Jalloh CRNA  Performed: resident/CRNA     Indications and Patient Condition  Indications for airway management: anesthesia  Sedation level: deep  Preoxygenated: yes  Patient position: sniffing  Mask difficulty assessment: 0 - not attempted    Final Airway Details  Final airway type: endotracheal airway      Successful airway: ETT  Cuffed: yes   Successful intubation technique: video laryngoscopy  Facilitating devices/methods: intubating stylet  Endotracheal tube insertion site: oral  Blade size: #4  ETT size (mm): 7.5  Cormack-Lehane Classification: grade I - full view of glottis  Placement verified by: chest auscultation and capnometry   Cuff volume (mL): 10  Measured from: lips  ETT to lips (cm): 22  Number of attempts at approach: 1  Atraumatic airway insertion      Additional Comments  Neck maintained in neutral position during intubation, no extension

## 2022-01-06 NOTE — OR SURGEON
Pre-Procedure patient identification:  I am the primary operating surgeon/proceduralist and I have identified the patient on 01/06/22 at 7:10 AM Chaparro Morejon MD  Phone Number: [phone]

## 2022-01-06 NOTE — BRIEF OP NOTE
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 Morejon    Estimated Blood Loss: No blood loss documented.    Specimens:                No specimens collected during this procedure.      Drains:   Urethral Catheter Double-lumen 16 Fr (Active)   Hemovac Drain    Implants:   Implant Name Type Inv. Item Serial No.  Lot No. LRB No. Used Action   MATRIX GRAFT DURAGEN 2 X 2IN - SNA - [id number] Biological tissue implant MATRIX GRAFT DURAGEN 2 X 2IN NA [employer] CRISS [id number] N/A 1 Implanted   PUTTY BONE DBX 5CC - [id number] - [id number] Human tissue implants PUTTY BONE DBX 5cc [id number] MUSCULOSKELETAL TRANSPLANT FND  N/A 1 Implanted   VITOSS BIMODAL 5CC FOAM PACK - SNA - [id number] Bone graft extender or substitute VITOSS BIMODAL 5CC FOAM PACK NA [employer] [id number] N/A 1 Implanted   SCREW 3.5 X 34MM  Screw  NA [employer]ANN [employer]INE  N/A 2 Implanted   SCREW POLYAXIAL 3.5X16MM YUKON - SNA - [id number] Screw SCREW POLYAXIAL 3.5X16MM YUKON NA MANN SPINE  N/A 2 Implanted   SCREW 3.5 X 14MM - SNA - [id number] Screw SCREW 3.5 X 14MM NA MANN SPINE  N/A 2 Implanted   TAP 3.0MM - SNA - [id number]  TAP 3.0MM NA [employer]  N/A 1 Implanted   SCREW SET -  [id number] Screw SCREW SET  MANN SPINE  N/A 6 Implanted   GEO 3.5X35MM - [id number] Screw GEO 3.5X35MM  MANN SPINE  N/A 1 Implanted              Complications:  None; patient tolerated the procedure well.           Disposition: ICU - extubated and stable.           Condition: stable    Chaparro Morejon MD  Phone Number: [phone]

## 2022-01-06 NOTE — DISCHARGE INSTRUCTIONS
"Cervical Fusion Postoperative and Discharge Instructions   Posterior Cervical Fusion \"PCDF\"    These instructions have been prepared for you by your neurosurgeon to guide you, as well as to serve as a reference to frequently asked questions which may arise during your recovery from surgery.    General instructions:  For the PCDF, the surgical approach to the spine through the neck muscles will cause temporary aching pain around the incision after surgery.  This particular pain will slowly get better as you heal, generally within 2-4 weeks.  Infrequently, your ‘typical nerve pain’ may worsen transiently after surgery before it starts to get better due to intra-operative manipulation of the nerve root.  If there has been any numbness present in the arm(s) or leg(s) for a lengthy time preoperatively this should get better but does so over the course of several weeks; typical nerve pain usually improves promptly after surgery.     Follow-up Appointments:  • When you are discharged from the hospital and are settled at home/rehab, please call to schedule or confirm your follow up appointments  1. a wound check visit 8-14 days after surgery with the Physician Assistant or Nurse Practitioner   2. a post-operative visit 4-6 weeks after surgery with the surgeon  • You can reach the office at:   [facility], [address], [phone]    • Please prepare for your visit and bring with you any necessary paperwork you may need completed such as: prescriptions needed, work/school notes needed, etc.  Please note certain forms may take up to 5 business days to be completed and returned.  Notify the  of any of these needs upon your arrival.  • Bring any applicable imaging studies such as films or CD-ROM’s which may contain MRI or x-ray images even if the doctor has already reviewed them in the event he needs to reinvestigate them.     Incision care:  • If not already done so in the hospital, " remove the incision dressing on day 2 after surgery.    • Some swelling and bruising around the incision is normal.  Your muscles have been cut, , and sewn back together as part of your surgical procedure.  You will leave the hospital with neck or back discomfort from the surgical incision.  As you become more active and the incision and muscles continue to heal, the swelling and pain will decrease.   • You may shower on day 2 after surgery.  Allow soap and water to wash over the incision site, do not scrub, and pat dry when finished.  • Once home, you should be washing your incision and showering DAILY.   • Do not put any lotion, ointments, alcohol, or peroxide on the incision.  • Dress with comfortable clothing using garments and undergarments that do not tightly compress the incision.  • If you have Steri-strips (small white paper tapes over the incision), they may start to curl up and fall off. This is OK but do not actively peel them off.  • At your wound check visit, any non-absorbable sutures, staples, or steri-strips will be removed.   • Do not soak or immerse your incision in water for at least 1 month.  For example, no tub baths, swimming pools or jacuzzi.    • Have someone look at the incision daily for 2 weeks.  Call the surgeon’s office if you  notice any of the following:  o Increased redness along the length of the incision      o Increased swelling of the area around your incision       o Drainage from the incision               o Weakness of your extremities greater than before surgery      o Loss of bowel or bladder control                                 o Development of severe headache    o Leg swelling or calf tenderness                                                                                                                                   o Fever above 101.5    Medications:  • You will be given prescriptions for pain medications upon discharge from the hospital.  Stool  softeners and laxatives may be purchased over the counter.  • Pain medications should be taken as prescribed by your surgeon or nurse practitioner/ physician assistant. You are allowed to gradually reduce the number of pills you take when the pain begins to subside.   • If you are taking more than the recommended dose, please contact the office to discuss this with a practitioner (the medication may need to be increased or changed).   • If you continue to require medications, you may be referred to a pain management specialist or your medical doctor for ongoing management of your pain medications.  Our office will only provide pain medications for the immediate post operative phase.   • Constipation: Pain medications (narcotics) may cause constipation (difficulty having a bowel movement).  It is important to be aware of your bowel habits so you don’t develop severe constipation. Call the office if this occurs for more than 3 days or if you have stomach pain. You may combine any and/or all of these for maximal effect as needed:  • Laxatives  • Ducolax 5mg 2-3times a day  • Miralax 17g of powder per day  • Senokot 8.6mg 1-2 tablets 1-2 times a day  • Stool Softener  • Colace 100mg  2-3 times per day  • High fiber diet  • Fibercon  • Metamucil  • Medications to avoid:  • Hold taking fish oil for 1 week post operatively  • Hold any immunosupressants until cleared by your surgeon  • Hold any blood thinners (aspirin, Plavix, Brillinta, Coumadin, warfarin, Xarelto, or Eloquis) until cleared by your surgeon  • Avoid NSAIDS for 6 weeks post-operative or until cleared by your surgeon. These medications include, but are not limited to the following:  • Non-Steroidal Anti-Inflammatory Agents: Advil, Aleve, Cataflam, Clinoril, Diclofenac, Dolobid, Feldene, Ibuprofen, Indocin, Medipren, Meloxicam, Motrin, Mobic, Nalfon, Naprosyn, Nuprin, Relafen, Rufen, Tolectin, Toradol, Trilisate, Voltarin  • You will be given a printed form at  the hospital called a “medical reconciliation form” as part of your discharge packet.  This will list what medications you are being discharged from the hospital with and will contain instructions.  If you have any questions about your medications, believe there is an error, or have any other questions or problem with regards to your discharge medications call the office to discuss.    Driving:  • You are not allowed to drive until cleared by your surgeon.   • You may ride in a car for short distances and avoid sitting in one position for too long .  If you must take long car rides, do not ride for more than 60 minutes without taking a break to stretch (walk for several minutes and change position)  • You are not permitted to drive if your condition prohibits you from driving safely. This includes severe pain, inability to twist your body, weakness, wearing a cervical collar, etc.    • You are not permitted to drive under any circumstances if you are undergoing medication treatment with narcotic (opioid) pain killers, or any other medication which may inhibit your ability to operate a motor vehicle safely.    • If attempted travel causes any discomfort or worsening pain, please call the office.    Work and School:    Your surgeon will discuss with you work or school restrictions as they may apply to you and your occupation or schooling.    Activity Guidelines:  • You must wear your cervical collar as instructed by your physician, nurse practitioner, or physician assistant.   • You may remove your cervical collar for eating and when cleaning your incision; be sure to hold the head and neck still & straight while the collar is off.  • For additional information regarding the cervical collar please see instructions at end of this document  • Avoid strenuous activity, bending, pushing, or reaching overhead.  For example, you should not vacuum, do large loads of laundry, walk the dog, wash the car, etc. until your  follow-up visit with your surgeon.  Do not hold objects with arms fully extended such as grasping and holding something with outstretched arms.  • Avoid any excessive bending, twisting, and extension of the neck and/or back.    • Avoid heavy lifting.  Do not lift anything over 5-10 pounds for the first few weeks that you are home from the hospital.  • Increase your activities a little each day.  Walking is a form of exercise.  Exercise should not cause pain.  Limit yourself to things that you can do comfortably and plan rest periods throughout the day.  • Take 3 to 5 short walks per day, even if it means walking inside your home.  Going up and down stairs is fine; ascend or descend one step at a time.    • Do not remain sitting in one position for longer than 1 hour.   • You may resume sexual activity 2-4 weeks after surgery, avoiding stress on the neck, shoulders, and back      Smoking  DO NOT smoke. Smoking delays healing and can increase the risk of complications.    Physical Therapy  • Outpatient Physical Therapy (if appropriate) will not begin until after your 6 week post-operative visit with your surgeon. A prescription is needed for formal outpatient therapy.   • You may be given simple stretching exercises or a prescription for formal outpatient physical therapy, based on what your needs are after surgery.    Blood Clots in the Leg   • It is not uncommon for patients who recently had surgery to develop blood clots in leg veins.   • Symptoms include low-grade fever, and/or redness, swelling, tenderness, and/or an    aching/cramping pain in your calf.   • You should call your doctor immediately if you have these symptoms.   • To prevent blood clots in legs, try walking and/or pumping ankles several times during the day.   • If the blood clot breaks free from the leg vein, it can travel to the lungs and cause severe breathing difficulty and/or chest pain. If you experience this, call 911  immediately.    Questions  • Any questions may be directed to your surgeon or nurse practitioner/ physician assistant.    ? During normal business hours (8:30am- 4:00pm), you can call the Neurosurgery office directly at:  o Jae Ramirez [phone]     o Turnaround time for a phone call is 24 hours.    ? After normal business hours, you can call the office and ask to be connected to the Neurosurgeon on-call.    • If you are calling with an urgent medical issue, please tell the office that it is an “urgent issue” and needs to be discussed in less than 24 hours (i.e. pain unrelieved with medications, wound breakdown/infection, or new neurological symptoms).      Rigid Collar Instructions  • You should put on your collar as you have been instructed by the brace specialist.  Instructions will be reviewed in the hospital by the nursing staff and Physical Therapist.  • Keep the name and phone number of the person who fitted and dispensed your collar close by in case you need to have the brace checked and/or adjusted.  • How to put collar on:  o The collar is labeled front and back with arrows indicating top and bottom.  o Position the back section on your neck first.  Apply the front section placing your chin in the chin rest.  o Before securing the Velcro, make sure the front overlaps the back section.  This allows more Velcro to be exposed giving the collar a more secure fit.  o Make the collar as tight as you can while remaining comfortable.  The tighter it is worn, the more immobilization of your spine is obtained and the less likely you will move your neck.  • Care for/during use:  o Be alert to pressures under your chin.  Some pressure is necessary but do not allow a blister or pressure sore to develop.  o To provide comfort, you should wear the collar liners provided between the brace and your chin to absorb perspiration and lessen irritation.   o The collar can be washed with mild soap and water, then dried with  a towel and/or hair dryer on the lowest setting.  We recommend that these be hand washed.  • You will receive either two cervical collars or one collar with two sets of liners.   This is done for 2 reasons:  1. The time the collar/pads last is different for each patient.  A spare collar or pads are given as a replacement once the collar becomes excessively soiled or once it starts to soften or collapse.   2. The collar is to be worn in the shower.  Once your shower is complete, replace the wet collar/pads with a dry collar/pads.  The wet collar/pads can be dried with a towel and set aside for your next shower.
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
## 2022-01-07 ENCOUNTER — APPOINTMENT (OUTPATIENT)
Dept: RADIOLOGY | Facility: HOSPITAL | Age: 72
DRG: 472 | End: 2022-01-07
Attending: STUDENT IN AN ORGANIZED HEALTH CARE EDUCATION/TRAINING PROGRAM
Payer: COMMERCIAL

## 2022-01-07 LAB
ANION GAP SERPL CALC-SCNC: 11 MEQ/L (ref 3–15)
BUN SERPL-MCNC: 15 MG/DL (ref 8–20)
CALCIUM SERPL-MCNC: 9 MG/DL (ref 8.9–10.3)
CHLORIDE SERPL-SCNC: 103 MEQ/L (ref 98–109)
CO2 SERPL-SCNC: 24 MEQ/L (ref 22–32)
CREAT SERPL-MCNC: 0.9 MG/DL (ref 0.8–1.3)
ERYTHROCYTE [DISTWIDTH] IN BLOOD BY AUTOMATED COUNT: 14 % (ref 11.6–14.4)
GFR SERPL CREATININE-BSD FRML MDRD: >60 ML/MIN/1.73M*2
GLUCOSE SERPL-MCNC: 148 MG/DL (ref 70–99)
HCT VFR BLDCO AUTO: 35.9 % (ref 40.1–51)
HGB BLD-MCNC: 12.3 G/DL (ref 13.7–17.5)
MCH RBC QN AUTO: 29.9 PG (ref 28–33.2)
MCHC RBC AUTO-ENTMCNC: 34.3 G/DL (ref 32.2–36.5)
MCV RBC AUTO: 87.1 FL (ref 83–98)
PDW BLD AUTO: 9.8 FL (ref 9.4–12.4)
PLATELET # BLD AUTO: 169 K/UL (ref 150–350)
POTASSIUM SERPL-SCNC: 4.6 MEQ/L (ref 3.6–5.1)
RBC # BLD AUTO: 4.12 M/UL (ref 4.5–5.8)
SODIUM SERPL-SCNC: 138 MEQ/L (ref 136–144)
WBC # BLD AUTO: 11.09 K/UL (ref 3.8–10.5)

## 2022-01-07 PROCEDURE — 36415 COLL VENOUS BLD VENIPUNCTURE: CPT | Performed by: PHYSICIAN ASSISTANT

## 2022-01-07 PROCEDURE — 12000000 HC ROOM AND CARE MED/SURG

## 2022-01-07 PROCEDURE — 97166 OT EVAL MOD COMPLEX 45 MIN: CPT | Mod: GO

## 2022-01-07 PROCEDURE — 97162 PT EVAL MOD COMPLEX 30 MIN: CPT | Mod: GP

## 2022-01-07 PROCEDURE — 99024 POSTOP FOLLOW-UP VISIT: CPT | Performed by: NEUROLOGICAL SURGERY

## 2022-01-07 PROCEDURE — 80048 BASIC METABOLIC PNL TOTAL CA: CPT | Performed by: PHYSICIAN ASSISTANT

## 2022-01-07 PROCEDURE — 97535 SELF CARE MNGMENT TRAINING: CPT | Mod: GO

## 2022-01-07 PROCEDURE — 25800000 HC PHARMACY IV SOLUTIONS: Performed by: PHYSICIAN ASSISTANT

## 2022-01-07 PROCEDURE — 97530 THERAPEUTIC ACTIVITIES: CPT | Mod: GP

## 2022-01-07 PROCEDURE — 63600000 HC DRUGS/DETAIL CODE: Performed by: PHYSICIAN ASSISTANT

## 2022-01-07 PROCEDURE — L0172 CERV COL SR FOAM 2PC PRE OTS: HCPCS

## 2022-01-07 PROCEDURE — 63700000 HC SELF-ADMINISTRABLE DRUG: Performed by: PHYSICIAN ASSISTANT

## 2022-01-07 PROCEDURE — 73010 X-RAY EXAM OF SHOULDER BLADE: CPT | Mod: RT

## 2022-01-07 PROCEDURE — 97116 GAIT TRAINING THERAPY: CPT | Mod: GP

## 2022-01-07 PROCEDURE — 25000000 HC PHARMACY GENERAL: Performed by: PHYSICIAN ASSISTANT

## 2022-01-07 PROCEDURE — 73030 X-RAY EXAM OF SHOULDER: CPT | Mod: RT

## 2022-01-07 PROCEDURE — 85027 COMPLETE CBC AUTOMATED: CPT | Performed by: PHYSICIAN ASSISTANT

## 2022-01-07 RX ADMIN — ACETAMINOPHEN 975 MG: 325 TABLET, FILM COATED ORAL at 20:30

## 2022-01-07 RX ADMIN — CYCLOBENZAPRINE HYDROCHLORIDE 5 MG: 5 TABLET, FILM COATED ORAL at 20:31

## 2022-01-07 RX ADMIN — AMLODIPINE BESYLATE 5 MG: 5 TABLET ORAL at 08:23

## 2022-01-07 RX ADMIN — DOCUSATE SODIUM AND SENNOSIDES 1 TABLET: 8.6; 5 TABLET, FILM COATED ORAL at 20:30

## 2022-01-07 RX ADMIN — ACETAMINOPHEN 975 MG: 325 TABLET, FILM COATED ORAL at 01:04

## 2022-01-07 RX ADMIN — ACETAMINOPHEN 975 MG: 325 TABLET, FILM COATED ORAL at 08:22

## 2022-01-07 RX ADMIN — ESCITALOPRAM OXALATE 10 MG: 10 TABLET ORAL at 08:23

## 2022-01-07 RX ADMIN — HEPARIN SODIUM 5000 UNITS: 5000 INJECTION, SOLUTION INTRAVENOUS; SUBCUTANEOUS at 14:58

## 2022-01-07 RX ADMIN — Medication: at 20:34

## 2022-01-07 RX ADMIN — HYDROCHLOROTHIAZIDE 12.5 MG: 25 TABLET ORAL at 08:23

## 2022-01-07 RX ADMIN — DOCUSATE SODIUM AND SENNOSIDES 1 TABLET: 8.6; 5 TABLET, FILM COATED ORAL at 08:23

## 2022-01-07 RX ADMIN — ATORVASTATIN CALCIUM 40 MG: 40 TABLET, FILM COATED ORAL at 20:31

## 2022-01-07 RX ADMIN — OXYCODONE HYDROCHLORIDE 5 MG: 5 TABLET ORAL at 05:01

## 2022-01-07 RX ADMIN — LOSARTAN POTASSIUM 100 MG: 100 TABLET, FILM COATED ORAL at 08:23

## 2022-01-07 RX ADMIN — POLYETHYLENE GLYCOL 3350 17 G: 17 POWDER, FOR SOLUTION ORAL at 08:23

## 2022-01-07 RX ADMIN — ACETAMINOPHEN 975 MG: 325 TABLET, FILM COATED ORAL at 15:47

## 2022-01-07 RX ADMIN — OXYCODONE HYDROCHLORIDE 5 MG: 5 TABLET ORAL at 01:04

## 2022-01-07 RX ADMIN — CYCLOBENZAPRINE HYDROCHLORIDE 5 MG: 5 TABLET, FILM COATED ORAL at 08:22

## 2022-01-07 RX ADMIN — DEXTROSE MONOHYDRATE 2 G: 5 INJECTION, SOLUTION INTRAVENOUS at 00:08

## 2022-01-07 RX ADMIN — OXYCODONE HYDROCHLORIDE 5 MG: 5 TABLET ORAL at 09:08

## 2022-01-07 RX ADMIN — FAMOTIDINE 40 MG: 20 TABLET ORAL at 08:22

## 2022-01-07 ASSESSMENT — COGNITIVE AND FUNCTIONAL STATUS - GENERAL
STANDING UP FROM CHAIR USING ARMS: 3 - A LITTLE
WALKING IN HOSPITAL ROOM: 3 - A LITTLE
HELP NEEDED FOR BATHING: 2 - A LOT
CLIMB 3 TO 5 STEPS WITH RAILING: 2 - A LOT
AFFECT: WFL
TOILETING: 2 - A LOT
EATING MEALS: 3 - A LITTLE
AFFECT: WFL
DRESSING REGULAR LOWER BODY CLOTHING: 1 - TOTAL
MOVING TO AND FROM BED TO CHAIR: 3 - A LITTLE
DRESSING REGULAR UPPER BODY CLOTHING: 2 - A LOT
HELP NEEDED FOR PERSONAL GROOMING: 3 - A LITTLE

## 2022-01-07 NOTE — PROGRESS NOTES
Patient: Melanie Zelaya  Location:  [facility] SICU SICU03  MRN:  [id number]  Today's date:  1/7/2022     Spoke to RN; cleared    Concluded session w/ pt in chair. Call bell in reach, RN aware of mobility outcomes and no chair alarm on chair      Melanie is a 71 y.o. male admitted on 1/6/2022 with Closed nondisplaced odontoid fracture with type II morphology and nonunion, subsequent encounter [S12.112K]  History of cervical fracture [Z87.81]  H/O cervical fracture [Z87.81]  Odontoid fracture with type II morphology and delayed healing [S12.110G]. Principal problem is Cervical spine fracture (CMS/HCC).    Past Medical History  Melanie has a past medical history of Anxiety, Atrial fibrillation (CMS/HCC), Breast cancer (CMS/HCC), C1 cervical fracture (CMS/HCC), Colon polyp, COPD (chronic obstructive pulmonary disease) (CMS/HCC), Coronary artery calcification seen on CT scan, COVID-19 vaccine series completed, Depression, Diverticulosis, Diverticulosis, Fracture of pelvis (CMS/HCC) (1968), GERD (gastroesophageal reflux disease), Hearing loss, History of colon polyps, History of COVID-19 (2020), Hyperlipidemia, Hypertension, Left atrial enlargement, Lung cancer (CMS/HCC), Lung cancer (CMS/HCC), Pneumonia (2011), Seasonal allergies, and Wrist fracture.    History of Present Illness   POD 1 C1-3 PCF, C2 ORIF now in hard collar        PT Vitals    Date/Time Pulse HR Source Resp SpO2 Pt Activity O2 Therapy O2 Del Method O2 Flow Rate BP BP Location BP Method Pt Position [facility]   01/07/22 1117 -- -- 19 -- -- -- -- -- -- -- -- --    01/07/22 1117 69 Monitor -- 98 % At rest Supplemental oxygen Nasal cannula 2 L/min 158/74 Left upper arm Automatic Lying [facility]   01/07/22 1128 -- -- 19 -- -- -- -- -- -- -- -- --    01/07/22 1128 72 Monitor -- 91 % Walking None (Room air) -- -- 161/71 Left upper arm Automatic Sitting [facility]   01/07/22 1135 -- -- 18 -- -- -- -- -- -- -- -- --    01/07/22 1135 81 Monitor -- 96 % At rest  Supplemental oxygen Nasal cannula 2 L/min 127/83 Left upper arm Automatic Sitting N[facility]      PT Pain    Date/Time Pain Type Location Rating: Rest Rating: Activity Interventions Who   01/07/22 1117 Pain Assessment neck 2 used pump 2 position adjusted NWR   01/07/22 1135 Pain Assessment -- -- 8 pillow support provided;position adjusted NWR          Prior Living Environment      Most Recent Value   People in Home alone   Current Living Arrangements home   Living Environment Comment 3SH, 3 VALERIA LHR, FF to bathroom then another FF to bedroom, tub shower w/ SC        Prior Level of Function      Most Recent Value   Dominant Hand right   Ambulation assistive equipment   Transferring assistive equipment   Toileting independent   Bathing independent   Dressing independent   Eating independent   Communication understands/communicates without difficulty   Prior Level of Function Comment Has RW on first floor of house that he uses intermittently, otherwise quad cane use. Lives alone so able to perform ADL's. Doesn't drive and has niece to do do food shopping. Has been mostly household ambulator   Assistive Device Currently Used at Home shower chair, cane, quad, grab bar           PT Evaluation and Treatment - 01/07/22 1105        PT Time Calculation    Start Time 1105     Stop Time 1143     Time Calculation (min) 38 min        Session Details    Document Type initial evaluation     Mode of Treatment physical therapy        General Information    Patient Profile Reviewed yes     Onset of Illness/Injury or Date of Surgery 01/06/22     Referring Physician Jean-Pierre     Patient/Family/Caregiver Comments/Observations Spoke to RN; cleared     General Observations of Patient rec'd supine in bed; agreeable     Existing Precautions/Restrictions brace worn at all times;fall   per note, pt allowed to have collar off for meals       Cognition/Psychosocial    Affect/Mental Status (Cognition) WFL     Orientation Status (Cognition) oriented x  3;verbal cues/prompts needed for orientation     Follows Commands (Cognition) WFL     Cognitive Function attention deficit;executive function deficit;safety deficit     Attention Deficit (Cognition) minimal deficit;focused/sustained attention;perseverates on recent conversation     Executive Function Deficit (Cognition) minimal deficit;insight/awareness of deficits     Safety Deficit (Cognition) minimal deficit;awareness of need for assistance;insight into deficits/self-awareness;safety precautions awareness     Comment, Cognition pleasant and cooperative, mild redirection to task as pt very tangential at times.        Sensory    Hearing Status WFL        Vision Assessment/Intervention    Visual Impairment/Limitations corrective lenses full-time        Sensory Assessment (Somatosensory)    Sensory Assessment (Somatosensory) LE sensation intact        Range of Motion (ROM)    Range of Motion ROM is WFL;bilateral lower extremities        Strength (Manual Muscle Testing)    Hip, Left (Strength) 4     Knee, Left (Strength) 4     Ankle, Left (Strength) 4     Hip, Right (Strength) 4     Knee, Right (Strength) 4     Ankle, Right (Strength) 4        Bed Mobility    [place], Roll Right minimum assist (75% or more patient effort);1 person assist;verbal cues     Verbal Cues (Roll Right) hand placement;technique;safety     [place], Supine to Sit minimum assist (75% or more patient effort);2 person assist;increased time to complete;verbal cues     Verbal Cues (Supine to Sit) hand placement;safety;technique     Assistive Device bed rails;draw sheet;head of bed elevated     Comment (Bed Mobility) Readjusted aspen collar while supine in bed; increased tactile cues to move LE toward EOB L. Need assist for trunk to upright position.        Sit to Stand Transfer    [place], Sit to Stand Transfer minimum assist (75% or more patient effort);2 person assist;increased time to complete;safety considerations;verbal cues      Verbal Cues hand placement;safety;technique     Assistive Device other (see comments)   HHA    Comment x 2 from EOB and toilet. fair strength through transfer, (+) posterior lean that pt recognizes but unable to correct despite cues        Stand to Sit Transfer    [place], Stand to Sit Transfer minimum assist (75% or more patient effort);2 person assist;verbal cues;safety considerations     Verbal Cues hand placement;safety;technique     Assistive Device other (see comments)   HHA x 2    Comment x 2 to recliner and toilet. Fair eccentric control, cues for hand placement, impulsive to sit        Gait Training    [place], Gait minimum assist (75% or more patient effort);2 person assist;verbal cues     Assistive Device other (see comments)   HHA x 2 + RW trial    Distance in Feet 12 feet   + 4    Pattern (Gait) step-through     Deviations/Abnormal Patterns (Gait) base of support, narrow;hailey decreased;gait speed decreased;step length decreased;stride length decreased     Comment (Gait/Stairs) Narrow [place] and short shuffling steps. rigid posture w/ knees locked in extension. Continued to note posterior lean. Slightly improved balance w/ RW but remains mildly unsteady and impulsive        Stairs Training    [place], Stairs not tested        Safety Issues, Functional Mobility    Safety Issues Affecting Function (Mobility) ability to follow commands;impulsivity;insight into deficits/self-awareness;judgment;problem-solving     Impairments Affecting Function (Mobility) balance;endurance/activity tolerance;pain;strength        Balance    Balance Assessment sit to stand dynamic balance     Static Sitting Balance WFL;supported;sitting, edge of bed     Dynamic Sitting Balance WFL;supported;sitting, edge of bed     Sit to Stand Dynamic Balance mild impairment;supported     Static Standing Balance mild impairment;supported     Dynamic Standing Balance mild impairment;moderate impairment;supported     Comment,  Balance improved balance w/ ambulation using RW vs HHA        Motor Skills    Functional Endurance Fair; pt has been household ambulator for past 9 mo. Slight desat on RA w/ ambulation to 91%        Orthotics & Prosthetics Management    Orthosis Location spinal orthosis        Spinal Orthosis Management    Type (Spinal Orthosis) cervical collar, hard     Fabrication Comment (Spinal Orthosis) [place]     Functional Design (Spinal Orthosis) static orthosis     Therapeutic Indications Spinal Orthosis post-op positioning/protection;stabilization and support     Wearing Schedule (Spinal Orthosis) wear full-time   except to eat    Orthosis Training (Spinal Orthosis) patient;donning/doffing orthosis;orthosis adjustment;wearing schedule     Compliance/Wearing Issues (Spinal Orthosis) patient/caregiver comprehend strategies;patient/caregiver comprehend rationale for orthosis        AM-PAC (TM) - Mobility (Current Function)    Turning from your back to your side while in a flat bed without using bedrails? 3 - A Little     Moving from lying on your back to sitting on the side of a flat bed without using bedrails? 3 - A Little     Moving to and from a bed to a chair? 3 - A Little     Standing up from a chair using your arms? 3 - A Little     To walk in a hospital room? 3 - A Little     Climbing 3-5 steps with a railing? 2 - A Lot     AM-PAC (TM) Mobility Score 17        Assessment/Plan (PT)    Daily Outcome Statement Min A x 2 for bed mobility, transfers, and mobility w/ and w/o AD. increased cuing for safety and proper technique with mobility tasks. Balance deficits and gait deviations noted put patient at a risk for falls. improved with use of RW but remains mildly unsteady w/ posterior lean. Pt lives alone and states mobility is becoming increasingly difficulty however did not report any recent falls. Believe that patient would benefit from acute rehab stay to improve functional deficits and maximize independence in order to  safely d/c home. Will continue to follow     Rehab Potential good, to achieve stated therapy goals     Therapy Frequency 5-7 times/wk     Planned Therapy Interventions balance training;bed mobility training;gait training;transfer training;strengthening;stair training;ROM (range of motion)               PT Assessment/Plan      Most Recent Value   PT Recommended Discharge Disposition acute rehab/Inpatient Rehab Facility at 01/07/2022 1105   Anticipated Equipment Needs at Discharge (PT) other (see comments)  [TBD] at 01/07/2022 1105   Patient/Family Therapy Goals Statement To move more at 01/07/2022 1105                    Education Documentation  Home Safety, taught by Brandon Phelps, PT at 1/7/2022  2:04 PM.  Learner: Patient  Readiness: Acceptance  Method: Explanation  Response: Verbalizes Understanding  Comment: Role of PT, use of AD, need for assistance, safety, POC    Assistive/Adaptive Devices, taught by Brandon Phelps PT at 1/7/2022  2:04 PM.  Learner: Patient  Readiness: Acceptance  Method: Explanation  Response: Verbalizes Understanding  Comment: Role of PT, use of AD, need for assistance, safety, POC          PT Goals      Most Recent Value   Bed Mobility Goal 1    Activity/Assistive Device bed mobility activities, all at 01/07/2022 1105   [place] independent at 01/07/2022 1105   Time Frame by discharge at 01/07/2022 1105   Progress/Outcome continuing progress toward goal at 01/07/2022 1105   Transfer Goal 1    Activity/Assistive Device all transfers, walker, front-wheeled at 01/07/2022 1105   [place] modified independence at 01/07/2022 1105   Time Frame by discharge at 01/07/2022 1105   Progress/Outcome continuing progress toward goal at 01/07/2022 1105   Gait Training Goal 1    Activity/Assistive Device gait (walking locomotion), assistive device use, decrease asymmetrical patterns, decrease fall risk, diminish gait deviation, improve balance and speed, increase endurance/gait  distance, increase energy conservation, walker, front-wheeled at 01/07/2022 1105   [place] modified independence at 01/07/2022 1105   Distance 150 at 01/07/2022 1105   Time Frame by discharge at 01/07/2022 1105   Progress/Outcome continuing progress toward goal at 01/07/2022 1105   Stairs Goal 1    Activity/Assistive Device stairs, all skills, ascending stairs, descending stairs, step-to-step, decrease fall risk, improve balance and speed, using handrail, left, using handrail, right at 01/07/2022 1105   [place] modified independence at 01/07/2022 1105   Number of Stairs 12 at 01/07/2022 1105   Time Frame by discharge at 01/07/2022 1105   Progress/Outcome continuing progress toward goal at 01/07/2022 1105

## 2022-01-07 NOTE — PATIENT CARE CONFERENCE
Care Progression Rounds Note  Date: 1/7/2022  Time: 10:47 AM     Patient Name: Melanie Zelaya     Medical Record Number: [id number]   YOB: 1950  Sex: Male      Room/Bed: [facility]    Admitting Diagnosis: Closed nondisplaced odontoid fracture with type II morphology and nonunion, subsequent encounter [S12.112K]  History of cervical fracture [Z87.81]  H/O cervical fracture [Z87.81]  Odontoid fracture with type II morphology and delayed healing [S12.110G]   Admit Date/Time: 1/6/2022  5:44 AM    Primary Diagnosis: Cervical spine fracture (CMS/HCC)  Principal Problem: Cervical spine fracture (CMS/HCC)    GMLOS: pending  Anticipated Discharge Date: 1/10/2022    AM-PAC:  Mobility Score:      Discharge Planning:  Anticipated Discharge Disposition: acute rehab/Inpatient Rehab Facility    Barriers to Discharge:  Medical issues not resolved    Comments:       Participants:  advanced practice provider,,physical therapy,physician,dietitian/nutrition services,nursing,social work/services,occupational therapy

## 2022-01-07 NOTE — PROGRESS NOTES
Surgical ICU attending:    Patient seen and evaluated personally.  Data labs and imaging reviewed personally.    Neurologically: The patient without focal deficit.  Awake and alert.  Some cervical surgical site pain.  Analgesia ongoing.  Surgical drain as per neurosurgery.  No other concerns rehab services evaluating for disposition.  Monitor exam.    Cardiovascular: Stable on telemetry.  Resumed outpatient antihypertensive regimen Monitor.    Respiratory: Acute respiratory insufficiency is atelectasis related.  Incentive spirometry use encouraged.  Titrated FiO2, nasal cannula.    GI: Bedside swallow evaluation without concern.  Advance diet.    : Stable renal function.  Stable electrolytes.  Monitor.    Hematologic: Mild acute loss anemia.  No indication for transfusion.  Platelet count stable.  VTE chemoprophylaxis with Bhupinder.    Coordinated care ongoing with the neurosurgical team.  Transfer to the stepdown unit appropriate..

## 2022-01-07 NOTE — HOSPITAL COURSE
Melanie is a 71 y.o. male admitted on 1/6/2022 with Closed nondisplaced odontoid fracture with type II morphology and nonunion, subsequent encounter [S12.112K]  History of cervical fracture [Z87.81]  H/O cervical fracture [Z87.81]  Odontoid fracture with type II morphology and delayed healing [S12.110G]. Principal problem is Cervical spine fracture (CMS/HCC).    Past Medical History  Melanie has a past medical history of Anxiety, Atrial fibrillation (CMS/HCC), Breast cancer (CMS/HCC), C1 cervical fracture (CMS/HCC), Colon polyp, COPD (chronic obstructive pulmonary disease) (CMS/HCC), Coronary artery calcification seen on CT scan, COVID-19 vaccine series completed, Depression, Diverticulosis, Diverticulosis, Fracture of pelvis (CMS/HCC) (1968), GERD (gastroesophageal reflux disease), Hearing loss, History of colon polyps, History of COVID-19 (2020), Hyperlipidemia, Hypertension, Left atrial enlargement, Lung cancer (CMS/HCC), Lung cancer (CMS/HCC), Pneumonia (2011), Seasonal allergies, and Wrist fracture.    History of Present Illness   POD 1 C1-3 PCF, C2 ORIF now in hard collar

## 2022-01-07 NOTE — PLAN OF CARE
Problem: Adult Inpatient Plan of Care  Goal: Plan of Care Review  1/7/2022 1429 by Karli Horowitz, OT  Flowsheets (Taken 1/7/2022 1429)  Progress: improving  Plan of Care Reviewed With: patient  Outcome Summary: OT jacque completed. REC acute rehab

## 2022-01-07 NOTE — PROGRESS NOTES
Daily Neurosurgery ICU Progress Note     No overnight events.  POD 1 from C1-C3 PCF, ORIF C2.    Complains of moderate posterior neck discomfort with radiation into his shoulders right > left.   No new radicular pain, paresthesias, weakness. Ja[place]gui in situ.     Temp:  [36.1 °C (97 °F)-36.3 °C (97.4 °F)] 36.3 °C (97.4 °F)  Heart Rate:  [64-85] 79  Resp:  [14-20] 16  BP: (121-136)/(58-79) 131/79  SpO2:  [92 %-99 %] 92 %  Oxygen Therapy: Supplemental oxygen  O2 Delivery Method: Nasal cannula  O2 Flow Rate (L/min):  [3 L/min-6 L/min] 3 L/min     Intake/Output                 01/06/22 0700 - 01/07/22 0659     5711-7484 4336-3700 Total              Intake    I.V.  2833.7  22 2855.7    Volume (mL) Propofol 246.8 -- 246.8    Volume (mL) Phenylephrine 47.5 -- 47.5    Volume (mL) Remifentanil 234.4 -- 234.4    Volume Infused (mL) Hydromorphone (0.2 mg/mL) 5 22 27    Volume (mL) (electrolyte-A (PLASMA-LYTE A) infusion) 1000 -- 1000    Volume (mL) (sodium chloride 0.9 % infusion) 1300 -- 1300    IV Piggyback  --  200 200    Total Intake 2833.7 222 3055.7       Output    Urine  875  1590 2465    Drains  100  125 225    Output (mL) (Drain 1 Posterior;Medial Neck) 100 125 225    Total Output 975 1715 2690       Net I/O     1858.7 -1493 365.7          Drains: Hemovac x 1 with 225 cc output     General: lying supine in bed in no acute distress    Neuro: A&O x 3, interacting appr with fluent speech  Cn: EOMI, no facial asymmetry, tongue midline  Motor:  There is no pronator drift.  Muscle bulk and tone are normal.     Deltoid Biceps Triceps Wrist ext Hand  Finger Spread Hip flexion Knee ext Dorsi-  flexion EHL Plantar Flexion   L 4+ PL 5 5 5 5 5 5 5 5 5 5   R 4 PL 5 5 5 5 5 5 5 5 5 5     Sensation: Intact and equal to light touch all 4 ext    Incision: covered with mepilex, dried blood on dressing     Scheduled Meds:  • acetaminophen  975 mg oral q6h INT   • amLODIPine  5 mg oral Daily   • atorvastatin  40 mg oral Nightly   •  escitalopram  10 mg oral Daily   • famotidine  40 mg oral Daily   • heparin (porcine)  5,000 Units subcutaneous q8h MARIAN   • hydrochlorothiazide  12.5 mg oral Daily   • losartan  100 mg oral Daily   • NON FORMULARY MEDICATION REQUEST  1 puff inhalation Daily   • polyethylene glycol  17 g oral Daily   • sennosides-docusate sodium  1 tablet oral BID     Continuous Infusions:  • HYDROmorphone in 0.9 % NaCl         PRN Meds:.albuterol  •  ALPRAZolam  •  alum-mag hydroxide-simeth  •  bisacodyL  •  cyclobenzaprine  •  glucose **OR** dextrose **OR** glucagon **OR** dextrose in water  •  fluticasone propionate  •  HYDROmorphone in 0.9 % NaCl **AND** naloxone (NARCAN) IV syringe 0.04 mg/mL **AND** Vital Signs, End Tidal Carbon Dioxide (EtCO2), O2 Saturation, Sedation Score (POSS), and pain score  (not required for Comfort Care patients) **AND** End Tidal CO2 Monitoring **AND** Stop PCA and notify physician of:Systolic blood pressure less than: 100; Respiratory rate less than: 6; Sedation Score: 3 or greater; EtCO2: an EtCO2 increase of 10% or greater, an EtCO2 change of 10 mmHg or greater, or a loss of waveform.  (demi... **AND** For PCA discontinuation: reassess patient and document within a minimum of 1 hour (refer to Patient Controlled Analgesia Policy)  •  ondansetron ODT **OR** ondansetron  •  oxyCODONE    CBC Results       01/07/22 01/06/22 01/03/22     0505 1531 1303    WBC 11.09 11.00 6.06    RBC 4.12 4.76 5.25    HGB 12.3 13.9 15.3    HCT 35.9 41.5 44.7    MCV 87.1 87.2 85.1    MCH 29.9 29.2 29.1    MCHC 34.3 33.5 34.2     175 198        BMP Results       01/07/22 01/06/22 01/03/22     0505 1531 1303     140 140    K 4.6 4.3 4.1    Cl 103 105 103    CO2 24 23 26    Glucose 148 160 90    BUN 15 17 11    Creatinine 0.9 0.8 0.7    Calcium 9.0 8.9 10.0    Anion Gap 11 12 11    EGFR >60.0 >60.0 >60.0        Results from last 7 days   Lab Units 01/03/22  1303   INR  1.3   PTT sec 43*       Microbiology  Results     Procedure Component Value Units Date/Time    COVID-19 PAT/Pre-procedural [[id number]]  (Normal) Collected: 01/03/22 1303    Specimen: Nasopharyngeal Swab from Nasopharynx Updated: 01/03/22 1903    Narrative:      The following orders were created for panel order COVID-19 PAT/Pre-procedural.  Procedure                               Abnormality         Status                     ---------                               -----------         ------                     SARS-CoV-2 (COVID-19), PCR[[id number]]   Normal              Final result                 Please view results for these tests on the individual orders.    MRSA/MSSA Screen Culture (Outpatient and PAT Only) [[id number]]  (Normal) Collected: 01/03/22 1303    Specimen: Nasal Swab from Nose Updated: 01/05/22 1222     Culture No Staphylococcus Aureus Recovered    SARS-CoV-2 (COVID-19), PCR [[id number]]  (Normal) Collected: 01/03/22 1303    Specimen: Nasopharyngeal Swab from Nasopharynx Updated: 01/03/22 1903     SARS-CoV-2 (COVID-19) Negative        Imaging: Personally reviewed by myself and Dr. Morejon    CT CERVICAL SPINE  No official report, approximate placement of hardware     Assessment & Plan:  Patient is a 71 year old gentleman who is now s/p C1-C3 posterior fusion, ORIF C2, POD 1. Post op CT demonstrates appropriate placement of hardware      -neuro exam is stable  -continue multimodal pain regimen  -maintain rigid c-collar at all times, okay to remove collar when eating   -continue hemovac, record q shift   -keep hemovac to gravity only, no suction  -discontinue caballero  -will re-evaluate at the end of the day to see if he can be downgraded   -patient seen and examined along with Dr. Morejon  -please call neurosurgery with questions, concerns or new symtpoms
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
## 2022-01-07 NOTE — PLAN OF CARE
Problem: Adult Inpatient Plan of Care  Goal: Plan of Care Review  Outcome: Progressing  Flowsheets (Taken 1/7/2022 1403)  Progress: improving  Plan of Care Reviewed With: patient  Outcome Summary: PT eval complete

## 2022-01-07 NOTE — PROGRESS NOTES
Patient:  Melanie Zelaya  Location:  [facility] SICU SICU03  MRN:  [id number]  Today's date:  1/7/2022    Therapy session completed with pt and nsg consent. Pt rec'd supine in bed and left OOB in chair, call bell in reach and chair alarmed, nsg aware    Melanie is a 71 y.o. male admitted on 1/6/2022 with Closed nondisplaced odontoid fracture with type II morphology and nonunion, subsequent encounter [S12.112K]  History of cervical fracture [Z87.81]  H/O cervical fracture [Z87.81]  Odontoid fracture with type II morphology and delayed healing [S12.110G]. Principal problem is Cervical spine fracture (CMS/HCC).    Past Medical History  Melanie has a past medical history of Anxiety, Atrial fibrillation (CMS/HCC), Breast cancer (CMS/HCC), C1 cervical fracture (CMS/HCC), Colon polyp, COPD (chronic obstructive pulmonary disease) (CMS/HCC), Coronary artery calcification seen on CT scan, COVID-19 vaccine series completed, Depression, Diverticulosis, Diverticulosis, Fracture of pelvis (CMS/HCC) (1968), GERD (gastroesophageal reflux disease), Hearing loss, History of colon polyps, History of COVID-19 (2020), Hyperlipidemia, Hypertension, Left atrial enlargement, Lung cancer (CMS/HCC), Lung cancer (CMS/HCC), Pneumonia (2011), Seasonal allergies, and Wrist fracture.    History of Present Illness   POD 1 C1-3 PCF, C2 ORIF now in hard collar        OT Vitals    Date/Time Pulse HR Source Resp SpO2 Pt Activity O2 Therapy O2 Del Method O2 Flow Rate BP BP Location BP Method Pt Position [facility]   01/07/22 1117 -- -- 19 -- -- -- -- -- -- -- -- --    01/07/22 1117 69 Monitor -- 98 % At rest Supplemental oxygen Nasal cannula 2 L/min 158/74 Left upper arm Automatic Lying NWR   01/07/22 1128 -- -- 19 -- -- -- -- -- -- -- -- --    01/07/22 1128 72 Monitor -- 91 % Walking None (Room air) -- -- 161/71 Left upper arm Automatic Sitting NWR   01/07/22 1135 -- -- 18 -- -- -- -- -- -- -- -- -- CD   01/07/22 1135 81 Monitor -- 96 % At  rest Supplemental oxygen Nasal cannula 2 L/min 127/83 Left upper arm Automatic Sitting NWR      OT Pain    Date/Time Pain Type Location Rating: Rest Rating: Activity Interventions Who   01/07/22 1117 Pain Assessment neck 2 used pump 2 position adjusted NWR   01/07/22 1135 Pain Assessment -- -- 8 pillow support provided;position adjusted NWR          Prior Living Environment      Most Recent Value   People in Home alone   Current Living Arrangements home   Living Environment Comment 3SH, 3 VALERIA LHR, FF to bathroom then another FF to bedroom, tub shower w/ SC        Prior Level of Function      Most Recent Value   Dominant Hand right   Ambulation assistive equipment   Transferring assistive equipment   Toileting independent   Bathing independent   Dressing independent   Eating independent   Communication understands/communicates without difficulty   Prior Level of Function Comment Has RW on first floor of house that he uses intermittently, otherwise quad cane use. Lives alone so able to perform ADL's. Doesn't drive and has niece to do do food shopping. Has been mostly household ambulator   Assistive Device Currently Used at Home shower chair, cane, quad, grab bar             OT Evaluation and Treatment - 01/07/22 1106        OT Time Calculation    Start Time 1106     Stop Time 1135     Time Calculation (min) 29 min        Session Details    Document Type initial evaluation     Mode of Treatment occupational therapy        General Information    Patient Profile Reviewed yes     Existing Precautions/Restrictions brace worn at all times   brace worn at all times but ok to take off for meals    Limitations/Impairments safety/cognitive;sensory        Cognition/Psychosocial    Affect/Mental Status (Cognition) WFL     Orientation Status (Cognition) oriented x 3;verbal cues/prompts needed for orientation     Follows Commands (Cognition) follows three-step commands;verbal cues/prompting required   due to dec atttention     Cognitive Function attention deficit     Attention Deficit (Cognition) minimal deficit;distractible in noisy environment;focused/sustained attention     Executive Function Deficit (Cognition) minimal deficit;information processing;insight/awareness of deficits     Comment, Cognition pt pleasant and coooperative, engaged in therpay session        Hearing Assessment    Hearing Status WFL        Vision Assessment/Intervention    Visual Impairment/Limitations corrective lenses full-time        Sensory Assessment (Somatosensory)    Sensory Assessment (Somatosensory) left-sided sensation intact     Left UE Sensory Assessment general sensation;intact     Right UE Sensory Assessment light touch awareness;light touch localization;impaired   median nerve, digits +h/o CTR and states no improvemen tnoted x 2 years       Range of Motion (ROM)    Range of Motion bilateral upper extremities;ROM is WFL   except tight intrinsics R hand       Strength (Manual Muscle Testing)    Strength (Manual Muscle Testing) strength is WFL;bilateral upper extremities     Hand, Left (Strength) 4/5     Hand, Right (Strength) 3/5        Bed Mobility    [place], Roll Right minimum assist (75% or more patient effort);1 person assist;increased time to complete;safety considerations;verbal cues     Verbal Cues (Roll Right) hand placement;safety;technique     [place], Supine to Sit minimum assist (75% or more patient effort);2 person assist;safety considerations;increased time to complete;verbal cues     Verbal Cues (Supine to Sit) hand placement;safety;technique     Assistive Device draw sheet;head of bed elevated;bed rails     Comment (Bed Mobility) pt educated on log roll, increaed cues and assist to bring trunk upright        Bed to Chair Transfer    [place], Bed to Chair minimum assist (75% or more patient effort);2 person assist;safety considerations;increased time to complete;verbal cues     Verbal Cues hand  placement;technique;safety;preparatory posture     Assistive Device other (see comments);walker, front-wheeled   HHA x2 intitally bed to toilet, also trial of RW toilet to chair with cues for safety noted       Sit to Stand Transfer    [place], Sit to Stand Transfer minimum assist (75% or more patient effort);2 person assist;increased time to complete;safety considerations;verbal cues     Verbal Cues hand placement;safety;technique     Assistive Device other (see comments)   HHA x2       Stand to Sit Transfer    [place], Stand to Sit Transfer minimum assist (75% or more patient effort);2 person assist;safety considerations;increased time to complete;verbal cues     Verbal Cues hand placement;safety;technique     Assistive Device walker, front-wheeled     Comment to low recliner chair        Toilet Transfer    Transfer Technique sit-stand;stand-sit     [place], Toilet Transfer moderate assist (50-74% patient effort);1 person assist;safety considerations;increased time to complete;verbal cues     Verbal Cues hand placement;safety;technique;preparatory posture     Assistive Device grab bars/safety frame   x1 on rtght    Comment cues for safety and use of grab rail        Safety Issues, Functional Mobility    Safety Issues Affecting Function (Mobility) positioning of assistive device;problem-solving;insight into deficits/self-awareness     Impairments Affecting Function (Mobility) balance;endurance/activity tolerance;coordination;sensation/sensory awareness;pain        Balance    Static Sitting Balance WFL     Dynamic Sitting Balance WFL     Sit to Stand Dynamic Balance mild impairment;supported;standing     Static Standing Balance mild impairment;supported;standing     Dynamic Standing Balance mild impairment;supported;standing        Motor Skills    Functional Endurance fair, +mild TUCKER with limited functional activity. States grossly house hold distances only since fall in April 2021        Lower Body  Dressing    Tasks don;socks     [place] Assistance dons/doffs left sock;dons/doffs right sock     [place] dependent (less than 25% patient effort)     Position supported sitting;long sitting     Adaptive Equipment none     Comment dec functional reach  to complete seat or long sititng. pt will benefit from trial of LB AE to faciilate ind        Grooming    Self-Performance washes, rinses and dries face;washes, rinses and dries hands;oral care (brushing teeth, cleaning dentures)     [place] supervision;increased time to complete     Position supported sitting     Comment increased time and attention as states occassioal drops things in right hand due to dec sensation        Toileting    [place] moderate assist (50-74% patient effort);perform bowel hygiene;adjust/manage clothing;perform bladder hygiene;increased time to complete     Position supported standing     Comment able to complete front pericare with supervison seated and mod A posterior in supported stance        Orthotics & Prosthetics Management    Orthosis Location spinal orthosis        Spinal Orthosis Management    Type (Spinal Orthosis) cervical collar, hard   aspen collar    Functional Design (Spinal Orthosis) static orthosis     Therapeutic Indications Spinal Orthosis post-op positioning/protection     Wearing Schedule (Spinal Orthosis) wear full-time   except can remove for eating    Orthosis Training (Spinal Orthosis) patient;activity limitations/precautions;donning/doffing orthosis;orthosis adjustment;purpose/goals of orthosis;sensory precautions;wearing schedule;able to verbalize training;able to show back training;requires cues;requires assistance;partially meets, needs review/practice     Compliance/Wearing Issues (Spinal Orthosis) patient/caregiver comprehend rationale for orthosis;follow-up training required        AM-PAC (TM) - ADL (Current Function)    Putting on and taking off regular lower body clothing? 1 - Total      Bathing? 2 - A Lot     Toileting? 2 - A Lot     Putting on/taking off regular upper body clothing? 2 - A Lot     How much help for taking care of personal grooming? 3 - A Little     Eating meals? 3 - A Little     AM-PAC (TM) ADL Score 13        Assessment/Plan (OT)    Daily Outcome Statement OT eval completed. pt presents with increaed neck pain +[place] collar and PCA insitu as well as dec balance and endurance. pt will benefit from continued skilled OT as currently requires mod A with most functioal activities and will benenfit from RW for increased balance and ECON as well as edu on distal LB AE to promote overal independence with ADLs     Rehab Potential good, to achieve stated therapy goals     Therapy Frequency 5-7 times/wk     Planned Therapy Interventions activity tolerance training;adaptive equipment training;BADL retraining;functional balance retraining;IADL retraining;passive ROM/stretching;orthotic fabrication/fitting/training;occupation/activity based interventions;ROM/therapeutic exercise;patient/caregiver education/training;transfer/mobility retraining               OT Assessment/Plan      Most Recent Value   OT Recommended Discharge Disposition acute rehab/Inpatient Rehab Facility at 01/07/2022 1106   Anticipated Equipment Needs At Discharge (OT) commode, 3-in-1, dressing equipment at 01/07/2022 1106   Patient/Family Therapy Goal Statement to be able to walk better and dress myself at 01/07/2022 1106                    Education Documentation  Treatment Plan, taught by Karli Horowitz, OT at 1/7/2022  2:29 PM.  Learner: Patient  Readiness: Acceptance  Method: Explanation, Demonstration  Response: Verbalizes Understanding, Demonstrated Understanding, Needs Reinforcement  Comment: role/goal of OT, therpay plan of care, impt of OOB and use of call bell, safe adapted ADL/transfer training, ECON edu and  purpose/wearing schedule of [place] collar          OT Goals      Most Recent Value   Bed Mobility  Goal 1    Activity/Assistive Device bed mobility activities, all at 01/07/2022 1106   [place] set-up required, supervision required at 01/07/2022 1106   Time Frame by discharge at 01/07/2022 1106   Progress/Outcome goal ongoing at 01/07/2022 1106   Transfer Goal 1    Activity/Assistive Device all transfers at 01/07/2022 1106   [place] supervision required, set-up required at 01/07/2022 1106   Time Frame by discharge at 01/07/2022 1106   Progress/Outcome goal ongoing at 01/07/2022 1106   Dressing Goal 1    Activity/Adaptive Equipment dressing skills, all at 01/07/2022 1106   [place] supervision required, set-up required at 01/07/2022 1106   Time Frame by discharge at 01/07/2022 1106   Strategies/Barriers use of :LB AE at 01/07/2022 1106   Progress/Outcome goal ongoing at 01/07/2022 1106   Toileting Goal 1    Activity/Assistive Device toileting skills, all at 01/07/2022 1106   [place] supervision required at 01/07/2022 1106   Time Frame by discharge at 01/07/2022 1106   Progress/Outcome goal ongoing at 01/07/2022 1106

## 2022-01-08 PROBLEM — S12.112G: Status: ACTIVE | Noted: 2022-01-08

## 2022-01-08 LAB
ANION GAP SERPL CALC-SCNC: 9 MEQ/L (ref 3–15)
BUN SERPL-MCNC: 19 MG/DL (ref 8–20)
CALCIUM SERPL-MCNC: 8.8 MG/DL (ref 8.9–10.3)
CHLORIDE SERPL-SCNC: 101 MEQ/L (ref 98–109)
CO2 SERPL-SCNC: 28 MEQ/L (ref 22–32)
CREAT SERPL-MCNC: 0.9 MG/DL (ref 0.8–1.3)
ERYTHROCYTE [DISTWIDTH] IN BLOOD BY AUTOMATED COUNT: 14.6 % (ref 11.6–14.4)
GFR SERPL CREATININE-BSD FRML MDRD: >60 ML/MIN/1.73M*2
GLUCOSE SERPL-MCNC: 91 MG/DL (ref 70–99)
HCT VFR BLDCO AUTO: 36.1 % (ref 40.1–51)
HGB BLD-MCNC: 11.9 G/DL (ref 13.7–17.5)
MCH RBC QN AUTO: 29.2 PG (ref 28–33.2)
MCHC RBC AUTO-ENTMCNC: 33 G/DL (ref 32.2–36.5)
MCV RBC AUTO: 88.5 FL (ref 83–98)
PDW BLD AUTO: 10.2 FL (ref 9.4–12.4)
PLATELET # BLD AUTO: 151 K/UL (ref 150–350)
POTASSIUM SERPL-SCNC: 3.9 MEQ/L (ref 3.6–5.1)
RBC # BLD AUTO: 4.08 M/UL (ref 4.5–5.8)
SODIUM SERPL-SCNC: 138 MEQ/L (ref 136–144)
WBC # BLD AUTO: 8.18 K/UL (ref 3.8–10.5)

## 2022-01-08 PROCEDURE — 97530 THERAPEUTIC ACTIVITIES: CPT | Mod: GO,CO

## 2022-01-08 PROCEDURE — 80048 BASIC METABOLIC PNL TOTAL CA: CPT | Performed by: PHYSICIAN ASSISTANT

## 2022-01-08 PROCEDURE — 12000000 HC ROOM AND CARE MED/SURG

## 2022-01-08 PROCEDURE — 97535 SELF CARE MNGMENT TRAINING: CPT | Mod: GO,CO

## 2022-01-08 PROCEDURE — 63700000 HC SELF-ADMINISTRABLE DRUG: Performed by: PHYSICIAN ASSISTANT

## 2022-01-08 PROCEDURE — 36415 COLL VENOUS BLD VENIPUNCTURE: CPT | Performed by: PHYSICIAN ASSISTANT

## 2022-01-08 PROCEDURE — 63600000 HC DRUGS/DETAIL CODE: Performed by: PHYSICIAN ASSISTANT

## 2022-01-08 PROCEDURE — 85027 COMPLETE CBC AUTOMATED: CPT | Performed by: PHYSICIAN ASSISTANT

## 2022-01-08 PROCEDURE — 99024 POSTOP FOLLOW-UP VISIT: CPT | Performed by: NEUROLOGICAL SURGERY

## 2022-01-08 RX ORDER — BISACODYL 10 MG/1
10 SUPPOSITORY RECTAL DAILY PRN
Status: DISCONTINUED | OUTPATIENT
Start: 2022-01-08 | End: 2022-01-11 | Stop reason: HOSPADM

## 2022-01-08 RX ADMIN — HEPARIN SODIUM 5000 UNITS: 5000 INJECTION, SOLUTION INTRAVENOUS; SUBCUTANEOUS at 05:53

## 2022-01-08 RX ADMIN — AMLODIPINE BESYLATE 5 MG: 5 TABLET ORAL at 08:48

## 2022-01-08 RX ADMIN — HYDROCHLOROTHIAZIDE 12.5 MG: 25 TABLET ORAL at 08:47

## 2022-01-08 RX ADMIN — HEPARIN SODIUM 5000 UNITS: 5000 INJECTION, SOLUTION INTRAVENOUS; SUBCUTANEOUS at 13:24

## 2022-01-08 RX ADMIN — ACETAMINOPHEN 975 MG: 325 TABLET, FILM COATED ORAL at 13:25

## 2022-01-08 RX ADMIN — ACETAMINOPHEN 975 MG: 325 TABLET, FILM COATED ORAL at 08:48

## 2022-01-08 RX ADMIN — OXYCODONE HYDROCHLORIDE 5 MG: 5 TABLET ORAL at 19:28

## 2022-01-08 RX ADMIN — ACETAMINOPHEN 975 MG: 325 TABLET, FILM COATED ORAL at 01:54

## 2022-01-08 RX ADMIN — ATORVASTATIN CALCIUM 40 MG: 40 TABLET, FILM COATED ORAL at 21:54

## 2022-01-08 RX ADMIN — DOCUSATE SODIUM AND SENNOSIDES 1 TABLET: 8.6; 5 TABLET, FILM COATED ORAL at 20:02

## 2022-01-08 RX ADMIN — Medication: at 19:38

## 2022-01-08 RX ADMIN — ACETAMINOPHEN 975 MG: 325 TABLET, FILM COATED ORAL at 20:01

## 2022-01-08 RX ADMIN — OXYCODONE HYDROCHLORIDE 5 MG: 5 TABLET ORAL at 05:53

## 2022-01-08 RX ADMIN — HEPARIN SODIUM 5000 UNITS: 5000 INJECTION, SOLUTION INTRAVENOUS; SUBCUTANEOUS at 21:54

## 2022-01-08 RX ADMIN — BISACODYL 10 MG: 10 SUPPOSITORY RECTAL at 13:30

## 2022-01-08 RX ADMIN — CYCLOBENZAPRINE HYDROCHLORIDE 5 MG: 5 TABLET, FILM COATED ORAL at 05:54

## 2022-01-08 RX ADMIN — ESCITALOPRAM OXALATE 10 MG: 10 TABLET ORAL at 08:47

## 2022-01-08 RX ADMIN — POLYETHYLENE GLYCOL 3350 17 G: 17 POWDER, FOR SOLUTION ORAL at 08:47

## 2022-01-08 RX ADMIN — FAMOTIDINE 40 MG: 20 TABLET ORAL at 08:47

## 2022-01-08 RX ADMIN — CYCLOBENZAPRINE HYDROCHLORIDE 5 MG: 5 TABLET, FILM COATED ORAL at 21:59

## 2022-01-08 RX ADMIN — DOCUSATE SODIUM AND SENNOSIDES 1 TABLET: 8.6; 5 TABLET, FILM COATED ORAL at 08:47

## 2022-01-08 RX ADMIN — LOSARTAN POTASSIUM 100 MG: 100 TABLET, FILM COATED ORAL at 08:48

## 2022-01-08 ASSESSMENT — COGNITIVE AND FUNCTIONAL STATUS - GENERAL
HELP NEEDED FOR BATHING: 2 - A LOT
HELP NEEDED FOR PERSONAL GROOMING: 3 - A LITTLE
EATING MEALS: 3 - A LITTLE
DRESSING REGULAR UPPER BODY CLOTHING: 2 - A LOT
DRESSING REGULAR LOWER BODY CLOTHING: 2 - A LOT
TOILETING: 2 - A LOT
AFFECT: WFL

## 2022-01-08 NOTE — NURSING NOTE
Patient resting in bed, assessment complete.  Meds given.  Neck brace in place.  Drain intact and not compressed as ordered.  Will continue to monitor

## 2022-01-08 NOTE — PROGRESS NOTES
Patient:  Melanie Zelaya  Location:  [place] [address]  MRN:  [id number]  Today's date:  1/8/2022  Pt left in chr with call nielsonMoe Navarro is a 71 y.o. male admitted on 1/6/2022 with Closed nondisplaced odontoid fracture with type II morphology and nonunion, subsequent encounter [S12.112K]  History of cervical fracture [Z87.81]  H/O cervical fracture [Z87.81]  Odontoid fracture with type II morphology and delayed healing [S12.110G]. Principal problem is Cervical spine fracture (CMS/HCC).    Past Medical History  Melanie has a past medical history of Anxiety, Atrial fibrillation (CMS/HCC), Breast cancer (CMS/HCC), C1 cervical fracture (CMS/HCC), Colon polyp, COPD (chronic obstructive pulmonary disease) (CMS/HCC), Coronary artery calcification seen on CT scan, COVID-19 vaccine series completed, Depression, Diverticulosis, Diverticulosis, Fracture of pelvis (CMS/HCC) (1968), GERD (gastroesophageal reflux disease), Hearing loss, History of colon polyps, History of COVID-19 (2020), Hyperlipidemia, Hypertension, Left atrial enlargement, Lung cancer (CMS/HCC), Lung cancer (CMS/HCC), Pneumonia (2011), Seasonal allergies, and Wrist fracture.    History of Present Illness   POD 1 C1-3 PCF, C2 ORIF now in hard collar        OT Vitals    Date/Time Pulse SpO2 Pt Activity O2 Therapy BP BP Location BP Method Pt Position [facility]   01/08/22 1126 55 93 % At rest None (Room air) 121/66 Right upper arm Automatic Lying MB      OT Pain    Date/Time Pain Type Location Rating: Rest Rating: Activity Interventions [facility]   01/08/22 1126 Pain Assessment neck 8 8 pain pump in use;position adjusted MB          Prior Living Environment      Most Recent Value   People in Home alone   Current Living Arrangements home   Living Environment Comment 3SH, 3 VALERIA LHR, FF to bathroom then another FF to bedroom, tub shower w/ SC        Prior Level of Function      Most Recent Value   Dominant Hand right   Ambulation assistive equipment    Transferring assistive equipment   Toileting independent   Bathing independent   Dressing independent   Eating independent   Communication understands/communicates without difficulty   Prior Level of Function Comment Has RW on first floor of house that he uses intermittently, otherwise quad cane use. Lives alone so able to perform ADL's. Doesn't drive and has niece to do do food shopping. Has been mostly household ambulator   Assistive Device Currently Used at Home shower chair, cane, quad, grab bar             OT Evaluation and Treatment - 01/08/22 1126        OT Time Calculation    Start Time 1126     Stop Time 1202     Time Calculation (min) 36 min        Session Details    Document Type daily treatment/progress note     Mode of Treatment occupational therapy        General Information    Patient Profile Reviewed yes     General Observations of Patient Pt rec'd in bed     Existing Precautions/Restrictions brace worn at all times   ok to remove to eat    Limitations/Impairments safety/cognitive        Cognition/Psychosocial    Affect/Mental Status (Cognition) WFL     Orientation Status (Cognition) oriented x 3     Comment, Cognition pleasant/cooperative with therapy        Bed Mobility    [place], Supine to Sit minimum assist (75% or more patient effort);1 person assist     Verbal Cues (Supine to Sit) preparatory posture;safety;technique     Assistive Device draw sheet;bed rails     Comment (Bed Mobility) cues for log roll        Sit to Stand Transfer    [place], Sit to Stand Transfer minimum assist (75% or more patient effort);1 person assist     Verbal Cues hand placement;preparatory posture;safety;technique     Assistive Device other (see comments)   IV pole    Comment from EOB        Stand to Sit Transfer    [place], Stand to Sit Transfer minimum assist (75% or more patient effort);1 person assist     Verbal Cues hand placement;preparatory posture;safety;technique     Assistive Device other  (see comments)   IV pole       Toilet Transfer    [place], Toilet Transfer minimum assist (75% or more patient effort);1 person assist     Verbal Cues hand placement;safety;technique     Assistive Device commode, 3-in-1        Lower Body Dressing    Tasks doff;don;socks     [place] dependent (less than 25% patient effort)     Position supported sitting     Adaptive Equipment none     Comment unable to do crossed leg tech        Grooming    Self-Performance oral care (brushing teeth, cleaning dentures)     [place] set up     Position supported sitting        Coping    Observed Emotional State cooperative     Verbalized Emotional State acceptance        AM-PAC (TM) - ADL (Current Function)    Putting on and taking off regular lower body clothing? 2 - A Lot     Bathing? 2 - A Lot     Toileting? 2 - A Lot     Putting on/taking off regular upper body clothing? 2 - A Lot     How much help for taking care of personal grooming? 3 - A Little     Eating meals? 3 - A Little     AM-PAC (TM) ADL Score 14        Assessment/Plan (OT)    Daily Outcome Statement Pt seen for OT session.  Progressing twds OT goals.  Requires [place] for functional txfers and Max A for LB selfcare tasks.  Limited by decr safe reach, incr pain and decr balance which is far from baseline.  Rec Inpt rehab when medically stable.               OT Assessment/Plan      Most Recent Value   OT Recommended Discharge Disposition acute rehab/Inpatient Rehab Facility at 01/08/2022 1126   Anticipated Equipment Needs At Discharge (OT) commode, 3-in-1, dressing equipment at 01/07/2022 1106   Patient/Family Therapy Goal Statement to be able to walk better and dress myself at 01/07/2022 1106                    Education Documentation  Equipment/Home Supplies, taught by Xiao Leslie COTA at 1/8/2022  3:40 PM.  Learner: Patient  Readiness: Acceptance  Method: Explanation  Response: Needs Reinforcement, Verbalizes Understanding  Comment: Pt ed on DME/AE for  incr ADL Ind.          OT Goals      Most Recent Value   Bed Mobility Goal 1    Activity/Assistive Device bed mobility activities, all at 01/07/2022 1106   [place] set-up required, supervision required at 01/07/2022 1106   Time Frame by discharge at 01/07/2022 1106   Progress/Outcome goal ongoing at 01/07/2022 1106   Transfer Goal 1    Activity/Assistive Device all transfers at 01/07/2022 1106   [place] supervision required, set-up required at 01/07/2022 1106   Time Frame by discharge at 01/07/2022 1106   Progress/Outcome goal ongoing at 01/07/2022 1106   Dressing Goal 1    Activity/Adaptive Equipment dressing skills, all at 01/07/2022 1106   [place] supervision required, set-up required at 01/07/2022 1106   Time Frame by discharge at 01/07/2022 1106   Strategies/Barriers use of :LB AE at 01/07/2022 1106   Progress/Outcome goal ongoing at 01/07/2022 1106   Toileting Goal 1    Activity/Assistive Device toileting skills, all at 01/07/2022 1106   [place] supervision required at 01/07/2022 1106   Time Frame by discharge at 01/07/2022 1106   Progress/Outcome goal ongoing at 01/07/2022 1106

## 2022-01-08 NOTE — PROGRESS NOTES
No overnight events.  POD #2 s/p C1-3 PCF, ORIF C2.      Patient reports moderate posterior neck discomfort with radiation to right shoulder, transiently increased earlier this morning. Denies left shoulder pain at this time. No new radicular pain, paresthesias, weakness. Jauregui d/c'd yesterday, + spontaneous void. + flatus, no BM. Interested in utilizing suppository. Denies headache, nausea, vomiting, abdominal pain. Patient seen in consultation by Orthopedic Surgery yesterday for right shoulder. Xray of shoulder and scapula obtained.     Temp:  [36.3 °C (97.4 °F)-36.7 °C (98.1 °F)] 36.7 °C (98 °F)  Heart Rate:  [] 102  Resp:  [16-19] 18  BP: (113-161)/() 141/104  SpO2:  [91 %-99 %] 98 %  Oxygen Therapy: None (Room air)  O2 Delivery Method: Nasal cannula  O2 Flow Rate (L/min):  [2 L/min] 2 L/min     Intake/Output                 01/07/22 0700 - 01/08/22 0659     2240-3640 2162-7079 Total              Intake    I.V.  11  0.4 11.4    Volume Infused (mL) Hydromorphone (0.2 mg/mL) 11 0.4 11.4    Total Intake 11 0.4 11.4       Output    Urine  975  1325 2300    Drains  100  -- 100    Output (mL) (Drain 1 Posterior;Medial Neck) 100 -- 100    Total Output 1075 1325 2400       Net I/O     -1064 -1324.6 -2388.6      Drains: Hemovac x 1 with 100 cc output, overnight volume not yet recorded. Upon exam, drain noted to be disconnected at point where tubing meets evacuator    General: lying supine in bed in no acute distress    Neuro: A&O x 3, interacting appr with fluent speech    Cn: EOMI, no facial asymmetry, tongue midline    Motor:  There is no pronator drift.  Muscle bulk and tone are normal.     Deltoid Biceps Triceps Wrist ext Hand  Finger Spread Hip flexion Knee ext Dorsi-  flexion EHL Plantar Flexion   L 4+ PL 5 5 5 5 5 5 5 5 5 5   R 4 PL 5 5 5 5 5 5 5 5 5 5     Sensation: Intact and equal to light touch x all 4 extremeties  Incision: covered with mepilex; removed briefly - C/D/I w/o  redness/drainage    Scheduled Meds:  • acetaminophen  975 mg oral q6h INT   • amLODIPine  5 mg oral Daily   • atorvastatin  40 mg oral Nightly   • escitalopram  10 mg oral Daily   • famotidine  40 mg oral Daily   • heparin (porcine)  5,000 Units subcutaneous q8h MARIAN   • hydrochlorothiazide  12.5 mg oral Daily   • losartan  100 mg oral Daily   • polyethylene glycol  17 g oral Daily   • sennosides-docusate sodium  1 tablet oral BID   • umeclidinium-vilanteroL  1 puff inhalation Daily     Continuous Infusions:  • HYDROmorphone in 0.9 % NaCl         PRN Meds:.albuterol  •  ALPRAZolam  •  alum-mag hydroxide-simeth  •  bisacodyL  •  cyclobenzaprine  •  [DISCONTINUED] glucose **OR** [DISCONTINUED] dextrose **OR** [DISCONTINUED] glucagon **OR** dextrose in water  •  fluticasone propionate  •  HYDROmorphone in 0.9 % NaCl **AND** naloxone (NARCAN) IV syringe 0.04 mg/mL **AND** Vital Signs, End Tidal Carbon Dioxide (EtCO2), O2 Saturation, Sedation Score (POSS), and pain score  (not required for Comfort Care patients) **AND** End Tidal CO2 Monitoring **AND** Stop PCA and notify physician of:Systolic blood pressure less than: 100; Respiratory rate less than: 6; Sedation Score: 3 or greater; EtCO2: an EtCO2 increase of 10% or greater, an EtCO2 change of 10 mmHg or greater, or a loss of waveform.  (demi... **AND** For PCA discontinuation: reassess patient and document within a minimum of 1 hour (refer to Patient Controlled Analgesia Policy)  •  ondansetron ODT **OR** ondansetron  •  oxyCODONE    CBC Results       01/08/22 01/07/22 01/06/22     0820 0505 1531    WBC 8.18 11.09 11.00    RBC 4.08 4.12 4.76    HGB 11.9 12.3 13.9    HCT 36.1 35.9 41.5    MCV 88.5 87.1 87.2    MCH 29.2 29.9 29.2    MCHC 33.0 34.3 33.5     169 175        BMP Results       01/08/22 01/07/22 01/06/22     0820 0505 1531     138 140    K 3.9 4.6 4.3    Cl 101 103 105    CO2 28 24 23    Glucose 91 148 160    BUN 19 15 17    Creatinine 0.9 0.9  0.8    Calcium 8.8 9.0 8.9    Anion Gap 9 11 12    EGFR >60.0 >60.0 >60.0        Results from last 7 days   Lab Units 22  1303   INR  1.3   PTT sec 43*       Microbiology Results     Procedure Component Value Units Date/Time    COVID-19 PAT/Pre-procedural [[id number]]  (Normal) Collected: 22 1303    Specimen: Nasopharyngeal Swab from Nasopharynx Updated: 22    Narrative:      The following orders were created for panel order COVID-19 PAT/Pre-procedural.  Procedure                               Abnormality         Status                     ---------                               -----------         ------                     SARS-CoV-2 (COVID-19), PCR[[id number]]   Normal              Final result                 Please view results for these tests on the individual orders.    MRSA/MSSA Screen Culture (Outpatient and PAT Only) [[id number]]  (Normal) Collected: 22 1303    Specimen: Nasal Swab from Nose Updated: 22 1222     Culture No Staphylococcus Aureus Recovered    SARS-CoV-2 (COVID-19), PCR [[id number]]  (Normal) Collected: 22 1303    Specimen: Nasopharyngeal Swab from Nasopharynx Updated: 22     SARS-CoV-2 (COVID-19) Negative          Imagin2022 xray right shoulder and scapula ordered by Orthopedic Surgery    Assessment & Plan:   Melanie Zelaya is a 71 y.o. male POD #2 s/p C1-C3 posterior fusion, ORIF C2.     -neuro exam is stable  -continue multimodal pain regimen; plan to d/c PCA tomorrow 2022  -maintain rigid c-collar at all times, okay to remove collar when eating   -continue hemovac, record q shift   -keep hemovac to gravity only, no suction  -additional ortho recommendations pending attending attestation  -patient care discussed with Dr. Morejon  -please call neurosurgery with questions, concerns or new symtpoms

## 2022-01-08 NOTE — PLAN OF CARE
Per info in medical rounds today, pt is stable for d/c likely tomorrow to transition to acute rehab. SW informed Sidra Mazariegos [facility] liaison of possible stability for transfer tomorrow. Pt has [employer] and would require insurance approval for transfer, thus updated therapy notes would be needed for approval. SW will follow for emotional support and dispo planning. Remedios Torres [facility] [place] [id number]

## 2022-01-09 ENCOUNTER — BMR PREADMISSION ASSESSMENT (OUTPATIENT)
Dept: ADMISSIONS | Facility: REHABILITATION | Age: 72
End: 2022-01-09
Payer: COMMERCIAL

## 2022-01-09 LAB
ANION GAP SERPL CALC-SCNC: 10 MEQ/L (ref 3–15)
BUN SERPL-MCNC: 10 MG/DL (ref 8–20)
CALCIUM SERPL-MCNC: 9.1 MG/DL (ref 8.9–10.3)
CHLORIDE SERPL-SCNC: 95 MEQ/L (ref 98–109)
CO2 SERPL-SCNC: 32 MEQ/L (ref 22–32)
CREAT SERPL-MCNC: 0.9 MG/DL (ref 0.8–1.3)
ERYTHROCYTE [DISTWIDTH] IN BLOOD BY AUTOMATED COUNT: 14.3 % (ref 11.6–14.4)
GFR SERPL CREATININE-BSD FRML MDRD: >60 ML/MIN/1.73M*2
GLUCOSE SERPL-MCNC: 110 MG/DL (ref 70–99)
HCT VFR BLDCO AUTO: 39.4 % (ref 40.1–51)
HGB BLD-MCNC: 13.1 G/DL (ref 13.7–17.5)
MCH RBC QN AUTO: 29.2 PG (ref 28–33.2)
MCHC RBC AUTO-ENTMCNC: 33.2 G/DL (ref 32.2–36.5)
MCV RBC AUTO: 87.9 FL (ref 83–98)
PDW BLD AUTO: 9.9 FL (ref 9.4–12.4)
PLATELET # BLD AUTO: 183 K/UL (ref 150–350)
POTASSIUM SERPL-SCNC: 3.7 MEQ/L (ref 3.6–5.1)
RBC # BLD AUTO: 4.48 M/UL (ref 4.5–5.8)
SODIUM SERPL-SCNC: 137 MEQ/L (ref 136–144)
WBC # BLD AUTO: 7.49 K/UL (ref 3.8–10.5)

## 2022-01-09 PROCEDURE — 36415 COLL VENOUS BLD VENIPUNCTURE: CPT | Performed by: PHYSICIAN ASSISTANT

## 2022-01-09 PROCEDURE — 63700000 HC SELF-ADMINISTRABLE DRUG: Performed by: PHYSICIAN ASSISTANT

## 2022-01-09 PROCEDURE — 99024 POSTOP FOLLOW-UP VISIT: CPT | Performed by: NEUROLOGICAL SURGERY

## 2022-01-09 PROCEDURE — 63600000 HC DRUGS/DETAIL CODE: Performed by: PHYSICIAN ASSISTANT

## 2022-01-09 PROCEDURE — 85027 COMPLETE CBC AUTOMATED: CPT | Performed by: PHYSICIAN ASSISTANT

## 2022-01-09 PROCEDURE — 80048 BASIC METABOLIC PNL TOTAL CA: CPT | Performed by: PHYSICIAN ASSISTANT

## 2022-01-09 PROCEDURE — 12000000 HC ROOM AND CARE MED/SURG

## 2022-01-09 RX ORDER — CYCLOBENZAPRINE HCL 10 MG
10 TABLET ORAL EVERY 8 HOURS PRN
Status: DISCONTINUED | OUTPATIENT
Start: 2022-01-09 | End: 2022-01-11 | Stop reason: HOSPADM

## 2022-01-09 RX ORDER — OXYCODONE HYDROCHLORIDE 5 MG/1
10 TABLET ORAL EVERY 6 HOURS PRN
Status: DISCONTINUED | OUTPATIENT
Start: 2022-01-09 | End: 2022-01-10

## 2022-01-09 RX ADMIN — HEPARIN SODIUM 5000 UNITS: 5000 INJECTION, SOLUTION INTRAVENOUS; SUBCUTANEOUS at 13:44

## 2022-01-09 RX ADMIN — HYDROCHLOROTHIAZIDE 12.5 MG: 25 TABLET ORAL at 08:44

## 2022-01-09 RX ADMIN — HEPARIN SODIUM 5000 UNITS: 5000 INJECTION, SOLUTION INTRAVENOUS; SUBCUTANEOUS at 21:09

## 2022-01-09 RX ADMIN — Medication: at 23:23

## 2022-01-09 RX ADMIN — BISACODYL 10 MG: 10 SUPPOSITORY RECTAL at 06:26

## 2022-01-09 RX ADMIN — CYCLOBENZAPRINE HYDROCHLORIDE 10 MG: 10 TABLET, FILM COATED ORAL at 13:44

## 2022-01-09 RX ADMIN — HEPARIN SODIUM 5000 UNITS: 5000 INJECTION, SOLUTION INTRAVENOUS; SUBCUTANEOUS at 06:26

## 2022-01-09 RX ADMIN — LOSARTAN POTASSIUM 100 MG: 100 TABLET, FILM COATED ORAL at 08:43

## 2022-01-09 RX ADMIN — AMLODIPINE BESYLATE 5 MG: 5 TABLET ORAL at 08:44

## 2022-01-09 RX ADMIN — OXYCODONE HYDROCHLORIDE 5 MG: 5 TABLET ORAL at 22:41

## 2022-01-09 RX ADMIN — CYCLOBENZAPRINE HYDROCHLORIDE 10 MG: 10 TABLET, FILM COATED ORAL at 22:42

## 2022-01-09 RX ADMIN — ACETAMINOPHEN 975 MG: 325 TABLET, FILM COATED ORAL at 13:44

## 2022-01-09 RX ADMIN — DOCUSATE SODIUM AND SENNOSIDES 1 TABLET: 8.6; 5 TABLET, FILM COATED ORAL at 20:59

## 2022-01-09 RX ADMIN — OXYCODONE HYDROCHLORIDE 5 MG: 5 TABLET ORAL at 01:12

## 2022-01-09 RX ADMIN — POLYETHYLENE GLYCOL 3350 17 G: 17 POWDER, FOR SOLUTION ORAL at 08:46

## 2022-01-09 RX ADMIN — CYCLOBENZAPRINE HYDROCHLORIDE 5 MG: 5 TABLET, FILM COATED ORAL at 06:26

## 2022-01-09 RX ADMIN — ACETAMINOPHEN 975 MG: 325 TABLET, FILM COATED ORAL at 08:44

## 2022-01-09 RX ADMIN — ACETAMINOPHEN 975 MG: 325 TABLET, FILM COATED ORAL at 01:13

## 2022-01-09 RX ADMIN — ESCITALOPRAM OXALATE 10 MG: 10 TABLET ORAL at 08:43

## 2022-01-09 RX ADMIN — FAMOTIDINE 40 MG: 20 TABLET ORAL at 08:44

## 2022-01-09 RX ADMIN — DOCUSATE SODIUM AND SENNOSIDES 1 TABLET: 8.6; 5 TABLET, FILM COATED ORAL at 08:44

## 2022-01-09 RX ADMIN — ACETAMINOPHEN 975 MG: 325 TABLET, FILM COATED ORAL at 20:59

## 2022-01-09 RX ADMIN — ATORVASTATIN CALCIUM 40 MG: 40 TABLET, FILM COATED ORAL at 21:09

## 2022-01-09 NOTE — NURSING NOTE
Patient resting in bed with no complaints.  Assessment complete.  Meds given.  Neck brace in place.  PCA infusing

## 2022-01-09 NOTE — PLAN OF CARE
Problem: Adult Inpatient Plan of Care  Goal: Plan of Care Review  Outcome: Progressing  Flowsheets (Taken 1/9/2022 4682)  Progress: improving  Plan of Care Reviewed With:   patient   son  Outcome Summary: Dispo: Acute Rehab - [facility]  Goal: Readiness for Transition of Care  Outcome: Progressing     SW spoke to pt and sonMelanie ([phone]) to discuss acute rehab referrals. Pt and his son are in agreement with [facility]. Referral made to Jenny to follow up on Mon for eval.     SW will continue to follow for emotional support and d/c planning needs.      Ankush Vee, DOMENICO, LSW, C-[facility], ASW-G  (Pgr: [id number])

## 2022-01-09 NOTE — PROGRESS NOTES
Neurosurgery Daily Progress Note    Subjective/Objective:     No overnight events.  Patient continues with incisional site discomfort as well as pain between his shoulder blades.  He reports he continues with right shoulder pain which is an intermittent stabbing pain.  He denies any new numbness, weakness, bowel or bladder dysfunction.    Temp:  [36.4 °C (97.6 °F)-36.8 °C (98.2 °F)] 36.8 °C (98.2 °F)  Heart Rate:  [73] 73  Resp:  [18] 18  BP: (144-168)/(65-77) 144/77    Intake/Output Summary (Last 24 hours) at 1/9/2022 1127  Last data filed at 1/9/2022 1000  Gross per 24 hour   Intake 25 ml   Output 3450 ml   Net -3425 ml     Drain output 150 mL since last drained on 1/7/2022 at 1500    General: lying supine in bed in no acute distress  Neuro: A&O x 3, interacting appr with fluent speech  Cn: EOMI, no facial asymmetry, tongue midline  Motor: There is no pronator drift.  Muscle bulk and tone are normal.     Deltoid Biceps Triceps Wrist ext Hand  Finger Spread Hip flexion Knee ext Dorsi-  flexion EHL Plantar Flexion   L 4+ PL 5 5 5 5 5 5 5 5 5 5   R 4 PL 5 5 5 5 5 5 5 5 5 5   Sensation: Intact and equal to light touch x all 4 extremeties             Scheduled Meds:  • acetaminophen  975 mg oral q6h INT   • amLODIPine  5 mg oral Daily   • atorvastatin  40 mg oral Nightly   • escitalopram  10 mg oral Daily   • famotidine  40 mg oral Daily   • heparin (porcine)  5,000 Units subcutaneous q8h MARIAN   • hydrochlorothiazide  12.5 mg oral Daily   • losartan  100 mg oral Daily   • polyethylene glycol  17 g oral Daily   • sennosides-docusate sodium  1 tablet oral BID   • umeclidinium-vilanteroL  1 puff inhalation Daily     Continuous Infusions:  • HYDROmorphone in 0.9 % NaCl         PRN Meds:.albuterol  •  ALPRAZolam  •  alum-mag hydroxide-simeth  •  bisacodyL  •  bisacodyL  •  cyclobenzaprine  •  [DISCONTINUED] glucose **OR** [DISCONTINUED] dextrose **OR** [DISCONTINUED] glucagon **OR** dextrose in water  •  fluticasone  propionate  •  HYDROmorphone in 0.9 % NaCl **AND** naloxone (NARCAN) IV syringe 0.04 mg/mL **AND** Vital Signs, End Tidal Carbon Dioxide (EtCO2), O2 Saturation, Sedation Score (POSS), and pain score  (not required for Comfort Care patients) **AND** End Tidal CO2 Monitoring **AND** Stop PCA and notify physician of:Systolic blood pressure less than: 100; Respiratory rate less than: 6; Sedation Score: 3 or greater; EtCO2: an EtCO2 increase of 10% or greater, an EtCO2 change of 10 mmHg or greater, or a loss of waveform.  (demi... **AND** For PCA discontinuation: reassess patient and document within a minimum of 1 hour (refer to Patient Controlled Analgesia Policy)  •  ondansetron ODT **OR** ondansetron  •  oxyCODONE  •  oxyCODONE    CBC Results       01/09/22 01/08/22 01/07/22     0917 0820 0505    WBC 7.49 8.18 11.09    RBC 4.48 4.08 4.12    HGB 13.1 11.9 12.3    HCT 39.4 36.1 35.9    MCV 87.9 88.5 87.1    MCH 29.2 29.2 29.9    MCHC 33.2 33.0 34.3     151 169        BMP Results       01/09/22 01/08/22 01/07/22     0917 0820 0505     138 138    K 3.7 3.9 4.6    Cl 95 101 103    CO2 32 28 24    Glucose 110 91 148    BUN 10 19 15    Creatinine 0.9 0.9 0.9    Calcium 9.1 8.8 9.0    Anion Gap 10 9 11    EGFR >60.0 >60.0 >60.0        Results from last 7 days   Lab Units 01/03/22  1303   INR  1.3   PTT sec 43*       Laboratory results were personally reviewed.    Imaging:  No interval neuroimaging      Assessment/Plan:    In summary, Melanie Zelaya is a 71 y.o. male POD #3 s/p C1-C3 posterior fusion, ORIF C2.     -neuro exam is stable  -continue multimodal pain regimen; plan to d/c PCA today, will increase flexeril from 5 to 10mg  -maintain rigid c-collar at all times, okay to remove collar when eating   -continue hemovac, record q shift   -keep hemovac to gravity only, no suction  -patient care discussed with Dr. Moreojn  -please call neurosurgery with questions, concerns or new symtpoms

## 2022-01-10 LAB — SARS-COV-2 RNA RESP QL NAA+PROBE: NEGATIVE

## 2022-01-10 PROCEDURE — 12000000 HC ROOM AND CARE MED/SURG

## 2022-01-10 PROCEDURE — 97530 THERAPEUTIC ACTIVITIES: CPT | Mod: GO

## 2022-01-10 PROCEDURE — 63700000 HC SELF-ADMINISTRABLE DRUG: Performed by: PHYSICIAN ASSISTANT

## 2022-01-10 PROCEDURE — L0172 CERV COL SR FOAM 2PC PRE OTS: HCPCS

## 2022-01-10 PROCEDURE — U0003 INFECTIOUS AGENT DETECTION BY NUCLEIC ACID (DNA OR RNA); SEVERE ACUTE RESPIRATORY SYNDROME CORONAVIRUS 2 (SARS-COV-2) (CORONAVIRUS DISEASE [COVID-19]), AMPLIFIED PROBE TECHNIQUE, MAKING USE OF HIGH THROUGHPUT TECHNOLOGIES AS DESCRIBED BY CMS-2020-01-R: HCPCS | Performed by: PHYSICIAN ASSISTANT

## 2022-01-10 PROCEDURE — 97116 GAIT TRAINING THERAPY: CPT | Mod: GP

## 2022-01-10 PROCEDURE — 63600000 HC DRUGS/DETAIL CODE: Performed by: PHYSICIAN ASSISTANT

## 2022-01-10 PROCEDURE — 99024 POSTOP FOLLOW-UP VISIT: CPT | Performed by: NEUROLOGICAL SURGERY

## 2022-01-10 RX ORDER — LIDOCAINE 560 MG/1
1 PATCH PERCUTANEOUS; TOPICAL; TRANSDERMAL DAILY
Status: DISCONTINUED | OUTPATIENT
Start: 2022-01-10 | End: 2022-01-11 | Stop reason: HOSPADM

## 2022-01-10 RX ORDER — OXYCODONE HYDROCHLORIDE 5 MG/1
10 TABLET ORAL EVERY 4 HOURS PRN
Status: DISCONTINUED | OUTPATIENT
Start: 2022-01-10 | End: 2022-01-11 | Stop reason: HOSPADM

## 2022-01-10 RX ADMIN — ATORVASTATIN CALCIUM 40 MG: 40 TABLET, FILM COATED ORAL at 20:39

## 2022-01-10 RX ADMIN — OXYCODONE HYDROCHLORIDE 10 MG: 5 TABLET ORAL at 23:13

## 2022-01-10 RX ADMIN — AMLODIPINE BESYLATE 5 MG: 5 TABLET ORAL at 09:29

## 2022-01-10 RX ADMIN — LIDOCAINE 1 PATCH: 4 PATCH TOPICAL at 09:30

## 2022-01-10 RX ADMIN — HEPARIN SODIUM 5000 UNITS: 5000 INJECTION, SOLUTION INTRAVENOUS; SUBCUTANEOUS at 06:05

## 2022-01-10 RX ADMIN — OXYCODONE HYDROCHLORIDE 5 MG: 5 TABLET ORAL at 09:55

## 2022-01-10 RX ADMIN — DOCUSATE SODIUM AND SENNOSIDES 1 TABLET: 8.6; 5 TABLET, FILM COATED ORAL at 20:39

## 2022-01-10 RX ADMIN — HEPARIN SODIUM 5000 UNITS: 5000 INJECTION, SOLUTION INTRAVENOUS; SUBCUTANEOUS at 13:54

## 2022-01-10 RX ADMIN — OXYCODONE HYDROCHLORIDE 10 MG: 5 TABLET ORAL at 13:54

## 2022-01-10 RX ADMIN — OXYCODONE HYDROCHLORIDE 10 MG: 5 TABLET ORAL at 18:14

## 2022-01-10 RX ADMIN — CYCLOBENZAPRINE HYDROCHLORIDE 10 MG: 10 TABLET, FILM COATED ORAL at 06:05

## 2022-01-10 RX ADMIN — POLYETHYLENE GLYCOL 3350 17 G: 17 POWDER, FOR SOLUTION ORAL at 09:30

## 2022-01-10 RX ADMIN — ACETAMINOPHEN 975 MG: 325 TABLET, FILM COATED ORAL at 02:57

## 2022-01-10 RX ADMIN — HEPARIN SODIUM 5000 UNITS: 5000 INJECTION, SOLUTION INTRAVENOUS; SUBCUTANEOUS at 20:39

## 2022-01-10 RX ADMIN — ESCITALOPRAM OXALATE 10 MG: 10 TABLET ORAL at 09:30

## 2022-01-10 RX ADMIN — DOCUSATE SODIUM AND SENNOSIDES 1 TABLET: 8.6; 5 TABLET, FILM COATED ORAL at 09:29

## 2022-01-10 RX ADMIN — ACETAMINOPHEN 975 MG: 325 TABLET, FILM COATED ORAL at 09:29

## 2022-01-10 RX ADMIN — ACETAMINOPHEN 975 MG: 325 TABLET, FILM COATED ORAL at 13:53

## 2022-01-10 RX ADMIN — FAMOTIDINE 40 MG: 20 TABLET ORAL at 09:29

## 2022-01-10 RX ADMIN — HYDROCHLOROTHIAZIDE 12.5 MG: 25 TABLET ORAL at 09:29

## 2022-01-10 RX ADMIN — LOSARTAN POTASSIUM 100 MG: 100 TABLET, FILM COATED ORAL at 09:29

## 2022-01-10 RX ADMIN — ACETAMINOPHEN 975 MG: 325 TABLET, FILM COATED ORAL at 20:38

## 2022-01-10 RX ADMIN — OXYCODONE HYDROCHLORIDE 5 MG: 5 TABLET ORAL at 03:00

## 2022-01-10 RX ADMIN — CYCLOBENZAPRINE HYDROCHLORIDE 10 MG: 10 TABLET, FILM COATED ORAL at 20:39

## 2022-01-10 ASSESSMENT — COGNITIVE AND FUNCTIONAL STATUS - GENERAL
MOVING TO AND FROM BED TO CHAIR: 3 - A LITTLE
AFFECT: WFL
AFFECT: WFL
EATING MEALS: 3 - A LITTLE
WALKING IN HOSPITAL ROOM: 3 - A LITTLE
STANDING UP FROM CHAIR USING ARMS: 3 - A LITTLE
HELP NEEDED FOR PERSONAL GROOMING: 3 - A LITTLE
HELP NEEDED FOR BATHING: 2 - A LOT
CLIMB 3 TO 5 STEPS WITH RAILING: 2 - A LOT
DRESSING REGULAR LOWER BODY CLOTHING: 2 - A LOT
DRESSING REGULAR UPPER BODY CLOTHING: 2 - A LOT
TOILETING: 2 - A LOT

## 2022-01-10 NOTE — PROGRESS NOTES
Daily Neurosurgery ICU Progress Note     No overnight events.  Continues with moderate posterior incisional discomfort. Mentions having episodes of stabbing pain to his right shoulder region yesterday and one similar episode this morning. No further pain into his upper extremities. No new or worsening weakness, paresthesias. PCA remains in place.     Temp:  [36.7 °C (98.1 °F)-37.1 °C (98.7 °F)] 36.7 °C (98.1 °F)  Heart Rate:  [70-83] 83  Resp:  [18-20] 20  BP: (132-144)/(77-95) 139/95  SpO2:  [93 %-95 %] 94 %  Oxygen Therapy: None (Room air)     Intake/Output                       01/09/22 0700 - 01/10/22 0659 01/10/22 0700 - 01/11/22 0659     9809-0901 3318-2079 Total 0821-3356 6879-8399 Total                 Intake    I.V.  6  13 19  3  -- 3    Volume Infused (mL) Hydromorphone (0.2 mg/mL) 6 13 19 3 -- 3    Total Intake 6 13 19 3 -- 3       Output    Urine  800  650 1450  --  -- --    Drains  150  50 200  --  -- --    Output (mL) (Drain 1 Posterior;Medial Neck) 150 50 200 -- -- --    Total Output  -- -- --       Net I/O     -944 -684 -8871 3 -- 3          Drains: Hemovac with 50 cc output     General: lying supine in bed in no acute distress    Neuro: A&O x 3, interacting appr with fluent speech  Cn: EOMI, no facial asymmetry, tongue midline  Motor: 5/5 throughout all 4 ext without pronator drift  Sensation: Intact and equal to light touch all 4 ext    Scheduled Meds:  • acetaminophen  975 mg oral q6h INT   • amLODIPine  5 mg oral Daily   • atorvastatin  40 mg oral Nightly   • escitalopram  10 mg oral Daily   • famotidine  40 mg oral Daily   • heparin (porcine)  5,000 Units subcutaneous q8h MAIRAN   • hydrochlorothiazide  12.5 mg oral Daily   • losartan  100 mg oral Daily   • polyethylene glycol  17 g oral Daily   • sennosides-docusate sodium  1 tablet oral BID   • umeclidinium-vilanteroL  1 puff inhalation Daily     Continuous Infusions:  • HYDROmorphone in 0.9 % NaCl         PRN Meds:.albuterol  •   ALPRAZolam  •  alum-mag hydroxide-simeth  •  bisacodyL  •  bisacodyL  •  cyclobenzaprine  •  [DISCONTINUED] glucose **OR** [DISCONTINUED] dextrose **OR** [DISCONTINUED] glucagon **OR** dextrose in water  •  fluticasone propionate  •  HYDROmorphone in 0.9 % NaCl **AND** naloxone (NARCAN) IV syringe 0.04 mg/mL **AND** Vital Signs, End Tidal Carbon Dioxide (EtCO2), O2 Saturation, Sedation Score (POSS), and pain score  (not required for Comfort Care patients) **AND** End Tidal CO2 Monitoring **AND** Stop PCA and notify physician of:Systolic blood pressure less than: 100; Respiratory rate less than: 6; Sedation Score: 3 or greater; EtCO2: an EtCO2 increase of 10% or greater, an EtCO2 change of 10 mmHg or greater, or a loss of waveform.  (demi... **AND** For PCA discontinuation: reassess patient and document within a minimum of 1 hour (refer to Patient Controlled Analgesia Policy)  •  ondansetron ODT **OR** ondansetron  •  oxyCODONE  •  oxyCODONE    CBC Results       01/09/22 01/08/22 01/07/22     0917 0820 0505    WBC 7.49 8.18 11.09    RBC 4.48 4.08 4.12    HGB 13.1 11.9 12.3    HCT 39.4 36.1 35.9    MCV 87.9 88.5 87.1    MCH 29.2 29.2 29.9    MCHC 33.2 33.0 34.3     151 169        BMP Results       01/09/22 01/08/22 01/07/22     0917 0820 0505     138 138    K 3.7 3.9 4.6    Cl 95 101 103    CO2 32 28 24    Glucose 110 91 148    BUN 10 19 15    Creatinine 0.9 0.9 0.9    Calcium 9.1 8.8 9.0    Anion Gap 10 9 11    EGFR >60.0 >60.0 >60.0        Results from last 7 days   Lab Units 01/03/22  1303   INR  1.3   PTT sec 43*       Microbiology Results     Procedure Component Value Units Date/Time    COVID-19 PAT/Pre-procedural [[id number]]  (Normal) Collected: 01/03/22 1303    Specimen: Nasopharyngeal Swab from Nasopharynx Updated: 01/03/22 6844    Narrative:      The following orders were created for panel order COVID-19 PAT/Pre-procedural.  Procedure                               Abnormality         Status                      ---------                               -----------         ------                     SARS-CoV-2 (COVID-19), PCR[[id number]]   Normal              Final result                 Please view results for these tests on the individual orders.    MRSA/MSSA Screen Culture (Outpatient and PAT Only) [[id number]]  (Normal) Collected: 01/03/22 1303    Specimen: Nasal Swab from Nose Updated: 01/05/22 1222     Culture No Staphylococcus Aureus Recovered    SARS-CoV-2 (COVID-19), PCR [[id number]]  (Normal) Collected: 01/03/22 1303    Specimen: Nasopharyngeal Swab from Nasopharynx Updated: 01/03/22 1903     SARS-CoV-2 (COVID-19) Negative        Assessment & Plan:    In summary, Melanie Zelaya is a 71 y.o. male POD #4 s/p C1-C3 posterior fusion, ORIF C2.     -neuro exam is stable  -continue multimodal pain regimen; PCA discontinued today  -maintain rigid c-collar at all times, okay to remove collar when eating   -continue hemovac, record q shift   -keep hemovac to gravity only, no suction  -will likely d/c hemovac later today  -patient seen and examined along with Dr. Morejon  -patient will ultimately need a 2 week post operative check  -dispo: later today vs tomorrow pending pain control, drain output, and medical stability   -please call neurosurgery with questions, concerns or new symtpoms    Mariaa Davalos PA-C   [id number]

## 2022-01-10 NOTE — PROGRESS NOTES
Patient:  Melanie Zelaya  Location:  [place] [address]  MRN:  [id number]  Today's date:  1/10/2022     Pt left sitting in chair with call bell in reach.     Melanie is a 71 y.o. male admitted on 1/6/2022 with Closed nondisplaced odontoid fracture with type II morphology and nonunion, subsequent encounter [S12.112K]  History of cervical fracture [Z87.81]  H/O cervical fracture [Z87.81]  Odontoid fracture with type II morphology and delayed healing [S12.110G]. Principal problem is Cervical spine fracture (CMS/HCC).    Past Medical History  Melanie has a past medical history of Anxiety, Atrial fibrillation (CMS/HCC), Breast cancer (CMS/HCC), C1 cervical fracture (CMS/HCC), Colon polyp, COPD (chronic obstructive pulmonary disease) (CMS/HCC), Coronary artery calcification seen on CT scan, COVID-19 vaccine series completed, Depression, Diverticulosis, Diverticulosis, Fracture of pelvis (CMS/HCC) (1968), GERD (gastroesophageal reflux disease), Hearing loss, History of colon polyps, History of COVID-19 (2020), Hyperlipidemia, Hypertension, Left atrial enlargement, Lung cancer (CMS/HCC), Lung cancer (CMS/HCC), Pneumonia (2011), Seasonal allergies, and Wrist fracture.    History of Present Illness   POD 1 C1-3 PCF, C2 ORIF now in hard collar            Therapy Pain/Vitals     Row Name 01/10/22 0957          Pain/Comfort/Sleep    Pain Charting Type Pain Assessment     Preferred Pain Scale number (Numeric Rating Pain Scale)     (0-10) Pain Rating: Rest 6     (0-10) Pain Rating: Activity 6     Pain Side/Orientation right     Pain Body Location neck     Pain Management Interventions position adjusted            Vital Signs    Pulse 84     Heart Rate Source Monitor     SpO2 95 %     Patient Activity At rest     Oxygen Therapy None (Room air)     /82     BP Location Right upper arm     BP Method Automatic     Patient Position Sitting            Patient Observation    Observations pre PT/OT                    Prior Living Environment      Most Recent Value   People in Home alone   Current Living Arrangements home   Living Environment Comment 3SH, 3 VALERIA LHR, FF to bathroom then another FF to bedroom, tub shower w/ SC        Prior Level of Function      Most Recent Value   Dominant Hand right   Ambulation assistive equipment   Transferring assistive equipment   Toileting independent   Bathing independent   Dressing independent   Eating independent   Communication understands/communicates without difficulty   Prior Level of Function Comment Has RW on first floor of house that he uses intermittently, otherwise quad cane use. Lives alone so able to perform ADL's. Doesn't drive and has niece to do do food shopping. Has been mostly household ambulator   Assistive Device Currently Used at Home shower chair, cane, quad, grab bar             OT Evaluation and Treatment - 01/10/22 0959        OT Time Calculation    Start Time 0959     Stop Time 1014     Time Calculation (min) 15 min        Session Details    Document Type daily treatment/progress note     Mode of Treatment occupational therapy        General Information    Patient Profile Reviewed yes     Existing Precautions/Restrictions fall;brace worn at all times     Limitations/Impairments safety/cognitive        Cognition/Psychosocial    Affect/Mental Status (Cognition) WFL     Orientation Status (Cognition) oriented x 3     Follows Commands (Cognition) WFL     Safety Deficit (Cognition) minimal deficit;impulsivity;insight into deficits/self-awareness;judgment;problem-solving        Sit to Stand Transfer    [place], Sit to Stand Transfer minimum assist (75% or more patient effort)     Verbal Cues hand placement;technique;safety     Assistive Device walker, front-wheeled        Stand to Sit Transfer    [place], Stand to Sit Transfer minimum assist (75% or more patient effort)     Verbal Cues hand placement;technique     Assistive Device walker, front-wheeled         Toilet Transfer    [place], Toilet Transfer minimum assist (75% or more patient effort)     Verbal Cues hand placement;technique     Assistive Device walker, front-wheeled;grab bars/safety frame        Balance    Static Sitting Balance WFL     Dynamic Sitting Balance WFL     Sit to Stand Dynamic Balance mild impairment     Static Standing Balance mild impairment        Upper Body Dressing    Tasks don;hospital gown     [place] moderate assist (50-74% patient effort)     Comment limited by pain        Lower Body Dressing    Tasks don;socks     [place] maximum assist (25-49% patient effort)        Toileting    [place] perform bladder hygiene;adjust/manage clothing;perform bowel hygiene;moderate assist (50-74% patient effort)     Position supported sitting;supported standing     Adaptive Equipment commode, 3-in-1        BADL Safety/Performance    Impairments, BADL Safety/Performance balance;endurance/activity tolerance;strength;trunk/postural control     Skilled BADL Treatment/Intervention adaptive equipment training;BADL process/adaptation training;energy conservation        AM-PAC (TM) - ADL (Current Function)    Putting on and taking off regular lower body clothing? 2 - A Lot     Bathing? 2 - A Lot     Toileting? 2 - A Lot     Putting on/taking off regular upper body clothing? 2 - A Lot     How much help for taking care of personal grooming? 3 - A Little     Eating meals? 3 - A Little     AM-PAC (TM) ADL Score 14        Assessment/Plan (OT)    Daily Outcome Statement OT treatment session completed.  Pt requires assistance with ADLs and transfers 2* dec endurance, dec strength, dec balance, and increased pain. Pt demonstrated difficulty with LB ADLs 2* difficulty reaching distal LEs. cont OT services.     Rehab Potential good, to achieve stated therapy goals               OT Assessment/Plan      Most Recent Value   OT Recommended Discharge Disposition acute rehab/Inpatient Rehab Facility at  01/10/2022 0959   Anticipated Equipment Needs At Discharge (OT) commode, 3-in-1, dressing equipment at 01/07/2022 1106   Patient/Family Therapy Goal Statement to be able to walk better and dress myself at 01/07/2022 1106                         OT Goals      Most Recent Value   Bed Mobility Goal 1    Activity/Assistive Device bed mobility activities, all at 01/07/2022 1106   Jay set-up required, supervision required at 01/07/2022 1106   Time Frame by discharge at 01/07/2022 1106   Progress/Outcome goal ongoing at 01/10/2022 0959   Transfer Goal 1    Activity/Assistive Device all transfers at 01/07/2022 1106   Jay supervision required, set-up required at 01/07/2022 1106   Time Frame by discharge at 01/07/2022 1106   Progress/Outcome goal ongoing at 01/10/2022 0959   Dressing Goal 1    Activity/Adaptive Equipment dressing skills, all at 01/07/2022 1106   Jay supervision required, set-up required at 01/07/2022 1106   Time Frame by discharge at 01/07/2022 1106   Strategies/Barriers use of :LB AE at 01/07/2022 1106   Progress/Outcome goal ongoing at 01/10/2022 0959   Toileting Goal 1    Activity/Assistive Device toileting skills, all at 01/07/2022 1106   Jay supervision required at 01/07/2022 1106   Time Frame by discharge at 01/07/2022 1106   Progress/Outcome goal ongoing at 01/10/2022 0959
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
## 2022-01-10 NOTE — PATIENT CARE CONFERENCE
Care Progression Rounds Note  Date: 1/10/2022  Time: 2:59 PM     Patient Name: Melanie Zelaya     Medical Record Number: [id number]   YOB: 1950  Sex: Male      Room/Bed: 0292    Admitting Diagnosis: Closed nondisplaced odontoid fracture with type II morphology and nonunion, subsequent encounter [S12.112K]  History of cervical fracture [Z87.81]  H/O cervical fracture [Z87.81]  Odontoid fracture with type II morphology and delayed healing [S12.110G]  Closed nondisplaced odontoid fracture with type II morphology and delayed healing [S12.112G]   Admit Date/Time: 1/6/2022  5:44 AM    Primary Diagnosis: Cervical spine fracture (CMS/HCC)  Principal Problem: Cervical spine fracture (CMS/HCC)    GMLOS: 3.2  Anticipated Discharge Date: 1/11/2022    AM-PAC:  Mobility Score: 17    Discharge Planning:  Current Living Arrangements: home  Concerns to be Addressed: care coordination/care conferences,discharge planning  Anticipated Discharge Disposition: acute rehab/Inpatient Rehab Facility    Barriers to Discharge:  None,Medical issues not resolved    Comments:    Patient to d/c to [facility] tomorrow     Participants:  ,physical therapy,nursing,social work/services

## 2022-01-10 NOTE — PROGRESS NOTES
Patient: Melanie Zelaya  Location:  [place] [address]  MRN:  [id number]  Today's date:  1/10/2022     Start: Pt received sitting along edge of bed, RN agrees pt is appropriate for PT session, pt agreeable with education to participate in mobility  Finish: Patient left sitting up in chair, call bell in reach, all immediate needs met, and RN notified.    Melanie is a 71 y.o. male admitted on 1/6/2022 with Closed nondisplaced odontoid fracture with type II morphology and nonunion, subsequent encounter [S12.112K]  History of cervical fracture [Z87.81]  H/O cervical fracture [Z87.81]  Odontoid fracture with type II morphology and delayed healing [S12.110G]. Principal problem is Cervical spine fracture (CMS/HCC).    Past Medical History  Melanie has a past medical history of Anxiety, Atrial fibrillation (CMS/HCC), Breast cancer (CMS/HCC), C1 cervical fracture (CMS/HCC), Colon polyp, COPD (chronic obstructive pulmonary disease) (CMS/HCC), Coronary artery calcification seen on CT scan, COVID-19 vaccine series completed, Depression, Diverticulosis, Diverticulosis, Fracture of pelvis (CMS/HCC) (1968), GERD (gastroesophageal reflux disease), Hearing loss, History of colon polyps, History of COVID-19 (2020), Hyperlipidemia, Hypertension, Left atrial enlargement, Lung cancer (CMS/HCC), Lung cancer (CMS/HCC), Pneumonia (2011), Seasonal allergies, and Wrist fracture.    History of Present Illness   POD 1 C1-3 PCF, C2 ORIF now in hard collar        PT Vitals    Date/Time Pulse HR Source SpO2 Pt Activity O2 Therapy BP BP Location BP Method Pt Position Observations [facility]   01/10/22 0957 84 Monitor 95 % At rest None (Room air) 160/82 Right upper arm Automatic Sitting pre PT/OT AM      PT Pain    Date/Time Pain Type Side/Orientation Location Rating: Rest Rating: Activity Interventions [facility]   01/10/22 0957 Pain Assessment right neck 6 6 position adjusted AM          Prior Living Environment      Most Recent Value    People in Home alone   Current Living Arrangements home   Living Environment Comment 3SH, 3 [place]ER[place] LHR, FF to bathroom then another FF to bedroom, tub shower w/ SC        Prior Level of Function      Most Recent Value   Dominant Hand right   Ambulation assistive equipment   Transferring assistive equipment   Toileting independent   Bathing independent   Dressing independent   Eating independent   Communication understands/communicates without difficulty   Prior Level of Function Comment Has RW on first floor of house that he uses intermittently, otherwise quad cane use. Lives alone so able to perform ADL's. Doesn't drive and has niece to do do food shopping. Has been mostly household ambulator   Assistive Device Currently Used at Home shower chair, cane, quad, grab bar           PT Evaluation and Treatment - 01/10/22 0956        PT Time Calculation    Start Time 0956     Stop Time 1014     Time Calculation (min) 18 min        Session Details    Document Type daily treatment/progress note     Mode of Treatment physical therapy        General Information    Patient Profile Reviewed yes     Onset of Illness/Injury or Date of Surgery 01/06/22     Referring Physician Dr. Morejon     Patient/Family/Caregiver Comments/Observations RN aware of therapy session     General Observations of Patient Pt rec'd sitting alongside edge of bed; agreeable to PT session     Existing Precautions/Restrictions fall;brace worn at all times;other (see comments)   hard cervical collar    Limitations/Impairments safety/cognitive        Cognition/Psychosocial    Affect/Mental Status (Cognition) WFL     Orientation Status (Cognition) oriented x 3     Follows Commands (Cognition) WFL;follows three-step commands     Cognitive Function safety deficit     Safety Deficit (Cognition) minimal deficit;judgment;problem-solving     Comment, Cognition cooperative throughout session. Mildly impulsive and benefits from cuing/redirection to ensure safe  follow-through of sequencing        Bed Mobility    Comment (Bed Mobility) Pt rec'd sitting alonside edge of bed        Transfers    Transfers toilet transfer        Sit to Stand Transfer    [place], Sit to Stand Transfer minimum assist (75% or more patient effort);1 person assist;verbal cues     Verbal Cues hand placement;safety     Assistive Device walker, front-wheeled     Comment from EOB. cues for hand placement as pt reaches for RW. [place] to initiate txf.. Min posterior lean upon initial stance.        Stand to Sit Transfer    [place], Stand to Sit Transfer minimum assist (75% or more patient effort);1 person assist;verbal cues     Verbal Cues hand placement;safety     Assistive Device walker, front-wheeled     Comment to chair. Demos fair eccentric control with min verbal cues for sequencing of steps and reaching UE back        Toilet Transfer    Transfer Technique sit-stand;stand-sit     [place], Toilet Transfer touching/steadying assist;1 person assist     Verbal Cues hand placement;safety     Assistive Device grab bars/safety frame;walker, front-wheeled        Gait Training    [place], Gait minimum assist (75% or more patient effort);1 person assist;verbal cues     Assistive Device walker, front-wheeled     Distance in Feet 22 feet     Pattern (Gait) step-through     Deviations/Abnormal Patterns (Gait) base of support, narrow;gait speed decreased;step length decreased     Comment (Gait/Stairs) Ambulates x22' total with use of RW for support. Narrow [place] noted with occasional step-to sequencing. With cues able to perform step-through.        Stairs Training    [place], Stairs not tested;safety considerations     Comment deferred as pt endorsing fatigue s/p ambulation in room        Safety Issues, Functional Mobility    Safety Issues Affecting Function (Mobility) positioning of assistive device;problem-solving;sequencing abilities     Impairments Affecting Function (Mobility)  balance;endurance/activity tolerance;pain;strength        Balance    Balance Assessment sitting static balance;sitting dynamic balance;sit to stand dynamic balance;standing static balance;standing dynamic balance     Static Sitting Balance WFL;unsupported;sitting in chair     Dynamic Sitting Balance WFL;unsupported;sitting in chair     Sit to Stand Dynamic Balance mild impairment;supported     Static Standing Balance WFL;supported     Dynamic Standing Balance mild impairment;supported     Comment, Balance Benefits from use of RW. Fair static standing balance without use of device. however, noted increased base of support when attempting to reach without UE assist.        Motor Skills    Motor Skills functional endurance     Functional Endurance endorsing mild TUCKER with brief ambulation in room. Below baseline PLOF        AM-PAC (TM) - Mobility (Current Function)    Turning from your back to your side while in a flat bed without using bedrails? 3 - A Little     Moving from lying on your back to sitting on the side of a flat bed without using bedrails? 3 - A Little     Moving to and from a bed to a chair? 3 - A Little     Standing up from a chair using your arms? 3 - A Little     To walk in a hospital room? 3 - A Little     Climbing 3-5 steps with a railing? 2 - A Lot     AM-PAC (TM) Mobility Score 17        Assessment/Plan (PT)    Daily Outcome Statement PT follow-up session complete (1/10). Pt progressing well towards acute PT goals. minAx1 for sit<>stand from edge of bed with use of RW. MinAx1 for ambualtion x22' with use of RW. Noted narrow base of support, shortened step length, and incoordination with use of RW and sequencing of steps. Pt endorsing mild fatigue s/p brief ambulation and deferring further ambulation. PT to continue to follow acutely. Pt would be a good candidate for inpatient rehab as he was IND at baseline, lives alone, and presents with deficits in balance, endurance, and sequencing of safe  mobility tasks.     Rehab Potential good, to achieve stated therapy goals     Therapy Frequency 5-7 times/wk               PT Assessment/Plan      Most Recent Value   PT Recommended Discharge Disposition acute rehab/Inpatient Rehab Facility at 01/10/2022 0956   Anticipated Equipment Needs at Discharge (PT) other (see comments)  [TBD] at 01/07/2022 1105   Patient/Family Therapy Goals Statement To move more at 01/07/2022 1105                    Education Documentation  Home Safety, taught by Marsha Smith, PT at 1/10/2022 10:26 AM.  Learner: Patient  Readiness: Acceptance  Method: Explanation  Response: Verbalizes Understanding  Comment: Pt educated on safe transfer techniques, role of rehab, safety during ambulation. pt verbalizes understanding.    Assistive/Adaptive Devices, taught by aMrsha Smith PT at 1/10/2022 10:26 AM.  Learner: Patient  Readiness: Acceptance  Method: Explanation  Response: Verbalizes Understanding  Comment: Pt educated on safe transfer techniques, role of rehab, safety during ambulation. pt verbalizes understanding.          PT Goals      Most Recent Value   Bed Mobility Goal 1    Activity/Assistive Device bed mobility activities, all at 01/07/2022 1105   [place] independent at 01/07/2022 1105   Time Frame by discharge at 01/07/2022 1105   Progress/Outcome goal ongoing at 01/10/2022 0956   Transfer Goal 1    Activity/Assistive Device all transfers, walker, front-wheeled at 01/07/2022 1105   [place] modified independence at 01/07/2022 1105   Time Frame by discharge at 01/07/2022 1105   Progress/Outcome goal ongoing at 01/10/2022 0956   Gait Training Goal 1    Activity/Assistive Device gait (walking locomotion), assistive device use, decrease asymmetrical patterns, decrease fall risk, diminish gait deviation, improve balance and speed, increase endurance/gait distance, increase energy conservation, walker, front-wheeled at 01/07/2022 1105   [place] modified independence at  01/07/2022 1105   Distance 150 at 01/07/2022 1105   Time Frame by discharge at 01/07/2022 1105   Progress/Outcome goal ongoing at 01/10/2022 0956   Stairs Goal 1    Activity/Assistive Device stairs, all skills, ascending stairs, descending stairs, step-to-step, decrease fall risk, improve balance and speed, using handrail, left, using handrail, right at 01/07/2022 1105   [place] modified independence at 01/07/2022 1105   Number of Stairs 12 at 01/07/2022 1105   Time Frame by discharge at 01/07/2022 1105   Progress/Outcome goal ongoing at 01/10/2022 0956

## 2022-01-10 NOTE — HOSPITAL COURSE
71 y.o. male with a PMHx significant for atrial fibrillation, breast cancer, COPD, diverticulosis, acid reflux, history of COVID-19, hyperlipidemia, hypertension, lung cancer, history of right shoulder rotator cuff repair, and history of right-sided carpal tunnel syndrome, who fell from a ladder on 04/23/2021 associated with loss of consciousness. He was found to have fractures of the anterior and posterior arches of C1, base of the dens of C2, right scapula, and left occipital condyle.  He was placed in a hard cervical collar and has had ongoing neurosurgical and imaging follow-up.  While C1 was healing normally.  There is no appreciable osseous bridging of the fracture of the dens.  He therefore was recommended to undergo ORIF which is performed by Dr. Morejon on 01/06/2020.      Patient underwent open reduction and internal fixation of C1 fractures, C2 odontoid fracture, associated with bilateral posterior lateral arthrodesis with fusion at C1-C3 in addition to C1-2 and C2-3 laminotomies.  There are no intraoperative complications.  Neurophysiological monitoring was stable throughout the course of the decompression.  PCA was d/c'd on 1/10 am.  +BM on 1/9.    Ortho was consulted on 1/7 for R shoulder follow up.  No acute surgical intervention at this time per Ortho.  New Imaging of R shoulder/scapula unremarkable for acute injury  RUE WBAT.  Recommend follow up with a shoulder/elbow orthopedic surgeon - Dr Shine as needed.      The patient lives alone in a two-story home.  There are several steps into the house.  Daughter reports that the staircase is very narrow.  Patient does walk up a full flight of stairs to reach his bathroom on the second floor and another flight of stairs to reach his bedroom.  Prior to admission he was fully independent for mobility and ambulation.    Patient is progressing with PT/OT, however, therapy is recommending acute rehab prior to d/c home alone.  Requires Kana for functional txfers  and Shawn CAMARA for LB selfcare tasks.

## 2022-01-10 NOTE — PLAN OF CARE
Problem: Adult Inpatient Plan of Care  Goal: Plan of Care Review  Flowsheets (Taken 1/10/2022 2472)  Plan of Care Reviewed With: (Medical Team) other (see comments)  Outcome Summary: Per info during medical rounds today, patient is medically cleared for d/c to [facility] tomorrow. SW to request a 2pm p/u. SW will remain available for emotional support and disposition planning. Ivy Rodriguez, MSW [phone]

## 2022-01-10 NOTE — [facility] PHYSICIAN REVIEW NOTE
Peer to peer    Will request a peer to peer on the following 72 yo M s/p C1-C3 posterior fusion, ORIF C2.    Pain: PCA with dilaudid pump from 1/6-present (1/10)    Cervical drain and IV ancef from 1/6-present(1/10) due to output from the drain    Dispo: acute rehab    Awaiting call back.    Josseline Syed, DO

## 2022-01-11 ENCOUNTER — HOSPITAL ENCOUNTER (INPATIENT)
Facility: REHABILITATION | Age: 72
LOS: 14 days | Discharge: HOME | DRG: 565 | End: 2022-01-25
Attending: PHYSICAL MEDICINE & REHABILITATION | Admitting: PHYSICAL MEDICINE & REHABILITATION
Payer: COMMERCIAL

## 2022-01-11 VITALS
HEART RATE: 72 BPM | RESPIRATION RATE: 18 BRPM | TEMPERATURE: 97.7 F | BODY MASS INDEX: 32.62 KG/M2 | WEIGHT: 203 LBS | HEIGHT: 66 IN | DIASTOLIC BLOOD PRESSURE: 67 MMHG | OXYGEN SATURATION: 99 % | SYSTOLIC BLOOD PRESSURE: 135 MMHG

## 2022-01-11 DIAGNOSIS — F43.22 ADJUSTMENT DISORDER WITH ANXIETY: ICD-10-CM

## 2022-01-11 PROBLEM — Z98.1 S/P CERVICAL SPINAL FUSION: Status: ACTIVE | Noted: 2022-01-11

## 2022-01-11 PROCEDURE — 63700000 HC SELF-ADMINISTRABLE DRUG: Performed by: PHYSICIAN ASSISTANT

## 2022-01-11 PROCEDURE — L0172 CERV COL SR FOAM 2PC PRE OTS: HCPCS

## 2022-01-11 PROCEDURE — 99024 POSTOP FOLLOW-UP VISIT: CPT | Performed by: NEUROLOGICAL SURGERY

## 2022-01-11 PROCEDURE — 63600000 HC DRUGS/DETAIL CODE: Performed by: INTERNAL MEDICINE

## 2022-01-11 PROCEDURE — 200200 PR NO CHARGE: Performed by: NEUROLOGICAL SURGERY

## 2022-01-11 PROCEDURE — 63700000 HC SELF-ADMINISTRABLE DRUG: Performed by: INTERNAL MEDICINE

## 2022-01-11 PROCEDURE — 12800001 HC ROOM AND CARE SEMIPRIVATE REHAB-BRAIN INJ

## 2022-01-11 PROCEDURE — 63600000 HC DRUGS/DETAIL CODE: Performed by: PHYSICIAN ASSISTANT

## 2022-01-11 RX ORDER — CETIRIZINE HYDROCHLORIDE 10 MG/1
10 TABLET ORAL DAILY
Status: DISCONTINUED | OUTPATIENT
Start: 2022-01-12 | End: 2022-01-11

## 2022-01-11 RX ORDER — AMLODIPINE BESYLATE 5 MG/1
5 TABLET ORAL DAILY
Status: DISCONTINUED | OUTPATIENT
Start: 2022-01-12 | End: 2022-01-13

## 2022-01-11 RX ORDER — BISACODYL 10 MG/1
10 SUPPOSITORY RECTAL DAILY PRN
Status: DISCONTINUED | OUTPATIENT
Start: 2022-01-11 | End: 2022-01-25 | Stop reason: HOSPADM

## 2022-01-11 RX ORDER — LIDOCAINE 560 MG/1
1 PATCH PERCUTANEOUS; TOPICAL; TRANSDERMAL DAILY
Status: DISCONTINUED | OUTPATIENT
Start: 2022-01-12 | End: 2022-01-25 | Stop reason: HOSPADM

## 2022-01-11 RX ORDER — OXYCODONE HYDROCHLORIDE 5 MG/1
5-10 TABLET ORAL EVERY 4 HOURS PRN
Qty: 60 TABLET | Refills: 0 | Status: SHIPPED | OUTPATIENT
Start: 2022-01-11 | End: 2022-01-25 | Stop reason: HOSPADM

## 2022-01-11 RX ORDER — DABIGATRAN ETEXILATE 150 MG/1
150 CAPSULE ORAL 2 TIMES DAILY
Status: DISCONTINUED | OUTPATIENT
Start: 2022-01-20 | End: 2022-01-25 | Stop reason: HOSPADM

## 2022-01-11 RX ORDER — HEPARIN SODIUM 5000 [USP'U]/ML
5000 INJECTION, SOLUTION INTRAVENOUS; SUBCUTANEOUS EVERY 8 HOURS
Status: COMPLETED | OUTPATIENT
Start: 2022-01-11 | End: 2022-01-19

## 2022-01-11 RX ORDER — IBUPROFEN 200 MG
16-32 TABLET ORAL AS NEEDED
Status: DISCONTINUED | OUTPATIENT
Start: 2022-01-11 | End: 2022-01-12

## 2022-01-11 RX ORDER — FAMOTIDINE 20 MG/1
40 TABLET, FILM COATED ORAL DAILY
Status: DISCONTINUED | OUTPATIENT
Start: 2022-01-12 | End: 2022-01-12

## 2022-01-11 RX ORDER — OXYCODONE HYDROCHLORIDE 5 MG/1
5 TABLET ORAL EVERY 4 HOURS PRN
Status: DISCONTINUED | OUTPATIENT
Start: 2022-01-11 | End: 2022-01-25 | Stop reason: HOSPADM

## 2022-01-11 RX ORDER — ACETAMINOPHEN 325 MG/1
975 TABLET ORAL EVERY 6 HOURS
Status: DISCONTINUED | OUTPATIENT
Start: 2022-01-12 | End: 2022-01-12

## 2022-01-11 RX ORDER — HEPARIN SODIUM 5000 [USP'U]/ML
5000 INJECTION, SOLUTION INTRAVENOUS; SUBCUTANEOUS EVERY 8 HOURS
Status: DISCONTINUED | OUTPATIENT
Start: 2022-01-11 | End: 2022-01-11

## 2022-01-11 RX ORDER — HYDROCHLOROTHIAZIDE 12.5 MG/1
12.5 TABLET ORAL DAILY
Status: DISCONTINUED | OUTPATIENT
Start: 2022-01-12 | End: 2022-01-17

## 2022-01-11 RX ORDER — ALBUTEROL SULFATE 90 UG/1
2 INHALANT RESPIRATORY (INHALATION) EVERY 6 HOURS PRN
Status: DISCONTINUED | OUTPATIENT
Start: 2022-01-11 | End: 2022-01-25 | Stop reason: HOSPADM

## 2022-01-11 RX ORDER — OXYCODONE HYDROCHLORIDE 5 MG/1
10 TABLET ORAL EVERY 4 HOURS PRN
Status: DISCONTINUED | OUTPATIENT
Start: 2022-01-11 | End: 2022-01-25 | Stop reason: HOSPADM

## 2022-01-11 RX ORDER — CYCLOBENZAPRINE HCL 10 MG
10 TABLET ORAL EVERY 8 HOURS PRN
Qty: 60 TABLET | Refills: 0 | Status: ON HOLD | OUTPATIENT
Start: 2022-01-11 | End: 2022-01-25

## 2022-01-11 RX ORDER — CETIRIZINE HYDROCHLORIDE 5 MG/1
5 TABLET ORAL NIGHTLY
Status: DISCONTINUED | OUTPATIENT
Start: 2022-01-11 | End: 2022-01-25 | Stop reason: HOSPADM

## 2022-01-11 RX ORDER — MULTIVITAMIN
1 TABLET ORAL DAILY
Qty: 30 TABLET | Refills: 0 | Status: SHIPPED | OUTPATIENT
Start: 2022-01-11 | End: 2022-03-15 | Stop reason: ALTCHOICE

## 2022-01-11 RX ORDER — FLUTICASONE PROPIONATE 50 MCG
2 SPRAY, SUSPENSION (ML) NASAL DAILY
Status: DISCONTINUED | OUTPATIENT
Start: 2022-01-12 | End: 2022-01-25 | Stop reason: HOSPADM

## 2022-01-11 RX ORDER — DEXTROSE 40 %
15-30 GEL (GRAM) ORAL AS NEEDED
Status: DISCONTINUED | OUTPATIENT
Start: 2022-01-11 | End: 2022-01-12

## 2022-01-11 RX ORDER — ATORVASTATIN CALCIUM 40 MG/1
40 TABLET, FILM COATED ORAL
Status: DISCONTINUED | OUTPATIENT
Start: 2022-01-11 | End: 2022-01-25 | Stop reason: HOSPADM

## 2022-01-11 RX ORDER — AMOXICILLIN 250 MG
1 CAPSULE ORAL 2 TIMES DAILY
Status: DISCONTINUED | OUTPATIENT
Start: 2022-01-11 | End: 2022-01-25 | Stop reason: HOSPADM

## 2022-01-11 RX ORDER — ACETAMINOPHEN 325 MG/1
975 TABLET ORAL
Qty: 360 TABLET | Refills: 0
Start: 2022-01-11 | End: 2022-01-25 | Stop reason: HOSPADM

## 2022-01-11 RX ORDER — CYCLOBENZAPRINE HCL 10 MG
10 TABLET ORAL 3 TIMES DAILY PRN
Status: DISCONTINUED | OUTPATIENT
Start: 2022-01-11 | End: 2022-01-25 | Stop reason: HOSPADM

## 2022-01-11 RX ORDER — DOCUSATE SODIUM 100 MG/1
100 CAPSULE, LIQUID FILLED ORAL 2 TIMES DAILY
Status: DISCONTINUED | OUTPATIENT
Start: 2022-01-11 | End: 2022-01-11

## 2022-01-11 RX ORDER — POLYETHYLENE GLYCOL 3350 17 G/17G
17 POWDER, FOR SOLUTION ORAL DAILY PRN
Status: DISCONTINUED | OUTPATIENT
Start: 2022-01-12 | End: 2022-01-25 | Stop reason: HOSPADM

## 2022-01-11 RX ORDER — DABIGATRAN ETEXILATE 150 MG/1
150 CAPSULE ORAL 2 TIMES DAILY
Qty: 60 CAPSULE | Refills: 0
Start: 2022-01-20 | End: 2022-01-25 | Stop reason: HOSPADM

## 2022-01-11 RX ORDER — SENNOSIDES 8.6 MG/1
1 TABLET ORAL 2 TIMES DAILY PRN
Status: DISCONTINUED | OUTPATIENT
Start: 2022-01-11 | End: 2022-01-11

## 2022-01-11 RX ORDER — LOSARTAN POTASSIUM 100 MG/1
100 TABLET ORAL DAILY
Status: DISCONTINUED | OUTPATIENT
Start: 2022-01-12 | End: 2022-01-12

## 2022-01-11 RX ORDER — DEXTROSE 50 % IN WATER (D50W) INTRAVENOUS SYRINGE
25 AS NEEDED
Status: DISCONTINUED | OUTPATIENT
Start: 2022-01-11 | End: 2022-01-12

## 2022-01-11 RX ORDER — ESCITALOPRAM OXALATE 10 MG/1
10 TABLET ORAL DAILY
Status: DISCONTINUED | OUTPATIENT
Start: 2022-01-12 | End: 2022-01-25 | Stop reason: HOSPADM

## 2022-01-11 RX ADMIN — OXYCODONE HYDROCHLORIDE 5 MG: 5 TABLET ORAL at 20:20

## 2022-01-11 RX ADMIN — LIDOCAINE 1 PATCH: 4 PATCH TOPICAL at 08:21

## 2022-01-11 RX ADMIN — HYDROCHLOROTHIAZIDE 12.5 MG: 25 TABLET ORAL at 08:20

## 2022-01-11 RX ADMIN — POLYETHYLENE GLYCOL 3350 17 G: 17 POWDER, FOR SOLUTION ORAL at 08:21

## 2022-01-11 RX ADMIN — OXYCODONE HYDROCHLORIDE 5 MG: 5 TABLET ORAL at 22:47

## 2022-01-11 RX ADMIN — OXYCODONE HYDROCHLORIDE 5 MG: 5 TABLET ORAL at 03:16

## 2022-01-11 RX ADMIN — FAMOTIDINE 40 MG: 20 TABLET ORAL at 08:19

## 2022-01-11 RX ADMIN — CETIRIZINE HYDROCHLORIDE 5 MG: 5 TABLET ORAL at 22:47

## 2022-01-11 RX ADMIN — HEPARIN SODIUM 5000 UNITS: 5000 INJECTION, SOLUTION INTRAVENOUS; SUBCUTANEOUS at 06:39

## 2022-01-11 RX ADMIN — OXYCODONE HYDROCHLORIDE 10 MG: 5 TABLET ORAL at 15:31

## 2022-01-11 RX ADMIN — ATORVASTATIN CALCIUM 40 MG: 40 TABLET, FILM COATED ORAL at 22:49

## 2022-01-11 RX ADMIN — HEPARIN SODIUM 5000 UNITS: 5000 INJECTION, SOLUTION INTRAVENOUS; SUBCUTANEOUS at 22:49

## 2022-01-11 RX ADMIN — SENNOSIDES AND DOCUSATE SODIUM 1 TABLET: 50; 8.6 TABLET ORAL at 22:48

## 2022-01-11 RX ADMIN — ACETAMINOPHEN 975 MG: 325 TABLET, FILM COATED ORAL at 03:15

## 2022-01-11 RX ADMIN — ACETAMINOPHEN 975 MG: 325 TABLET, FILM COATED ORAL at 15:31

## 2022-01-11 RX ADMIN — ACETAMINOPHEN 975 MG: 325 TABLET, FILM COATED ORAL at 20:18

## 2022-01-11 RX ADMIN — CYCLOBENZAPRINE HYDROCHLORIDE 10 MG: 10 TABLET, FILM COATED ORAL at 06:40

## 2022-01-11 RX ADMIN — ESCITALOPRAM OXALATE 10 MG: 10 TABLET ORAL at 08:19

## 2022-01-11 RX ADMIN — CYCLOBENZAPRINE HYDROCHLORIDE 10 MG: 10 TABLET, FILM COATED ORAL at 15:31

## 2022-01-11 RX ADMIN — LOSARTAN POTASSIUM 100 MG: 100 TABLET, FILM COATED ORAL at 08:20

## 2022-01-11 RX ADMIN — AMLODIPINE BESYLATE 5 MG: 5 TABLET ORAL at 08:19

## 2022-01-11 RX ADMIN — OXYCODONE HYDROCHLORIDE 10 MG: 5 TABLET ORAL at 08:19

## 2022-01-11 RX ADMIN — HEPARIN SODIUM 5000 UNITS: 5000 INJECTION, SOLUTION INTRAVENOUS; SUBCUTANEOUS at 15:31

## 2022-01-11 RX ADMIN — DOCUSATE SODIUM AND SENNOSIDES 1 TABLET: 8.6; 5 TABLET, FILM COATED ORAL at 08:19

## 2022-01-11 ASSESSMENT — PATIENT HEALTH QUESTIONNAIRE - PHQ9: SUM OF ALL RESPONSES TO PHQ9 QUESTIONS 1 & 2: 0

## 2022-01-11 NOTE — DISCHARGE SUMMARY
Neurosurgery Inpatient Discharge Summary    BRIEF OVERVIEW  Admitting Provider:      Attending Provider: Chaparro Morejon MD Attending phys phone: [phone]  Primary Care Physician at Discharge: Keisha Schultz CRNP [phone]    Admission Date: 1/6/2022     Discharge Date: 1/11/2022    Primary Discharge Diagnosis  Cervical spine fracture (CMS/HCC)    Secondary Discharge Diagnosis  Active Hospital Problems    Diagnosis Date Noted   • Closed nondisplaced odontoid fracture with type II morphology and delayed healing 01/08/2022   • History of cervical fracture 01/06/2022   • H/O cervical fracture 01/06/2022   • Cervical spine fracture (CMS/HCC) 04/23/2021      Resolved Hospital Problems   No resolved problems to display.       DETAILS OF HOSPITAL STAY    Operative Procedures Performed  Procedure(s):  ORIF C2, Fracture, C1-3 LAMINECTOMY CERVICAL POSTERIOR FUSION INSTRUMENTATION MULTI-LEVEL    Consults:   Consult Notes 12/12/2021 to 1/11/2022         Date of Service   Author Author Type Status Note Type File Time    01/07/22 1326  Tito Rueda DO Resident Signed Consults 01/07/22 1550    01/06/22 1606  Keny Townsend MD Physician Signed Consults 01/06/22 1637    01/06/22 1606  Kristy Marin MD Physician Signed Consults 01/06/22 1623    01/03/22 1315  Sharifa Olvera MD Physician Signed Consults 01/03/22 2145          Consult Orders During Admission:  IP CONSULT TO CASE MANAGEMENT  IP CONSULT TO PHYSICAL MEDICINE REHAB  IP CONSULT TO ORTHOPEDIC SURGERY       Imaging  X-RAY CHEST 2 VIEWS    Result Date: 1/3/2022  IMPRESSION:  Blunting of the left costophrenic sulcus, similar to the prior study which may reflect pleural thickening rather than a pleural effusion. Otherwise, no dense effusion or dense consolidation.    X-RAY CERVICAL SPINE 2 OR 3 VIEWS    Result Date: 1/7/2022  IMPRESSION: Intraoperative localization for surgery    X-RAY SCAPULA RIGHT    Result Date: 1/7/2022  IMPRESSION: No acute  osseous abnormality.    X-RAY SHOULDER RIGHT 2+ VIEWS    Result Date: 1/7/2022  IMPRESSION: No acute osseous abnormality.    CT CERVICAL SPINE WITHOUT IV CONTRAST    Result Date: 1/7/2022  IMPRESSION: 1. Status post posterior spinal fusion from C1 to C3 with the dens fracture otherwise noted. Please see description above. 2. Degenerative spondylosis as described.      Imaging personally reviewed.     Presenting Problem/History of Present Illness  Closed nondisplaced odontoid fracture with type II morphology and nonunion, subsequent encounter [S12.112K]  History of cervical fracture [Z87.81]  H/O cervical fracture [Z87.81]  Odontoid fracture with type II morphology and delayed healing [S12.110G]  Closed nondisplaced odontoid fracture with type II morphology and delayed healing [S12.112G]     Melanie Zelaya is a 71 y.o. male who fell on April 2021 sustained significant head, neck trauma.  He has radiographic evidence of C1, C2 fractures.  Specifically his C2 fracture involves the odontoid process. There is mild distraction of the odontoid process from the body of C2 with posterior angulation.  He wishes to pursue surgical fixation of his fracture and [place] [facility] [place].    Hospital Course  Patient seen and examined on day of discharge.      Patient hospital course was uncomplicated. He had right shoulder, scapular pain prior to surgery which seemed to be exacerbated following surgery. He was seen by orthopedics who recommended weightbearing as tolerated and follow up with Dr. Shine as needed as an outpatient. His hemovac drain was removed on 1/11/22. He is to remain in hard cervical collar at this time but it can be removed to eat and shower.  He was discharged to [facility].     Discharge Orders  Released Discharge Orders     Order Details Provider Status    albuterol HFA (PROAIR HFA) 90 mcg/actuation inhaler Inhale 2 puffs 2 (two) times a day as needed for wheezing or shortness of breath. Vanessa Spain, YESENIA KOROMA  Resume at Discharge (Prescription)     Patient taking differently: Inhale 2 puffs as needed for wheezing or shortness of breath.        alprazolam (XANAX ORAL) Take 1 tablet by mouth as needed. Vanessa Spain PA C Resume at Discharge (Patient Reported)    amLODIPine 5 mg tablet Take 5 mg by mouth daily.   Vanessa Spain PA C Resume at Discharge (Patient Reported)    atorvastatin 40 mg tablet Take 1 tablet (40 mg total) by mouth once daily. Vanessa Spain PA C Resume at Discharge (Prescription)     Patient taking differently: Take 40 mg by mouth nightly.        cimetidine (TAGAMET HB) 200 mg tablet Take 200 mg by mouth as needed. Vanessa Spain PA C Resume at Discharge (Patient Reported)    escitalopram (LEXAPRO) 10 mg tablet Take 1 tablet (10 mg total) by mouth daily. Vanessa Spain PA C Resume at Discharge (Prescription)    hydrochlorothiazide (HYDRODIURIL) 12.5 mg tablet Take 12.5 mg by mouth daily. Vanessa Spain PA C Resume at Discharge (Patient Reported)    irbesartan (AVAPRO) 300 mg tablet Take 300 mg by mouth daily.   Vanessa Spain PA C Resume at Discharge (Patient Reported)    loratadine (CLARITIN) 10 mg tablet Take 1 tablet (10 mg total) by mouth daily. Vanessa Spain PA C Resume at Discharge (Prescription)     Patient taking differently: Take 10 mg by mouth daily as needed.        sennosides-docusate sodium (SENOKOT-S) 8.6-50 mg Take 1 tablet by mouth 2 (two) times a day. Vanessa Spain PA C Resume at Discharge (Prescription)     Patient taking differently: Take 1 tablet by mouth as needed.        umeclidinium-vilanteroL (ANORO ELLIPTA) 62.5-25 mcg/actuation blister with device Inhale 1 puff daily.   Vanessa Spain PA C Resume at Discharge (Patient Reported)    acetaminophen 325 mg tablet Take 3 tablets (975 mg total) by mouth every 6 (six) hours. Vanessa Spain PA C Prescribed    albuterol nebulizer solution 2.5 mg 2.5 mg, nebulization, Every 4 hours PRN, wheezing, shortness of breath,  Starting on Thu 1/6/22 at 1338 YoVanessa ureña PA C Do Not Order at Discharge    ALPRAZolam (XANAX) tablet 0.5 mg 0.5 mg, oral, 3 times daily PRN, anxiety, Starting on Thu 1/6/22 at 1338 YoVanessa ureña PA C Do Not Order at Discharge    alum-mag hydroxide-simeth (MAALOX) 200-200-20 mg/5 mL suspension 30 mL 30 mL, oral, Every 4 hours PRN, indigestion, Starting on Thu 1/6/22 at 1302** Shake well before administration ** Vanessa Spain PA C Do Not Order at Discharge    amLODIPine (NORVASC) tablet 5 mg 5 mg, oral, Daily, First dose on Thu 1/6/22 at 1400 Vanessa Spain PA C Do Not Order at Discharge    atorvastatin (LIPITOR) tablet 40 mg 40 mg, oral, Nightly, First dose on Thu 1/6/22 at 2200 YoVanessa ureña PA C Do Not Order at Discharge    bisacodyL (DULCOLAX) 10 mg suppository 10 mg 10 mg, rectal, Daily PRN, constipation, Starting on Thu 1/6/22 at 1302If unable to take oral medications Vanessa Spain PA C Do Not Order at Discharge    bisacodyL (DULCOLAX) 10 mg suppository 10 mg 10 mg, rectal, Daily PRN, constipation, Starting on Sat 1/8/22 at 1053 Vanessa Spain PA C Do Not Order at Discharge    cyclobenzaprine 10 mg tablet Take 1 tablet (10 mg total) by mouth every 8 (eight) hours as needed for muscle spasms for up to 20 days. Vanessa Spain PA C Prescribed    dabigatran etexilate 150 mg capsu Take 1 capsule (150 mg total) by mouth 2 (two) times a day. Hold for 2 weeks from surgery performed on 1/6/22 Vanessa Spain PA C Prescribed    dextrose in water injection 12.5 g 12.5 g (25 mL), intravenous, As needed, low blood sugar, Blood Glucose less than or equal to 70, Starting on Thu 1/6/22 at 1257* If NPO or unable to swallow and has IV access *  Repeat POC blood glucose 15 minutes after treatment. •  Fifteen (15) minutes after any hypoglycemia treatments, repeat blood glucose with the unit meter. If the repeat blood glucose level is above 70mg/dL, repeat the blood glucose level in one hour if the patient has not  had a meal. If the patient has eaten, follow the standard protocol to perform POC testing as ordered. •  If the repeat blood glucose level is less than 70mg/dL, repeat the hypoglycemia treatment. Perform blood glucose again in 15 minutes after retreating. Repeat treatment for a third time as needed and notify physician immediately. •  Notify the physician of the clinical situation and document all interventions. Vanessa Spain PA C Do Not Order at Discharge    escitalopram (LEXAPRO) tablet 10 mg 10 mg, oral, Daily, First dose on Fri 1/7/22 at 0900 Vanessa Spain PA C Do Not Order at Discharge    famotidine (PEPCID) tablet 40 mg 40 mg, oral, Daily, First dose on Thu 1/6/22 at 1400Indications: gastroesophageal reflux disease Vanessa Spain PA C Do Not Order at Discharge    fluticasone propionate (FLONASE) 50 mcg/actuation nasal spray 2 spray 2 spray, Each Nostril, Daily PRN, rhinitis, allergies, Starting on Thu 1/6/22 at 1253** Shake well before administration ** Vanessa Spain PA C Do Not Order at Discharge    fluticasone propionate (FLONASE) 50 mcg/actuation nasal spray Administer 2 sprays into each nostril daily. Vanessa Spain PA C Resume at Discharge (Prescription)     Patient taking differently: Administer 2 sprays into each nostril daily as needed.        heparin (porcine) 5,000 unit/mL injection 5,000 Units 5,000 Units, subcutaneous, Every 8 hours, First dose on Fri 1/7/22 at 1400Begin 24 hours post op AVOID injections sites adjacent to SURGICAL SITES. Vanessa Spain PA C Do Not Order at Discharge    hydrochlorothiazide (HYDRODIURIL) tablet 12.5 mg 12.5 mg, oral, Daily, First dose on Thu 1/6/22 at 1400 Vanessa Spain PA C Do Not Order at Discharge    lidocaine (ASPERCREME) 4 % topical patch 1 patch 1 patch, Topical, Administer over 12 Hours, Daily, First dose on Mon 1/10/22 at 0900Apply to area near not on incision Vanessa Spain PA C Do Not Order at Discharge    losartan (COZAAR) tablet 100 mg 100 mg,  oral, Daily, First dose on Thu 1/6/22 at 1400 Vanessa Spain PA C Do Not Order at Discharge    multivitamin tablet Take 1 tablet by mouth daily. Hold for 2 weeks from surgery performed on 1/6/22 Vanessa Spain PA C Modify at Discharge    ondansetron (ZOFRAN) injection 4 mg 4 mg, intravenous, Every 8 hours PRN, nausea, vomiting, Nausea/Vomiting, Starting on Thu 1/6/22 at 1302If unable to take orally. Vanessa Spain PA C Do Not Order at Discharge    ondansetron ODT (ZOFRAN-ODT) disintegrating tablet 4 mg 4 mg, oral, Every 8 hours PRN, nausea, vomiting, Nausea/Vomiting, Starting on Thu 1/6/22 at 1302 Vanessa Spain PA C Do Not Order at Discharge    oxyCODONE (ROXICODONE) immediate release tablet 10 mg 10 mg, oral, Every 4 hours PRN, pain, Moderate to severe pain, Starting on Mon 1/10/22 at 0757 Vanessa Spain PA C Do Not Order at Discharge    oxyCODONE 5 mg immediate release tablet Take 1-2 tablets (5-10 mg total) by mouth every 4 (four) hours as needed for moderate pain for up to 5 days. Vanessa Spain PA C Prescribed    polyethylene glycol (MIRALAX) 17 gram packet 17 g 17 g, oral, Daily, First dose on Thu 1/6/22 at 1400Hold for diarrhea. Vanessa Spain PA C Do Not Order at Discharge    sennosides-docusate sodium (SENOKOT-S) 8.6-50 mg per tablet 1 tablet 1 tablet, oral, 2 times daily, First dose on Thu 1/6/22 at 2000 Vanessa Spain PA C Do Not Order at Discharge    umeclidinium-vilanteroL (ANORO ELLIPTA) 62.5-25 mcg/actuation inhaler 1 puff 1 puff, inhalation, Daily, First dose on Thu 1/6/22 at 1400** FOR ORAL INHALATION ONLY. NOT TO BE SWALLOWED ** Reason for Non-Formulary Request: Other (specify)Explanatory Comment: Patient to take his own medicationDrug Name: Anoro Ellipta 62.5-25mcg/actuation blistser with deviceForm: aerosolIndication for Therapy: Sleep apnea/PulmonaryLength of Therapy: IndefiniteProvider contact number: [phone] Vanessa Spain PA C Do Not Order at Discharge    Activity:  As Tolerated  Routine, Clinic Performed Vanessa Spain PA C New at Discharge    Activity:  Per Rehab Recommendations Routine, Clinic Performed Vanessa Spain PA C New at Discharge    Discharge diet Routine, Normal Vanessa Spain PA C New at Discharge    Remove dressing Routine Vanessa Spain PA C New at Discharge          Outpatient Follow-Up            In 2 weeks NEUROSURGERY, [place] [facility]surgery at [facility]    In 9 months Blaise Guallpa MD [facility] Thoracic Surgery at [facility]        Referrals:  No orders of the defined types were placed in this encounter.      Active Issues Requiring Follow-up  Issue: Staple removal   What is Needed: 2 week post op staple removal    Hold pradaxa and vitamins for 2 weeks post op, may restart on 1/20/22      Test Results Pending at Discharge  Unresulted Labs (From admission, onward)            None              Discharge Disposition     [facility]    Code Status at Discharge: Full Code

## 2022-01-11 NOTE — PLAN OF CARE
Problem: Adult Inpatient Plan of Care  Goal: Plan of Care Review  Flowsheets (Taken 1/11/2022 1156)  Plan of Care Reviewed With: (Medical Team)   other (see comments)   son  Outcome Summary: Per info during medical rounds today, patient is medically cleared for d/c to [facility] today at 5pm via [facility]. SW made patient family and medical team aware. SW will remain available for emotional support and disposition planning. Ivy Rodriguez, MSW [phone]

## 2022-01-11 NOTE — PROGRESS NOTES
Neurosurgery Daily Progress Note    Subjective/Objective:     No overnight events.  Patient continues with posterior cervical and right shoulder pain. Denies any new radiating pain, numbness, or weakness.     Temp:  [36.3 °C (97.4 °F)-36.4 °C (97.5 °F)] 36.3 °C (97.4 °F)  Heart Rate:  [72-84] 80  Resp:  [18] 18  BP: (135-181)/(72-83) 181/83    Intake/Output Summary (Last 24 hours) at 1/11/2022 0953  Last data filed at 1/11/2022 0100  Gross per 24 hour   Intake 240 ml   Output 530 ml   Net -290 ml     Drain: 30mL    General: sitting up in bed in no acute distress  Neuro: A&O x 3, interacting appr with fluent speech  Cn: EOMI, no facial asymmetry, tongue midline  Motor: There is no pronator drift.  Muscle bulk and tone are normal.     Deltoid Biceps Triceps Wrist ext Hand  Finger Spread Hip flexion Knee ext Dorsi-  flexion EHL Plantar Flexion   L 4+ PL 5 5 5 5 5 5 5 5 5 5   R 4+ PL 4+PL 4+PL 5 5 5 5 5 5 5 5   Sensation: Intact and equal to light touch x all 4 extremeties  Skin: Incision site clean, dry, and intact        Scheduled Meds:  • acetaminophen  975 mg oral q6h INT   • amLODIPine  5 mg oral Daily   • atorvastatin  40 mg oral Nightly   • escitalopram  10 mg oral Daily   • famotidine  40 mg oral Daily   • heparin (porcine)  5,000 Units subcutaneous q8h MARIAN   • hydrochlorothiazide  12.5 mg oral Daily   • lidocaine  1 patch Topical Daily   • losartan  100 mg oral Daily   • polyethylene glycol  17 g oral Daily   • sennosides-docusate sodium  1 tablet oral BID   • umeclidinium-vilanteroL  1 puff inhalation Daily     Continuous Infusions:  PRN Meds:.albuterol  •  ALPRAZolam  •  alum-mag hydroxide-simeth  •  bisacodyL  •  bisacodyL  •  cyclobenzaprine  •  [DISCONTINUED] glucose **OR** [DISCONTINUED] dextrose **OR** [DISCONTINUED] glucagon **OR** dextrose in water  •  fluticasone propionate  •  ondansetron ODT **OR** ondansetron  •  oxyCODONE  •  oxyCODONE    CBC Results       01/09/22 01/08/22 01/07/22     0917  0820 0505    WBC 7.49 8.18 11.09    RBC 4.48 4.08 4.12    HGB 13.1 11.9 12.3    HCT 39.4 36.1 35.9    MCV 87.9 88.5 87.1    MCH 29.2 29.2 29.9    MCHC 33.2 33.0 34.3     151 169        BMP Results       01/09/22 01/08/22 01/07/22     0917 0820 0505     138 138    K 3.7 3.9 4.6    Cl 95 101 103    CO2 32 28 24    Glucose 110 91 148    BUN 10 19 15    Creatinine 0.9 0.9 0.9    Calcium 9.1 8.8 9.0    Anion Gap 10 9 11    EGFR >60.0 >60.0 >60.0              Laboratory results were personally reviewed.    Imaging:    No interval imaging      Procedure:  Hemovac drain removed, tip visualized, no active bleeding or drainage. Drain site secured with steri strips.     Assessment/Plan:    In summary, Melanie Zelaya is a 71 y.o. male POD #5 s/p C1-C3 posterior fusion, ORIF C2.     -neuro exam is stable  -continue multimodal pain regimen  -maintain rigid c-collar at all times, okay to remove collar when eating   -hemovac drain discontinued today 1/11/22  -patient will ultimately need a 2 week post operative check  -patient to be discharged to [facility] when there is a bed available  -please call neurosurgery with questions, concerns or new symtpoms

## 2022-01-11 NOTE — PATIENT CARE CONFERENCE
Care Progression Rounds Note  Date: 1/11/2022  Time: 12:01 PM     Patient Name: Melanie Zelaya     Medical Record Number: [id number]   YOB: 1950  Sex: Male      Room/Bed: 0292    Admitting Diagnosis: Closed nondisplaced odontoid fracture with type II morphology and nonunion, subsequent encounter [S12.112K]  History of cervical fracture [Z87.81]  H/O cervical fracture [Z87.81]  Odontoid fracture with type II morphology and delayed healing [S12.110G]  Closed nondisplaced odontoid fracture with type II morphology and delayed healing [S12.112G]   Admit Date/Time: 1/6/2022  5:44 AM    Primary Diagnosis: Cervical spine fracture (CMS/HCC)  Principal Problem: Cervical spine fracture (CMS/HCC)    GMLOS: 3.2  Anticipated Discharge Date: 1/11/2022    AM-PAC:  Mobility Score: 17    Discharge Planning:  Current Living Arrangements: home  Concerns to be Addressed: care coordination/care conferences,discharge planning  Anticipated Discharge Disposition: acute rehab/Inpatient Rehab Facility    Barriers to Discharge:  None    Comments:    Patient to d/c to [facility] today at 5pm    Participants:  physical therapy,nursing,social work/services,occupational therapy,physician

## 2022-01-12 ENCOUNTER — APPOINTMENT (INPATIENT)
Dept: OCCUPATIONAL THERAPY | Facility: REHABILITATION | Age: 72
DRG: 565 | End: 2022-01-12
Payer: COMMERCIAL

## 2022-01-12 ENCOUNTER — APPOINTMENT (INPATIENT)
Dept: PHYSICAL THERAPY | Facility: REHABILITATION | Age: 72
DRG: 565 | End: 2022-01-12
Payer: COMMERCIAL

## 2022-01-12 ENCOUNTER — APPOINTMENT (OUTPATIENT)
Dept: PSYCHOLOGY | Facility: CLINIC | Age: 72
End: 2022-01-12
Payer: COMMERCIAL

## 2022-01-12 LAB
ANION GAP SERPL CALC-SCNC: 9 MEQ/L (ref 3–15)
BILIRUB UR QL STRIP.AUTO: NEGATIVE MG/DL
BUN SERPL-MCNC: 15 MG/DL (ref 8–20)
CALCIUM SERPL-MCNC: 9.2 MG/DL (ref 8.9–10.3)
CHLORIDE SERPL-SCNC: 98 MEQ/L (ref 98–109)
CLARITY UR REFRACT.AUTO: CLEAR
CO2 SERPL-SCNC: 31 MEQ/L (ref 22–32)
COLOR UR AUTO: YELLOW
CREAT SERPL-MCNC: 0.8 MG/DL (ref 0.8–1.3)
ERYTHROCYTE [DISTWIDTH] IN BLOOD BY AUTOMATED COUNT: 13.9 % (ref 11.6–14.4)
GFR SERPL CREATININE-BSD FRML MDRD: >60 ML/MIN/1.73M*2
GLUCOSE SERPL-MCNC: 102 MG/DL (ref 70–99)
GLUCOSE UR STRIP.AUTO-MCNC: ABNORMAL MG/DL
HCT VFR BLDCO AUTO: 39.2 % (ref 40.1–51)
HGB BLD-MCNC: 13 G/DL (ref 13.7–17.5)
HGB UR QL STRIP.AUTO: NEGATIVE
KETONES UR STRIP.AUTO-MCNC: NEGATIVE MG/DL
LEUKOCYTE ESTERASE UR QL STRIP.AUTO: NEGATIVE
MCH RBC QN AUTO: 29.5 PG (ref 28–33.2)
MCHC RBC AUTO-ENTMCNC: 33.2 G/DL (ref 32.2–36.5)
MCV RBC AUTO: 88.9 FL (ref 83–98)
NITRITE UR QL STRIP.AUTO: NEGATIVE
PDW BLD AUTO: 9.9 FL (ref 9.4–12.4)
PH UR STRIP.AUTO: 7.5 [PH]
PLATELET # BLD AUTO: 205 K/UL (ref 150–350)
POTASSIUM SERPL-SCNC: 4.4 MEQ/L (ref 3.6–5.1)
PROT UR QL STRIP.AUTO: NEGATIVE
RBC # BLD AUTO: 4.41 M/UL (ref 4.5–5.8)
SODIUM SERPL-SCNC: 138 MEQ/L (ref 136–144)
SP GR UR REFRACT.AUTO: 1.02
UROBILINOGEN UR STRIP-ACNC: 0.2 EU/DL
WBC # BLD AUTO: 5.78 K/UL (ref 3.8–10.5)

## 2022-01-12 PROCEDURE — 80048 BASIC METABOLIC PNL TOTAL CA: CPT | Performed by: INTERNAL MEDICINE

## 2022-01-12 PROCEDURE — 97535 SELF CARE MNGMENT TRAINING: CPT | Mod: GO

## 2022-01-12 PROCEDURE — 63600000 HC DRUGS/DETAIL CODE: Performed by: INTERNAL MEDICINE

## 2022-01-12 PROCEDURE — 36415 COLL VENOUS BLD VENIPUNCTURE: CPT | Performed by: INTERNAL MEDICINE

## 2022-01-12 PROCEDURE — 97163 PT EVAL HIGH COMPLEX 45 MIN: CPT

## 2022-01-12 PROCEDURE — 63700000 HC SELF-ADMINISTRABLE DRUG: Performed by: INTERNAL MEDICINE

## 2022-01-12 PROCEDURE — 81003 URINALYSIS AUTO W/O SCOPE: CPT | Performed by: INTERNAL MEDICINE

## 2022-01-12 PROCEDURE — 97167 OT EVAL HIGH COMPLEX 60 MIN: CPT | Mod: GO

## 2022-01-12 PROCEDURE — 90791 PSYCH DIAGNOSTIC EVALUATION: CPT | Performed by: PSYCHOLOGIST

## 2022-01-12 PROCEDURE — L0172 CERV COL SR FOAM 2PC PRE OTS: HCPCS

## 2022-01-12 PROCEDURE — 200200 PR NO CHARGE: Performed by: NEUROLOGICAL SURGERY

## 2022-01-12 PROCEDURE — 85027 COMPLETE CBC AUTOMATED: CPT | Performed by: INTERNAL MEDICINE

## 2022-01-12 PROCEDURE — 12800001 HC ROOM AND CARE SEMIPRIVATE REHAB-BRAIN INJ

## 2022-01-12 RX ORDER — GUAIFENESIN 600 MG/1
600 TABLET, EXTENDED RELEASE ORAL 2 TIMES DAILY
Status: DISCONTINUED | OUTPATIENT
Start: 2022-01-12 | End: 2022-01-25 | Stop reason: HOSPADM

## 2022-01-12 RX ORDER — ACETAMINOPHEN 325 MG/1
975 TABLET ORAL EVERY 8 HOURS
Status: DISCONTINUED | OUTPATIENT
Start: 2022-01-12 | End: 2022-01-25 | Stop reason: HOSPADM

## 2022-01-12 RX ORDER — FAMOTIDINE 20 MG/1
20 TABLET, FILM COATED ORAL 2 TIMES DAILY
Status: DISCONTINUED | OUTPATIENT
Start: 2022-01-13 | End: 2022-01-25 | Stop reason: HOSPADM

## 2022-01-12 RX ORDER — LOSARTAN POTASSIUM 100 MG/1
100 TABLET ORAL
Status: DISCONTINUED | OUTPATIENT
Start: 2022-01-13 | End: 2022-01-25 | Stop reason: HOSPADM

## 2022-01-12 RX ADMIN — LIDOCAINE 1 PATCH: 246 PATCH TOPICAL at 08:15

## 2022-01-12 RX ADMIN — LOSARTAN POTASSIUM 100 MG: 100 TABLET, FILM COATED ORAL at 08:15

## 2022-01-12 RX ADMIN — OXYCODONE HYDROCHLORIDE 10 MG: 5 TABLET ORAL at 08:35

## 2022-01-12 RX ADMIN — OXYCODONE HYDROCHLORIDE 10 MG: 5 TABLET ORAL at 15:06

## 2022-01-12 RX ADMIN — GUAIFENESIN 600 MG: 600 TABLET, EXTENDED RELEASE ORAL at 14:01

## 2022-01-12 RX ADMIN — ATORVASTATIN CALCIUM 40 MG: 40 TABLET, FILM COATED ORAL at 17:13

## 2022-01-12 RX ADMIN — HEPARIN SODIUM 5000 UNITS: 5000 INJECTION, SOLUTION INTRAVENOUS; SUBCUTANEOUS at 06:00

## 2022-01-12 RX ADMIN — AMLODIPINE BESYLATE 5 MG: 5 TABLET ORAL at 08:15

## 2022-01-12 RX ADMIN — ACETAMINOPHEN 975 MG: 325 TABLET, FILM COATED ORAL at 00:30

## 2022-01-12 RX ADMIN — ACETAMINOPHEN 975 MG: 325 TABLET, FILM COATED ORAL at 11:29

## 2022-01-12 RX ADMIN — OXYCODONE HYDROCHLORIDE 10 MG: 5 TABLET ORAL at 21:07

## 2022-01-12 RX ADMIN — ACETAMINOPHEN 975 MG: 325 TABLET, FILM COATED ORAL at 21:04

## 2022-01-12 RX ADMIN — CETIRIZINE HYDROCHLORIDE 5 MG: 5 TABLET ORAL at 21:05

## 2022-01-12 RX ADMIN — FAMOTIDINE 40 MG: 20 TABLET ORAL at 08:14

## 2022-01-12 RX ADMIN — GUAIFENESIN 600 MG: 600 TABLET, EXTENDED RELEASE ORAL at 21:05

## 2022-01-12 RX ADMIN — HEPARIN SODIUM 5000 UNITS: 5000 INJECTION, SOLUTION INTRAVENOUS; SUBCUTANEOUS at 14:02

## 2022-01-12 RX ADMIN — SENNOSIDES AND DOCUSATE SODIUM 1 TABLET: 50; 8.6 TABLET ORAL at 08:15

## 2022-01-12 RX ADMIN — FLUTICASONE PROPIONATE 2 SPRAY: 50 SPRAY, METERED NASAL at 08:18

## 2022-01-12 RX ADMIN — ESCITALOPRAM OXALATE 10 MG: 10 TABLET, FILM COATED ORAL at 08:15

## 2022-01-12 RX ADMIN — HYDROCHLOROTHIAZIDE 12.5 MG: 12.5 TABLET ORAL at 08:15

## 2022-01-12 RX ADMIN — SENNOSIDES AND DOCUSATE SODIUM 1 TABLET: 50; 8.6 TABLET ORAL at 21:05

## 2022-01-12 RX ADMIN — HEPARIN SODIUM 5000 UNITS: 5000 INJECTION, SOLUTION INTRAVENOUS; SUBCUTANEOUS at 21:04

## 2022-01-12 RX ADMIN — ACETAMINOPHEN 975 MG: 325 TABLET, FILM COATED ORAL at 06:00

## 2022-01-12 SDOH — ECONOMIC STABILITY: FOOD INSECURITY: WITHIN THE PAST 12 MONTHS, THE FOOD YOU BOUGHT JUST DIDN'T LAST AND YOU DIDN'T HAVE MONEY TO GET MORE.: NEVER TRUE

## 2022-01-12 SDOH — ECONOMIC STABILITY: FOOD INSECURITY: WITHIN THE PAST 12 MONTHS, YOU WORRIED THAT YOUR FOOD WOULD RUN OUT BEFORE YOU GOT MONEY TO BUY MORE.: NEVER TRUE

## 2022-01-12 ASSESSMENT — COGNITIVE AND FUNCTIONAL STATUS - GENERAL
THOUGHT_PROCESS: WNL
AFFECT: WFL
RECENT MEMORY: WNL
PERCEPTUAL FUNCTION: PAIN
SPEECH: REGULAR;OTHER:
EYE_CONTACT: WNL
DELUSIONS: NONE OR AGE APPROPRIATE
ATTENTION: WNL
PSYCHOMOTOR FUNCTIONING: WNL
SLEEP_WAKE_CYCLE: DECREASED
AFFECT: FULL RANGE
LIBIDO: NON-CONTRIBUTORY
EST. PREMORBID INTELLIGENCE: AVERAGE
CONCENTRATION: WNL
APPETITE: NO CHANGE
ORIENTATION: FULLY ORIENTED
APPEARANCE: WELL GROOMED
IMPULSE CONTROL: INTACT
MOOD: MOTIVATED;ANXIOUS
REMOTE MEMORY: WNL
AROUSAL LEVEL: ATTENTIVE
THOUGHT_CONTENT: APPROPRIATE
INSIGHT: INTACT
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
## 2022-01-12 NOTE — PROGRESS NOTES
Patient: Melanie Zelaya  Location: [facility] [address]  MRN: [id number]  Today's date: 1/12/2022    History of Present Illness  Melanie is a 71 y.o. male admitted on 1/11/2022 with S/P cervical spinal fusion [Z98.1]. Principal problem is S/P cervical spinal fusion.      Past Medical History  Melanie has a past medical history of Anxiety, Atrial fibrillation (CMS/HCC), Breast cancer (CMS/HCC), C1 cervical fracture (CMS/HCC), Colon polyp, COPD (chronic obstructive pulmonary disease) (CMS/HCC), Coronary artery calcification seen on CT scan, COVID-19 vaccine series completed, Depression, Diverticulosis, Diverticulosis, Fracture of pelvis (CMS/HCC) (1968), GERD (gastroesophageal reflux disease), Hearing loss, History of colon polyps, History of COVID-19 (2020), Hyperlipidemia, Hypertension, Left atrial enlargement, Lung cancer (CMS/HCC), Lung cancer (CMS/HCC), Pneumonia (2011), Seasonal allergies, and Wrist fracture.      PT Vitals    Date/Time Pulse HR Source BP BP Location BP Method Pt Position [facility]   01/12/22 1047 85 Monitor 157/83 Left upper arm Automatic Sitting DT   01/12/22 1146 -- Monitor 143/72 Right upper arm Automatic Sitting DT      PT Pain    Date/Time Pain Type Location Rating: Activity Interventions [facility]   01/12/22 1047 Pain Assessment neck 7 premedicated for activity DT   01/12/22 1129 Pain Assessment neck -- -- HJS   01/12/22 1146 Pain Assessment neck 7 premedicated for activity DT          Prior Living Environment      Most Recent Value   People in Home alone   Current Living Arrangements home   Home Accessibility not wheelchair accessible   Number of Stairs, Main Entrance 4   Surface of Stairs, Main Entrance concrete   Stair Railings, Main Entrance railing on left side (ascending)   Landing, Stairs, Main Entrance adequate turning radius   Number of Stairs, Second Entrance other (see comments)  [back door not utilized]   Location, Kitchen first (main) floor   [place], Kitchen tile  floor   Location, Laundry Room first (main) floor   [place], Laundry Room concrete floor   Laundry Room Access not wheelchair accessible   Laundry Room Access Comment down in basement,  with full flight and L hand rail   Location, Patient Bedroom other (see comments)  [Third Floor]   [place], Patient Bedroom carpeted floor   Location, Bathroom second floor, must negotiate stairs to access   [place], Bathroom tile floor   Bathroom Access not wheelchair accessible   Number of Stairs, Within Home, Primary 12   Surface of Stairs, Within Home, Primary carpeting   Stair Railings, Within Home, Primary railings on both sides of stairs   Stairs Comment, Within Home, Primary to second floor for bathroom   Stairs, Within Home, Secondary 12   Surface of Stairs, Within Home, Secondary carpeting   Stair Railings, Within Home, Secondary railing on left side (ascending)   Stairs Comment, Within Home, Secondary to third floor for bed room          Prior Level of Function      Most Recent Value   Dominant Hand right   Ambulation assistive equipment   Transferring assistive equipment   Toileting independent   Bathing independent   Dressing assistive equipment  [shoe horn]   Eating independent   Prior Level of Function Comment Pt reports prior use of cane in home as needed for ambulation and use of a shower chair for bathing. Pt reports was completing BADLs independently.           IRF PT Evaluation and Treatment - 01/12/22 0831        PT Time Calculation    Start Time 1030     Stop Time 1200     Time Calculation (min) 90 min        Session Details    Document Type initial evaluation     Mode of Treatment individual therapy;physical therapy        General Information    Patient Profile Reviewed yes     General Observations of Patient received in therapy gym, willing to participate     Existing Precautions/Restrictions fall;brace worn at all times;spinal;cardiac        Weight-bearing Status    Right UE Weight-Bearing Status  weight-bearing as tolerated (WBAT)        Living Environment    Primary Care Provided by self        Cognition/Psychosocial    Comment, Cognition alert and oriented to person, place, time and reason for stay in hospital        Sensory Assessment (Somatosensory)    Left LE Sensory Assessment general sensation;light touch awareness;light touch localization;proprioception;intact   L3-S2 intact with proprioception intact at great toe    Right LE Sensory Assessment general sensation;light touch awareness;light touch localization;proprioception;intact   L3-S2 intact with proprioception intact at great toe       Range of Motion (ROM)    Left Lower Extremity (ROM) left LE ROM is WFL     Right Lower Extremity (ROM) right LE ROM is WFL        Strength (Manual Muscle Testing)    Hip, Left (Strength) flexion 4-, extension 4-, abd 4-, add 4-, IR/ER 4-     Knee, Left (Strength) flexion 4-, extension 4-     Ankle, Left (Strength) DF 4-, PF 4-, Inv 4-, Ev 4-     Hip, Right (Strength) flexion 4-, extension 4-, abd 4-, add 4-, IR/ER 4-     Knee, Right (Strength) flexion 4-, extension 4-     Ankle, Right (Strength) DF 4-, PF 4-, Inv 4-, Ev 4-        Bed Mobility    [place], Roll Left touching/steadying assist     [place], Roll Right touching/steadying assist     [place], Supine to Sit minimum assist (75% or more patient effort)     Verbal Cues (Supine to Sit) preparatory posture     [place], Sit to Supine minimum assist (75% or more patient effort)     Assistive Device bed rails     Comment (Bed Mobility) touching assist for rolling L and R for maintaining precautions and Maco for steadying the BLE        Transfers    Transfers stand pivot transfer        Bed to Chair Transfer    [place], Bed to Chair minimum assist (75% or more patient effort)     Verbal Cues hand placement;technique;safety     Assistive Device other (see comments);walker, front-wheeled   Gait belt and RW    Comment Maco for steadying at  pelvis with SPT        Chair to Bed Transfer    [place], Chair to Bed minimum assist (75% or more patient effort)     Verbal Cues hand placement;technique;safety     Assistive Device other (see comments)   gait belt and RW    Comment Maco for steadying at pelvis with SPT        Sit to Stand Transfer    [place], Sit to Stand Transfer minimum assist (75% or more patient effort)     Verbal Cues hand placement;technique;safety     Assistive Device walker, front-wheeled     Comment for steadying at pelvis        Stand to Sit Transfer    [place], Stand to Sit Transfer minimum assist (75% or more patient effort)     Verbal Cues hand placement;technique;safety     Assistive Device walker, front-wheeled     Comment for controlling eccentric sit        Stand Pivot Transfer    [place], Stand Pivot/Stand Step Transfer minimum assist (75% or more patient effort)     Verbal Cues safety;technique;hand placement     Assistive Device gait belt;walker, front-wheeled     Comment Maco for steadying at pelvis for improved weight shifting        Car Transfer    [place], Car Transfer unable to assess     Comment unable to assess secondary to safety concerns        Gait Training    [place], Gait minimum assist (75% or more patient effort)     Assistive Device walker, front-wheeled     Distance in Feet 25 feet     Pattern (Gait) step-through     Deviations/Abnormal Patterns (Gait) base of support, narrow;base of support, wide;stride length decreased;weight shifting decreased     [place], Picking Up Object unable to assess   unable to assess at this time due to safety concerns    Comment (Gait/Stairs) + 25ft with Maco for steadying at pelvis and improving balance        Curb Negotiation    Alfalfa unable to assess     Comment unable to assess secondary to safety concerns with impaired balance and impaired endurance        Stairs Training    [place], Stairs unable to assess     Comment unable to  assess secondary to safety concerns with impaired balance and impaired endurance        Wheelchair Mobility/Management    Comment, Wheelchair Mobility Pt seated in manual w/c with leg rest adjsuted for comfort        Balance    Balance Assessment sitting static balance;sitting dynamic balance;standing static balance;standing dynamic balance     Static Sitting Balance WFL;unsupported;sitting, edge of bed     Dynamic Sitting Balance WFL;unsupported;sitting, edge of bed     Static Standing Balance mild impairment;unsupported;standing   generalized unsteadiness    Dynamic Standing Balance mild impairment;unsupported;standing;other (see comments)   generalized unsteadiness       Motor Skills    Motor Skills coordination;functional endurance;muscle tone;postural deviations     Coordination WFL;bilateral;lower extremity;finger to nose   +BETHANY intact    Functional Endurance fair; limited activity tolerance     Muscle Tone bilateral;lower extremity(s);WNL        Postural Deviations    Postural Deviations head and neck;shoulder;low back     Head and Neck forward head     Shoulder left shoulder medial rotation;right shoulder medial rotation     Low Back flattened        Gait/Walking Locomotion Goal 1    Distance 50 feet        Gait/Walking Locomotion Goal 2    Distance 150 feet        Patient/Family Goals    Patient's Goals For Discharge return home;take care of myself at home        Therapy Assessment/Plan (PT)    Functional Level at Time of Evaluation (PT) Kana     PT Diagnosis (PT) amb dysfunction s/p cervical fusion     Rehab Potential/Prognosis (PT) good, to achieve stated therapy goals     Frequency of Treatment (PT) 5-7 times per week;60-90 minutes per day     Estimated Duration of Therapy (PT) 5 weeks     Problem List (PT) problems related to;balance;coordination;mobility;motor control;muscle tone;sensation;range of motion (ROM);strength;pain;postural control;inability to direct their own care     Activity Limitations  Related to Problem List unable to ambulate safely;unable to transfer safely     Planned Therapy Interventions balance training;bed mobility training;gait training;home exercise program;joint mobilization;lumbar stabilization;manual therapy techniques;motor coordination training;neuromuscular re-education;orthotic fitting/training;patient/family education;postural re-education;ROM (range of motion);stair training;stretching;strengthening;transfer training     Comment, Therapy Assessment/Plan (PT) Pt is a 70 yo m with past medical history of Anxiety, Atrial fibrillation, Breast cancer, C1 cervical fracture, Colon polyp, COPD, Coronary artery calcification seen on CT scan, COVID-19 vaccine series completed, Depression, Diverticulosis, Fracture of pelvis, GERD, Hearing loss, History of colon polyps, History of COVID-19 (2020), Hyperlipidemia, Hypertension, Left atrial enlargement, Lung cancer, Pneumonia (2011), Seasonal allergies, and Wrist fracture. Principal problem is amb dysfunction s/p cervical fusion as result of fall.  Pt PLOF was independent with use of RW and with elevations, ambulation and transfers and all other ADLs.  Pt has been medically cleared for comprehensive IP at [facility].  On IE, pt was touching assist for rolling L and R and Maco for sit <> supine. Pt is Maco for sit <> stand and SPT with RW.  Pt is Maco for 25ft with RW.  Pt is unable to perform elevations at this time.  Pt is limited by impaired balance, decreased strength, impaired endurance, impaired activity tolerance, and limited functional endurance.  Pt will benefit from skilled comprehensive IP rehab 5-7x per week for 60- 90 min per day to address the above impairments and maximize functional ability.  Anticipate home exercise program and AD to be provided to patient.  Goals established and POC recommended for 21 days.                      Education Documentation  Safety Techniques, taught by Gavino Contreras, PT at  1/12/2022 12:17 PM.  Learner: Patient  Readiness: Acceptance  Method: Explanation  Response: Verbalizes Understanding  Comment: PT POC, role of PT, falls risk, increased need for assist and AD management    Mobility Aids/Assistive Devices, taught by Gavino Contreras PT at 1/12/2022 12:17 PM.  Learner: Patient  Readiness: Acceptance  Method: Explanation  Response: Verbalizes Understanding  Comment: PT POC, role of PT, falls risk, increased need for assist and AD management    Fall Prevention, taught by Gavino Contreras PT at 1/12/2022 12:17 PM.  Learner: Patient  Readiness: Acceptance  Method: Explanation  Response: Verbalizes Understanding  Comment: PT POC, role of PT, falls risk, increased need for assist and AD management    Call Light Use, taught by Gavino Contreras PT at 1/12/2022 12:17 PM.  Learner: Patient  Readiness: Acceptance  Method: Explanation  Response: Verbalizes Understanding  Comment: PT POC, role of PT, falls risk, increased need for assist and AD management    Treatment Plan, taught by Gavino Contreras PT at 1/12/2022 12:17 PM.  Learner: Patient  Readiness: Acceptance  Method: Explanation  Response: Verbalizes Understanding  Comment: PT POC, role of PT, falls risk, increased need for assist and AD management          IRF PT Goals      Most Recent Value   Bed Mobility Goal 1    Activity/Assistive Device rolling to right, rolling to left at 01/12/2022 0831   [place] supervision required at 01/12/2022 0831   Time Frame short-term goal (STG), 5 - 7 days at 01/12/2022 0831   Bed Mobility Goal 2    Activity/Assistive Device rolling to left, rolling to right at 01/12/2022 0831   [place] modified independence at 01/12/2022 0831   Time Frame long-term goal (LTG), 21 days or less at 01/12/2022 0831   Bed Mobility Goal 3    Activity/Assistive Device sit to supine/supine to sit at 01/12/2022 0831   [place] other (see comments)  [touching assist] at 01/12/2022 0831   Time Frame short-term  goal (STG), 5 - 7 days at 01/12/2022 0831   Bed Mobility Goal 4    Activity/Assistive Device sit to supine/supine to sit at 01/12/2022 0831   [place] modified independence at 01/12/2022 0831   Time Frame long-term goal (LTG), 21 days or less at 01/12/2022 0831   Transfer Goal 1    Activity/Assistive Device sit-to-stand/stand-to-sit at 01/12/2022 0831   [place] other (see comments)  [touching assist] at 01/12/2022 0831   Time Frame short-term goal (STG), 5 - 7 days at 01/12/2022 0831   Transfer Goal 2    Activity/Assistive Device sit-to-stand/stand-to-sit at 01/12/2022 0831   [place] modified independence at 01/12/2022 0831   Time Frame 21 days or less, long-term goal (LTG) at 01/12/2022 0831   Transfer Goal 3    Activity/Assistive Device stand pivot at 01/12/2022 0831   [place] other (see comments)  [touching assist] at 01/12/2022 0831   Time Frame short-term goal (STG), 5 - 7 days at 01/12/2022 0831   Transfer Goal 4    Activity/Assistive Device stand pivot at 01/12/2022 0831   [place] modified independence at 01/12/2022 0831   Time Frame long-term goal (LTG), 21 days or less at 01/12/2022 0831   Gait/Walking Locomotion Goal 1    Activity/Assistive Device gait (walking locomotion), assistive device use at 01/12/2022 0831   Distance 50 feet at 01/12/2022 0831   [place] other (see comments)  [touching assist] at 01/12/2022 0831   Time Frame short-term goal (STG), 5 - 7 days at 01/12/2022 0831   Gait/Walking Locomotion Goal 2    Activity/Assistive Device gait (walking locomotion), assistive device use at 01/12/2022 0831   Distance 150 feet at 01/12/2022 0831   [place] modified independence at 01/12/2022 0831   Time Frame long-term goal (LTG), 21 days or less at 01/12/2022 0831

## 2022-01-12 NOTE — HOSPITAL COURSE
History of Present Illness  Melanie is a 71 y.o. male admitted on 1/11/2022 with S/P cervical spinal fusion [Z98.1]. Principal problem is S/P cervical spinal fusion.      Past Medical History  Melanie has a past medical history of Anxiety, Atrial fibrillation (CMS/HCC), Breast cancer (CMS/HCC), C1 cervical fracture (CMS/HCC), Colon polyp, COPD (chronic obstructive pulmonary disease) (CMS/HCC), Coronary artery calcification seen on CT scan, COVID-19 vaccine series completed, Depression, Diverticulosis, Diverticulosis, Fracture of pelvis (CMS/HCC) (1968), GERD (gastroesophageal reflux disease), Hearing loss, History of colon polyps, History of COVID-19 (2020), Hyperlipidemia, Hypertension, Left atrial enlargement, Lung cancer (CMS/HCC), Lung cancer (CMS/HCC), Pneumonia (2011), Seasonal allergies, and Wrist fracture.

## 2022-01-12 NOTE — PLAN OF CARE
Initial OT eval completed. Pt reports was I using AD and AE PLOF. Pt now requiring MIN A for safe shower transfer and unable to perform toilet transfer s skilled OT intervention using DME. Pt requiring MOD A for bathing, MOD A LB dressing, MIN A for toileting and UB dressing and S for grooming. Pt will benefit from skilled IP OT intervention to maximize independence and safety c BADLs and functional transfers. OT POC established.

## 2022-01-12 NOTE — PROGRESS NOTES
"   Internal Medicine -   X-Cover [facility] Note       TRANSFER INTAKE NOTE   This patient is a 71 y.o. yo male presenting tonight from [facility] for acute rehabilitation following a cervical spine surgery.     The full H+P will be done by the primary team.     Per [facility] Pre-Admit    \"  71 y.o. male with a PMHx significant for atrial fibrillation, breast cancer, COPD, diverticulosis, acid reflux, history of COVID-19, hyperlipidemia, hypertension, lung cancer, history of right shoulder rotator cuff repair, and history of right-sided carpal tunnel syndrome, who fell from a ladder on 04/23/2021 associated with loss of consciousness. He was found to have fractures of the anterior and posterior arches of C1, base of the dens of C2, right scapula, and left occipital condyle.  He was placed in a hard cervical collar and has had ongoing neurosurgical and imaging follow-up.  While C1 was healing normally.  There is no appreciable osseous bridging of the fracture of the dens.  He therefore was recommended to undergo ORIF which is performed by Dr. Morejon on 01/06/2020.      Patient underwent open reduction and internal fixation of C1 fractures, C2 odontoid fracture, associated with bilateral posterior lateral arthrodesis with fusion at C1-C3 in addition to C1-2 and C2-3 laminotomies.  There are no intraoperative complications.  Neurophysiological monitoring was stable throughout the course of the decompression.  PCA was d/c'd on 1/10 am.  +BM on 1/9.     Ortho was consulted on 1/7 for R shoulder follow up.  No acute surgical intervention at this time per Ortho.  New Imaging of R shoulder/scapula unremarkable for acute injury  RUE WBAT.  Recommend follow up with a shoulder/elbow orthopedic surgeon - Dr Shine as needed.      The patient lives alone in a two-story home.  There are several steps into the house.  Daughter reports that the staircase is very narrow.  Patient does walk up a full flight of stairs to reach his bathroom on the second " "floor and another flight of stairs to reach his bedroom.  Prior to admission he was fully independent for mobility and ambulation.    Patient is progressing with PT/OT, however, therapy is recommending acute rehab prior to d/c home alone.  Requires Kana for functional txfers and Max A for LB selfcare tasks.\"    From [facility] Pre-Op Summary  \"In regards to medical history:  - Permanent Afib, anticoagulation with Pradaxa. Previously on metoprolol for rate control but discontinued in October due to bradycardia. Asymptomatic. Denies history of CVA or bleeding complications related to pradaxa. Was instructed by cardiology to hold pradaxa starting today.   - CAD by virtue of coronary calcifications on CT. Denies history of ischemic events. Follows with Dr. Kern who recommended stress prior to this surgery- stress PET 12/8/21 showed normal perfusion and EF 57%   - Essential Hypertension- medically managed with irbesartan, amlodipine, and hctz.   - History of systolic dysfunction, now resolved. Per cardiology.  - COPD, follows with Dr. Holly. Tells me his symptoms have been stable, denies recent exacerbations, and reports compliance with Anoro and albuterol which he takes rarely. He saw Dr. Holly in December for pre-operative clearance as well as follow up, and tells me he was cleared for surgery.  - Lung cancer s/p left lower lobectomy with Dr. Guallpa in 2016, and is now on a once a year follow up schedule.   - Anxiety and depression, managed with lexapro and prn xanax which he takes very rarely, not in the last month or so  - Hearing loss, wears hearing aides  - GERD, for which he takes tagamet prn.\"    Notes from AVS/DC Summary:  remain in hard cervical collar at this time but it can be removed to eat and shower.   What is Needed: 2 week post op staple removal     Hold pradaxa and vitamins for 2 weeks post op, may restart on 1/20/22      The patient denies headache, chest pain or pressure, dyspnea, cough, " abdominal pain, nausea, vomiting, or other symptoms.     VS as per EMR. HEENT WNL. NECK cervical collar. CV irreg irreg no m/r. RESP CTAB no r/r/w. ABD firm/non-distended/nt NABS. EXT no c/c/e. Skin warm dry. NEURO stable from xfer records.     Medications reconciled. Diet ordered. BMR order set utilized. Case d/w patient and nursing.     Full plan in AM per primary team.   Patient is in STABLE condition tondavid Edwards MD

## 2022-01-12 NOTE — PROGRESS NOTES
01/12/22 1000   General Information   Preferred Language eng   Referring Facility Type acute care facility   Contact Information   Permission Granted to Share Info With family/designee   [facility]    Advance Directives (for Healthcare)   Does patient have advance directive? Yes   Patient has Advance Directive Advance Directive is NOT in chart, requested to bring in  (asked for copy to scan)   Living Environment   Current Living Arrangements home   People in Home alone   Living Environment   Living Arrangement Comments 3 SH with 3 STEs  no first floor bath.. FF to bath room and another flight to bedroom   Employment/   Employment Status retired   Values/Beliefs   Spiritual, Cultural Beliefs, Buddhist Practices, Values that Affect Care yes   Values/Beliefs Comment Judaism   Discharge Needs Assessment   Anticipated Discharge Disposition home with home health   Discharge Transportation   Does the patient need discharge transport arranged? No   Team Conference   Next Team Conference Date 01/17/22
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
## 2022-01-12 NOTE — PROGRESS NOTES
Patient: Melanie Zelaya  Location: [facility] [address]  MRN: [id number]  Today's date: 1/12/2022    History of Present Illness  Melanie is a 71 y.o. male admitted on 1/11/2022 with S/P cervical spinal fusion [Z98.1]. Principal problem is S/P cervical spinal fusion.      Past Medical History  eMlanie has a past medical history of Anxiety, Atrial fibrillation (CMS/HCC), Breast cancer (CMS/HCC), C1 cervical fracture (CMS/HCC), Colon polyp, COPD (chronic obstructive pulmonary disease) (CMS/HCC), Coronary artery calcification seen on CT scan, COVID-19 vaccine series completed, Depression, Diverticulosis, Diverticulosis, Fracture of pelvis (CMS/HCC) (1968), GERD (gastroesophageal reflux disease), Hearing loss, History of colon polyps, History of COVID-19 (2020), Hyperlipidemia, Hypertension, Left atrial enlargement, Lung cancer (CMS/HCC), Lung cancer (CMS/HCC), Pneumonia (2011), Seasonal allergies, and Wrist fracture.      OT Vitals    Date/Time Pulse SpO2 BP BP Location BP Method Pt Position [facility]   01/12/22 0810 93 -- 160/88 Right upper arm Automatic Lying    01/12/22 0929 99 98 % 166/94 Right upper arm Automatic Sitting V      OT Pain    Date/Time Pain Type Location Rating: Rest Rating: Activity Interventions [facility]   01/12/22 0810 Pain Assessment neck 6 -- diversional activity provided    01/12/22 0835 Pain Assessment neck 6 8 -- [facility]   01/12/22 0858 Pain Reassessment neck -- 6 premedicated for activity    01/12/22 0920 Pain Reassessment neck 5 -- -- [facility]   01/12/22 0929 Pain Reassessment neck 5 -- premedicated for activity           Prior Living Environment      Most Recent Value   People in Home alone   Current Living Arrangements home   Home Accessibility not wheelchair accessible   Living Environment Comment 3SH, 3 VALERIA LHR, FF to bathroom then another FF to bedroom, tub shower w/ SC   Number of Stairs, Main Entrance 4   Surface of Stairs, Main Entrance concrete   Stair Railings, Main  Entrance railing on left side (ascending)   Landing, Stairs, Main Entrance adequate turning radius   Number of Stairs, Second Entrance other (see comments)  [back door not utilized]   Location, Kitchen first (main) floor   [place], Kitchen tile floor   Location, Laundry Room first (main) floor   [place], Laundry Room concrete floor   Laundry Room Access not wheelchair accessible   Laundry Room Access Comment down in basement,  with full flight and L hand rail   Location, Patient Bedroom other (see comments)  [3rd floor]   [place], Patient Bedroom carpeted floor   Location, Bathroom second floor, must negotiate stairs to access   [place], Bathroom tile floor   Bathroom Access not wheelchair accessible   Bathroom Access Comment Pt reports FB on 2nd floor. Pt has shower chair.   Number of Stairs, Within Home, Primary 12   Surface of Stairs, Within Home, Primary carpeting   Stair Railings, Within Home, Primary railings on both sides of stairs   Stairs Comment, Within Home, Primary to second floor for bathroom   Stairs, Within Home, Secondary 12   Surface of Stairs, Within Home, Secondary carpeting   Stair Railings, Within Home, Secondary railing on left side (ascending)   Stairs Comment, Within Home, Secondary to third floor for bed room          Prior Level of Function      Most Recent Value   Dominant Hand right   Ambulation assistive equipment   Transferring assistive equipment   Toileting independent   Bathing independent   Dressing assistive equipment  [shoe horn]   Eating independent   Prior Level of Function Comment Pt reports prior use of cane in home as needed for ambulation and use of a shower chair for bathing. Pt reports was completing BADLs independently.   Assistive Device Currently Used at Home cane, straight, shower chair          Occupational Profile      Most Recent Value   Occupational History/Life Experiences Pt reports living c  in [facility]. Pt has three (adult) children and grandchildren.  Pt has good family support.           [facility] OT Evaluation and Treatment - 01/12/22 0840        OT Time Calculation    Start Time 0800     Stop Time 0920     Time Calculation (min) 80 min        Session Details    Document Type initial evaluation     Mode of Treatment occupational therapy;individual therapy        General Information    Patient Profile Reviewed yes     General Observations of Patient Pt received asleep in bed, easily arousable and agreeable to OT eval.     Existing Precautions/Restrictions fall;cardiac;spinal     Limitations/Impairments hearing        Living Environment    Primary Care Provided by self        Cognition/Psychosocial    Affect/Mental Status (Cognition) WFL     Orientation Status (Cognition) oriented x 4        Vision Assessment/Intervention    Visual Impairment/Limitations corrective lenses full-time;WFL        Sensory Assessment (Somatosensory)    Left UE Sensory Assessment intact;proprioception;light touch awareness     Right UE Sensory Assessment intact;proprioception;light touch awareness        Range of Motion (ROM)    Range of Motion left upper extremity;ROM is WFL;right upper extremity ROM deficit     Right Upper Extremity (ROM) right UE ROM is WFL except;shoulder     Shoulder, Right (ROM) Prior RT injury. Recommend goniometry.        Strength Comprehensive (MMT)    Comment Per functional observation BUE strength grossly 3/5. Recommend MMT next session.        Bed Mobility    Assistive Device head of bed elevated;bed rails     Comment (Bed Mobility) Touching A supine to sit EOB.        Sit to Stand Transfer    [place], Sit to Stand Transfer minimum assist (75% or more patient effort)     Verbal Cues preparatory posture;hand placement;safety     Comment from w/c        Stand to Sit Transfer    [place], Stand to Sit Transfer minimum assist (75% or more patient effort)     Verbal Cues hand placement;safety     Comment to w/c        Stand Pivot Transfer    [place],  Stand Pivot/Stand Step Transfer minimum assist (75% or more patient effort)     Verbal Cues preparatory posture;safety;hand placement     Comment SPT EOB to w/c        Toilet Transfer    Comment Unsafe to perform prior to therapeutic intervention.        Shower Transfer    Transfer Technique stand pivot     [place], Shower Transfer minimum assist (75% or more patient effort)     Verbal Cues preparatory posture;hand placement;safety     Assistive Device tub bench     Comment SPT w/c to/from tub bench in barrier free shower stall.        Motor Skills    Coordination fine motor deficit;WFL        Bathing    Self-Performance chest;left arm;right arm;abdomen;front perineal area;left upper leg;right upper leg     [place] Assistance buttocks;left lower leg, including foot;right lower leg, including foot     [place] moderate assist (50-74% patient effort)     Position supported sitting;supported standing     Setup Assistance adaptive equipment setup;obtain supplies     Adaptive Equipment grab bar/tub rail;hand-held shower spray hose;tub bench     Comment Pt performs bathing seated on tub bench. Steadying assistance in stance for washing/drying buttocks and assist for washing/drying distal legs and feet.        Upper Body Dressing    Self-Performance threads left arm, shirt;threads right arm, shirt;pulls shirt over head/around back;pulls shirt down/adjusts     [place] Assistance orthosis application     [place] minimum assist (75% or more patient effort)     Position supported sitting     Adaptive Equipment none     Comment Assist for donning orthosis only.        Lower Body Dressing    Self-Performance threads right leg, pants/shorts;pulls underpants up or down;pulls pants/shorts up or down     [place] Assistance threads left leg, underpants;threads right leg, underpants;threads left leg, pants/shorts     [place] moderate assist (50-74% patient effort)     Position supported sitting;unsupported standing      Adaptive Equipment none     [place], Footwear dependent (less than 25% patient effort)     Comment Decreased ROM for performing cross leg position and assist required for threading underwear and pants. Steadying assist in stance while performing clothing management up. D assist c footwear.        Grooming    Self-Performance oral care (brushing teeth, cleaning dentures);washes, rinses and dries hands;brushes/c[place]rera hair;applies deodorant     [place] supervision     Position supported sitting;sink side     Setup Assistance obtain supplies     Adaptive Equipment none     [place], Oral Hygiene supervision     Comment w/c level at sink.        Toileting    [place] minimum assist (75% or more patient effort)     Position supported sitting;unsupported standing     Adaptive Equipment accessible height toilet;grab bar/safety frame     Comment MIN A for balance in stance while managing clothing. Pt performs bowel hygiene.        Therapy Assessment/Plan (OT)    Rehab Potential/Prognosis (OT) good, to achieve stated therapy goals     Frequency of Treatment (OT) 5-7 times per week;60-90 minutes per day     Estimated Duration of Therapy (OT) 2 weeks     Problem List (OT) problems related to;balance;mobility;motor control;strength;pain     Activity Limitations Related to Problem List BADLs not performed adequately or safely;IADLs not performed adequately or safely;community activities not performed adequately or safely;home management activity not performed adequately or safely     Planned Therapy Interventions activity tolerance training;adaptive equipment training;BADL retraining;functional balance retraining;IADL retraining;manual therapy/joint mobilization;neuromuscular control/coordination retraining;occupation/activity based interventions;passive ROM/stretching;patient/caregiver education/training;ROM/therapeutic exercise;strengthening exercise;transfer/mobility retraining     Comment, Therapy  Assessment/Plan (OT) Initial OT eval completed. Pt reports was I using AD and AE PLOF. Pt now requiring MIN A for safe shower transfer and unable to perform toilet transfer s skilled OT intervention using DME. Pt requiring MOD A for bathing, MOD A LB dressing, MIN A for toileting and UB dressing and S for grooming. Pt will benefit from skilled IP OT intervention to maximize independence and safety c BADLs and functional transfers. OT POC established.                      Education Documentation  Orientation to Care Setting, Routine, taught by Vonnie Burnham OT at 1/12/2022  3:53 PM.  Learner: Patient  Readiness: Acceptance  Method: Explanation  Response: Verbalizes Understanding  Comment: Pt educated on role of IP OT and OT POC. Pt educated on 3 hr rule and on use of call bell.          IRF OT Goals      Most Recent Value   Transfer Goal 1    Activity/Assistive Device toilet at 01/12/2022 0840   [place] supervision required  [Cl S] at 01/12/2022 0840   Time Frame short-term goal (STG), 1 week at 01/12/2022 0840   Transfer Goal 2    Activity/Assistive Device toilet at 01/12/2022 0840   [place] modified independence at 01/12/2022 0840   Time Frame long-term goal (LTG), 2 weeks at 01/12/2022 0840   Transfer Goal 3    Activity/Assistive Device shower at 01/12/2022 0840   [place] supervision required  [Cl S] at 01/12/2022 0840   Time Frame short-term goal (STG), 1 week at 01/12/2022 0840   Transfer Goal 4    Activity/Assistive Device shower at 01/12/2022 0840   [place] modified independence at 01/12/2022 0840   Time Frame long-term goal (LTG), 2 weeks at 01/12/2022 0840   Bathing Goal 1    Activity/Assistive Device bathing skills, all at 01/12/2022 0840   [place] minimum assist (75% or more patient effort) at 01/12/2022 0840   Time Frame short-term goal (STG), 1 week at 01/12/2022 0840   Bathing Goal 2    Activity/Assistive Device bathing skills, all at 01/12/2022 0840   [place] modified  independence at 01/12/2022 0840   Time Frame long-term goal (LTG), 2 weeks at 01/12/2022 0840   UB Dressing Goal 1    Activity/Assistive Device upper body dressing at 01/12/2022 0840   [place] supervision required  [Cl S] at 01/12/2022 0840   Time Frame short-term goal (STG), 1 week at 01/12/2022 0840   UB Dressing Goal 2    Activity/Assistive Device upper body dressing at 01/12/2022 0840   [place] modified independence at 01/12/2022 0840   Time Frame long-term goal (LTG), 2 weeks at 01/12/2022 0840   LB Dressing Goal 1    Activity/Assistive Device lower body dressing at 01/12/2022 0840   [place] minimum assist (75% or more patient effort) at 01/12/2022 0840   Time Frame short-term goal (STG), 1 week at 01/12/2022 0840   LB Dressing Goal 2    Activity/Assistive Device lower body dressing at 01/12/2022 0840   [place] modified independence at 01/12/2022 0840   Time Frame long-term goal (LTG), 2 weeks at 01/12/2022 0840   Grooming Goal 1    [place] supervision required  [Cl S] at 01/12/2022 0840   Time Frame short-term goal (STG), 1 week at 01/12/2022 0840   Strategies/Barriers Standing at 01/12/2022 0840   Grooming Goal 2    Activity/Assistive Device grooming skills, all at 01/12/2022 0840   [place] modified independence at 01/12/2022 0840   Time Frame long-term goal (LTG), 2 weeks at 01/12/2022 0840   Toileting Goal 1    Activity/Assistive Device toileting skills, all at 01/12/2022 0840   [place] supervision required  [Cl S] at 01/12/2022 0840   Time Frame short-term goal (STG), 1 week at 01/12/2022 0840   Toileting Goal 2    Activity/Assistive Device toileting skills, all at 01/12/2022 0840   [place] modified independence at 01/12/2022 0840   Time Frame long-term goal (LTG), 2 weeks at 01/12/2022 0840

## 2022-01-12 NOTE — PLAN OF CARE
Plan of Care Review  Plan of Care Reviewed With: patient  Progress: improving  Outcome Summary: Patient AAOx4, continent of bowel and bladder.  Patient gets oxy and tylenol for pain management.  Incision well approximated with staples, slightly ecchymotic covered with a mepilex.  Aspen collar worn AAT

## 2022-01-12 NOTE — PLAN OF CARE
Pt alert and oriented X3. [place], does not have hearing aides. Continent of bowel and bladder. Voided 550cc clear yellow urine into the urinal, CUE=278. SCDS on. Aspen collar on. Posterior neck incision with staples, silver surgical dressing intact. Ate 100% of dinner. Medicated with oxycodine 5mg for pain 6/10 in neck and right shoulder with minimal relief, medicated with tylenol 975mg as ordered for pain 6/10. Bed alarm on, call bell within reach.

## 2022-01-12 NOTE — PLAN OF CARE
Problem: Rehabilitation (IRF) Plan of Care  Goal: Plan of Care Review  Flowsheets  Taken 1/12/2022 1620 by Gavino Contreras PT  Plan of Care Reviewed With: patient  Outcome Summary: PT IE completed  Taken 1/12/2022 1413 by Gail Childers, RN  Progress: improving      Follow up with Dr. Najera in 1-2 weeks. Call the office at the number below to schedule your appointment. You may shower; soap and water over incision sites. Do not scrub. Pat dry when done. No tub bathing or swimming until cleared. Keep incision sites out of the sun as scars will darken. Ambulate as tolerated, but no heavy lifting (>10lbs) or strenuous exercise. You may resume regular diet. You should be urinating at least 3-4x per day. Call the office if you experience increasing abdominal pain, nausea, vomiting, or temperature >101 F. Follow up with Dr. Najera on 12/03/19 for mtz removal.  Call the office at the number below to schedule your appointment. Dr. Najera will remove your mtz catheter on 12/03/19. You may shower; soap and water over incision sites. Do not scrub. Pat dry when done. No tub bathing or swimming until cleared. Keep incision sites out of the sun as scars will darken. Ambulate as tolerated, but no heavy lifting (>10lbs) or strenuous exercise. You may resume regular diet. You should be urinating at least 3-4x per day. Call the office if you experience increasing abdominal pain, nausea, vomiting, or temperature >101 F.    Take 2 tabs tylenol every 6 hours as needed for pain management. You have also been prescribed percocet for pain not controlled by tylenol. Take 1-2 tabs as prescribed instead of tylenol for severe pain. Take prescribed stool softener (colace) to prevent constipation caused by percocet.    Mtz Care:  - Keep the drainage bag below the level of your bladder and off the floor at all times.  - Keep the catheter secured to your thigh to prevent it from moving.  - Don’t lie on your catheter or block the flow of urine in the tubing.  - Shower daily to keep the catheter clean.  - Clean your hands before and after touching the catheter or bag.

## 2022-01-12 NOTE — PROGRESS NOTES
Patient: Melanie Zelaya  Location: [facility] [address]  MRN: [id number]  Today's date: 1/12/2022    History of Present Illness  Melanie is a 71 y.o. male admitted on 1/11/2022 with S/P cervical spinal fusion [Z98.1]. Principal problem is S/P cervical spinal fusion.      Past Medical History  Melanie has a past medical history of Anxiety, Atrial fibrillation (CMS/HCC), Breast cancer (CMS/HCC), C1 cervical fracture (CMS/HCC), Colon polyp, COPD (chronic obstructive pulmonary disease) (CMS/HCC), Coronary artery calcification seen on CT scan, COVID-19 vaccine series completed, Depression, Diverticulosis, Diverticulosis, Fracture of pelvis (CMS/HCC) (1968), GERD (gastroesophageal reflux disease), Hearing loss, History of colon polyps, History of COVID-19 (2020), Hyperlipidemia, Hypertension, Left atrial enlargement, Lung cancer (CMS/HCC), Lung cancer (CMS/HCC), Pneumonia (2011), Seasonal allergies, and Wrist fracture.      OT Vitals    Date/Time Pulse SpO2 BP BP Location BP Method Pt Position [facility]   01/12/22 0929 99 98 % 166/94 Right upper arm Automatic Sitting JV      OT Pain    Date/Time Pain Type Location Rating: Rest Interventions Who   01/12/22 0920 Pain Reassessment neck 5 -- HJS   01/12/22 0929 Pain Reassessment neck 5 premedicated for activity JV          Prior Living Environment      Most Recent Value   People in Home alone   Current Living Arrangements home   Home Accessibility not wheelchair accessible   Living Environment Comment 3SH, 3 VALERIA LHR, FF to bathroom then another FF to bedroom, tub shower w/ SC   Number of Stairs, Main Entrance 4   Surface of Stairs, Main Entrance concrete   Stair Railings, Main Entrance railing on left side (ascending)   Landing, Stairs, Main Entrance adequate turning radius   Number of Stairs, Second Entrance other (see comments)  [back door not utilized]   Location, Kitchen first (main) floor   [place], Kitchen tile floor   Location, Laundry Room first  (main) floor   [place], Laundry Room concrete floor   Laundry Room Access not wheelchair accessible   Laundry Room Access Comment down in basement,  with full flight and L hand rail   Location, Patient Bedroom other (see comments)  [3rd floor]   [place], Patient Bedroom carpeted floor   Location, Bathroom second floor, must negotiate stairs to access   [place], Bathroom tile floor   Bathroom Access not wheelchair accessible   Bathroom Access Comment Pt reports FB on 2nd floor. Pt has shower chair.   Number of Stairs, Within Home, Primary 12   Surface of Stairs, Within Home, Primary carpeting   Stair Railings, Within Home, Primary railings on both sides of stairs   Stairs Comment, Within Home, Primary to second floor for bathroom   Stairs, Within Home, Secondary 12   Surface of Stairs, Within Home, Secondary carpeting   Stair Railings, Within Home, Secondary railing on left side (ascending)   Stairs Comment, Within Home, Secondary to third floor for bed room          Prior Level of Function      Most Recent Value   Dominant Hand right   Ambulation assistive equipment   Transferring assistive equipment   Toileting independent   Bathing independent   Dressing assistive equipment  [shoe horn]   Eating independent   Prior Level of Function Comment Pt reports prior use of cane in home as needed for ambulation and use of a shower chair for bathing. Pt reports was completing BADLs independently.   Assistive Device Currently Used at Home cane, straight, shower chair          Occupational Profile      Most Recent Value   Occupational History/Life Experiences Pt reports living c  in [place]. Pt has three (adult) children and grandchildren. Pt has good family support.           [facility] OT Evaluation and Treatment - 01/12/22 0921        OT Time Calculation    Start Time 0920     Stop Time 0930     Time Calculation (min) 10 min        Session Details    Document Type daily treatment/progress note     Mode of Treatment  occupational therapy;individual therapy        General Information    General Observations of Patient Pt agreeable to utilize recommended DME for safe toilet transfer.        Toilet Transfer    Transfer Technique stand pivot     Deaf Smith, Toilet Transfer minimum assist (75% or more patient effort)     Verbal Cues preparatory posture;hand placement;technique     Assistive Device grab bars/safety frame     Comment SPT w/c to/from comfort height toilet using B grab bars.        Daily Progress Summary (OT)    Daily Outcome Statement Pt benefiting from skilled OT intervention using recommended DME for safe toilet transfer.                           IRF OT Goals      Most Recent Value   Transfer Goal 1    Activity/Assistive Device toilet at 01/12/2022 0840   Deaf Smith supervision required  [Cl S] at 01/12/2022 0840   Time Frame short-term goal (STG), 1 week at 01/12/2022 0840   Transfer Goal 2    Activity/Assistive Device toilet at 01/12/2022 0840   Deaf Smith modified independence at 01/12/2022 0840   Time Frame long-term goal (LTG), 2 weeks at 01/12/2022 0840   Transfer Goal 3    Activity/Assistive Device shower at 01/12/2022 0840   Deaf Smith supervision required  [Cl S] at 01/12/2022 0840   Time Frame short-term goal (STG), 1 week at 01/12/2022 0840   Transfer Goal 4    Activity/Assistive Device shower at 01/12/2022 0840   Deaf Smith modified independence at 01/12/2022 0840   Time Frame long-term goal (LTG), 2 weeks at 01/12/2022 0840   Bathing Goal 1    Activity/Assistive Device bathing skills, all at 01/12/2022 0840   Deaf Smith minimum assist (75% or more patient effort) at 01/12/2022 0840   Time Frame short-term goal (STG), 1 week at 01/12/2022 0840   Bathing Goal 2    Activity/Assistive Device bathing skills, all at 01/12/2022 0840   Deaf Smith modified independence at 01/12/2022 0840   Time Frame long-term goal (LTG), 2 weeks at 01/12/2022 0840   UB Dressing Goal 1    Activity/Assistive Device  upper body dressing at 01/12/2022 0840   Bureau supervision required  [Cl S] at 01/12/2022 0840   Time Frame short-term goal (STG), 1 week at 01/12/2022 0840   UB Dressing Goal 2    Activity/Assistive Device upper body dressing at 01/12/2022 0840   Bureau modified independence at 01/12/2022 0840   Time Frame long-term goal (LTG), 2 weeks at 01/12/2022 0840   LB Dressing Goal 1    Activity/Assistive Device lower body dressing at 01/12/2022 0840   Bureau minimum assist (75% or more patient effort) at 01/12/2022 0840   Time Frame short-term goal (STG), 1 week at 01/12/2022 0840   LB Dressing Goal 2    Activity/Assistive Device lower body dressing at 01/12/2022 0840   Bureau modified independence at 01/12/2022 0840   Time Frame long-term goal (LTG), 2 weeks at 01/12/2022 0840   Grooming Goal 1    Bureau supervision required  [Cl S] at 01/12/2022 0840   Time Frame short-term goal (STG), 1 week at 01/12/2022 0840   Strategies/Barriers Standing at 01/12/2022 0840   Grooming Goal 2    Activity/Assistive Device grooming skills, all at 01/12/2022 0840   Bureau modified independence at 01/12/2022 0840   Time Frame long-term goal (LTG), 2 weeks at 01/12/2022 0840   Toileting Goal 1    Activity/Assistive Device toileting skills, all at 01/12/2022 0840   Bureau supervision required  [Cl S] at 01/12/2022 0840   Time Frame short-term goal (STG), 1 week at 01/12/2022 0840   Toileting Goal 2    Activity/Assistive Device toileting skills, all at 01/12/2022 0840   Bureau modified independence at 01/12/2022 0840   Time Frame long-term goal (LTG), 2 weeks at 01/12/2022 0840

## 2022-01-12 NOTE — PROGRESS NOTES
01/12/22 1034   Food Insecurity   Within the past 12 months, you worried that your food would run out before you got the money to buy more. Never true   Within the past 12 months, the food you bought just didn’t last and you didn’t have money to get more. Never true
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
## 2022-01-13 ENCOUNTER — APPOINTMENT (INPATIENT)
Dept: PHYSICAL THERAPY | Facility: REHABILITATION | Age: 72
DRG: 565 | End: 2022-01-13
Payer: COMMERCIAL

## 2022-01-13 ENCOUNTER — APPOINTMENT (INPATIENT)
Dept: OCCUPATIONAL THERAPY | Facility: REHABILITATION | Age: 72
DRG: 565 | End: 2022-01-13
Payer: COMMERCIAL

## 2022-01-13 PROCEDURE — L0172 CERV COL SR FOAM 2PC PRE OTS: HCPCS

## 2022-01-13 PROCEDURE — 97530 THERAPEUTIC ACTIVITIES: CPT | Mod: GO

## 2022-01-13 PROCEDURE — 97535 SELF CARE MNGMENT TRAINING: CPT | Mod: GO

## 2022-01-13 PROCEDURE — 12800001 HC ROOM AND CARE SEMIPRIVATE REHAB-BRAIN INJ

## 2022-01-13 PROCEDURE — 63700000 HC SELF-ADMINISTRABLE DRUG: Performed by: INTERNAL MEDICINE

## 2022-01-13 PROCEDURE — 97530 THERAPEUTIC ACTIVITIES: CPT | Mod: GP

## 2022-01-13 PROCEDURE — 63600000 HC DRUGS/DETAIL CODE: Performed by: INTERNAL MEDICINE

## 2022-01-13 PROCEDURE — 97110 THERAPEUTIC EXERCISES: CPT | Mod: GO

## 2022-01-13 PROCEDURE — 97116 GAIT TRAINING THERAPY: CPT | Mod: GP

## 2022-01-13 PROCEDURE — 97110 THERAPEUTIC EXERCISES: CPT | Mod: GP

## 2022-01-13 RX ORDER — AMLODIPINE BESYLATE 5 MG/1
5 TABLET ORAL EVERY 12 HOURS
Status: DISCONTINUED | OUTPATIENT
Start: 2022-01-13 | End: 2022-01-25 | Stop reason: HOSPADM

## 2022-01-13 RX ADMIN — AMLODIPINE BESYLATE 5 MG: 5 TABLET ORAL at 21:09

## 2022-01-13 RX ADMIN — LOSARTAN POTASSIUM 100 MG: 100 TABLET, FILM COATED ORAL at 16:55

## 2022-01-13 RX ADMIN — ACETAMINOPHEN 975 MG: 325 TABLET, FILM COATED ORAL at 21:08

## 2022-01-13 RX ADMIN — FAMOTIDINE 20 MG: 20 TABLET ORAL at 21:09

## 2022-01-13 RX ADMIN — OXYCODONE HYDROCHLORIDE 10 MG: 5 TABLET ORAL at 08:04

## 2022-01-13 RX ADMIN — LIDOCAINE 1 PATCH: 246 PATCH TOPICAL at 08:00

## 2022-01-13 RX ADMIN — HEPARIN SODIUM 5000 UNITS: 5000 INJECTION, SOLUTION INTRAVENOUS; SUBCUTANEOUS at 21:09

## 2022-01-13 RX ADMIN — FLUTICASONE PROPIONATE 2 SPRAY: 50 SPRAY, METERED NASAL at 08:01

## 2022-01-13 RX ADMIN — AMLODIPINE BESYLATE 5 MG: 5 TABLET ORAL at 08:00

## 2022-01-13 RX ADMIN — ATORVASTATIN CALCIUM 40 MG: 40 TABLET, FILM COATED ORAL at 16:55

## 2022-01-13 RX ADMIN — HEPARIN SODIUM 5000 UNITS: 5000 INJECTION, SOLUTION INTRAVENOUS; SUBCUTANEOUS at 13:26

## 2022-01-13 RX ADMIN — SENNOSIDES AND DOCUSATE SODIUM 1 TABLET: 50; 8.6 TABLET ORAL at 21:09

## 2022-01-13 RX ADMIN — ACETAMINOPHEN 975 MG: 325 TABLET, FILM COATED ORAL at 06:21

## 2022-01-13 RX ADMIN — HEPARIN SODIUM 5000 UNITS: 5000 INJECTION, SOLUTION INTRAVENOUS; SUBCUTANEOUS at 06:21

## 2022-01-13 RX ADMIN — OXYCODONE HYDROCHLORIDE 10 MG: 5 TABLET ORAL at 21:17

## 2022-01-13 RX ADMIN — SENNOSIDES AND DOCUSATE SODIUM 1 TABLET: 50; 8.6 TABLET ORAL at 08:00

## 2022-01-13 RX ADMIN — HYDROCHLOROTHIAZIDE 12.5 MG: 12.5 TABLET ORAL at 08:00

## 2022-01-13 RX ADMIN — OXYCODONE HYDROCHLORIDE 10 MG: 5 TABLET ORAL at 17:24

## 2022-01-13 RX ADMIN — GUAIFENESIN 600 MG: 600 TABLET, EXTENDED RELEASE ORAL at 21:09

## 2022-01-13 RX ADMIN — ESCITALOPRAM OXALATE 10 MG: 10 TABLET, FILM COATED ORAL at 08:00

## 2022-01-13 RX ADMIN — FAMOTIDINE 20 MG: 20 TABLET ORAL at 08:00

## 2022-01-13 RX ADMIN — OXYCODONE HYDROCHLORIDE 10 MG: 5 TABLET ORAL at 13:24

## 2022-01-13 RX ADMIN — CETIRIZINE HYDROCHLORIDE 5 MG: 5 TABLET ORAL at 21:09

## 2022-01-13 RX ADMIN — ACETAMINOPHEN 975 MG: 325 TABLET, FILM COATED ORAL at 13:24

## 2022-01-13 RX ADMIN — GUAIFENESIN 600 MG: 600 TABLET, EXTENDED RELEASE ORAL at 08:00

## 2022-01-13 NOTE — PROGRESS NOTES
[facility] Progress Note          Patient was seen and examined at bedside.    Subjective:   Saw him in am, in NAD, his pain is manageable, no new neurologic deficits, started and tolerated his PT/OT.  Reviewed night shift RN note , noted:  Alert and oriented x 3. Continent of bowel and bladder. PVRs WNLs this shift. [place] collar worn AATs. Cervical incision has foam dressing intact, no drainage or s/s of complication noted. Continues on scheduled Tyleno with PRN Oxycodone requested at HS for severe neck pain, effective. Sleeping quietly in bed overnight.    COPD, h/o lung cancer s/p LLL resection, diminished bibasilar lung BS, encouraged lung expansion maneuvers, cont current resp management and care, his SO2 has been fine. His labs are fine.  Denies nausea, vomiting, abdominal pain or discomfort, dysuria, cough/sputum, running nose, sore throat, chest pain, palpitation, SOB or orthopnea, dizziness or LH,  HA.         Objective   Vital signs in last 24 hours:  Temp:  [36.7 °C (98.1 °F)-37.4 °C (99.3 °F)] 36.7 °C (98.1 °F)  Heart Rate:  [76-85] 83  Resp:  [18-20] 18  BP: (127-155)/(67-85) 155/75    No intake or output data in the 24 hours ending 01/13/22 1154  Intake/Output this shift:  No intake/output data recorded.   Review of Systems:  All other systems reviewed and negative except as noted in the HPI.   Objective      Labs  reviewed his labs thoroughly   Lab Results   Component Value Date    WBC 5.78 01/12/2022    HGB 13.0 (L) 01/12/2022    HCT 39.2 (L) 01/12/2022    MCV 88.9 01/12/2022     01/12/2022     Lab Results   Component Value Date    GLUCOSE 102 (H) 01/12/2022    CALCIUM 9.2 01/12/2022     01/12/2022    K 4.4 01/12/2022    CO2 31 01/12/2022    CL 98 01/12/2022    BUN 15 01/12/2022    CREATININE 0.8 01/12/2022       Imaging  OSH imaging study reports reviewed       Full Code    Physical Exam:  Head/Ear/Nose/Throat: normocephalic; atraumatic; moisture mouth mm, no  oropharyngeal thrush noted.   Eyes: anicteric sclera, EOMI; PERRL.   Neck : supple, no JVD, no carotid bruits appeciated.   Respiratory: no evidence of labored breathing, lung sounds CTA b/l, good aeration bibasilar area, no w/r/c.   Cardiovascular: RRR; normal S1, S2; no m/r/g; no S3 or S4.   Gastrointestinal: soft; NT; BS normal; mildly distended; no CVAT b/l.   Genitourinary: no caballero.   Extremities : no c/c/e .   Neurological: AO x 3, fluent speeches, following commands, CNS II-XII grossly intact; no focal neurologic deficits.   Behavior/Emotional: in NAD, appropriate; cooperative.   Skin: clean, dry and intact.     Plan of care was discussed with patient, RN, and PMR attending     Assessment     CC:S/p a mechanical fall, sustained traumatic cervical spinal fractures with myelopathy, s/p cervical spinal ORIF and decom.lami.fusion, ADL and ambulatory dysfunction.       71 y.o. male with a PMHx significant for atrial fibrillation, breast cancer, ex-tobacco smoking, COPD, diverticulosis, acid reflux, history of COVID-19, hyperlipidemia, hypertension, lung cancer s/p LLL resection, history of right shoulder rotator cuff repair, and history of right-sided carpal tunnel syndrome, who fell from a ladder on 04/23/2021 associated with loss of consciousness. He was found to have fractures of the anterior and posterior arches of C1, base of the dens of C2, right scapula, and left occipital condyle.  He was placed in a hard cervical collar and has had ongoing neurosurgical and imaging follow-up.  While C1 was healing normally.  There is no appreciable osseous bridging of the fracture of the dens.  He therefore was recommended to undergo ORIF which is performed by Dr. Morejon on 01/06/2020 [place] [facility] [place]      Patient underwent open reduction and internal fixation of C1 fractures, C2 odontoid fracture, associated with bilateral posterior lateral arthrodesis with fusion at C1-C3 in addition to C1-2 and C2-3 laminotomies.  There are no  intraoperative complications.  Neurophysiological monitoring was stable throughout the course of the decompression.  PCA was d/c'd on 1/10 am.  +BM on 1/9.     Ortho was consulted on 1/7 for R shoulder follow up.  No acute surgical intervention at this time per Ortho.  New Imaging of R shoulder/scapula unremarkable for acute injury  RUE WBAT.  Recommend follow up with a shoulder/elbow orthopedic surgeon - Dr Shine as needed.   Functionally, he has ADL and ambulatory dysfunction, requires inpt acute rehab, transferred to [facility] on 1/11/22.       1. S/p a mechanical fall, sustained traumatic cervical spinal fractures with myelopathy, s/p cervical spinal ORIF and decom.lami.fusion, ADL and ambulatory dysfunction : inpt comprehensive rehab, ADL, gait/balance training, pain/neuropathic pain management, fall precaution, regular neuro checks, DVT prophylaxis, surgical site wound care/dermal defense, f/u with spinal surgeon as scheduled.     R shoulder injury, but no acute surgical intervention at this time per Ortho, f/u with  a shoulder/elbow orthopedic surgeon - Dr Shine as needed.    2. Post op acute on chronic pain, paresthesia: pain management, regular pain scale evaluation, topical lidoderm patch, ice packs, consider Neurontin as indicated, prn muscle relaxant.    3. Pulm-COPD, h/o lung cancer s/p LLL resection, atelectasis: monitor SO2, prn NC O2, keep SO2>92%, incentive spirometry, bronchodilator inhaler prn, cont LABA/LAMA/flonase,  mucolytic agent, pulm toileting.    4. GI-constipation, GERD: provide constipation bowel regimen, po hydration, fiber intake, timed toilet visits. Pepcid for GERD and GI prophy.    5. DVT prophy: SCD, early ambulation, SQ heparin to resume pradaxa as planned , check LE venous DUS to r/o DVT.      6. PAF, HTN, Dyslipidemia: resume pradaxa on 1/20/22, monitor HR and rhythm, cont current HTN meds with holding parameter, monitor BP, titrate HTN meds as indicated, post op orthostatic  hypotension precaution; cont statin.     7. - Neurogenic bladder, acute urinary retention s/p caballero: timed voiding trial with appropriate position and privacy, monitor PVR with cic, high risk of UTI , check UA/UCX.    8. Renal, electrolytes: monitor renal function, avoid nephrotoxic agents or low BP, monitor and keep electrolytes balance.     Billing code: [id number]  Diagnoses:  Patient Active Problem List   Diagnosis   • Anxiety   • Atrial fibrillation (CMS/HCC)   • Chronic diastolic heart failure (CMS/HCC)   • Hearing loss   • Primary malignant neoplasm of left lower lobe of lung (CMS/HCC)   • Multiple pulmonary nodules   • Other emphysema (CMS/HCC)   • Gastroesophageal reflux disease without esophagitis   • Asthma   • Cervical spine fracture (CMS/HCC)   • Hypertension   • Mild episode of recurrent major depressive disorder (CMS/HCC)   • Preop examination   • Coronary artery calcification seen on CT scan   • COPD (chronic obstructive pulmonary disease) (CMS/HCC)   • Prediabetes   • History of cervical fracture   • H/O cervical fracture   • Closed nondisplaced odontoid fracture with type II morphology and delayed healing   • S/P cervical spinal fusion   complicated case with multiple comorbidities as mentioned in subjective section, spent 35 min to manage the case, >50% of the time consulting the patient about current medical condition, existing comorbidities, new findings/concerns and care/management plan.              Fiona Rivera MD  1/13/2022

## 2022-01-13 NOTE — H&P
This is a progress note, H&P was done on January 12, 2022    History of present illness:    71 y.o. male with a PMHx significant for atrial fibrillation, breast cancer, COPD, diverticulosis, acid reflux, history of COVID-19, hyperlipidemia, hypertension, lung cancer, history of right shoulder rotator cuff repair, and history of right-sided carpal tunnel syndrome, who fell from a ladder on 04/23/2021 associated with loss of consciousness. He was found to have fractures of the anterior and posterior arches of C1, base of the dens of C2, right scapula, and left occipital condyle.  He was placed in a hard cervical collar and has had ongoing neurosurgical and imaging follow-up.  While C1 was healing normally.  There is no appreciable osseous bridging of the fracture of the dens.    Patient underwent open reduction and internal fixation of C1 fractures, C2 odontoid fracture, associated with bilateral posterior lateral arthrodesis with fusion at C1-C3 in addition to C1-2 and C2-3 laminotomies. Surgery was done on 1/6/2022 by Dr. Morejon .  There are no intraoperative complications.  Neurophysiological monitoring was stable throughout the course of the decompression.        Patient was evaluated by orthopedics for right shoulder follow up. No acute surgical intervention at this time . New Imaging of R shoulder/scapula unremarkable for acute injury, RUE WBAT.  Recommend follow up with a shoulder/elbow orthopedic surgeon - Dr Shine as needed.     Patient was evaluated by PM&R deemed to be a good candidate for acute rehabilitation. Patient now transferred to [facility] for comprehensive acute inpatient rehabilitation admitted on 1/12/2022.    Scheduled Meds:  • acetaminophen  975 mg oral q8h   • amLODIPine  5 mg oral q12h MARIAN   • atorvastatin  40 mg oral Daily (6p)   • cetirizine  5 mg oral Nightly   • [START ON 1/20/2022] dabigatran etexilate  150 mg oral BID   • escitalopram  10 mg oral Daily   • famotidine  20 mg oral BID   •  "fluticasone propionate  2 spray Each Nostril Daily   • guaiFENesin  600 mg oral BID   • heparin (porcine)  5,000 Units subcutaneous q8h MARIAN   • hydrochlorothiazide  12.5 mg oral Daily   • lidocaine  1 patch Topical Daily   • losartan  100 mg oral Daily with dinner   • sennosides-docusate sodium  1 tablet oral BID   • umeclidinium-vilanteroL  1 puff inhalation Daily     Continuous Infusions:  PRN Meds:.albuterol HFA  •  bisacodyL  •  cyclobenzaprine  •  oxyCODONE  •  oxyCODONE  •  polyethylene glycol     Lab Results   Component Value Date    WBC 5.78 01/12/2022    HGB 13.0 (L) 01/12/2022    HCT 39.2 (L) 01/12/2022    MCV 88.9 01/12/2022     01/12/2022       Lab Results   Component Value Date    GLUCOSE 102 (H) 01/12/2022    CALCIUM 9.2 01/12/2022     01/12/2022    K 4.4 01/12/2022    CO2 31 01/12/2022    CL 98 01/12/2022    BUN 15 01/12/2022    CREATININE 0.8 01/12/2022       Subjective: Patient seen and examined no chest pain or shortness of breath, was seen ambulating with PT in the gym with min assist with good safety awareness.    Physical exam:  Physical examination today showed a 71-year-old man in no acute distress.  Patient awake alert obeys commands.     Blood pressure (!) 152/76, pulse 73, temperature 36.8 °C (98.3 °F), temperature source Oral, resp. rate 18, height 1.676 m (5' 6\"), weight 89.7 kg (197 lb 12.8 oz), SpO2 95 %.     Abdominopelvic, skin negative, mucous brains moist.     Neck supple     Heart regular rate     Lungs decreased breath sounds on the right side     Examination was benign     Musculoskeletal exam: Range of motion within functional mid bilateral upper extremity and bilateral extremity except decreased right shoulder abduction.  Patient does agree that he will not allow extremity.  Neuro exam: Mental status as above patient able to be commands.  Cranial nerve examination shows face symmetric, tongue midline, completely intact and visual fields are full.  Motor " examination: Right upper extremity deltoid 3/5, biceps 5/5 and  3/5, finger extension 4/5.  Examination of the right lower extremity hip flexion 4 -/5, quad and ankle dorsiflexion 5/5.  Examination of the left upper extremity 5/5.  Hall of the left lower extremity hip flexion 4/5, quad and ankle dorsiflexion 5/5.  Sensory examination grossly normal.  Deep tendon reflexes are symmetrical.  There was no evidence of cerebellar signs and gait not tested at this time.     Impression:     Ambulatory ADL dysfunction due to:    History of fall last) with growth none displaced odontoid fracture with type II morphology and nonunion now status post ORIF C2, C1-3 laminectomy posterior cervical fusion at multiple levels.     Postoperative anemia     History of atrial fibrillation on Pradaxa     History of coronary artery disease     History of lung cancer status post left lower lobe resection     History of breast cancer     History of COPD/tobacco use     GERD     Hypertension on Norvasc, losartan and hydrochlorothiazide     History of COVID-19     Hyperlipidemia on Lipitor     History of right shoulder rotator cuff repair     History of carpal tunnel syndrome     Obesity followed by dietitian     History of anxiety/depression on Lexapro     Plan:     Patient will continue physical therapy, and Occupational Therapy.  We will consult Dr. Rivera for medical management,  [place] neurology, Dr. Boyd psychiatry and patient also be followed by cardiology.  Patient will be followed by psychology and case management for support.  Patient will continue to use heart cervical collar all the times.  Hall patient precautions include cardiac, fall and orthopedic spinal precautions.  Patient will continue on Xarelto for history of atrial fibrillation.  We will monitor routine labs and additional monitoring healing of the posterior cervical incision closely.     Goals: To improve overall functioning for transfers, ambulation and  ADLs     Extensive length of stay about 2 weeks     Discussed with Dr. Rivera medical consultant, patient and nursing     This patient note has been dictated using speech recognition software.  Inadvertent speech recognition errors should be disregarded. Please do not hesitate to call my office for clarification.

## 2022-01-13 NOTE — PROGRESS NOTES
Patient: Melanie Zelaya  Location: [facility] [address]  MRN: [id number]  Today's date: 1/13/2022    History of Present Illness  Melanie is a 71 y.o. male admitted on 1/11/2022 with S/P cervical spinal fusion [Z98.1]. Principal problem is S/P cervical spinal fusion.      Past Medical History  Melanie has a past medical history of Anxiety, Atrial fibrillation (CMS/HCC), Breast cancer (CMS/HCC), C1 cervical fracture (CMS/HCC), Colon polyp, COPD (chronic obstructive pulmonary disease) (CMS/HCC), Coronary artery calcification seen on CT scan, COVID-19 vaccine series completed, Depression, Diverticulosis, Diverticulosis, Fracture of pelvis (CMS/HCC) (1968), GERD (gastroesophageal reflux disease), Hearing loss, History of colon polyps, History of COVID-19 (2020), Hyperlipidemia, Hypertension, Left atrial enlargement, Lung cancer (CMS/HCC), Lung cancer (CMS/HCC), Pneumonia (2011), Seasonal allergies, and Wrist fracture.      OT Vitals    Date/Time Pulse O2 Therapy BP BP Location BP Method Pt Position [facility]   01/13/22 0932 83 -- 155/75 Right upper arm Automatic Sitting    01/13/22 0936 -- None (Room air) -- -- -- -- NMV      OT Pain    Date/Time Pain Type Location Rating: Rest Interventions [facility]   01/13/22 0932 Pain Assessment neck 6 premedicated for activity    01/13/22 0936 Pain Assessment -- -- -- NMV   01/13/22 1058 Pain Reassessment neck 6 premedicated for activity DM          Prior Living Environment      Most Recent Value   People in Home alone   Current Living Arrangements home   Home Accessibility not wheelchair accessible   Living Environment Comment 3SH, 3 VALERIA LHR, FF to bathroom then another FF to bedroom, tub shower w/ SC   Number of Stairs, Main Entrance 4   Surface of Stairs, Main Entrance concrete   Stair Railings, Main Entrance railing on left side (ascending)   Landing, Stairs, Main Entrance adequate turning radius   Number of Stairs, Second Entrance other (see comments)  [back door  not utilized]   Location, Kitchen first (main) floor   [place], Kitchen tile floor   Location, Laundry Room first (main) floor   [place], Laundry Room concrete floor   Laundry Room Access not wheelchair accessible   Laundry Room Access Comment down in basement,  with full flight and L hand rail   Location, Patient Bedroom other (see comments)  [3rd floor]   [place], Patient Bedroom carpeted floor   Location, Bathroom second floor, must negotiate stairs to access   [place], Bathroom tile floor   Bathroom Access not wheelchair accessible   Bathroom Access Comment Pt reports FB on 2nd floor. Pt has shower chair.   Number of Stairs, Within Home, Primary 12   Surface of Stairs, Within Home, Primary carpeting   Stair Railings, Within Home, Primary railings on both sides of stairs   Stairs Comment, Within Home, Primary to second floor for bathroom   Stairs, Within Home, Secondary 12   Surface of Stairs, Within Home, Secondary carpeting   Stair Railings, Within Home, Secondary railing on left side (ascending)   Stairs Comment, Within Home, Secondary to third floor for bed room          Prior Level of Function      Most Recent Value   Dominant Hand right   Ambulation assistive equipment   Transferring assistive equipment   Toileting independent   Bathing independent   Dressing assistive equipment  [shoe horn]   Eating independent   Prior Level of Function Comment Pt reports prior use of cane in home as needed for ambulation and use of a shower chair for bathing. Pt reports was completing BADLs independently.   Assistive Device Currently Used at Home cane, straight, shower chair          Occupational Profile      Most Recent Value   Occupational History/Life Experiences Pt reports living c  in [facility]. Pt has three (adult) children and grandchildren. Pt has good family support.           IRF OT Evaluation and Treatment - 01/13/22 0950        OT Time Calculation    Start Time 0930     Stop Time 1100     Time  "Calculation (min) 90 min        Session Details    Document Type daily treatment/progress note     Mode of Treatment occupational therapy;individual therapy        General Information    General Observations of Patient Pt. received seated in w/c and agreeable to therapy. Care also provided by PATITO Torres.        Cognition/Psychosocial    Comment, Cognition Educated on spinal precautions and provided acronym \"BLT\".        Range of Motion (ROM)    Shoulder, Right (ROM) RUE WFL.  Min impaired IR 2* prior rotator cuff injury. Functional for ADLs.        Strength (Manual Muscle Testing)    Shoulder, Left (Strength) 4-/5 flexion/abduction   not tested further 2* to spinal precautions    Elbow, Left (Strength) 5/5 flexion/extension     Shoulder, Right (Strength) 4-/5 flexion and abduction   not tested further 2* to spinal precautions    Elbow, Right (Strength) 4/5 flexion/extension        Hand  Strength Testing    Left Hand, Setting 2 38; 40; 47 (Average 41.6)   Norm 64    Right Hand, Setting 2 12; 19; 14 (Average 15)   Norm 75    Left Hand: Tip (Pincer) Pinch Strength 11; 10.5; 16.5 (Average 12.7)   Norm 13    Left Hand: Lateral (Key) Pinch Strength 19; 14; 16 (Average 16.3)   Norm 19    Left Hand: [place] (Reggie) Pinch Strength 14; 13.5; 17 (Average 14.8)   Norm 19    Right Hand: Tip (Pincer) Pinch Strength 9; 9; 11 (average 9.3)   Norm 13    Right Hand: Lateral (Key) Pinch Strength 9.5; 9; 8 (Average 8.8)   Norm 19    Right Hand: [place] (Reggie) Pinch Strength 11.5; 8.5; 8.5 (Average 9.5)   Norm 18       Motor Skills    Coordination 9 Hole Peg Test of Fine Motor Coordination Results     Results, 9 Hole Peg Test of Fine Motor Coordination 52 seconds R hand (26 norm); 35 seconds L hand (26 norm).  Box and Blocks 41 blocks R (66 norm), 54 blocks L (64 norm).   Pt. had carpal tunnel surgery in R hand.       Hand (Therapeutic Exercise)    Exercise Position/Type seated     Weight/Resistance therapy " putty;hand gripper     Comment Pt issued hand gripper and theraputty (medium resistance) for hand strengthening HEP. Pt educated on use of each and provided demonstration. Pt performs x 10 reps each hands c assist to set appropriate resistance for hand gripper. OT educating on HEP h/o for theraputty and pt performs c each hand x several reps. OT educating on performance in room daily.        Lower Body Dressing    Comment Trialed AE for LB dressing. DRESSING STICK: Provided demonstration followed by pt doffing B/L sock with min vc's for technique. SOCKAIDE: Trialed hard sock aide. Pt able to don sock onto sockaide. Min vc's for technique with donning sock onto foot. OT issued LH sponge, reacher, sock aide, dressing stick and LH shoe horn to pt.        Daily Progress Summary (OT)    Daily Outcome Statement Outcome assessments performed this session. Pt presents c residual shoulder weakness and decreased IR ROM 2* prior RC injury, however WFL for BADLs. Pt presents c MIN impaired FMC/GMC and /pinch strength compared to age related norms. OT issuing HEP to address these deficits. Pt benefiting from initial AE training. Recommend ongoing AE training in context of ADL.                           IRF OT Goals      Most Recent Value   Transfer Goal 1    Activity/Assistive Device toilet at 01/12/2022 0840   [place] supervision required  [Cl S] at 01/12/2022 0840   Time Frame short-term goal (STG), 1 week at 01/12/2022 0840   Transfer Goal 2    Activity/Assistive Device toilet at 01/12/2022 0840   [place] modified independence at 01/12/2022 0840   Time Frame long-term goal (LTG), 2 weeks at 01/12/2022 0840   Transfer Goal 3    Activity/Assistive Device shower at 01/12/2022 0840   [place] supervision required  [Cl S] at 01/12/2022 0840   Time Frame short-term goal (STG), 1 week at 01/12/2022 0840   Transfer Goal 4    Activity/Assistive Device shower at 01/12/2022 0840   [place] modified independence at  01/12/2022 0840   Time Frame long-term goal (LTG), 2 weeks at 01/12/2022 0840   Bathing Goal 1    Activity/Assistive Device bathing skills, all at 01/12/2022 0840   [place] minimum assist (75% or more patient effort) at 01/12/2022 0840   Time Frame short-term goal (STG), 1 week at 01/12/2022 0840   Bathing Goal 2    Activity/Assistive Device bathing skills, all at 01/12/2022 0840   [place] modified independence at 01/12/2022 0840   Time Frame long-term goal (LTG), 2 weeks at 01/12/2022 0840   UB Dressing Goal 1    Activity/Assistive Device upper body dressing at 01/12/2022 0840   [place] supervision required  [Cl S] at 01/12/2022 0840   Time Frame short-term goal (STG), 1 week at 01/12/2022 0840   UB Dressing Goal 2    Activity/Assistive Device upper body dressing at 01/12/2022 0840   [place] modified independence at 01/12/2022 0840   Time Frame long-term goal (LTG), 2 weeks at 01/12/2022 0840   LB Dressing Goal 1    Activity/Assistive Device lower body dressing at 01/12/2022 0840   [place] minimum assist (75% or more patient effort) at 01/12/2022 0840   Time Frame short-term goal (STG), 1 week at 01/12/2022 0840   LB Dressing Goal 2    Activity/Assistive Device lower body dressing at 01/12/2022 0840   [place] modified independence at 01/12/2022 0840   Time Frame long-term goal (LTG), 2 weeks at 01/12/2022 0840   Grooming Goal 1    [place] supervision required  [Cl S] at 01/12/2022 0840   Time Frame short-term goal (STG), 1 week at 01/12/2022 0840   Strategies/Barriers Standing at 01/12/2022 0840   Grooming Goal 2    Activity/Assistive Device grooming skills, all at 01/12/2022 0840   [place] modified independence at 01/12/2022 0840   Time Frame long-term goal (LTG), 2 weeks at 01/12/2022 0840   Toileting Goal 1    Activity/Assistive Device toileting skills, all at 01/12/2022 0840   [place] supervision required  [Cl S] at 01/12/2022 0840   Time Frame short-term goal (STG),  1 week at 01/12/2022 0840   Toileting Goal 2    Activity/Assistive Device toileting skills, all at 01/12/2022 0840   [place] modified independence at 01/12/2022 0840   Time Frame long-term goal (LTG), 2 weeks at 01/12/2022 0840

## 2022-01-13 NOTE — PLAN OF CARE
Plan of Care Review  Plan of Care Reviewed With: patient  Progress: improving  Outcome Summary: Alert and oriented x4. Continent of bowel and bladder. PVRs WNLs this shift, order for monitoring completed. [place] collar worn AATs. Cervical incision has foam dressing intact, no drainage or s/s of complication noted. Continues on scheduled Tyleno with PRN Oxycodone requested at HS for severe neck pain, effective. Sleeping quietly in bed overnight. Fall and safety measures maintained.

## 2022-01-13 NOTE — PLAN OF CARE
Plan of Care Review  Plan of Care Reviewed With: patient  Progress: improving  Outcome Summary: alert and oriented x 4.    Continent of bowel and bladder.   Pain managed using multiodal approach to control.     Incision covered with mepilex dressing.   Unable to visualize.    Skin otherwise intact.    [place] collar aats.   Appetite good.   Incentive spironmeter provided and patient used effectively.

## 2022-01-13 NOTE — PROGRESS NOTES
Patient: Melanie Zelaya  Location: [facility] [address]  MRN: [id number]  Today's date: 1/13/2022    History of Present Illness  Melanie is a 71 y.o. male admitted on 1/11/2022 with S/P cervical spinal fusion [Z98.1]. Principal problem is S/P cervical spinal fusion.      Past Medical History  Melanie has a past medical history of Anxiety, Atrial fibrillation (CMS/HCC), Breast cancer (CMS/HCC), C1 cervical fracture (CMS/HCC), Colon polyp, COPD (chronic obstructive pulmonary disease) (CMS/HCC), Coronary artery calcification seen on CT scan, COVID-19 vaccine series completed, Depression, Diverticulosis, Diverticulosis, Fracture of pelvis (CMS/HCC) (1968), GERD (gastroesophageal reflux disease), Hearing loss, History of colon polyps, History of COVID-19 (2020), Hyperlipidemia, Hypertension, Left atrial enlargement, Lung cancer (CMS/HCC), Lung cancer (CMS/HCC), Pneumonia (2011), Seasonal allergies, and Wrist fracture.      PT Vitals    Date/Time Pulse HR Source BP BP Location BP Method Pt Position [facility]   01/13/22 1439 88 Monitor 138/79 Right upper arm Automatic Sitting DT   01/13/22 1554 80 Monitor 160/80 Right upper arm Automatic Sitting DT      PT Pain    Date/Time Pain Type Location Rating: Rest Interventions [facility]   01/13/22 1439 Pain Assessment neck 7 premedicated for activity DT   01/13/22 1554 Pain Assessment neck 7 premedicated for activity DT          Prior Living Environment      Most Recent Value   People in Home alone   Current Living Arrangements home   Home Accessibility not wheelchair accessible   Living Environment Comment 3SH, 3 VALERIA LHR, FF to bathroom then another FF to bedroom, tub shower w/ SC   Number of Stairs, Main Entrance 4   Surface of Stairs, Main Entrance concrete   Stair Railings, Main Entrance railing on left side (ascending)   Landing, Stairs, Main Entrance adequate turning radius   Number of Stairs, Second Entrance other (see comments)  [back door not utilized]    Location, Kitchen first (main) floor   [place], Kitchen tile floor   Location, Laundry Room first (main) floor   [place], Laundry Room concrete floor   Laundry Room Access not wheelchair accessible   Laundry Room Access Comment down in basement,  with full flight and L hand rail   Location, Patient Bedroom other (see comments)  [3rd floor]   [place], Patient Bedroom carpeted floor   Location, Bathroom second floor, must negotiate stairs to access   [place], Bathroom tile floor   Bathroom Access not wheelchair accessible   Bathroom Access Comment Pt reports FB on 2nd floor. Pt has shower chair.   Number of Stairs, Within Home, Primary 12   Surface of Stairs, Within Home, Primary carpeting   Stair Railings, Within Home, Primary railings on both sides of stairs   Stairs Comment, Within Home, Primary to second floor for bathroom   Stairs, Within Home, Secondary 12   Surface of Stairs, Within Home, Secondary carpeting   Stair Railings, Within Home, Secondary railing on left side (ascending)   Stairs Comment, Within Home, Secondary to third floor for bed room          Prior Level of Function      Most Recent Value   Dominant Hand right   Ambulation assistive equipment   Transferring assistive equipment   Toileting independent   Bathing independent   Dressing assistive equipment  [shoe horn]   Eating independent   Prior Level of Function Comment Pt reports prior use of cane in home as needed for ambulation and use of a shower chair for bathing. Pt reports was completing BADLs independently.   Assistive Device Currently Used at Home cane, straight, shower chair           IRF PT Evaluation and Treatment - 01/13/22 1438        PT Time Calculation    Start Time 1430     Stop Time 1600     Time Calculation (min) 90 min        Session Details    Document Type daily treatment/progress note     Mode of Treatment individual therapy;physical therapy        General Information    Patient Profile Reviewed yes     General  Observations of Patient received in therapy gym, willing to participate        Transfers    Transfers stand pivot transfer        Sit to Stand Transfer    [place], Sit to Stand Transfer minimum assist (75% or more patient effort)     Verbal Cues preparatory posture     Assistive Device walker, front-wheeled;gait belt     Comment [place] for improved pelvic lift and steadying upon standing        Stand to Sit Transfer    [place], Stand to Sit Transfer minimum assist (75% or more patient effort)     Verbal Cues safety;technique     Assistive Device walker, front-wheeled;gait belt     Comment for guiding pelvis to w/c        Stand Pivot Transfer    [place], Stand Pivot/Stand Step Transfer minimum assist (75% or more patient effort)     Verbal Cues preparatory posture     Assistive Device gait belt;walker, front-wheeled     Comment amb approach to w/c with [place] for steadying at pelvis to improve lateral weight shifting        Car Transfer    Transfer Technique stand pivot;sit-stand;stand-sit     [place], Car Transfer minimum assist (75% or more patient effort)     Verbal Cues safety;technique     Assistive Device walker, front-wheeled;gait belt     Comment [place] for steadying at pelvis during SPT with amb approach and [place] for guiding pelvis to car seat with verbal cuing for hand placement.  Pt with CL S for BLE management into and out of vehicle        Gait Training    [place], Gait minimum assist (75% or more patient effort)     Assistive Device walker, front-wheeled     Distance in Feet 75 feet     Pattern (Gait) step-through     Deviations/Abnormal Patterns (Gait) base of support, narrow;weight shifting decreased;step length decreased;stride length decreased     Comment (Gait/Stairs) + 15ft x2 + 50ft with [place] for steadying at pelvis for lateral weight shifting (+20ft x3 with Timed up and Go)        Stairs Training    [place], Stairs moderate assist (50-74% patient effort)     Assistive  Device gait belt;railing     Handrail Location (Stairs) both sides     Number of Stairs 8     Stair Height 6 inches     Ascending Stairs Technique step-to-step   RLE leading    Descending Stairs Technique step-to-step   RLE leading backwards    Comment modA for maintaining balance at pelvis with gait belt and verbal cuing for maintaining foot placement on whole step        Balance    Balance Test Results Timed Up and Go Test (TUG)        Timed Up and Go Test    Trial One: Timed Up and Go Test 58     Trial Two: Timed Up and Go Test 59     Trial Three: Timed Up and Go Test 44     Mean of 3 Trials: Timed Up and Go Test [id number]     Comment, Timed Up and Go Test completed with RW and [place] for steadying        Lower Extremity (Therapeutic Exercise)    Comment 1) sit <> stand 5x with emphasis on anterior trunk lean and BUE use to push up from w/c. 2) seated: B APs 2x15, B LAQs 2x15, B seated marches 2x15, hip adduction with 3 second hold 2x15, B hip abd with pink tb 2x15        Aerobic Exercise    Type pedal exerciser     Time Performed 10     Comment BLE on Motomed        Daily Progress Summary (PT)    Daily Outcome Statement Pt trialed elevations and car transfers and demos improvement in functional mobility.  Pt performed Timed Up and Go and result of 53.6 seconds indicates increased risk for falls as result is below the age-gender norm for community ambulation.  Pt will continue to improve with elevation training and BLE strengthening to improve functional independence.                           IRF PT Goals      Most Recent Value   Bed Mobility Goal 1    Activity/Assistive Device rolling to right, rolling to left at 01/12/2022 0831   [place] supervision required at 01/12/2022 0831   Time Frame short-term goal (STG), 5 - 7 days at 01/12/2022 0831   Bed Mobility Goal 2    Activity/Assistive Device rolling to left, rolling to right at 01/12/2022 0831   [place] modified independence at 01/12/2022  0831   Time Frame long-term goal (LTG), 21 days or less at 01/12/2022 0831   Bed Mobility Goal 3    Activity/Assistive Device sit to supine/supine to sit at 01/12/2022 0831   [place] other (see comments)  [touching assist] at 01/12/2022 0831   Time Frame short-term goal (STG), 5 - 7 days at 01/12/2022 0831   Bed Mobility Goal 4    Activity/Assistive Device sit to supine/supine to sit at 01/12/2022 0831   [place] modified independence at 01/12/2022 0831   Time Frame long-term goal (LTG), 21 days or less at 01/12/2022 0831   Transfer Goal 1    Activity/Assistive Device sit-to-stand/stand-to-sit at 01/12/2022 0831   [place] other (see comments)  [touching assist] at 01/12/2022 0831   Time Frame short-term goal (STG), 5 - 7 days at 01/12/2022 0831   Transfer Goal 2    Activity/Assistive Device sit-to-stand/stand-to-sit at 01/12/2022 0831   [place] modified independence at 01/12/2022 0831   Time Frame 21 days or less, long-term goal (LTG) at 01/12/2022 0831   Transfer Goal 3    Activity/Assistive Device stand pivot at 01/12/2022 0831   [place] other (see comments)  [touching assist] at 01/12/2022 0831   Time Frame short-term goal (STG), 5 - 7 days at 01/12/2022 0831   Transfer Goal 4    Activity/Assistive Device stand pivot at 01/12/2022 0831   [place] modified independence at 01/12/2022 0831   Time Frame long-term goal (LTG), 21 days or less at 01/12/2022 0831   Gait/Walking Locomotion Goal 1    Activity/Assistive Device gait (walking locomotion), assistive device use at 01/12/2022 0831   Distance 50 feet at 01/12/2022 0831   [place] other (see comments)  [touching assist] at 01/12/2022 0831   Time Frame short-term goal (STG), 5 - 7 days at 01/12/2022 0831   Gait/Walking Locomotion Goal 2    Activity/Assistive Device gait (walking locomotion), assistive device use at 01/12/2022 0831   Distance 150 feet at 01/12/2022 0831   [place] modified independence at 01/12/2022 0831   Time  Frame long-term goal (LTG), 21 days or less at 01/12/2022 0876

## 2022-01-13 NOTE — PROGRESS NOTES
Individualized Plan of Care    Melanie Zelaya is admitted to [facility] for comprehensive inpatient rehabilitation for Ortho with functional deficits in mobility; motor dysfunction; safety; self-care. Patient is receiving the following services: medical consultative services; physical therapy; occupational therapy.    Frequency of Treatment (OT): 5-7 times per week,60-90 minutes per day    Frequency of Treatment (PT): 5-7 times per week,60-90 minutes per day           Active medical management is required for  Patient Active Problem List   Diagnosis   • Anxiety   • Atrial fibrillation (CMS/HCC)   • Chronic diastolic heart failure (CMS/HCC)   • Hearing loss   • Primary malignant neoplasm of left lower lobe of lung (CMS/HCC)   • Multiple pulmonary nodules   • Other emphysema (CMS/HCC)   • Gastroesophageal reflux disease without esophagitis   • Asthma   • Cervical spine fracture (CMS/HCC)   • Hypertension   • Mild episode of recurrent major depressive disorder (CMS/HCC)   • Preop examination   • Coronary artery calcification seen on CT scan   • COPD (chronic obstructive pulmonary disease) (CMS/HCC)   • Prediabetes   • History of cervical fracture   • H/O cervical fracture   • Closed nondisplaced odontoid fracture with type II morphology and delayed healing   • S/P cervical spinal fusion       Risk for Complications  DVT: High  Falls: High      The patient's medical prognosis is good to achieve the stated goals below.    Expected Level of Function  Expected Functional Improvement: mobility; motor dysfunction; safety; self-care  Self-Care: Supervision or touching assistance  Sphincter Control: Independent  Transfers: Independent  Locomotion: Independent  Communication: Independent  Social Cognition: Independent      Goals  IRF PT Goals      Most Recent Value   Bed Mobility Goal 1    Activity/Assistive Device rolling to right, rolling to left at 01/12/2022 0831   [place] supervision required  at 01/12/2022 0831   Time Frame short-term goal (STG), 5 - 7 days at 01/12/2022 0831   Bed Mobility Goal 2    Activity/Assistive Device rolling to left, rolling to right at 01/12/2022 0831   [place] modified independence at 01/12/2022 0831   Time Frame long-term goal (LTG), 21 days or less at 01/12/2022 0831   Bed Mobility Goal 3    Activity/Assistive Device sit to supine/supine to sit at 01/12/2022 0831   [place] other (see comments)  [touching assist] at 01/12/2022 0831   Time Frame short-term goal (STG), 5 - 7 days at 01/12/2022 0831   Bed Mobility Goal 4    Activity/Assistive Device sit to supine/supine to sit at 01/12/2022 0831   [place] modified independence at 01/12/2022 0831   Time Frame long-term goal (LTG), 21 days or less at 01/12/2022 0831   Transfer Goal 1    Activity/Assistive Device sit-to-stand/stand-to-sit at 01/12/2022 0831   [place] other (see comments)  [touching assist] at 01/12/2022 0831   Time Frame short-term goal (STG), 5 - 7 days at 01/12/2022 0831   Transfer Goal 2    Activity/Assistive Device sit-to-stand/stand-to-sit at 01/12/2022 0831   [place] modified independence at 01/12/2022 0831   Time Frame 21 days or less, long-term goal (LTG) at 01/12/2022 0831   Transfer Goal 3    Activity/Assistive Device stand pivot at 01/12/2022 0831   [place] other (see comments)  [touching assist] at 01/12/2022 0831   Time Frame short-term goal (STG), 5 - 7 days at 01/12/2022 0831   Transfer Goal 4    Activity/Assistive Device stand pivot at 01/12/2022 0831   [place] modified independence at 01/12/2022 0831   Time Frame long-term goal (LTG), 21 days or less at 01/12/2022 0831   Gait/Walking Locomotion Goal 1    Activity/Assistive Device gait (walking locomotion), assistive device use at 01/12/2022 0831   Distance 50 feet at 01/12/2022 0831   [place] other (see comments)  [touching assist] at 01/12/2022 0831   Time Frame short-term goal (STG), 5 - 7 days at  01/12/2022 0831   Gait/Walking Locomotion Goal 2    Activity/Assistive Device gait (walking locomotion), assistive device use at 01/12/2022 0831   Distance 150 feet at 01/12/2022 0831   [place] modified independence at 01/12/2022 0831   Time Frame long-term goal (LTG), 21 days or less at 01/12/2022 0831        IRF OT Goals      Most Recent Value   Transfer Goal 1    Activity/Assistive Device toilet at 01/12/2022 0840   [place] supervision required  [Cl S] at 01/12/2022 0840   Time Frame short-term goal (STG), 1 week at 01/12/2022 0840   Transfer Goal 2    Activity/Assistive Device toilet at 01/12/2022 0840   [place] modified independence at 01/12/2022 0840   Time Frame long-term goal (LTG), 2 weeks at 01/12/2022 0840   Transfer Goal 3    Activity/Assistive Device shower at 01/12/2022 0840   [place] supervision required  [Cl S] at 01/12/2022 0840   Time Frame short-term goal (STG), 1 week at 01/12/2022 0840   Transfer Goal 4    Activity/Assistive Device shower at 01/12/2022 0840   [place] modified independence at 01/12/2022 0840   Time Frame long-term goal (LTG), 2 weeks at 01/12/2022 0840   Bathing Goal 1    Activity/Assistive Device bathing skills, all at 01/12/2022 0840   [place] minimum assist (75% or more patient effort) at 01/12/2022 0840   Time Frame short-term goal (STG), 1 week at 01/12/2022 0840   Bathing Goal 2    Activity/Assistive Device bathing skills, all at 01/12/2022 0840   [place] modified independence at 01/12/2022 0840   Time Frame long-term goal (LTG), 2 weeks at 01/12/2022 0840   UB Dressing Goal 1    Activity/Assistive Device upper body dressing at 01/12/2022 0840   [place] supervision required  [Cl S] at 01/12/2022 0840   Time Frame short-term goal (STG), 1 week at 01/12/2022 0840   UB Dressing Goal 2    Activity/Assistive Device upper body dressing at 01/12/2022 0840   [place] modified independence at 01/12/2022 0840   Time Frame long-term goal  (LTG), 2 weeks at 01/12/2022 0840   LB Dressing Goal 1    Activity/Assistive Device lower body dressing at 01/12/2022 0840   [place] minimum assist (75% or more patient effort) at 01/12/2022 0840   Time Frame short-term goal (STG), 1 week at 01/12/2022 0840   LB Dressing Goal 2    Activity/Assistive Device lower body dressing at 01/12/2022 0840   [place] modified independence at 01/12/2022 0840   Time Frame long-term goal (LTG), 2 weeks at 01/12/2022 0840   Grooming Goal 1    [place] supervision required  [Cl S] at 01/12/2022 0840   Time Frame short-term goal (STG), 1 week at 01/12/2022 0840   Strategies/Barriers Standing at 01/12/2022 0840   Grooming Goal 2    Activity/Assistive Device grooming skills, all at 01/12/2022 0840   [place] modified independence at 01/12/2022 0840   Time Frame long-term goal (LTG), 2 weeks at 01/12/2022 0840   Toileting Goal 1    Activity/Assistive Device toileting skills, all at 01/12/2022 0840   [place] supervision required  [Cl S] at 01/12/2022 0840   Time Frame short-term goal (STG), 1 week at 01/12/2022 0840   Toileting Goal 2    Activity/Assistive Device toileting skills, all at 01/12/2022 0840   [place] modified independence at 01/12/2022 0840   Time Frame long-term goal (LTG), 2 weeks at 01/12/2022 0840          Anticipated Discharge Plan  Anticipated Discharge Disposition: home with home health  Type of Home Care Services: home OT; nursing; home PT      Expected length of stay: 14 days

## 2022-01-14 ENCOUNTER — APPOINTMENT (INPATIENT)
Dept: PHYSICAL THERAPY | Facility: REHABILITATION | Age: 72
DRG: 565 | End: 2022-01-14
Payer: COMMERCIAL

## 2022-01-14 ENCOUNTER — APPOINTMENT (INPATIENT)
Dept: OCCUPATIONAL THERAPY | Facility: REHABILITATION | Age: 72
DRG: 565 | End: 2022-01-14
Payer: COMMERCIAL

## 2022-01-14 PROCEDURE — 12800001 HC ROOM AND CARE SEMIPRIVATE REHAB-BRAIN INJ

## 2022-01-14 PROCEDURE — 97110 THERAPEUTIC EXERCISES: CPT | Mod: GO

## 2022-01-14 PROCEDURE — 97116 GAIT TRAINING THERAPY: CPT | Mod: GP

## 2022-01-14 PROCEDURE — 63600000 HC DRUGS/DETAIL CODE: Performed by: INTERNAL MEDICINE

## 2022-01-14 PROCEDURE — 97110 THERAPEUTIC EXERCISES: CPT | Mod: GP

## 2022-01-14 PROCEDURE — 63700000 HC SELF-ADMINISTRABLE DRUG: Performed by: INTERNAL MEDICINE

## 2022-01-14 PROCEDURE — L0172 CERV COL SR FOAM 2PC PRE OTS: HCPCS

## 2022-01-14 PROCEDURE — 97530 THERAPEUTIC ACTIVITIES: CPT | Mod: GO

## 2022-01-14 RX ADMIN — FLUTICASONE PROPIONATE 2 SPRAY: 50 SPRAY, METERED NASAL at 08:01

## 2022-01-14 RX ADMIN — ACETAMINOPHEN 975 MG: 325 TABLET, FILM COATED ORAL at 05:22

## 2022-01-14 RX ADMIN — HEPARIN SODIUM 5000 UNITS: 5000 INJECTION, SOLUTION INTRAVENOUS; SUBCUTANEOUS at 05:22

## 2022-01-14 RX ADMIN — ESCITALOPRAM OXALATE 10 MG: 10 TABLET, FILM COATED ORAL at 07:56

## 2022-01-14 RX ADMIN — SENNOSIDES AND DOCUSATE SODIUM 1 TABLET: 50; 8.6 TABLET ORAL at 07:56

## 2022-01-14 RX ADMIN — HEPARIN SODIUM 5000 UNITS: 5000 INJECTION, SOLUTION INTRAVENOUS; SUBCUTANEOUS at 15:04

## 2022-01-14 RX ADMIN — SENNOSIDES AND DOCUSATE SODIUM 1 TABLET: 50; 8.6 TABLET ORAL at 20:29

## 2022-01-14 RX ADMIN — AMLODIPINE BESYLATE 5 MG: 5 TABLET ORAL at 08:15

## 2022-01-14 RX ADMIN — ACETAMINOPHEN 975 MG: 325 TABLET, FILM COATED ORAL at 15:04

## 2022-01-14 RX ADMIN — OXYCODONE HYDROCHLORIDE 10 MG: 5 TABLET ORAL at 20:28

## 2022-01-14 RX ADMIN — LIDOCAINE 1 PATCH: 246 PATCH TOPICAL at 07:57

## 2022-01-14 RX ADMIN — FAMOTIDINE 20 MG: 20 TABLET ORAL at 07:56

## 2022-01-14 RX ADMIN — OXYCODONE HYDROCHLORIDE 10 MG: 5 TABLET ORAL at 02:35

## 2022-01-14 RX ADMIN — FAMOTIDINE 20 MG: 20 TABLET ORAL at 20:28

## 2022-01-14 RX ADMIN — ACETAMINOPHEN 975 MG: 325 TABLET, FILM COATED ORAL at 21:42

## 2022-01-14 RX ADMIN — HEPARIN SODIUM 5000 UNITS: 5000 INJECTION, SOLUTION INTRAVENOUS; SUBCUTANEOUS at 21:42

## 2022-01-14 RX ADMIN — LOSARTAN POTASSIUM 100 MG: 100 TABLET, FILM COATED ORAL at 16:44

## 2022-01-14 RX ADMIN — GUAIFENESIN 600 MG: 600 TABLET, EXTENDED RELEASE ORAL at 07:57

## 2022-01-14 RX ADMIN — ATORVASTATIN CALCIUM 40 MG: 40 TABLET, FILM COATED ORAL at 16:44

## 2022-01-14 RX ADMIN — AMLODIPINE BESYLATE 5 MG: 5 TABLET ORAL at 20:28

## 2022-01-14 RX ADMIN — OXYCODONE HYDROCHLORIDE 5 MG: 5 TABLET ORAL at 07:57

## 2022-01-14 RX ADMIN — HYDROCHLOROTHIAZIDE 12.5 MG: 12.5 TABLET ORAL at 07:57

## 2022-01-14 RX ADMIN — GUAIFENESIN 600 MG: 600 TABLET, EXTENDED RELEASE ORAL at 20:29

## 2022-01-14 RX ADMIN — CETIRIZINE HYDROCHLORIDE 5 MG: 5 TABLET ORAL at 20:29

## 2022-01-14 NOTE — PROGRESS NOTES
Patient: Melanie Zelaya  Location: [facility] [address]  MRN: [id number]  Today's date: 1/14/2022    History of Present Illness  Melanie is a 71 y.o. male admitted on 1/11/2022 with S/P cervical spinal fusion [Z98.1]. Principal problem is S/P cervical spinal fusion.      Past Medical History  Melanie has a past medical history of Anxiety, Atrial fibrillation (CMS/HCC), Breast cancer (CMS/HCC), C1 cervical fracture (CMS/HCC), Colon polyp, COPD (chronic obstructive pulmonary disease) (CMS/HCC), Coronary artery calcification seen on CT scan, COVID-19 vaccine series completed, Depression, Diverticulosis, Diverticulosis, Fracture of pelvis (CMS/HCC) (1968), GERD (gastroesophageal reflux disease), Hearing loss, History of colon polyps, History of COVID-19 (2020), Hyperlipidemia, Hypertension, Left atrial enlargement, Lung cancer (CMS/HCC), Lung cancer (CMS/HCC), Pneumonia (2011), Seasonal allergies, and Wrist fracture.      OT Vitals    Date/Time Pulse BP BP Location BP Method Pt Position [facility]   01/14/22 0904 87 127/68 Right upper arm Manual Sitting DM      OT Pain    Date/Time Pain Type Side/Orientation Location Rating: Rest Interventions [facility]   01/14/22 0904 Pain Assessment -- neck 6 premedicated for activity    01/14/22 1029 Pain Reassessment upper back 6 premedicated for activity DM          Prior Living Environment      Most Recent Value   People in Home alone   Current Living Arrangements home   Home Accessibility not wheelchair accessible   Living Environment Comment 3SH, 3 VALERIA LHR, FF to bathroom then another FF to bedroom, tub shower w/ SC   Number of Stairs, Main Entrance 4   Surface of Stairs, Main Entrance concrete   Stair Railings, Main Entrance railing on left side (ascending)   Landing, Stairs, Main Entrance adequate turning radius   Number of Stairs, Second Entrance other (see comments)  [back door not utilized]   Location, Kitchen first (main) floor   [place], Kitchen tile floor    Location, Laundry Room first (main) floor   [place], Laundry Room concrete floor   Laundry Room Access not wheelchair accessible   Laundry Room Access Comment down in basement,  with full flight and L hand rail   Location, Patient Bedroom other (see comments)  [3rd floor]   [place], Patient Bedroom carpeted floor   Location, Bathroom second floor, must negotiate stairs to access   [place], Bathroom tile floor   Bathroom Access not wheelchair accessible   Bathroom Access Comment Pt reports FB on 2nd floor. Pt has shower chair.   Number of Stairs, Within Home, Primary 12   Surface of Stairs, Within Home, Primary carpeting   Stair Railings, Within Home, Primary railings on both sides of stairs   Stairs Comment, Within Home, Primary to second floor for bathroom   Stairs, Within Home, Secondary 12   Surface of Stairs, Within Home, Secondary carpeting   Stair Railings, Within Home, Secondary railing on left side (ascending)   Stairs Comment, Within Home, Secondary to third floor for bed room          Prior Level of Function      Most Recent Value   Dominant Hand right   Ambulation assistive equipment   Transferring assistive equipment   Toileting independent   Bathing independent   Dressing assistive equipment  [shoe horn]   Eating independent   Prior Level of Function Comment Pt reports prior use of cane in home as needed for ambulation and use of a shower chair for bathing. Pt reports was completing BADLs independently.   Assistive Device Currently Used at Home cane, straight, shower chair          Occupational Profile      Most Recent Value   Successful Occupations Pt reports not a  within past year but would like to return to driving in future.   Occupational History/Life Experiences Pt reports living c  in [facility]. Pt has three (adult) children and grandchildren. Pt has good family support.           IRF OT Evaluation and Treatment - 01/14/22 0912        OT Time Calculation    Start Time 0900     Stop  Time 1030     Time Calculation (min) 90 min        Session Details    Document Type daily treatment/progress note     Mode of Treatment occupational therapy;individual therapy        General Information    General Observations of Patient Pt. received seated in w/c and agreeable to therapy.        Bed Mobility    Comment (Bed Mobility) Cl S sit to/from supine in standard bed in ILU for bed mobility training. Pt able to recall log roll technique and utilize for safe bed mobility.        Transfers    Transfers tub transfer        Sit to Stand Transfer    [place], Sit to Stand Transfer touching/steadying assist     Verbal Cues safety     Assistive Device walker, front-wheeled     Comment from w/c and from EOB c VC for hand placement        Stand Pivot Transfer    [place], Stand Pivot/Stand Step Transfer touching/steadying assist     Verbal Cues safety     Assistive Device walker, front-wheeled     Comment Ambulatory approach to w/c, to EOB and to recliner chair in ILU.        Toilet Transfer    Transfer Technique stand pivot     [place], Toilet Transfer touching/steadying assist     Verbal Cues hand placement;safety;technique     Assistive Device walker, front-wheeled;grab bars/safety frame     Comment Transfer training in ILU. Pt educated on recommednation on use c safety frame to increase independence c toilet transfer. Pt reports vanity and other structure in close proximity for use to steady while performing toilet transfer in home. Pt agreeable to recommendations and reports that he will look into DME if needed.        Tub Transfer    Transfer Technique step over, left entry     [place], Tub Transfer minimum assist (75% or more patient effort)     Verbal Cues hand placement;safety;preparatory posture;technique;proper use of assistive device     Assistive Device walker, front-wheeled;grab bars/tub rail;shower chair     Comment Transfer training in ILU. Pt educated on safe tub transfer techniques.  Pt reports has shower chair and grab bar along interior wall of tub. Ambulatory approach and side stepping into tub c mod verbal cues for technique. Completed 2 trials utilizing  verticle grab bar along anterior wall of tub during second trial c increased safety. OT recommending placement of additional grab bar in that location, pt agreeable.        Safety Issues, Functional Mobility    Comment, Safety Issues/Impairments (Mobility) MIN A ambulating c gait belt and RW in ILU for transfer training.        Therapeutic Interventions    Comment, Therapeutic Intervention OT assisting pt problem solve pet care and laundry. Pt reports has yard for dog and has arranged for neighbor to take dog on walks occasionally. Pt reports dog bowls placed on elevated surface for ease of placement. OT educating on placing dog bowls near counter to use counter as support while bending to place dog bowls while maintaining spinal precautions. Pt reports laundry in basement and reports neice able to assist c laundry weekly.        Upper Extremity (Therapeutic Exercise)    Exercise Position/Type seated     General Exercise pendulums;bilateral     Comment Seated in w/c c B armrests removed, pt performs B pendulums for pain management and OT providing sustained stretch in B scap depression for stretch to upper traps. OT providing gentle massage to B upper traps for pain management.        Care of Pets/Service Animals (IADLs)    Care of Pets/Service Animals arranging care;providing care        Community Mobility (IADLs)    Community Mobility pre-driving assessment        Pre-Driving Assessment    Medication Issues Pt unable to drive at this time 2* spinal precautions and in [place] collar. OT educating pt on recommendation of handicap placard for ease of community mobility c apts. Pt agreeable and OT to initiate handicap placard process.        Meal Preparation (IADLs)    Meal Preparation contact guard assist;verbal cues;obtaining items/supplies      Comment, Meal Preparation Pt participating in safe item retrieval training in ILU kitchen. Pt educated on safe RW use and body positioning, educated on transporting task items via sliding on counter technique and using RW bag or tray, educated on rearranging environment to ensure task items within arms reach. Demo provided following education for safe retrieval of 4 task items from different locations in kitchen. Pt then completes item retrieval ambulatory level c RW and touching A. Pt requiring MIN VCs for carryover of education while completing task.        Daily Progress Summary (OT)    Daily Outcome Statement Session focused on transfer training and [employer]chem mobility in ILU.  Pt educated on proper DME options and agreeable to recommendations. Pt presenting c decreased activity tolerance c noted fatigue and increased pain following interventions. Recommend ongoing functinoal endurance training and gentle ROM to BUEs for improved tolerance c reaching tasks.                           IRF OT Goals      Most Recent Value   Transfer Goal 1    Activity/Assistive Device toilet at 01/12/2022 0840   [place] supervision required  [Cl S] at 01/12/2022 0840   Time Frame short-term goal (STG), 1 week at 01/12/2022 0840   Transfer Goal 2    Activity/Assistive Device toilet at 01/12/2022 0840   [place] modified independence at 01/12/2022 0840   Time Frame long-term goal (LTG), 2 weeks at 01/12/2022 0840   Transfer Goal 3    Activity/Assistive Device shower at 01/12/2022 0840   [place] supervision required  [Cl S] at 01/12/2022 0840   Time Frame short-term goal (STG), 1 week at 01/12/2022 0840   Transfer Goal 4    Activity/Assistive Device shower at 01/12/2022 0840   [place] modified independence at 01/12/2022 0840   Time Frame long-term goal (LTG), 2 weeks at 01/12/2022 0840   Bathing Goal 1    Activity/Assistive Device bathing skills, all at 01/12/2022 0840   [place] minimum assist (75% or more  patient effort) at 01/12/2022 0840   Time Frame short-term goal (STG), 1 week at 01/12/2022 0840   Bathing Goal 2    Activity/Assistive Device bathing skills, all at 01/12/2022 0840   [place] modified independence at 01/12/2022 0840   Time Frame long-term goal (LTG), 2 weeks at 01/12/2022 0840   UB Dressing Goal 1    Activity/Assistive Device upper body dressing at 01/12/2022 0840   [place] supervision required  [Cl S] at 01/12/2022 0840   Time Frame short-term goal (STG), 1 week at 01/12/2022 0840   UB Dressing Goal 2    Activity/Assistive Device upper body dressing at 01/12/2022 0840   [place] modified independence at 01/12/2022 0840   Time Frame long-term goal (LTG), 2 weeks at 01/12/2022 0840   LB Dressing Goal 1    Activity/Assistive Device lower body dressing at 01/12/2022 0840   [place] minimum assist (75% or more patient effort) at 01/12/2022 0840   Time Frame short-term goal (STG), 1 week at 01/12/2022 0840   LB Dressing Goal 2    Activity/Assistive Device lower body dressing at 01/12/2022 0840   [place] modified independence at 01/12/2022 0840   Time Frame long-term goal (LTG), 2 weeks at 01/12/2022 0840   Grooming Goal 1    [place] supervision required  [Cl S] at 01/12/2022 0840   Time Frame short-term goal (STG), 1 week at 01/12/2022 0840   Strategies/Barriers Standing at 01/12/2022 0840   Grooming Goal 2    Activity/Assistive Device grooming skills, all at 01/12/2022 0840   [place] modified independence at 01/12/2022 0840   Time Frame long-term goal (LTG), 2 weeks at 01/12/2022 0840   Toileting Goal 1    Activity/Assistive Device toileting skills, all at 01/12/2022 0840   [place] supervision required  [Cl S] at 01/12/2022 0840   Time Frame short-term goal (STG), 1 week at 01/12/2022 0840   Toileting Goal 2    Activity/Assistive Device toileting skills, all at 01/12/2022 0840   [place] modified independence at 01/12/2022 0840   Time Frame long-term goal (LTG),  2 weeks at 01/12/2022 0840

## 2022-01-14 NOTE — PLAN OF CARE
Plan of Care Review  Plan of Care Reviewed With: patient  Progress: improving  Outcome Summary: Patient relatively new to [facility] 3 days since admission. Pt participates in rehab program daily. Using [place] collar appropriately. Reports only mild to moderate neck/shoulder pain. Using lidocaine patches this am and oxycodone for pain control. Continues to gain strength and endurance.

## 2022-01-14 NOTE — PROGRESS NOTES
History of present illness:     71 y.o. male with a PMHx significant for atrial fibrillation, breast cancer, COPD, diverticulosis, acid reflux, history of COVID-19, hyperlipidemia, hypertension, lung cancer, history of right shoulder rotator cuff repair, and history of right-sided carpal tunnel syndrome, who fell from a ladder on 04/23/2021 associated with loss of consciousness. He was found to have fractures of the anterior and posterior arches of C1, base of the dens of C2, right scapula, and left occipital condyle.  He was placed in a hard cervical collar and has had ongoing neurosurgical and imaging follow-up.  While C1 was healing normally.  There is no appreciable osseous bridging of the fracture of the dens.    Patient underwent open reduction and internal fixation of C1 fractures, C2 odontoid fracture, associated with bilateral posterior lateral arthrodesis with fusion at C1-C3 in addition to C1-2 and C2-3 laminotomies. Surgery was done on 1/6/2022 by Dr. Morejon .  There are no intraoperative complications.  Neurophysiological monitoring was stable throughout the course of the decompression.        Patient was evaluated by orthopedics for right shoulder follow up. No acute surgical intervention at this time . New Imaging of R shoulder/scapula unremarkable for acute injury, RUE WBAT.  Recommend follow up with a shoulder/elbow orthopedic surgeon - Dr Shine as needed.     Patient was evaluated by PM&R deemed to be a good candidate for acute rehabilitation. Patient now transferred to [facility] for comprehensive acute inpatient rehabilitation admitted on 1/12/2022.    Scheduled Meds:  • acetaminophen  975 mg oral q8h   • amLODIPine  5 mg oral q12h MARIAN   • atorvastatin  40 mg oral Daily (6p)   • cetirizine  5 mg oral Nightly   • [START ON 1/20/2022] dabigatran etexilate  150 mg oral BID   • escitalopram  10 mg oral Daily   • famotidine  20 mg oral BID   • fluticasone propionate  2 spray Each Nostril Daily   •  "guaiFENesin  600 mg oral BID   • heparin (porcine)  5,000 Units subcutaneous q8h MARIAN   • hydrochlorothiazide  12.5 mg oral Daily   • lidocaine  1 patch Topical Daily   • losartan  100 mg oral Daily with dinner   • sennosides-docusate sodium  1 tablet oral BID   • umeclidinium-vilanteroL  1 puff inhalation Daily     Continuous Infusions:  PRN Meds:.albuterol HFA  •  bisacodyL  •  cyclobenzaprine  •  oxyCODONE  •  oxyCODONE  •  polyethylene glycol     Lab Results   Component Value Date    WBC 5.78 01/12/2022    HGB 13.0 (L) 01/12/2022    HCT 39.2 (L) 01/12/2022    MCV 88.9 01/12/2022     01/12/2022       Lab Results   Component Value Date    GLUCOSE 102 (H) 01/12/2022    CALCIUM 9.2 01/12/2022     01/12/2022    K 4.4 01/12/2022    CO2 31 01/12/2022    CL 98 01/12/2022    BUN 15 01/12/2022    CREATININE 0.8 01/12/2022       Subjective: Patient seen and examined no chest pain or shortness of breath, tolerating therapies, had good nasal BP       Physical exam:  Physical examination today showed a 71-year-old man in no acute distress.  Patient awake alert obeys commands.     Blood pressure 126/65, pulse 67, temperature 37.2 °C (98.9 °F), temperature source Oral, resp. rate 18, height 1.676 m (5' 6\"), weight 89.7 kg (197 lb 12.8 oz), SpO2 94 %.       Abdominopelvic, skin negative, mucous brains moist.     Neck supple     Heart regular rate     Lungs decreased breath sounds on the right side     Examination was benign     Musculoskeletal exam: Range of motion within functional mid bilateral upper extremity and bilateral extremity except decreased right shoulder abduction.  Patient does agree that he will not allow extremity.  Neuro exam: Mental status as above patient able to be commands.  Cranial nerve examination shows face symmetric, tongue midline, completely intact and visual fields are full.  Motor examination: Right upper extremity deltoid 3/5, biceps 5/5 and  3/5, finger extension 4/5.  Examination " of the right lower extremity hip flexion 4 -/5, quad and ankle dorsiflexion 5/5.  Examination of the left upper extremity 5/5.  [place] of the left lower extremity hip flexion 4/5, quad and ankle dorsiflexion 5/5.  Sensory examination grossly normal.  Deep tendon reflexes are symmetrical.  There was no evidence of cerebellar signs and gait not tested at this time.     Impression:     Ambulatory ADL dysfunction due to:    History of fall last) with growth none displaced odontoid fracture with type II morphology and nonunion now status post ORIF C2, C1-3 laminectomy posterior cervical fusion at multiple levels.     Postoperative anemia     History of atrial fibrillation on Pradaxa     History of coronary artery disease     History of lung cancer status post left lower lobe resection     History of breast cancer     History of COPD/tobacco use     GERD     Hypertension on Norvasc, losartan and hydrochlorothiazide     History of COVID-19     Hyperlipidemia on Lipitor     History of right shoulder rotator cuff repair     History of carpal tunnel syndrome     Obesity followed by dietitian     History of anxiety/depression on Lexapro     Plan:     Patient will continue physical therapy, and Occupational Therapy.  We will consult Dr. Rivera for medical management, Dr. Pagan neurology, Dr. Boyd psychiatry and patient also be followed by cardiology.  Patient will be followed by psychology and case management for support.  Patient will continue to use heart cervical collar all the times.  [place] patient precautions include cardiac, fall and orthopedic spinal precautions.  Patient will continue on Xarelto for history of atrial fibrillation.  We will monitor routine labs and additional monitoring healing of the posterior cervical incision closely.     Goals: To improve overall functioning for transfers, ambulation and ADLs     Extensive length of stay about 2 weeks     Discussed with Dr. Rivera medical consultant, patient and  nursing     This patient note has been dictated using speech recognition software.  Inadvertent speech recognition errors should be disregarded. Please do not hesitate to call my office for clarification.

## 2022-01-14 NOTE — PROGRESS NOTES
[facility] Progress Note          Patient was seen and examined at bedside.    Subjective:   1/13/22  Saw him in am, in NAD, his pain is manageable, no new neurologic deficits, started and tolerated his PT/OT.  Reviewed night shift RN note , noted:  Alert and oriented x 3. Continent of bowel and bladder. PVRs WNLs this shift. [place] collar worn AATs. Cervical incision has foam dressing intact, no drainage or s/s of complication noted. Continues on scheduled Tyleno with PRN Oxycodone requested at HS for severe neck pain, effective. Sleeping quietly in bed overnight.    COPD, h/o lung cancer s/p LLL resection, diminished bibasilar lung BS, encouraged lung expansion maneuvers, cont current resp management and care, his SO2 has been fine. His labs are fine.  Denies nausea, vomiting, abdominal pain or discomfort, dysuria, cough/sputum, running nose, sore throat, chest pain, palpitation, SOB or orthopnea, dizziness or LH,  HA.       1/14/22  Stable, pleasant, his pain is manageable, his PT/OT is progressing, his lungs sound better today.     Objective   Vital signs in last 24 hours:  Temp:  [36.6 °C (97.8 °F)-36.8 °C (98.3 °F)] 36.8 °C (98.2 °F)  Heart Rate:  [57-88] 87  Resp:  [18] 18  BP: (103-160)/(62-80) 127/68    No intake or output data in the 24 hours ending 01/14/22 1104  Intake/Output this shift:  No intake/output data recorded.   Review of Systems:  All other systems reviewed and negative except as noted in the HPI.   Objective      Labs  reviewed his labs thoroughly   Lab Results   Component Value Date    WBC 5.78 01/12/2022    HGB 13.0 (L) 01/12/2022    HCT 39.2 (L) 01/12/2022    MCV 88.9 01/12/2022     01/12/2022     Lab Results   Component Value Date    GLUCOSE 102 (H) 01/12/2022    CALCIUM 9.2 01/12/2022     01/12/2022    K 4.4 01/12/2022    CO2 31 01/12/2022    CL 98 01/12/2022    BUN 15 01/12/2022    CREATININE 0.8 01/12/2022       Imaging  OSH imaging study reports  reviewed       Full Code    Physical Exam:  Head/Ear/Nose/Throat: normocephalic; atraumatic; moisture mouth mm, no oropharyngeal thrush noted.   Eyes: anicteric sclera, EOMI; PERRL.   Neck : supple, no JVD, no carotid bruits appeciated.   Respiratory: no evidence of labored breathing, lung sounds CTA b/l, good aeration bibasilar area, no w/r/c.   Cardiovascular: RRR; normal S1, S2; no m/r/g; no S3 or S4.   Gastrointestinal: soft; NT; BS normal; mildly distended; no CVAT b/l.   Genitourinary: no caballero.   Extremities : no c/c/e .   Neurological: AO x 3, fluent speeches, following commands, CNS II-XII grossly intact; no focal neurologic deficits.   Behavior/Emotional: in NAD, appropriate; cooperative.   Skin: clean, dry and intact.     Plan of care was discussed with patient, RN, and PMR attending     Assessment     CC:S/p a mechanical fall, sustained traumatic cervical spinal fractures with myelopathy, s/p cervical spinal ORIF and decom.lami.fusion, ADL and ambulatory dysfunction.       71 y.o. male with a PMHx significant for atrial fibrillation, breast cancer, ex-tobacco smoking, COPD, diverticulosis, acid reflux, history of COVID-19, hyperlipidemia, hypertension, lung cancer s/p LLL resection, history of right shoulder rotator cuff repair, and history of right-sided carpal tunnel syndrome, who fell from a ladder on 04/23/2021 associated with loss of consciousness. He was found to have fractures of the anterior and posterior arches of C1, base of the dens of C2, right scapula, and left occipital condyle.  He was placed in a hard cervical collar and has had ongoing neurosurgical and imaging follow-up.  While C1 was healing normally.  There is no appreciable osseous bridging of the fracture of the dens.  He therefore was recommended to undergo ORIF which is performed by Dr. Morejon on 01/06/2020 [facility].      Patient underwent open reduction and internal fixation of C1 fractures, C2 odontoid fracture, associated with  bilateral posterior lateral arthrodesis with fusion at C1-C3 in addition to C1-2 and C2-3 laminotomies.  There are no intraoperative complications.  Neurophysiological monitoring was stable throughout the course of the decompression.  PCA was d/c'd on 1/10 am.  +BM on 1/9.     Ortho was consulted on 1/7 for R shoulder follow up.  No acute surgical intervention at this time per Ortho.  New Imaging of R shoulder/scapula unremarkable for acute injury  RUE WBAT.  Recommend follow up with a shoulder/elbow orthopedic surgeon - Dr Shine as needed.   Functionally, he has ADL and ambulatory dysfunction, requires inpt acute rehab, transferred to [facility], [place] on 1/11/22.       1. S/p a mechanical fall, sustained traumatic cervical spinal fractures with myelopathy, s/p cervical spinal ORIF and decom.lami.fusion, ADL and ambulatory dysfunction : inpt comprehensive rehab, ADL, gait/balance training, pain/neuropathic pain management, fall precaution, regular neuro checks, DVT prophylaxis, surgical site wound care/dermal defense, f/u with spinal surgeon as scheduled.     R shoulder injury, but no acute surgical intervention at this time per Ortho, f/u with  a shoulder/elbow orthopedic surgeon - Dr Shine as needed.    2. Post op acute on chronic pain, paresthesia: pain management, regular pain scale evaluation, topical lidoderm patch, ice packs, consider Neurontin as indicated, prn muscle relaxant.    3. Pulm-COPD, h/o lung cancer s/p LLL resection, atelectasis: monitor SO2, prn NC O2, keep SO2>92%, incentive spirometry, bronchodilator inhaler prn, cont LABA/LAMA/flonase,  mucolytic agent, pulm toileting.    4. GI-constipation, GERD: provide constipation bowel regimen, po hydration, fiber intake, timed toilet visits. Pepcid for GERD and GI prophy.    5. DVT prophy: SCD, early ambulation, SQ heparin to resume pradaxa as planned , check LE venous DUS to r/o DVT.      6. PAF, HTN, Dyslipidemia: resume pradaxa on 1/20/22, monitor HR and  rhythm, cont current HTN meds with holding parameter, monitor BP, titrate HTN meds as indicated, post op orthostatic hypotension precaution; cont statin.     7. - Neurogenic bladder, acute urinary retention s/p caballero: timed voiding trial with appropriate position and privacy, monitor PVR with cic, high risk of UTI , check UA/UCX.    8. Renal, electrolytes: monitor renal function, avoid nephrotoxic agents or low BP, monitor and keep electrolytes balance.     Billing code: [id number]  Diagnoses:  Patient Active Problem List   Diagnosis   • Anxiety   • Atrial fibrillation (CMS/HCC)   • Chronic diastolic heart failure (CMS/HCC)   • Hearing loss   • Primary malignant neoplasm of left lower lobe of lung (CMS/HCC)   • Multiple pulmonary nodules   • Other emphysema (CMS/HCC)   • Gastroesophageal reflux disease without esophagitis   • Asthma   • Cervical spine fracture (CMS/HCC)   • Hypertension   • Mild episode of recurrent major depressive disorder (CMS/HCC)   • Preop examination   • Coronary artery calcification seen on CT scan   • COPD (chronic obstructive pulmonary disease) (CMS/HCC)   • Prediabetes   • History of cervical fracture   • H/O cervical fracture   • Closed nondisplaced odontoid fracture with type II morphology and delayed healing   • S/P cervical spinal fusion        Fiona Rivera MD  1/14/2022

## 2022-01-14 NOTE — PROGRESS NOTES
"Patient: Melanie Zelaya  Location: [facility]  MRN: [id number]  Today's date: 1/14/2022    History of Present Illness  Melanie is a 71 y.o. male admitted on 1/11/2022 with S/P cervical spinal fusion [Z98.1]. Principal problem is S/P cervical spinal fusion.      Past Medical History  Melanie has a past medical history of Anxiety, Atrial fibrillation (CMS/HCC), Breast cancer (CMS/HCC), C1 cervical fracture (CMS/HCC), Colon polyp, COPD (chronic obstructive pulmonary disease) (CMS/HCC), Coronary artery calcification seen on CT scan, COVID-19 vaccine series completed, Depression, Diverticulosis, Diverticulosis, Fracture of pelvis (CMS/HCC) (1968), GERD (gastroesophageal reflux disease), Hearing loss, History of colon polyps, History of COVID-19 (2020), Hyperlipidemia, Hypertension, Left atrial enlargement, Lung cancer (CMS/HCC), Lung cancer (CMS/HCC), Pneumonia (2011), Seasonal allergies, and Wrist fracture.      PT Vitals    Date/Time Pulse HR Source BP BP Location BP Method Pt Position [facility]   01/14/22 1312 73 Monitor 159/77 Left upper arm Automatic Sitting EBB   01/14/22 1428 90 Monitor 136/93 Left upper arm Automatic Sitting EBB      PT Pain    Date/Time Pain Type Side/Orientation Location Rating: Rest Rating: Activity Radiation to Description Interventions [facility]   01/14/22 1312 Pain Assessment bilateral;upper;posterior neck 8 \"back up to between 7 and 8\" -- shoulder, left;shoulder, right constant;other (see comments) \"Just Pain\" position adjusted EBB   01/14/22 1428 Pain Reassessment;Post Activity bilateral;upper;posterior neck -- 6 -- -- -- EBB          Prior Living Environment      Most Recent Value   People in Home alone   Current Living Arrangements home   Home Accessibility not wheelchair accessible   Living Environment Comment 3SH, 3 VALERIA LHR, FF to bathroom then another FF to bedroom, tub shower w/ SC   Number of Stairs, Main Entrance 4   Surface of Stairs, Main Entrance concrete "   Stair Railings, Main Entrance railing on left side (ascending)   Landing, Stairs, Main Entrance adequate turning radius   Number of Stairs, Second Entrance other (see comments)  [back door not utilized]   Location, Kitchen first (main) floor   [place], Kitchen tile floor   Location, Laundry Room first (main) floor   [place], Laundry Room concrete floor   Laundry Room Access not wheelchair accessible   Laundry Room Access Comment down in basement,  with full flight and L hand rail   Location, Patient Bedroom other (see comments)  [3rd floor]   [place], Patient Bedroom carpeted floor   Location, Bathroom second floor, must negotiate stairs to access   [place], Bathroom tile floor   Bathroom Access not wheelchair accessible   Bathroom Access Comment Pt reports FB on 2nd floor. Pt has shower chair.   Number of Stairs, Within Home, Primary 12   Surface of Stairs, Within Home, Primary carpeting   Stair Railings, Within Home, Primary railings on both sides of stairs   Stairs Comment, Within Home, Primary to second floor for bathroom   Stairs, Within Home, Secondary 12   Surface of Stairs, Within Home, Secondary carpeting   Stair Railings, Within Home, Secondary railing on left side (ascending)   Stairs Comment, Within Home, Secondary to third floor for bed room          Prior Level of Function      Most Recent Value   Dominant Hand right   Ambulation assistive equipment   Transferring assistive equipment   Toileting independent   Bathing independent   Dressing assistive equipment  [shoe horn]   Eating independent   Prior Level of Function Comment Pt reports prior use of cane in home as needed for ambulation and use of a shower chair for bathing. Pt reports was completing BADLs independently.   Assistive Device Currently Used at Home cane, straight, shower chair           IRF PT Evaluation and Treatment - 01/14/22 1308        PT Time Calculation    Start Time 1300     Stop Time 1430     Time Calculation (min) 90 min         Session Details    Document Type daily treatment/progress note     Mode of Treatment physical therapy;individual therapy        General Information    Patient Profile Reviewed yes     General Observations of Patient Speaking w/ Dr Armstrong on gym rounds; MD adjusting cervical collar        Gait Training    [place], Gait minimum assist (75% or more patient effort)     Assistive Device walker, front-wheeled;gait belt     Distance in Feet 75 feet   x 2    Pattern (Gait) step-through     Deviations/Abnormal Patterns (Gait) hailey decreased;gait speed decreased;step length decreased;stride length decreased     Advanced Gait Activity curb negotiation;sloped surfaces        Curb Negotiation    [place] minimum assist (75% or more patient effort);nonverbal cues (demo/gesture);verbal cues     Assistive Device walker, front-wheeled;gait belt     Curb Height 6 inches     Comment x 2, w/ cueing for foot placement and positioning w/ RW        Sloped Surface Gait Skills    [place] minimum assist (75% or more patient effort);verbal cues     Assistive Device walker, front-wheeled;gait belt     Distance in Feet 5 feet     Comment indoor gym ramp        Lower Extremity (Therapeutic Exercise)    Exercise Position/Type seated;AROM (active range of motion);static stretching     General Exercise bilateral;ankle pumps;LAQ (long arc quad);marching while seated;knee flexion;gastroc stretch;hamstring stretch     Range of Motion Exercises bilateral;ankle dorsiflexion/plantarflexion;hip abduction/adduction;hip external/internal rotation;knee flexion/extension;hip flexion/extension     Weight/Resistance resistance band     Reps and Sets 10-15 x 2     Comment orage therband for knee flex in sitting        Daily Progress Summary (PT)    Daily Outcome Statement Pt began session very upset and reporting some increased pains t/o neck/shoulder areas in general since last night. Dr Armstrong was rounding in gym and spent time w/ pt  and PT also offered encouragement and reassurance, and pt was fully cooperative during session. He was able to initiate curb and ramp this session w/ cueing for safety, sequencing and strategies as noted. He presents w/ progressive fatigue w/ mobility and jennifer elevation and needs approp recovery rest breaks. BP also initially elevated but w/ relaxation and exercise SBP did decreased approp. Cont w/ POC and progerss as roscoe.                           IRF PT Goals      Most Recent Value   Bed Mobility Goal 1    Activity/Assistive Device rolling to right, rolling to left at 01/12/2022 0831   [place] supervision required at 01/12/2022 0831   Time Frame short-term goal (STG), 5 - 7 days at 01/12/2022 0831   Bed Mobility Goal 2    Activity/Assistive Device rolling to left, rolling to right at 01/12/2022 0831   [place] modified independence at 01/12/2022 0831   Time Frame long-term goal (LTG), 21 days or less at 01/12/2022 0831   Bed Mobility Goal 3    Activity/Assistive Device sit to supine/supine to sit at 01/12/2022 0831   [place] other (see comments)  [touching assist] at 01/12/2022 0831   Time Frame short-term goal (STG), 5 - 7 days at 01/12/2022 0831   Bed Mobility Goal 4    Activity/Assistive Device sit to supine/supine to sit at 01/12/2022 0831   [place] modified independence at 01/12/2022 0831   Time Frame long-term goal (LTG), 21 days or less at 01/12/2022 0831   Transfer Goal 1    Activity/Assistive Device sit-to-stand/stand-to-sit at 01/12/2022 0831   [place] other (see comments)  [touching assist] at 01/12/2022 0831   Time Frame short-term goal (STG), 5 - 7 days at 01/12/2022 0831   Transfer Goal 2    Activity/Assistive Device sit-to-stand/stand-to-sit at 01/12/2022 0831   [place] modified independence at 01/12/2022 0831   Time Frame 21 days or less, long-term goal (LTG) at 01/12/2022 0831   Transfer Goal 3    Activity/Assistive Device stand pivot at 01/12/2022 0831   [place]  other (see comments)  [touching assist] at 01/12/2022 0831   Time Frame short-term goal (STG), 5 - 7 days at 01/12/2022 0831   Transfer Goal 4    Activity/Assistive Device stand pivot at 01/12/2022 0831   [place] modified independence at 01/12/2022 0831   Time Frame long-term goal (LTG), 21 days or less at 01/12/2022 0831   Gait/Walking Locomotion Goal 1    Activity/Assistive Device gait (walking locomotion), assistive device use at 01/12/2022 0831   Distance 50 feet at 01/12/2022 0831   [place] other (see comments)  [touching assist] at 01/12/2022 0831   Time Frame short-term goal (STG), 5 - 7 days at 01/12/2022 0831   Gait/Walking Locomotion Goal 2    Activity/Assistive Device gait (walking locomotion), assistive device use at 01/12/2022 0831   Distance 150 feet at 01/12/2022 0831   [place] modified independence at 01/12/2022 0831   Time Frame long-term goal (LTG), 21 days or less at 01/12/2022 0831

## 2022-01-15 ENCOUNTER — APPOINTMENT (INPATIENT)
Dept: PHYSICAL THERAPY | Facility: REHABILITATION | Age: 72
DRG: 565 | End: 2022-01-15
Payer: COMMERCIAL

## 2022-01-15 ENCOUNTER — APPOINTMENT (INPATIENT)
Dept: OCCUPATIONAL THERAPY | Facility: REHABILITATION | Age: 72
DRG: 565 | End: 2022-01-15
Payer: COMMERCIAL

## 2022-01-15 PROCEDURE — 63700000 HC SELF-ADMINISTRABLE DRUG: Performed by: INTERNAL MEDICINE

## 2022-01-15 PROCEDURE — 97112 NEUROMUSCULAR REEDUCATION: CPT | Mod: GP

## 2022-01-15 PROCEDURE — L0172 CERV COL SR FOAM 2PC PRE OTS: HCPCS

## 2022-01-15 PROCEDURE — 63600000 HC DRUGS/DETAIL CODE: Performed by: INTERNAL MEDICINE

## 2022-01-15 PROCEDURE — 12800001 HC ROOM AND CARE SEMIPRIVATE REHAB-BRAIN INJ

## 2022-01-15 PROCEDURE — 97116 GAIT TRAINING THERAPY: CPT | Mod: GP

## 2022-01-15 PROCEDURE — 97110 THERAPEUTIC EXERCISES: CPT | Mod: GO

## 2022-01-15 PROCEDURE — 97110 THERAPEUTIC EXERCISES: CPT | Mod: GP

## 2022-01-15 PROCEDURE — 97530 THERAPEUTIC ACTIVITIES: CPT | Mod: GO

## 2022-01-15 PROCEDURE — 97530 THERAPEUTIC ACTIVITIES: CPT | Mod: GP

## 2022-01-15 RX ADMIN — ACETAMINOPHEN 975 MG: 325 TABLET, FILM COATED ORAL at 05:56

## 2022-01-15 RX ADMIN — HYDROCHLOROTHIAZIDE 12.5 MG: 12.5 TABLET ORAL at 08:25

## 2022-01-15 RX ADMIN — FAMOTIDINE 20 MG: 20 TABLET ORAL at 20:35

## 2022-01-15 RX ADMIN — LOSARTAN POTASSIUM 100 MG: 100 TABLET, FILM COATED ORAL at 16:39

## 2022-01-15 RX ADMIN — SENNOSIDES AND DOCUSATE SODIUM 1 TABLET: 50; 8.6 TABLET ORAL at 08:26

## 2022-01-15 RX ADMIN — SENNOSIDES AND DOCUSATE SODIUM 1 TABLET: 50; 8.6 TABLET ORAL at 20:35

## 2022-01-15 RX ADMIN — OXYCODONE HYDROCHLORIDE 10 MG: 5 TABLET ORAL at 13:08

## 2022-01-15 RX ADMIN — AMLODIPINE BESYLATE 5 MG: 5 TABLET ORAL at 20:35

## 2022-01-15 RX ADMIN — FAMOTIDINE 20 MG: 20 TABLET ORAL at 08:26

## 2022-01-15 RX ADMIN — HEPARIN SODIUM 5000 UNITS: 5000 INJECTION, SOLUTION INTRAVENOUS; SUBCUTANEOUS at 05:56

## 2022-01-15 RX ADMIN — HEPARIN SODIUM 5000 UNITS: 5000 INJECTION, SOLUTION INTRAVENOUS; SUBCUTANEOUS at 21:24

## 2022-01-15 RX ADMIN — ACETAMINOPHEN 975 MG: 325 TABLET, FILM COATED ORAL at 21:24

## 2022-01-15 RX ADMIN — ATORVASTATIN CALCIUM 40 MG: 40 TABLET, FILM COATED ORAL at 17:33

## 2022-01-15 RX ADMIN — OXYCODONE HYDROCHLORIDE 10 MG: 5 TABLET ORAL at 01:04

## 2022-01-15 RX ADMIN — OXYCODONE HYDROCHLORIDE 10 MG: 5 TABLET ORAL at 08:25

## 2022-01-15 RX ADMIN — LIDOCAINE 1 PATCH: 246 PATCH TOPICAL at 08:25

## 2022-01-15 RX ADMIN — AMLODIPINE BESYLATE 5 MG: 5 TABLET ORAL at 08:25

## 2022-01-15 RX ADMIN — CETIRIZINE HYDROCHLORIDE 5 MG: 5 TABLET ORAL at 20:35

## 2022-01-15 RX ADMIN — ESCITALOPRAM OXALATE 10 MG: 10 TABLET, FILM COATED ORAL at 08:26

## 2022-01-15 RX ADMIN — GUAIFENESIN 600 MG: 600 TABLET, EXTENDED RELEASE ORAL at 20:35

## 2022-01-15 RX ADMIN — HEPARIN SODIUM 5000 UNITS: 5000 INJECTION, SOLUTION INTRAVENOUS; SUBCUTANEOUS at 16:39

## 2022-01-15 RX ADMIN — FLUTICASONE PROPIONATE 2 SPRAY: 50 SPRAY, METERED NASAL at 08:28

## 2022-01-15 RX ADMIN — OXYCODONE HYDROCHLORIDE 10 MG: 5 TABLET ORAL at 20:35

## 2022-01-15 RX ADMIN — GUAIFENESIN 600 MG: 600 TABLET, EXTENDED RELEASE ORAL at 08:26

## 2022-01-15 RX ADMIN — ACETAMINOPHEN 975 MG: 325 TABLET, FILM COATED ORAL at 13:08

## 2022-01-15 NOTE — PLAN OF CARE
"Plan of Care Review  Plan of Care Reviewed With: patient  Progress: improving  Outcome Summary: Alert and oriented x4. Continent of bowel and bladder. Cerival incision is covered with a foam dressing, no drainage or s/s of complicatiopn noted. Continues on scheduled Tylenol with PRN Oxycodone administered for pain. States if he does not take his PRN Oxycodone at fairly regular intervals his pain level \"skyrockets and is hard to control\". Sleeping quietly in bed overnight. Fall and safety measures maintained.  "

## 2022-01-15 NOTE — PROGRESS NOTES
Patient: Melanie Zelaya  Location: [facility] [address]  MRN: [id number]  Today's date: 1/15/2022    History of Present Illness  Melanie is a 71 y.o. male admitted on 1/11/2022 with S/P cervical spinal fusion [Z98.1]. Principal problem is S/P cervical spinal fusion.      Past Medical History  Melanie has a past medical history of Anxiety, Atrial fibrillation (CMS/HCC), Breast cancer (CMS/HCC), C1 cervical fracture (CMS/HCC), Colon polyp, COPD (chronic obstructive pulmonary disease) (CMS/HCC), Coronary artery calcification seen on CT scan, COVID-19 vaccine series completed, Depression, Diverticulosis, Diverticulosis, Fracture of pelvis (CMS/HCC) (1968), GERD (gastroesophageal reflux disease), Hearing loss, History of colon polyps, History of COVID-19 (2020), Hyperlipidemia, Hypertension, Left atrial enlargement, Lung cancer (CMS/HCC), Lung cancer (CMS/HCC), Pneumonia (2011), Seasonal allergies, and Wrist fracture.      OT Vitals    Date/Time Pulse SpO2 BP BP Location BP Method Pt Position [facility]   01/15/22 1307 76 95 % 146/70 Right upper arm Automatic Sitting CK      OT Pain    Date/Time Pain Type Location Rating: Rest Rating: Activity Interventions [facility]   01/15/22 1307 Pain Assessment neck 6 -- other (see comments) nursing notified for meds             01/15/22 1425 Pain Reassessment neck -- 5 position adjusted CK          Prior Living Environment      Most Recent Value   People in Home alone   Current Living Arrangements home   Home Accessibility not wheelchair accessible   Living Environment Comment 3SH, 3 VALERIA LHR, FF to bathroom then another FF to bedroom, tub shower w/ SC   Number of Stairs, Main Entrance 4   Surface of Stairs, Main Entrance concrete   Stair Railings, Main Entrance railing on left side (ascending)   Landing, Stairs, Main Entrance adequate turning radius   Number of Stairs, Second Entrance other (see comments)  [back door not utilized]   Location, Kitchen first (main) floor    [place], Kitchen tile floor   Location, Laundry Room first (main) floor   [place], Laundry Room concrete floor   Laundry Room Access not wheelchair accessible   Laundry Room Access Comment down in basement,  with full flight and L hand rail   Location, Patient Bedroom other (see comments)  [3rd floor]   [place], Patient Bedroom carpeted floor   Location, Bathroom second floor, must negotiate stairs to access   [place], Bathroom tile floor   Bathroom Access not wheelchair accessible   Bathroom Access Comment Pt reports FB on 2nd floor. Pt has shower chair.   Number of Stairs, Within Home, Primary 12   Surface of Stairs, Within Home, Primary carpeting   Stair Railings, Within Home, Primary railings on both sides of stairs   Stairs Comment, Within Home, Primary to second floor for bathroom   Stairs, Within Home, Secondary 12   Surface of Stairs, Within Home, Secondary carpeting   Stair Railings, Within Home, Secondary railing on left side (ascending)   Stairs Comment, Within Home, Secondary to third floor for bed room          Prior Level of Function      Most Recent Value   Dominant Hand right   Ambulation assistive equipment   Transferring assistive equipment   Toileting independent   Bathing independent   Dressing assistive equipment  [shoe horn]   Eating independent   Prior Level of Function Comment Pt reports prior use of cane in home as needed for ambulation and use of a shower chair for bathing. Pt reports was completing BADLs independently.   Assistive Device Currently Used at Home cane, straight, shower chair          Occupational Profile      Most Recent Value   Successful Occupations Pt reports not a  within past year but would like to return to driving in future.   Occupational History/Life Experiences Pt reports living c  in [facility]. Pt has three (adult) children and grandchildren. Pt has good family support.           IRF OT Evaluation and Treatment - 01/15/22 1255        OT Time  Calculation    Start Time 1300     Stop Time 1430     Time Calculation (min) 90 min        Session Details    Document Type daily treatment/progress note     Mode of Treatment individual therapy;occupational therapy        General Information    Patient Profile Reviewed yes     General Observations of Patient Pt. pleasant and cooperative.        Weight-bearing Status    Right UE Weight-Bearing Status weight-bearing as tolerated (WBAT)        Bed to Chair Transfer    [place], Bed to Chair verbal cues;increased time to complete;safety considerations;1 person assist;touching/steadying assist   on mat in gym    Verbal Cues safety;technique     Assistive Device walker, front-wheeled;wheelchair     Comment Steady A x 1 SPT with walker with gait belt donned.        Chair to Bed Transfer    [place], Chair to Bed verbal cues;increased time to complete;safety considerations;1 person assist;touching/steadying assist   off of mat    Verbal Cues safety;technique     Assistive Device gait belt;wheelchair;walker, front-wheeled     Comment Steady A x 1 SPT with walker with verbal cues.        Sit to Stand Transfer    [place], Sit to Stand Transfer close supervision;verbal cues;increased time to complete;1 person assist;safety considerations     Verbal Cues hand placement;preparatory posture;proper use of assistive device;safety;technique     Assistive Device gait belt;walker, front-wheeled;wheelchair     Comment CLS x 1 sit to stand with walker with verbal cues.        Stand to Sit Transfer    [place], Stand to Sit Transfer verbal cues;close supervision;safety considerations;increased time to complete;1 person assist     Verbal Cues technique;safety;proper use of assistive device;preparatory posture;hand placement     Assistive Device wheelchair;walker, front-wheeled     Comment CLS x 1 stand to sit with walker with gait belt donned.        Stand Pivot Transfer    [place], Stand Pivot/Stand Step Transfer  verbal cues;touching/steadying assist;increased time to complete;safety considerations;1 person assist     Verbal Cues hand placement;preparatory posture;proper use of assistive device;safety;technique     Assistive Device gait belt;walker, front-wheeled;wheelchair     Comment Steady A x 1 SPT with walker and gait belt donned.        Safety Issues, Functional Mobility    Comment, Safety Issues/Impairments (Mobility) Pt. participated in short HHM activity with walker/gait belt donned. Pt. required Min A x1 with walker and verbal cues for safety. Of note, gate belt donned.        Balance    Balance Assessment sitting static balance;standing static balance     Static Sitting Balance WFL;supported;sitting in chair     Static Standing Balance mild impairment;supported;standing        Motor Skills    Motor Skills coordination   WFL       Upper Extremity (Therapeutic Exercise)    Exercise Position/Type supine;AROM (active range of motion)     General Exercise bilateral     Range of Motion Exercises bilateral;shoulder flexion/extension;shoulder horizontal abduction/adduction     Reps and Sets 10 reps, 2 set     Comment supine on mat. Pt. reported hx. of rotator cuff injury and prior surgery/therapy. Pt. noted discomfort in RUE/LUE in collar bone/supraspinatus muscle region and medial deltoid. Pt. AROM (less than 90 degrees during exercise (exercised within tolerance for pt.). Following exercises pt. reported increased ROM.        Lower Body Dressing    Tasks doff;don;shoes/slippers;socks     Self-Performance don;dons/doffs left shoe;dons/doffs right shoe;dons/doffs right sock;dons/doffs left sock     [place] Assistance obtains clothes;adaptive equipment setup     [place] verbal cues;increased time to complete;safety considerations;1 person assist;minimum assist (75% or more patient effort)     Position supported sitting     Adaptive Equipment dressing stick;reacher;sock aid     Comment seated in w/c with verbal cues.         Daily Progress Summary (OT)    Daily Outcome Statement OT session focused on b/l UE AROM/stretching exercises supine, short HHM with walker with Min A x 1 with gait belt donned, LB dressing with AE. Pt. would continue to benefit from addressing balance, increasing independence in ADLS with AE, increasing functional txs with DME/AE. Cont. with POC.                      Education Documentation  Treatment Plan, taught by Estrellita Pacheco OT at 1/15/2022  3:33 PM.  Learner: Patient  Readiness: Acceptance  Method: Explanation, Demonstration  Response: Verbalizes Understanding, Demonstrated Understanding, Needs Reinforcement  Comment: Pt. educated on AE for LB dressing during OT session. Ongoing education recommended to increase pt. safety and independence. Pt. required Min A x1 during set up of items on AE.    Tobacco Use, Smoke Exposure, taught by Estrellita Pacheco OT at 1/15/2022  3:33 PM.  Learner: Patient  Readiness: Acceptance  Method: Explanation, Demonstration  Response: Verbalizes Understanding, Demonstrated Understanding, Needs Reinforcement  Comment: Pt. educated on AE for LB dressing during OT session. Ongoing education recommended to increase pt. safety and independence. Pt. required Min A x1 during set up of items on AE.    Safe Medication Disposal, taught by Estrellita Pacheco OT at 1/15/2022  3:33 PM.  Learner: Patient  Readiness: Acceptance  Method: Explanation, Demonstration  Response: Verbalizes Understanding, Demonstrated Understanding, Needs Reinforcement  Comment: Pt. educated on AE for LB dressing during OT session. Ongoing education recommended to increase pt. safety and independence. Pt. required Min A x1 during set up of items on AE.    Pain Assessment Process, taught by Estrellita Pacheco OT at 1/15/2022  3:33 PM.  Learner: Patient  Readiness: Acceptance  Method: Explanation, Demonstration  Response: Verbalizes Understanding, Demonstrated Understanding, Needs Reinforcement  Comment:  Pt. educated on AE for LB dressing during OT session. Ongoing education recommended to increase pt. safety and independence. Pt. required Min A x1 during set up of items on AE.    Medication Management, taught by Estrellita Pacheco OT at 1/15/2022  3:33 PM.  Learner: Patient  Readiness: Acceptance  Method: Explanation, Demonstration  Response: Verbalizes Understanding, Demonstrated Understanding, Needs Reinforcement  Comment: Pt. educated on AE for LB dressing during OT session. Ongoing education recommended to increase pt. safety and independence. Pt. required Min A x1 during set up of items on AE.    Oral Health, taught by Estrellita Pacheco OT at 1/15/2022  3:33 PM.  Learner: Patient  Readiness: Acceptance  Method: Explanation, Demonstration  Response: Verbalizes Understanding, Demonstrated Understanding, Needs Reinforcement  Comment: Pt. educated on AE for LB dressing during OT session. Ongoing education recommended to increase pt. safety and independence. Pt. required Min A x1 during set up of items on AE.    Opioid Medication Management, taught by Estrellita Pacheco OT at 1/15/2022  3:33 PM.  Learner: Patient  Readiness: Acceptance  Method: Explanation, Demonstration  Response: Verbalizes Understanding, Demonstrated Understanding, Needs Reinforcement  Comment: Pt. educated on AE for LB dressing during OT session. Ongoing education recommended to increase pt. safety and independence. Pt. required Min A x1 during set up of items on AE.    Diet Modification, taught by Estrellita Pacheco OT at 1/15/2022  3:33 PM.  Learner: Patient  Readiness: Acceptance  Method: Explanation, Demonstration  Response: Verbalizes Understanding, Demonstrated Understanding, Needs Reinforcement  Comment: Pt. educated on AE for LB dressing during OT session. Ongoing education recommended to increase pt. safety and independence. Pt. required Min A x1 during set up of items on AE.    Diagnostic Tests/Procedures, taught by Junior  SHAHNAZ Haro at 1/15/2022  3:33 PM.  Learner: Patient  Readiness: Acceptance  Method: Explanation, Demonstration  Response: Verbalizes Understanding, Demonstrated Understanding, Needs Reinforcement  Comment: Pt. educated on AE for LB dressing during OT session. Ongoing education recommended to increase pt. safety and independence. Pt. required Min A x1 during set up of items on AE.    Advance Care Planning, taught by Estrellita Pacheco OT at 1/15/2022  3:33 PM.  Learner: Patient  Readiness: Acceptance  Method: Explanation, Demonstration  Response: Verbalizes Understanding, Demonstrated Understanding, Needs Reinforcement  Comment: Pt. educated on AE for LB dressing during OT session. Ongoing education recommended to increase pt. safety and independence. Pt. required Min A x1 during set up of items on AE.    Orientation to Care Setting, Routine, taught by Estrellita Pacheco OT at 1/15/2022  3:33 PM.  Learner: Patient  Readiness: Acceptance  Method: Explanation, Demonstration  Response: Verbalizes Understanding, Demonstrated Understanding, Needs Reinforcement  Comment: Pt. educated on AE for LB dressing during OT session. Ongoing education recommended to increase pt. safety and independence. Pt. required Min A x1 during set up of items on AE.    Admission, Transition of Care, taught by Estrellita Pacheco OT at 1/15/2022  3:33 PM.  Learner: Patient  Readiness: Acceptance  Method: Explanation, Demonstration  Response: Verbalizes Understanding, Demonstrated Understanding, Needs Reinforcement  Comment: Pt. educated on AE for LB dressing during OT session. Ongoing education recommended to increase pt. safety and independence. Pt. required Min A x1 during set up of items on AE.          IRF OT Goals      Most Recent Value   Transfer Goal 1    Activity/Assistive Device toilet at 01/12/2022 0840   [place] supervision required  [Cl S] at 01/12/2022 0840   Time Frame short-term goal (STG), 1 week at 01/12/2022 0840    Transfer Goal 2    Activity/Assistive Device toilet at 01/12/2022 0840   [place] modified independence at 01/12/2022 0840   Time Frame long-term goal (LTG), 2 weeks at 01/12/2022 0840   Transfer Goal 3    Activity/Assistive Device shower at 01/12/2022 0840   [place] supervision required  [Cl S] at 01/12/2022 0840   Time Frame short-term goal (STG), 1 week at 01/12/2022 0840   Transfer Goal 4    Activity/Assistive Device shower at 01/12/2022 0840   [place] modified independence at 01/12/2022 0840   Time Frame long-term goal (LTG), 2 weeks at 01/12/2022 0840   Bathing Goal 1    Activity/Assistive Device bathing skills, all at 01/12/2022 0840   [place] minimum assist (75% or more patient effort) at 01/12/2022 0840   Time Frame short-term goal (STG), 1 week at 01/12/2022 0840   Bathing Goal 2    Activity/Assistive Device bathing skills, all at 01/12/2022 0840   [place] modified independence at 01/12/2022 0840   Time Frame long-term goal (LTG), 2 weeks at 01/12/2022 0840   UB Dressing Goal 1    Activity/Assistive Device upper body dressing at 01/12/2022 0840   [place] supervision required  [Cl S] at 01/12/2022 0840   Time Frame short-term goal (STG), 1 week at 01/12/2022 0840   UB Dressing Goal 2    Activity/Assistive Device upper body dressing at 01/12/2022 0840   [place] modified independence at 01/12/2022 0840   Time Frame long-term goal (LTG), 2 weeks at 01/12/2022 0840   LB Dressing Goal 1    Activity/Assistive Device lower body dressing at 01/12/2022 0840   [place] minimum assist (75% or more patient effort) at 01/12/2022 0840   Time Frame short-term goal (STG), 1 week at 01/12/2022 0840   LB Dressing Goal 2    Activity/Assistive Device lower body dressing at 01/12/2022 0840   [place] modified independence at 01/12/2022 0840   Time Frame long-term goal (LTG), 2 weeks at 01/12/2022 0840   Grooming Goal 1    [place] supervision required  [Cl S] at 01/12/2022 0840    Time Frame short-term goal (STG), 1 week at 01/12/2022 0840   Strategies/Barriers Standing at 01/12/2022 0840   Grooming Goal 2    Activity/Assistive Device grooming skills, all at 01/12/2022 0840   [place] modified independence at 01/12/2022 0840   Time Frame long-term goal (LTG), 2 weeks at 01/12/2022 0840   Toileting Goal 1    Activity/Assistive Device toileting skills, all at 01/12/2022 0840   [place] supervision required  [Cl S] at 01/12/2022 0840   Time Frame short-term goal (STG), 1 week at 01/12/2022 0840   Toileting Goal 2    Activity/Assistive Device toileting skills, all at 01/12/2022 0840   [place] modified independence at 01/12/2022 0840   Time Frame long-term goal (LTG), 2 weeks at 01/12/2022 0840

## 2022-01-15 NOTE — PLAN OF CARE
Plan of Care Review  Plan of Care Reviewed With: patient  Progress: improving  Outcome Summary: Pt. A&O x3.  able to make needs known.  Continent x2 during shift. Pain managed with PRN and scheduled medications. Care and medications reviewed with pt.  Safety maintained.
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
## 2022-01-16 ENCOUNTER — APPOINTMENT (INPATIENT)
Dept: PHYSICAL THERAPY | Facility: REHABILITATION | Age: 72
DRG: 565 | End: 2022-01-16
Payer: COMMERCIAL

## 2022-01-16 PROCEDURE — 63700000 HC SELF-ADMINISTRABLE DRUG: Performed by: INTERNAL MEDICINE

## 2022-01-16 PROCEDURE — 97116 GAIT TRAINING THERAPY: CPT | Mod: GP

## 2022-01-16 PROCEDURE — 97542 WHEELCHAIR MNGMENT TRAINING: CPT | Mod: GP

## 2022-01-16 PROCEDURE — 97110 THERAPEUTIC EXERCISES: CPT | Mod: GP

## 2022-01-16 PROCEDURE — 63600000 HC DRUGS/DETAIL CODE: Performed by: INTERNAL MEDICINE

## 2022-01-16 PROCEDURE — 12800001 HC ROOM AND CARE SEMIPRIVATE REHAB-BRAIN INJ

## 2022-01-16 RX ADMIN — ACETAMINOPHEN 975 MG: 325 TABLET, FILM COATED ORAL at 06:30

## 2022-01-16 RX ADMIN — SENNOSIDES AND DOCUSATE SODIUM 1 TABLET: 50; 8.6 TABLET ORAL at 21:17

## 2022-01-16 RX ADMIN — LOSARTAN POTASSIUM 100 MG: 100 TABLET, FILM COATED ORAL at 18:25

## 2022-01-16 RX ADMIN — HEPARIN SODIUM 5000 UNITS: 5000 INJECTION, SOLUTION INTRAVENOUS; SUBCUTANEOUS at 21:17

## 2022-01-16 RX ADMIN — CETIRIZINE HYDROCHLORIDE 5 MG: 5 TABLET ORAL at 21:17

## 2022-01-16 RX ADMIN — OXYCODONE HYDROCHLORIDE 10 MG: 5 TABLET ORAL at 06:29

## 2022-01-16 RX ADMIN — ATORVASTATIN CALCIUM 40 MG: 40 TABLET, FILM COATED ORAL at 18:25

## 2022-01-16 RX ADMIN — FAMOTIDINE 20 MG: 20 TABLET ORAL at 21:17

## 2022-01-16 RX ADMIN — FAMOTIDINE 20 MG: 20 TABLET ORAL at 08:24

## 2022-01-16 RX ADMIN — OXYCODONE HYDROCHLORIDE 10 MG: 5 TABLET ORAL at 13:18

## 2022-01-16 RX ADMIN — HEPARIN SODIUM 5000 UNITS: 5000 INJECTION, SOLUTION INTRAVENOUS; SUBCUTANEOUS at 06:30

## 2022-01-16 RX ADMIN — OXYCODONE HYDROCHLORIDE 10 MG: 5 TABLET ORAL at 18:24

## 2022-01-16 RX ADMIN — HEPARIN SODIUM 5000 UNITS: 5000 INJECTION, SOLUTION INTRAVENOUS; SUBCUTANEOUS at 13:19

## 2022-01-16 RX ADMIN — LIDOCAINE 1 PATCH: 246 PATCH TOPICAL at 08:24

## 2022-01-16 RX ADMIN — FLUTICASONE PROPIONATE 2 SPRAY: 50 SPRAY, METERED NASAL at 08:24

## 2022-01-16 RX ADMIN — GUAIFENESIN 600 MG: 600 TABLET, EXTENDED RELEASE ORAL at 08:24

## 2022-01-16 RX ADMIN — ACETAMINOPHEN 975 MG: 325 TABLET, FILM COATED ORAL at 13:18

## 2022-01-16 RX ADMIN — HYDROCHLOROTHIAZIDE 12.5 MG: 12.5 TABLET ORAL at 08:24

## 2022-01-16 RX ADMIN — AMLODIPINE BESYLATE 5 MG: 5 TABLET ORAL at 21:17

## 2022-01-16 RX ADMIN — ACETAMINOPHEN 975 MG: 325 TABLET, FILM COATED ORAL at 21:16

## 2022-01-16 RX ADMIN — AMLODIPINE BESYLATE 5 MG: 5 TABLET ORAL at 08:24

## 2022-01-16 RX ADMIN — OXYCODONE HYDROCHLORIDE 10 MG: 5 TABLET ORAL at 00:24

## 2022-01-16 RX ADMIN — ESCITALOPRAM OXALATE 10 MG: 10 TABLET, FILM COATED ORAL at 08:24

## 2022-01-16 RX ADMIN — SENNOSIDES AND DOCUSATE SODIUM 1 TABLET: 50; 8.6 TABLET ORAL at 08:24

## 2022-01-16 RX ADMIN — GUAIFENESIN 600 MG: 600 TABLET, EXTENDED RELEASE ORAL at 21:17

## 2022-01-16 NOTE — PLAN OF CARE
Pt alert and oriented X3. SCDs on. Aspen collar on AATS. Continent of bowel and bladder. Medicated with oxycodone for neck pain with relief and sleep.   Bed alarm on, call bell within reach.

## 2022-01-16 NOTE — PROGRESS NOTES
[facility] Progress Note          Patient was seen and examined at bedside.    Subjective:     Stable, pleasant, his pain is manageable, his resp status is good, his PT/OT is progressing, his lungs sound good.     Objective   Vital signs in last 24 hours:  Temp:  [36.6 °C (97.8 °F)-36.9 °C (98.4 °F)] 36.6 °C (97.8 °F)  Heart Rate:  [68-81] 74  Resp:  [18-93] 18  BP: (126-154)/(61-75) 135/66    No intake or output data in the 24 hours ending 01/16/22 0914  Intake/Output this shift:  No intake/output data recorded.   Review of Systems:  All other systems reviewed and negative except as noted in the HPI.   Objective      Labs  reviewed his labs thoroughly   Lab Results   Component Value Date    WBC 5.78 01/12/2022    HGB 13.0 (L) 01/12/2022    HCT 39.2 (L) 01/12/2022    MCV 88.9 01/12/2022     01/12/2022     Lab Results   Component Value Date    GLUCOSE 102 (H) 01/12/2022    CALCIUM 9.2 01/12/2022     01/12/2022    K 4.4 01/12/2022    CO2 31 01/12/2022    CL 98 01/12/2022    BUN 15 01/12/2022    CREATININE 0.8 01/12/2022       Imaging  OSH imaging study reports reviewed       Full Code    Physical Exam:  Head/Ear/Nose/Throat: normocephalic; atraumatic; moisture mouth mm, no oropharyngeal thrush noted.   Eyes: anicteric sclera, EOMI; PERRL.   Neck : supple, no JVD, no carotid bruits appeciated.   Respiratory: no evidence of labored breathing, lung sounds CTA b/l, good aeration bibasilar area, no w/r/c.   Cardiovascular: RRR; normal S1, S2; no m/r/g; no S3 or S4.   Gastrointestinal: soft; NT; BS normal; mildly distended; no CVAT b/l.   Genitourinary: no caballero.   Extremities : no c/c/e .   Neurological: AO x 3, fluent speeches, following commands, CNS II-XII grossly intact; no focal neurologic deficits.   Behavior/Emotional: in NAD, appropriate; cooperative.   Skin: clean, dry and intact.     Plan of care was discussed with patient, RN, and PMR attending     Assessment     CC:S/p a  mechanical fall, sustained traumatic cervical spinal fractures with myelopathy, s/p cervical spinal ORIF and decom.lami.fusion, ADL and ambulatory dysfunction.       71 y.o. male with a PMHx significant for atrial fibrillation, breast cancer, ex-tobacco smoking, COPD, diverticulosis, acid reflux, history of COVID-19, hyperlipidemia, hypertension, lung cancer s/p LLL resection, history of right shoulder rotator cuff repair, and history of right-sided carpal tunnel syndrome, who fell from a ladder on 04/23/2021 associated with loss of consciousness. He was found to have fractures of the anterior and posterior arches of C1, base of the dens of C2, right scapula, and left occipital condyle.  He was placed in a hard cervical collar and has had ongoing neurosurgical and imaging follow-up.  While C1 was healing normally.  There is no appreciable osseous bridging of the fracture of the dens.  He therefore was recommended to undergo ORIF which [place]. [place] [facility] [place]      [place] underwent open reduction and internal fixation of C1 fractures, C2 odontoid fracture, associated with bilateral posterior lateral arthrodesis with fusion at C1-C3 in addition to C1-2 and C2-3 laminotomies.  There are no intraoperative complications.  Neurophysiological monitoring was stable throughout the course of the decompression.  PCA was d/c'd on 1/10 am.  +BM on 1/9.     Ortho was consulted on 1/7 for R shoulder follow up.  No acute surgical intervention at this time per Ortho.  New Imaging of R shoulder/scapula unremarkable for acute injury  RUE WBAT.  Recommend follow up with a shoulder/elbow orthopedic surgeon - Dr Shine as needed.   Functionally, he has ADL and ambulatory dysfunction, requires inpt acute rehab, transferred to [facility] on 1/11/22.       1. S/p a mechanical fall, sustained traumatic cervical spinal fractures with myelopathy, s/p cervical spinal ORIF and decom.lami.fusion, ADL and ambulatory dysfunction :  inpt comprehensive rehab, ADL, gait/balance training, pain/neuropathic pain management, fall precaution, regular neuro checks, DVT prophylaxis, surgical site wound care/dermal defense, f/u with spinal surgeon as scheduled.     R shoulder injury, but no acute surgical intervention at this time per Ortho, f/u with  a shoulder/elbow orthopedic surgeon - Dr Shine as needed.    2. Post op acute on chronic pain, paresthesia: pain management, regular pain scale evaluation, topical lidoderm patch, ice packs, consider Neurontin as indicated, prn muscle relaxant.    3. Pulm-COPD, h/o lung cancer s/p LLL resection, atelectasis: monitor SO2, prn NC O2, keep SO2>92%, incentive spirometry, bronchodilator inhaler prn, cont LABA/LAMA/flonase,  mucolytic agent, pulm toileting.    4. GI-constipation, GERD: provide constipation bowel regimen, po hydration, fiber intake, timed toilet visits. Pepcid for GERD and GI prophy.    5. DVT prophy: SCD, early ambulation, SQ heparin to resume pradaxa as planned , check LE venous DUS to r/o DVT.      6. PAF, HTN, Dyslipidemia: resume pradaxa on 1/20/22, monitor HR and rhythm, cont current HTN meds with holding parameter, monitor BP, titrate HTN meds as indicated, post op orthostatic hypotension precaution; cont statin.     7. - Neurogenic bladder, acute urinary retention s/p caballero: timed voiding trial with appropriate position and privacy, monitor PVR with cic, high risk of UTI , check UA/UCX.    8. Renal, electrolytes: monitor renal function, avoid nephrotoxic agents or low BP, monitor and keep electrolytes balance.     Billing code: [id number]  Diagnoses:  Patient Active Problem List   Diagnosis   • Anxiety   • Atrial fibrillation (CMS/HCC)   • Chronic diastolic heart failure (CMS/HCC)   • Hearing loss   • Primary malignant neoplasm of left lower lobe of lung (CMS/HCC)   • Multiple pulmonary nodules   • Other emphysema (CMS/HCC)   • Gastroesophageal reflux disease without esophagitis   • Asthma    • Cervical spine fracture (CMS/HCC)   • Hypertension   • Mild episode of recurrent major depressive disorder (CMS/HCC)   • Preop examination   • Coronary artery calcification seen on CT scan   • COPD (chronic obstructive pulmonary disease) (CMS/HCC)   • Prediabetes   • History of cervical fracture   • H/O cervical fracture   • Closed nondisplaced odontoid fracture with type II morphology and delayed healing   • S/P cervical spinal fusion        Fiona Rivera MD  1/16/2022

## 2022-01-16 NOTE — PROGRESS NOTES
Patient: Melanie Zelaya  Location: [facility] [address]  MRN: [id number]  Today's date: 1/16/2022    History of Present Illness  Melanie is a 71 y.o. male admitted on 1/11/2022 with S/P cervical spinal fusion [Z98.1]. Principal problem is S/P cervical spinal fusion.      Past Medical History  Melanie has a past medical history of Anxiety, Atrial fibrillation (CMS/HCC), Breast cancer (CMS/HCC), C1 cervical fracture (CMS/HCC), Colon polyp, COPD (chronic obstructive pulmonary disease) (CMS/HCC), Coronary artery calcification seen on CT scan, COVID-19 vaccine series completed, Depression, Diverticulosis, Diverticulosis, Fracture of pelvis (CMS/HCC) (1968), GERD (gastroesophageal reflux disease), Hearing loss, History of colon polyps, History of COVID-19 (2020), Hyperlipidemia, Hypertension, Left atrial enlargement, Lung cancer (CMS/HCC), Lung cancer (CMS/HCC), Pneumonia (2011), Seasonal allergies, and Wrist fracture.      PT Vitals    Date/Time Pulse HR Source BP BP Location BP Method Pt Position [facility]   01/16/22 0905 74 Monitor 135/66 Left upper arm Automatic Sitting DT      PT Pain    Date/Time Pain Type Side/Orientation Location Rating: Rest Who   01/16/22 0905 Pain Assessment bilateral neck 5 DT   01/16/22 0950 Pain Assessment bilateral neck 5 DT          Prior Living Environment      Most Recent Value   People in Home alone   Current Living Arrangements home   Home Accessibility not wheelchair accessible   Living Environment Comment 3SH, 3 VALERIA LHR, FF to bathroom then another FF to bedroom, tub shower w/ SC   Number of Stairs, Main Entrance 4   Surface of Stairs, Main Entrance concrete   Stair Railings, Main Entrance railing on left side (ascending)   Landing, Stairs, Main Entrance adequate turning radius   Number of Stairs, Second Entrance other (see comments)  [back door not utilized]   Location, Kitchen first (main) floor   [place], Kitchen tile floor   Location, Laundry Room first (main)  floor   [place], Laundry Room concrete floor   Laundry Room Access not wheelchair accessible   Laundry Room Access Comment down in basement,  with full flight and L hand rail   Location, Patient Bedroom other (see comments)  [3rd floor]   [place], Patient Bedroom carpeted floor   Location, Bathroom second floor, must negotiate stairs to access   [place], Bathroom tile floor   Bathroom Access not wheelchair accessible   Bathroom Access Comment Pt reports FB on 2nd floor. Pt has shower chair.   Number of Stairs, Within Home, Primary 12   Surface of Stairs, Within Home, Primary carpeting   Stair Railings, Within Home, Primary railings on both sides of stairs   Stairs Comment, Within Home, Primary to second floor for bathroom   Stairs, Within Home, Secondary 12   Surface of Stairs, Within Home, Secondary carpeting   Stair Railings, Within Home, Secondary railing on left side (ascending)   Stairs Comment, Within Home, Secondary to third floor for bed room          Prior Level of Function      Most Recent Value   Dominant Hand right   Ambulation assistive equipment   Transferring assistive equipment   Toileting independent   Bathing independent   Dressing assistive equipment  [shoe horn]   Eating independent   Prior Level of Function Comment Pt reports prior use of cane in home as needed for ambulation and use of a shower chair for bathing. Pt reports was completing BADLs independently.   Assistive Device Currently Used at Home cane, straight, shower chair           IRF PT Evaluation and Treatment - 01/16/22 0904        PT Time Calculation    Start Time 0900     Stop Time 1000     Time Calculation (min) 60 min        Session Details    Document Type daily treatment/progress note     Mode of Treatment individual therapy;physical therapy        General Information    Patient Profile Reviewed yes     General Observations of Patient received in therapy gym, willing to participate        Transfers    Transfers stand pivot  transfer        Sit to Stand Transfer    [place], Sit to Stand Transfer close supervision     Verbal Cues safety     Assistive Device walker, front-wheeled;gait belt     Comment for safety        Stand to Sit Transfer    [place], Stand to Sit Transfer close supervision     Verbal Cues safety     Assistive Device walker, front-wheeled;gait belt     Comment for safety        Stand Pivot Transfer    [place], Stand Pivot/Stand Step Transfer touching/steadying assist     Verbal Cues maintaining center of gravity over base of support     Assistive Device gait belt;walker, front-wheeled     Comment for steadying with gait belt        Gait Training    [place], Gait touching/steadying assist     Assistive Device walker, front-wheeled     Distance in Feet 200 feet     Pattern (Gait) step-through     Deviations/Abnormal Patterns (Gait) base of support, narrow;weight shifting decreased;gait speed decreased     Comment (Gait/Stairs) +100ft with touching assist for steadying at pelvis and improving balance with gait belt        Stairs Training    [place], Stairs touching/steadying assist     Assistive Device gait belt;railing     Handrail Location (Stairs) left side (ascending)     Number of Stairs 16     Stair Height 6 inches     Ascending Stairs Technique step-to-step   RLE leading laterally    Descending Stairs Technique step-to-step   LLE leading laterally    Comment lateral approach to elevations with touching assist at pelvis to improve balance with gait belt        Wheelchair Mobility/Management    Method of Locomotion bimanual (upper extremity) propulsion     Wheelchair Type manual, ultra lightweight     [place], Forward Propulsion modified independence     [place], Steering Activities modified independence     [place], Turning Activities modified independence     Distance Propelled in Feet 200 feet     Activity Tolerance able to tolerate short community activity distances (150 to  500 feet)     Comment, Wheelchair Mobility Pt provided with w/c license and verbalized agreement to rules and regulations regarding safe and effective use of wheelchair     Management Activities wheel locks/brakes management     Wheel Locks/Brake Management modified independence        Lower Extremity (Therapeutic Exercise)    Comment seated: B LAQs 2x15 with 2#, seated marches 2x15, seat hip adduction 3 second hold for 2x15, hip abduction with pink band 2x15, sit <> stand 5x with emphasis on anterior trunk lean        Daily Progress Summary (PT)    Daily Outcome Statement Pt demos improvement with elevations and endurance as evident with increased number of elevations.  Pt will continue to benefit from BLE strengthening, endurance training and elevations training.                           IRF PT Goals      Most Recent Value   Bed Mobility Goal 1    Activity/Assistive Device rolling to right, rolling to left at 01/12/2022 0831   [place] supervision required at 01/12/2022 0831   Time Frame short-term goal (STG), 5 - 7 days at 01/12/2022 0831   Progress/Outcome goal met at 01/15/2022 0905   Bed Mobility Goal 2    Activity/Assistive Device rolling to left, rolling to right at 01/12/2022 0831   [place] modified independence at 01/12/2022 0831   Time Frame long-term goal (LTG), 21 days or less at 01/12/2022 0831   Progress/Outcome good progress toward goal, goal ongoing at 01/15/2022 0905   Bed Mobility Goal 3    Activity/Assistive Device sit to supine/supine to sit at 01/12/2022 0831   [place] other (see comments)  [touching assist] at 01/12/2022 0831   Time Frame short-term goal (STG), 5 - 7 days at 01/12/2022 0831   Progress/Outcome goal met at 01/15/2022 0905   Bed Mobility Goal 4    Activity/Assistive Device sit to supine/supine to sit at 01/12/2022 0831   [place] modified independence at 01/12/2022 0831   Time Frame long-term goal (LTG), 21 days or less at 01/12/2022 0831   Progress/Outcome  good progress toward goal, goal ongoing at 01/15/2022 0905   Transfer Goal 1    Activity/Assistive Device sit-to-stand/stand-to-sit at 01/12/2022 0831   [place] other (see comments)  [touching assist] at 01/12/2022 0831   Time Frame short-term goal (STG), 5 - 7 days at 01/12/2022 0831   Progress/Outcome good progress toward goal, goal ongoing at 01/15/2022 0905   Transfer Goal 2    Activity/Assistive Device sit-to-stand/stand-to-sit at 01/12/2022 0831   [place] modified independence at 01/12/2022 0831   Time Frame 21 days or less, long-term goal (LTG) at 01/12/2022 0831   Progress/Outcome good progress toward goal, goal ongoing at 01/15/2022 0905   Transfer Goal 3    Activity/Assistive Device stand pivot at 01/12/2022 0831   [place] other (see comments)  [touching assist] at 01/12/2022 0831   Time Frame short-term goal (STG), 5 - 7 days at 01/12/2022 0831   Progress/Outcome good progress toward goal, goal ongoing at 01/15/2022 0905   Transfer Goal 4    Activity/Assistive Device stand pivot at 01/12/2022 0831   [place] modified independence at 01/12/2022 0831   Time Frame long-term goal (LTG), 21 days or less at 01/12/2022 0831   Progress/Outcome good progress toward goal, goal ongoing at 01/15/2022 0905   Gait/Walking Locomotion Goal 1    Activity/Assistive Device gait (walking locomotion), assistive device use at 01/12/2022 0831   Distance 50 feet at 01/12/2022 0831   [place] other (see comments)  [touching assist] at 01/12/2022 0831   Time Frame short-term goal (STG), 5 - 7 days at 01/12/2022 0831   Progress/Outcome good progress toward goal, goal ongoing at 01/15/2022 0905   Gait/Walking Locomotion Goal 2    Activity/Assistive Device gait (walking locomotion), assistive device use at 01/12/2022 0831   Distance 150 feet at 01/12/2022 0831   [place] modified independence at 01/12/2022 0831   Time Frame long-term goal (LTG), 21 days or less at 01/12/2022 0831   Progress/Outcome good  progress toward goal, goal ongoing at 01/15/2022 0905   Stairs Goal 1    Activity/Assistive Device ascending stairs, descending stairs at 01/15/2022 0905   Number of Stairs 12 at 01/15/2022 0905   [place] minimum assist (75% or more patient effort) at 01/15/2022 0905   Time Frame short-term goal (STG), 1 week at 01/15/2022 0905   Progress/Outcome good progress toward goal, goal ongoing at 01/15/2022 0905   Stairs Goal 2    Activity/Assistive Device ascending stairs, descending stairs at 01/15/2022 0905   Number of Stairs 12 at 01/15/2022 0905   [place] modified independence at 01/15/2022 0905   Time Frame long-term goal (LTG), 21 days or less, by discharge at 01/15/2022 0905   Progress/Outcome good progress toward goal, goal ongoing at 01/15/2022 0905

## 2022-01-17 ENCOUNTER — APPOINTMENT (INPATIENT)
Dept: PHYSICAL THERAPY | Facility: REHABILITATION | Age: 72
DRG: 565 | End: 2022-01-17
Payer: COMMERCIAL

## 2022-01-17 ENCOUNTER — APPOINTMENT (INPATIENT)
Dept: OCCUPATIONAL THERAPY | Facility: REHABILITATION | Age: 72
DRG: 565 | End: 2022-01-17
Payer: COMMERCIAL

## 2022-01-17 PROCEDURE — 97530 THERAPEUTIC ACTIVITIES: CPT | Mod: GP,CQ

## 2022-01-17 PROCEDURE — 97535 SELF CARE MNGMENT TRAINING: CPT | Mod: GO

## 2022-01-17 PROCEDURE — 63600000 HC DRUGS/DETAIL CODE: Performed by: INTERNAL MEDICINE

## 2022-01-17 PROCEDURE — 63700000 HC SELF-ADMINISTRABLE DRUG: Performed by: INTERNAL MEDICINE

## 2022-01-17 PROCEDURE — L0172 CERV COL SR FOAM 2PC PRE OTS: HCPCS

## 2022-01-17 PROCEDURE — 97116 GAIT TRAINING THERAPY: CPT | Mod: GP,CQ

## 2022-01-17 PROCEDURE — 97116 GAIT TRAINING THERAPY: CPT | Mod: GP

## 2022-01-17 PROCEDURE — 12800001 HC ROOM AND CARE SEMIPRIVATE REHAB-BRAIN INJ

## 2022-01-17 RX ORDER — HYDROCHLOROTHIAZIDE 25 MG/1
25 TABLET ORAL DAILY
Status: DISCONTINUED | OUTPATIENT
Start: 2022-01-18 | End: 2022-01-25 | Stop reason: HOSPADM

## 2022-01-17 RX ADMIN — LOSARTAN POTASSIUM 100 MG: 100 TABLET, FILM COATED ORAL at 16:50

## 2022-01-17 RX ADMIN — GUAIFENESIN 600 MG: 600 TABLET, EXTENDED RELEASE ORAL at 20:06

## 2022-01-17 RX ADMIN — HEPARIN SODIUM 5000 UNITS: 5000 INJECTION, SOLUTION INTRAVENOUS; SUBCUTANEOUS at 21:06

## 2022-01-17 RX ADMIN — ACETAMINOPHEN 975 MG: 325 TABLET, FILM COATED ORAL at 21:07

## 2022-01-17 RX ADMIN — HEPARIN SODIUM 5000 UNITS: 5000 INJECTION, SOLUTION INTRAVENOUS; SUBCUTANEOUS at 13:11

## 2022-01-17 RX ADMIN — OXYCODONE HYDROCHLORIDE 10 MG: 5 TABLET ORAL at 07:36

## 2022-01-17 RX ADMIN — AMLODIPINE BESYLATE 5 MG: 5 TABLET ORAL at 07:28

## 2022-01-17 RX ADMIN — SENNOSIDES AND DOCUSATE SODIUM 1 TABLET: 50; 8.6 TABLET ORAL at 07:28

## 2022-01-17 RX ADMIN — CETIRIZINE HYDROCHLORIDE 5 MG: 5 TABLET ORAL at 20:06

## 2022-01-17 RX ADMIN — ACETAMINOPHEN 975 MG: 325 TABLET, FILM COATED ORAL at 13:11

## 2022-01-17 RX ADMIN — ATORVASTATIN CALCIUM 40 MG: 40 TABLET, FILM COATED ORAL at 16:50

## 2022-01-17 RX ADMIN — OXYCODONE HYDROCHLORIDE 10 MG: 5 TABLET ORAL at 13:13

## 2022-01-17 RX ADMIN — ESCITALOPRAM OXALATE 10 MG: 10 TABLET, FILM COATED ORAL at 07:29

## 2022-01-17 RX ADMIN — HEPARIN SODIUM 5000 UNITS: 5000 INJECTION, SOLUTION INTRAVENOUS; SUBCUTANEOUS at 06:35

## 2022-01-17 RX ADMIN — ACETAMINOPHEN 975 MG: 325 TABLET, FILM COATED ORAL at 06:38

## 2022-01-17 RX ADMIN — GUAIFENESIN 600 MG: 600 TABLET, EXTENDED RELEASE ORAL at 07:29

## 2022-01-17 RX ADMIN — FAMOTIDINE 20 MG: 20 TABLET ORAL at 20:06

## 2022-01-17 RX ADMIN — FLUTICASONE PROPIONATE 2 SPRAY: 50 SPRAY, METERED NASAL at 07:31

## 2022-01-17 RX ADMIN — OXYCODONE HYDROCHLORIDE 10 MG: 5 TABLET ORAL at 19:58

## 2022-01-17 RX ADMIN — FAMOTIDINE 20 MG: 20 TABLET ORAL at 07:28

## 2022-01-17 RX ADMIN — LIDOCAINE 1 PATCH: 246 PATCH TOPICAL at 07:28

## 2022-01-17 RX ADMIN — OXYCODONE HYDROCHLORIDE 10 MG: 5 TABLET ORAL at 01:33

## 2022-01-17 RX ADMIN — HYDROCHLOROTHIAZIDE 12.5 MG: 12.5 TABLET ORAL at 07:28

## 2022-01-17 RX ADMIN — AMLODIPINE BESYLATE 5 MG: 5 TABLET ORAL at 20:06

## 2022-01-17 NOTE — PROGRESS NOTES
Patient: Melanie Zelaya  Location: [facility] [address]  MRN: [id number]  Today's date: 1/17/2022    History of Present Illness  Melanie is a 71 y.o. male admitted on 1/11/2022 with S/P cervical spinal fusion [Z98.1]. Principal problem is S/P cervical spinal fusion.      Past Medical History  Melanie has a past medical history of Anxiety, Atrial fibrillation (CMS/HCC), Breast cancer (CMS/HCC), C1 cervical fracture (CMS/HCC), Colon polyp, COPD (chronic obstructive pulmonary disease) (CMS/HCC), Coronary artery calcification seen on CT scan, COVID-19 vaccine series completed, Depression, Diverticulosis, Diverticulosis, Fracture of pelvis (CMS/HCC) (1968), GERD (gastroesophageal reflux disease), Hearing loss, History of colon polyps, History of COVID-19 (2020), Hyperlipidemia, Hypertension, Left atrial enlargement, Lung cancer (CMS/HCC), Lung cancer (CMS/HCC), Pneumonia (2011), Seasonal allergies, and Wrist fracture.      PT Vitals    Date/Time Pulse HR Source BP BP Location BP Method Pt Position [facility]   01/17/22 1335 61 Monitor 136/68 Right upper arm Automatic Sitting PF      PT Pain    Date/Time Pain Type Location Rating: Rest Interventions [facility]   01/17/22 1335 Pain Assessment neck 4 premedicated for activity PF   01/17/22 1423 Pain Reassessment neck 4 premedicated for activity PF          Prior Living Environment      Most Recent Value   People in Home alone   Current Living Arrangements home   Home Accessibility not wheelchair accessible   Living Environment Comment 3SH, 3 VALERIA LHR, FF to bathroom then another FF to bedroom, tub shower w/ SC   Number of Stairs, Main Entrance 4   Surface of Stairs, Main Entrance concrete   Stair Railings, Main Entrance railing on left side (ascending)   Landing, Stairs, Main Entrance adequate turning radius   Number of Stairs, Second Entrance other (see comments)  [back door not utilized]   Location, Kitchen first (main) floor   [place], Kitchen tile floor    Location, Laundry Room first (main) floor   [place], Laundry Room concrete floor   Laundry Room Access not wheelchair accessible   Laundry Room Access Comment down in basement,  with full flight and L hand rail   Location, Patient Bedroom other (see comments)  [3rd floor]   [place], Patient Bedroom carpeted floor   Location, Bathroom second floor, must negotiate stairs to access   [place], Bathroom tile floor   Bathroom Access not wheelchair accessible   Bathroom Access Comment Pt reports FB on 2nd floor. Pt has shower chair.   Number of Stairs, Within Home, Primary 12   Surface of Stairs, Within Home, Primary carpeting   Stair Railings, Within Home, Primary railings on both sides of stairs   Stairs Comment, Within Home, Primary to second floor for bathroom   Stairs, Within Home, Secondary 12   Surface of Stairs, Within Home, Secondary carpeting   Stair Railings, Within Home, Secondary railing on left side (ascending)   Stairs Comment, Within Home, Secondary to third floor for bed room          Prior Level of Function      Most Recent Value   Dominant Hand right   Ambulation assistive equipment   Transferring assistive equipment   Toileting independent   Bathing independent   Dressing assistive equipment  [shoe horn]   Eating independent   Prior Level of Function Comment Pt reports prior use of cane in home as needed for ambulation and use of a shower chair for bathing. Pt reports was completing BADLs independently.   Assistive Device Currently Used at Home cane, straight, shower chair           IRF PT Evaluation and Treatment - 01/17/22 1335        PT Time Calculation    Start Time 1330     Stop Time 1430     Time Calculation (min) 60 min        Session Details    Document Type daily treatment/progress note     Mode of Treatment individual therapy;physical therapy        General Information    Patient Profile Reviewed yes     General Observations of Patient received seated in wc in gym and agreeable to  "treatment        Bed Mobility    [place], Roll Left close supervision     [place], Roll Right close supervision     [place], Supine to Sit close supervision     [place], Sit to Supine close supervision     Assistive Device none     Comment (Bed Mobility) via log roll technique on mat req close S and extra time to complete        Transfers    Transfers stand pivot transfer        Sit to Stand Transfer    [place], Sit to Stand Transfer close supervision     Verbal Cues safety     Assistive Device walker, front-wheeled;gait belt     Comment for safety        Stand to Sit Transfer    [place], Stand to Sit Transfer close supervision     Verbal Cues safety     Assistive Device gait belt;walker, front-wheeled     Comment for safety        Stand Pivot Transfer    [place], Stand Pivot/Stand Step Transfer touching/steadying assist     Verbal Cues safety     Assistive Device walker, front-wheeled;gait belt     Comment steadying assist at pelvis via amb approach        Gait Training    [place], Gait touching/steadying assist     Assistive Device walker, front-wheeled;gait belt     Distance in Feet 200 feet     Pattern (Gait) step-through     Deviations/Abnormal Patterns (Gait) base of support, narrow;gait speed decreased;step length decreased     Advanced Gait Activity 10 Meter Walk Test (Self-Selected Velocity)     Comment (Gait/Stairs) 200 ft + 70 ft x 2 + 100 ft with RW req steadying assist for balance at hips        Curb Negotiation    [place] minimum assist (75% or more patient effort)     Assistive Device walker, front-wheeled;gait belt     Curb Height 6 inches     Comment up/down 2\" curb + 6\" curb with RW req min A for steadying and vc for safe foot placement        Rough/Uneven Surface Gait Skills    [place] minimum assist (75% or more patient effort)     Assistive Device walker, front-wheeled;gait belt     Distance in Feet 40 feet     Comment on carpet and transitions " "tile <> carpet with RW req steadying-min A at hips        Sloped Surface Gait Skills    [place] minimum assist (75% or more patient effort)     Assistive Device walker, front-wheeled     Distance in Feet 10 feet     Comment up/down 5 ft ramp with RW req min A for steadying and vc for closer proximity to AD        10 Meter Walk Test (Self-Selected Velocity)    Trial One: Ten Meter Walk Test (sec) 14.95 seconds     Trial Two: Ten Meter Walk Test (sec) 16.39 seconds     Trial Three: Ten Meter Walk Test (sec) 13.7 seconds     Assistive Device walker, front-wheeled     Trial One: Gait Speed (m/s) 0.4 m/s     Trial Two: Gait Speed (m/s) 0.37 m/s     Trial Three: Gait Speed (m/s) 0.44 m/s     Average Gait Speed (m/s): Three Trials 0.4 m/s        Stairs Training    [place], Stairs touching/steadying assist     Assistive Device gait belt;railing     Handrail Location (Stairs) left side (ascending);left side (descending)     Number of Stairs 16     Stair Height 6 inches     Ascending Stairs Technique step-to-step     Descending Stairs Technique step-to-step     Comment via semi-lateral approach up/down 4, 6\" steps x 4 with BUE support on L rail req steadying assist for balance        Balance    Static Sitting Balance WFL;supported;sitting in chair;unsupported;sitting, edge of mat     Dynamic Sitting Balance WFL;unsupported;sitting, edge of mat     Sit to Stand Dynamic Balance mild impairment     Static Standing Balance mild impairment;supported;standing     Dynamic Standing Balance mild impairment;unsupported;standing     Balance Test Results Timed Up and Go Test (TUG)        Timed Up and Go Test    Trial One: Timed Up and Go Test 30.46     Trial Two: Timed Up and Go Test 27.8     Trial Three: Timed Up and Go Test 30.33     Mean of 3 Trials: Timed Up and Go Test 29.53     Comment, Timed Up and Go Test with RW and steadying assist        Motor Skills    Functional Endurance fair +, requires occasional rest breaks     "    Daily Progress Summary (PT)    Symptoms Noted During/After Treatment fatigue;other (see comments)   noted onset of dizziness when rolling to R which subsided with sitting rest    Daily Outcome Statement Pt has made progress with mobility amb x 200 ft with RW req steadying assist, ascending/desending 16 steps with BUE support on L rail req steadying assist, performing curb and ramp negotiation with RW req min A, and req close S for bed mobility and sit <> stand transfers. Pt has demonstrated progress with improved TUG score from an average speed of 53.6 seconds previously to an average time of 29.53 sec on this date. Pt would benefit from continued skilled therapy to continue improving strength, endurance, and balance to restore functional independence and decrease risk of falls.                           IRF PT Goals      Most Recent Value   Bed Mobility Goal 1    Activity/Assistive Device rolling to right, rolling to left at 01/12/2022 0831   [place] supervision required at 01/12/2022 0831   Time Frame short-term goal (STG), 5 - 7 days at 01/12/2022 0831   Progress/Outcome goal met at 01/15/2022 0905   Bed Mobility Goal 2    Activity/Assistive Device rolling to left, rolling to right at 01/12/2022 0831   [place] modified independence at 01/12/2022 0831   Time Frame long-term goal (LTG), 21 days or less at 01/12/2022 0831   Progress/Outcome goal ongoing at 01/17/2022 0800   Bed Mobility Goal 3    Activity/Assistive Device sit to supine/supine to sit at 01/12/2022 0831   [place] other (see comments)  [touching assist] at 01/12/2022 0831   Time Frame short-term goal (STG), 5 - 7 days at 01/12/2022 0831   Progress/Outcome goal met at 01/15/2022 0905   Bed Mobility Goal 4    Activity/Assistive Device sit to supine/supine to sit at 01/12/2022 0831   [place] modified independence at 01/12/2022 0831   Time Frame long-term goal (LTG), 21 days or less at 01/12/2022 0831   Progress/Outcome goal ongoing  at 01/17/2022 0800   Transfer Goal 1    Activity/Assistive Device sit-to-stand/stand-to-sit at 01/12/2022 0831   [place] other (see comments)  [touching assist] at 01/12/2022 0831   Time Frame short-term goal (STG), 5 - 7 days at 01/12/2022 0831   Progress/Outcome goal met at 01/17/2022 0800   Transfer Goal 2    Activity/Assistive Device sit-to-stand/stand-to-sit at 01/12/2022 0831   [place] modified independence at 01/12/2022 0831   Time Frame 21 days or less, long-term goal (LTG) at 01/12/2022 0831   Progress/Outcome goal ongoing at 01/17/2022 0800   Transfer Goal 3    Activity/Assistive Device stand pivot at 01/12/2022 0831   [place] other (see comments)  [touching assist] at 01/12/2022 0831   Time Frame short-term goal (STG), 5 - 7 days at 01/12/2022 0831   Progress/Outcome goal met at 01/17/2022 0800   Transfer Goal 4    Activity/Assistive Device stand pivot at 01/12/2022 0831   [place] modified independence at 01/12/2022 0831   Time Frame long-term goal (LTG), 21 days or less at 01/12/2022 0831   Progress/Outcome goal ongoing at 01/17/2022 0800   Gait/Walking Locomotion Goal 1    Activity/Assistive Device gait (walking locomotion), assistive device use at 01/12/2022 0831   Distance 50 feet at 01/12/2022 0831   [place] other (see comments)  [touching assist] at 01/12/2022 0831   Time Frame short-term goal (STG), 5 - 7 days at 01/12/2022 0831   Progress/Outcome goal met at 01/17/2022 0800   Gait/Walking Locomotion Goal 2    Activity/Assistive Device gait (walking locomotion), assistive device use at 01/12/2022 0831   Distance 150 feet at 01/12/2022 0831   [place] modified independence at 01/12/2022 0831   Time Frame long-term goal (LTG), 21 days or less at 01/12/2022 0831   Progress/Outcome goal ongoing at 01/17/2022 0800   Stairs Goal 1    Activity/Assistive Device ascending stairs, descending stairs at 01/15/2022 0905   Number of Stairs 12 at 01/15/2022 0905   [place] minimum  assist (75% or more patient effort) at 01/15/2022 0905   Time Frame short-term goal (STG), 1 week at 01/15/2022 0905   Progress/Outcome goal met at 01/17/2022 0800   Stairs Goal 2    Activity/Assistive Device ascending stairs, descending stairs at 01/15/2022 0905   Number of Stairs 12 at 01/15/2022 0905   [place] modified independence at 01/15/2022 0905   Time Frame long-term goal (LTG), 21 days or less, by discharge at 01/15/2022 0905   Progress/Outcome goal ongoing at 01/17/2022 0800

## 2022-01-17 NOTE — PROGRESS NOTES
Psychiatry Progress Note    History of Present Illness   See initial consult.  History pertaining to psychosis, depression and anxiety and other psychiatric and psychologic conditions as well as general medical history detailed in initial consult.      Interval History:   Ok on lexapro no c/o side effects  Sodium wnl , bp ok  Anxiety more manageable  Patient did okay overnight.  No nursing report of any impulsivity, agitation or behavioral disturbance.  Sleep pattern normalizing.  Adjusting to routine of the hospital.   Seems motivated to participate in therapies. No suicidal ideation, auditory or visual hallucinations or paranoid ideation. Reviewed progress and behavior with nursing    MENTAL STATUS EXAM  Appearance: well groomed  Gait and Motor: no abnormal movements  Speech: normal rate/rhythm/volume  Mood: depressed and anxious  Affect: constricted  Associations: coherent  Thought Process: goal-directed  Thought Content: no auditory or visual hallucinations.  Suicidality/Homicidality: denies  Judgement/Insight: acknowledges illness  Orientation: situation  Memory: knows current president  Attention: distracted  Knowledge: normal  Language: normal    Vital Signs for the last 24 hours:  Temp:  [36.6 °C (97.9 °F)-37.1 °C (98.7 °F)] 36.6 °C (97.9 °F)  Heart Rate:  [70-84] 84  Resp:  [16-18] 16  BP: (135-159)/(66-90) 143/85    Labs:  Labs below reviewed for pertinence to psychiatric condition  Lab Results   Component Value Date    GLUCOSE 102 (H) 01/12/2022    CALCIUM 9.2 01/12/2022     01/12/2022    K 4.4 01/12/2022    CO2 31 01/12/2022    CL 98 01/12/2022    BUN 15 01/12/2022    CREATININE 0.8 01/12/2022     Lab Results   Component Value Date    WBC 5.78 01/12/2022    HGB 13.0 (L) 01/12/2022    HCT 39.2 (L) 01/12/2022    MCV 88.9 01/12/2022     01/12/2022     Pain Management Panel    There is no flowsheet data to display.           Current Facility-Administered Medications   Medication Dose Route  Frequency Provider Last Rate Last Admin   • acetaminophen (TYLENOL) tablet 975 mg  975 mg oral q8h Fiona Rivera MD   975 mg at 01/17/22 0638   • albuterol HFA (VENTOLIN HFA) 90 mcg/actuation inhaler 2 puff  2 puff inhalation q6h PRN Jorge Edwards MD       • amLODIPine (NORVASC) tablet 5 mg  5 mg oral q12h [facility] Fiona Rivera MD   5 mg at 01/17/22 0728   • atorvastatin (LIPITOR) tablet 40 mg  40 mg oral Daily (6p) Jorge Edwards MD   40 mg at 01/16/22 1825   • bisacodyL (DULCOLAX) 10 mg suppository 10 mg  10 mg rectal Daily PRN Jorge Edwards MD       • cetirizine (ZyrTEC) tablet 5 mg  5 mg oral Nightly Jorge Edwards MD   5 mg at 01/16/22 2117   • cyclobenzaprine (FLEXERIL) tablet 10 mg  10 mg oral 3x daily PRN Jorge Edwards MD       • [START ON 1/20/2022] dabigatran etexilate (PRADAXA) capsule 150 mg  150 mg oral BID Jorge Edwards MD       • escitalopram (LEXAPRO) tablet 10 mg  10 mg oral Daily Jorge Edwards MD   10 mg at 01/17/22 0729   • famotidine (PEPCID) tablet 20 mg  20 mg oral BID Fiona Rivera MD   20 mg at 01/17/22 0728   • fluticasone propionate (FLONASE) 50 mcg/actuation nasal spray 2 spray  2 spray Each Nostril Daily Jorge Edwards MD   2 spray at 01/17/22 0731   • guaiFENesin (MUCINEX) 12 hr ER tablet 600 mg  600 mg oral BID Fiona Rivera MD   600 mg at 01/17/22 0729   • heparin (porcine) 5,000 unit/mL injection 5,000 Units  5,000 Units subcutaneous q8h [facility] Jorge Edwards MD   5,000 Units at 01/17/22 0635   • hydrochlorothiazide (HYDRODIURIL) tablet 12.5 mg  12.5 mg oral Daily Jorge Edwards MD   12.5 mg at 01/17/22 0728   • lidocaine (ASPERCREME) 4 % topical patch 1 patch  1 patch Topical Daily Jorge Edwards MD   1 patch at 01/17/22 0728   • losartan (COZAAR) tablet 100 mg  100 mg oral Daily with dinner Fiona Rivera MD   100 mg at 01/16/22 3488   • oxyCODONE (ROXICODONE) immediate release tablet 10 mg  10 mg oral  q4h PRN Jorge Edwards MD   10 mg at 01/17/22 0736   • oxyCODONE (ROXICODONE) immediate release tablet 5 mg  5 mg oral q4h PRN Jorge Edwards MD   5 mg at 01/14/22 0757   • polyethylene glycol (MIRALAX) 17 gram packet 17 g  17 g oral Daily PRN Jorge Edwards MD       • sennosides-docusate sodium (SENOKOT-S) 8.6-50 mg per tablet 1 tablet  1 tablet oral BID Jorge Edwards MD   1 tablet at 01/17/22 0728   • umeclidinium-vilanteroL (ANORO ELLIPTA) 62.5-25 mcg/actuation inhaler 1 puff  1 puff inhalation Daily Jorge Edwards MD   1 puff at 01/17/22 0731        Patient Active Problem List   Diagnosis   • Anxiety   • Atrial fibrillation (CMS/HCC)   • Chronic diastolic heart failure (CMS/HCC)   • Hearing loss   • Primary malignant neoplasm of left lower lobe of lung (CMS/HCC)   • Multiple pulmonary nodules   • Other emphysema (CMS/HCC)   • Gastroesophageal reflux disease without esophagitis   • Asthma   • Cervical spine fracture (CMS/HCC)   • Hypertension   • Mild episode of recurrent major depressive disorder (CMS/HCC)   • Preop examination   • Coronary artery calcification seen on CT scan   • COPD (chronic obstructive pulmonary disease) (CMS/HCC)   • Prediabetes   • History of cervical fracture   • H/O cervical fracture   • Closed nondisplaced odontoid fracture with type II morphology and delayed healing   • S/P cervical spinal fusion           Assessment/Plan    Depression: CBT automatic anxious and negative thoughts  Adjustment Disorder to Disability: supportive therapy  Sleep:  Insight into illness: insight therapy/psychoeducation  Psychotherapy: supportive  Monitor: Mood, Speech, Appetite, Energy, Cognition, Behavioral, Impulsivity, Agitation  Family Support  Medications: monitor for side effects  - presently no gi, akathesia , ha or sedation  Sodium 138  Okay to continue Lexapro 10 mg daily-monitor for any side effects  Monitor sodium on Lexapro-currently 138  Support ATD  CBT  automatic anxious and negative thoughts  Clarify for patient not on porzac  cnt for anxiety  Austen Boyd MD  1/17/2022

## 2022-01-17 NOTE — PLAN OF CARE
Plan of Care Review  Plan of Care Reviewed With: patient  Progress: improving  Outcome Summary: Patient AAOx4, continent of bowel and bladder.  Aspen collar worn AAT.  Incision healing well with staples covered with mepilex for protection.

## 2022-01-17 NOTE — PROGRESS NOTES
Patient: Melanie Zelaya  Location: [facility] [address]  MRN: [id number]  Today's date: 1/17/2022    History of Present Illness  Melanie is a 71 y.o. male admitted on 1/11/2022 with S/P cervical spinal fusion [Z98.1]. Principal problem is S/P cervical spinal fusion.      Past Medical History  Melanie has a past medical history of Anxiety, Atrial fibrillation (CMS/HCC), Breast cancer (CMS/HCC), C1 cervical fracture (CMS/HCC), Colon polyp, COPD (chronic obstructive pulmonary disease) (CMS/HCC), Coronary artery calcification seen on CT scan, COVID-19 vaccine series completed, Depression, Diverticulosis, Diverticulosis, Fracture of pelvis (CMS/HCC) (1968), GERD (gastroesophageal reflux disease), Hearing loss, History of colon polyps, History of COVID-19 (2020), Hyperlipidemia, Hypertension, Left atrial enlargement, Lung cancer (CMS/HCC), Lung cancer (CMS/HCC), Pneumonia (2011), Seasonal allergies, and Wrist fracture.      OT Vitals    Date/Time Pulse BP BP Location BP Method Pt Position [facility]   01/17/22 0738 84 143/85 Right upper arm Automatic Sitting DM      OT Pain    Date/Time Pain Type Location Rating: Rest Interventions Who   01/17/22 0736 Pain Assessment neck 5 -- HJS   01/17/22 0738 Pain Assessment neck 5 premedicated for activity DM   01/17/22 0858 Pain Reassessment neck 5 premedicated for activity DM          Prior Living Environment      Most Recent Value   People in Home alone   Current Living Arrangements home   Home Accessibility not wheelchair accessible   Living Environment Comment 3SH, 3 VALERIA LHR, FF to bathroom then another FF to bedroom, tub shower w/ SC   Number of Stairs, Main Entrance 4   Surface of Stairs, Main Entrance concrete   Stair Railings, Main Entrance railing on left side (ascending)   Landing, Stairs, Main Entrance adequate turning radius   Number of Stairs, Second Entrance other (see comments)  [back door not utilized]   Location, Kitchen first (main) floor   [place],  Kitchen tile floor   Location, Laundry Room first (main) floor   [place], Laundry Room concrete floor   Laundry Room Access not wheelchair accessible   Laundry Room Access Comment down in basement,  with full flight and L hand rail   Location, Patient Bedroom other (see comments)  [3rd floor]   [place], Patient Bedroom carpeted floor   Location, Bathroom second floor, must negotiate stairs to access   [place], Bathroom tile floor   Bathroom Access not wheelchair accessible   Bathroom Access Comment Pt reports FB on 2nd floor. Pt has shower chair.   Number of Stairs, Within Home, Primary 12   Surface of Stairs, Within Home, Primary carpeting   Stair Railings, Within Home, Primary railings on both sides of stairs   Stairs Comment, Within Home, Primary to second floor for bathroom   Stairs, Within Home, Secondary 12   Surface of Stairs, Within Home, Secondary carpeting   Stair Railings, Within Home, Secondary railing on left side (ascending)   Stairs Comment, Within Home, Secondary to third floor for bed room          Prior Level of Function      Most Recent Value   Dominant Hand right   Ambulation assistive equipment   Transferring assistive equipment   Toileting independent   Bathing independent   Dressing assistive equipment  [shoe horn]   Eating independent   Prior Level of Function Comment Pt reports prior use of cane in home as needed for ambulation and use of a shower chair for bathing. Pt reports was completing BADLs independently.   Assistive Device Currently Used at Home cane, straight, shower chair          Occupational Profile      Most Recent Value   Successful Occupations Pt reports not a  within past year but would like to return to driving in future.   Occupational History/Life Experiences Pt reports living c  in [facility]. Pt has three (adult) children and grandchildren. Pt has good family support.           01/17/22 0840   OT Time Calculation   Start Time 0730   Stop Time 0900   Time  Calculation (min) 90 min   Session Details   Document Type daily treatment/progress note   Mode of Treatment occupational therapy;individual therapy   General Information   General Observations of Patient Pt. received seated in w/c. Pt agreeable to therapy.   Sit to Stand Transfer   [place], Sit to Stand Transfer touching/steadying assist   Verbal Cues safety   Assistive Device walker, front-wheeled   Comment from w/c.   Stand to Sit Transfer   [place], Stand to Sit Transfer touching/steadying assist   Verbal Cues safety   Assistive Device walker, front-wheeled   Comment from RW to w/c.   Stand Pivot Transfer   [place], Stand Pivot/Stand Step Transfer touching/steadying assist   Verbal Cues safety   Assistive Device walker, front-wheeled   Comment Ambulatory level.   Shower Transfer   Transfer Technique stand pivot   [place], Shower Transfer touching/steadying assist   Verbal Cues safety   Assistive Device walker, front-wheeled;tub bench   Comment Ambulatory approach, SPT c RW to/from tub bench   Safety Issues, Functional Mobility   Comment, Safety Issues/Impairments (Mobility) Touching A ambulating c RW HHD in room for BADL routine.   Therapeutic Interventions   Comment, Therapeutic Intervention OT educating on use of RW bag and reacher for safe item retrieval and transport during ADL. Touching A while pt utilizing to clean up dirty clothing following shower.   Bathing   Self-Performance chest;left arm;right arm;front perineal area;abdomen;buttocks;left upper leg;right upper leg;left lower leg, including foot;right lower leg, including foot   [place] Assistance other (see comments)  (back)   [place] touching/steadying assist   Position supported sitting   Setup Assistance adaptive equipment setup;obtain supplies;orthosis application   Adaptive Equipment grab bar/tub rail;long-handled sponge;tub bench   Comment Pt. performed bathing seated on tub bench. Steadying assistance for washing/drying  buttocks. Min A with washing back. Assistance with changing [place] Collar and [place] Collar. OT educating on recommendation to utilize hospital socks or slippers c hard bottom for safe exit out of shower upon d/c home.   Upper Body Dressing   Self-Performance obtains clothes;threads left arm, shirt;threads right arm, shirt;pulls shirt down/adjusts   [place] Assistance pulls shirt over head/around back   [place] minimum assist (75% or more patient effort)   Position supported sitting;unsupported standing   Adaptive Equipment none   Comment Touching A standing clothing retrieval at RW. Pt. required Min A for pulling shirt over [place] collar and adjusting shirt around the brace. D assist don [place] collar.   Lower Body Dressing   Self-Performance threads left leg, underpants;threads right leg, underpants;pulls underpants up or down;threads left leg, pants/shorts;threads right leg, pants/shorts;pulls pants/shorts up or down;dons/doffs left sock;dons/doffs right sock   [place] Assistance dons/doffs left shoe;dons/doffs right shoe   [place] touching/steadying assist;increased time to complete   Position supported sitting;unsupported standing   Adaptive Equipment sock aid;dressing stick   [place], Footwear moderate assist (50-74% patient effort)   Comment Touching A standing clothing retrieval at RW. Pt seated in w/c for dressing tasks.  Assist with donning R/L shoe due to time constraints. S for all LB dressing tasks c steadying assist when standing to pull-up underwear and pants.   Grooming   Self-Performance washes, rinses and dries hands;brushes/[place]rera hair;oral care (brushing teeth, cleaning dentures)   [place] close supervision   Position unsupported standing;sink side   Setup Assistance obtain supplies   [place], Oral Hygiene close supervision   Comment Standing c RW at sink   Toileting   Comment Pt reports no toileting needs. Per functional observation c clothing management during LB dressing,  anticipate steadying A.   Transfer Goal 1   Activity/Assistive Device toilet   [place] supervision required   Time Frame short-term goal (STG);1 week   Progress/Outcome goal met;goal revised this date   Transfer Goal 2   Progress/Outcome goal ongoing   Transfer Goal 3   Activity/Assistive Device shower   [place] supervision required   Time Frame short-term goal (STG);1 week   Progress/Outcome goal met;goal revised this date   Transfer Goal 4   Progress/Outcome goal ongoing   Bathing Goal 1   [place] supervision required   Time Frame short-term goal (STG);1 week   Progress/Outcome goal met;goal revised this date   Bathing Goal 2   Progress/Outcome goal ongoing   UB Dressing Goal 1   Time Frame short-term goal (STG);1 week   Strategies/Barriers including clothing retrieval   Progress/Outcome goal ongoing   UB Dressing Goal 2   Progress/Outcome goal ongoing   LB Dressing Goal 1   [place] supervision required  (Cl S)   Time Frame short-term goal (STG);1 week   Progress/Outcome goal met;goal revised this date   LB Dressing Goal 2   Progress/Outcome goal ongoing   Grooming Goal 1   [place] supervision required   Time Frame short-term goal (STG);1 week   Progress/Outcome goal met;goal revised this date   Grooming Goal 2   Progress/Outcome goal ongoing   Toileting Goal 1   [place] supervision required   Time Frame short-term goal (STG);1 week   Progress/Outcome goal met;goal revised this date   Toileting Goal 2   Progress/Outcome goal ongoing   Daily Progress Summary (OT)   Daily Outcome Statement OT session focused on bathing, grooming and UB/LB dressing.  Pt progressing to steadying A for transfers, CL S for bathing and grooming, MIN A for UB/LB dressing. Pt educated on use of AE for safe item retrieval and transport c pt obtaining clothing from drawers using AE, RW and walker bag. Pt would benefit from training for donning/doffing [place] collar and [place] collar for showers.  Pt has  increasing independence in ADLs c AE. Cont. with POC.   Weekly Progress Summary (OT)   Progress Toward Functional Goals (OT) progressing toward functional goals as expected   Weekly Outcome Statement Pt progressing to touching A c functional transfers ambulatory level c RW. Pt progressing to touching A bathing, Cl S grooming in stance at sink, Cl S toileting, Touching A LB dressing including clothing retrieval and MIN A UB dressing for collar management. Pt demo's decreased activity tolerance and will cont to benefit from functional endurance training. Pt benefiting from AE training. ILU transfers and IADL retraining completed. Recommnend toilet safety frame and additional grab bar in tub for safe d/c home.   Impairments Continuing to Limit Function decreased strength;impaired balance;impaired motor control;pain   Recommendations Cont skilled IP OT               IRF OT Goals      Most Recent Value   Transfer Goal 1    Activity/Assistive Device toilet at 01/17/2022 0840   [place] supervision required at 01/17/2022 0840   Time Frame short-term goal (STG), 1 week at 01/17/2022 0840   Progress/Outcome goal met, goal revised this date at 01/17/2022 0840   Transfer Goal 2    Activity/Assistive Device toilet at 01/12/2022 0840   [place] modified independence at 01/12/2022 0840   Time Frame long-term goal (LTG), 2 weeks at 01/12/2022 0840   Progress/Outcome goal ongoing at 01/17/2022 0840   Transfer Goal 3    Activity/Assistive Device shower at 01/17/2022 0840   [place] supervision required at 01/17/2022 0840   Time Frame short-term goal (STG), 1 week at 01/17/2022 0840   Progress/Outcome goal met, goal revised this date at 01/17/2022 0840   Transfer Goal 4    Activity/Assistive Device shower at 01/12/2022 0840   [place] modified independence at 01/12/2022 0840   Time Frame long-term goal (LTG), 2 weeks at 01/12/2022 0840   Progress/Outcome goal ongoing at 01/17/2022 0840   Bathing Goal 1     Activity/Assistive Device bathing skills, all at 01/12/2022 0840   [place] supervision required at 01/17/2022 0840   Time Frame short-term goal (STG), 1 week at 01/17/2022 0840   Progress/Outcome goal met, goal revised this date at 01/17/2022 0840   Bathing Goal 2    Activity/Assistive Device bathing skills, all at 01/12/2022 0840   [place] modified independence at 01/12/2022 0840   Time Frame long-term goal (LTG), 2 weeks at 01/12/2022 0840   Progress/Outcome goal ongoing at 01/17/2022 0840   UB Dressing Goal 1    Activity/Assistive Device upper body dressing at 01/12/2022 0840   [place] supervision required  [Cl S] at 01/12/2022 0840   Time Frame short-term goal (STG), 1 week at 01/17/2022 0840   Strategies/Barriers including clothing retrieval at 01/17/2022 0840   Progress/Outcome goal ongoing at 01/17/2022 0840   UB Dressing Goal 2    Activity/Assistive Device upper body dressing at 01/12/2022 0840   [place] modified independence at 01/12/2022 0840   Time Frame long-term goal (LTG), 2 weeks at 01/12/2022 0840   Progress/Outcome goal ongoing at 01/17/2022 0840   LB Dressing Goal 1    Activity/Assistive Device lower body dressing at 01/12/2022 0840   [place] supervision required  [Cl S] at 01/17/2022 0840   Time Frame short-term goal (STG), 1 week at 01/17/2022 0840   Progress/Outcome goal met, goal revised this date at 01/17/2022 0840   LB Dressing Goal 2    Activity/Assistive Device lower body dressing at 01/12/2022 0840   [place] modified independence at 01/12/2022 0840   Time Frame long-term goal (LTG), 2 weeks at 01/12/2022 0840   Progress/Outcome goal ongoing at 01/17/2022 0840   Grooming Goal 1    [place] supervision required at 01/17/2022 0840   Time Frame short-term goal (STG), 1 week at 01/17/2022 0840   Strategies/Barriers Standing at 01/12/2022 0840   Progress/Outcome goal met, goal revised this date at 01/17/2022 0840   Grooming Goal 2    Activity/Assistive Device  grooming skills, all at 01/12/2022 0840   [place] modified independence at 01/12/2022 0840   Time Frame long-term goal (LTG), 2 weeks at 01/12/2022 0840   Progress/Outcome goal ongoing at 01/17/2022 0840   Toileting Goal 1    Activity/Assistive Device toileting skills, all at 01/12/2022 0840   [place] supervision required at 01/17/2022 0840   Time Frame short-term goal (STG), 1 week at 01/17/2022 0840   Progress/Outcome goal met, goal revised this date at 01/17/2022 0840   Toileting Goal 2    Activity/Assistive Device toileting skills, all at 01/12/2022 0840   [place] modified independence at 01/12/2022 0840   Time Frame long-term goal (LTG), 2 weeks at 01/12/2022 0840   Progress/Outcome goal ongoing at 01/17/2022 0840

## 2022-01-17 NOTE — PROGRESS NOTES
Patient: Melanie Zelaya  Location: [facility] [address]  MRN: [id number]  Today's date: 1/17/2022    History of Present Illness  Melanie is a 71 y.o. male admitted on 1/11/2022 with S/P cervical spinal fusion [Z98.1]. Principal problem is S/P cervical spinal fusion.      Past Medical History  Melanie has a past medical history of Anxiety, Atrial fibrillation (CMS/HCC), Breast cancer (CMS/HCC), C1 cervical fracture (CMS/HCC), Colon polyp, COPD (chronic obstructive pulmonary disease) (CMS/HCC), Coronary artery calcification seen on CT scan, COVID-19 vaccine series completed, Depression, Diverticulosis, Diverticulosis, Fracture of pelvis (CMS/HCC) (1968), GERD (gastroesophageal reflux disease), Hearing loss, History of colon polyps, History of COVID-19 (2020), Hyperlipidemia, Hypertension, Left atrial enlargement, Lung cancer (CMS/HCC), Lung cancer (CMS/HCC), Pneumonia (2011), Seasonal allergies, and Wrist fracture.      PT Vitals    Date/Time Pulse HR Source BP BP Location BP Method Pt Position [facility]   01/17/22 0906 81 Monitor 150/71 Left upper arm Automatic Sitting DT      PT Pain    Date/Time Pain Type Location Rating: Rest Interventions [facility]   01/17/22 0906 Pain Assessment neck 5 premedicated for activity DT   01/17/22 0919 Pain Assessment neck 5 premedicated for activity DT          Prior Living Environment      Most Recent Value   People in Home alone   Current Living Arrangements home   Home Accessibility not wheelchair accessible   Living Environment Comment 3SH, 3 VALERIA LHR, FF to bathroom then another FF to bedroom, tub shower w/ SC   Number of Stairs, Main Entrance 4   Surface of Stairs, Main Entrance concrete   Stair Railings, Main Entrance railing on left side (ascending)   Landing, Stairs, Main Entrance adequate turning radius   Number of Stairs, Second Entrance other (see comments)  [back door not utilized]   Location, Kitchen first (main) floor   [place], Kitchen tile floor   Location,  Laundry Room first (main) floor   [place], Laundry Room concrete floor   Laundry Room Access not wheelchair accessible   Laundry Room Access Comment down in basement,  with full flight and L hand rail   Location, Patient Bedroom other (see comments)  [3rd floor]   [place], Patient Bedroom carpeted floor   Location, Bathroom second floor, must negotiate stairs to access   [place], Bathroom tile floor   Bathroom Access not wheelchair accessible   Bathroom Access Comment Pt reports FB on 2nd floor. Pt has shower chair.   Number of Stairs, Within Home, Primary 12   Surface of Stairs, Within Home, Primary carpeting   Stair Railings, Within Home, Primary railings on both sides of stairs   Stairs Comment, Within Home, Primary to second floor for bathroom   Stairs, Within Home, Secondary 12   Surface of Stairs, Within Home, Secondary carpeting   Stair Railings, Within Home, Secondary railing on left side (ascending)   Stairs Comment, Within Home, Secondary to third floor for bed room          Prior Level of Function      Most Recent Value   Dominant Hand right   Ambulation assistive equipment   Transferring assistive equipment   Toileting independent   Bathing independent   Dressing assistive equipment  [shoe horn]   Eating independent   Prior Level of Function Comment Pt reports prior use of cane in home as needed for ambulation and use of a shower chair for bathing. Pt reports was completing BADLs independently.   Assistive Device Currently Used at Home cane, straight, shower chair           IRF PT Evaluation and Treatment - 01/17/22 0905        PT Time Calculation    Start Time 0900     Stop Time 0930     Time Calculation (min) 30 min        Session Details    Document Type daily treatment/progress note     Mode of Treatment individual therapy;physical therapy        General Information    Patient Profile Reviewed yes     General Observations of Patient received in therapy gym, willing to participate        Transfers     Transfers stand pivot transfer     Comment gait belt donned t/o        Sit to Stand Transfer    [place], Sit to Stand Transfer close supervision     Verbal Cues safety     Assistive Device walker, front-wheeled     Comment for safety        Stand to Sit Transfer    [place], Stand to Sit Transfer close supervision     Verbal Cues safety     Assistive Device walker, front-wheeled     Comment for safety        Stand Pivot Transfer    [place], Stand Pivot/Stand Step Transfer touching/steadying assist     Verbal Cues safety     Assistive Device walker, front-wheeled     Comment for steadying the pelvis        Gait Training    [place], Gait touching/steadying assist     Assistive Device walker, front-wheeled     Distance in Feet 200 feet     Pattern (Gait) step-through     Deviations/Abnormal Patterns (Gait) base of support, narrow;step length decreased;weight shifting decreased     Comment (Gait/Stairs) + 200ft with touching assist for steadying with gait belt        Spinal Orthosis Management    Type (Spinal Orthosis) cervical collar, hard     Fabrication Comment (Spinal Orthosis) [place] donned t/o session        Daily Progress Summary (PT)    Daily Outcome Statement Session emphasis on ambulation trials with improving endurance.  Pt verbalized increased fatigue due to increased activity in morning session.  Pt to continue to benefit from BLE strengthening and elevation training.                           IRF PT Goals      Most Recent Value   Bed Mobility Goal 1    Activity/Assistive Device rolling to right, rolling to left at 01/12/2022 0831   [place] supervision required at 01/12/2022 0831   Time Frame short-term goal (STG), 5 - 7 days at 01/12/2022 0831   Progress/Outcome goal met at 01/15/2022 0905   Bed Mobility Goal 2    Activity/Assistive Device rolling to left, rolling to right at 01/12/2022 0831   [place] modified independence at 01/12/2022 0831   Time Frame long-term goal  (LTG), 21 days or less at 01/12/2022 0831   Progress/Outcome goal ongoing at 01/17/2022 0800   Bed Mobility Goal 3    Activity/Assistive Device sit to supine/supine to sit at 01/12/2022 0831   [place] other (see comments)  [touching assist] at 01/12/2022 0831   Time Frame short-term goal (STG), 5 - 7 days at 01/12/2022 0831   Progress/Outcome goal met at 01/15/2022 0905   Bed Mobility Goal 4    Activity/Assistive Device sit to supine/supine to sit at 01/12/2022 0831   [place] modified independence at 01/12/2022 0831   Time Frame long-term goal (LTG), 21 days or less at 01/12/2022 0831   Progress/Outcome goal ongoing at 01/17/2022 0800   Transfer Goal 1    Activity/Assistive Device sit-to-stand/stand-to-sit at 01/12/2022 0831   [place] other (see comments)  [touching assist] at 01/12/2022 0831   Time Frame short-term goal (STG), 5 - 7 days at 01/12/2022 0831   Progress/Outcome goal met at 01/17/2022 0800   Transfer Goal 2    Activity/Assistive Device sit-to-stand/stand-to-sit at 01/12/2022 0831   [place] modified independence at 01/12/2022 0831   Time Frame 21 days or less, long-term goal (LTG) at 01/12/2022 0831   Progress/Outcome goal ongoing at 01/17/2022 0800   Transfer Goal 3    Activity/Assistive Device stand pivot at 01/12/2022 0831   [place] other (see comments)  [touching assist] at 01/12/2022 0831   Time Frame short-term goal (STG), 5 - 7 days at 01/12/2022 0831   Progress/Outcome goal met at 01/17/2022 0800   Transfer Goal 4    Activity/Assistive Device stand pivot at 01/12/2022 0831   [place] modified independence at 01/12/2022 0831   Time Frame long-term goal (LTG), 21 days or less at 01/12/2022 0831   Progress/Outcome goal ongoing at 01/17/2022 0800   Gait/Walking Locomotion Goal 1    Activity/Assistive Device gait (walking locomotion), assistive device use at 01/12/2022 0831   Distance 50 feet at 01/12/2022 0831   [place] other (see comments)  [touching assist] at  01/12/2022 0831   Time Frame short-term goal (STG), 5 - 7 days at 01/12/2022 0831   Progress/Outcome goal met at 01/17/2022 0800   Gait/Walking Locomotion Goal 2    Activity/Assistive Device gait (walking locomotion), assistive device use at 01/12/2022 0831   Distance 150 feet at 01/12/2022 0831   [place] modified independence at 01/12/2022 0831   Time Frame long-term goal (LTG), 21 days or less at 01/12/2022 0831   Progress/Outcome goal ongoing at 01/17/2022 0800   Stairs Goal 1    Activity/Assistive Device ascending stairs, descending stairs at 01/15/2022 0905   Number of Stairs 12 at 01/15/2022 0905   [place] minimum assist (75% or more patient effort) at 01/15/2022 0905   Time Frame short-term goal (STG), 1 week at 01/15/2022 0905   Progress/Outcome goal met at 01/17/2022 0800   Stairs Goal 2    Activity/Assistive Device ascending stairs, descending stairs at 01/15/2022 0905   Number of Stairs 12 at 01/15/2022 0905   [place] modified independence at 01/15/2022 0905   Time Frame long-term goal (LTG), 21 days or less, by discharge at 01/15/2022 0905   Progress/Outcome goal ongoing at 01/17/2022 0800

## 2022-01-17 NOTE — PATIENT CARE CONFERENCE
Inpatient Rehabilitation Team Conference Note  Date: 1/17/2022  Time: 10:36 AM       Patient Name: Melanie Zelaya     Medical Record Number: [id number]   YOB: 1950  Sex: Male      Room/Bed: 107/107D  Payor Info: Payor: MOHAMUDARSENIO / Plan: [employer]ICARE [employer]VANTAGE / Product Type:  Managed Care /     Admitting Diagnosis: S/P cervical spinal fusion [Z98.1]   Admit Date/Time: 1/11/2022  9:33 PM  Admission Comments: No comment available     Primary Diagnosis: S/P cervical spinal fusion  Principal Problem: S/P cervical spinal fusion    Patient Active Problem List    Diagnosis Date Noted   • S/P cervical spinal fusion 01/11/2022   • Closed nondisplaced odontoid fracture with type II morphology and delayed healing 01/08/2022   • History of cervical fracture 01/06/2022   • H/O cervical fracture 01/06/2022   • Preop examination 01/03/2022   • Coronary artery calcification seen on CT scan    • COPD (chronic obstructive pulmonary disease) (CMS/HCC)    • Prediabetes    • Mild episode of recurrent major depressive disorder (CMS/[facility]) 05/03/2021   • Cervical spine fracture (CMS/[facility]) 04/23/2021   • Hypertension 04/23/2021   • Asthma 10/22/2019   • Other emphysema (CMS/HCC) 02/21/2019   • Gastroesophageal reflux disease without esophagitis 02/21/2019   • Multiple pulmonary nodules 10/24/2018   • Primary malignant neoplasm of left lower lobe of lung (CMS/[facility]) 11/08/2017   • Anxiety 05/23/2017   • Hearing loss 07/13/2016   • Chronic diastolic heart failure (CMS/[facility]) 09/08/2015   • Atrial fibrillation (CMS/HCC) 09/03/2015       Premorbid Status:  Dominant Hand: right  Ambulation: assistive equipment  Transferring: assistive equipment  Toileting: independent  Bathing: independent  Dressing: assistive equipment (shoe horn)  Eating: independent  Communication: understands/communicates without difficulty  Swallowing: swallows foods/liquids without difficulty  Baseline Diet/Method of Nutritional Intake: no diet  restrictions  Past History of Dysphagia: No hx of dysphagia  Assistive Device/Animal Currently Used at Home: cane, straight; shower chair  Prior Level of Function Comment: Pt reports prior use of cane in home as needed for ambulation and use of a shower chair for bathing. Pt reports was completing BADLs independently.      Current Functional Status:  Mobility  Gait  [place]agan, Gait: touching/steadying assist  Assistive Device: walker, front-wheeled  Comment (Gait/Stairs): + 200ft with touching assist for steadying with gait belt    Stairs  [place], Stairs: touching/steadying assist  Assistive Device: gait belt; railing  Handrail Location (Stairs): left side (ascending)  Number of Stairs: 16  Stair Height: 6 inches  Comment: lateral approach to elevations with touching assist at pelvis to improve balance with gait belt    Wheelchair  Method of Locomotion: bimanual (upper extremity) propulsion  Reagan, Forward Propulsion: modified independence  [place]agan, Steering Activities: modified independence  [place], Turning Activities: modified independence  Distance Propelled in Feet: 200 feet  Activity Tolerance: able to tolerate short community activity distances (150 to 500 feet)  Comment, Wheelchair Mobility: Pt provided with w/c license and verbalized agreement to rules and regulations regarding safe and effective use of wheelchair  Wheel Locks/Brake Management: modified independence  Management Activities: wheel locks/brakes management    Transfers  [place]agan, Roll Left: close supervision  [place], Roll Right: close supervision  Verbal Cues (Roll Right): hand placement; safety; technique  [place], Supine to Sit: close supervision  Verbal Cues (Supine to Sit): preparatory posture  [place], Sit to Supine: close supervision  Assistive Device: bed rails; head of bed elevated  Comment (Bed Mobility): cls for safety  Comment: gait belt donned t/o  [place], Bed to Chair: verbal  cues; increased time to complete; safety considerations; 1 person assist; touching/steadying assist (on mat in gym)  Verbal Cues: safety; technique  Assistive Device: walker, front-wheeled; wheelchair  Comment: Steady A x 1 SPT with walker with gait belt donned.  [place], Chair to Bed: verbal cues; increased time to complete; safety considerations; 1 person assist; touching/steadying assist (off of mat)  Verbal Cues: safety; technique  Assistive Device: gait belt; wheelchair; walker, front-wheeled  Comment: Steady A x 1 SPT with walker with verbal cues.  [place], Sit to Stand Transfer: close supervision  Verbal Cues: safety  Assistive Device: walker, front-wheeled  Comment: for safety  [place], Stand to Sit Transfer: close supervision  Verbal Cues: safety  Assistive Device: walker, front-wheeled  Comment: for safety  [place], Stand Pivot/Stand Step Transfer: touching/steadying assist  Verbal Cues: safety  Assistive Device: walker, front-wheeled  Comment: for steadying the pelvis    Transfer Technique: stand pivot  [place], Toilet Transfer: touching/steadying assist  Verbal Cues: hand placement; safety; technique  Assistive Device: walker, front-wheeled; grab bars/safety frame  Comment: Transfer training in ILU. Pt educated on recommednation on use c safety frame to increase independence c toilet transfer. Pt reports vanity and other structure in close proximity for use to steady while performing toilet transfer in home. Pt agreeable to recommendations and reports that he will look into DME if needed.  Transfer Technique: stand pivot  [place], Shower Transfer: touching/steadying assist  Verbal Cues: safety  Assistive Device: walker, front-wheeled; tub bench  Comment: SPT c RW to/from tub bench  Transfer Technique: step over, left entry  [place], Tub Transfer: minimum assist (75% or more patient effort)  Verbal Cues: hand placement; safety; preparatory posture; technique; proper use  of assistive device  Assistive Device: walker, front-wheeled; grab bars/tub rail; shower chair  Comment: Transfer training in ILU. Pt educated on safe tub transfer techniques. Pt reports has shower chair and grab bar along interior wall of tub. Ambulatory approach and side stepping into tub c mod verbal cues for technique. Completed 2 trials utilizing  verticle grab bar along anterior wall of tub during second trial c increased safety. OT recommending placement of additional grab bar in that location, pt agreeable.      Self Care  Self-Performance: obtains clothes; threads left arm, shirt; threads right arm, shirt; pulls shirt down/adjusts  [place] Assistance: pulls shirt over head/around back  Adaptive Equipment: none  Comment: Pt. required Min A for pulling shirt over [place] collar and pulling the back down around the brace.  Tasks: doff; don; shoes/slippers; socks  Self-Performance: threads left leg, underpants; threads right leg, underpants; pulls underpants up or down; threads left leg, pants/shorts; threads right leg, pants/shorts; pulls pants/shorts up or down; dons/doffs left sock; dons/doffs right sock  [place] Assistance: dons/doffs left shoe; dons/doffs right shoe  Adaptive Equipment: sock aid; dressing stick  [place]: set up; increased time to complete; touching/steadying assist  Comment: Pt was seating in w/c.  Assist with donning R/L shoe due to time constraints. Mod I with LB dressing, steadying assist when standing to don/pull-up underwear and pants.  Self-Performance: chest; left arm; right arm; front perineal area; abdomen; buttocks; left upper leg; right upper leg; left lower leg, including foot; right lower leg, including foot  Adaptive Equipment: grab bar/tub rail; long-handled sponge; tub bench  Setup Assistance: adaptive equipment setup; obtain supplies; orthosis application  Comment: Pt. performed bathing seated on tub bench. Steadying assistance for washing/drying buttocks. Min A with  "washing back. Mod A with changing [place] Collar to [place] Collar  [place]: minimum assist (75% or more patient effort); 1 person assist; adjust/manage clothing  Adaptive Equipment: grab bar/safety frame; urinal  Setup Assistance: other (see comments)  Comment: MIN A for balance in stance while managing clothing. Pt performs bowel hygiene.  Self-Performance: washes, rinses and dries hands; brushes/cabrera hair; oral care (brushing teeth, cleaning dentures)  Adaptive Equipment: none  Setup Assistance: obtain supplies  [place]: touching/steadying assist  Comment: Standing c RW at sink    Cognition  Affect/Mental Status (Cognition): WFL  Orientation Status (Cognition): oriented x 4  Follows Commands (Cognition): WFL  Cognitive Function: safety deficit  Attention Deficit (Cognition): minimal deficit; distractible in noisy environment; focused/sustained attention  Executive Function Deficit (Cognition): minimal deficit; information processing; insight/awareness of deficits  Safety Deficit (Cognition): minimal deficit; impulsivity; insight into deficits/self-awareness; judgment; problem-solving  Comment, Cognition: Educated on spinal precautions and provided acronym \"BLT\".    Communication       Frequency of Treatment (OT): 5-7 times per week,60-90 minutes per day  Frequency of Treatment (PT): 5-7 times per week,60-90 minutes per day    Weekly Outcome Summaries:  Weekly Progress Summary (PT)  Progress Toward Functional Goals (PT): progressing toward functional goals as expected  Weekly Outcome Statement: Pt is a 70 yo m with past medical history of Anxiety, Atrial fibrillation, Breast cancer, C1 cervical fracture, Colon polyp, COPD, Coronary artery calcification seen on CT scan, COVID-19 vaccine series completed, Depression, Diverticulosis, Fracture of pelvis, GERD, Hearing loss, History of colon polyps, History of COVID-19 (2020), Hyperlipidemia, Hypertension, Left atrial enlargement, Lung cancer, Pneumonia (2011), " Seasonal allergies, and Wrist fracture. Principal problem is amb dysfunction s/p cervical fusion as result of fall.  Pt PLOF was independent with use of RW and with elevations, ambulation and transfers and all other ADLs.  Pt has been medically cleared for comprehensive IP at [facility].  Catie, pt was CL S for rolling L and R and CL S for sit <> supine. Pt is CL S for sit <> stand and SPT with RW.  Pt is touching assist for 200ft with RW.  Pt is touching assist for 16x 6in steps with unilateral hand rail and lateral approach.  Pt is limited by impaired balance, decreased strength, impaired endurance, impaired activity tolerance, and limited functional endurance.  Pt will benefit from skilled comprehensive IP rehab 5-7x per week for 60- 90 min per day to address the above impairments and maximize functional ability.  Anticipate home exercise program and AD to be provided to patient.  Goals established and POC recommended for 21 days.  Recommendations: cont IP rehab 5-7x per week for 60-90 min per day  Weekly Progress Summary (OT)  Progress Toward Functional Goals (OT): progressing toward functional goals as expected  Weekly Outcome Statement: Pt progressing to touching A c functional transfers ambulatory level c RW. Pt progressing to touching A bathing, Cl S grooming in stance at sink, Cl S toileting, Touching A LB dressing including clothing retrieval and MIN A UB dressing for collar management. Pt demo's decreased activity tolerance and will cont to benefit from functional endurance training. Pt benefiting from AE training. ILU transfers and IADL retraining completed. Recommnend toilet safety frame and additional grab bar in tub for safe d/c home.  Recommendations: Cont skilled IP OT  Weekly Outcome Summary (Interdisciplinary)  Weekly Progress Summary: progressing toward goals as expected  Weekly Outcome Statement: 70 yo male c h/o fall from ladder c LOC and cervical, scapula and occipital fx in  4/21.  Pt fell off 6 ft ladder  trimming bushes. Now admitted p cervical ORIF/lami/Fusion C1-3. H/o breast CA, COPD, COVID, lung CA. Lives alone in 2SH c narrow stairs per chart but pt reports a roommate who works and is not available to help..  Daughter in [place]. Son is also at some distance. Pt not able to drive since injury.   Pt alert, cooperative.  [place]. Pt is ox3.  C/o HA but interacts in friendly way.  Affect is bright.  Pt did not realize the severity of his injury until he reached ED.  Has been in collar x11 mos.  Provides relevant hx. Mild word finding difficulty. Pt is somewhat anxious about recovery and about being a burden to his kids. Pain 7-8; pt not aware of pain med regimen. Gets only brief relief from meds.  Psych to follow.    Problem Resolution:  Coping/Stress/Tolerance  Do you feel stress - tense, restless, nervous, or anxious, or unable to sleep at night because your mind is troubled all the time - these days?: Not at all  Major Change/Loss/Stressor/Fears: deniesBladder Management: external device for managementSelf-Care Promotion: safe use of adaptive equipment encouraged,independence encouraged  Identified Problems & Impairments: (not recorded)  Comment, Identified Problems & Impairments: (not recorded)  Resolved Problems and Impairments: (not recorded)  Comment, Resolved Problems & Impairments: (not recorded) Team Weekly Outcome Statement: cervical collar.  oriented continent   [place]    Reg thins Low sodium cardiac diet.  Incisions stable with steristrips. Mepilex (01/17/22 1026)        Goals/Support System:  Patient/Family Goals  Patient's Goals For Discharge: return home,take care of myself at home  Family Goals For Discharge: patient able to return to all previous activities/roles  Family/Support System  Caregiver Engagement: active learner related to care delivery  Family/Support System Care: self-care encouraged  Caregiver Training  Caregiver(s) to be Trained: daughter(s),son(s)  Visit  Schedule: to be arranged  Comment, Caregiver Training Plan: to be arranged    IRF PT Goals      Most Recent Value   Bed Mobility Goal 1    Activity/Assistive Device rolling to right, rolling to left at 01/12/2022 0831   [place] supervision required at 01/12/2022 0831   Time Frame short-term goal (STG), 5 - 7 days at 01/12/2022 0831   Progress/Outcome goal met at 01/15/2022 0905   Bed Mobility Goal 2    Activity/Assistive Device rolling to left, rolling to right at 01/12/2022 0831   [place] modified independence at 01/12/2022 0831   Time Frame long-term goal (LTG), 21 days or less at 01/12/2022 0831   Progress/Outcome goal ongoing at 01/17/2022 0800   Bed Mobility Goal 3    Activity/Assistive Device sit to supine/supine to sit at 01/12/2022 0831   [place] other (see comments)  [touching assist] at 01/12/2022 0831   Time Frame short-term goal (STG), 5 - 7 days at 01/12/2022 0831   Progress/Outcome goal met at 01/15/2022 0905   Bed Mobility Goal 4    Activity/Assistive Device sit to supine/supine to sit at 01/12/2022 0831   [place] modified independence at 01/12/2022 0831   Time Frame long-term goal (LTG), 21 days or less at 01/12/2022 0831   Progress/Outcome goal ongoing at 01/17/2022 0800   Transfer Goal 1    Activity/Assistive Device sit-to-stand/stand-to-sit at 01/12/2022 0831   [place] other (see comments)  [touching assist] at 01/12/2022 0831   Time Frame short-term goal (STG), 5 - 7 days at 01/12/2022 0831   Progress/Outcome goal met at 01/17/2022 0800   Transfer Goal 2    Activity/Assistive Device sit-to-stand/stand-to-sit at 01/12/2022 0831   [place] modified independence at 01/12/2022 0831   Time Frame 21 days or less, long-term goal (LTG) at 01/12/2022 0831   Progress/Outcome goal ongoing at 01/17/2022 0800   Transfer Goal 3    Activity/Assistive Device stand pivot at 01/12/2022 0831   [place] other (see comments)  [touching assist] at 01/12/2022 0831   Time Frame  short-term goal (STG), 5 - 7 days at 01/12/2022 0831   Progress/Outcome goal met at 01/17/2022 0800   Transfer Goal 4    Activity/Assistive Device stand pivot at 01/12/2022 0831   [place] modified independence at 01/12/2022 0831   Time Frame long-term goal (LTG), 21 days or less at 01/12/2022 0831   Progress/Outcome goal ongoing at 01/17/2022 0800   Gait/Walking Locomotion Goal 1    Activity/Assistive Device gait (walking locomotion), assistive device use at 01/12/2022 0831   Distance 50 feet at 01/12/2022 0831   [place] other (see comments)  [touching assist] at 01/12/2022 0831   Time Frame short-term goal (STG), 5 - 7 days at 01/12/2022 0831   Progress/Outcome goal met at 01/17/2022 0800   Gait/Walking Locomotion Goal 2    Activity/Assistive Device gait (walking locomotion), assistive device use at 01/12/2022 0831   Distance 150 feet at 01/12/2022 0831   [place] modified independence at 01/12/2022 0831   Time Frame long-term goal (LTG), 21 days or less at 01/12/2022 0831   Progress/Outcome goal ongoing at 01/17/2022 0800   Stairs Goal 1    Activity/Assistive Device ascending stairs, descending stairs at 01/15/2022 0905   Number of Stairs 12 at 01/15/2022 0905   [place] minimum assist (75% or more patient effort) at 01/15/2022 0905   Time Frame short-term goal (STG), 1 week at 01/15/2022 0905   Progress/Outcome goal met at 01/17/2022 0800   Stairs Goal 2    Activity/Assistive Device ascending stairs, descending stairs at 01/15/2022 0905   Number of Stairs 12 at 01/15/2022 0905   [place] modified independence at 01/15/2022 0905   Time Frame long-term goal (LTG), 21 days or less, by discharge at 01/15/2022 0905   Progress/Outcome goal ongoing at 01/17/2022 0800        IRF OT Goals      Most Recent Value   Transfer Goal 1    Activity/Assistive Device toilet at 01/17/2022 0840   [place] supervision required at 01/17/2022 0840   Time Frame short-term goal (STG), 1 week at 01/17/2022 0840    Progress/Outcome goal met, goal revised this date at 01/17/2022 0840   Transfer Goal 2    Activity/Assistive Device toilet at 01/12/2022 0840   [place] modified independence at 01/12/2022 0840   Time Frame long-term goal (LTG), 2 weeks at 01/12/2022 0840   Progress/Outcome goal ongoing at 01/17/2022 0840   Transfer Goal 3    Activity/Assistive Device shower at 01/17/2022 0840   [place] supervision required at 01/17/2022 0840   Time Frame short-term goal (STG), 1 week at 01/17/2022 0840   Progress/Outcome goal met, goal revised this date at 01/17/2022 0840   Transfer Goal 4    Activity/Assistive Device shower at 01/12/2022 0840   [place] modified independence at 01/12/2022 0840   Time Frame long-term goal (LTG), 2 weeks at 01/12/2022 0840   Progress/Outcome goal ongoing at 01/17/2022 0840   Bathing Goal 1    Activity/Assistive Device bathing skills, all at 01/12/2022 0840   [place] supervision required at 01/17/2022 0840   Time Frame short-term goal (STG), 1 week at 01/17/2022 0840   Progress/Outcome goal met, goal revised this date at 01/17/2022 0840   Bathing Goal 2    Activity/Assistive Device bathing skills, all at 01/12/2022 0840   [place] modified independence at 01/12/2022 0840   Time Frame long-term goal (LTG), 2 weeks at 01/12/2022 0840   Progress/Outcome goal ongoing at 01/17/2022 0840   UB Dressing Goal 1    Activity/Assistive Device upper body dressing at 01/12/2022 0840   [place] supervision required  [Cl S] at 01/12/2022 0840   Time Frame short-term goal (STG), 1 week at 01/17/2022 0840   Strategies/Barriers including clothing retrieval at 01/17/2022 0840   Progress/Outcome goal ongoing at 01/17/2022 0840   UB Dressing Goal 2    Activity/Assistive Device upper body dressing at 01/12/2022 0840   [place] modified independence at 01/12/2022 0840   Time Frame long-term goal (LTG), 2 weeks at 01/12/2022 0840   Progress/Outcome goal ongoing at 01/17/2022 0840   CHANDU Dressing  Goal 1    Activity/Assistive Device lower body dressing at 01/12/2022 0840   [place] supervision required  [Cl S] at 01/17/2022 0840   Time Frame short-term goal (STG), 1 week at 01/17/2022 0840   Progress/Outcome goal met, goal revised this date at 01/17/2022 0840   LB Dressing Goal 2    Activity/Assistive Device lower body dressing at 01/12/2022 0840   [place] modified independence at 01/12/2022 0840   Time Frame long-term goal (LTG), 2 weeks at 01/12/2022 0840   Progress/Outcome goal ongoing at 01/17/2022 0840   Grooming Goal 1    [place] supervision required at 01/17/2022 0840   Time Frame short-term goal (STG), 1 week at 01/17/2022 0840   Strategies/Barriers Standing at 01/12/2022 0840   Progress/Outcome goal met, goal revised this date at 01/17/2022 0840   Grooming Goal 2    Activity/Assistive Device grooming skills, all at 01/12/2022 0840   [place] modified independence at 01/12/2022 0840   Time Frame long-term goal (LTG), 2 weeks at 01/12/2022 0840   Progress/Outcome goal ongoing at 01/17/2022 0840   Toileting Goal 1    Activity/Assistive Device toileting skills, all at 01/12/2022 0840   [place] supervision required at 01/17/2022 0840   Time Frame short-term goal (STG), 1 week at 01/17/2022 0840   Progress/Outcome goal met, goal revised this date at 01/17/2022 0840   Toileting Goal 2    Activity/Assistive Device toileting skills, all at 01/12/2022 0840   [place] modified independence at 01/12/2022 0840   Time Frame long-term goal (LTG), 2 weeks at 01/12/2022 0840   Progress/Outcome goal ongoing at 01/17/2022 0840          Risk for Complications  Constipation: Moderate  Dehydration/Malnutrition: Moderate  DVT: Moderate  Falls: Moderate  Infection: Moderate  Skin Breakdown: Moderate      The patient's medical prognosis is good to achieve the stated goals below.    Expected Level of Function  Expected Functional Improvement: self-care; mobility; safety  Self-Care: Setup or clean-up  assistance  Sphincter Control: Independent  Transfers: Independent  Locomotion: Independent  Communication: Independent  Social Cognition: Independent  Comment, Expected Level of Function at Discharge: some ADL assist       Discharge Planning:  Anticipated Discharge Disposition: home with home health  Type of Home Care Services: home OT; home PT; nursing    Equipment/Device Needs at Discharge  Assistive Device/Animal Currently Used at Home: cane, straight,shower chair  Discharge Planning  Does the patient need discharge transport arranged?: No    Anticipated Discharge Date: 1/25/2022    Needs Identified:       Team Members Present:     Rehab Attending Present:  Glenn Armstrong MD    Care Coordinator Present:  Sherry Gallego LSW    Nurse Present:  Gail Childers RN    OT Present:  Vonnie Burnham OT    PT Present:  Gavino Contreras PT    Psychologist Present:  Mariel Newton, PhD        Next Team Conference Date: 01/24/22

## 2022-01-17 NOTE — PROGRESS NOTES
[facility] Progress Note          Patient was seen and examined at bedside.    Subjective:     Stable, pleasant, his pain is manageable, his resp status is good, his PT/OT is progressing, his lungs sound good.   BP high, increased HCTZ dose from 12.5 to 25 mg daily.check labs tomorrow.    Objective   Vital signs in last 24 hours:  Temp:  [36.6 °C (97.9 °F)-37.1 °C (98.7 °F)] 36.6 °C (97.9 °F)  Heart Rate:  [70-84] 81  Resp:  [16-18] 16  BP: (142-159)/(71-90) 150/71    No intake or output data in the 24 hours ending 01/17/22 1138  Intake/Output this shift:  No intake/output data recorded.   Review of Systems:  All other systems reviewed and negative except as noted in the HPI.   Objective      Labs  reviewed his labs thoroughly   Lab Results   Component Value Date    WBC 5.78 01/12/2022    HGB 13.0 (L) 01/12/2022    HCT 39.2 (L) 01/12/2022    MCV 88.9 01/12/2022     01/12/2022     Lab Results   Component Value Date    GLUCOSE 102 (H) 01/12/2022    CALCIUM 9.2 01/12/2022     01/12/2022    K 4.4 01/12/2022    CO2 31 01/12/2022    CL 98 01/12/2022    BUN 15 01/12/2022    CREATININE 0.8 01/12/2022       Imaging  OSH imaging study reports reviewed       Full Code    Physical Exam:  Head/Ear/Nose/Throat: normocephalic; atraumatic; moisture mouth mm, no oropharyngeal thrush noted.   Eyes: anicteric sclera, EOMI; PERRL.   Neck : supple, no JVD, no carotid bruits appeciated.   Respiratory: no evidence of labored breathing, lung sounds CTA b/l, good aeration bibasilar area, no w/r/c.   Cardiovascular: RRR; normal S1, S2; no m/r/g; no S3 or S4.   Gastrointestinal: soft; NT; BS normal; mildly distended; no CVAT b/l.   Genitourinary: no caballero.   Extremities : no c/c/e .   Neurological: AO x 3, fluent speeches, following commands, CNS II-XII grossly intact; no focal neurologic deficits.   Behavior/Emotional: in NAD, appropriate; cooperative.   Skin: clean, dry and intact.     Plan of care was  discussed with patient, RN, and PMR attending     Assessment     CC:S/p a mechanical fall, sustained traumatic cervical spinal fractures with myelopathy, s/p cervical spinal ORIF and decom.lami.fusion, ADL and ambulatory dysfunction.       71 y.o. male with a PMHx significant for atrial fibrillation, breast cancer, ex-tobacco smoking, COPD, diverticulosis, acid reflux, history of COVID-19, hyperlipidemia, hypertension, lung cancer s/p LLL resection, history of right shoulder rotator cuff repair, and history of right-sided carpal tunnel syndrome, who fell from a ladder on 04/23/2021 associated with loss of consciousness. He was found to have fractures of the anterior and posterior arches of C1, base of the dens of C2, right scapula, and left occipital condyle.  He was placed in a hard cervical collar and has had ongoing neurosurgical and imaging follow-up.  While C1 was healing normally.  There is no appreciable osseous bridging of the fracture of the dens.  He therefore was recommended to undergo ORIF which is performed by Dr. Morejon on 01/06/2020 at [facility] [place].      Patient underwent open reduction and internal fixation of C1 fractures, C2 odontoid fracture, associated with bilateral posterior lateral arthrodesis with fusion at C1-C3 in addition to C1-2 and C2-3 laminotomies.  There are no intraoperative complications.  Neurophysiological monitoring was stable throughout the course of the decompression.  PCA was d/c'd on 1/10 am.  +BM on 1/9.     Ortho was consulted on 1/7 for R shoulder follow up.  No acute surgical intervention at this time per Ortho.  New Imaging of R shoulder/scapula unremarkable for acute injury  RUE WBAT.  Recommend follow up with a shoulder/elbow orthopedic surgeon - Dr Shine as needed.   Functionally, he has ADL and ambulatory dysfunction, requires inpt acute rehab, transferred to [facility] on 1/11/22.       1. S/p a mechanical fall, sustained traumatic cervical spinal fractures with myelopathy, s/p  cervical spinal ORIF and decom.lami.fusion, ADL and ambulatory dysfunction : inpt comprehensive rehab, ADL, gait/balance training, pain/neuropathic pain management, fall precaution, regular neuro checks, DVT prophylaxis, surgical site wound care/dermal defense, f/u with spinal surgeon as scheduled.     R shoulder injury, but no acute surgical intervention at this time per Ortho, f/u with  a shoulder/elbow orthopedic surgeon - Dr Shine as needed.    2. Post op acute on chronic pain, paresthesia: pain management, regular pain scale evaluation, topical lidoderm patch, ice packs, consider Neurontin as indicated, prn muscle relaxant.    3. Pulm-COPD, h/o lung cancer s/p LLL resection, atelectasis: monitor SO2, prn NC O2, keep SO2>92%, incentive spirometry, bronchodilator inhaler prn, cont LABA/LAMA/flonase,  mucolytic agent, pulm toileting.    4. GI-constipation, GERD: provide constipation bowel regimen, po hydration, fiber intake, timed toilet visits. Pepcid for GERD and GI prophy.    5. DVT prophy: SCD, early ambulation, SQ heparin to resume pradaxa as planned , check LE venous DUS to r/o DVT.      6. PAF, HTN, Dyslipidemia: resume pradaxa on 1/20/22, monitor HR and rhythm, cont current HTN meds with holding parameter, monitor BP, titrate HTN meds as indicated, post op orthostatic hypotension precaution; cont statin.     7. - Neurogenic bladder, acute urinary retention s/p caballero: timed voiding trial with appropriate position and privacy, monitor PVR with cic, high risk of UTI , check UA/UCX.    8. Renal, electrolytes: monitor renal function, avoid nephrotoxic agents or low BP, monitor and keep electrolytes balance.     Billing code: [id number]  Diagnoses:  Patient Active Problem List   Diagnosis   • Anxiety   • Atrial fibrillation (CMS/HCC)   • Chronic diastolic heart failure (CMS/HCC)   • Hearing loss   • Primary malignant neoplasm of left lower lobe of lung (CMS/HCC)   • Multiple pulmonary nodules   • Other emphysema  (CMS/[facility])   • Gastroesophageal reflux disease without esophagitis   • Asthma   • Cervical spine fracture (CMS/[facility])   • Hypertension   • Mild episode of recurrent major depressive disorder (CMS/[facility])   • Preop examination   • Coronary artery calcification seen on CT scan   • COPD (chronic obstructive pulmonary disease) (CMS/[facility])   • Prediabetes   • History of cervical fracture   • H/O cervical fracture   • Closed nondisplaced odontoid fracture with type II morphology and delayed healing   • S/P cervical spinal fusion        Fiona Rivera MD  1/17/2022

## 2022-01-17 NOTE — PLAN OF CARE
Plan of Care Review  Plan of Care Reviewed With: patient  Progress: improving  Outcome Summary: Pt A&O x3.  complaints of neck pain.  pain managed wiht rn and scheduled medications care and medicatinos reviewed with pt.

## 2022-01-17 NOTE — PLAN OF CARE
Problem: Rehabilitation ([facility]) Plan of Care  Goal: Plan of Care Review  Flowsheets (Taken 1/17/2022 1141)  Progress: improving  Plan of Care Reviewed With: patient  Outcome Summary: team this date. Pt progressing well in therapies and ELOS Projected for the 25th..  Pt has follow up with surgeon that date so would be able to get ride home from family and then taken to appointment on way home. Pt agrees with ELOS and the plan and will begin calling his nieces or friends to assist.   Pt would like home care. Support offered. Questions answered - Sherry PRECIADO   A list of providers that are participating in the Medicare program and are located in the desired geographic area was presented and reviewed with the patient and/or patient guardian or advocate. The list and ratings were provided from the Medicare.gov website.

## 2022-01-17 NOTE — PLAN OF CARE
Pt alert and oriented x3. Aspen collar AATs, c/o discomfort from collar and incision. Medicated with oxycodone with relief. Continent of bowel and bladder. SCDs on while in bed. Bed alarm on, call bell within reach.

## 2022-01-18 ENCOUNTER — APPOINTMENT (INPATIENT)
Dept: PHYSICAL THERAPY | Facility: REHABILITATION | Age: 72
DRG: 565 | End: 2022-01-18
Payer: COMMERCIAL

## 2022-01-18 ENCOUNTER — APPOINTMENT (INPATIENT)
Dept: OCCUPATIONAL THERAPY | Facility: REHABILITATION | Age: 72
DRG: 565 | End: 2022-01-18
Payer: COMMERCIAL

## 2022-01-18 LAB
ANION GAP SERPL CALC-SCNC: 12 MEQ/L (ref 3–15)
BUN SERPL-MCNC: 13 MG/DL (ref 8–20)
CALCIUM SERPL-MCNC: 9.7 MG/DL (ref 8.9–10.3)
CHLORIDE SERPL-SCNC: 97 MEQ/L (ref 98–109)
CO2 SERPL-SCNC: 29 MEQ/L (ref 22–32)
CREAT SERPL-MCNC: 0.9 MG/DL (ref 0.8–1.3)
ERYTHROCYTE [DISTWIDTH] IN BLOOD BY AUTOMATED COUNT: 13.6 % (ref 11.6–14.4)
GFR SERPL CREATININE-BSD FRML MDRD: >60 ML/MIN/1.73M*2
GLUCOSE SERPL-MCNC: 103 MG/DL (ref 70–99)
HCT VFR BLDCO AUTO: 43.5 % (ref 40.1–51)
HGB BLD-MCNC: 14.3 G/DL (ref 13.7–17.5)
MAGNESIUM SERPL-MCNC: 2.3 MG/DL (ref 1.8–2.5)
MCH RBC QN AUTO: 29.4 PG (ref 28–33.2)
MCHC RBC AUTO-ENTMCNC: 32.9 G/DL (ref 32.2–36.5)
MCV RBC AUTO: 89.3 FL (ref 83–98)
PDW BLD AUTO: 9.7 FL (ref 9.4–12.4)
PLATELET # BLD AUTO: 273 K/UL (ref 150–350)
POTASSIUM SERPL-SCNC: 4.3 MEQ/L (ref 3.6–5.1)
RBC # BLD AUTO: 4.87 M/UL (ref 4.5–5.8)
SODIUM SERPL-SCNC: 138 MEQ/L (ref 136–144)
WBC # BLD AUTO: 5.04 K/UL (ref 3.8–10.5)

## 2022-01-18 PROCEDURE — 80048 BASIC METABOLIC PNL TOTAL CA: CPT | Performed by: INTERNAL MEDICINE

## 2022-01-18 PROCEDURE — 97530 THERAPEUTIC ACTIVITIES: CPT | Mod: GO

## 2022-01-18 PROCEDURE — 97116 GAIT TRAINING THERAPY: CPT | Mod: GP

## 2022-01-18 PROCEDURE — 63600000 HC DRUGS/DETAIL CODE: Performed by: INTERNAL MEDICINE

## 2022-01-18 PROCEDURE — 83735 ASSAY OF MAGNESIUM: CPT | Performed by: INTERNAL MEDICINE

## 2022-01-18 PROCEDURE — 97110 THERAPEUTIC EXERCISES: CPT | Mod: GO

## 2022-01-18 PROCEDURE — 97530 THERAPEUTIC ACTIVITIES: CPT | Mod: GP

## 2022-01-18 PROCEDURE — 36415 COLL VENOUS BLD VENIPUNCTURE: CPT | Performed by: INTERNAL MEDICINE

## 2022-01-18 PROCEDURE — 97110 THERAPEUTIC EXERCISES: CPT | Mod: GP

## 2022-01-18 PROCEDURE — L0172 CERV COL SR FOAM 2PC PRE OTS: HCPCS

## 2022-01-18 PROCEDURE — 63700000 HC SELF-ADMINISTRABLE DRUG: Performed by: INTERNAL MEDICINE

## 2022-01-18 PROCEDURE — 97112 NEUROMUSCULAR REEDUCATION: CPT | Mod: GP

## 2022-01-18 PROCEDURE — 12800001 HC ROOM AND CARE SEMIPRIVATE REHAB-BRAIN INJ

## 2022-01-18 PROCEDURE — 85027 COMPLETE CBC AUTOMATED: CPT | Performed by: INTERNAL MEDICINE

## 2022-01-18 RX ADMIN — ACETAMINOPHEN 975 MG: 325 TABLET, FILM COATED ORAL at 21:43

## 2022-01-18 RX ADMIN — HEPARIN SODIUM 5000 UNITS: 5000 INJECTION, SOLUTION INTRAVENOUS; SUBCUTANEOUS at 14:05

## 2022-01-18 RX ADMIN — CETIRIZINE HYDROCHLORIDE 5 MG: 5 TABLET ORAL at 20:16

## 2022-01-18 RX ADMIN — OXYCODONE HYDROCHLORIDE 10 MG: 5 TABLET ORAL at 14:06

## 2022-01-18 RX ADMIN — OXYCODONE HYDROCHLORIDE 10 MG: 5 TABLET ORAL at 05:26

## 2022-01-18 RX ADMIN — HYDROCHLOROTHIAZIDE 25 MG: 25 TABLET ORAL at 08:21

## 2022-01-18 RX ADMIN — ATORVASTATIN CALCIUM 40 MG: 40 TABLET, FILM COATED ORAL at 16:53

## 2022-01-18 RX ADMIN — FLUTICASONE PROPIONATE 2 SPRAY: 50 SPRAY, METERED NASAL at 08:21

## 2022-01-18 RX ADMIN — ACETAMINOPHEN 975 MG: 325 TABLET, FILM COATED ORAL at 05:26

## 2022-01-18 RX ADMIN — FAMOTIDINE 20 MG: 20 TABLET ORAL at 08:21

## 2022-01-18 RX ADMIN — AMLODIPINE BESYLATE 5 MG: 5 TABLET ORAL at 08:21

## 2022-01-18 RX ADMIN — LIDOCAINE 1 PATCH: 246 PATCH TOPICAL at 08:21

## 2022-01-18 RX ADMIN — OXYCODONE HYDROCHLORIDE 10 MG: 5 TABLET ORAL at 20:17

## 2022-01-18 RX ADMIN — GUAIFENESIN 600 MG: 600 TABLET, EXTENDED RELEASE ORAL at 08:21

## 2022-01-18 RX ADMIN — ACETAMINOPHEN 975 MG: 325 TABLET, FILM COATED ORAL at 14:05

## 2022-01-18 RX ADMIN — SENNOSIDES AND DOCUSATE SODIUM 1 TABLET: 50; 8.6 TABLET ORAL at 20:17

## 2022-01-18 RX ADMIN — ESCITALOPRAM OXALATE 10 MG: 10 TABLET, FILM COATED ORAL at 08:21

## 2022-01-18 RX ADMIN — HEPARIN SODIUM 5000 UNITS: 5000 INJECTION, SOLUTION INTRAVENOUS; SUBCUTANEOUS at 21:43

## 2022-01-18 RX ADMIN — FAMOTIDINE 20 MG: 20 TABLET ORAL at 20:16

## 2022-01-18 RX ADMIN — OXYCODONE HYDROCHLORIDE 10 MG: 5 TABLET ORAL at 09:48

## 2022-01-18 RX ADMIN — SENNOSIDES AND DOCUSATE SODIUM 1 TABLET: 50; 8.6 TABLET ORAL at 08:21

## 2022-01-18 RX ADMIN — AMLODIPINE BESYLATE 5 MG: 5 TABLET ORAL at 20:16

## 2022-01-18 RX ADMIN — GUAIFENESIN 600 MG: 600 TABLET, EXTENDED RELEASE ORAL at 20:16

## 2022-01-18 RX ADMIN — HEPARIN SODIUM 5000 UNITS: 5000 INJECTION, SOLUTION INTRAVENOUS; SUBCUTANEOUS at 05:26

## 2022-01-18 RX ADMIN — LOSARTAN POTASSIUM 100 MG: 100 TABLET, FILM COATED ORAL at 16:53

## 2022-01-18 NOTE — PROGRESS NOTES
[facility] Progress Note          Patient was seen and examined at bedside.    Subjective:     Stable, pleasant, his pain is manageable, his resp status is good, his PT/OT is progressing, his lungs sound good.   BP high, increased HCTZ dose from 12.5 to 25 mg daily, now his BP is well controled, reviewed his am labs, benign .    Objective   Vital signs in last 24 hours:  Temp:  [36.3 °C (97.3 °F)-36.6 °C (97.8 °F)] 36.5 °C (97.7 °F)  Heart Rate:  [58-73] 58  Resp:  [16-17] 16  BP: (125-159)/(61-72) 125/61    No intake or output data in the 24 hours ending 01/18/22 1208  Intake/Output this shift:  No intake/output data recorded.   Review of Systems:  All other systems reviewed and negative except as noted in the HPI.   Objective      Labs  reviewed his labs thoroughly   Lab Results   Component Value Date    WBC 5.04 01/18/2022    HGB 14.3 01/18/2022    HCT 43.5 01/18/2022    MCV 89.3 01/18/2022     01/18/2022     Lab Results   Component Value Date    GLUCOSE 103 (H) 01/18/2022    CALCIUM 9.7 01/18/2022     01/18/2022    K 4.3 01/18/2022    CO2 29 01/18/2022    CL 97 (L) 01/18/2022    BUN 13 01/18/2022    CREATININE 0.9 01/18/2022       Imaging  OSH imaging study reports reviewed       Full Code    Physical Exam:  Head/Ear/Nose/Throat: normocephalic; atraumatic; moisture mouth mm, no oropharyngeal thrush noted.   Eyes: anicteric sclera, EOMI; PERRL.   Neck : supple, no JVD, no carotid bruits appeciated.   Respiratory: no evidence of labored breathing, lung sounds CTA b/l, good aeration bibasilar area, no w/r/c.   Cardiovascular: RRR; normal S1, S2; no m/r/g; no S3 or S4.   Gastrointestinal: soft; NT; BS normal; mildly distended; no CVAT b/l.   Genitourinary: no caballero.   Extremities : no c/c/e .   Neurological: AO x 3, fluent speeches, following commands, CNS II-XII grossly intact; no focal neurologic deficits.   Behavior/Emotional: in NAD, appropriate; cooperative.   Skin: clean,  dry and intact.     Plan of care was discussed with patient, RN, and PMR attending     Assessment     CC:S/p a mechanical fall, sustained traumatic cervical spinal fractures with myelopathy, s/p cervical spinal ORIF and decom.lami.fusion, ADL and ambulatory dysfunction.       71 y.o. male with a PMHx significant for atrial fibrillation, breast cancer, ex-tobacco smoking, COPD, diverticulosis, acid reflux, history of COVID-19, hyperlipidemia, hypertension, lung cancer s/p LLL resection, history of right shoulder rotator cuff repair, and history of right-sided carpal tunnel syndrome, who fell from a ladder on 04/23/2021 associated with loss of consciousness. He was found to have fractures of the anterior and posterior arches of C1, base of the dens of C2, right scapula, and left occipital condyle.  He was placed in a hard cervical collar and has had ongoing neurosurgical and imaging follow-up.  While C1 was healing normally.  There is no appreciable osseous bridging of the fracture of the dens.  He therefore was recommended to undergo ORIF which [place] [facility] [place]      Patient underwent open reduction and internal fixation of C1 fractures, C2 odontoid fracture, associated with bilateral posterior lateral arthrodesis with fusion at C1-C3 in addition to C1-2 and C2-3 laminotomies.  There are no intraoperative complications.  Neurophysiological monitoring was stable throughout the course of the decompression.  PCA was d/c'd on 1/10 am.  +BM on 1/9.     Ortho was consulted on 1/7 for R shoulder follow up.  No acute surgical intervention at this time per Ortho.  New Imaging of R shoulder/scapula unremarkable for acute injury  RUE WBAT.  Recommend follow up with a shoulder/elbow orthopedic surgeon - Dr Shine as needed.   Functionally, he has ADL and ambulatory dysfunction, requires inpt acute rehab, transferred to [facility] on 1/11/22.       1. S/p a mechanical fall, sustained traumatic cervical  spinal fractures with myelopathy, s/p cervical spinal ORIF and decom.lami.fusion, ADL and ambulatory dysfunction : inpt comprehensive rehab, ADL, gait/balance training, pain/neuropathic pain management, fall precaution, regular neuro checks, DVT prophylaxis, surgical site wound care/dermal defense, f/u with spinal surgeon as scheduled.     R shoulder injury, but no acute surgical intervention at this time per Ortho, f/u with  a shoulder/elbow orthopedic surgeon - Dr Shine as needed.    2. Post op acute on chronic pain, paresthesia: pain management, regular pain scale evaluation, topical lidoderm patch, ice packs, consider Neurontin as indicated, prn muscle relaxant.    3. Pulm-COPD, h/o lung cancer s/p LLL resection, atelectasis: monitor SO2, prn NC O2, keep SO2>92%, incentive spirometry, bronchodilator inhaler prn, cont LABA/LAMA/flonase,  mucolytic agent, pulm toileting.    4. GI-constipation, GERD: provide constipation bowel regimen, po hydration, fiber intake, timed toilet visits. Pepcid for GERD and GI prophy.    5. DVT prophy: SCD, early ambulation, SQ heparin to resume pradaxa as planned , check LE venous DUS to r/o DVT.      6. PAF, HTN, Dyslipidemia: resume pradaxa on 1/20/22, monitor HR and rhythm, cont current HTN meds with holding parameter, monitor BP, titrate HTN meds as indicated, post op orthostatic hypotension precaution; cont statin.     7. - Neurogenic bladder, acute urinary retention s/p caballero: timed voiding trial with appropriate position and privacy, monitor PVR with cic, high risk of UTI , check UA/UCX.    8. Renal, electrolytes: monitor renal function, avoid nephrotoxic agents or low BP, monitor and keep electrolytes balance.     Billing code: [id number]  Diagnoses:  Patient Active Problem List   Diagnosis   • Anxiety   • Atrial fibrillation (CMS/HCC)   • Chronic diastolic heart failure (CMS/HCC)   • Hearing loss   • Primary malignant neoplasm of left lower lobe of lung (CMS/HCC)   • Multiple  pulmonary nodules   • Other emphysema (CMS/HCC)   • Gastroesophageal reflux disease without esophagitis   • Asthma   • Cervical spine fracture (CMS/HCC)   • Hypertension   • Mild episode of recurrent major depressive disorder (CMS/HCC)   • Preop examination   • Coronary artery calcification seen on CT scan   • COPD (chronic obstructive pulmonary disease) (CMS/HCC)   • Prediabetes   • History of cervical fracture   • H/O cervical fracture   • Closed nondisplaced odontoid fracture with type II morphology and delayed healing   • S/P cervical spinal fusion        Fiona Rivera MD  1/18/2022

## 2022-01-18 NOTE — PROGRESS NOTES
Psychiatry Progress Note    History of Present Illness   See initial consult.  History pertaining to psychosis, depression and anxiety and other psychiatric and psychologic conditions as well as general medical history detailed in initial consult.      Interval History:   Educated about lexapro use - he confirmed that he used lexapro not prozac at home  Mood ok  No si or avh  No sundowning   Continues to sleep better.  Mood improving.  Anxiety and despondency in context of medical conditions manageable.   Sodium stable.  Vital signs stable.  Nursing reports no behavioral concerns.  No sundowning agitation.  Continues to make progress with respect to gaining insight into emotional state and medical conditions and the relationship between them.  Participatory in therapies    MENTAL STATUS EXAM  Appearance: well groomed  Gait and Motor: no abnormal movements  Speech: normal rate/rhythm/volume  Mood: depressed and anxious  Affect: constricted  Associations: coherent  Thought Process: goal-directed  Thought Content: no auditory or visual hallucinations.  Suicidality/Homicidality: denies  Judgement/Insight: acknowledges illness  Orientation: situation  Memory: knows current president  Attention: distracted  Knowledge: normal  Language: normal    Vital Signs for the last 24 hours:  Temp:  [36.3 °C (97.3 °F)-36.6 °C (97.8 °F)] 36.5 °C (97.7 °F)  Heart Rate:  [61-81] 70  Resp:  [16-17] 16  BP: (128-159)/(63-72) 133/69    Labs:  Labs below reviewed for pertinence to psychiatric condition  Lab Results   Component Value Date    GLUCOSE 103 (H) 01/18/2022    CALCIUM 9.7 01/18/2022     01/18/2022    K 4.3 01/18/2022    CO2 29 01/18/2022    CL 97 (L) 01/18/2022    BUN 13 01/18/2022    CREATININE 0.9 01/18/2022     Lab Results   Component Value Date    WBC 5.04 01/18/2022    HGB 14.3 01/18/2022    HCT 43.5 01/18/2022    MCV 89.3 01/18/2022     01/18/2022     Pain Management Panel    There is no flowsheet data to  display.           Current Facility-Administered Medications   Medication Dose Route Frequency Provider Last Rate Last Admin   • acetaminophen (TYLENOL) tablet 975 mg  975 mg oral q8h Fiona Rivera MD   975 mg at 01/18/22 0526   • albuterol HFA (VENTOLIN HFA) 90 mcg/actuation inhaler 2 puff  2 puff inhalation q6h PRN Jorge Edwards MD       • amLODIPine (NORVASC) tablet 5 mg  5 mg oral q12h [facility] Fiona Rivera MD   5 mg at 01/17/22 2006   • atorvastatin (LIPITOR) tablet 40 mg  40 mg oral Daily (6p) Jorge Edwards MD   40 mg at 01/17/22 1650   • bisacodyL (DULCOLAX) 10 mg suppository 10 mg  10 mg rectal Daily PRN Jorge Edwards MD       • cetirizine (ZyrTEC) tablet 5 mg  5 mg oral Nightly Jorge Edwards MD   5 mg at 01/17/22 2006   • cyclobenzaprine (FLEXERIL) tablet 10 mg  10 mg oral 3x daily PRN Jorge Edwards MD       • [START ON 1/20/2022] dabigatran etexilate (PRADAXA) capsule 150 mg  150 mg oral BID Jorge Edwards MD       • escitalopram (LEXAPRO) tablet 10 mg  10 mg oral Daily Jorge Edwards MD   10 mg at 01/17/22 0729   • famotidine (PEPCID) tablet 20 mg  20 mg oral BID Fiona Rivera MD   20 mg at 01/17/22 2006   • fluticasone propionate (FLONASE) 50 mcg/actuation nasal spray 2 spray  2 spray Each Nostril Daily Jorge Edwards MD   2 spray at 01/17/22 0731   • guaiFENesin (MUCINEX) 12 hr ER tablet 600 mg  600 mg oral BID Fiona Rivera MD   600 mg at 01/17/22 2006   • heparin (porcine) 5,000 unit/mL injection 5,000 Units  5,000 Units subcutaneous q8h [facility] Jorge Edwards MD   5,000 Units at 01/18/22 0526   • hydrochlorothiazide (HYDRODIURIL) tablet 25 mg  25 mg oral Daily Fiona Rivera MD       • lidocaine (ASPERCREME) 4 % topical patch 1 patch  1 patch Topical Daily Jroge Edwards MD   1 patch at 01/17/22 0728   • losartan (COZAAR) tablet 100 mg  100 mg oral Daily with dinner Fiona Rivera MD   100 mg at 01/17/22 1650   • oxyCODONE  (ROXICODONE) immediate release tablet 10 mg  10 mg oral q4h PRN Jorge Edwards MD   10 mg at 01/18/22 0526   • oxyCODONE (ROXICODONE) immediate release tablet 5 mg  5 mg oral q4h PRN Jorge Edwards MD   5 mg at 01/14/22 0757   • polyethylene glycol (MIRALAX) 17 gram packet 17 g  17 g oral Daily PRN Jorge Edwards MD       • sennosides-docusate sodium (SENOKOT-S) 8.6-50 mg per tablet 1 tablet  1 tablet oral BID Jorge Edwards MD   1 tablet at 01/17/22 0728   • umeclidinium-vilanteroL (ANORO ELLIPTA) 62.5-25 mcg/actuation inhaler 1 puff  1 puff inhalation Daily Jorge Edwards MD   1 puff at 01/17/22 0731        Patient Active Problem List   Diagnosis   • Anxiety   • Atrial fibrillation (CMS/HCC)   • Chronic diastolic heart failure (CMS/HCC)   • Hearing loss   • Primary malignant neoplasm of left lower lobe of lung (CMS/HCC)   • Multiple pulmonary nodules   • Other emphysema (CMS/HCC)   • Gastroesophageal reflux disease without esophagitis   • Asthma   • Cervical spine fracture (CMS/HCC)   • Hypertension   • Mild episode of recurrent major depressive disorder (CMS/HCC)   • Preop examination   • Coronary artery calcification seen on CT scan   • COPD (chronic obstructive pulmonary disease) (CMS/[facility])   • Prediabetes   • History of cervical fracture   • H/O cervical fracture   • Closed nondisplaced odontoid fracture with type II morphology and delayed healing   • S/P cervical spinal fusion           Assessment/Plan    Depression: CBT automatic anxious and negative thoughts  Adjustment Disorder to Disability: supportive therapy  Sleep:  Insight into illness: insight therapy/psychoeducation  Psychotherapy: supportive  Monitor: Mood, Speech, Appetite, Energy, Cognition, Behavioral, Impulsivity, Agitation  Family Support  Medications: monitor for side effects  - presently no gi, akathesia , ha or sedation  Sodium 138  Okay to continue Lexapro 10 mg daily-monitor for any side effects  Monitor  sodium on Lexapro-currently 138  Support ATD  CBT automatic anxious and negative thoughts  Clarify for patient not on porzac  cnt for anxiety  Continue lexapro and support mood  Austen Boyd MD  1/18/2022

## 2022-01-18 NOTE — PROGRESS NOTES
History of present illness:     71 y.o. male with a PMHx significant for atrial fibrillation, breast cancer, COPD, diverticulosis, acid reflux, history of COVID-19, hyperlipidemia, hypertension, lung cancer, history of right shoulder rotator cuff repair, and history of right-sided carpal tunnel syndrome, who fell from a ladder on 04/23/2021 associated with loss of consciousness. He was found to have fractures of the anterior and posterior arches of C1, base of the dens of C2, right scapula, and left occipital condyle.  He was placed in a hard cervical collar and has had ongoing neurosurgical and imaging follow-up.  While C1 was healing normally.  There is no appreciable osseous bridging of the fracture of the dens.    Patient underwent open reduction and internal fixation of C1 fractures, C2 odontoid fracture, associated with bilateral posterior lateral arthrodesis with fusion at C1-C3 in addition to C1-2 and C2-3 laminotomies. Surgery was done on 1/6/2022 by Dr. Morejon .  There are no intraoperative complications.  Neurophysiological monitoring was stable throughout the course of the decompression.        Patient was evaluated by orthopedics for right shoulder follow up. No acute surgical intervention at this time . New Imaging of R shoulder/scapula unremarkable for acute injury, RUE WBAT.  Recommend follow up with a shoulder/elbow orthopedic surgeon - Dr Shine as needed.     Patient was evaluated by PM&R deemed to be a good candidate for acute rehabilitation. Patient now transferred to [facility] for comprehensive acute inpatient rehabilitation admitted on 1/12/2022.    Scheduled Meds:  • acetaminophen  975 mg oral q8h   • amLODIPine  5 mg oral q12h MARIAN   • atorvastatin  40 mg oral Daily (6p)   • cetirizine  5 mg oral Nightly   • [START ON 1/20/2022] dabigatran etexilate  150 mg oral BID   • escitalopram  10 mg oral Daily   • famotidine  20 mg oral BID   • fluticasone propionate  2 spray Each Nostril Daily   •  "guaiFENesin  600 mg oral BID   • heparin (porcine)  5,000 Units subcutaneous q8h MARIAN   • hydrochlorothiazide  25 mg oral Daily   • lidocaine  1 patch Topical Daily   • losartan  100 mg oral Daily with dinner   • sennosides-docusate sodium  1 tablet oral BID   • umeclidinium-vilanteroL  1 puff inhalation Daily     Continuous Infusions:  PRN Meds:.albuterol HFA  •  bisacodyL  •  cyclobenzaprine  •  oxyCODONE  •  oxyCODONE  •  polyethylene glycol     Lab Results   Component Value Date    WBC 5.04 01/18/2022    HGB 14.3 01/18/2022    HCT 43.5 01/18/2022    MCV 89.3 01/18/2022     01/18/2022       Lab Results   Component Value Date    GLUCOSE 103 (H) 01/18/2022    CALCIUM 9.7 01/18/2022     01/18/2022    K 4.3 01/18/2022    CO2 29 01/18/2022    CL 97 (L) 01/18/2022    BUN 13 01/18/2022    CREATININE 0.9 01/18/2022       Subjective: Patient seen and examined no chest pain or shortness of breath, tolerating therapies, patient is making steady progress in rehab therapies, will see Dr. Morejon neurosurgery on 1/25/2021.  Team 1/17:    Nursing: continent B&B, skin intact    Functional status:    PT : CS fortransfers , touching assist for amb 200f RW    OT: ADLs: transfers touching assist, using adaptive equipment, CS for toileting, min A for UE dressing/colar managment    Psych : mild memory issues, anxious about discharge,     Physical exam:  Physical examination today showed a 71-year-old man in no acute distress.  Patient awake alert obeys commands.     Blood pressure (!) 156/78, pulse 69, temperature 36.5 °C (97.7 °F), temperature source Oral, resp. rate 16, height 1.676 m (5' 6\"), weight 89.7 kg (197 lb 12.8 oz), SpO2 93 %.       Abdominopelvic, skin negative, mucous brains moist.     Neck supple     Heart regular rate     Lungs decreased breath sounds on the right side     Examination was benign     Musculoskeletal exam: Range of motion within functional mid bilateral upper extremity and bilateral extremity " except decreased right shoulder abduction.  Patient does agree that he will not allow extremity.  Neuro exam: Mental status as above patient able to be commands.  Cranial nerve examination shows face symmetric, tongue midline, completely intact and visual fields are full.  Motor examination: Right upper extremity deltoid 3/5, biceps 5/5 and  3/5, finger extension 4/5.  Examination of the right lower extremity hip flexion 4 -/5, quad and ankle dorsiflexion 5/5.  Examination of the left upper extremity 5/5.  Western of the left lower extremity hip flexion 4/5, quad and ankle dorsiflexion 5/5.  Sensory examination grossly normal.  Deep tendon reflexes are symmetrical.  There was no evidence of cerebellar signs and gait not tested at this time.    Skin: Incision dry clean and intact staples in place covered by Mepilex.     Impression:     Ambulatory ADL dysfunction due to:    History of fall last) with growth none displaced odontoid fracture with type II morphology and nonunion now status post ORIF C2, C1-3 laminectomy posterior cervical fusion at multiple levels.     Postoperative anemia     History of atrial fibrillation on Pradaxa     History of coronary artery disease     History of lung cancer status post left lower lobe resection     History of breast cancer     History of COPD/tobacco use     GERD     Hypertension on Norvasc, losartan and hydrochlorothiazide     History of COVID-19     Hyperlipidemia on Lipitor     History of right shoulder rotator cuff repair     History of carpal tunnel syndrome     Obesity followed by dietitian     History of anxiety/depression on Lexapro     Plan:     Patient will continue physical therapy, and Occupational Therapy.  We will consult Dr. Rivera for medical management, Dr. Pagan neurology, Dr. Boyd psychiatry and patient also be followed by cardiology.  Patient will be followed by psychology and case management for support.  Patient will continue to use heart cervical  collar all the times.  [place] patient precautions include cardiac, fall and orthopedic spinal precautions.  Patient will continue on Xarelto for history of atrial fibrillation.  We will monitor routine labs and additional monitoring healing of the posterior cervical incision closely.     Goals: To improve overall functioning for transfers, ambulation and ADLs     Extensive length of stay 1/25     Discussed with Dr. Rivera medical consultant, patient and nursing     This patient note has been dictated using speech recognition software.  Inadvertent speech recognition errors should be disregarded. Please do not hesitate to call my office for clarification.

## 2022-01-18 NOTE — PROGRESS NOTES
Patient: Melanie Zelaya  Location: [facility] [address]  MRN: [id number]  Today's date: 1/18/2022    History of Present Illness  Melanie is a 71 y.o. male admitted on 1/11/2022 with S/P cervical spinal fusion [Z98.1]. Principal problem is S/P cervical spinal fusion.      Past Medical History  Melanie has a past medical history of Anxiety, Atrial fibrillation (CMS/HCC), Breast cancer (CMS/HCC), C1 cervical fracture (CMS/HCC), Colon polyp, COPD (chronic obstructive pulmonary disease) (CMS/HCC), Coronary artery calcification seen on CT scan, COVID-19 vaccine series completed, Depression, Diverticulosis, Diverticulosis, Fracture of pelvis (CMS/HCC) (1968), GERD (gastroesophageal reflux disease), Hearing loss, History of colon polyps, History of COVID-19 (2020), Hyperlipidemia, Hypertension, Left atrial enlargement, Lung cancer (CMS/HCC), Lung cancer (CMS/HCC), Pneumonia (2011), Seasonal allergies, and Wrist fracture.      OT Vitals    Date/Time Pulse HR Source Resp SpO2 Pt Activity O2 Therapy BP BP Location BP Method Pt Position [facility]   01/18/22 0715 70 Monitor 16 93 % At rest None (Room air) 133/69 Left upper arm Automatic Lying HJS   01/18/22 1003 58 -- -- -- -- -- 125/61 Right upper arm Automatic Sitting DM      OT Pain    Date/Time Pain Type Location Rating: Rest Rating: Rest Rating: Activity [facility]   01/18/22 0526 Pain Assessment -- -- 6 - moderate-severe pain 6 - moderate-severe pain JQ   01/18/22 0611 Pain Reassessment;Patient Sleeping -- -- -- -- CA[facility]   01/18/22 0948 Pain Assessment neck 3.5 -- -- [facility]   01/18/22 1003 Pain Assessment neck 3 -- -- DM   01/18/22 1033 Patient Off Unit -- -- -- -- N   01/18/22 1058 Pain Reassessment neck 3 -- -- DM          Prior Living Environment      Most Recent Value   People in Home alone   Current Living Arrangements home   Home Accessibility not wheelchair accessible   Living Environment Comment 3SH, 3 [place] LHR, FF to bathroom then another FF to bedroom, tub  shower w/ SC   Number of Stairs, Main Entrance 4   Surface of Stairs, Main Entrance concrete   Stair Railings, Main Entrance railing on left side (ascending)   Landing, Stairs, Main Entrance adequate turning radius   Number of Stairs, Second Entrance other (see comments)  [back door not utilized]   Location, Kitchen first (main) floor   [place], Kitchen tile floor   Location, Laundry Room first (main) floor   [place], Laundry Room concrete floor   Laundry Room Access not wheelchair accessible   Laundry Room Access Comment down in basement,  with full flight and L hand rail   Location, Patient Bedroom other (see comments)  [3rd floor]   [place], Patient Bedroom carpeted floor   Location, Bathroom second floor, must negotiate stairs to access   [place], Bathroom tile floor   Bathroom Access not wheelchair accessible   Bathroom Access Comment Pt reports FB on 2nd floor. Pt has shower chair.   Number of Stairs, Within Home, Primary 12   Surface of Stairs, Within Home, Primary carpeting   Stair Railings, Within Home, Primary railings on both sides of stairs   Stairs Comment, Within Home, Primary to second floor for bathroom   Stairs, Within Home, Secondary 12   Surface of Stairs, Within Home, Secondary carpeting   Stair Railings, Within Home, Secondary railing on left side (ascending)   Stairs Comment, Within Home, Secondary to third floor for bed room          Prior Level of Function      Most Recent Value   Dominant Hand right   Ambulation assistive equipment   Transferring assistive equipment   Toileting independent   Bathing independent   Dressing assistive equipment  [shoe horn]   Eating independent   Prior Level of Function Comment Pt reports prior use of cane in home as needed for ambulation and use of a shower chair for bathing. Pt reports was completing BADLs independently.   Assistive Device Currently Used at Home cane, straight, shower chair          Occupational Profile      Most Recent Value   Successful  Occupations Pt reports not a  within past year but would like to return to driving in future.   Occupational History/Life Experiences Pt reports living c  in [facility]. Pt has three (adult) children and grandchildren. Pt has good family support.                01/18/22 1010   OT Time Calculation   Start Time 1000   Stop Time 1100   Time Calculation (min) 60 min   Session Details   Document Type daily treatment/progress note   Mode of Treatment occupational therapy;individual therapy   General Information   General Observations of Patient Pt received seated in w/c. Pt agreeable to therapy. Care also provided by Arlyn Larsen, OTR/L.   Transfers   Transfers stand pivot transfer   Sit to Stand Transfer   [place], Sit to Stand Transfer close supervision   Verbal Cues safety   Assistive Device gait belt;walker, front-wheeled   Comment from w/c and standard chair c arms/back.   Stand to Sit Transfer   [place], Stand to Sit Transfer close supervision   Verbal Cues safety   Assistive Device gait belt;walker, front-wheeled   Comment to standard chair c arms/back.   Stand Pivot Transfer   [place], Stand Pivot/Stand Step Transfer touching/steadying assist   Verbal Cues safety   Assistive Device walker, front-wheeled;gait belt   Comment Steadying A ambulatory approach to w/c   Safety Issues, Functional Mobility   Comment, Safety Issues/Impairments (Mobility) Touching A ambulating c RW and gait belt in greenhouse. Pt demo's good RW safety and pacing throughout. Pt demo's good adherence to spinal precautions.   Balance   Comment, Balance Cl S standing balance at RW at table in [employer] center while engaged in plant maintenance activity.  Pt able to maintain standing balance and reach outside [place] s LOB to complete tasks.   Therapeutic Interventions   Comment, Therapeutic Intervention SLEEPING POSITION: discussed postioning in bed to A c B shoulder pain. Discussed trialing placing pillows under BUE c  R palm up toward ceiling to relief bicep tendon   Therapeutic Exercise   Therapeutic Exercise core strength   Upper Extremity (Therapeutic Exercise)   Exercise Position/Type seated   General Exercise bilateral   Comment BUE c digits interlocked completed 1 x 10 chest press. UNWEIGHTED DOWEL: 1 x 10 AAROM RUE and AROM LUE chest press/shoulder flexion/ext, horz. abd/add. to shoulder height. Completed c upper body unsupported from back of w/c for core strengthening   Lower Extremity (Therapeutic Exercise)   Exercise Position/Type seated   General Exercise bilateral;marching while seated   Reps and Sets 1 x 20   Core Strength (Therapeutic Exercise)   Core Strength, Position seated   Comment seated unsupported from back of w/c to encourage core activation while completing UE ther ex   Daily Progress Summary (OT)   Daily Outcome Statement OT session focused on increasing pts endurance and balance while engaged in activities in the [place]. Pt benefiting from AROM/warm up and dowel exercises to increase UE ROM. Pt demo's good balance for simple standing activity. Pt demo's decreased standing tolerance, benefiting from seated rest breaks to improve endurance during task. Pt benefiting from continued balance and endurance training. Continue c POC.          IRF OT Goals      Most Recent Value   Transfer Goal 1    Activity/Assistive Device toilet at 01/17/2022 0840   [place] supervision required at 01/17/2022 0840   Time Frame short-term goal (STG), 1 week at 01/17/2022 0840   Progress/Outcome goal met, goal revised this date at 01/17/2022 0840   Transfer Goal 2    Activity/Assistive Device toilet at 01/12/2022 0840   [place] modified independence at 01/12/2022 0840   Time Frame long-term goal (LTG), 2 weeks at 01/12/2022 0840   Progress/Outcome goal ongoing at 01/17/2022 0840   Transfer Goal 3    Activity/Assistive Device shower at 01/17/2022 0840   [place] supervision required at 01/17/2022 0840    Time Frame short-term goal (STG), 1 week at 01/17/2022 0840   Progress/Outcome goal met, goal revised this date at 01/17/2022 0840   Transfer Goal 4    Activity/Assistive Device shower at 01/12/2022 0840   [place] modified independence at 01/12/2022 0840   Time Frame long-term goal (LTG), 2 weeks at 01/12/2022 0840   Progress/Outcome goal ongoing at 01/17/2022 0840   Bathing Goal 1    Activity/Assistive Device bathing skills, all at 01/12/2022 0840   [place] supervision required at 01/17/2022 0840   Time Frame short-term goal (STG), 1 week at 01/17/2022 0840   Progress/Outcome goal met, goal revised this date at 01/17/2022 0840   Bathing Goal 2    Activity/Assistive Device bathing skills, all at 01/12/2022 0840   [place] modified independence at 01/12/2022 0840   Time Frame long-term goal (LTG), 2 weeks at 01/12/2022 0840   Progress/Outcome goal ongoing at 01/17/2022 0840   UB Dressing Goal 1    Activity/Assistive Device upper body dressing at 01/12/2022 0840   [place] supervision required  [Cl S] at 01/12/2022 0840   Time Frame short-term goal (STG), 1 week at 01/17/2022 0840   Strategies/Barriers including clothing retrieval at 01/17/2022 0840   Progress/Outcome goal ongoing at 01/17/2022 0840   UB Dressing Goal 2    Activity/Assistive Device upper body dressing at 01/12/2022 0840   [place] modified independence at 01/12/2022 0840   Time Frame long-term goal (LTG), 2 weeks at 01/12/2022 0840   Progress/Outcome goal ongoing at 01/17/2022 0840   LB Dressing Goal 1    Activity/Assistive Device lower body dressing at 01/12/2022 0840   [place] supervision required  [Cl S] at 01/17/2022 0840   Time Frame short-term goal (STG), 1 week at 01/17/2022 0840   Progress/Outcome goal met, goal revised this date at 01/17/2022 0840   LB Dressing Goal 2    Activity/Assistive Device lower body dressing at 01/12/2022 0840   [place] modified independence at 01/12/2022 0840   Time Frame long-term  goal (LTG), 2 weeks at 01/12/2022 0840   Progress/Outcome goal ongoing at 01/17/2022 0840   Grooming Goal 1    [place] supervision required at 01/17/2022 0840   Time Frame short-term goal (STG), 1 week at 01/17/2022 0840   Strategies/Barriers Standing at 01/12/2022 0840   Progress/Outcome goal met, goal revised this date at 01/17/2022 0840   Grooming Goal 2    Activity/Assistive Device grooming skills, all at 01/12/2022 0840   [place] modified independence at 01/12/2022 0840   Time Frame long-term goal (LTG), 2 weeks at 01/12/2022 0840   Progress/Outcome goal ongoing at 01/17/2022 0840   Toileting Goal 1    Activity/Assistive Device toileting skills, all at 01/12/2022 0840   [place] supervision required at 01/17/2022 0840   Time Frame short-term goal (STG), 1 week at 01/17/2022 0840   Progress/Outcome goal met, goal revised this date at 01/17/2022 0840   Toileting Goal 2    Activity/Assistive Device toileting skills, all at 01/12/2022 0840   [place] modified independence at 01/12/2022 0840   Time Frame long-term goal (LTG), 2 weeks at 01/12/2022 0840   Progress/Outcome goal ongoing at 01/17/2022 0840

## 2022-01-18 NOTE — PLAN OF CARE
Plan of Care Review  Plan of Care Reviewed With: patient  Progress: improving  Outcome Summary: Alert and oriented x4. Continent of bowel and bladder. Continues on scheduled Tylenol with PRN Oxycodone administered for breakthrough pain. Aspen collar worn AATs, foam dressing CDI over cervical incision. Sleeping quietly in bed overnight. Fall and safety measures maintained. Lab work scheduled for today.

## 2022-01-18 NOTE — PLAN OF CARE
Problem: Rehabilitation (IRF) Plan of Care  Goal: Plan of Care Review  Outcome: Progressing  Flowsheets (Taken 1/18/2022 4080)  Progress: improving  Plan of Care Reviewed With: patient  Outcome Summary: pt aaox3. continent of b&b. participates in therapies. aspen collar aat. [place], wears b/l hearing aids. c/o neck pain, managed with PRN oxy and tylenol. incision staples covered with mepilex. drain site covered with steri strip.

## 2022-01-18 NOTE — PROGRESS NOTES
Patient: Melanie Zelaya  Location: [facility] [address]  MRN: [id number]  Today's date: 1/18/2022    History of Present Illness  Melanie is a 71 y.o. male admitted on 1/11/2022 with S/P cervical spinal fusion [Z98.1]. Principal problem is S/P cervical spinal fusion.      Past Medical History  Melanie has a past medical history of Anxiety, Atrial fibrillation (CMS/HCC), Breast cancer (CMS/HCC), C1 cervical fracture (CMS/HCC), Colon polyp, COPD (chronic obstructive pulmonary disease) (CMS/HCC), Coronary artery calcification seen on CT scan, COVID-19 vaccine series completed, Depression, Diverticulosis, Diverticulosis, Fracture of pelvis (CMS/HCC) (1968), GERD (gastroesophageal reflux disease), Hearing loss, History of colon polyps, History of COVID-19 (2020), Hyperlipidemia, Hypertension, Left atrial enlargement, Lung cancer (CMS/HCC), Lung cancer (CMS/HCC), Pneumonia (2011), Seasonal allergies, and Wrist fracture.      OT Vitals    Date/Time Pulse BP BP Location BP Method Pt Position [facility]   01/18/22 1303 69 156/78 Left upper arm Automatic Sitting DM      OT Pain    Date/Time Pain Type Location Rating: Rest [facility]   01/18/22 1303 Pain Assessment neck 3 DM   01/18/22 1329 Pain Reassessment neck 4 DM          Prior Living Environment      Most Recent Value   People in Home alone   Current Living Arrangements home   Home Accessibility not wheelchair accessible   Living Environment Comment 3SH, 3 VALERIA LHR, FF to bathroom then another FF to bedroom, tub shower w/ SC   Number of Stairs, Main Entrance 4   Surface of Stairs, Main Entrance concrete   Stair Railings, Main Entrance railing on left side (ascending)   Landing, Stairs, Main Entrance adequate turning radius   Number of Stairs, Second Entrance other (see comments)  [back door not utilized]   Location, Kitchen first (main) floor   [place], Kitchen tile floor   Location, Laundry Room first (main) floor   [place], Laundry Room concrete floor   Laundry  Room Access not wheelchair accessible   Laundry Room Access Comment down in basement,  with full flight and L hand rail   Location, Patient Bedroom other (see comments)  [3rd floor]   [place], Patient Bedroom carpeted floor   Location, Bathroom second floor, must negotiate stairs to access   [place], Bathroom tile floor   Bathroom Access not wheelchair accessible   Bathroom Access Comment Pt reports FB on 2nd floor. Pt has shower chair.   Number of Stairs, Within Home, Primary 12   Surface of Stairs, Within Home, Primary carpeting   Stair Railings, Within Home, Primary railings on both sides of stairs   Stairs Comment, Within Home, Primary to second floor for bathroom   Stairs, Within Home, Secondary 12   Surface of Stairs, Within Home, Secondary carpeting   Stair Railings, Within Home, Secondary railing on left side (ascending)   Stairs Comment, Within Home, Secondary to third floor for bed room          Prior Level of Function      Most Recent Value   Dominant Hand right   Ambulation assistive equipment   Transferring assistive equipment   Toileting independent   Bathing independent   Dressing assistive equipment  [shoe horn]   Eating independent   Prior Level of Function Comment Pt reports prior use of cane in home as needed for ambulation and use of a shower chair for bathing. Pt reports was completing BADLs independently.   Assistive Device Currently Used at Home cane, straight, shower chair          Occupational Profile      Most Recent Value   Successful Occupations Pt reports not a  within past year but would like to return to driving in future.   Occupational History/Life Experiences Pt reports living c  in [facility]. Pt has three (adult) children and grandchildren. Pt has good family support.           IRF OT Evaluation and Treatment - 01/18/22 1308        OT Time Calculation    Start Time 1300     Stop Time 1330     Time Calculation (min) 30 min        Session Details    Document Type daily  treatment/progress note     Mode of Treatment occupational therapy;individual therapy        General Information    General Observations of Patient Pt received seated in w/c. Pt agreeable to therapy.        Sit to Stand Transfer    [place], Sit to Stand Transfer close supervision     Verbal Cues safety     Assistive Device none     Comment from w/c        Stand to Sit Transfer    [place], Stand to Sit Transfer close supervision     Verbal Cues safety     Assistive Device none     Comment from standing at table        Balance    Comment, Balance CL S unsupported standing at table while engaged in clothespin activity. Facilitating weight shifting slightly outside [place] to R and L via reaching to retrieve and place clothespins.  Pt able to maintain standing balance s LOB to complete tasks. Pt completes x 10 reps each direction.        Motor Skills    Comment, Motor Skills Pt. engaged in clothespin activity to increase  strength. Pt. used lateral pinch to  clothespin and performs AROM via shldr flexion to attach/detach clothespin to vertical dowel. Pt completed 1x10 BUE c various resistances.        Aerobic Exercise    Type arm bike     Time Performed 6 mins     Comment Pt used [employer] c BUE on resistance 3.  Pt completed 3 mins forward and 3 mins backward. Pt required 30 sec rest break between directions.        Daily Progress Summary (OT)    Daily Outcome Statement OT session focused on increasing pts balance while standing unsupported and increasing pts motor skills and  strength. Pt demos good  strength and motor skills WFL. Pt demos decreased standing tolerance and benefitting from seated rest breaks between activities. Pt will con to benefit from standing tolerance and weight shifting interventions. Continue c POC.                           IRF OT Goals      Most Recent Value   Transfer Goal 1    Activity/Assistive Device toilet at 01/17/2022 0840   [place] supervision required at  01/17/2022 0840   Time Frame short-term goal (STG), 1 week at 01/17/2022 0840   Progress/Outcome goal met, goal revised this date at 01/17/2022 0840   Transfer Goal 2    Activity/Assistive Device toilet at 01/12/2022 0840   [place] modified independence at 01/12/2022 0840   Time Frame long-term goal (LTG), 2 weeks at 01/12/2022 0840   Progress/Outcome goal ongoing at 01/17/2022 0840   Transfer Goal 3    Activity/Assistive Device shower at 01/17/2022 0840   [place] supervision required at 01/17/2022 0840   Time Frame short-term goal (STG), 1 week at 01/17/2022 0840   Progress/Outcome goal met, goal revised this date at 01/17/2022 0840   Transfer Goal 4    Activity/Assistive Device shower at 01/12/2022 0840   [place] modified independence at 01/12/2022 0840   Time Frame long-term goal (LTG), 2 weeks at 01/12/2022 0840   Progress/Outcome goal ongoing at 01/17/2022 0840   Bathing Goal 1    Activity/Assistive Device bathing skills, all at 01/12/2022 0840   [place] supervision required at 01/17/2022 0840   Time Frame short-term goal (STG), 1 week at 01/17/2022 0840   Progress/Outcome goal met, goal revised this date at 01/17/2022 0840   Bathing Goal 2    Activity/Assistive Device bathing skills, all at 01/12/2022 0840   [place] modified independence at 01/12/2022 0840   Time Frame long-term goal (LTG), 2 weeks at 01/12/2022 0840   Progress/Outcome goal ongoing at 01/17/2022 0840   UB Dressing Goal 1    Activity/Assistive Device upper body dressing at 01/12/2022 0840   [place] supervision required  [Cl S] at 01/12/2022 0840   Time Frame short-term goal (STG), 1 week at 01/17/2022 0840   Strategies/Barriers including clothing retrieval at 01/17/2022 0840   Progress/Outcome goal ongoing at 01/17/2022 0840   UB Dressing Goal 2    Activity/Assistive Device upper body dressing at 01/12/2022 0840   [place] modified independence at 01/12/2022 0840   Time Frame long-term goal (LTG), 2 weeks at  01/12/2022 0840   Progress/Outcome goal ongoing at 01/17/2022 0840   LB Dressing Goal 1    Activity/Assistive Device lower body dressing at 01/12/2022 0840   [place] supervision required  [Cl S] at 01/17/2022 0840   Time Frame short-term goal (STG), 1 week at 01/17/2022 0840   Progress/Outcome goal met, goal revised this date at 01/17/2022 0840   LB Dressing Goal 2    Activity/Assistive Device lower body dressing at 01/12/2022 0840   [place] modified independence at 01/12/2022 0840   Time Frame long-term goal (LTG), 2 weeks at 01/12/2022 0840   Progress/Outcome goal ongoing at 01/17/2022 0840   Grooming Goal 1    [place] supervision required at 01/17/2022 0840   Time Frame short-term goal (STG), 1 week at 01/17/2022 0840   Strategies/Barriers Standing at 01/12/2022 0840   Progress/Outcome goal met, goal revised this date at 01/17/2022 0840   Grooming Goal 2    Activity/Assistive Device grooming skills, all at 01/12/2022 0840   [place] modified independence at 01/12/2022 0840   Time Frame long-term goal (LTG), 2 weeks at 01/12/2022 0840   Progress/Outcome goal ongoing at 01/17/2022 0840   Toileting Goal 1    Activity/Assistive Device toileting skills, all at 01/12/2022 0840   [place] supervision required at 01/17/2022 0840   Time Frame short-term goal (STG), 1 week at 01/17/2022 0840   Progress/Outcome goal met, goal revised this date at 01/17/2022 0840   Toileting Goal 2    Activity/Assistive Device toileting skills, all at 01/12/2022 0840   [place] modified independence at 01/12/2022 0840   Time Frame long-term goal (LTG), 2 weeks at 01/12/2022 0840   Progress/Outcome goal ongoing at 01/17/2022 0840

## 2022-01-18 NOTE — PROGRESS NOTES
Patient: Melanie Zelaya  Location: [facility] [address]  MRN: [id number]  Today's date: 1/18/2022    History of Present Illness  Melanie is a 71 y.o. male admitted on 1/11/2022 with S/P cervical spinal fusion [Z98.1]. Principal problem is S/P cervical spinal fusion.      Past Medical History  Melanie has a past medical history of Anxiety, Atrial fibrillation (CMS/HCC), Breast cancer (CMS/HCC), C1 cervical fracture (CMS/HCC), Colon polyp, COPD (chronic obstructive pulmonary disease) (CMS/HCC), Coronary artery calcification seen on CT scan, COVID-19 vaccine series completed, Depression, Diverticulosis, Diverticulosis, Fracture of pelvis (CMS/HCC) (1968), GERD (gastroesophageal reflux disease), Hearing loss, History of colon polyps, History of COVID-19 (2020), Hyperlipidemia, Hypertension, Left atrial enlargement, Lung cancer (CMS/HCC), Lung cancer (CMS/HCC), Pneumonia (2011), Seasonal allergies, and Wrist fracture.      PT Vitals    Date/Time Pulse HR Source BP BP Location BP Method Pt Position [facility]   01/18/22 1437 68 Monitor 142/67 Left upper arm Automatic Sitting DT      PT Pain    Date/Time Pain Type Location Rating: Rest [facility]   01/18/22 1437 Pain Assessment neck 4 premedicated for activity DT   01/18/22 1554 Pain Assessment neck 4 premedicated for activity DT          Prior Living Environment      Most Recent Value   People in Home alone   Current Living Arrangements home   Home Accessibility not wheelchair accessible   Living Environment Comment 3SH, 3 VALERIA LHR, FF to bathroom then another FF to bedroom, tub shower w/ SC   Number of Stairs, Main Entrance 4   Surface of Stairs, Main Entrance concrete   Stair Railings, Main Entrance railing on left side (ascending)   Land[place], Stairs, Main Entrance adequate turning radius   Number of Stairs, Second Entrance other (see comments)  [back door not utilized]   Location, Kitchen first (main) floor   [place], Kitchen tile floor   Location,  Laundry Room first (main) floor   [place], Laundry Room concrete floor   Laundry Room Access not wheelchair accessible   Laundry Room Access Comment down in basement,  with full flight and L hand rail   Location, Patient Bedroom other (see comments)  [3rd floor]   [place], Patient Bedroom carpeted floor   Location, Bathroom second floor, must negotiate stairs to access   [place], Bathroom tile floor   Bathroom Access not wheelchair accessible   Bathroom Access Comment Pt reports FB on 2nd floor. Pt has shower chair.   Number of Stairs, Within Home, Primary 12   Surface of Stairs, Within Home, Primary carpeting   Stair Railings, Within Home, Primary railings on both sides of stairs   Stairs Comment, Within Home, Primary to second floor for bathroom   Stairs, Within Home, Secondary 12   Surface of Stairs, Within Home, Secondary carpeting   Stair Railings, Within Home, Secondary railing on left side (ascending)   Stairs Comment, Within Home, Secondary to third floor for bed room          Prior Level of Function      Most Recent Value   Dominant Hand right   Ambulation assistive equipment   Transferring assistive equipment   Toileting independent   Bathing independent   Dressing assistive equipment  [shoe horn]   Eating independent   Prior Level of Function Comment Pt reports prior use of cane in home as needed for ambulation and use of a shower chair for bathing. Pt reports was completing BADLs independently.   Assistive Device Currently Used at Home cane, straight, shower chair           IRF PT Evaluation and Treatment - 01/18/22 1437        PT Time Calculation    Start Time 1430     Stop Time 1600     Time Calculation (min) 90 min        Session Details    Document Type daily treatment/progress note     Mode of Treatment individual therapy;physical therapy        General Information    Patient Profile Reviewed yes     General Observations of Patient received in therapy gym, verbalizing fatigue        Transfers     Transfers stand pivot transfer     Comment gait belt donned t/o session        Sit to Stand Transfer    [place], Sit to Stand Transfer close supervision     Verbal Cues safety     Assistive Device walker, front-wheeled     Comment for safety        Stand to Sit Transfer    [place], Stand to Sit Transfer close supervision     Verbal Cues safety     Assistive Device none     Comment for safety        Stand Pivot Transfer    [place], Stand Pivot/Stand Step Transfer close supervision     Verbal Cues safety     Assistive Device walker, front-wheeled     Comment for safety        Gait Training    [place], Gait close supervision     Assistive Device walker, front-wheeled     Distance in Feet 200 feet     Pattern (Gait) step-through     Deviations/Abnormal Patterns (Gait) base of support, narrow;gait speed decreased;weight shifting decreased     Comment (Gait/Stairs) + 200 ftx 3 with CL S for safety        Curb Negotiation    [place] touching/steadying assist     Assistive Device walker, front-wheeled     Curb Height 6 inches     Comment up/down 6in+2in curb step with RLE leading to ascend and LLE RLE leading to descend with touching assist for steadying the pelvis and improving balance        Sloped Surface Gait Skills    [place] touching/steadying assist     Assistive Device walker, front-wheeled     Distance in Feet 10 feet     Comment up/down 5ft ramp with verbal cuing for proximity to RW with touching assist to improve balance at gait belt        Stairs Training    [place], Stairs close supervision     Assistive Device railing     Handrail Location (Stairs) left side (ascending);left side (descending)     Number of Stairs 12     Stair Height 6 inches     Ascending Stairs Technique step-to-step   RLE leading laterally    Descending Stairs Technique step-to-step   LLE leading laterally    Comment via semi-lateral approach with BUE support on L hand rail with CL S for safety and verbal  cuing to improve sequencing as patient attempt to cross feet with descending        Motor Skills    Motor Control/Coordination Interventions stepping/walking        Advanced Stepping/Walking Interventions    Stepping/Walking Interventions box stepping     Box Stepping (Stepping/Walking Interventions) 1) alternate 6in gabriele taps with BUE support and touching assist for steadying 2x15        Lower Extremity (Therapeutic Exercise)    Comment seated: BLE hamstring/gastroc stretch 30s x3        Aerobic Exercise    Type pedal exerciser     Time Performed 10     Comment Pt used motomed for BLE        Spinal Orthosis Management    Type (Spinal Orthosis) cervical collar, hard     Fabrication Comment (Spinal Orthosis) [place] donned t/o session        Daily Progress Summary (PT)    Daily Outcome Statement Pt continues to report pain in posterior neck region that persists t/o sesison.  Pt demos improved ability to complete elevations and ambulation with CL S for safety which is an improvement from previous attempt.  Pt to continue to benefit from BLE strengthening and endurance training.                           IRF PT Goals      Most Recent Value   Bed Mobility Goal 1    Activity/Assistive Device rolling to right, rolling to left at 01/12/2022 0831   [place] supervision required at 01/12/2022 0831   Time Frame short-term goal (STG), 5 - 7 days at 01/12/2022 0831   Progress/Outcome goal met at 01/15/2022 0905   Bed Mobility Goal 2    Activity/Assistive Device rolling to left, rolling to right at 01/12/2022 0831   [place] modified independence at 01/12/2022 0831   Time Frame long-term goal (LTG), 21 days or less at 01/12/2022 0831   Progress/Outcome goal ongoing at 01/17/2022 0800   Bed Mobility Goal 3    Activity/Assistive Device sit to supine/supine to sit at 01/12/2022 0831   [place] other (see comments)  [touching assist] at 01/12/2022 0831   Time Frame short-term goal (STG), 5 - 7 days at 01/12/2022  0831   Progress/Outcome goal met at 01/15/2022 0905   Bed Mobility Goal 4    Activity/Assistive Device sit to supine/supine to sit at 01/12/2022 0831   [place] modified independence at 01/12/2022 0831   Time Frame long-term goal (LTG), 21 days or less at 01/12/2022 0831   Progress/Outcome goal ongoing at 01/17/2022 0800   Transfer Goal 1    Activity/Assistive Device sit-to-stand/stand-to-sit at 01/12/2022 0831   [place] other (see comments)  [touching assist] at 01/12/2022 0831   Time Frame short-term goal (STG), 5 - 7 days at 01/12/2022 0831   Progress/Outcome goal met at 01/17/2022 0800   Transfer Goal 2    Activity/Assistive Device sit-to-stand/stand-to-sit at 01/12/2022 0831   [place] modified independence at 01/12/2022 0831   Time Frame 21 days or less, long-term goal (LTG) at 01/12/2022 0831   Progress/Outcome goal ongoing at 01/17/2022 0800   Transfer Goal 3    Activity/Assistive Device stand pivot at 01/12/2022 0831   [place] other (see comments)  [touching assist] at 01/12/2022 0831   Time Frame short-term goal (STG), 5 - 7 days at 01/12/2022 0831   Progress/Outcome goal met at 01/17/2022 0800   Transfer Goal 4    Activity/Assistive Device stand pivot at 01/12/2022 0831   [place] modified independence at 01/12/2022 0831   Time Frame long-term goal (LTG), 21 days or less at 01/12/2022 0831   Progress/Outcome goal ongoing at 01/17/2022 0800   Gait/Walking Locomotion Goal 1    Activity/Assistive Device gait (walking locomotion), assistive device use at 01/12/2022 0831   Distance 50 feet at 01/12/2022 0831   [place] other (see comments)  [touching assist] at 01/12/2022 0831   Time Frame short-term goal (STG), 5 - 7 days at 01/12/2022 0831   Progress/Outcome goal met at 01/17/2022 0800   Gait/Walking Locomotion Goal 2    Activity/Assistive Device gait (walking locomotion), assistive device use at 01/12/2022 0831   Distance 150 feet at 01/12/2022 0831   [place] modified  independence at 01/12/2022 0831   Time Frame long-term goal (LTG), 21 days or less at 01/12/2022 0831   Progress/Outcome goal ongoing at 01/17/2022 0800   Stairs Goal 1    Activity/Assistive Device ascending stairs, descending stairs at 01/15/2022 0905   Number of Stairs 12 at 01/15/2022 0905   [place] minimum assist (75% or more patient effort) at 01/15/2022 0905   Time Frame short-term goal (STG), 1 week at 01/15/2022 0905   Progress/Outcome goal met at 01/17/2022 0800   Stairs Goal 2    Activity/Assistive Device ascending stairs, descending stairs at 01/15/2022 0905   Number of Stairs 12 at 01/15/2022 0905   [place] modified independence at 01/15/2022 0905   Time Frame long-term goal (LTG), 21 days or less, by discharge at 01/15/2022 0905   Progress/Outcome goal ongoing at 01/17/2022 0800

## 2022-01-19 ENCOUNTER — APPOINTMENT (INPATIENT)
Dept: OCCUPATIONAL THERAPY | Facility: REHABILITATION | Age: 72
DRG: 565 | End: 2022-01-19
Payer: COMMERCIAL

## 2022-01-19 ENCOUNTER — APPOINTMENT (INPATIENT)
Dept: PHYSICAL THERAPY | Facility: REHABILITATION | Age: 72
DRG: 565 | End: 2022-01-19
Payer: COMMERCIAL

## 2022-01-19 ENCOUNTER — APPOINTMENT (OUTPATIENT)
Dept: PSYCHOLOGY | Facility: CLINIC | Age: 72
End: 2022-01-19
Payer: COMMERCIAL

## 2022-01-19 PROCEDURE — 97530 THERAPEUTIC ACTIVITIES: CPT | Mod: GP

## 2022-01-19 PROCEDURE — 63700000 HC SELF-ADMINISTRABLE DRUG: Performed by: INTERNAL MEDICINE

## 2022-01-19 PROCEDURE — 63600000 HC DRUGS/DETAIL CODE: Performed by: INTERNAL MEDICINE

## 2022-01-19 PROCEDURE — 12800001 HC ROOM AND CARE SEMIPRIVATE REHAB-BRAIN INJ

## 2022-01-19 PROCEDURE — 90832 PSYTX W PT 30 MINUTES: CPT | Performed by: PSYCHOLOGIST

## 2022-01-19 PROCEDURE — 97110 THERAPEUTIC EXERCISES: CPT | Mod: GP

## 2022-01-19 PROCEDURE — 97116 GAIT TRAINING THERAPY: CPT | Mod: GP

## 2022-01-19 PROCEDURE — 97110 THERAPEUTIC EXERCISES: CPT | Mod: GO

## 2022-01-19 PROCEDURE — 97530 THERAPEUTIC ACTIVITIES: CPT | Mod: GO

## 2022-01-19 RX ADMIN — HYDROCHLOROTHIAZIDE 25 MG: 25 TABLET ORAL at 08:11

## 2022-01-19 RX ADMIN — SENNOSIDES AND DOCUSATE SODIUM 1 TABLET: 50; 8.6 TABLET ORAL at 08:11

## 2022-01-19 RX ADMIN — CYCLOBENZAPRINE HYDROCHLORIDE 10 MG: 10 TABLET, FILM COATED ORAL at 14:46

## 2022-01-19 RX ADMIN — ACETAMINOPHEN 975 MG: 325 TABLET, FILM COATED ORAL at 21:57

## 2022-01-19 RX ADMIN — HEPARIN SODIUM 5000 UNITS: 5000 INJECTION, SOLUTION INTRAVENOUS; SUBCUTANEOUS at 14:42

## 2022-01-19 RX ADMIN — AMLODIPINE BESYLATE 5 MG: 5 TABLET ORAL at 21:57

## 2022-01-19 RX ADMIN — GUAIFENESIN 600 MG: 600 TABLET, EXTENDED RELEASE ORAL at 08:11

## 2022-01-19 RX ADMIN — LOSARTAN POTASSIUM 100 MG: 100 TABLET, FILM COATED ORAL at 16:40

## 2022-01-19 RX ADMIN — OXYCODONE HYDROCHLORIDE 10 MG: 5 TABLET ORAL at 12:42

## 2022-01-19 RX ADMIN — GUAIFENESIN 600 MG: 600 TABLET, EXTENDED RELEASE ORAL at 21:57

## 2022-01-19 RX ADMIN — OXYCODONE HYDROCHLORIDE 10 MG: 5 TABLET ORAL at 16:40

## 2022-01-19 RX ADMIN — ACETAMINOPHEN 975 MG: 325 TABLET, FILM COATED ORAL at 14:42

## 2022-01-19 RX ADMIN — FLUTICASONE PROPIONATE 2 SPRAY: 50 SPRAY, METERED NASAL at 08:12

## 2022-01-19 RX ADMIN — ACETAMINOPHEN 975 MG: 325 TABLET, FILM COATED ORAL at 05:42

## 2022-01-19 RX ADMIN — CETIRIZINE HYDROCHLORIDE 5 MG: 5 TABLET ORAL at 21:57

## 2022-01-19 RX ADMIN — FAMOTIDINE 20 MG: 20 TABLET ORAL at 21:57

## 2022-01-19 RX ADMIN — HEPARIN SODIUM 5000 UNITS: 5000 INJECTION, SOLUTION INTRAVENOUS; SUBCUTANEOUS at 21:57

## 2022-01-19 RX ADMIN — ATORVASTATIN CALCIUM 40 MG: 40 TABLET, FILM COATED ORAL at 16:40

## 2022-01-19 RX ADMIN — OXYCODONE HYDROCHLORIDE 10 MG: 5 TABLET ORAL at 06:39

## 2022-01-19 RX ADMIN — SENNOSIDES AND DOCUSATE SODIUM 1 TABLET: 50; 8.6 TABLET ORAL at 21:57

## 2022-01-19 RX ADMIN — LIDOCAINE 1 PATCH: 246 PATCH TOPICAL at 08:11

## 2022-01-19 RX ADMIN — ESCITALOPRAM OXALATE 10 MG: 10 TABLET, FILM COATED ORAL at 08:11

## 2022-01-19 RX ADMIN — HEPARIN SODIUM 5000 UNITS: 5000 INJECTION, SOLUTION INTRAVENOUS; SUBCUTANEOUS at 05:42

## 2022-01-19 RX ADMIN — FAMOTIDINE 20 MG: 20 TABLET ORAL at 08:11

## 2022-01-19 ASSESSMENT — COGNITIVE AND FUNCTIONAL STATUS - GENERAL
AROUSAL LEVEL: ATTENTIVE
APPETITE: NO CHANGE
PSYCHOMOTOR FUNCTIONING: WNL
SPEECH: REGULAR
APPEARANCE: WELL GROOMED
THOUGHT_PROCESS: WNL
PERCEPTUAL FUNCTION: PAIN
EYE_CONTACT: WNL
IMPULSE CONTROL: INTACT
ORIENTATION: FULLY ORIENTED
INSIGHT: INTACT
AFFECT: FULL RANGE
RECENT MEMORY: WNL
EST. PREMORBID INTELLIGENCE: AVERAGE
ATTENTION: WNL
REMOTE MEMORY: WNL
MOOD: HOPEFUL;MOTIVATED;EUTHYMIC (NORMAL)
THOUGHT_CONTENT: APPROPRIATE
SLEEP_WAKE_CYCLE: NO CHANGE
DELUSIONS: NONE OR AGE APPROPRIATE
CONCENTRATION: WNL
LIBIDO: NO CHANGE

## 2022-01-19 NOTE — PLAN OF CARE
Plan of Care Review  Plan of Care Reviewed With: patient  Progress: improving  Outcome Summary: Alert and oriented x4. Continent of bowel and bladder. [place] collar worn AATs. Mepilex intact over cervical incision site. Continues on scheduled Tylenol with PRN Oxycodone administered for breakthrough pain. Sleeping quietly in bed overnight. Fall and safety measures maintained.

## 2022-01-19 NOTE — PLAN OF CARE
Problem: Rehabilitation (IRF) Plan of Care  Goal: Plan of Care Review  Outcome: Progressing  Flowsheets (Taken 1/19/2022 1430)  Progress: improving  Plan of Care Reviewed With: patient  Outcome Summary: pt aaox3. continent of b&b. participates in therapies. c/o pain in neck, managed with scheduled tylenol and PRN oxycodone. [place] collar at all times. neck incision staples intact, dressed with mepilex.

## 2022-01-19 NOTE — PROGRESS NOTES
History of present illness:     71 y.o. male with a PMHx significant for atrial fibrillation, breast cancer, COPD, diverticulosis, acid reflux, history of COVID-19, hyperlipidemia, hypertension, lung cancer, history of right shoulder rotator cuff repair, and history of right-sided carpal tunnel syndrome, who fell from a ladder on 04/23/2021 associated with loss of consciousness. He was found to have fractures of the anterior and posterior arches of C1, base of the dens of C2, right scapula, and left occipital condyle.  He was placed in a hard cervical collar and has had ongoing neurosurgical and imaging follow-up.  While C1 was healing normally.  There is no appreciable osseous bridging of the fracture of the dens.    Patient underwent open reduction and internal fixation of C1 fractures, C2 odontoid fracture, associated with bilateral posterior lateral arthrodesis with fusion at C1-C3 in addition to C1-2 and C2-3 laminotomies. Surgery was done on 1/6/2022 by Dr. Morejon .  There are no intraoperative complications.  Neurophysiological monitoring was stable throughout the course of the decompression.        Patient was evaluated by orthopedics for right shoulder follow up. No acute surgical intervention at this time . New Imaging of R shoulder/scapula unremarkable for acute injury, RUE WBAT.  Recommend follow up with a shoulder/elbow orthopedic surgeon - Dr Shine as needed.     Patient was evaluated by PM&R deemed to be a good candidate for acute rehabilitation. Patient now transferred to [facility] for comprehensive acute inpatient rehabilitation admitted on 1/12/2022.    Scheduled Meds:  • acetaminophen  975 mg oral q8h   • amLODIPine  5 mg oral q12h MARIAN   • atorvastatin  40 mg oral Daily (6p)   • cetirizine  5 mg oral Nightly   • [START ON 1/20/2022] dabigatran etexilate  150 mg oral BID   • escitalopram  10 mg oral Daily   • famotidine  20 mg oral BID   • fluticasone propionate  2 spray Each Nostril Daily   •  "guaiFENesin  600 mg oral BID   • heparin (porcine)  5,000 Units subcutaneous q8h MARIAN   • hydrochlorothiazide  25 mg oral Daily   • lidocaine  1 patch Topical Daily   • losartan  100 mg oral Daily with dinner   • sennosides-docusate sodium  1 tablet oral BID   • umeclidinium-vilanteroL  1 puff inhalation Daily     Continuous Infusions:  PRN Meds:.albuterol HFA  •  bisacodyL  •  cyclobenzaprine  •  oxyCODONE  •  oxyCODONE  •  polyethylene glycol     Lab Results   Component Value Date    WBC 5.04 01/18/2022    HGB 14.3 01/18/2022    HCT 43.5 01/18/2022    MCV 89.3 01/18/2022     01/18/2022       Lab Results   Component Value Date    GLUCOSE 103 (H) 01/18/2022    CALCIUM 9.7 01/18/2022     01/18/2022    K 4.3 01/18/2022    CO2 29 01/18/2022    CL 97 (L) 01/18/2022    BUN 13 01/18/2022    CREATININE 0.9 01/18/2022       Subjective: Patient seen and examined no chest pain or shortness of breath, tolerating therapies, patient is making steady progress in rehab therapies, will see Dr. Morejon neurosurgery on 1/25/2021.  Patient was seen in physical therapy doing well with improving strength.  Team 1/17:    Nursing: continent B&B, skin intact    Functional status:    PT : CS fortransfers , touching assist for amb 200f RW    OT: ADLs: transfers touching assist, using adaptive equipment, CS for toileting, min A for UE dressing/colar managment    Psych : mild memory issues, anxious about discharge,     Physical exam:  Physical examination today showed a 71-year-old man in no acute distress.  Patient awake alert obeys commands.     Blood pressure 139/82, pulse 82, temperature 36.6 °C (97.8 °F), temperature source Oral, resp. rate 18, height 1.676 m (5' 6\"), weight 89.7 kg (197 lb 12.8 oz), SpO2 95 %.       Abdominopelvic, skin negative, mucous brains moist.     Neck supple     Heart regular rate     Lungs decreased breath sounds on the right side     Examination was benign     Musculoskeletal exam: Range of motion " within functional mid bilateral upper extremity and bilateral extremity except decreased right shoulder abduction.  Patient does agree that he will not allow extremity.  Neuro exam: Mental status as above patient able to be commands.  Cranial nerve examination shows face symmetric, tongue midline, completely intact and visual fields are full.  Motor examination: Right upper extremity deltoid 3/5, biceps 5/5 and  3/5, finger extension 4/5.  Examination of the right lower extremity hip flexion 4 -/5, quad and ankle dorsiflexion 5/5.  Examination of the left upper extremity 5/5.  [place] of the left lower extremity hip flexion 4/5, quad and ankle dorsiflexion 5/5.  Sensory examination grossly normal.  Deep tendon reflexes are symmetrical.  There was no evidence of cerebellar signs and gait not tested at this time.    Skin: Incision dry clean and intact staples in place covered by Mepilex.     Impression:     Ambulatory ADL dysfunction due to:    History of fall last) with growth none displaced odontoid fracture with type II morphology and nonunion now status post ORIF C2, C1-3 laminectomy posterior cervical fusion at multiple levels.     Postoperative anemia     History of atrial fibrillation on Pradaxa     History of coronary artery disease     History of lung cancer status post left lower lobe resection     History of breast cancer     History of COPD/tobacco use     GERD     Hypertension on Norvasc, losartan and hydrochlorothiazide     History of COVID-19     Hyperlipidemia on Lipitor     History of right shoulder rotator cuff repair     History of carpal tunnel syndrome     Obesity followed by dietitian     History of anxiety/depression on Lexapro     Plan:     Patient will continue physical therapy, and Occupational Therapy.  We will consult Dr. Rivera for medical management,  [place] neurology, Dr. Boyd psychiatry and patient also be followed by cardiology.  Patient will be followed by psychology and case  management for support.  Patient will continue to use heart cervical collar all the times.  [place] patient precautions include cardiac, fall and orthopedic spinal precautions.  Patient will continue on Xarelto for history of atrial fibrillation.  We will monitor routine labs and additional monitoring healing of the posterior cervical incision closely.     Goals: To improve overall functioning for transfers, ambulation and ADLs     Extensive length of stay 1/25     Discussed with Dr. Rivera medical consultant, patient and nursing     This patient note has been dictated using speech recognition software.  Inadvertent speech recognition errors should be disregarded. Please do not hesitate to call my office for clarification.

## 2022-01-19 NOTE — PROGRESS NOTES
Patient: Melanie Zelaya  Location: [facility] [address]  MRN: [id number]  Today's date: 1/19/2022    History of Present Illness  Melanie is a 71 y.o. male admitted on 1/11/2022 with S/P cervical spinal fusion [Z98.1]. Principal problem is S/P cervical spinal fusion.      Past Medical History  Melanie has a past medical history of Anxiety, Atrial fibrillation (CMS/HCC), Breast cancer (CMS/HCC), C1 cervical fracture (CMS/HCC), Colon polyp, COPD (chronic obstructive pulmonary disease) (CMS/HCC), Coronary artery calcification seen on CT scan, COVID-19 vaccine series completed, Depression, Diverticulosis, Diverticulosis, Fracture of pelvis (CMS/HCC) (1968), GERD (gastroesophageal reflux disease), Hearing loss, History of colon polyps, History of COVID-19 (2020), Hyperlipidemia, Hypertension, Left atrial enlargement, Lung cancer (CMS/HCC), Lung cancer (CMS/HCC), Pneumonia (2011), Seasonal allergies, and Wrist fracture.      PT Vitals    Date/Time Pulse HR Source BP BP Location BP Method Pt Position [facility]   01/19/22 1426 82 Monitor 139/82 Left upper arm Automatic Sitting DT      PT Pain    Date/Time Pain Type Location Rating: Rest Interventions [facility]   01/19/22 1337 Pain Assessment neck 4 premedicated for activity DT   01/19/22 1426 Pain Assessment neck 4 premedicated for activity DT          Prior Living Environment      Most Recent Value   People in Home alone   Current Living Arrangements home   Home Accessibility not wheelchair accessible   Living Environment Comment 3SH, 3 VALERIA LHR, FF to bathroom then another FF to bedroom, tub shower w/ SC   Number of Stairs, Main Entrance 4   Surface of Stairs, Main Entrance concrete   Stair Railings, Main Entrance railing on left side (ascending)   Landing, Stairs, Main Entrance adequate turning radius   Number of Stairs, Second Entrance other (see comments)  [back door not utilized]   Location, Kitchen first (main) floor   [place], Kitchen tile floor   Location,  Laundry Room first (main) floor   [place], Laundry Room concrete floor   Laundry Room Access not wheelchair accessible   Laundry Room Access Comment down in basement,  with full flight and L hand rail   Location, Patient Bedroom other (see comments)  [3rd floor]   [place], Patient Bedroom carpeted floor   Location, Bathroom second floor, must negotiate stairs to access   [place], Bathroom tile floor   Bathroom Access not wheelchair accessible   Bathroom Access Comment Pt reports FB on 2nd floor. Pt has shower chair.   Number of Stairs, Within Home, Primary 12   Surface of Stairs, Within Home, Primary carpeting   Stair Railings, Within Home, Primary railings on both sides of stairs   Stairs Comment, Within Home, Primary to second floor for bathroom   Stairs, Within Home, Secondary 12   Surface of Stairs, Within Home, Secondary carpeting   Stair Railings, Within Home, Secondary railing on left side (ascending)   Stairs Comment, Within Home, Secondary to third floor for bed room          Prior Level of Function      Most Recent Value   Dominant Hand right   Ambulation assistive equipment   Transferring assistive equipment   Toileting independent   Bathing independent   Dressing assistive equipment  [shoe horn]   Eating independent   Prior Level of Function Comment Pt reports prior use of cane in home as needed for ambulation and use of a shower chair for bathing. Pt reports was completing BADLs independently.   Assistive Device Currently Used at Home cane, straight, shower chair           IRF PT Evaluation and Treatment - 01/19/22 1337        PT Time Calculation    Start Time 1330     Stop Time 1430     Time Calculation (min) 60 min        Session Details    Document Type daily treatment/progress note     Mode of Treatment individual therapy;physical therapy        General Information    Patient Profile Reviewed yes     General Observations of Patient received in therapy gym, willing to participate        Transfers     Comment gait belt donned for all mobility        Sit to Stand Transfer    [place], Sit to Stand Transfer close supervision     Verbal Cues safety     Assistive Device walker, front-wheeled     Comment for safety        Stand to Sit Transfer    [place], Stand to Sit Transfer close supervision     Verbal Cues safety     Assistive Device walker, front-wheeled     Comment for safety        Stand Pivot Transfer    [place], Stand Pivot/Stand Step Transfer close supervision     Verbal Cues safety;technique     Assistive Device walker, front-wheeled     Comment for safety        Car Transfer    Transfer Technique stand pivot     [place], Car Transfer close supervision     Verbal Cues safety;technique     Assistive Device walker, front-wheeled     Comment for safety        Gait Training    [place], Gait close supervision     Assistive Device walker, front-wheeled     Distance in Feet 200 feet     Pattern (Gait) step-through     Deviations/Abnormal Patterns (Gait) base of support, narrow;gait speed decreased;weight shifting decreased     Comment (Gait/Stairs) + 200ft x5 with CL S for safety        Curb Negotiation    [place] close supervision     Assistive Device walker, front-wheeled     Curb Height 6 inches     Comment up/down 6in+2in curb step with RLE leading to ascend and LLE RLE leading to descend with CL S for safety        Rough/Uneven Surface Gait Skills    [place] close supervision     Assistive Device walker, front-wheeled     Distance in Feet 40 feet     Comment for safety over carpeted surface and carpet-tile threshold        Sloped Surface Gait Skills    [place] close supervision     Assistive Device walker, front-wheeled     Distance in Feet 10 feet     Comment up/down 5ft ramp with CL S for safety        Stairs Training    [place], Stairs close supervision     Assistive Device railing     Handrail Location (Stairs) left side (ascending);left side (descending)      Number of Stairs 12     Stair Height 6 inches     Ascending Stairs Technique step-to-step   RLE leading however alternates    Descending Stairs Technique step-to-step   RLE leading however alternates    Comment via semilateral approach with BUE support on handrail and alternating step-to-step pattern with improved safety        Wheelchair Mobility/Management    Comment, Wheelchair Mobility recliner w/c ordered for comfort        Spinal Orthosis Management    Type (Spinal Orthosis) cervical collar, hard     Fabrication Comment (Spinal Orthosis) [place] Leanata donned t/o session        Daily Progress Summary (PT)    Daily Outcome Statement Pt continues to progress with elevations and increased ambulation trials.  Pt to continue with BLE strengthening and dynamic balance exercises to improve functional mobility.                           IRF PT Goals      Most Recent Value   Bed Mobility Goal 1    Activity/Assistive Device rolling to right, rolling to left at 01/12/2022 0831   [place] supervision required at 01/12/2022 0831   Time Frame short-term goal (STG), 5 - 7 days at 01/12/2022 0831   Progress/Outcome goal met at 01/15/2022 0905   Bed Mobility Goal 2    Activity/Assistive Device rolling to left, rolling to right at 01/12/2022 0831   [place] modified independence at 01/12/2022 0831   Time Frame long-term goal (LTG), 21 days or less at 01/12/2022 0831   Progress/Outcome goal ongoing at 01/17/2022 0800   Bed Mobility Goal 3    Activity/Assistive Device sit to supine/supine to sit at 01/12/2022 0831   [place] other (see comments)  [touching assist] at 01/12/2022 0831   Time Frame short-term goal (STG), 5 - 7 days at 01/12/2022 0831   Progress/Outcome goal met at 01/15/2022 0905   Bed Mobility Goal 4    Activity/Assistive Device sit to supine/supine to sit at 01/12/2022 0831   [place] modified independence at 01/12/2022 0831   Time Frame long-term goal (LTG), 21 days or less at 01/12/2022 0831    Progress/Outcome goal ongoing at 01/17/2022 0800   Transfer Goal 1    Activity/Assistive Device sit-to-stand/stand-to-sit at 01/12/2022 0831   [place] other (see comments)  [touching assist] at 01/12/2022 0831   Time Frame short-term goal (STG), 5 - 7 days at 01/12/2022 0831   Progress/Outcome goal met at 01/17/2022 0800   Transfer Goal 2    Activity/Assistive Device sit-to-stand/stand-to-sit at 01/12/2022 0831   [place] modified independence at 01/12/2022 0831   Time Frame 21 days or less, long-term goal (LTG) at 01/12/2022 0831   Progress/Outcome goal ongoing at 01/17/2022 0800   Transfer Goal 3    Activity/Assistive Device stand pivot at 01/12/2022 0831   [place] other (see comments)  [touching assist] at 01/12/2022 0831   Time Frame short-term goal (STG), 5 - 7 days at 01/12/2022 0831   Progress/Outcome goal met at 01/17/2022 0800   Transfer Goal 4    Activity/Assistive Device stand pivot at 01/12/2022 0831   [place] modified independence at 01/12/2022 0831   Time Frame long-term goal (LTG), 21 days or less at 01/12/2022 0831   Progress/Outcome goal ongoing at 01/17/2022 0800   Gait/Walking Locomotion Goal 1    Activity/Assistive Device gait (walking locomotion), assistive device use at 01/12/2022 0831   Distance 50 feet at 01/12/2022 0831   [place] other (see comments)  [touching assist] at 01/12/2022 0831   Time Frame short-term goal (STG), 5 - 7 days at 01/12/2022 0831   Progress/Outcome goal met at 01/17/2022 0800   Gait/Walking Locomotion Goal 2    Activity/Assistive Device gait (walking locomotion), assistive device use at 01/12/2022 0831   Distance 150 feet at 01/12/2022 0831   [place] modified independence at 01/12/2022 0831   Time Frame long-term goal (LTG), 21 days or less at 01/12/2022 0831   Progress/Outcome goal ongoing at 01/17/2022 0800   Stairs Goal 1    Activity/Assistive Device ascending stairs, descending stairs at 01/15/2022 0905   Number of Stairs 12 at  01/15/2022 0905   [place] minimum assist (75% or more patient effort) at 01/15/2022 0905   Time Frame short-term goal (STG), 1 week at 01/15/2022 0905   Progress/Outcome goal met at 01/17/2022 0800   Stairs Goal 2    Activity/Assistive Device ascending stairs, descending stairs at 01/15/2022 0905   Number of Stairs 12 at 01/15/2022 0905   [place] modified independence at 01/15/2022 0905   Time Frame long-term goal (LTG), 21 days or less, by discharge at 01/15/2022 0905   Progress/Outcome goal ongoing at 01/17/2022 0800

## 2022-01-19 NOTE — PROGRESS NOTES
[facility] Progress Note          Patient was seen and examined at bedside.    Subjective:     Stable, pleasant, his pain is manageable, his resp status is good, his PT/OT is progressing, his lungs sound good.   BP well controled.     Objective   Vital signs in last 24 hours:  Temp:  [36.5 °C (97.7 °F)-36.7 °C (98.1 °F)] 36.6 °C (97.8 °F)  Heart Rate:  [64-78] 75  Resp:  [18] 18  BP: (117-156)/(57-79) 128/79    No intake or output data in the 24 hours ending 01/19/22 1103  Intake/Output this shift:  No intake/output data recorded.   Review of Systems:  All other systems reviewed and negative except as noted in the HPI.   Objective      Labs  reviewed his labs thoroughly   Lab Results   Component Value Date    WBC 5.04 01/18/2022    HGB 14.3 01/18/2022    HCT 43.5 01/18/2022    MCV 89.3 01/18/2022     01/18/2022     Lab Results   Component Value Date    GLUCOSE 103 (H) 01/18/2022    CALCIUM 9.7 01/18/2022     01/18/2022    K 4.3 01/18/2022    CO2 29 01/18/2022    CL 97 (L) 01/18/2022    BUN 13 01/18/2022    CREATININE 0.9 01/18/2022       Imaging  OSH imaging study reports reviewed       Full Code    Physical Exam:  Head/Ear/Nose/Throat: normocephalic; atraumatic; moisture mouth mm, no oropharyngeal thrush noted.   Eyes: anicteric sclera, EOMI; PERRL.   Neck : supple, no JVD, no carotid bruits appeciated.   Respiratory: no evidence of labored breathing, lung sounds CTA b/l, good aeration bibasilar area, no w/r/c.   Cardiovascular: RRR; normal S1, S2; no m/r/g; no S3 or S4.   Gastrointestinal: soft; NT; BS normal; mildly distended; no CVAT b/l.   Genitourinary: no caballero.   Extremities : no c/c/e .   Neurological: AO x 3, fluent speeches, following commands, CNS II-XII grossly intact; no focal neurologic deficits.   Behavior/Emotional: in NAD, appropriate; cooperative.   Skin: clean, dry and intact.     Plan of care was discussed with patient, RN, and PMR attending     Assessment      CC:S/p a mechanical fall, sustained traumatic cervical spinal fractures with myelopathy, s/p cervical spinal ORIF and decom.lami.fusion, ADL and ambulatory dysfunction.       71 y.o. male with a PMHx significant for atrial fibrillation, breast cancer, ex-tobacco smoking, COPD, diverticulosis, acid reflux, history of COVID-19, hyperlipidemia, hypertension, lung cancer s/p LLL resection, history of right shoulder rotator cuff repair, and history of right-sided carpal tunnel syndrome, who fell from a ladder on 04/23/2021 associated with loss of consciousness. He was found to have fractures of the anterior and posterior arches of C1, base of the dens of C2, right scapula, and left occipital condyle.  He was placed in a hard cervical collar and has had ongoing neurosurgical and imaging follow-up.  While C1 was healing normally.  There is no appreciable osseous bridging of the fracture of the dens.  He therefore was recommended to undergo ORIF which is performed by Dr. Morejon on 01/06/2020 at [facility].      Patient underwent open reduction and internal fixation of C1 fractures, C2 odontoid fracture, associated with bilateral posterior lateral arthrodesis with fusion at C1-C3 in addition to C1-2 and C2-3 laminotomies.  There are no intraoperative complications.  Neurophysiological monitoring was stable throughout the course of the decompression.  PCA was d/c'd on 1/10 am.  +BM on 1/9.     Ortho was consulted on 1/7 for R shoulder follow up.  No acute surgical intervention at this time per Ortho.  New Imaging of R shoulder/scapula unremarkable for acute injury  RUE WBAT.  Recommend follow up with a shoulder/elbow orthopedic surgeon - Dr Shine as needed.   Functionally, he has ADL and ambulatory dysfunction, requires inpt acute rehab, transferred to [facility] on 1/11/22.       1. S/p a mechanical fall, sustained traumatic cervical spinal fractures with myelopathy, s/p cervical spinal ORIF and decom.lami.fusion, ADL and ambulatory  dysfunction : inpt comprehensive rehab, ADL, gait/balance training, pain/neuropathic pain management, fall precaution, regular neuro checks, DVT prophylaxis, surgical site wound care/dermal defense, f/u with spinal surgeon as scheduled.     R shoulder injury, but no acute surgical intervention at this time per Ortho, f/u with  a shoulder/elbow orthopedic surgeon - Dr Shine as needed.    2. Post op acute on chronic pain, paresthesia: pain management, regular pain scale evaluation, topical lidoderm patch, ice packs, consider Neurontin as indicated, prn muscle relaxant.    3. Pulm-COPD, h/o lung cancer s/p LLL resection, atelectasis: monitor SO2, prn NC O2, keep SO2>92%, incentive spirometry, bronchodilator inhaler prn, cont LABA/LAMA/flonase,  mucolytic agent, pulm toileting.    4. GI-constipation, GERD: provide constipation bowel regimen, po hydration, fiber intake, timed toilet visits. Pepcid for GERD and GI prophy.    5. DVT prophy: SCD, early ambulation, SQ heparin to resume pradaxa as planned , check LE venous DUS to r/o DVT.      6. PAF, HTN, Dyslipidemia: resume pradaxa on 1/20/22, monitor HR and rhythm, cont current HTN meds with holding parameter, monitor BP, titrate HTN meds as indicated, post op orthostatic hypotension precaution; cont statin.     7. - Neurogenic bladder, acute urinary retention s/p caballero: timed voiding trial with appropriate position and privacy, monitor PVR with cic, high risk of UTI , check UA/UCX.    8. Renal, electrolytes: monitor renal function, avoid nephrotoxic agents or low BP, monitor and keep electrolytes balance.     Billing code: [id number]  Diagnoses:  Patient Active Problem List   Diagnosis   • Anxiety   • Atrial fibrillation (CMS/HCC)   • Chronic diastolic heart failure (CMS/HCC)   • Hearing loss   • Primary malignant neoplasm of left lower lobe of lung (CMS/HCC)   • Multiple pulmonary nodules   • Other emphysema (CMS/HCC)   • Gastroesophageal reflux disease without  esophagitis   • Asthma   • Cervical spine fracture (CMS/HCC)   • Hypertension   • Mild episode of recurrent major depressive disorder (CMS/HCC)   • Preop examination   • Coronary artery calcification seen on CT scan   • COPD (chronic obstructive pulmonary disease) (CMS/HCC)   • Prediabetes   • History of cervical fracture   • H/O cervical fracture   • Closed nondisplaced odontoid fracture with type II morphology and delayed healing   • S/P cervical spinal fusion        Fiona Rivera MD  1/19/2022

## 2022-01-19 NOTE — PROGRESS NOTES
Inpatient Psychology Progress Note    Duration: 25 minutes  Melanie Zelaya : 1950, a 71 y.o. male, for follow up visit.  Reason:  He has been experiencing adjustment issues    HPI: sci    Current Evaluation:     Mental Status Evaluation:  Arousal Level: Attentive  Appearance: Well Groomed  Speech: Regular  Psychomotor Functioning: WNL  Eye Contact: WNL  Est. Premorbid Intelligence: Average  Orientation: Fully oriented  Attention: WNL  Concentration: WNL  Recent Memory: WNL  Remote Memory: WNL  Thought Content: Appropriate  Thought Process: WNL  Insight: Intact  Perceptual Function: Pain  Delusions: None or age appropriate  Sleeping: No Change  Appetite: No Change  Libido: No change  Affect: Full Range  Mood: Hopeful,Motivated,Euthymic (normal)    Comments:      Additional Assessments done this visit:     Ada Suicide Severity Rating Scale:  Not indicated       Ada Suicide Severity Rating Scale  1. Within the past month, have you wished you were dead or wished you could go to sleep and not wake up?: No  2. Within the past month, have you actually had any thoughts of killing yourself?: No  6. Have you ever done anything, started to do anything, or prepared to do anything to end your life?: No    Safe-T Assessment:  Not indicated        Session Summary:        Goals Addressed                 This Visit's Progress    • Maximize adjustment and acceptance of limitations             Interventions  Acceptance & Adjustment  Goal Setting  Monitoring of Symptoms    Psychoeducation provided on:  Spinal Cord Injury and Recovery     Recommendations:      Individual Therapy  30 minutesweekly      Visit Diagnosis:     1. Adjustment disorder with anxiety        Diagnostic Impression:   Weekly Progress Summary: progressing toward goals as expected  Weekly Outcome Statement: Pt c bright affect and mood. feels he is improving.  Still has occipital HA but it is less intense (3-4 vs 6-8) and not constant. Endurance and  LE strength improving. Misses driving and riding a motorcycle but hopes to resume both when collar is d/c. Open to using tools to help c ADLs.  Feels less anxious re d/c but is concerned re steps. Lives in [facility]. can stay on 2nd floor prn.     More open to visitors, including son and lady friend. Lady friend works long hrs. A little concerned about managing his dog, Micheal.    Psychological condition is generally improving       Mariel Newton, PhD @ 9:22 AM

## 2022-01-19 NOTE — PROGRESS NOTES
Patient: Melanie Zelaya  Location: [facility] [address]  MRN: [id number]  Today's date: 1/19/2022    History of Present Illness  Melanie is a 71 y.o. male admitted on 1/11/2022 with S/P cervical spinal fusion [Z98.1]. Principal problem is S/P cervical spinal fusion.      Past Medical History  Melanie has a past medical history of Anxiety, Atrial fibrillation (CMS/HCC), Breast cancer (CMS/HCC), C1 cervical fracture (CMS/HCC), Colon polyp, COPD (chronic obstructive pulmonary disease) (CMS/HCC), Coronary artery calcification seen on CT scan, COVID-19 vaccine series completed, Depression, Diverticulosis, Diverticulosis, Fracture of pelvis (CMS/HCC) (1968), GERD (gastroesophageal reflux disease), Hearing loss, History of colon polyps, History of COVID-19 (2020), Hyperlipidemia, Hypertension, Left atrial enlargement, Lung cancer (CMS/HCC), Lung cancer (CMS/HCC), Pneumonia (2011), Seasonal allergies, and Wrist fracture.     01/19/22 1006   Pain/Comfort/Sleep   Pain Charting Type Pain Assessment   (0-10) Pain Rating: Rest 3   Pain Body Location neck   Pain Management Interventions premedicated for activity   Vital Signs   Heart Rate 75   /79   BP Location Right upper arm   BP Method Automatic   Patient Position Sitting        01/19/22 1059   Pain/Comfort/Sleep   Pain Charting Type Pain Reassessment   (0-10) Pain Rating: Rest 3   Pain Body Location neck   Pain Management Interventions premedicated for activity       Prior Living Environment      Most Recent Value   People in Home alone   Current Living Arrangements home   Home Accessibility not wheelchair accessible   Living Environment Comment 3SH, 3 VALERIA LHR, FF to bathroom then another FF to bedroom, tub shower w/ SC   Number of Stairs, Main Entrance 4   Surface of Stairs, Main Entrance concrete   Stair Railings, Main Entrance railing on left side (ascending)   Landing, Stairs, Main Entrance adequate turning radius   Number of Stairs, Second  Entrance other (see comments)  [back door not utilized]   Location, Kitchen first (main) floor   [place], Kitchen tile floor   Location, Laundry Room first (main) floor   [place], Laundry Room concrete floor   Laundry Room Access not wheelchair accessible   Laundry Room Access Comment down in basement,  with full flight and L hand rail   Location, Patient Bedroom other (see comments)  [3rd floor]   [place], Patient Bedroom carpeted floor   Location, Bathroom second floor, must negotiate stairs to access   [place], Bathroom tile floor   Bathroom Access not wheelchair accessible   Bathroom Access Comment Pt reports FB on 2nd floor. Pt has shower chair.   Number of Stairs, Within Home, Primary 12   Surface of Stairs, Within Home, Primary carpeting   Stair Railings, Within Home, Primary railings on both sides of stairs   Stairs Comment, Within Home, Primary to second floor for bathroom   Stairs, Within Home, Secondary 12   Surface of Stairs, Within Home, Secondary carpeting   Stair Railings, Within Home, Secondary railing on left side (ascending)   Stairs Comment, Within Home, Secondary to third floor for bed room          Prior Level of Function      Most Recent Value   Dominant Hand right   Ambulation assistive equipment   Transferring assistive equipment   Toileting independent   Bathing independent   Dressing assistive equipment  [shoe horn]   Eating independent   Prior Level of Function Comment Pt reports prior use of cane in home as needed for ambulation and use of a shower chair for bathing. Pt reports was completing BADLs independently.   Assistive Device Currently Used at Home cane, straight, shower chair          Occupational Profile      Most Recent Value   Successful Occupations Pt reports not a  within past year but would like to return to driving in future.   Occupational History/Life Experiences Pt reports living c  in [facility]. Pt has three (adult) children and grandchildren. Pt has good  family support.           01/19/22 1007   OT Time Calculation   Start Time 1000   Stop Time 1100   Time Calculation (min) 60 min   Session Details   Document Type daily treatment/progress note   Mode of Treatment occupational therapy;individual therapy   General Information   General Observations of Patient Pt recevied seated in w/c. Pt agreeable to therapy.   Transfers   Transfers stand pivot transfer;tub transfer   Comment c RW, gait belt donned   Sit to Stand Transfer   [place], Sit to Stand Transfer close supervision   Verbal Cues safety   Assistive Device walker, front-wheeled;gait belt   Comment from w/c   Stand to Sit Transfer   [place], Stand to Sit Transfer close supervision   Verbal Cues safety   Assistive Device walker, front-wheeled;gait belt   Comment from standing on foam mat c RW   Stand Pivot Transfer   [place], Stand Pivot/Stand Step Transfer close supervision   Verbal Cues safety   Assistive Device walker, front-wheeled;gait belt   Comment Ambulatory to w/c.   Tub Transfer   Comment Transfer training in ILU c shower chair. Pt re-educated on safe tub transfer techniques. Pt reported shower chair and grab bar along interior wall of tub. Ambulatory approach and side stepping into tub c min VCs for technique. Completed 2 trials utilizing  verticle grab bar along anterior wall of tub during second trial c increased safety. OT re-recommending placement of additional grab bar in that location, pt agreeable.   [place], Tub Transfer touching/steadying assist   Assistive Device walker, front-wheeled;gait belt;shower chair   Transfer Technique step over, left entry   Verbal Cues safety;technique;hand placement   Safety Issues, Functional Mobility   Comment, Safety Issues/Impairments (Mobility) Steadying A ambulating in ILU c RW and gait belt. Pt demo's good RW safety and pacing throughout activities. Pt utilized reacher and walker bag to  3 articles of clothing and hang them in the  closet during clothing retrival activity c steadying A. Pt requiring MIN VCs for RW safety via returning reacher to RW bag while ambulating.   Balance   Comment, Balance Steadying A supported standing on blue foam mat c unilateral support on RW while engaged in bean bag toss activity. Facilitating weight shifting slightly outside [place] to R and L via reaching to retrieve bean bags. Facilitating stability when tossing bean bag into bucket. Pt able to maintain standing balance s LOB to complete tasks. Pt completed x 15 reps each direction. Min VCs for safety and improved COG.   Upper Extremity (Therapeutic Exercise)   Exercise Position/Type seated;AROM (active range of motion);AAROM (active assistive range of motion)   General Exercise bilateral   Range of Motion Exercises bilateral;elbow flexion/extension;shoulder flexion/extension;shoulder abduction/adduction   Reps and Sets 1x10   Comment Pt completed BUE ROM c unweighted dowel. AAROM RUE and AROM LUE for exercises above.   Upper Body Dressing   Self-Performance orthosis application   [place] supervision   Position supported sitting   Comment Don/doff orthosis training. S c don/doff [place] collar and steven collar while seated in w/c at sink. Pt reports need to independently change collar upon return home. Pt demo's good safety and carryover of spinal precautions while completing.   Daily Progress Summary (OT)   Daily Outcome Statement OT session focused on increasing pts ROM and balance. Pt demos decreased activity tolerance and decreased dynamic standing balance. Pt will continue to benefit from standing tolerance and weight shifting interventions to challenge balance.  Pt will continue to benefit from tub transfer training to increase safety, independence and carryover of techniques.                 IRF OT Goals      Most Recent Value   Transfer Goal 1    Activity/Assistive Device toilet at 01/17/2022 0840   [place] supervision required at 01/17/2022 0840    Time Frame short-term goal (STG), 1 week at 01/17/2022 0840   Progress/Outcome goal met, goal revised this date at 01/17/2022 0840   Transfer Goal 2    Activity/Assistive Device toilet at 01/12/2022 0840   [place] modified independence at 01/12/2022 0840   Time Frame long-term goal (LTG), 2 weeks at 01/12/2022 0840   Progress/Outcome goal ongoing at 01/17/2022 0840   Transfer Goal 3    Activity/Assistive Device shower at 01/17/2022 0840   [place] supervision required at 01/17/2022 0840   Time Frame short-term goal (STG), 1 week at 01/17/2022 0840   Progress/Outcome goal met, goal revised this date at 01/17/2022 0840   Transfer Goal 4    Activity/Assistive Device shower at 01/12/2022 0840   [place] modified independence at 01/12/2022 0840   Time Frame long-term goal (LTG), 2 weeks at 01/12/2022 0840   Progress/Outcome goal ongoing at 01/17/2022 0840   Bathing Goal 1    Activity/Assistive Device bathing skills, all at 01/12/2022 0840   [place] supervision required at 01/17/2022 0840   Time Frame short-term goal (STG), 1 week at 01/17/2022 0840   Progress/Outcome goal met, goal revised this date at 01/17/2022 0840   Bathing Goal 2    Activity/Assistive Device bathing skills, all at 01/12/2022 0840   [place] modified independence at 01/12/2022 0840   Time Frame long-term goal (LTG), 2 weeks at 01/12/2022 0840   Progress/Outcome goal ongoing at 01/17/2022 0840   UB Dressing Goal 1    Activity/Assistive Device upper body dressing at 01/12/2022 0840   [place] supervision required  [Cl S] at 01/12/2022 0840   Time Frame short-term goal (STG), 1 week at 01/17/2022 0840   Strategies/Barriers including clothing retrieval at 01/17/2022 0840   Progress/Outcome goal ongoing at 01/17/2022 0840   UB Dressing Goal 2    Activity/Assistive Device upper body dressing at 01/12/2022 0840   [place] modified independence at 01/12/2022 0840   Time Frame long-term goal (LTG), 2 weeks at 01/12/2022 0840    Progress/Outcome goal ongoing at 01/17/2022 0840   LB Dressing Goal 1    Activity/Assistive Device lower body dressing at 01/12/2022 0840   [place] supervision required  [Cl S] at 01/17/2022 0840   Time Frame short-term goal (STG), 1 week at 01/17/2022 0840   Progress/Outcome goal met, goal revised this date at 01/17/2022 0840   LB Dressing Goal 2    Activity/Assistive Device lower body dressing at 01/12/2022 0840   [place] modified independence at 01/12/2022 0840   Time Frame long-term goal (LTG), 2 weeks at 01/12/2022 0840   Progress/Outcome goal ongoing at 01/17/2022 0840   Grooming Goal 1    [place] supervision required at 01/17/2022 0840   Time Frame short-term goal (STG), 1 week at 01/17/2022 0840   Strategies/Barriers Standing at 01/12/2022 0840   Progress/Outcome goal met, goal revised this date at 01/17/2022 0840   Grooming Goal 2    Activity/Assistive Device grooming skills, all at 01/12/2022 0840   [place] modified independence at 01/12/2022 0840   Time Frame long-term goal (LTG), 2 weeks at 01/12/2022 0840   Progress/Outcome goal ongoing at 01/17/2022 0840   Toileting Goal 1    Activity/Assistive Device toileting skills, all at 01/12/2022 0840   [place] supervision required at 01/17/2022 0840   Time Frame short-term goal (STG), 1 week at 01/17/2022 0840   Progress/Outcome goal met, goal revised this date at 01/17/2022 0840   Toileting Goal 2    Activity/Assistive Device toileting skills, all at 01/12/2022 0840   [place] modified independence at 01/12/2022 0840   Time Frame long-term goal (LTG), 2 weeks at 01/12/2022 0840   Progress/Outcome goal ongoing at 01/17/2022 0840

## 2022-01-19 NOTE — PROGRESS NOTES
Patient: Melanie Zelaya  Location: [facility] [address]  MRN: [id number]  Today's date: 1/19/2022    History of Present Illness  Melanie is a 71 y.o. male admitted on 1/11/2022 with S/P cervical spinal fusion [Z98.1]. Principal problem is S/P cervical spinal fusion.      Past Medical History  Melanie has a past medical history of Anxiety, Atrial fibrillation (CMS/HCC), Breast cancer (CMS/HCC), C1 cervical fracture (CMS/HCC), Colon polyp, COPD (chronic obstructive pulmonary disease) (CMS/HCC), Coronary artery calcification seen on CT scan, COVID-19 vaccine series completed, Depression, Diverticulosis, Diverticulosis, Fracture of pelvis (CMS/HCC) (1968), GERD (gastroesophageal reflux disease), Hearing loss, History of colon polyps, History of COVID-19 (2020), Hyperlipidemia, Hypertension, Left atrial enlargement, Lung cancer (CMS/HCC), Lung cancer (CMS/HCC), Pneumonia (2011), Seasonal allergies, and Wrist fracture.      PT Vitals    Date/Time Pulse HR Source BP BP Location BP Method Pt Position [facility]   01/19/22 1133 85 Monitor 160/58 Right upper arm Automatic Sitting DT      PT Pain    Date/Time Pain Type Location Rating: Rest Interventions [facility]   01/19/22 1133 Pain Assessment neck 3 premedicated for activity DT   01/19/22 1147 Pain Reassessment neck 3 premedicated for activity DT          Prior Living Environment      Most Recent Value   People in Home alone   Current Living Arrangements home   Home Accessibility not wheelchair accessible   Living Environment Comment 3SH, 3 VALERIA LHR, FF to bathroom then another FF to bedroom, tub shower w/ SC   Number of Stairs, Main Entrance 4   Surface of Stairs, Main Entrance concrete   Stair Railings, Main Entrance railing on left side (ascending)   Landing, Stairs, Main Entrance adequate turning radius   Number of Stairs, Second Entrance other (see comments)  [back door not utilized]   Location, Kitchen first (main) floor   [place], Kitchen tile floor    Location, Laundry Room first (main) floor   [place], Laundry Room concrete floor   Laundry Room Access not wheelchair accessible   Laundry Room Access Comment down in basement,  with full flight and L hand rail   Location, Patient Bedroom other (see comments)  [3rd floor]   [place], Patient Bedroom carpeted floor   Location, Bathroom second floor, must negotiate stairs to access   [place], Bathroom tile floor   Bathroom Access not wheelchair accessible   Bathroom Access Comment Pt reports FB on 2nd floor. Pt has shower chair.   Number of Stairs, Within Home, Primary 12   Surface of Stairs, Within Home, Primary carpeting   Stair Railings, Within Home, Primary railings on both sides of stairs   Stairs Comment, Within Home, Primary to second floor for bathroom   Stairs, Within Home, Secondary 12   Surface of Stairs, Within Home, Secondary carpeting   Stair Railings, Within Home, Secondary railing on left side (ascending)   Stairs Comment, Within Home, Secondary to third floor for bed room          Prior Level of Function      Most Recent Value   Dominant Hand right   Ambulation assistive equipment   Transferring assistive equipment   Toileting independent   Bathing independent   Dressing assistive equipment  [shoe horn]   Eating independent   Prior Level of Function Comment Pt reports prior use of cane in home as needed for ambulation and use of a shower chair for bathing. Pt reports was completing BADLs independently.   Assistive Device Currently Used at Home cane, straight, shower chair           IRF PT Evaluation and Treatment - 01/19/22 1132        PT Time Calculation    Start Time 1130     Stop Time 1200     Time Calculation (min) 30 min        Session Details    Document Type daily treatment/progress note     Mode of Treatment individual therapy;physical therapy        General Information    Patient Profile Reviewed yes     General Observations of Patient received in therapy gym, willing to participate         Transfers    Comment gait belt donned t/o session for mobility        Sit to Stand Transfer    [place], Sit to Stand Transfer close supervision     Verbal Cues safety     Assistive Device walker, front-wheeled     Comment for safety        Stand to Sit Transfer    [place], Stand to Sit Transfer close supervision     Verbal Cues safety     Assistive Device walker, front-wheeled     Comment for safety        Stand Pivot Transfer    [place], Stand Pivot/Stand Step Transfer close supervision     Verbal Cues safety     Assistive Device walker, front-wheeled     Comment for safety        Gait Training    [place], Gait close supervision     Assistive Device walker, front-wheeled     Distance in Feet 200 feet     Pattern (Gait) step-through     Deviations/Abnormal Patterns (Gait) base of support, narrow;gait speed decreased;weight shifting decreased     Comment (Gait/Stairs) + 50ft x4 with CL S for safety        Stairs Training    [place], Stairs close supervision     Assistive Device railing     Handrail Location (Stairs) left side (ascending);left side (descending)     Number of Stairs 12     Stair Height 6 inches     Ascending Stairs Technique step-to-step   semi-lateral, alternating BLE    Descending Stairs Technique step-to-step   semi-lateral, alternating BLE    Comment via semilateral approach with BUE support on handrail and alternating step-to-step pattern with improved safety        Lower Extremity (Therapeutic Exercise)    Comment seated: B LAQs 2x15 with 2#, B marches 2x15 with 2#, hip abduction 2x15 with pink tb        Daily Progress Summary (PT)    Daily Outcome Statement Pt continues to demo improved safety awareness as evident with decreased verbal cuing on elevations in order to avoid crossing of feet.  Pt continues to be elimited in endurance and BLE strength and will continue to benefit from elevation training and balance exercises.                           IRF PT Goals       Most Recent Value   Bed Mobility Goal 1    Activity/Assistive Device rolling to right, rolling to left at 01/12/2022 0831   [place] supervision required at 01/12/2022 0831   Time Frame short-term goal (STG), 5 - 7 days at 01/12/2022 0831   Progress/Outcome goal met at 01/15/2022 0905   Bed Mobility Goal 2    Activity/Assistive Device rolling to left, rolling to right at 01/12/2022 0831   [place] modified independence at 01/12/2022 0831   Time Frame long-term goal (LTG), 21 days or less at 01/12/2022 0831   Progress/Outcome goal ongoing at 01/17/2022 0800   Bed Mobility Goal 3    Activity/Assistive Device sit to supine/supine to sit at 01/12/2022 0831   [place] other (see comments)  [touching assist] at 01/12/2022 0831   Time Frame short-term goal (STG), 5 - 7 days at 01/12/2022 0831   Progress/Outcome goal met at 01/15/2022 0905   Bed Mobility Goal 4    Activity/Assistive Device sit to supine/supine to sit at 01/12/2022 0831   [place] modified independence at 01/12/2022 0831   Time Frame long-term goal (LTG), 21 days or less at 01/12/2022 0831   Progress/Outcome goal ongoing at 01/17/2022 0800   Transfer Goal 1    Activity/Assistive Device sit-to-stand/stand-to-sit at 01/12/2022 0831   [place] other (see comments)  [touching assist] at 01/12/2022 0831   Time Frame short-term goal (STG), 5 - 7 days at 01/12/2022 0831   Progress/Outcome goal met at 01/17/2022 0800   Transfer Goal 2    Activity/Assistive Device sit-to-stand/stand-to-sit at 01/12/2022 0831   [place] modified independence at 01/12/2022 0831   Time Frame 21 days or less, long-term goal (LTG) at 01/12/2022 0831   Progress/Outcome goal ongoing at 01/17/2022 0800   Transfer Goal 3    Activity/Assistive Device stand pivot at 01/12/2022 0831   [place] other (see comments)  [touching assist] at 01/12/2022 0831   Time Frame short-term goal (STG), 5 - 7 days at 01/12/2022 0831   Progress/Outcome goal met at 01/17/2022 0800    Transfer Goal 4    Activity/Assistive Device stand pivot at 01/12/2022 0831   [place] modified independence at 01/12/2022 0831   Time Frame long-term goal (LTG), 21 days or less at 01/12/2022 0831   Progress/Outcome goal ongoing at 01/17/2022 0800   Gait/Walking Locomotion Goal 1    Activity/Assistive Device gait (walking locomotion), assistive device use at 01/12/2022 0831   Distance 50 feet at 01/12/2022 0831   [place] other (see comments)  [touching assist] at 01/12/2022 0831   Time Frame short-term goal (STG), 5 - 7 days at 01/12/2022 0831   Progress/Outcome goal met at 01/17/2022 0800   Gait/Walking Locomotion Goal 2    Activity/Assistive Device gait (walking locomotion), assistive device use at 01/12/2022 0831   Distance 150 feet at 01/12/2022 0831   [place] modified independence at 01/12/2022 0831   Time Frame long-term goal (LTG), 21 days or less at 01/12/2022 0831   Progress/Outcome goal ongoing at 01/17/2022 0800   Stairs Goal 1    Activity/Assistive Device ascending stairs, descending stairs at 01/15/2022 0905   Number of Stairs 12 at 01/15/2022 0905   [place] minimum assist (75% or more patient effort) at 01/15/2022 0905   Time Frame short-term goal (STG), 1 week at 01/15/2022 0905   Progress/Outcome goal met at 01/17/2022 0800   Stairs Goal 2    Activity/Assistive Device ascending stairs, descending stairs at 01/15/2022 0905   Number of Stairs 12 at 01/15/2022 0905   [place] modified independence at 01/15/2022 0905   Time Frame long-term goal (LTG), 21 days or less, by discharge at 01/15/2022 0905   Progress/Outcome goal ongoing at 01/17/2022 0800

## 2022-01-19 NOTE — PROGRESS NOTES
Patient: Melanie Zelaya  Location: [facility] [address]  MRN: [id number]  Today's date: 1/19/2022    History of Present Illness  Melanie is a 71 y.o. male admitted on 1/11/2022 with S/P cervical spinal fusion [Z98.1]. Principal problem is S/P cervical spinal fusion.      Past Medical History  Melanie has a past medical history of Anxiety, Atrial fibrillation (CMS/HCC), Breast cancer (CMS/HCC), C1 cervical fracture (CMS/HCC), Colon polyp, COPD (chronic obstructive pulmonary disease) (CMS/HCC), Coronary artery calcification seen on CT scan, COVID-19 vaccine series completed, Depression, Diverticulosis, Diverticulosis, Fracture of pelvis (CMS/HCC) (1968), GERD (gastroesophageal reflux disease), Hearing loss, History of colon polyps, History of COVID-19 (2020), Hyperlipidemia, Hypertension, Left atrial enlargement, Lung cancer (CMS/HCC), Lung cancer (CMS/HCC), Pneumonia (2011), Seasonal allergies, and Wrist fracture.      OT Vitals    Date/Time Pulse HR Source BP BP Location BP Method Pt Position [facility]   01/19/22 1300 88 Monitor 135/68 Left upper arm Automatic Sitting JC      OT Pain    Date/Time Pain Type Location Rating: Rest Interventions [facility]   01/19/22 1300 Pain Assessment neck 4 -- [facility]   01/19/22 1328 Pain Reassessment neck 4 premedicated for activity JC          Prior Living Environment      Most Recent Value   People in Home alone   Current Living Arrangements home   Home Accessibility not wheelchair accessible   Living Environment Comment 3SH, 3 VALERIA LHR, FF to bathroom then another FF to bedroom, tub shower w/ SC   Number of Stairs, Main Entrance 4   Surface of Stairs, Main Entrance concrete   Stair Railings, Main Entrance railing on left side (ascending)   Landing, Stairs, Main Entrance adequate turning radius   Number of Stairs, Second Entrance other (see comments)  [back door not utilized]   Location, Kitchen first (main) floor   [place], Kitchen tile floor   Location, Laundry Room  first (main) floor   [place], Laundry Room concrete floor   Laundry Room Access not wheelchair accessible   Laundry Room Access Comment down in basement,  with full flight and L hand rail   Location, Patient Bedroom other (see comments)  [3rd floor]   [place], Patient Bedroom carpeted floor   Location, Bathroom second floor, must negotiate stairs to access   [place], Bathroom tile floor   Bathroom Access not wheelchair accessible   Bathroom Access Comment Pt reports FB on 2nd floor. Pt has shower chair.   Number of Stairs, Within Home, Primary 12   Surface of Stairs, Within Home, Primary carpeting   Stair Railings, Within Home, Primary railings on both sides of stairs   Stairs Comment, Within Home, Primary to second floor for bathroom   Stairs, Within Home, Secondary 12   Surface of Stairs, Within Home, Secondary carpeting   Stair Railings, Within Home, Secondary railing on left side (ascending)   Stairs Comment, Within Home, Secondary to third floor for bed room          Prior Level of Function      Most Recent Value   Dominant Hand right   Ambulation assistive equipment   Transferring assistive equipment   Toileting independent   Bathing independent   Dressing assistive equipment  [shoe horn]   Eating independent   Prior Level of Function Comment Pt reports prior use of cane in home as needed for ambulation and use of a shower chair for bathing. Pt reports was completing BADLs independently.   Assistive Device Currently Used at Home cane, straight, shower chair          Occupational Profile      Most Recent Value   Successful Occupations Pt reports not a  within past year but would like to return to driving in future.   Occupational History/Life Experiences Pt reports living c  in [facility]. Pt has three (adult) children and grandchildren. Pt has good family support.           IRF OT Evaluation and Treatment - 01/19/22 1301        OT Time Calculation    Start Time 1300     Stop Time 1330     Time  "Calculation (min) 30 min        Session Details    Document Type daily treatment/progress note     Mode of Treatment occupational therapy;individual therapy        Sit to Stand Transfer    [place], Sit to Stand Transfer touching/steadying assist     Assistive Device walker, front-wheeled     Comment from w/c        Stand to Sit Transfer    [place], Stand to Sit Transfer touching/steadying assist     Assistive Device walker, front-wheeled     Comment to w/c        Therapeutic Interventions    Comment, Therapeutic Intervention soft tissue massage applied to B deltoids and upper traps >2\" from incision for tension relief. Pt. reported decresaed tension post 2 minutes of massage. Encourage deep breathing and muscle relaxation tech to A c pain.        Lower Extremity (Therapeutic Exercise)    Exercise Position/Type seated     General Exercise bilateral;ankle pumps;marching while seated     Reps and Sets 2 x 10     Comment seated warm ups        Core Strength (Therapeutic Exercise)    Core Strength, Position seated     Core Strength abdominal bracing     Reps and Sets 3 x 10        Daily Progress Summary (OT)    Daily Outcome Statement Tolerated session well. min to mod A for balance c blue foam. pt. limited by neck pain. Reached out to primary OT/PT for possible high back w/c. Also discussed sleeping positions c pt. Pt. reported some tension relief post soft tissue massage and breathing/muscle relaxation tech. To continue OT services per POC.                           IRF OT Goals      Most Recent Value   Transfer Goal 1    Activity/Assistive Device toilet at 01/17/2022 0840   [place] supervision required at 01/17/2022 0840   Time Frame short-term goal (STG), 1 week at 01/17/2022 0840   Progress/Outcome goal met, goal revised this date at 01/17/2022 0840   Transfer Goal 2    Activity/Assistive Device toilet at 01/12/2022 0840   [place] modified independence at 01/12/2022 0840   Time Frame long-term " goal (LTG), 2 weeks at 01/12/2022 0840   Progress/Outcome goal ongoing at 01/17/2022 0840   Transfer Goal 3    Activity/Assistive Device shower at 01/17/2022 0840   [place] supervision required at 01/17/2022 0840   Time Frame short-term goal (STG), 1 week at 01/17/2022 0840   Progress/Outcome goal met, goal revised this date at 01/17/2022 0840   Transfer Goal 4    Activity/Assistive Device shower at 01/12/2022 0840   [place] modified independence at 01/12/2022 0840   Time Frame long-term goal (LTG), 2 weeks at 01/12/2022 0840   Progress/Outcome goal ongoing at 01/17/2022 0840   Bathing Goal 1    Activity/Assistive Device bathing skills, all at 01/12/2022 0840   [place] supervision required at 01/17/2022 0840   Time Frame short-term goal (STG), 1 week at 01/17/2022 0840   Progress/Outcome goal met, goal revised this date at 01/17/2022 0840   Bathing Goal 2    Activity/Assistive Device bathing skills, all at 01/12/2022 0840   [place] modified independence at 01/12/2022 0840   Time Frame long-term goal (LTG), 2 weeks at 01/12/2022 0840   Progress/Outcome goal ongoing at 01/17/2022 0840   UB Dressing Goal 1    Activity/Assistive Device upper body dressing at 01/12/2022 0840   [place] supervision required  [Cl S] at 01/12/2022 0840   Time Frame short-term goal (STG), 1 week at 01/17/2022 0840   Strategies/Barriers including clothing retrieval at 01/17/2022 0840   Progress/Outcome goal ongoing at 01/17/2022 0840   UB Dressing Goal 2    Activity/Assistive Device upper body dressing at 01/12/2022 0840   [place] modified independence at 01/12/2022 0840   Time Frame long-term goal (LTG), 2 weeks at 01/12/2022 0840   Progress/Outcome goal ongoing at 01/17/2022 0840   LB Dressing Goal 1    Activity/Assistive Device lower body dressing at 01/12/2022 0840   [place] supervision required  [Cl S] at 01/17/2022 0840   Time Frame short-term goal (STG), 1 week at 01/17/2022 0840   Progress/Outcome  goal met, goal revised this date at 01/17/2022 0840   LB Dressing Goal 2    Activity/Assistive Device lower body dressing at 01/12/2022 0840   [place] modified independence at 01/12/2022 0840   Time Frame long-term goal (LTG), 2 weeks at 01/12/2022 0840   Progress/Outcome goal ongoing at 01/17/2022 0840   Grooming Goal 1    [place] supervision required at 01/17/2022 0840   Time Frame short-term goal (STG), 1 week at 01/17/2022 0840   Strategies/Barriers Standing at 01/12/2022 0840   Progress/Outcome goal met, goal revised this date at 01/17/2022 0840   Grooming Goal 2    Activity/Assistive Device grooming skills, all at 01/12/2022 0840   [place] modified independence at 01/12/2022 0840   Time Frame long-term goal (LTG), 2 weeks at 01/12/2022 0840   Progress/Outcome goal ongoing at 01/17/2022 0840   Toileting Goal 1    Activity/Assistive Device toileting skills, all at 01/12/2022 0840   [place] supervision required at 01/17/2022 0840   Time Frame short-term goal (STG), 1 week at 01/17/2022 0840   Progress/Outcome goal met, goal revised this date at 01/17/2022 0840   Toileting Goal 2    Activity/Assistive Device toileting skills, all at 01/12/2022 0840   [place] modified independence at 01/12/2022 0840   Time Frame long-term goal (LTG), 2 weeks at 01/12/2022 0840   Progress/Outcome goal ongoing at 01/17/2022 0840

## 2022-01-20 ENCOUNTER — APPOINTMENT (INPATIENT)
Dept: OCCUPATIONAL THERAPY | Facility: REHABILITATION | Age: 72
DRG: 565 | End: 2022-01-20
Payer: COMMERCIAL

## 2022-01-20 ENCOUNTER — APPOINTMENT (INPATIENT)
Dept: PHYSICAL THERAPY | Facility: REHABILITATION | Age: 72
DRG: 565 | End: 2022-01-20
Payer: COMMERCIAL

## 2022-01-20 PROCEDURE — 97530 THERAPEUTIC ACTIVITIES: CPT | Mod: GO

## 2022-01-20 PROCEDURE — 12800001 HC ROOM AND CARE SEMIPRIVATE REHAB-BRAIN INJ

## 2022-01-20 PROCEDURE — 97530 THERAPEUTIC ACTIVITIES: CPT | Mod: GP | Performed by: PHYSICAL THERAPIST

## 2022-01-20 PROCEDURE — 63700000 HC SELF-ADMINISTRABLE DRUG: Performed by: INTERNAL MEDICINE

## 2022-01-20 PROCEDURE — 97116 GAIT TRAINING THERAPY: CPT | Mod: GP | Performed by: PHYSICAL THERAPIST

## 2022-01-20 PROCEDURE — 97110 THERAPEUTIC EXERCISES: CPT | Mod: GP,CQ

## 2022-01-20 PROCEDURE — 97535 SELF CARE MNGMENT TRAINING: CPT | Mod: GO

## 2022-01-20 PROCEDURE — 97110 THERAPEUTIC EXERCISES: CPT | Mod: GP | Performed by: PHYSICAL THERAPIST

## 2022-01-20 PROCEDURE — 97116 GAIT TRAINING THERAPY: CPT | Mod: GP,CQ

## 2022-01-20 RX ORDER — CARVEDILOL 6.25 MG/1
6.25 TABLET ORAL 2 TIMES DAILY WITH MEALS
Status: DISCONTINUED | OUTPATIENT
Start: 2022-01-20 | End: 2022-01-25 | Stop reason: HOSPADM

## 2022-01-20 RX ADMIN — FAMOTIDINE 20 MG: 20 TABLET ORAL at 08:06

## 2022-01-20 RX ADMIN — GUAIFENESIN 600 MG: 600 TABLET, EXTENDED RELEASE ORAL at 21:22

## 2022-01-20 RX ADMIN — GUAIFENESIN 600 MG: 600 TABLET, EXTENDED RELEASE ORAL at 08:06

## 2022-01-20 RX ADMIN — SENNOSIDES AND DOCUSATE SODIUM 1 TABLET: 50; 8.6 TABLET ORAL at 08:06

## 2022-01-20 RX ADMIN — OXYCODONE HYDROCHLORIDE 5 MG: 5 TABLET ORAL at 12:30

## 2022-01-20 RX ADMIN — ATORVASTATIN CALCIUM 40 MG: 40 TABLET, FILM COATED ORAL at 17:17

## 2022-01-20 RX ADMIN — DABIGATRAN ETEXILATE MESYLATE 150 MG: 150 CAPSULE ORAL at 08:06

## 2022-01-20 RX ADMIN — OXYCODONE HYDROCHLORIDE 5 MG: 5 TABLET ORAL at 17:17

## 2022-01-20 RX ADMIN — CARVEDILOL 6.25 MG: 6.25 TABLET, FILM COATED ORAL at 17:17

## 2022-01-20 RX ADMIN — FAMOTIDINE 20 MG: 20 TABLET ORAL at 21:21

## 2022-01-20 RX ADMIN — AMLODIPINE BESYLATE 5 MG: 5 TABLET ORAL at 08:08

## 2022-01-20 RX ADMIN — LOSARTAN POTASSIUM 100 MG: 100 TABLET, FILM COATED ORAL at 17:17

## 2022-01-20 RX ADMIN — CETIRIZINE HYDROCHLORIDE 5 MG: 5 TABLET ORAL at 21:21

## 2022-01-20 RX ADMIN — ESCITALOPRAM OXALATE 10 MG: 10 TABLET, FILM COATED ORAL at 08:07

## 2022-01-20 RX ADMIN — ACETAMINOPHEN 975 MG: 325 TABLET, FILM COATED ORAL at 21:21

## 2022-01-20 RX ADMIN — DABIGATRAN ETEXILATE MESYLATE 150 MG: 150 CAPSULE ORAL at 21:21

## 2022-01-20 RX ADMIN — ACETAMINOPHEN 975 MG: 325 TABLET, FILM COATED ORAL at 16:01

## 2022-01-20 RX ADMIN — OXYCODONE HYDROCHLORIDE 5 MG: 5 TABLET ORAL at 08:09

## 2022-01-20 RX ADMIN — FLUTICASONE PROPIONATE 2 SPRAY: 50 SPRAY, METERED NASAL at 08:11

## 2022-01-20 RX ADMIN — OXYCODONE HYDROCHLORIDE 10 MG: 5 TABLET ORAL at 21:19

## 2022-01-20 RX ADMIN — HYDROCHLOROTHIAZIDE 25 MG: 25 TABLET ORAL at 08:06

## 2022-01-20 RX ADMIN — ACETAMINOPHEN 975 MG: 325 TABLET, FILM COATED ORAL at 05:13

## 2022-01-20 RX ADMIN — AMLODIPINE BESYLATE 5 MG: 5 TABLET ORAL at 21:27

## 2022-01-20 NOTE — PROGRESS NOTES
Patient: Melanie Zelaya  Location: [facility] [address]  MRN: [id number]  Today's date: 1/20/2022    History of Present Illness  Melanie is a 71 y.o. male admitted on 1/11/2022 with S/P cervical spinal fusion [Z98.1]. Principal problem is S/P cervical spinal fusion.      Past Medical History  Melanie has a past medical history of Anxiety, Atrial fibrillation (CMS/HCC), Breast cancer (CMS/HCC), C1 cervical fracture (CMS/HCC), Colon polyp, COPD (chronic obstructive pulmonary disease) (CMS/HCC), Coronary artery calcification seen on CT scan, COVID-19 vaccine series completed, Depression, Diverticulosis, Diverticulosis, Fracture of pelvis (CMS/HCC) (1968), GERD (gastroesophageal reflux disease), Hearing loss, History of colon polyps, History of COVID-19 (2020), Hyperlipidemia, Hypertension, Left atrial enlargement, Lung cancer (CMS/HCC), Lung cancer (CMS/HCC), Pneumonia (2011), Seasonal allergies, and Wrist fracture.      PT Vitals    Date/Time Pulse HR Source BP BP Location BP Method Pt Position [facility]   01/20/22 1435 72 Monitor 134/67 Right upper arm Automatic Sitting PF      PT Pain    Date/Time Pain Type Location Rating: Rest Interventions Who   01/20/22 1435 Pain Assessment neck 4 premedicated for activity PF   01/20/22 1459 Pain Reassessment neck 5 -- PF          Prior Living Environment      Most Recent Value   People in Home alone   Current Living Arrangements home   Home Accessibility not wheelchair accessible   Living Environment Comment 3SH, 3 VALERIA LHR, FF to bathroom then another FF to bedroom, tub shower w/ SC   Number of Stairs, Main Entrance 4   Surface of Stairs, Main Entrance concrete   Stair Railings, Main Entrance railing on left side (ascending)   Landing, Stairs, Main Entrance adequate turning radius   Number of Stairs, Second Entrance other (see comments)  [back door not utilized]   Location, Kitchen first (main) floor   [place], Kitchen tile floor   Location, Laundry Room first  (main) floor   [place], Laundry Room concrete floor   Laundry Room Access not wheelchair accessible   Laundry Room Access Comment down in basement,  with full flight and L hand rail   Location, Patient Bedroom other (see comments)  [3rd floor]   [place], Patient Bedroom carpeted floor   Location, Bathroom second floor, must negotiate stairs to access   [place], Bathroom tile floor   Bathroom Access not wheelchair accessible   Bathroom Access Comment Pt reports FB on 2nd floor. Pt has shower chair.   Number of Stairs, Within Home, Primary 12   Surface of Stairs, Within Home, Primary carpeting   Stair Railings, Within Home, Primary railings on both sides of stairs   Stairs Comment, Within Home, Primary to second floor for bathroom   Stairs, Within Home, Secondary 12   Surface of Stairs, Within Home, Secondary carpeting   Stair Railings, Within Home, Secondary railing on left side (ascending)   Stairs Comment, Within Home, Secondary to third floor for bed room          Prior Level of Function      Most Recent Value   Dominant Hand right   Ambulation assistive equipment   Transferring assistive equipment   Toileting independent   Bathing independent   Dressing assistive equipment  [shoe horn]   Eating independent   Prior Level of Function Comment Pt reports prior use of cane in home as needed for ambulation and use of a shower chair for bathing. Pt reports was completing BADLs independently.   Assistive Device Currently Used at Home cane, straight, shower chair           IRF PT Evaluation and Treatment - 01/20/22 1435        PT Time Calculation    Start Time 1430     Stop Time 1500     Time Calculation (min) 30 min        Session Details    Document Type daily treatment/progress note     Mode of Treatment individual therapy;physical therapy        General Information    Patient Profile Reviewed yes     General Observations of Patient received seated in wc in gym and agreeable to treatment, reporting fatigue         Transfers    Transfers stand pivot transfer        Sit to Stand Transfer    [place], Sit to Stand Transfer close supervision     Verbal Cues safety     Assistive Device walker, front-wheeled;gait belt     Comment from         Stand to Sit Transfer    [place], Stand to Sit Transfer close supervision     Verbal Cues safety     Assistive Device walker, front-wheeled;gait belt     Comment to         Stand Pivot Transfer    [place], Stand Pivot/Stand Step Transfer close supervision     Verbal Cues safety     Assistive Device walker, front-wheeled;gait belt     Comment via amb approach to         Gait Training    [place], Gait close supervision     Assistive Device walker, front-wheeled;gait belt     Distance in Feet 200 feet     Pattern (Gait) step-through     Deviations/Abnormal Patterns (Gait) base of support, narrow;hailey decreased;gait speed decreased;weight shifting decreased     Comment (Gait/Stairs) 200 ft with RW req close S for safety        Lower Extremity (Therapeutic Exercise)    Exercise Position/Type seated;standing     General Exercise bilateral     Reps and Sets 2 x 10     Comment 1) AP 2) LAQ 2# 3) knee raises 4) standing heel raises 5) mini squats 1 set of 10, increased neck pain        Daily Progress Summary (PT)    Symptoms Noted During/After Treatment increased pain     Daily Outcome Statement Session focused on ambulation with RW and general LE strengthening. Pt limited by fatigue and increased pain with session. Performs mobility with close S req min vc. Continue to progress as able.                           IRF PT Goals      Most Recent Value   Bed Mobility Goal 1    Activity/Assistive Device rolling to right, rolling to left at 01/12/2022 0831   [place] supervision required at 01/12/2022 0831   Time Frame short-term goal (STG), 5 - 7 days at 01/12/2022 0831   Progress/Outcome goal met at 01/15/2022 0905   Bed Mobility Goal 2    Activity/Assistive Device rolling  to left, rolling to right at 01/12/2022 0831   [place] modified independence at 01/12/2022 0831   Time Frame long-term goal (LTG), 21 days or less at 01/12/2022 0831   Progress/Outcome goal ongoing at 01/17/2022 0800   Bed Mobility Goal 3    Activity/Assistive Device sit to supine/supine to sit at 01/12/2022 0831   [place] other (see comments)  [touching assist] at 01/12/2022 0831   Time Frame short-term goal (STG), 5 - 7 days at 01/12/2022 0831   Progress/Outcome goal met at 01/15/2022 0905   Bed Mobility Goal 4    Activity/Assistive Device sit to supine/supine to sit at 01/12/2022 0831   [place] modified independence at 01/12/2022 0831   Time Frame long-term goal (LTG), 21 days or less at 01/12/2022 0831   Progress/Outcome goal ongoing at 01/17/2022 0800   Transfer Goal 1    Activity/Assistive Device sit-to-stand/stand-to-sit at 01/12/2022 0831   [place] other (see comments)  [touching assist] at 01/12/2022 0831   Time Frame short-term goal (STG), 5 - 7 days at 01/12/2022 0831   Progress/Outcome goal met at 01/17/2022 0800   Transfer Goal 2    Activity/Assistive Device sit-to-stand/stand-to-sit at 01/12/2022 0831   [place] modified independence at 01/12/2022 0831   Time Frame 21 days or less, long-term goal (LTG) at 01/12/2022 0831   Progress/Outcome goal ongoing at 01/17/2022 0800   Transfer Goal 3    Activity/Assistive Device stand pivot at 01/12/2022 0831   [place] other (see comments)  [touching assist] at 01/12/2022 0831   Time Frame short-term goal (STG), 5 - 7 days at 01/12/2022 0831   Progress/Outcome goal met at 01/17/2022 0800   Transfer Goal 4    Activity/Assistive Device stand pivot at 01/12/2022 0831   [place] modified independence at 01/12/2022 0831   Time Frame long-term goal (LTG), 21 days or less at 01/12/2022 0831   Progress/Outcome goal ongoing at 01/17/2022 0800   Gait/Walking Locomotion Goal 1    Activity/Assistive Device gait (walking locomotion), assistive  device use at 01/12/2022 0831   Distance 50 feet at 01/12/2022 0831   [place] other (see comments)  [touching assist] at 01/12/2022 0831   Time Frame short-term goal (STG), 5 - 7 days at 01/12/2022 0831   Progress/Outcome goal met at 01/17/2022 0800   Gait/Walking Locomotion Goal 2    Activity/Assistive Device gait (walking locomotion), assistive device use at 01/12/2022 0831   Distance 150 feet at 01/12/2022 0831   [place] modified independence at 01/12/2022 0831   Time Frame long-term goal (LTG), 21 days or less at 01/12/2022 0831   Progress/Outcome goal ongoing at 01/17/2022 0800   Stairs Goal 1    Activity/Assistive Device ascending stairs, descending stairs at 01/15/2022 0905   Number of Stairs 12 at 01/15/2022 0905   [place] minimum assist (75% or more patient effort) at 01/15/2022 0905   Time Frame short-term goal (STG), 1 week at 01/15/2022 0905   Progress/Outcome goal met at 01/17/2022 0800   Stairs Goal 2    Activity/Assistive Device ascending stairs, descending stairs at 01/15/2022 0905   Number of Stairs 12 at 01/15/2022 0905   [place] modified independence at 01/15/2022 0905   Time Frame long-term goal (LTG), 21 days or less, by discharge at 01/15/2022 0905   Progress/Outcome goal ongoing at 01/17/2022 0800

## 2022-01-20 NOTE — PROGRESS NOTES
Patient: Melanie Zelaya  Location: [facility]  MRN: [id number]  Today's date: 1/20/2022    History of Present Illness  Melanie is a 71 y.o. male admitted on 1/11/2022 with S/P cervical spinal fusion [Z98.1]. Principal problem is S/P cervical spinal fusion.      Past Medical History  Melanie has a past medical history of Anxiety, Atrial fibrillation (CMS/HCC), Breast cancer (CMS/HCC), C1 cervical fracture (CMS/HCC), Colon polyp, COPD (chronic obstructive pulmonary disease) (CMS/HCC), Coronary artery calcification seen on CT scan, COVID-19 vaccine series completed, Depression, Diverticulosis, Diverticulosis, Fracture of pelvis (CMS/HCC) (1968), GERD (gastroesophageal reflux disease), Hearing loss, History of colon polyps, History of COVID-19 (2020), Hyperlipidemia, Hypertension, Left atrial enlargement, Lung cancer (CMS/HCC), Lung cancer (CMS/HCC), Pneumonia (2011), Seasonal allergies, and Wrist fracture.      PT Vitals    Date/Time Pulse HR Source SpO2 Pt Activity O2 Therapy BP BP Location BP Method Pt Position Observations Who   01/20/22 1109 74 Monitor 96 % At rest None (Room air) 152/81 Right upper arm Automatic Sitting -- DTW   01/20/22 1150 78 Monitor 97 % Walking None (Room air) 147/78 Right upper arm Automatic Sitting VS after amb. 200' with a RW. DTW      PT Pain    Date/Time Pain Type Side/Orientation Location Rating: Rest Rating: Activity Interventions Who   01/20/22 1109 Pain Assessment posterior neck 3 3 premedicated for activity DTW   01/20/22 1150 Post Activity posterior neck 4 4 position adjusted DTW          Prior Living Environment      Most Recent Value   People in Home alone   Current Living Arrangements home   Home Accessibility not wheelchair accessible   Living Environment Comment 3SH, 3 VALERIA LHR, FF to bathroom then another FF to bedroom, tub shower w/ SC   Number of Stairs, Main Entrance 4   Surface of Stairs, Main Entrance concrete   Stair Railings, Main  Entrance railing on left side (ascending)   Landing, Stairs, Main Entrance adequate turning radius   Number of Stairs, Second Entrance other (see comments)  [back door not utilized]   Location, Kitchen first (main) floor   [place], Kitchen tile floor   Location, Laundry Room first (main) floor   [place], Laundry Room concrete floor   Laundry Room Access not wheelchair accessible   Laundry Room Access Comment down in basement,  with full flight and L hand rail   Location, Patient Bedroom other (see comments)  [3rd floor]   [place], Patient Bedroom carpeted floor   Location, Bathroom second floor, must negotiate stairs to access   [place], Bathroom tile floor   Bathroom Access not wheelchair accessible   Bathroom Access Comment Pt reports FB on 2nd floor. Pt has shower chair.   Number of Stairs, Within Home, Primary 12   Surface of Stairs, Within Home, Primary carpeting   Stair Railings, Within Home, Primary railings on both sides of stairs   Stairs Comment, Within Home, Primary to second floor for bathroom   Stairs, Within Home, Secondary 12   Surface of Stairs, Within Home, Secondary carpeting   Stair Railings, Within Home, Secondary railing on left side (ascending)   Stairs Comment, Within Home, Secondary to third floor for bed room          Prior Level of Function      Most Recent Value   Dominant Hand right   Ambulation assistive equipment   Transferring assistive equipment   Toileting independent   Bathing independent   Dressing assistive equipment  [shoe horn]   Eating independent   Prior Level of Function Comment Pt reports prior use of cane in home as needed for ambulation and use of a shower chair for bathing. Pt reports was completing BADLs independently.   Assistive Device Currently Used at Home cane, straight, shower chair           IRF PT Evaluation and Treatment - 01/20/22 1109        PT Time Calculation    Start Time 1100     Stop Time 1200     Time Calculation (min) 60 min        Session Details  "   Document Type daily treatment/progress note     Mode of Treatment physical therapy;individual therapy        General Information    Patient Profile Reviewed yes     General Observations of Patient Pt. pleasant and agreeable to tx./     Existing Precautions/Restrictions fall;cardiac;spinal   Pt. using hard cervical brace.    Limitations/Impairments hearing        Weight-bearing Status    Right UE Weight-Bearing Status weight-bearing as tolerated (WBAT)        Transfers    Transfers stand pivot transfer     Maintains Weight-Bearing Status (Transfers) able to maintain     Comment gait belt donned for transfers, amb. and stair mgmt.        Sit to Stand Transfer    [place], Sit to Stand Transfer close supervision     Verbal Cues safety     Assistive Device walker, front-wheeled;gait belt     Comment from w/c        Stand to Sit Transfer    [place], Stand to Sit Transfer close supervision     Verbal Cues safety     Assistive Device walker, front-wheeled;gait belt     Comment to w/c        Stand Pivot Transfer    [place], Stand Pivot/Stand Step Transfer close supervision     Verbal Cues safety     Assistive Device walker, front-wheeled;gait belt     Comment via amb. approach using a RWalker.        Gait Training    [place], Gait close supervision;verbal cues;nonverbal cues (demo/gesture)     Assistive Device walker, front-wheeled;gait belt     Distance in Feet 200 feet   2 x 200'    Pattern (Gait) step-through     Deviations/Abnormal Patterns (Gait) base of support, narrow;hailey decreased;gait speed decreased;weight shifting decreased     Maintains Weight-bearing Status (Gait) able to maintain     Advanced Gait Activity curb negotiation;sloped surfaces        Curb Negotiation    [place] close supervision;verbal cues     Assistive Device walker, front-wheeled;gait belt     Curb Height 6 inches     Comment up/down 2\" and 6\" high curbs with the RWalker with close S for safety.        Sloped " Surface Gait Skills    [place] close supervision;verbal cues     Assistive Device walker, front-wheeled;gait belt     Distance in Feet 5 feet     Comment up/down 5' long, non-ADA ramp with the RWalker and close S for safety.        Stairs Training    [place], Stairs close supervision;verbal cues;nonverbal cues (demo/gesture)     Assistive Device railing     Handrail Location (Stairs) other (see comments)     Number of Stairs 12     Stair Height 6 inches     Ascending Stairs Technique step-over-step     Descending Stairs Technique step-over-step;step-to-step     Maintains Weight-bearing Status (Stairs) able to maintain     Comment Pt. managed 3 x 4 steps using 1 HR, either side, step over step ascending and descending (when using the L railing) and step to step when descending with the R railing.        Lower Extremity (Therapeutic Exercise)    Exercise Position/Type seated;AROM (active range of motion);active stretching     General Exercise bilateral;ankle pumps;gastroc stretch;hamstring stretch;LAQ (long arc quad);gluteal sets;marching while seated     Reps and Sets 30x's each, 3 x 30 sec hold for stretches.        Daily Progress Summary (PT)    Symptoms Noted During/After Treatment increased pain     Progress Toward Functional Goals (PT) progressing toward functional goals as expected     Daily Outcome Statement Pt. performing most mobility with close supervision and occ. vc's for safety and to decrease speed with turns and transfers.     Recommendations (PT) Continue per PT POC.                      Education Documentation  Mobility Aids/Assistive Devices, taught by Jessie Edmondson, PT at 1/20/2022 12:30 PM.  Learner: Patient  Readiness: Acceptance  Method: Explanation, Demonstration  Response: Needs Reinforcement  Comment: Reviewed safe technique for managing stairs using 1 railing.          IRF PT Goals      Most Recent Value   Bed Mobility Goal 1    Activity/Assistive Device rolling to right, rolling  to left at 01/12/2022 0831   [place] supervision required at 01/12/2022 0831   Time Frame short-term goal (STG), 5 - 7 days at 01/12/2022 0831   Progress/Outcome goal met at 01/15/2022 0905   Bed Mobility Goal 2    Activity/Assistive Device rolling to left, rolling to right at 01/12/2022 0831   [place] modified independence at 01/12/2022 0831   Time Frame long-term goal (LTG), 21 days or less at 01/12/2022 0831   Progress/Outcome goal ongoing at 01/17/2022 0800   Bed Mobility Goal 3    Activity/Assistive Device sit to supine/supine to sit at 01/12/2022 0831   [place] other (see comments)  [touching assist] at 01/12/2022 0831   Time Frame short-term goal (STG), 5 - 7 days at 01/12/2022 0831   Progress/Outcome goal met at 01/15/2022 0905   Bed Mobility Goal 4    Activity/Assistive Device sit to supine/supine to sit at 01/12/2022 0831   [place] modified independence at 01/12/2022 0831   Time Frame long-term goal (LTG), 21 days or less at 01/12/2022 0831   Progress/Outcome goal ongoing at 01/17/2022 0800   Transfer Goal 1    Activity/Assistive Device sit-to-stand/stand-to-sit at 01/12/2022 0831   [place] other (see comments)  [touching assist] at 01/12/2022 0831   Time Frame short-term goal (STG), 5 - 7 days at 01/12/2022 0831   Progress/Outcome goal met at 01/17/2022 0800   Transfer Goal 2    Activity/Assistive Device sit-to-stand/stand-to-sit at 01/12/2022 0831   [place] modified independence at 01/12/2022 0831   Time Frame 21 days or less, long-term goal (LTG) at 01/12/2022 0831   Progress/Outcome goal ongoing at 01/17/2022 0800   Transfer Goal 3    Activity/Assistive Device stand pivot at 01/12/2022 0831   [place] other (see comments)  [touching assist] at 01/12/2022 0831   Time Frame short-term goal (STG), 5 - 7 days at 01/12/2022 0831   Progress/Outcome goal met at 01/17/2022 0800   Transfer Goal 4    Activity/Assistive Device stand pivot at 01/12/2022 0831   [place]  modified independence at 01/12/2022 0831   Time Frame long-term goal (LTG), 21 days or less at 01/12/2022 0831   Progress/Outcome goal ongoing at 01/17/2022 0800   Gait/Walking Locomotion Goal 1    Activity/Assistive Device gait (walking locomotion), assistive device use at 01/12/2022 0831   Distance 50 feet at 01/12/2022 0831   [place] other (see comments)  [touching assist] at 01/12/2022 0831   Time Frame short-term goal (STG), 5 - 7 days at 01/12/2022 0831   Progress/Outcome goal met at 01/17/2022 0800   Gait/Walking Locomotion Goal 2    Activity/Assistive Device gait (walking locomotion), assistive device use at 01/12/2022 0831   Distance 150 feet at 01/12/2022 0831   [place] modified independence at 01/12/2022 0831   Time Frame long-term goal (LTG), 21 days or less at 01/12/2022 0831   Progress/Outcome goal ongoing at 01/17/2022 0800   Stairs Goal 1    Activity/Assistive Device ascending stairs, descending stairs at 01/15/2022 0905   Number of Stairs 12 at 01/15/2022 0905   [place] minimum assist (75% or more patient effort) at 01/15/2022 0905   Time Frame short-term goal (STG), 1 week at 01/15/2022 0905   Progress/Outcome goal met at 01/17/2022 0800   Stairs Goal 2    Activity/Assistive Device ascending stairs, descending stairs at 01/15/2022 0905   Number of Stairs 12 at 01/15/2022 0905   [place] modified independence at 01/15/2022 0905   Time Frame long-term goal (LTG), 21 days or less, by discharge at 01/15/2022 0905   Progress/Outcome goal ongoing at 01/17/2022 0800

## 2022-01-20 NOTE — DISCHARGE INSTR - ACTIVITY
Occupational Therapy     Toilet Transfers: Melanie completes toilet transfers at a modified independence level for safety. OT has recommended purchase of a toilet safety frame to increase safety with transfer.     Shower/Tub Transfers: Recommend supervision for safety with tub/shower transfers. Recommend placement of grab bar placed vertically at entrance to tub/shower for increased safety with transfer. Melanie reports grab bar along interior wall of tub. Recommend use of a shower chair with a back. Melanie reports he has a shower chair.    Upper Body Dressing: Melanie completes upper body dressing with modified independence for safety considerations. Recommend donning cervical collar in seated position with mirror for visual feedback while completing. Recommend use of rolling walker bag and use of reacher as needed for safe clothing retrieval and transport. Recommend placing clothing within arms reach in closet and dresser to avoid bending.     Lower Body Dressing: Melanie completes lower body dressing with modified independence for safety considerations. Recommend completing dressing tasks in seated and utilizing long handled adaptive equipment for maintaining spinal precautions while completing dressing tasks. Recommend use of rolling walker bag and use of reacher as needed for safe clothing retrieval and transport. Recommend placing clothing within arms reach in closet and dresser to avoid bending.    Bathing: Recommend set-up assistance and distant supervision for safety with bathing. Recommend completing seated on shower chair with back. Fadia collar donned for bathing. Recommend use of long handled sponge for assist with drying feet and wrapping towel around wrung out long handled sponge for drying. Recommend donning supportive hard bottom slipper or hospital socks upon exiting shower.    Toileting: Melanie completes toileting with modified independence for safety considerations.    Grooming: Melanie completes  grooming with modified independence for safety considerations. Recommend completing grooming tasks of longer duration in seated position for energy conservation.     Household Mobility/Household Activity: Modified independence for safety considerations. Recommend use of a rolling walker bag and having reacher in bag for use as needed for item retrieval/transport. Recommend removing throw rugs and clearing pathways of potential tripping hazards.     Driving: Driving is unsafe and not recommended at this time. Consult your physician for approval before resuming driving. Handicap Placard initiated.    Entered by: PAM Mackenzie/L on: 1/20/22    Physical Therapy:      Bed mobility: [place] is modified independent with bed mobility including rolling left and right and sit to supine and supine to sit     Transfers: [place] is modified independent with sit to stand, stand to sit and stand pivot transfers     Ambulation: [place] is modified independent with ambulation of 200ft with a rolling walker     Elevations: Melanie is modified independent with elevations for 12 x6in steps with single handrail       Entered by: Gavino Contreras PT DPT on: 1/24/2022     Additional     Weight-Bearing Status: Weight bearing as tolerated     Precautions: Spinal precautions including no bending, lifting (>5lbs), or twisting.      Braces/Prosthesis:  Aspen collar to be donned at all times. May be removed for eating per MD orders. May wear Fadia collar for bathing.     Durable Medical Equipment: Patient was issued rolling walker from [employer].  Please call [phone] with any issues or questions.      Home Exercise Program: Please continue to utilize your theraputty and hand gripper home exercise program for hand strengthening until home care Occupational Therapist provides additional exercises.     Physical Therapy: home exercise provided to patient

## 2022-01-20 NOTE — PROGRESS NOTES
[facility] Progress Note          Patient was seen and examined at bedside.    Subjective:     Stable, pleasant, his pain is manageable, his resp status is good, his PT/OT is progressing, his lungs sound good.   BP well controled.     Objective   Vital signs in last 24 hours:  Temp:  [36.5 °C (97.7 °F)-36.9 °C (98.4 °F)] 36.5 °C (97.7 °F)  Heart Rate:  [61-88] 76  Resp:  [18] 18  BP: (130-156)/(68-85) 156/85    No intake or output data in the 24 hours ending 01/20/22 1133  Intake/Output this shift:  No intake/output data recorded.   Review of Systems:  All other systems reviewed and negative except as noted in the HPI.   Objective      Labs  reviewed his labs thoroughly   Lab Results   Component Value Date    WBC 5.04 01/18/2022    HGB 14.3 01/18/2022    HCT 43.5 01/18/2022    MCV 89.3 01/18/2022     01/18/2022     Lab Results   Component Value Date    GLUCOSE 103 (H) 01/18/2022    CALCIUM 9.7 01/18/2022     01/18/2022    K 4.3 01/18/2022    CO2 29 01/18/2022    CL 97 (L) 01/18/2022    BUN 13 01/18/2022    CREATININE 0.9 01/18/2022       Imaging  OSH imaging study reports reviewed       Full Code    Physical Exam:  Head/Ear/Nose/Throat: normocephalic; atraumatic; moisture mouth mm, no oropharyngeal thrush noted.   Eyes: anicteric sclera, EOMI; PERRL.   Neck : supple, no JVD, no carotid bruits appeciated.   Respiratory: no evidence of labored breathing, lung sounds CTA b/l, good aeration bibasilar area, no w/r/c.   Cardiovascular: RRR; normal S1, S2; no m/r/g; no S3 or S4.   Gastrointestinal: soft; NT; BS normal; mildly distended; no CVAT b/l.   Genitourinary: no caballero.   Extremities : no c/c/e .   Neurological: AO x 3, fluent speeches, following commands, CNS II-XII grossly intact; no focal neurologic deficits.   Behavior/Emotional: in NAD, appropriate; cooperative.   Skin: clean, dry and intact.     Plan of care was discussed with patient, RN, and PMR attending     Assessment      CC:S/p a mechanical fall, sustained traumatic cervical spinal fractures with myelopathy, s/p cervical spinal ORIF and decom.lami.fusion, ADL and ambulatory dysfunction.       71 y.o. male with a PMHx significant for atrial fibrillation, breast cancer, ex-tobacco smoking, COPD, diverticulosis, acid reflux, history of COVID-19, hyperlipidemia, hypertension, lung cancer s/p LLL resection, history of right shoulder rotator cuff repair, and history of right-sided carpal tunnel syndrome, who fell from a ladder on 04/23/2021 associated with loss of consciousness. He was found to have fractures of the anterior and posterior arches of C1, base of the dens of C2, right scapula, and left occipital condyle.  He was placed in a hard cervical collar and has had ongoing neurosurgical and imaging follow-up.  While C1 was healing normally.  There is no appreciable osseous bridging of the fracture of the dens.  He therefore was recommended to undergo ORIF which is performed by Dr. Morejon on 01/06/2020 at [facility] [place].      Patient underwent open reduction and internal fixation of C1 fractures, C2 odontoid fracture, associated with bilateral posterior lateral arthrodesis with fusion at C1-C3 in addition to C1-2 and C2-3 laminotomies.  There are no intraoperative complications.  Neurophysiological monitoring was stable throughout the course of the decompression.  PCA was d/c'd on 1/10 am.  +BM on 1/9.     Ortho was consulted on 1/7 for R shoulder follow up.  No acute surgical intervention at this time per Ortho.  New Imaging of R shoulder/scapula unremarkable for acute injury  RUE WBAT.  Recommend follow up with a shoulder/elbow orthopedic surgeon - Dr Shine as needed.   Functionally, he has ADL and ambulatory dysfunction, requires inpt acute rehab, transferred [facility] on 1/11/22.       1. S/p a mechanical fall, sustained traumatic cervical spinal fractures with myelopathy, s/p cervical spinal ORIF and decom.lami.fusion, ADL and ambulatory  dysfunction : inpt comprehensive rehab, ADL, gait/balance training, pain/neuropathic pain management, fall precaution, regular neuro checks, DVT prophylaxis, surgical site wound care/dermal defense, f/u with spinal surgeon as scheduled.     R shoulder injury, but no acute surgical intervention at this time per Ortho, f/u with  a shoulder/elbow orthopedic surgeon - Dr Shine as needed.    2. Post op acute on chronic pain, paresthesia: pain management, regular pain scale evaluation, topical lidoderm patch, ice packs, consider Neurontin as indicated, prn muscle relaxant.    3. Pulm-COPD, h/o lung cancer s/p LLL resection, atelectasis: monitor SO2, prn NC O2, keep SO2>92%, incentive spirometry, bronchodilator inhaler prn, cont LABA/LAMA/flonase,  mucolytic agent, pulm toileting.    4. GI-constipation, GERD: provide constipation bowel regimen, po hydration, fiber intake, timed toilet visits. Pepcid for GERD and GI prophy.    5. DVT prophy: SCD, early ambulation, SQ heparin to resume pradaxa as planned , check LE venous DUS to r/o DVT.      6. PAF, HTN, Dyslipidemia: resume pradaxa on 1/20/22, monitor HR and rhythm, cont current HTN meds with holding parameter, monitor BP, titrate HTN meds as indicated, post op orthostatic hypotension precaution; cont statin.     7. - Neurogenic bladder, acute urinary retention s/p caballero: timed voiding trial with appropriate position and privacy, monitor PVR with cic, high risk of UTI , check UA/UCX.    8. Renal, electrolytes: monitor renal function, avoid nephrotoxic agents or low BP, monitor and keep electrolytes balance.     Billing code: [id number]  Diagnoses:  Patient Active Problem List   Diagnosis   • Anxiety   • Atrial fibrillation (CMS/HCC)   • Chronic diastolic heart failure (CMS/HCC)   • Hearing loss   • Primary malignant neoplasm of left lower lobe of lung (CMS/HCC)   • Multiple pulmonary nodules   • Other emphysema (CMS/HCC)   • Gastroesophageal reflux disease without  esophagitis   • Asthma   • Cervical spine fracture (CMS/HCC)   • Hypertension   • Mild episode of recurrent major depressive disorder (CMS/HCC)   • Preop examination   • Coronary artery calcification seen on CT scan   • COPD (chronic obstructive pulmonary disease) (CMS/HCC)   • Prediabetes   • History of cervical fracture   • H/O cervical fracture   • Closed nondisplaced odontoid fracture with type II morphology and delayed healing   • S/P cervical spinal fusion        Fiona Rivera MD  1/20/2022

## 2022-01-20 NOTE — PLAN OF CARE
Plan of Care Review  Plan of Care Reviewed With: patient  Progress: improving  Outcome Summary: Pt has c/o more persistent pain in posterior neck, radiating to b/l shoulders. Pt managed pain with rest, scheduled Tylenol and PRN oxycodone. Ice offered for b/l shoulders, but pt declined. Pt compliant with wearing Aspen collar AAT; loosens or removes for meals/meds. He is continent of b/b. Decreased appetite at lunchtime r/t discomfort sitting up, states PT is looking into getting him a WC with a higher, more-supportive backrest.

## 2022-01-20 NOTE — PROGRESS NOTES
Psychiatry Progress Note    History of Present Illness   See initial consult.  History pertaining to psychosis, depression and anxiety and other psychiatric and psychologic conditions as well as general medical history detailed in initial consult.      Interval History:   Anxious/tense/ doesn't want to increase ssri' relaxation techniques helpful  continued effort in therapy  no signs of metabolic disturbance - cognition stable  energy adequate for therapy  developing coping strategies for ATD and mood fluctuations  continues without any safety issues  responsive to supportive intervention  Discussed with nursing - no behavioral disturbance    MENTAL STATUS EXAM  Appearance: well groomed  Gait and Motor: no abnormal movements  Speech: normal rate/rhythm/volume  Mood: depressed and anxious  Affect: constricted  Associations: coherent  Thought Process: goal-directed  Thought Content: no auditory or visual hallucinations.  Suicidality/Homicidality: denies  Judgement/Insight: acknowledges illness  Orientation: situation  Memory: knows current president  Attention: distracted  Knowledge: normal  Language: normal    Vital Signs for the last 24 hours:  Temp:  [36.5 °C (97.7 °F)-36.9 °C (98.4 °F)] 36.5 °C (97.7 °F)  Heart Rate:  [61-88] 64  Resp:  [18] 18  BP: (128-160)/(58-82) 131/76    Labs:  Labs below reviewed for pertinence to psychiatric condition  Lab Results   Component Value Date    GLUCOSE 103 (H) 01/18/2022    CALCIUM 9.7 01/18/2022     01/18/2022    K 4.3 01/18/2022    CO2 29 01/18/2022    CL 97 (L) 01/18/2022    BUN 13 01/18/2022    CREATININE 0.9 01/18/2022     Lab Results   Component Value Date    WBC 5.04 01/18/2022    HGB 14.3 01/18/2022    HCT 43.5 01/18/2022    MCV 89.3 01/18/2022     01/18/2022     Pain Management Panel    There is no flowsheet data to display.           Current Facility-Administered Medications   Medication Dose Route Frequency Provider Last Rate Last Admin   •  acetaminophen (TYLENOL) tablet 975 mg  975 mg oral q8h Fiona Rivera MD   975 mg at 01/20/22 0513   • albuterol HFA (VENTOLIN HFA) 90 mcg/actuation inhaler 2 puff  2 puff inhalation q6h PRN Jorge Edwards MD       • amLODIPine (NORVASC) tablet 5 mg  5 mg oral q12h MARIAN Fiona Rivera MD   5 mg at 01/20/22 0808   • atorvastatin (LIPITOR) tablet 40 mg  40 mg oral Daily (6p) Jorge Edwards MD   40 mg at 01/19/22 1640   • bisacodyL (DULCOLAX) 10 mg suppository 10 mg  10 mg rectal Daily PRN Jorge Edwards MD       • cetirizine (ZyrTEC) tablet 5 mg  5 mg oral Nightly Jorge Edwards MD   5 mg at 01/19/22 2157   • cyclobenzaprine (FLEXERIL) tablet 10 mg  10 mg oral 3x daily PRN Jorge Edwards MD   10 mg at 01/19/22 1446   • dabigatran etexilate (PRADAXA) capsule 150 mg  150 mg oral BID Jorge Edwards MD   150 mg at 01/20/22 0806   • escitalopram (LEXAPRO) tablet 10 mg  10 mg oral Daily Jorge Edwards MD   10 mg at 01/20/22 0807   • famotidine (PEPCID) tablet 20 mg  20 mg oral BID Fiona Rivera MD   20 mg at 01/20/22 0806   • fluticasone propionate (FLONASE) 50 mcg/actuation nasal spray 2 spray  2 spray Each Nostril Daily Jorge Edwards MD   2 spray at 01/20/22 0811   • guaiFENesin (MUCINEX) 12 hr ER tablet 600 mg  600 mg oral BID Fiona Rivera MD   600 mg at 01/20/22 0806   • hydrochlorothiazide (HYDRODIURIL) tablet 25 mg  25 mg oral Daily Fiona Rivera MD   25 mg at 01/20/22 0806   • lidocaine (ASPERCREME) 4 % topical patch 1 patch  1 patch Topical Daily Jorge Edwards MD   1 patch at 01/20/22 0809   • losartan (COZAAR) tablet 100 mg  100 mg oral Daily with dinner Fiona Rivera MD   100 mg at 01/19/22 1640   • oxyCODONE (ROXICODONE) immediate release tablet 10 mg  10 mg oral q4h PRN Jorge Edwards MD   10 mg at 01/19/22 1640   • oxyCODONE (ROXICODONE) immediate release tablet 5 mg  5 mg oral q4h PRN Jorge Edwards MD   5 mg at 01/20/22 0809    • polyethylene glycol (MIRALAX) 17 gram packet 17 g  17 g oral Daily PRN Jorge Edwards MD       • sennosides-docusate sodium (SENOKOT-S) 8.6-50 mg per tablet 1 tablet  1 tablet oral BID Jorge Edwards MD   1 tablet at 01/20/22 0806   • umeclidinium-vilanteroL (ANORO ELLIPTA) 62.5-25 mcg/actuation inhaler 1 puff  1 puff inhalation Daily Jorge Edwards MD   1 puff at 01/20/22 0811        Patient Active Problem List   Diagnosis   • Anxiety   • Atrial fibrillation (CMS/HCC)   • Chronic diastolic heart failure (CMS/HCC)   • Hearing loss   • Primary malignant neoplasm of left lower lobe of lung (CMS/HCC)   • Multiple pulmonary nodules   • Other emphysema (CMS/HCC)   • Gastroesophageal reflux disease without esophagitis   • Asthma   • Cervical spine fracture (CMS/[facility])   • Hypertension   • Mild episode of recurrent major depressive disorder (CMS/[facility])   • Preop examination   • Coronary artery calcification seen on CT scan   • COPD (chronic obstructive pulmonary disease) (CMS/[facility])   • Prediabetes   • History of cervical fracture   • H/O cervical fracture   • Closed nondisplaced odontoid fracture with type II morphology and delayed healing   • S/P cervical spinal fusion           Assessment/Plan    Depression: CBT automatic anxious and negative thoughts  Adjustment Disorder to Disability: supportive therapy  Sleep:  Insight into illness: insight therapy/psychoeducation  Psychotherapy: supportive  Monitor: Mood, Speech, Appetite, Energy, Cognition, Behavioral, Impulsivity, Agitation  Family Support  Medications: monitor for side effects  - presently no gi, akathesia , ha or sedation  Sodium 138  Okay to continue Lexapro 10 mg daily-monitor for any side effects  Monitor sodium on Lexapro-currently 138  Support ATD  CBT automatic anxious and negative thoughts  Clarify for patient not on porzac  cnt for anxiety  Continue lexapro and support mood  Declines increase in lexapro dose  Austen Boyd,  MD  1/20/2022

## 2022-01-20 NOTE — PROGRESS NOTES
Patient: Melanie Zelaya  Location: [facility]  MRN: [id number]  Today's date: 1/20/2022    History of Present Illness  Melanie is a 71 y.o. male admitted on 1/11/2022 with S/P cervical spinal fusion [Z98.1]. Principal problem is S/P cervical spinal fusion.      Past Medical History  Melanie has a past medical history of Anxiety, Atrial fibrillation (CMS/HCC), Breast cancer (CMS/HCC), C1 cervical fracture (CMS/HCC), Colon polyp, COPD (chronic obstructive pulmonary disease) (CMS/HCC), Coronary artery calcification seen on CT scan, COVID-19 vaccine series completed, Depression, Diverticulosis, Diverticulosis, Fracture of pelvis (CMS/HCC) (1968), GERD (gastroesophageal reflux disease), Hearing loss, History of colon polyps, History of COVID-19 (2020), Hyperlipidemia, Hypertension, Left atrial enlargement, Lung cancer (CMS/HCC), Lung cancer (CMS/HCC), Pneumonia (2011), Seasonal allergies, and Wrist fracture.      OT Vitals    Date/Time Pulse BP [facility]   01/20/22 1304 70 143/83 DM      OT Pain    Date/Time Pain Type Location Rating: Rest Interventions [facility]   01/20/22 1304 Pain Assessment neck 4 premedicated for activity DM   01/20/22 1329 Pain Reassessment neck 4 premedicated for activity DM          Prior Living Environment      Most Recent Value   People in Home alone   Current Living Arrangements home   Home Accessibility not wheelchair accessible   Living Environment Comment 3SH, 3 VALERIA LHR, FF to bathroom then another FF to bedroom, tub shower w/ SC   Number of Stairs, Main Entrance 4   Surface of Stairs, Main Entrance concrete   Stair Railings, Main Entrance railing on left side (ascending)   Landing, Stairs, Main Entrance adequate turning radius   Number of Stairs, Second Entrance other (see comments)  [back door not utilized]   Location, Kitchen first (main) floor   [place], Kitchen tile floor   Location, Laundry Room first (main) floor   [place], Laundry Room concrete floor   Laundry  Room Access not wheelchair accessible   Laundry Room Access Comment down in basement,  with full flight and L hand rail   Location, Patient Bedroom other (see comments)  [3rd floor]   [place], Patient Bedroom carpeted floor   Location, Bathroom second floor, must negotiate stairs to access   [place], Bathroom tile floor   Bathroom Access not wheelchair accessible   Bathroom Access Comment Pt reports FB on 2nd floor. Pt has shower chair.   Number of Stairs, Within Home, Primary 12   Surface of Stairs, Within Home, Primary carpeting   Stair Railings, Within Home, Primary railings on both sides of stairs   Stairs Comment, Within Home, Primary to second floor for bathroom   Stairs, Within Home, Secondary 12   Surface of Stairs, Within Home, Secondary carpeting   Stair Railings, Within Home, Secondary railing on left side (ascending)   Stairs Comment, Within Home, Secondary to third floor for bed room          Prior Level of Function      Most Recent Value   Dominant Hand right   Ambulation assistive equipment   Transferring assistive equipment   Toileting independent   Bathing independent   Dressing assistive equipment  [shoe horn]   Eating independent   Prior Level of Function Comment Pt reports prior use of cane in home as needed for ambulation and use of a shower chair for bathing. Pt reports was completing BADLs independently.   Assistive Device Currently Used at Home cane, straight, shower chair          Occupational Profile      Most Recent Value   Successful Occupations Pt reports not a  within past year but would like to return to driving in future.   Occupational History/Life Experiences Pt reports living c  in [facility]. Pt has three (adult) children and grandchildren. Pt has good family support.           IRF OT Evaluation and Treatment - 01/20/22 1302        OT Time Calculation    Start Time 1300     Stop Time 1330     Time Calculation (min) 30 min        Session Details    Document Type daily  treatment/progress note     Mode of Treatment occupational therapy;individual therapy        General Information    General Observations of Patient Pt received in room, seated in w/c. Pt agreeable to therapy.        Sit to Stand Transfer    [place], Sit to Stand Transfer supervision     Verbal Cues safety     Assistive Device walker, front-wheeled;gait belt     Comment from w/c. Min VCs for safety 2* impulsivity.        Stand to Sit Transfer    [place], Stand to Sit Transfer supervision     Verbal Cues safety     Assistive Device walker, front-wheeled;gait belt     Comment to w/c        Stand Pivot Transfer    [place], Stand Pivot/Stand Step Transfer supervision     Verbal Cues safety     Assistive Device walker, front-wheeled;gait belt     Comment Ambulatory approach to w/c        Safety Issues, Functional Mobility    Comment, Safety Issues/Impairments (Mobility) S ambulating in [facility]. Pt demo's good RW safety and pacing throughout ambulation and gardening activity.        Balance    Comment, Balance S c unsupported standing at anterior table while engaging in a gardening activity. Pt able to maintain unsupported standing balance s LOB while engaged in bimanual gardening activity.        Therapeutic Interventions    Therapeutic Interventions Therapeutic Standing Program (Group)     Comment, Therapeutic Intervention OT educating on RW accessories for increased safety c item transport. OT facilitating use RW basket and pt utilizes to safely transport plants from greenhouse to [facility].        Therapeutic Standing Program    Comments Pt demo's good standing tolerance for completion of 10 minute gardening activity without seated rest break required.        Grooming    Self-Performance washes, rinses and dries hands     [place] supervision     Position unsupported standing     Adaptive Equipment none     Comment c RW at sink        Daily Progress Summary (OT)    Daily Outcome  Statement OT session focused on balance and functional mobility.  Pt demo's improvement in activity tolerance and requires less breaks between tasks. Pt requires Min VCs for safety, for weight shifting and to decrease impulsivity when standing from w/c. Pt cont to benefit from VCs and balance training to increase pt's activity tolerance, safety and independence.                           IRF OT Goals      Most Recent Value   Transfer Goal 1    Activity/Assistive Device toilet at 01/17/2022 0840   [place] supervision required at 01/17/2022 0840   Time Frame short-term goal (STG), 1 week at 01/17/2022 0840   Progress/Outcome goal met, goal revised this date at 01/17/2022 0840   Transfer Goal 2    Activity/Assistive Device toilet at 01/12/2022 0840   [place] modified independence at 01/12/2022 0840   Time Frame long-term goal (LTG), 2 weeks at 01/12/2022 0840   Progress/Outcome goal ongoing at 01/17/2022 0840   Transfer Goal 3    Activity/Assistive Device shower at 01/17/2022 0840   [place] supervision required at 01/17/2022 0840   Time Frame short-term goal (STG), 1 week at 01/17/2022 0840   Progress/Outcome goal met, goal revised this date at 01/17/2022 0840   Transfer Goal 4    Activity/Assistive Device shower at 01/12/2022 0840   [place] modified independence at 01/12/2022 0840   Time Frame long-term goal (LTG), 2 weeks at 01/12/2022 0840   Progress/Outcome goal ongoing at 01/17/2022 0840   Bathing Goal 1    Activity/Assistive Device bathing skills, all at 01/12/2022 0840   [place] supervision required at 01/17/2022 0840   Time Frame short-term goal (STG), 1 week at 01/17/2022 0840   Progress/Outcome goal met, goal revised this date at 01/17/2022 0840   Bathing Goal 2    Activity/Assistive Device bathing skills, all at 01/12/2022 0840   [place] modified independence at 01/12/2022 0840   Time Frame long-term goal (LTG), 2 weeks at 01/12/2022 0840   Progress/Outcome goal ongoing at  01/17/2022 0840   UB Dressing Goal 1    Activity/Assistive Device upper body dressing at 01/12/2022 0840   [place] supervision required  [Cl S] at 01/12/2022 0840   Time Frame short-term goal (STG), 1 week at 01/17/2022 0840   Strategies/Barriers including clothing retrieval at 01/17/2022 0840   Progress/Outcome goal ongoing at 01/17/2022 0840   UB Dressing Goal 2    Activity/Assistive Device upper body dressing at 01/12/2022 0840   [place] modified independence at 01/12/2022 0840   Time Frame long-term goal (LTG), 2 weeks at 01/12/2022 0840   Progress/Outcome goal ongoing at 01/17/2022 0840   LB Dressing Goal 1    Activity/Assistive Device lower body dressing at 01/12/2022 0840   [place] supervision required  [Cl S] at 01/17/2022 0840   Time Frame short-term goal (STG), 1 week at 01/17/2022 0840   Progress/Outcome goal met, goal revised this date at 01/17/2022 0840   LB Dressing Goal 2    Activity/Assistive Device lower body dressing at 01/12/2022 0840   [place] modified independence at 01/12/2022 0840   Time Frame long-term goal (LTG), 2 weeks at 01/12/2022 0840   Progress/Outcome goal ongoing at 01/17/2022 0840   Grooming Goal 1    [place] supervision required at 01/17/2022 0840   Time Frame short-term goal (STG), 1 week at 01/17/2022 0840   Strategies/Barriers Standing at 01/12/2022 0840   Progress/Outcome goal met, goal revised this date at 01/17/2022 0840   Grooming Goal 2    Activity/Assistive Device grooming skills, all at 01/12/2022 0840   [place] modified independence at 01/12/2022 0840   Time Frame long-term goal (LTG), 2 weeks at 01/12/2022 0840   Progress/Outcome goal ongoing at 01/17/2022 0840   Toileting Goal 1    Activity/Assistive Device toileting skills, all at 01/12/2022 0840   [place] supervision required at 01/17/2022 0840   Time Frame short-term goal (STG), 1 week at 01/17/2022 0840   Progress/Outcome goal met, goal revised this date at 01/17/2022 0840    Toileting Goal 2    Activity/Assistive Device toileting skills, all at 01/12/2022 0840   [place] modified independence at 01/12/2022 0840   Time Frame long-term goal (LTG), 2 weeks at 01/12/2022 0840   Progress/Outcome goal ongoing at 01/17/2022 0840

## 2022-01-20 NOTE — PROGRESS NOTES
History of present illness:     71 y.o. male with a PMHx significant for atrial fibrillation, breast cancer, COPD, diverticulosis, acid reflux, history of COVID-19, hyperlipidemia, hypertension, lung cancer, history of right shoulder rotator cuff repair, and history of right-sided carpal tunnel syndrome, who fell from a ladder on 04/23/2021 associated with loss of consciousness. He was found to have fractures of the anterior and posterior arches of C1, base of the dens of C2, right scapula, and left occipital condyle.  He was placed in a hard cervical collar and has had ongoing neurosurgical and imaging follow-up.  While C1 was healing normally.  There is no appreciable osseous bridging of the fracture of the dens.    Patient underwent open reduction and internal fixation of C1 fractures, C2 odontoid fracture, associated with bilateral posterior lateral arthrodesis with fusion at C1-C3 in addition to C1-2 and C2-3 laminotomies. Surgery was done on 1/6/2022 by Dr. Morejon .  There are no intraoperative complications.  Neurophysiological monitoring was stable throughout the course of the decompression.        Patient was evaluated by orthopedics for right shoulder follow up. No acute surgical intervention at this time . New Imaging of R shoulder/scapula unremarkable for acute injury, RUE WBAT.  Recommend follow up with a shoulder/elbow orthopedic surgeon - Dr Shine as needed.     Patient was evaluated by PM&R deemed to be a good candidate for acute rehabilitation. Patient now transferred to [facility] for comprehensive acute inpatient rehabilitation admitted on 1/12/2022.    Scheduled Meds:  • acetaminophen  975 mg oral q8h   • amLODIPine  5 mg oral q12h MARIAN   • atorvastatin  40 mg oral Daily (6p)   • carvediloL  6.25 mg oral BID with meals   • cetirizine  5 mg oral Nightly   • dabigatran etexilate  150 mg oral BID   • escitalopram  10 mg oral Daily   • famotidine  20 mg oral BID   • fluticasone propionate  2 spray Each  "Nostril Daily   • guaiFENesin  600 mg oral BID   • hydrochlorothiazide  25 mg oral Daily   • lidocaine  1 patch Topical Daily   • losartan  100 mg oral Daily with dinner   • sennosides-docusate sodium  1 tablet oral BID   • umeclidinium-vilanteroL  1 puff inhalation Daily     Continuous Infusions:  PRN Meds:.albuterol HFA  •  bisacodyL  •  cyclobenzaprine  •  oxyCODONE  •  oxyCODONE  •  polyethylene glycol     Lab Results   Component Value Date    WBC 5.04 01/18/2022    HGB 14.3 01/18/2022    HCT 43.5 01/18/2022    MCV 89.3 01/18/2022     01/18/2022       Lab Results   Component Value Date    GLUCOSE 103 (H) 01/18/2022    CALCIUM 9.7 01/18/2022     01/18/2022    K 4.3 01/18/2022    CO2 29 01/18/2022    CL 97 (L) 01/18/2022    BUN 13 01/18/2022    CREATININE 0.9 01/18/2022       Subjective: Patient seen and examined no chest pain or shortness of breath, tolerating therapies, patient is making steady progress in rehab therapies, will see Dr. Morejon neurosurgery on 1/25/2021.  Patient was seen in physical therapy doing well with improving strength.  Patient will have appointment with neurosurgery Dr. Morejon next week.      Team 1/17:    Nursing: continent B&B, skin intact    Functional status:    PT : CS fortransfers , touching assist for amb 200f RW    OT: ADLs: transfers touching assist, using adaptive equipment, CS for toileting, min A for UE dressing/colar managment    Psych : mild memory issues, anxious about discharge,     Physical exam:  Physical examination today showed a 71-year-old man in no acute distress.  Patient awake alert obeys commands.     Blood pressure 129/71, pulse 67, temperature 36.7 °C (98.1 °F), temperature source Oral, resp. rate 18, height 1.676 m (5' 6\"), weight 89.7 kg (197 lb 12.8 oz), SpO2 96 %.       Abdominopelvic, skin negative, mucous brains moist.     Neck supple     Heart regular rate     Lungs decreased breath sounds on the right side     Examination was " benign     Musculoskeletal exam: Range of motion within functional mid bilateral upper extremity and bilateral extremity except decreased right shoulder abduction.  Patient does agree that he will not allow extremity.  Neuro exam: Mental status as above patient able to be commands.  Cranial nerve examination shows face symmetric, tongue midline, completely intact and visual fields are full.  Motor examination: Right upper extremity deltoid 3/5, biceps 5/5 and  3/5, finger extension 4/5.  Examination of the right lower extremity hip flexion 4 -/5, quad and ankle dorsiflexion 5/5.  Examination of the left upper extremity 5/5.  [place] of the left lower extremity hip flexion 4/5, quad and ankle dorsiflexion 5/5.  Sensory examination grossly normal.  Deep tendon reflexes are symmetrical.  There was no evidence of cerebellar signs and gait not tested at this time.    Skin: Incision dry clean and intact staples in place covered by Mepilex.     Impression:     Ambulatory ADL dysfunction due to:    History of fall last) with growth none displaced odontoid fracture with type II morphology and nonunion now status post ORIF C2, C1-3 laminectomy posterior cervical fusion at multiple levels.     Postoperative anemia     History of atrial fibrillation on Pradaxa     History of coronary artery disease     History of lung cancer status post left lower lobe resection     History of breast cancer     History of COPD/tobacco use     GERD     Hypertension on Norvasc, losartan and hydrochlorothiazide     History of COVID-19     Hyperlipidemia on Lipitor     History of right shoulder rotator cuff repair     History of carpal tunnel syndrome     Obesity followed by dietitian     History of anxiety/depression on Lexapro     Plan:     Patient will continue physical therapy, and Occupational Therapy.  We will consult Dr. Rivera for medical management,  [place] neurology, Dr. Boyd psychiatry and patient also be followed by cardiology.   Patient will be followed by psychology and case management for support.  Patient will continue to use heart cervical collar all the times.  [place] patient precautions include cardiac, fall and orthopedic spinal precautions.  Patient will continue on Xarelto for history of atrial fibrillation.  We will monitor routine labs and additional monitoring healing of the posterior cervical incision closely.     Goals: To improve overall functioning for transfers, ambulation and ADLs     Extensive length of stay 1/25     Discussed with Dr. Rivera medical consultant, patient and nursing     This patient note has been dictated using speech recognition software.  Inadvertent speech recognition errors should be disregarded. Please do not hesitate to call my office for clarification.

## 2022-01-20 NOTE — PROGRESS NOTES
Patient: Melanie Zelaya  Location: [facility] [address]  MRN: [id number]  Today's date: 1/20/2022    History of Present Illness  Melanie is a 71 y.o. male admitted on 1/11/2022 with S/P cervical spinal fusion [Z98.1]. Principal problem is S/P cervical spinal fusion.      Past Medical History  Melanie has a past medical history of Anxiety, Atrial fibrillation (CMS/HCC), Breast cancer (CMS/HCC), C1 cervical fracture (CMS/HCC), Colon polyp, COPD (chronic obstructive pulmonary disease) (CMS/HCC), Coronary artery calcification seen on CT scan, COVID-19 vaccine series completed, Depression, Diverticulosis, Diverticulosis, Fracture of pelvis (CMS/HCC) (1968), GERD (gastroesophageal reflux disease), Hearing loss, History of colon polyps, History of COVID-19 (2020), Hyperlipidemia, Hypertension, Left atrial enlargement, Lung cancer (CMS/HCC), Lung cancer (CMS/HCC), Pneumonia (2011), Seasonal allergies, and Wrist fracture.      OT Vitals    Date/Time Pulse BP BP Location BP Method Pt Position [facility]   01/20/22 1002 76 156/85 Right upper arm Automatic Sitting DM      OT Pain    Date/Time Pain Type Location Rating: Rest Interventions [facility]   01/20/22 1002 Pain Assessment neck 3 premedicated for activity DM   01/20/22 1058 Pain Reassessment neck 3 premedicated for activity DM          Prior Living Environment      Most Recent Value   People in Home alone   Current Living Arrangements home   Home Accessibility not wheelchair accessible   Living Environment Comment 3SH, 3 VALERIA LHR, FF to bathroom then another FF to bedroom, tub shower w/ SC   Number of Stairs, Main Entrance 4   Surface of Stairs, Main Entrance concrete   Stair Railings, Main Entrance railing on left side (ascending)   Landing, Stairs, Main Entrance adequate turning radius   Number of Stairs, Second Entrance other (see comments)  [back door not utilized]   Location, Kitchen first (main) floor   [place], Kitchen tile floor   Location, Laundry Room  first (main) floor   [place], Laundry Room concrete floor   Laundry Room Access not wheelchair accessible   Laundry Room Access Comment down in basement,  with full flight and L hand rail   Location, Patient Bedroom other (see comments)  [3rd floor]   [place], Patient Bedroom carpeted floor   Location, Bathroom second floor, must negotiate stairs to access   [place], Bathroom tile floor   Bathroom Access not wheelchair accessible   Bathroom Access Comment Pt reports FB on 2nd floor. Pt has shower chair.   Number of Stairs, Within Home, Primary 12   Surface of Stairs, Within Home, Primary carpeting   Stair Railings, Within Home, Primary railings on both sides of stairs   Stairs Comment, Within Home, Primary to second floor for bathroom   Stairs, Within Home, Secondary 12   Surface of Stairs, Within Home, Secondary carpeting   Stair Railings, Within Home, Secondary railing on left side (ascending)   Stairs Comment, Within Home, Secondary to third floor for bed room          Prior Level of Function      Most Recent Value   Dominant Hand right   Ambulation assistive equipment   Transferring assistive equipment   Toileting independent   Bathing independent   Dressing assistive equipment  [shoe horn]   Eating independent   Prior Level of Function Comment Pt reports prior use of cane in home as needed for ambulation and use of a shower chair for bathing. Pt reports was completing BADLs independently.   Assistive Device Currently Used at Home cane, straight, shower chair          Occupational Profile      Most Recent Value   Successful Occupations Pt reports not a  within past year but would like to return to driving in future.   Occupational History/Life Experiences Pt reports living c  in [facility]. Pt has three (adult) children and grandchildren. Pt has good family support.           IRF OT Evaluation and Treatment - 01/20/22 1004        OT Time Calculation    Start Time 1000     Stop Time 1100     Time  Calculation (min) 60 min        Session Details    Document Type daily treatment/progress note     Mode of Treatment occupational therapy;individual therapy        General Information    General Observations of Patient Pt received seated in w/c. Pt agreeable to therapy.        Transfers    Transfers stand pivot transfer;toilet transfer;shower transfer        Sit to Stand Transfer    [place], Sit to Stand Transfer supervision     Verbal Cues safety     Assistive Device walker, front-wheeled     Comment from w/c        Stand to Sit Transfer    [place], Stand to Sit Transfer supervision     Verbal Cues safety     Assistive Device walker, front-wheeled     Comment to w/c        Stand Pivot Transfer    [place], Stand Pivot/Stand Step Transfer supervision     Verbal Cues safety     Assistive Device walker, front-wheeled     Comment Ambulatory to w/c        Toilet Transfer    Transfer Technique stand pivot     [place], Toilet Transfer supervision     Verbal Cues safety     Assistive Device walker, front-wheeled;grab bars/safety frame     Comment Ambulatory approach to toilet        Shower Transfer    Transfer Technique stand pivot     [place], Shower Transfer supervision     Verbal Cues safety     Assistive Device walker, front-wheeled;tub bench;grab bars/tub rail     Comment Ambulatory approach SPT c RW to/from tub bench        Safety Issues, Functional Mobility    Comment, Safety Issues/Impairments (Mobility) S ambulating in pt's room. Pt demo's good RW safety and pacing throughout activities.        Bathing    Self-Performance chest;left arm;right arm;abdomen;front perineal area;buttocks;left upper leg;right upper leg;left lower leg, including foot;right lower leg, including foot     [place] Assistance buttocks     [place] minimum assist (75% or more patient effort)     Position supported sitting;supported standing     Setup Assistance obtain supplies     Adaptive Equipment tub bench;grab  bar/tub rail;hand-held shower spray hose;long-handled sponge     Comment Pt performs bathing seated on tub bench. S c standing for washing buttocks, MIN A for drying buttocks. Pt requiring VCs for safety c drying feet as attempts to place foot on tub bench in standing to complete. Education provided on safe techniques including towel on floor to dry bottoms of feet and towel around long handled sponge to dry tops.        Upper Body Dressing    Self-Performance obtains clothes;orthosis application;threads left arm, shirt;threads right arm, shirt;pulls shirt over head/around back;pulls shirt down/adjusts     [place] Assistance pulls shirt down/adjusts     [place] minimum assist (75% or more patient effort)     Position supported sitting     Adaptive Equipment reacher     Comment S c VCs required for carryover of technique for don/off aspen and steven collar. Pt requires assistance adjusting shirt around collar. Pt demo's good safety precautions and abides to spinal precautions.        Lower Body Dressing    Self-Performance obtains clothes;threads left leg, underpants;threads right leg, underpants;pulls underpants up or down;threads left leg, pants/shorts;threads right leg, pants/shorts;pulls pants/shorts up or down;dons/doffs left sock;dons/doffs right sock     [place] Assistance dons/doffs left shoe;dons/doffs right shoe     [place] close supervision     Position supported sitting;unsupported standing     Adaptive Equipment sock aid;reacher;dressing stick     [place], Footwear supervision     Comment OT donned R/L shoe due to time contraints.        Toileting    [place] supervision     Position supported sitting     Adaptive Equipment grab bar/safety frame     Comment Pt able to complete toileting c S on comfort height toilet c grab bars        Daily Progress Summary (OT)    Daily Outcome Statement OT session focused on ADL performance.  Pt improved to MIN A to S for toileting, bathing, UB dressing  and LB dressing with AE.  Pt still benefits from dynamic standing balance interventions but is demonstrating improvement.  Pt still benefits from endurance training but demo's increasing activity tolerance with less rest breaks. Pt will continue to benefit from tub transfer training to increase safety, independence and carryover of technique.                           IRF OT Goals      Most Recent Value   Transfer Goal 1    Activity/Assistive Device toilet at 01/17/2022 0840   [place] supervision required at 01/17/2022 0840   Time Frame short-term goal (STG), 1 week at 01/17/2022 0840   Progress/Outcome goal met, goal revised this date at 01/17/2022 0840   Transfer Goal 2    Activity/Assistive Device toilet at 01/12/2022 0840   [place] modified independence at 01/12/2022 0840   Time Frame long-term goal (LTG), 2 weeks at 01/12/2022 0840   Progress/Outcome goal ongoing at 01/17/2022 0840   Transfer Goal 3    Activity/Assistive Device shower at 01/17/2022 0840   [place] supervision required at 01/17/2022 0840   Time Frame short-term goal (STG), 1 week at 01/17/2022 0840   Progress/Outcome goal met, goal revised this date at 01/17/2022 0840   Transfer Goal 4    Activity/Assistive Device shower at 01/12/2022 0840   [place] modified independence at 01/12/2022 0840   Time Frame long-term goal (LTG), 2 weeks at 01/12/2022 0840   Progress/Outcome goal ongoing at 01/17/2022 0840   Bathing Goal 1    Activity/Assistive Device bathing skills, all at 01/12/2022 0840   [place] supervision required at 01/17/2022 0840   Time Frame short-term goal (STG), 1 week at 01/17/2022 0840   Progress/Outcome goal met, goal revised this date at 01/17/2022 0840   Bathing Goal 2    Activity/Assistive Device bathing skills, all at 01/12/2022 0840   [place] modified independence at 01/12/2022 0840   Time Frame long-term goal (LTG), 2 weeks at 01/12/2022 0840   Progress/Outcome goal ongoing at 01/17/2022 0840   UB  Dressing Goal 1    Activity/Assistive Device upper body dressing at 01/12/2022 0840   [place] supervision required  [Cl S] at 01/12/2022 0840   Time Frame short-term goal (STG), 1 week at 01/17/2022 0840   Strategies/Barriers including clothing retrieval at 01/17/2022 0840   Progress/Outcome goal ongoing at 01/17/2022 0840   UB Dressing Goal 2    Activity/Assistive Device upper body dressing at 01/12/2022 0840   [place] modified independence at 01/12/2022 0840   Time Frame long-term goal (LTG), 2 weeks at 01/12/2022 0840   Progress/Outcome goal ongoing at 01/17/2022 0840   LB Dressing Goal 1    Activity/Assistive Device lower body dressing at 01/12/2022 0840   [place] supervision required  [Cl S] at 01/17/2022 0840   Time Frame short-term goal (STG), 1 week at 01/17/2022 0840   Progress/Outcome goal met, goal revised this date at 01/17/2022 0840   LB Dressing Goal 2    Activity/Assistive Device lower body dressing at 01/12/2022 0840   [place] modified independence at 01/12/2022 0840   Time Frame long-term goal (LTG), 2 weeks at 01/12/2022 0840   Progress/Outcome goal ongoing at 01/17/2022 0840   Grooming Goal 1    [place] supervision required at 01/17/2022 0840   Time Frame short-term goal (STG), 1 week at 01/17/2022 0840   Strategies/Barriers Standing at 01/12/2022 0840   Progress/Outcome goal met, goal revised this date at 01/17/2022 0840   Grooming Goal 2    Activity/Assistive Device grooming skills, all at 01/12/2022 0840   [place] modified independence at 01/12/2022 0840   Time Frame long-term goal (LTG), 2 weeks at 01/12/2022 0840   Progress/Outcome goal ongoing at 01/17/2022 0840   Toileting Goal 1    Activity/Assistive Device toileting skills, all at 01/12/2022 0840   [place] supervision required at 01/17/2022 0840   Time Frame short-term goal (STG), 1 week at 01/17/2022 0840   Progress/Outcome goal met, goal revised this date at 01/17/2022 0840   Toileting Goal 2     Activity/Assistive Device toileting skills, all at 01/12/2022 0840   [place] modified independence at 01/12/2022 0840   Time Frame long-term goal (LTG), 2 weeks at 01/12/2022 0840   Progress/Outcome goal ongoing at 01/17/2022 0840

## 2022-01-20 NOTE — PLAN OF CARE
Plan of Care Review  Plan of Care Reviewed With: patient  Progress: improving  Outcome Summary: appears to be sleeping well; call bell in reach; bed alarm set; no reports of concerns at this time.

## 2022-01-21 ENCOUNTER — APPOINTMENT (INPATIENT)
Dept: OCCUPATIONAL THERAPY | Facility: REHABILITATION | Age: 72
DRG: 565 | End: 2022-01-21
Payer: COMMERCIAL

## 2022-01-21 ENCOUNTER — APPOINTMENT (INPATIENT)
Dept: PHYSICAL THERAPY | Facility: REHABILITATION | Age: 72
DRG: 565 | End: 2022-01-21
Payer: COMMERCIAL

## 2022-01-21 PROCEDURE — 63700000 HC SELF-ADMINISTRABLE DRUG: Performed by: INTERNAL MEDICINE

## 2022-01-21 PROCEDURE — 12800001 HC ROOM AND CARE SEMIPRIVATE REHAB-BRAIN INJ

## 2022-01-21 PROCEDURE — 97530 THERAPEUTIC ACTIVITIES: CPT | Mod: GP

## 2022-01-21 PROCEDURE — 97110 THERAPEUTIC EXERCISES: CPT | Mod: GP

## 2022-01-21 PROCEDURE — 97116 GAIT TRAINING THERAPY: CPT | Mod: GP

## 2022-01-21 PROCEDURE — 97530 THERAPEUTIC ACTIVITIES: CPT | Mod: GO

## 2022-01-21 PROCEDURE — 97110 THERAPEUTIC EXERCISES: CPT | Mod: GO

## 2022-01-21 RX ADMIN — ESCITALOPRAM OXALATE 10 MG: 10 TABLET, FILM COATED ORAL at 08:07

## 2022-01-21 RX ADMIN — ATORVASTATIN CALCIUM 40 MG: 40 TABLET, FILM COATED ORAL at 17:29

## 2022-01-21 RX ADMIN — OXYCODONE HYDROCHLORIDE 5 MG: 5 TABLET ORAL at 08:07

## 2022-01-21 RX ADMIN — AMLODIPINE BESYLATE 5 MG: 5 TABLET ORAL at 08:07

## 2022-01-21 RX ADMIN — HYDROCHLOROTHIAZIDE 25 MG: 25 TABLET ORAL at 08:07

## 2022-01-21 RX ADMIN — CETIRIZINE HYDROCHLORIDE 5 MG: 5 TABLET ORAL at 20:15

## 2022-01-21 RX ADMIN — CARVEDILOL 6.25 MG: 6.25 TABLET, FILM COATED ORAL at 08:06

## 2022-01-21 RX ADMIN — ACETAMINOPHEN 975 MG: 325 TABLET, FILM COATED ORAL at 13:18

## 2022-01-21 RX ADMIN — CARVEDILOL 6.25 MG: 6.25 TABLET, FILM COATED ORAL at 17:29

## 2022-01-21 RX ADMIN — FLUTICASONE PROPIONATE 2 SPRAY: 50 SPRAY, METERED NASAL at 08:12

## 2022-01-21 RX ADMIN — FAMOTIDINE 20 MG: 20 TABLET ORAL at 20:15

## 2022-01-21 RX ADMIN — OXYCODONE HYDROCHLORIDE 10 MG: 5 TABLET ORAL at 20:14

## 2022-01-21 RX ADMIN — AMLODIPINE BESYLATE 5 MG: 5 TABLET ORAL at 20:14

## 2022-01-21 RX ADMIN — LIDOCAINE 1 PATCH: 246 PATCH TOPICAL at 08:07

## 2022-01-21 RX ADMIN — GUAIFENESIN 600 MG: 600 TABLET, EXTENDED RELEASE ORAL at 20:15

## 2022-01-21 RX ADMIN — OXYCODONE HYDROCHLORIDE 10 MG: 5 TABLET ORAL at 15:20

## 2022-01-21 RX ADMIN — ACETAMINOPHEN 975 MG: 325 TABLET, FILM COATED ORAL at 21:17

## 2022-01-21 RX ADMIN — DABIGATRAN ETEXILATE MESYLATE 150 MG: 150 CAPSULE ORAL at 08:07

## 2022-01-21 RX ADMIN — DABIGATRAN ETEXILATE MESYLATE 150 MG: 150 CAPSULE ORAL at 20:14

## 2022-01-21 RX ADMIN — SENNOSIDES AND DOCUSATE SODIUM 1 TABLET: 50; 8.6 TABLET ORAL at 08:06

## 2022-01-21 RX ADMIN — FAMOTIDINE 20 MG: 20 TABLET ORAL at 08:06

## 2022-01-21 RX ADMIN — LOSARTAN POTASSIUM 100 MG: 100 TABLET, FILM COATED ORAL at 17:29

## 2022-01-21 RX ADMIN — GUAIFENESIN 600 MG: 600 TABLET, EXTENDED RELEASE ORAL at 08:06

## 2022-01-21 RX ADMIN — ACETAMINOPHEN 975 MG: 325 TABLET, FILM COATED ORAL at 06:29

## 2022-01-21 NOTE — PROGRESS NOTES
Patient: Melanie Zelaya  Location: [facility] [address]  MRN: [id number]  Today's date: 1/21/2022    History of Present Illness  Melanie is a 71 y.o. male admitted on 1/11/2022 with S/P cervical spinal fusion [Z98.1]. Principal problem is S/P cervical spinal fusion.      Past Medical History  Melanie has a past medical history of Anxiety, Atrial fibrillation (CMS/HCC), Breast cancer (CMS/HCC), C1 cervical fracture (CMS/HCC), Colon polyp, COPD (chronic obstructive pulmonary disease) (CMS/HCC), Coronary artery calcification seen on CT scan, COVID-19 vaccine series completed, Depression, Diverticulosis, Diverticulosis, Fracture of pelvis (CMS/HCC) (1968), GERD (gastroesophageal reflux disease), Hearing loss, History of colon polyps, History of COVID-19 (2020), Hyperlipidemia, Hypertension, Left atrial enlargement, Lung cancer (CMS/HCC), Lung cancer (CMS/HCC), Pneumonia (2011), Seasonal allergies, and Wrist fracture.      PT Vitals    Date/Time Pulse HR Source BP BP Location BP Method Pt Position [facility]   01/21/22 1536 64 Monitor 148/70 Right upper arm Automatic Sitting EBB      PT Pain    Date/Time Pain Type Side/Orientation Location Rating: Rest Description Interventions [facility]   01/21/22 1536 Pain Assessment right;upper;posterior neck 3 intermittent;pressure;squeezing;tightness;stabbing premedicated for activity EBB          Prior Living Environment      Most Recent Value   People in Home alone   Current Living Arrangements home   Home Accessibility not wheelchair accessible   Living Environment Comment 3SH, 3 VALERIA LHR, FF to bathroom then another FF to bedroom, tub shower w/ SC   Number of Stairs, Main Entrance 4   Surface of Stairs, Main Entrance concrete   Stair Railings, Main Entrance railing on left side (ascending)   Landing, Stairs, Main Entrance adequate turning radius   Number of Stairs, Second Entrance other (see comments)  [back door not utilized]   Location, Kitchen first (main) floor    [place], Kitchen tile floor   Location, Laundry Room first (main) floor   [place], Laundry Room concrete floor   Laundry Room Access not wheelchair accessible   Laundry Room Access Comment down in basement,  with full flight and L hand rail   Location, Patient Bedroom other (see comments)  [3rd floor]   [place], Patient Bedroom carpeted floor   Location, Bathroom second floor, must negotiate stairs to access   [place], Bathroom tile floor   Bathroom Access not wheelchair accessible   Bathroom Access Comment Pt reports FB on 2nd floor. Pt has shower chair.   Number of Stairs, Within Home, Primary 12   Surface of Stairs, Within Home, Primary carpeting   Stair Railings, Within Home, Primary railings on both sides of stairs   Stairs Comment, Within Home, Primary to second floor for bathroom   Stairs, Within Home, Secondary 12   Surface of Stairs, Within Home, Secondary carpeting   Stair Railings, Within Home, Secondary railing on left side (ascending)   Stairs Comment, Within Home, Secondary to third floor for bed room          Prior Level of Function      Most Recent Value   Dominant Hand right   Ambulation assistive equipment   Transferring assistive equipment   Toileting independent   Bathing independent   Dressing assistive equipment  [shoe horn]   Eating independent   Prior Level of Function Comment Pt reports prior use of cane in home as needed for ambulation and use of a shower chair for bathing. Pt reports was completing BADLs independently.   Assistive Device Currently Used at Home cane, straight, shower chair           IRF PT Evaluation and Treatment - 01/21/22 1535        PT Time Calculation    Start Time 1530     Stop Time 1600     Time Calculation (min) 30 min        Session Details    Document Type daily treatment/progress note     Mode of Treatment physical therapy;individual therapy        General Information    Patient Profile Reviewed yes     General Observations of Patient [place]         Sit to Stand Transfer    [place], Sit to Stand Transfer supervision;verbal cues     Verbal Cues safety     Assistive Device walker, front-wheeled;gait belt        Stand to Sit Transfer    [place], Stand to Sit Transfer supervision;verbal cues     Verbal Cues safety;hand placement     Assistive Device walker, front-wheeled;gait belt        Stand Pivot Transfer    [place], Stand Pivot/Stand Step Transfer supervision;verbal cues     Verbal Cues safety     Assistive Device walker, front-wheeled;gait belt        Gait Training    [place], Gait supervision     Assistive Device walker, front-wheeled;gait belt     Distance in Feet 200 feet   x 2    Pattern (Gait) step-through        Timed Up and Go Test    Trial One: Timed Up and Go Test 20.77     Trial Two: Timed Up and Go Test 21.6     Trial Three: Timed Up and Go Test 20.23     Mean of 3 Trials: Timed Up and Go Test [id number]     Comment, Timed Up and Go Test RW, Cl S     Results, Timed Up and Go Test (Balance) improved 3-trial ave speed by ~ 9 sec from previous assess; remains beyond community fall risk cut-off of >/= 13.5 sec        Daily Progress Summary (PT)    Progress Toward Functional Goals (PT) progressing toward functional goals as expected     Daily Outcome Statement Pt reports primary pain remains posterior neck, shoulders and sx's more towards right side vs left. He adds occasionally he gets a sharp, spasm or pain to R shoulder area. Cont to perform transfers and amb w/ grossly Supervision and AD, and demo ~ 30% improvement in TUG gait speed for 3-trial average as noted above. Speed however remains beyond community fall risk cut-off. Cont w/ POC and progress as roscoe.                           IRF PT Goals      Most Recent Value   Bed Mobility Goal 1    Activity/Assistive Device rolling to right, rolling to left at 01/12/2022 0831   [place] supervision required at 01/12/2022 0831   Time Frame short-term goal (STG), 5 - 7  days at 01/12/2022 0831   Progress/Outcome goal met at 01/15/2022 0905   Bed Mobility Goal 2    Activity/Assistive Device rolling to left, rolling to right at 01/12/2022 0831   [place] modified independence at 01/12/2022 0831   Time Frame long-term goal (LTG), 21 days or less at 01/12/2022 0831   Progress/Outcome goal ongoing at 01/17/2022 0800   Bed Mobility Goal 3    Activity/Assistive Device sit to supine/supine to sit at 01/12/2022 0831   [place] other (see comments)  [touching assist] at 01/12/2022 0831   Time Frame short-term goal (STG), 5 - 7 days at 01/12/2022 0831   Progress/Outcome goal met at 01/15/2022 0905   Bed Mobility Goal 4    Activity/Assistive Device sit to supine/supine to sit at 01/12/2022 0831   [place] modified independence at 01/12/2022 0831   Time Frame long-term goal (LTG), 21 days or less at 01/12/2022 0831   Progress/Outcome goal ongoing at 01/17/2022 0800   Transfer Goal 1    Activity/Assistive Device sit-to-stand/stand-to-sit at 01/12/2022 0831   [place] other (see comments)  [touching assist] at 01/12/2022 0831   Time Frame short-term goal (STG), 5 - 7 days at 01/12/2022 0831   Progress/Outcome goal met at 01/17/2022 0800   Transfer Goal 2    Activity/Assistive Device sit-to-stand/stand-to-sit at 01/12/2022 0831   [place] modified independence at 01/12/2022 0831   Time Frame 21 days or less, long-term goal (LTG) at 01/12/2022 0831   Progress/Outcome goal ongoing at 01/17/2022 0800   Transfer Goal 3    Activity/Assistive Device stand pivot at 01/12/2022 0831   [place] other (see comments)  [touching assist] at 01/12/2022 0831   Time Frame short-term goal (STG), 5 - 7 days at 01/12/2022 0831   Progress/Outcome goal met at 01/17/2022 0800   Transfer Goal 4    Activity/Assistive Device stand pivot at 01/12/2022 0831   [place] modified independence at 01/12/2022 0831   Time Frame long-term goal (LTG), 21 days or less at 01/12/2022 0831   Progress/Outcome  goal ongoing at 01/17/2022 0800   Gait/Walking Locomotion Goal 1    Activity/Assistive Device gait (walking locomotion), assistive device use at 01/12/2022 0831   Distance 50 feet at 01/12/2022 0831   [place] other (see comments)  [touching assist] at 01/12/2022 0831   Time Frame short-term goal (STG), 5 - 7 days at 01/12/2022 0831   Progress/Outcome goal met at 01/17/2022 0800   Gait/Walking Locomotion Goal 2    Activity/Assistive Device gait (walking locomotion), assistive device use at 01/12/2022 0831   Distance 150 feet at 01/12/2022 0831   [place] modified independence at 01/12/2022 0831   Time Frame long-term goal (LTG), 21 days or less at 01/12/2022 0831   Progress/Outcome goal ongoing at 01/17/2022 0800   Stairs Goal 1    Activity/Assistive Device ascending stairs, descending stairs at 01/15/2022 0905   Number of Stairs 12 at 01/15/2022 0905   [place] minimum assist (75% or more patient effort) at 01/15/2022 0905   Time Frame short-term goal (STG), 1 week at 01/15/2022 0905   Progress/Outcome goal met at 01/17/2022 0800   Stairs Goal 2    Activity/Assistive Device ascending stairs, descending stairs at 01/15/2022 0905   Number of Stairs 12 at 01/15/2022 0905   [place] modified independence at 01/15/2022 0905   Time Frame long-term goal (LTG), 21 days or less, by discharge at 01/15/2022 0905   Progress/Outcome goal ongoing at 01/17/2022 0800

## 2022-01-21 NOTE — PROGRESS NOTES
Psychiatry Progress Note    History of Present Illness   See initial consult.  History pertaining to psychosis, depression and anxiety and other psychiatric and psychologic conditions as well as general medical history detailed in initial consult.      Interval History:     Less tense, stil minimizes emotional issues but not problematic  Energy ok  Feels more positively about progress in therapy  Secondary benefit for mood  Sleep pattern more consistent  Energy continues to improve  Frustration level manageable  Seems less tense with some improved affect noted  Discussed with nursing - no problems overnight or any behavioral concerns  Does not report any concerns about psychotropic medicines      MENTAL STATUS EXAM  Appearance: well groomed  Gait and Motor: no abnormal movements  Speech: normal rate/rhythm/volume  Mood: depressed and anxious  Affect: constricted  Associations: coherent  Thought Process: goal-directed  Thought Content: no auditory or visual hallucinations.  Suicidality/Homicidality: denies  Judgement/Insight: acknowledges illness  Orientation: situation  Memory: knows current president  Attention: distracted  Knowledge: normal  Language: normal    Vital Signs for the last 24 hours:  Temp:  [36.6 °C (97.8 °F)-36.7 °C (98.1 °F)] 36.6 °C (97.8 °F)  Heart Rate:  [55-78] 70  Resp:  [18] 18  BP: (129-156)/(67-85) 141/67    Labs:  Labs below reviewed for pertinence to psychiatric condition  Lab Results   Component Value Date    GLUCOSE 103 (H) 01/18/2022    CALCIUM 9.7 01/18/2022     01/18/2022    K 4.3 01/18/2022    CO2 29 01/18/2022    CL 97 (L) 01/18/2022    BUN 13 01/18/2022    CREATININE 0.9 01/18/2022     Lab Results   Component Value Date    WBC 5.04 01/18/2022    HGB 14.3 01/18/2022    HCT 43.5 01/18/2022    MCV 89.3 01/18/2022     01/18/2022     Pain Management Panel    There is no flowsheet data to display.           Current Facility-Administered Medications   Medication Dose Route  Frequency Provider Last Rate Last Admin   • acetaminophen (TYLENOL) tablet 975 mg  975 mg oral q8h Fiona Rivera MD   975 mg at 01/21/22 0629   • albuterol HFA (VENTOLIN HFA) 90 mcg/actuation inhaler 2 puff  2 puff inhalation q6h PRN Jorge Edwards MD       • amLODIPine (NORVASC) tablet 5 mg  5 mg oral q12h MARIAN Fiona Rivera MD   5 mg at 01/21/22 0807   • atorvastatin (LIPITOR) tablet 40 mg  40 mg oral Daily (6p) Jorge Edwards MD   40 mg at 01/20/22 1717   • bisacodyL (DULCOLAX) 10 mg suppository 10 mg  10 mg rectal Daily PRN Jorge Edwards MD       • carvediloL (COREG) tablet 6.25 mg  6.25 mg oral BID with meals Fiona Rivera MD   6.25 mg at 01/21/22 0806   • cetirizine (ZyrTEC) tablet 5 mg  5 mg oral Nightly Jorge Edwards MD   5 mg at 01/20/22 2121   • cyclobenzaprine (FLEXERIL) tablet 10 mg  10 mg oral 3x daily PRN Jorge Edwards MD   10 mg at 01/19/22 1446   • dabigatran etexilate (PRADAXA) capsule 150 mg  150 mg oral BID Jorge Edwards MD   150 mg at 01/21/22 0807   • escitalopram (LEXAPRO) tablet 10 mg  10 mg oral Daily Jorge Edwards MD   10 mg at 01/21/22 0807   • famotidine (PEPCID) tablet 20 mg  20 mg oral BID Fiona Rivera MD   20 mg at 01/21/22 0806   • fluticasone propionate (FLONASE) 50 mcg/actuation nasal spray 2 spray  2 spray Each Nostril Daily Jorge Edwards MD   2 spray at 01/21/22 0812   • guaiFENesin (MUCINEX) 12 hr ER tablet 600 mg  600 mg oral BID Fiona Rivera MD   600 mg at 01/21/22 0806   • hydrochlorothiazide (HYDRODIURIL) tablet 25 mg  25 mg oral Daily Fiona Rivera MD   25 mg at 01/21/22 0807   • lidocaine (ASPERCREME) 4 % topical patch 1 patch  1 patch Topical Daily Jorge Edwards MD   1 patch at 01/21/22 0807   • losartan (COZAAR) tablet 100 mg  100 mg oral Daily with dinner Fiona Rivera MD   100 mg at 01/20/22 1717   • oxyCODONE (ROXICODONE) immediate release tablet 10 mg  10 mg oral q4h PRN Jorge Edwards,  MD   10 mg at 01/20/22 2119   • oxyCODONE (ROXICODONE) immediate release tablet 5 mg  5 mg oral q4h PRN Jorge Edwards MD   5 mg at 01/21/22 0807   • polyethylene glycol (MIRALAX) 17 gram packet 17 g  17 g oral Daily PRN Jorge Edwards MD       • sennosides-docusate sodium (SENOKOT-S) 8.6-50 mg per tablet 1 tablet  1 tablet oral BID Jorge Edwards MD   1 tablet at 01/21/22 0806   • umeclidinium-vilanteroL (ANORO ELLIPTA) 62.5-25 mcg/actuation inhaler 1 puff  1 puff inhalation Daily Jorge Edwards MD   1 puff at 01/21/22 0812        Patient Active Problem List   Diagnosis   • Anxiety   • Atrial fibrillation (CMS/HCC)   • Chronic diastolic heart failure (CMS/HCC)   • Hearing loss   • Primary malignant neoplasm of left lower lobe of lung (CMS/HCC)   • Multiple pulmonary nodules   • Other emphysema (CMS/HCC)   • Gastroesophageal reflux disease without esophagitis   • Asthma   • Cervical spine fracture (CMS/HCC)   • Hypertension   • Mild episode of recurrent major depressive disorder (CMS/HCC)   • Preop examination   • Coronary artery calcification seen on CT scan   • COPD (chronic obstructive pulmonary disease) (CMS/HCC)   • Prediabetes   • History of cervical fracture   • H/O cervical fracture   • Closed nondisplaced odontoid fracture with type II morphology and delayed healing   • S/P cervical spinal fusion           Assessment/Plan    Depression: CBT automatic anxious and negative thoughts  Adjustment Disorder to Disability: supportive therapy  Sleep:  Insight into illness: insight therapy/psychoeducation  Psychotherapy: supportive  Monitor: Mood, Speech, Appetite, Energy, Cognition, Behavioral, Impulsivity, Agitation  Family Support  Medications: monitor for side effects  - presently no gi, akathesia , ha or sedation  Sodium 138  Okay to continue Lexapro 10 mg daily-monitor for any side effects  Monitor sodium on Lexapro-currently 138  Support ATD  CBT automatic anxious and negative  thoughts  Clarify for patient not on porzac  cnt for anxiety  Continue lexapro and support mood  Declines increase in lexapro dose  cbt automatic thoughts - continue  Austen Boyd MD  1/21/2022

## 2022-01-21 NOTE — PLAN OF CARE
Problem: Rehabilitation (IRF) Plan of Care  Goal: Plan of Care Review  Flowsheets (Taken 1/21/2022 1500)  Progress: improving  Plan of Care Reviewed With: (Darshan)   patient   daughter  Outcome Summary: spoke with patient to see how he is feeling. He feels good and feel like dc on the 25th still a plan.  Pt requests [facility] as that is where his surgery was done...  CM referred this date.  Team will meet on monday to ensure medical stability for dc.   Pt reports he does have a ride to his medical appointment and then home from there.   He asks CM to update Dgt Teneil.. Message left for dgt.    Support offered. Questions answered - Sherry PRECIADO

## 2022-01-21 NOTE — PROGRESS NOTES
Patient: Melanie Zelaya  Location: [facility] [address]  MRN: [id number]  Today's date: 1/21/2022    History of Present Illness  Melanie is a 71 y.o. male admitted on 1/11/2022 with S/P cervical spinal fusion [Z98.1]. Principal problem is S/P cervical spinal fusion.      Past Medical History  Melanie has a past medical history of Anxiety, Atrial fibrillation (CMS/HCC), Breast cancer (CMS/HCC), C1 cervical fracture (CMS/HCC), Colon polyp, COPD (chronic obstructive pulmonary disease) (CMS/HCC), Coronary artery calcification seen on CT scan, COVID-19 vaccine series completed, Depression, Diverticulosis, Diverticulosis, Fracture of pelvis (CMS/HCC) (1968), GERD (gastroesophageal reflux disease), Hearing loss, History of colon polyps, History of COVID-19 (2020), Hyperlipidemia, Hypertension, Left atrial enlargement, Lung cancer (CMS/HCC), Lung cancer (CMS/HCC), Pneumonia (2011), Seasonal allergies, and Wrist fracture.       01/21/22 0841   Pain/Comfort/Sleep   Pain Charting Type Pain Assessment   (0-10) Pain Rating: Rest 3   Pain Body Location neck   Pain Management Interventions premedicated for activity   Vital Signs   Heart Rate 70   BP (!) 141/67   BP Location Right upper arm   BP Method Automatic   Patient Position Sitting      01/21/22 0928   Pain/Comfort/Sleep   Pain Charting Type Pain Reassessment   (0-10) Pain Rating: Rest 3   Pain Body Location neck   Pain Management Interventions premedicated for activity       Prior Living Environment      Most Recent Value   People in Home alone   Current Living Arrangements home   Home Accessibility not wheelchair accessible   Living Environment Comment 3SH, 3 VALERIA LHR, FF to bathroom then another FF to bedroom, tub shower w/ SC   Number of Stairs, Main Entrance 4   Surface of Stairs, Main Entrance concrete   Stair Railings, Main Entrance railing on left side (ascending)   Landing, Stairs, Main Entrance adequate turning radius   Number of Stairs, Second  Entrance other (see comments)  [back door not utilized]   Location, Kitchen first (main) floor   [place], Kitchen tile floor   Location, Laundry Room first (main) floor   [place], Laundry Room concrete floor   Laundry Room Access not wheelchair accessible   Laundry Room Access Comment down in basement,  with full flight and L hand rail   Location, Patient Bedroom other (see comments)  [3rd floor]   [place], Patient Bedroom carpeted floor   Location, Bathroom second floor, must negotiate stairs to access   [place], Bathroom tile floor   Bathroom Access not wheelchair accessible   Bathroom Access Comment Pt reports FB on 2nd floor. Pt has shower chair.   Number of Stairs, Within Home, Primary 12   Surface of Stairs, Within Home, Primary carpeting   Stair Railings, Within Home, Primary railings on both sides of stairs   Stairs Comment, Within Home, Primary to second floor for bathroom   Stairs, Within Home, Secondary 12   Surface of Stairs, Within Home, Secondary carpeting   Stair Railings, Within Home, Secondary railing on left side (ascending)   Stairs Comment, Within Home, Secondary to third floor for bed room          Prior Level of Function      Most Recent Value   Dominant Hand right   Ambulation assistive equipment   Transferring assistive equipment   Toileting independent   Bathing independent   Dressing assistive equipment  [shoe horn]   Eating independent   Prior Level of Function Comment Pt reports prior use of cane in home as needed for ambulation and use of a shower chair for bathing. Pt reports was completing BADLs independently.   Assistive Device Currently Used at Home cane, straight, shower chair          Occupational Profile      Most Recent Value   Successful Occupations Pt reports not a  within past year but would like to return to driving in future.   Occupational History/Life Experiences Pt reports living c  in [facility]. Pt has three (adult) children and grandchildren. Pt has good  family support.           01/21/22 0843   OT Time Calculation   Start Time 0830   Stop Time 0930   Time Calculation (min) 60 min   Session Details   Document Type daily treatment/progress note   Mode of Treatment occupational therapy;individual therapy   General Information   General Observations of Patient Pt received seated in w/c in pts room. Pt agreeable to therapy.   Bed Mobility   Comment (Bed Mobility) Transfer training in ILU. S ambulatory approach to bed c RW. Pt safetly performs sit to/from supine in bed utilizing log roll technique to maintain spinal precautions.   Transfers   Transfers stand pivot transfer;toilet transfer;tub transfer   Sit to Stand Transfer   [place], Sit to Stand Transfer supervision   Verbal Cues safety   Assistive Device walker, front-wheeled   Comment from w/c   Stand to Sit Transfer   [place], Stand to Sit Transfer supervision   Verbal Cues safety   Assistive Device walker, front-wheeled   Comment to w/c   Stand Pivot Transfer   [place], Stand Pivot/Stand Step Transfer supervision   Verbal Cues safety   Assistive Device walker, front-wheeled   Comment Ambulatory approach to w/c   Toilet Transfer   Transfer Technique stand pivot   [place], Toilet Transfer supervision   Verbal Cues safety   Assistive Device walker, front-wheeled;grab bars/safety frame   Comment Transfer training in ILU. Ambulatory approach. OT recommending toilet safety frame, pt agreeable.   Tub Transfer   Comment Transfer training in ILU. Pt completes 2 trials, 1st c Cl S progressing to S on 2nd trial. Good carryover of safe techniques noted. Pt reports having shower chair and horizontal grab bar at interior wall at home. OT recommending adding verticle grab bar on anterior wall for increased safety.   [place], Tub Transfer supervision   Assistive Device shower chair;walker, front-wheeled;grab bars/tub rail;gait belt   Transfer Technique step over, left entry   Verbal Cues safety   Safety  Issues, Functional Mobility   Comment, Safety Issues/Impairments (Mobility) S ambulating in ILU. Pt demo's good RW safety, carryover and pacing throughout ambulation and transfers.   Upper Extremity (Therapeutic Exercise)   Exercise Position/Type seated;AROM (active range of motion);AAROM (active assistive range of motion)   General Exercise bilateral;pendulums;bicep curl   Range of Motion Exercises bilateral;shoulder flexion/extension;elbow flexion/extension   Reps and Sets 3x10   Comment Pt completed BUE ROM exercises c unweighted dowel. AAROM RUE and AROM LUE. Exercises kept light 2* shoulder pain.   Meal Preparation (IADLs)   Comment, Meal Preparation S c supported/unsupported standing at Kitchen counter c RW while engaging in a cooking activity. Pt able to maintain unsupported standing balance s LOB while reaching and engaging in bimanual cooking activity. Pt completes item retrieval for simple microwave meal prep and performs all related clean up tasks. MIN VCs for maintaining spinal precautions. Pt demo's good carryover of safe RW and item transport techniques.   Daily Progress Summary (OT)   Daily Outcome Statement OT session focused on transfer training and functional mobility in the ILU. Pt benefits from ROM exercises prior to activity for improved tolerance c reaching during meal prep activity. Pt demo's good carryover of RW safety and technique. Pt demo's increased activity tolerance. Pt required Min VCs to prevent twisting c cooking activity. Pt benefits from continued transfer training, balance/endurance training and the use of AE to increase independence. Pt supplied with DME packet c OT recommendations.               IRF OT Goals      Most Recent Value   Transfer Goal 1    Activity/Assistive Device toilet at 01/17/2022 0840   [place] supervision required at 01/17/2022 0840   Time Frame short-term goal (STG), 1 week at 01/17/2022 0840   Progress/Outcome goal met, goal revised this date at  01/17/2022 0840   Transfer Goal 2    Activity/Assistive Device toilet at 01/12/2022 0840   Gem modified independence at 01/12/2022 0840   Time Frame long-term goal (LTG), 2 weeks at 01/12/2022 0840   Progress/Outcome goal ongoing at 01/17/2022 0840   Transfer Goal 3    Activity/Assistive Device shower at 01/17/2022 0840   [place] supervision required at 01/17/2022 0840   Time Frame short-term goal (STG), 1 week at 01/17/2022 0840   Progress/Outcome goal met, goal revised this date at 01/17/2022 0840   Transfer Goal 4    Activity/Assistive Device shower at 01/12/2022 0840   Gem modified independence at 01/12/2022 0840   Time Frame long-term goal (LTG), 2 weeks at 01/12/2022 0840   Progress/Outcome goal ongoing at 01/17/2022 0840   Bathing Goal 1    Activity/Assistive Device bathing skills, all at 01/12/2022 0840   [place] supervision required at 01/17/2022 0840   Time Frame short-term goal (STG), 1 week at 01/17/2022 0840   Progress/Outcome goal met, goal revised this date at 01/17/2022 0840   Bathing Goal 2    Activity/Assistive Device bathing skills, all at 01/12/2022 0840   Gem modified independence at 01/12/2022 0840   Time Frame long-term goal (LTG), 2 weeks at 01/12/2022 0840   Progress/Outcome goal ongoing at 01/17/2022 0840   UB Dressing Goal 1    Activity/Assistive Device upper body dressing at 01/12/2022 0840   [place]em supervision required  [Cl S] at 01/12/2022 0840   Time Frame short-term goal (STG), 1 week at 01/17/2022 0840   Strategies/Barriers including clothing retrieval at 01/17/2022 0840   Progress/Outcome goal ongoing at 01/17/2022 0840   UB Dressing Goal 2    Activity/Assistive Device upper body dressing at 01/12/2022 0840   Gem modified independence at 01/12/2022 0840   Time Frame long-term goal (LTG), 2 weeks at 01/12/2022 0840   Progress/Outcome goal ongoing at 01/17/2022 0840   LB Dressing Goal 1    Activity/Assistive Device lower body  dressing at 01/12/2022 0840   [place] supervision required  [Cl S] at 01/17/2022 0840   Time Frame short-term goal (STG), 1 week at 01/17/2022 0840   Progress/Outcome goal met, goal revised this date at 01/17/2022 0840   LB Dressing Goal 2    Activity/Assistive Device lower body dressing at 01/12/2022 0840   [place]ass modified independence at 01/12/2022 0840   Time Frame long-term goal (LTG), 2 weeks at 01/12/2022 0840   Progress/Outcome goal ongoing at 01/17/2022 0840   Grooming Goal 1    [place] supervision required at 01/17/2022 0840   Time Frame short-term goal (STG), 1 week at 01/17/2022 0840   Strategies/Barriers Standing at 01/12/2022 0840   Progress/Outcome goal met, goal revised this date at 01/17/2022 0840   Grooming Goal 2    Activity/Assistive Device grooming skills, all at 01/12/2022 0840   Vass modified independence at 01/12/2022 0840   Time Frame long-term goal (LTG), 2 weeks at 01/12/2022 0840   Progress/Outcome goal ongoing at 01/17/2022 0840   Toileting Goal 1    Activity/Assistive Device toileting skills, all at 01/12/2022 0840   [place] supervision required at 01/17/2022 0840   Time Frame short-term goal (STG), 1 week at 01/17/2022 0840   Progress/Outcome goal met, goal revised this date at 01/17/2022 0840   Toileting Goal 2    Activity/Assistive Device toileting skills, all at 01/12/2022 0840   Vass modified independence at 01/12/2022 0840   Time Frame long-term goal (LTG), 2 weeks at 01/12/2022 0840   Progress/Outcome goal ongoing at 01/17/2022 0840

## 2022-01-21 NOTE — PLAN OF CARE
Plan of Care Review  Plan of Care Reviewed With: patient  Progress: improving  Outcome Summary: Pt compliant with wearing of aspen collar AAT. Does remove it appropriately as allowed when eating meals. Area of pressure noted under front of collar on left medial clavicle; mepilex applied. Pt manages posterior neck and shoulder pain with repositioning, daily lidocaine patches, scheduled Tylenol and occasional PRN oxycodone. Staples to incision are intact, covered with mepilex to protect from collar.

## 2022-01-21 NOTE — DISCHARGE INSTR - APPOINTMENTS
[facility]   [phone]  RN PT  assess for OT or aide.   They will call to schedule visits

## 2022-01-21 NOTE — PROGRESS NOTES
Patient: Melanie Zelaya  Location: [facility] [address]  MRN: [id number]  Today's date: 1/21/2022    History of Present Illness  Melanie is a 71 y.o. male admitted on 1/11/2022 with S/P cervical spinal fusion [Z98.1]. Principal problem is S/P cervical spinal fusion.      Past Medical History  Melanie has a past medical history of Anxiety, Atrial fibrillation (CMS/HCC), Breast cancer (CMS/HCC), C1 cervical fracture (CMS/HCC), Colon polyp, COPD (chronic obstructive pulmonary disease) (CMS/HCC), Coronary artery calcification seen on CT scan, COVID-19 vaccine series completed, Depression, Diverticulosis, Diverticulosis, Fracture of pelvis (CMS/HCC) (1968), GERD (gastroesophageal reflux disease), Hearing loss, History of colon polyps, History of COVID-19 (2020), Hyperlipidemia, Hypertension, Left atrial enlargement, Lung cancer (CMS/HCC), Lung cancer (CMS/HCC), Pneumonia (2011), Seasonal allergies, and Wrist fracture.      OT Vitals    Date/Time Pulse HR Source BP BP Location BP Method Pt Position [facility]   01/21/22 1133 45 Monitor 113/62 Right upper arm Automatic Sitting    01/21/22 1140 52 Left Radial -- -- -- -- DM   01/21/22 1154 50 Monitor -- -- -- -- DM      OT Pain    Date/Time Pain Type Location Rating: Rest Interventions [facility]   01/21/22 1133 Pain Assessment neck 2 premedicated for activity    01/21/22 1159 Pain Reassessment -- 2 position adjusted JV          Prior Living Environment      Most Recent Value   People in Home alone   Current Living Arrangements home   Home Accessibility not wheelchair accessible   Living Environment Comment 3SH, 3 VALERIA LHR, FF to bathroom then another FF to bedroom, tub shower w/ SC   Number of Stairs, Main Entrance 4   Surface of Stairs, Main Entrance concrete   Stair Railings, Main Entrance railing on left side (ascending)   Landing, Stairs, Main Entrance adequate turning radius   Number of Stairs, Second Entrance other (see comments)  [back door not utilized]    Location, Kitchen first (main) floor   [place], Kitchen tile floor   Location, Laundry Room first (main) floor   [place], Laundry Room concrete floor   Laundry Room Access not wheelchair accessible   Laundry Room Access Comment down in basement,  with full flight and L hand rail   Location, Patient Bedroom other (see comments)  [3rd floor]   [place], Patient Bedroom carpeted floor   Location, Bathroom second floor, must negotiate stairs to access   [place], Bathroom tile floor   Bathroom Access not wheelchair accessible   Bathroom Access Comment Pt reports FB on 2nd floor. Pt has shower chair.   Number of Stairs, Within Home, Primary 12   Surface of Stairs, Within Home, Primary carpeting   Stair Railings, Within Home, Primary railings on both sides of stairs   Stairs Comment, Within Home, Primary to second floor for bathroom   Stairs, Within Home, Secondary 12   Surface of Stairs, Within Home, Secondary carpeting   Stair Railings, Within Home, Secondary railing on left side (ascending)   Stairs Comment, Within Home, Secondary to third floor for bed room          Prior Level of Function      Most Recent Value   Dominant Hand right   Ambulation assistive equipment   Transferring assistive equipment   Toileting independent   Bathing independent   Dressing assistive equipment  [shoe horn]   Eating independent   Prior Level of Function Comment Pt reports prior use of cane in home as needed for ambulation and use of a shower chair for bathing. Pt reports was completing BADLs independently.   Assistive Device Currently Used at Home cane, straight, shower chair          Occupational Profile      Most Recent Value   Successful Occupations Pt reports not a  within past year but would like to return to driving in future.   Occupational History/Life Experiences Pt reports living c  in [facility]. Pt has three (adult) children and grandchildren. Pt has good family support.           IRF OT Evaluation and Treatment  - 01/21/22 1145        OT Time Calculation    Start Time 1130     Stop Time 1200     Time Calculation (min) 30 min        Session Details    Document Type daily treatment/progress note     Mode of Treatment occupational therapy;individual therapy        General Information    General Observations of Patient Pt received seated in w/c. Pt agreeable to therapy. Pt c low HR when initially taken automatically. Still low HR after taken manually. OT notifying nurse. Pt reports asymptomatic.        Sit to Stand Transfer    [place], Sit to Stand Transfer supervision     Verbal Cues safety     Assistive Device walker, front-wheeled;gait belt     Comment from w/c        Stand to Sit Transfer    [place], Stand to Sit Transfer supervision     Verbal Cues safety     Assistive Device walker, front-wheeled;gait belt     Comment to w/c        Balance    Comment, Balance CL S (due to low HR) c supported standing at anterior table, c RW, while engaging in connect 4 game. Pt able to maintain standing balance while weight shifting to reach pieces and place into connect 4 board. Pt required min VCs to maintain spinal precautions.        Aerobic Exercise    Type arm bike     Time Performed 8 mins     Comment BUE on motomed on resistance 3. 4 mins forward, 4 mins backwards. 1 min rest break between directions.        Daily Progress Summary (OT)    Daily Outcome Statement OT session focused on BUE ROM and balance training.  Pt benefits from ROM exercises prior to activity. Pt's HR was low and engaged in BUE arm bike activity in attempt to increase HR. CL S during balance activity due to low HR. Pt requiring min VCs to switch hands to prevent crossing midline and maintain spinal precautions. Pt benefits from continued balance and endurance training.                           IRF OT Goals      Most Recent Value   Transfer Goal 1    Activity/Assistive Device toilet at 01/17/2022 0840   [place] supervision required at 01/17/2022  0840   Time Frame short-term goal (STG), 1 week at 01/17/2022 0840   Progress/Outcome goal met, goal revised this date at 01/17/2022 0840   Transfer Goal 2    Activity/Assistive Device toilet at 01/12/2022 0840   [place] modified independence at 01/12/2022 0840   Time Frame long-term goal (LTG), 2 weeks at 01/12/2022 0840   Progress/Outcome goal ongoing at 01/17/2022 0840   Transfer Goal 3    Activity/Assistive Device shower at 01/17/2022 0840   [place] supervision required at 01/17/2022 0840   Time Frame short-term goal (STG), 1 week at 01/17/2022 0840   Progress/Outcome goal met, goal revised this date at 01/17/2022 0840   Transfer Goal 4    Activity/Assistive Device shower at 01/12/2022 0840   [place] modified independence at 01/12/2022 0840   Time Frame long-term goal (LTG), 2 weeks at 01/12/2022 0840   Progress/Outcome goal ongoing at 01/17/2022 0840   Bathing Goal 1    Activity/Assistive Device bathing skills, all at 01/12/2022 0840   [place] supervision required at 01/17/2022 0840   Time Frame short-term goal (STG), 1 week at 01/17/2022 0840   Progress/Outcome goal met, goal revised this date at 01/17/2022 0840   Bathing Goal 2    Activity/Assistive Device bathing skills, all at 01/12/2022 0840   [place] modified independence at 01/12/2022 0840   Time Frame long-term goal (LTG), 2 weeks at 01/12/2022 0840   Progress/Outcome goal ongoing at 01/17/2022 0840   UB Dressing Goal 1    Activity/Assistive Device upper body dressing at 01/12/2022 0840   [place] supervision required  [Cl S] at 01/12/2022 0840   Time Frame short-term goal (STG), 1 week at 01/17/2022 0840   Strategies/Barriers including clothing retrieval at 01/17/2022 0840   Progress/Outcome goal ongoing at 01/17/2022 0840   UB Dressing Goal 2    Activity/Assistive Device upper body dressing at 01/12/2022 0840   [place] modified independence at 01/12/2022 0840   Time Frame long-term goal (LTG), 2 weeks at 01/12/2022  0840   Progress/Outcome goal ongoing at 01/17/2022 0840   LB Dressing Goal 1    Activity/Assistive Device lower body dressing at 01/12/2022 0840   [place] supervision required  [Cl S] at 01/17/2022 0840   Time Frame short-term goal (STG), 1 week at 01/17/2022 0840   Progress/Outcome goal met, goal revised this date at 01/17/2022 0840   LB Dressing Goal 2    Activity/Assistive Device lower body dressing at 01/12/2022 0840   [place] modified independence at 01/12/2022 0840   Time Frame long-term goal (LTG), 2 weeks at 01/12/2022 0840   Progress/Outcome goal ongoing at 01/17/2022 0840   Grooming Goal 1    [place] supervision required at 01/17/2022 0840   Time Frame short-term goal (STG), 1 week at 01/17/2022 0840   Strategies/Barriers Standing at 01/12/2022 0840   Progress/Outcome goal met, goal revised this date at 01/17/2022 0840   Grooming Goal 2    Activity/Assistive Device grooming skills, all at 01/12/2022 0840   [place] modified independence at 01/12/2022 0840   Time Frame long-term goal (LTG), 2 weeks at 01/12/2022 0840   Progress/Outcome goal ongoing at 01/17/2022 0840   Toileting Goal 1    Activity/Assistive Device toileting skills, all at 01/12/2022 0840   [place] supervision required at 01/17/2022 0840   Time Frame short-term goal (STG), 1 week at 01/17/2022 0840   Progress/Outcome goal met, goal revised this date at 01/17/2022 0840   Toileting Goal 2    Activity/Assistive Device toileting skills, all at 01/12/2022 0840   [place] modified independence at 01/12/2022 0840   Time Frame long-term goal (LTG), 2 weeks at 01/12/2022 0840   Progress/Outcome goal ongoing at 01/17/2022 0840

## 2022-01-21 NOTE — PROGRESS NOTES
Patient: Melanie Zelaya  Location: [facility] [address]  MRN: [id number]  Today's date: 1/21/2022    History of Present Illness  Melanie is a 71 y.o. male admitted on 1/11/2022 with S/P cervical spinal fusion [Z98.1]. Principal problem is S/P cervical spinal fusion.      Past Medical History  Melanie has a past medical history of Anxiety, Atrial fibrillation (CMS/HCC), Breast cancer (CMS/HCC), C1 cervical fracture (CMS/HCC), Colon polyp, COPD (chronic obstructive pulmonary disease) (CMS/HCC), Coronary artery calcification seen on CT scan, COVID-19 vaccine series completed, Depression, Diverticulosis, Diverticulosis, Fracture of pelvis (CMS/HCC) (1968), GERD (gastroesophageal reflux disease), Hearing loss, History of colon polyps, History of COVID-19 (2020), Hyperlipidemia, Hypertension, Left atrial enlargement, Lung cancer (CMS/HCC), Lung cancer (CMS/HCC), Pneumonia (2011), Seasonal allergies, and Wrist fracture.      PT Vitals    Date/Time Pulse HR Source BP BP Location BP Method Pt Position [facility]   01/21/22 1004 81 Monitor 142/59 Right upper arm Automatic Sitting DT   01/21/22 1037 51 Monitor 126/64 Right upper arm Automatic Sitting DT      PT Pain    Date/Time Pain Type Side/Orientation Location Rating: Rest Interventions [facility]   01/21/22 1004 Pain Assessment posterior neck 3 premedicated for activity DT   01/21/22 1037 Pain Assessment posterior neck 3 premedicated for activity DT          Prior Living Environment      Most Recent Value   People in Home alone   Current Living Arrangements home   Home Accessibility not wheelchair accessible   Living Environment Comment 3SH, 3 VALERIA LHR, FF to bathroom then another FF to bedroom, tub shower w/ SC   Number of Stairs, Main Entrance 4   Surface of Stairs, Main Entrance concrete   Stair Railings, Main Entrance railing on left side (ascending)   Landing, Stairs, Main Entrance adequate turning radius   Number of Stairs, Second Entrance other (see  comments)  [back door not utilized]   Location, Kitchen first (main) floor   [place], Kitchen tile floor   Location, Laundry Room first (main) floor   [place], Laundry Room concrete floor   Laundry Room Access not wheelchair accessible   Laundry Room Access Comment down in basement,  with full flight and L hand rail   Location, Patient Bedroom other (see comments)  [3rd floor]   [place], Patient Bedroom carpeted floor   Location, Bathroom second floor, must negotiate stairs to access   [place], Bathroom tile floor   Bathroom Access not wheelchair accessible   Bathroom Access Comment Pt reports FB on 2nd floor. Pt has shower chair.   Number of Stairs, Within Home, Primary 12   Surface of Stairs, Within Home, Primary carpeting   Stair Railings, Within Home, Primary railings on both sides of stairs   Stairs Comment, Within Home, Primary to second floor for bathroom   Stairs, Within Home, Secondary 12   Surface of Stairs, Within Home, Secondary carpeting   Stair Railings, Within Home, Secondary railing on left side (ascending)   Stairs Comment, Within Home, Secondary to third floor for bed room          Prior Level of Function      Most Recent Value   Dominant Hand right   Ambulation assistive equipment   Transferring assistive equipment   Toileting independent   Bathing independent   Dressing assistive equipment  [shoe horn]   Eating independent   Prior Level of Function Comment Pt reports prior use of cane in home as needed for ambulation and use of a shower chair for bathing. Pt reports was completing BADLs independently.   Assistive Device Currently Used at Home cane, straight, shower chair           IRF PT Evaluation and Treatment - 01/21/22 1004        PT Time Calculation    Start Time 1000     Stop Time 1100     Time Calculation (min) 60 min        Session Details    Document Type daily treatment/progress note     Mode of Treatment individual therapy;physical therapy        General Information    Patient  Profile Reviewed yes     General Observations of Patient received in therapy gym, willing to participate        Transfers    Transfers stand pivot transfer     Comment gait belt donned t/o sesison        Sit to Stand Transfer    [place], Sit to Stand Transfer supervision     Verbal Cues safety     Assistive Device walker, front-wheeled     Comment for safety        Stand to Sit Transfer    [place], Stand to Sit Transfer supervision     Verbal Cues safety     Assistive Device walker, front-wheeled     Comment for safety        Stand Pivot Transfer    [place], Stand Pivot/Stand Step Transfer supervision     Verbal Cues safety     Assistive Device walker, front-wheeled     Comment for safety        Gait Training    [place], Gait supervision     Assistive Device walker, front-wheeled     Distance in Feet 200 feet     Pattern (Gait) step-through     Deviations/Abnormal Patterns (Gait) base of support, narrow;weight shifting decreased;gait speed decreased     Comment (Gait/Stairs) + 100ft x2 + 200ft x3 with S for safety        Rough/Uneven Surface Gait Skills    [place] supervision     Assistive Device walker, front-wheeled     Distance in Feet 100 feet     Comment for safety over carpeted surfaces        Stairs Training    [place], Stairs close supervision     Assistive Device railing     Handrail Location (Stairs) left side (ascending);right side (descending)     Number of Stairs 12     Stair Height 6 inches     Ascending Stairs Technique step-over-step     Descending Stairs Technique step-over-step     Comment with CL S for safety and unilateral UE support        Lower Extremity (Therapeutic Exercise)    Comment standing: heel raises 2x10, standing marches 2x10, hip abduction 2x10, hip extension 2x10; mini squats with BUE support 2x10; seated: B LAQs with 2#        Spinal Orthosis Management    Type (Spinal Orthosis) cervical collar, hard     Fabrication Comment (Spinal Orthosis) [place]  [place] donned t/o session        Daily Progress Summary (PT)    Daily Outcome Statement Pt continues to progress with ambulation trials and elevation training.  Pt to continue with BLE strengthening and endurance training.                           IRF PT Goals      Most Recent Value   Bed Mobility Goal 1    Activity/Assistive Device rolling to right, rolling to left at 01/12/2022 0831   [place] supervision required at 01/12/2022 0831   Time Frame short-term goal (STG), 5 - 7 days at 01/12/2022 0831   Progress/Outcome goal met at 01/15/2022 0905   Bed Mobility Goal 2    Activity/Assistive Device rolling to left, rolling to right at 01/12/2022 0831   [place] modified independence at 01/12/2022 0831   Time Frame long-term goal (LTG), 21 days or less at 01/12/2022 0831   Progress/Outcome goal ongoing at 01/17/2022 0800   Bed Mobility Goal 3    Activity/Assistive Device sit to supine/supine to sit at 01/12/2022 0831   [place] other (see comments)  [touching assist] at 01/12/2022 0831   Time Frame short-term goal (STG), 5 - 7 days at 01/12/2022 0831   Progress/Outcome goal met at 01/15/2022 0905   Bed Mobility Goal 4    Activity/Assistive Device sit to supine/supine to sit at 01/12/2022 0831   [place] modified independence at 01/12/2022 0831   Time Frame long-term goal (LTG), 21 days or less at 01/12/2022 0831   Progress/Outcome goal ongoing at 01/17/2022 0800   Transfer Goal 1    Activity/Assistive Device sit-to-stand/stand-to-sit at 01/12/2022 0831   [place] other (see comments)  [touching assist] at 01/12/2022 0831   Time Frame short-term goal (STG), 5 - 7 days at 01/12/2022 0831   Progress/Outcome goal met at 01/17/2022 0800   Transfer Goal 2    Activity/Assistive Device sit-to-stand/stand-to-sit at 01/12/2022 0831   [place] modified independence at 01/12/2022 0831   Time Frame 21 days or less, long-term goal (LTG) at 01/12/2022 0831   Progress/Outcome goal ongoing at 01/17/2022 0800    Transfer Goal 3    Activity/Assistive Device stand pivot at 01/12/2022 0831   [place] other (see comments)  [touching assist] at 01/12/2022 0831   Time Frame short-term goal (STG), 5 - 7 days at 01/12/2022 0831   Progress/Outcome goal met at 01/17/2022 0800   Transfer Goal 4    Activity/Assistive Device stand pivot at 01/12/2022 0831   [place] modified independence at 01/12/2022 0831   Time Frame long-term goal (LTG), 21 days or less at 01/12/2022 0831   Progress/Outcome goal ongoing at 01/17/2022 0800   Gait/Walking Locomotion Goal 1    Activity/Assistive Device gait (walking locomotion), assistive device use at 01/12/2022 0831   Distance 50 feet at 01/12/2022 0831   [place] other (see comments)  [touching assist] at 01/12/2022 0831   Time Frame short-term goal (STG), 5 - 7 days at 01/12/2022 0831   Progress/Outcome goal met at 01/17/2022 0800   Gait/Walking Locomotion Goal 2    Activity/Assistive Device gait (walking locomotion), assistive device use at 01/12/2022 0831   Distance 150 feet at 01/12/2022 0831   [place] modified independence at 01/12/2022 0831   Time Frame long-term goal (LTG), 21 days or less at 01/12/2022 0831   Progress/Outcome goal ongoing at 01/17/2022 0800   Stairs Goal 1    Activity/Assistive Device ascending stairs, descending stairs at 01/15/2022 0905   Number of Stairs 12 at 01/15/2022 0905   [place] minimum assist (75% or more patient effort) at 01/15/2022 0905   Time Frame short-term goal (STG), 1 week at 01/15/2022 0905   Progress/Outcome goal met at 01/17/2022 0800   Stairs Goal 2    Activity/Assistive Device ascending stairs, descending stairs at 01/15/2022 0905   Number of Stairs 12 at 01/15/2022 0905   [place] modified independence at 01/15/2022 0905   Time Frame long-term goal (LTG), 21 days or less, by discharge at 01/15/2022 0905   Progress/Outcome goal ongoing at 01/17/2022 0800

## 2022-01-21 NOTE — PLAN OF CARE
Pt alert and oriented X3. [place] collar on AATs. Continent of bowel and bladder. SCDs on. Sleeping well overnight. Bed alarm on, call within reach.

## 2022-01-21 NOTE — PROGRESS NOTES
[facility] Progress Note          Patient was seen and examined at bedside.    Subjective:     Stable, pleasant, his pain is manageable, his resp status is good, his PT/OT is progressing, his lungs sound good.   BP well controled. Provided with new complaints or concerns.     Objective   Vital signs in last 24 hours:  Temp:  [36.6 °C (97.8 °F)-36.7 °C (98.1 °F)] 36.6 °C (97.9 °F)  Heart Rate:  [54-81] 81  Resp:  [18] 18  BP: (129-152)/(59-83) 142/59    No intake or output data in the 24 hours ending 01/21/22 1029  Intake/Output this shift:  No intake/output data recorded.   Review of Systems:  All other systems reviewed and negative except as noted in the HPI.   Objective      Labs  reviewed his labs thoroughly   Lab Results   Component Value Date    WBC 5.04 01/18/2022    HGB 14.3 01/18/2022    HCT 43.5 01/18/2022    MCV 89.3 01/18/2022     01/18/2022     Lab Results   Component Value Date    GLUCOSE 103 (H) 01/18/2022    CALCIUM 9.7 01/18/2022     01/18/2022    K 4.3 01/18/2022    CO2 29 01/18/2022    CL 97 (L) 01/18/2022    BUN 13 01/18/2022    CREATININE 0.9 01/18/2022       Imaging  OSH imaging study reports reviewed       Full Code    Physical Exam:  Head/Ear/Nose/Throat: normocephalic; atraumatic; moisture mouth mm, no oropharyngeal thrush noted.   Eyes: anicteric sclera, EOMI; PERRL.   Neck : supple, no JVD, no carotid bruits appeciated.   Respiratory: no evidence of labored breathing, lung sounds CTA b/l, good aeration bibasilar area, no w/r/c.   Cardiovascular: RRR; normal S1, S2; no m/r/g; no S3 or S4.   Gastrointestinal: soft; NT; BS normal; mildly distended; no CVAT b/l.   Genitourinary: no caballero.   Extremities : no c/c/e .   Neurological: AO x 3, fluent speeches, following commands, CNS II-XII grossly intact; no focal neurologic deficits.   Behavior/Emotional: in NAD, appropriate; cooperative.   Skin: clean, dry and intact.     Plan of care was discussed with  patient, RN, and PMR attending     Assessment     CC:S/p a mechanical fall, sustained traumatic cervical spinal fractures with myelopathy, s/p cervical spinal ORIF and decom.lami.fusion, ADL and ambulatory dysfunction.       71 y.o. male with a PMHx significant for atrial fibrillation, breast cancer, ex-tobacco smoking, COPD, diverticulosis, acid reflux, history of COVID-19, hyperlipidemia, hypertension, lung cancer s/p LLL resection, history of right shoulder rotator cuff repair, and history of right-sided carpal tunnel syndrome, who fell from a ladder on 04/23/2021 associated with loss of consciousness. He was found to have fractures of the anterior and posterior arches of C1, base of the dens of C2, right scapula, and left occipital condyle.  He was placed in a hard cervical collar and has had ongoing neurosurgical and imaging follow-up.  While C1 was healing normally.  There is no appreciable osseous bridging of the fracture of the dens.  He therefore was recommended to undergo ORIF which is performed by Dr. Morejon on 01/06/2020 at [facility].      Patient underwent open reduction and internal fixation of C1 fractures, C2 odontoid fracture, associated with bilateral posterior lateral arthrodesis with fusion at C1-C3 in addition to C1-2 and C2-3 laminotomies.  There are no intraoperative complications.  Neurophysiological monitoring was stable throughout the course of the decompression.  PCA was d/c'd on 1/10 am.  +BM on 1/9.     Ortho was consulted on 1/7 for R shoulder follow up.  No acute surgical intervention at this time per Ortho.  New Imaging of R shoulder/scapula unremarkable for acute injury  RUE WBAT.  Recommend follow up with a shoulder/elbow orthopedic surgeon - Dr Shine as needed.   Functionally, he has ADL and ambulatory dysfunction, requires inpt acute rehab, transferred to [facility] on 1/11/22.       1. S/p a mechanical fall, sustained traumatic cervical spinal fractures with myelopathy, s/p cervical spinal  ORIF and decom.lami.fusion, ADL and ambulatory dysfunction : inpt comprehensive rehab, ADL, gait/balance training, pain/neuropathic pain management, fall precaution, regular neuro checks, DVT prophylaxis, surgical site wound care/dermal defense, f/u with spinal surgeon as scheduled.     R shoulder injury, but no acute surgical intervention at this time per Ortho, f/u with  a shoulder/elbow orthopedic surgeon - Dr Shine as needed.    2. Post op acute on chronic pain, paresthesia: pain management, regular pain scale evaluation, topical lidoderm patch, ice packs, consider Neurontin as indicated, prn muscle relaxant.    3. Pulm-COPD, h/o lung cancer s/p LLL resection, atelectasis: monitor SO2, prn NC O2, keep SO2>92%, incentive spirometry, bronchodilator inhaler prn, cont LABA/LAMA/flonase,  mucolytic agent, pulm toileting.    4. GI-constipation, GERD: provide constipation bowel regimen, po hydration, fiber intake, timed toilet visits. Pepcid for GERD and GI prophy.    5. DVT prophy: SCD, early ambulation, SQ heparin to resume pradaxa as planned , check LE venous DUS to r/o DVT.      6. PAF, HTN, Dyslipidemia: resume pradaxa on 1/20/22, monitor HR and rhythm, cont current HTN meds with holding parameter, monitor BP, titrate HTN meds as indicated, post op orthostatic hypotension precaution; cont statin.     7. - Neurogenic bladder, acute urinary retention s/p caballero: timed voiding trial with appropriate position and privacy, monitor PVR with cic, high risk of UTI , check UA/UCX.    8. Renal, electrolytes: monitor renal function, avoid nephrotoxic agents or low BP, monitor and keep electrolytes balance.     Billing code: [id number]  Diagnoses:  Patient Active Problem List   Diagnosis   • Anxiety   • Atrial fibrillation (CMS/HCC)   • Chronic diastolic heart failure (CMS/HCC)   • Hearing loss   • Primary malignant neoplasm of left lower lobe of lung (CMS/HCC)   • Multiple pulmonary nodules   • Other emphysema (CMS/HCC)   •  Gastroesophageal reflux disease without esophagitis   • Asthma   • Cervical spine fracture (CMS/HCC)   • Hypertension   • Mild episode of recurrent major depressive disorder (CMS/HCC)   • Preop examination   • Coronary artery calcification seen on CT scan   • COPD (chronic obstructive pulmonary disease) (CMS/HCC)   • Prediabetes   • History of cervical fracture   • H/O cervical fracture   • Closed nondisplaced odontoid fracture with type II morphology and delayed healing   • S/P cervical spinal fusion        Fiona Rivera MD  1/21/2022

## 2022-01-21 NOTE — PROGRESS NOTES
History of present illness:     71 y.o. male with a PMHx significant for atrial fibrillation, breast cancer, COPD, diverticulosis, acid reflux, history of COVID-19, hyperlipidemia, hypertension, lung cancer, history of right shoulder rotator cuff repair, and history of right-sided carpal tunnel syndrome, who fell from a ladder on 04/23/2021 associated with loss of consciousness. He was found to have fractures of the anterior and posterior arches of C1, base of the dens of C2, right scapula, and left occipital condyle.  He was placed in a hard cervical collar and has had ongoing neurosurgical and imaging follow-up.  While C1 was healing normally.  There is no appreciable osseous bridging of the fracture of the dens.    Patient underwent open reduction and internal fixation of C1 fractures, C2 odontoid fracture, associated with bilateral posterior lateral arthrodesis with fusion at C1-C3 in addition to C1-2 and C2-3 laminotomies. Surgery was done on 1/6/2022 by Dr. Morejon .  There are no intraoperative complications.  Neurophysiological monitoring was stable throughout the course of the decompression.        Patient was evaluated by orthopedics for right shoulder follow up. No acute surgical intervention at this time . New Imaging of R shoulder/scapula unremarkable for acute injury, RUE WBAT.  Recommend follow up with a shoulder/elbow orthopedic surgeon - Dr Shine as needed.     Patient was evaluated by PM&R deemed to be a good candidate for acute rehabilitation. Patient now transferred to [facility] for comprehensive acute inpatient rehabilitation admitted on 1/12/2022.    Scheduled Meds:  • acetaminophen  975 mg oral q8h   • amLODIPine  5 mg oral q12h MARIAN   • atorvastatin  40 mg oral Daily (6p)   • carvediloL  6.25 mg oral BID with meals   • cetirizine  5 mg oral Nightly   • dabigatran etexilate  150 mg oral BID   • escitalopram  10 mg oral Daily   • famotidine  20 mg oral BID   • fluticasone propionate  2 spray Each  "Nostril Daily   • guaiFENesin  600 mg oral BID   • hydrochlorothiazide  25 mg oral Daily   • lidocaine  1 patch Topical Daily   • losartan  100 mg oral Daily with dinner   • sennosides-docusate sodium  1 tablet oral BID   • umeclidinium-vilanteroL  1 puff inhalation Daily     Continuous Infusions:  PRN Meds:.albuterol HFA  •  bisacodyL  •  cyclobenzaprine  •  oxyCODONE  •  oxyCODONE  •  polyethylene glycol     Lab Results   Component Value Date    WBC 5.04 01/18/2022    HGB 14.3 01/18/2022    HCT 43.5 01/18/2022    MCV 89.3 01/18/2022     01/18/2022       Lab Results   Component Value Date    GLUCOSE 103 (H) 01/18/2022    CALCIUM 9.7 01/18/2022     01/18/2022    K 4.3 01/18/2022    CO2 29 01/18/2022    CL 97 (L) 01/18/2022    BUN 13 01/18/2022    CREATININE 0.9 01/18/2022       Subjective: Patient seen and examined no chest pain or shortness of breath, tolerating therapies, patient is making good progress in rehab therapies, patient will be discharged on January 25, 2022 after which he will see Dr. Morejon at that time staples will be removed.    Team 1/17:    Nursing: continent B&B, skin intact    Functional status:    PT : CS fortransfers , touching assist for amb 200f RW    OT: ADLs: transfers touching assist, using adaptive equipment, CS for toileting, min A for UE dressing/colar managment    Psych : mild memory issues, anxious about discharge,     Physical exam:  Physical examination today showed a 71-year-old man in no acute distress.  Patient awake alert obeys commands.     Blood pressure (!) 148/70, pulse 64, temperature 36.4 °C (97.6 °F), temperature source Oral, resp. rate 18, height 1.676 m (5' 6\"), weight 89.7 kg (197 lb 12.8 oz), SpO2 98 %.       Abdominopelvic, skin negative, mucous brains moist.     Neck supple     Heart regular rate     Lungs decreased breath sounds on the right side     Examination was benign     Musculoskeletal exam: Range of motion within functional mid bilateral upper " extremity and bilateral extremity except decreased right shoulder abduction.  Patient does agree that he will not allow extremity.  Neuro exam: Mental status as above patient able to be commands.  Cranial nerve examination shows face symmetric, tongue midline, completely intact and visual fields are full.  Motor examination: Right upper extremity deltoid 3/5, biceps 5/5 and  3/5, finger extension 4/5.  Examination of the right lower extremity hip flexion 4 -/5, quad and ankle dorsiflexion 5/5.  Examination of the left upper extremity 5/5.  [place] of the left lower extremity hip flexion 4/5, quad and ankle dorsiflexion 5/5.  Sensory examination grossly normal.  Deep tendon reflexes are symmetrical.  There was no evidence of cerebellar signs and gait not tested at this time.    Skin: Incision dry clean and intact staples in place covered by Mepilex.     Impression:     Ambulatory ADL dysfunction due to:    History of fall last) with growth none displaced odontoid fracture with type II morphology and nonunion now status post ORIF C2, C1-3 laminectomy posterior cervical fusion at multiple levels.     Postoperative anemia     History of atrial fibrillation on Pradaxa     History of coronary artery disease     History of lung cancer status post left lower lobe resection     History of breast cancer     History of COPD/tobacco use     GERD     Hypertension on Norvasc, losartan and hydrochlorothiazide     History of COVID-19     Hyperlipidemia on Lipitor     History of right shoulder rotator cuff repair     History of carpal tunnel syndrome     Obesity followed by dietitian     History of anxiety/depression on Lexapro     Plan:     Patient will continue physical therapy, and Occupational Therapy.  We will consult Dr. Rivera for medical management, Dr. Pagan neurology, Dr. Boyd psychiatry and patient also be followed by cardiology.  Patient will be followed by psychology and case management for support.  Patient will  continue to use heart cervical collar all the times.  [place] patient precautions include cardiac, fall and orthopedic spinal precautions.  Patient will continue on Xarelto for history of atrial fibrillation.  We will monitor routine labs and additional monitoring healing of the posterior cervical incision closely.     Goals: To improve overall functioning for transfers, ambulation and ADLs     Extensive length of stay 1/25     Discussed with Dr. Rivera medical consultant, patient and nursing     This patient note has been dictated using speech recognition software.  Inadvertent speech recognition errors should be disregarded. Please do not hesitate to call my office for clarification.

## 2022-01-22 ENCOUNTER — APPOINTMENT (INPATIENT)
Dept: OCCUPATIONAL THERAPY | Facility: REHABILITATION | Age: 72
DRG: 565 | End: 2022-01-22
Payer: COMMERCIAL

## 2022-01-22 PROCEDURE — 12800001 HC ROOM AND CARE SEMIPRIVATE REHAB-BRAIN INJ

## 2022-01-22 PROCEDURE — 97110 THERAPEUTIC EXERCISES: CPT | Mod: GO

## 2022-01-22 PROCEDURE — 63700000 HC SELF-ADMINISTRABLE DRUG: Performed by: INTERNAL MEDICINE

## 2022-01-22 PROCEDURE — 97530 THERAPEUTIC ACTIVITIES: CPT | Mod: GO

## 2022-01-22 RX ADMIN — FLUTICASONE PROPIONATE 2 SPRAY: 50 SPRAY, METERED NASAL at 08:09

## 2022-01-22 RX ADMIN — ACETAMINOPHEN 975 MG: 325 TABLET, FILM COATED ORAL at 05:18

## 2022-01-22 RX ADMIN — SENNOSIDES AND DOCUSATE SODIUM 1 TABLET: 50; 8.6 TABLET ORAL at 08:07

## 2022-01-22 RX ADMIN — ACETAMINOPHEN 975 MG: 325 TABLET, FILM COATED ORAL at 21:00

## 2022-01-22 RX ADMIN — HYDROCHLOROTHIAZIDE 25 MG: 25 TABLET ORAL at 08:07

## 2022-01-22 RX ADMIN — ACETAMINOPHEN 975 MG: 325 TABLET, FILM COATED ORAL at 13:05

## 2022-01-22 RX ADMIN — GUAIFENESIN 600 MG: 600 TABLET, EXTENDED RELEASE ORAL at 08:07

## 2022-01-22 RX ADMIN — ESCITALOPRAM OXALATE 10 MG: 10 TABLET, FILM COATED ORAL at 08:07

## 2022-01-22 RX ADMIN — CARVEDILOL 6.25 MG: 6.25 TABLET, FILM COATED ORAL at 08:07

## 2022-01-22 RX ADMIN — CETIRIZINE HYDROCHLORIDE 5 MG: 5 TABLET ORAL at 21:00

## 2022-01-22 RX ADMIN — OXYCODONE HYDROCHLORIDE 5 MG: 5 TABLET ORAL at 04:05

## 2022-01-22 RX ADMIN — DABIGATRAN ETEXILATE MESYLATE 150 MG: 150 CAPSULE ORAL at 08:07

## 2022-01-22 RX ADMIN — GUAIFENESIN 600 MG: 600 TABLET, EXTENDED RELEASE ORAL at 21:00

## 2022-01-22 RX ADMIN — OXYCODONE HYDROCHLORIDE 5 MG: 5 TABLET ORAL at 20:59

## 2022-01-22 RX ADMIN — CARVEDILOL 6.25 MG: 6.25 TABLET, FILM COATED ORAL at 16:34

## 2022-01-22 RX ADMIN — LIDOCAINE 1 PATCH: 246 PATCH TOPICAL at 08:07

## 2022-01-22 RX ADMIN — OXYCODONE HYDROCHLORIDE 10 MG: 5 TABLET ORAL at 16:34

## 2022-01-22 RX ADMIN — AMLODIPINE BESYLATE 5 MG: 5 TABLET ORAL at 08:07

## 2022-01-22 RX ADMIN — FAMOTIDINE 20 MG: 20 TABLET ORAL at 08:07

## 2022-01-22 RX ADMIN — OXYCODONE HYDROCHLORIDE 10 MG: 5 TABLET ORAL at 09:36

## 2022-01-22 RX ADMIN — LOSARTAN POTASSIUM 100 MG: 100 TABLET, FILM COATED ORAL at 16:35

## 2022-01-22 RX ADMIN — DABIGATRAN ETEXILATE MESYLATE 150 MG: 150 CAPSULE ORAL at 21:00

## 2022-01-22 RX ADMIN — ATORVASTATIN CALCIUM 40 MG: 40 TABLET, FILM COATED ORAL at 16:35

## 2022-01-22 RX ADMIN — SENNOSIDES AND DOCUSATE SODIUM 1 TABLET: 50; 8.6 TABLET ORAL at 21:00

## 2022-01-22 RX ADMIN — FAMOTIDINE 20 MG: 20 TABLET ORAL at 21:00

## 2022-01-22 NOTE — PROGRESS NOTES
Patient: Melanie Zelaya  Location: [facility] [address]  MRN: [id number]  Today's date: 1/22/2022    History of Present Illness  Melanie is a 71 y.o. male admitted on 1/11/2022 with S/P cervical spinal fusion [Z98.1]. Principal problem is S/P cervical spinal fusion.      Past Medical History  Melanie has a past medical history of Anxiety, Atrial fibrillation (CMS/HCC), Breast cancer (CMS/HCC), C1 cervical fracture (CMS/HCC), Colon polyp, COPD (chronic obstructive pulmonary disease) (CMS/HCC), Coronary artery calcification seen on CT scan, COVID-19 vaccine series completed, Depression, Diverticulosis, Diverticulosis, Fracture of pelvis (CMS/HCC) (1968), GERD (gastroesophageal reflux disease), Hearing loss, History of colon polyps, History of COVID-19 (2020), Hyperlipidemia, Hypertension, Left atrial enlargement, Lung cancer (CMS/HCC), Lung cancer (CMS/HCC), Pneumonia (2011), Seasonal allergies, and Wrist fracture.      OT Vitals    Date/Time Pulse HR Source Pt Activity O2 Therapy BP BP Location BP Method Pt Position [facility]   01/22/22 0914 62 Monitor -- -- 126/68 Right upper arm Automatic Sitting [facility]   01/22/22 0936 -- -- At rest None (Room air) -- -- -- -- [facility]      OT Pain    Date/Time Pain Type Acceptable Pain Level Location Rating: Rest Rating: Activity Description Interventions Sleep/Rest/Relaxation [facility]   01/22/22 0914 Pain Assessment -- neck 2 3 -- -- RN contacted c pt request for pain meds during session. RN provided pain meds. -- [facility]   01/22/22 0936 Pain Assessment 4 neck 2 8 intermittent -- no problem identified [facility]          Prior Living Environment      Most Recent Value   People in Home alone   Current Living Arrangements home   Home Accessibility not wheelchair accessible   Living Environment Comment 3SH, 3 VALERIA LHR, FF to bathroom then another FF to bedroom, tub shower w/ SC   Number of Stairs, Main Entrance 4   Surface of Stairs, Main Entrance concrete   Stair Railings, Main Entrance  railing on left side (ascending)   Landing, Stairs, Main Entrance adequate turning radius   Number of Stairs, Second Entrance other (see comments)  [back door not utilized]   Location, Kitchen first (main) floor   [place], Kitchen tile floor   Location, Laundry Room first (main) floor   [place], Laundry Room concrete floor   Laundry Room Access not wheelchair accessible   Laundry Room Access Comment down in basement,  with full flight and L hand rail   Location, Patient Bedroom other (see comments)  [3rd floor]   [place], Patient Bedroom carpeted floor   Location, Bathroom second floor, must negotiate stairs to access   [place], Bathroom tile floor   Bathroom Access not wheelchair accessible   Bathroom Access Comment Pt reports FB on 2nd floor. Pt has shower chair.   Number of Stairs, Within Home, Primary 12   Surface of Stairs, Within Home, Primary carpeting   Stair Railings, Within Home, Primary railings on both sides of stairs   Stairs Comment, Within Home, Primary to second floor for bathroom   Stairs, Within Home, Secondary 12   Surface of Stairs, Within Home, Secondary carpeting   Stair Railings, Within Home, Secondary railing on left side (ascending)   Stairs Comment, Within Home, Secondary to third floor for bed room          Prior Level of Function      Most Recent Value   Dominant Hand right   Ambulation assistive equipment   Transferring assistive equipment   Toileting independent   Bathing independent   Dressing assistive equipment  [shoe horn]   Eating independent   Prior Level of Function Comment Pt reports prior use of cane in home as needed for ambulation and use of a shower chair for bathing. Pt reports was completing BADLs independently.   Assistive Device Currently Used at Home cane, straight, shower chair          Occupational Profile      Most Recent Value   Successful Occupations Pt reports not a  within past year but would like to return to driving in future.   Occupational  History/Life Experiences Pt reports living c  in [facility]. Pt has three (adult) children and grandchildren. Pt has good family support.           [facility] OT Evaluation and Treatment - 01/22/22 0926        OT Time Calculation    Start Time 0900     Stop Time 1000     Time Calculation (min) 60 min        Session Details    Document Type daily treatment/progress note     Mode of Treatment occupational therapy;individual therapy        General Information    General Observations of Patient Pt received in room; pleasant and motivated throughout. OT transported to and from main gym while reviewing POC and pt goals for session.        Safety Awareness/Health Promotion    Fall Prevention demonstrates safety awareness related to fall risk   reviewed scanning environment 2* cervical collar and awareness of area rugs in home to reduce tripping with RW at home       Transfers    Transfers stand pivot transfer     Comment gait belt donned for safety        Sit to Stand Transfer    [place], Sit to Stand Transfer supervision     Verbal Cues safety;technique     Assistive Device walker, front-wheeled     Comment from w/c        Stand to Sit Transfer    [place], Stand to Sit Transfer supervision     Verbal Cues safety;technique     Assistive Device walker, front-wheeled     Comment to w/c        Stand Pivot Transfer    [place], Stand Pivot/Stand Step Transfer supervision     Verbal Cues safety;technique     Assistive Device walker, front-wheeled     Comment from ambulatory level to w/c        Safety Issues, Functional Mobility    Comment, Safety Issues/Impairments (Mobility) Supervision for house hold distance mobility with RW throughout session.        Balance    Comment, Balance CLS during side stepping to right and left x 4 feet x3 reps with increased cues for sequencing with RW.        Therapeutic Interventions    Comment, Therapeutic Intervention Pt participating in ambulatory level item retrieval for clothing.  Hanging clothes over bar for item transport. Standing at elevated table top to fold 4 items of clothing. CLS provided for balance throughout. Good safety awareness with RW to navigate and preparatory position for safe reach.        Aerobic Exercise    Type arm bike     Time Performed 6     Comment motomed 3 minutes x 2 with 2 min rest break in between. Completed at level 3 resistance for generalized strength and endurance training. Pillow support posteriorly for optimal upright position.        Daily Progress Summary (OT)    Daily Outcome Statement Session focused on standing endurance and balance as well as safety with RW. Pt demonstrating good carryover of safety education. Will benefit from continued skilled OT services to maximize safety and functional independence in ADL/IADL.                          IRF OT Goals      Most Recent Value   Transfer Goal 1    Activity/Assistive Device toilet at 01/17/2022 0840   [place] supervision required at 01/17/2022 0840   Time Frame short-term goal (STG), 1 week at 01/17/2022 0840   Progress/Outcome goal met, goal revised this date at 01/17/2022 0840   Transfer Goal 2    Activity/Assistive Device toilet at 01/12/2022 0840   [place] modified independence at 01/12/2022 0840   Time Frame long-term goal (LTG), 2 weeks at 01/12/2022 0840   Progress/Outcome goal ongoing at 01/17/2022 0840   Transfer Goal 3    Activity/Assistive Device shower at 01/17/2022 0840   [place] supervision required at 01/17/2022 0840   Time Frame short-term goal (STG), 1 week at 01/17/2022 0840   Progress/Outcome goal met, goal revised this date at 01/17/2022 0840   Transfer Goal 4    Activity/Assistive Device shower at 01/12/2022 0840   [place] modified independence at 01/12/2022 0840   Time Frame long-term goal (LTG), 2 weeks at 01/12/2022 0840   Progress/Outcome goal ongoing at 01/17/2022 0840   Bathing Goal 1    Activity/Assistive Device bathing skills, all at 01/12/2022 0840    [place] supervision required at 01/17/2022 0840   Time Frame short-term goal (STG), 1 week at 01/17/2022 0840   Progress/Outcome goal met, goal revised this date at 01/17/2022 0840   Bathing Goal 2    Activity/Assistive Device bathing skills, all at 01/12/2022 0840   [place] modified independence at 01/12/2022 0840   Time Frame long-term goal (LTG), 2 weeks at 01/12/2022 0840   Progress/Outcome goal ongoing at 01/17/2022 0840   UB Dressing Goal 1    Activity/Assistive Device upper body dressing at 01/12/2022 0840   [place] supervision required  [Cl S] at 01/12/2022 0840   Time Frame short-term goal (STG), 1 week at 01/17/2022 0840   Strategies/Barriers including clothing retrieval at 01/17/2022 0840   Progress/Outcome goal ongoing at 01/17/2022 0840   UB Dressing Goal 2    Activity/Assistive Device upper body dressing at 01/12/2022 0840   [place] modified independence at 01/12/2022 0840   Time Frame long-term goal (LTG), 2 weeks at 01/12/2022 0840   Progress/Outcome goal ongoing at 01/17/2022 0840   LB Dressing Goal 1    Activity/Assistive Device lower body dressing at 01/12/2022 0840   [place] supervision required  [Cl S] at 01/17/2022 0840   Time Frame short-term goal (STG), 1 week at 01/17/2022 0840   Progress/Outcome goal met, goal revised this date at 01/17/2022 0840   LB Dressing Goal 2    Activity/Assistive Device lower body dressing at 01/12/2022 0840   [place] modified independence at 01/12/2022 0840   Time Frame long-term goal (LTG), 2 weeks at 01/12/2022 0840   Progress/Outcome goal ongoing at 01/17/2022 0840   Grooming Goal 1    [place] supervision required at 01/17/2022 0840   Time Frame short-term goal (STG), 1 week at 01/17/2022 0840   Strategies/Barriers Standing at 01/12/2022 0840   Progress/Outcome goal met, goal revised this date at 01/17/2022 0840   Grooming Goal 2    Activity/Assistive Device grooming skills, all at 01/12/2022 0840   [place] modified  independence at 01/12/2022 0840   Time Frame long-term goal (LTG), 2 weeks at 01/12/2022 0840   Progress/Outcome goal ongoing at 01/17/2022 0840   Toileting Goal 1    Activity/Assistive Device toileting skills, all at 01/12/2022 0840   [place] supervision required at 01/17/2022 0840   Time Frame short-term goal (STG), 1 week at 01/17/2022 0840   Progress/Outcome goal met, goal revised this date at 01/17/2022 0840   Toileting Goal 2    Activity/Assistive Device toileting skills, all at 01/12/2022 0840   [place] modified independence at 01/12/2022 0840   Time Frame long-term goal (LTG), 2 weeks at 01/12/2022 0840   Progress/Outcome goal ongoing at 01/17/2022 0840

## 2022-01-22 NOTE — PLAN OF CARE
Plan of Care Review  Plan of Care Reviewed With: patient  Progress: improving  Outcome Summary: Patient received at 2300. Patient appears to have slept well through the night.  Continent of bowel and bladder. Pain managed with positional changes, scheduled tylenol and pen oxycodone. Medications reviewed. Call bell within reach and used appropriately. No other nursing concerns at this time.

## 2022-01-22 NOTE — PLAN OF CARE
Plan of Care Review  Plan of Care Reviewed With: patient  Progress: improving  Outcome Summary: Patient AAOx4, continent of bowel and bladder.  Hearing aids applied independently.  Mepilex to collar bone for protection.  Aspen collar worn AAT.  Incision healing well with staples covered with mepilex.

## 2022-01-23 ENCOUNTER — APPOINTMENT (INPATIENT)
Dept: PHYSICAL THERAPY | Facility: REHABILITATION | Age: 72
DRG: 565 | End: 2022-01-23
Payer: COMMERCIAL

## 2022-01-23 PROCEDURE — 97110 THERAPEUTIC EXERCISES: CPT | Mod: GP

## 2022-01-23 PROCEDURE — 97530 THERAPEUTIC ACTIVITIES: CPT | Mod: GP

## 2022-01-23 PROCEDURE — 12800001 HC ROOM AND CARE SEMIPRIVATE REHAB-BRAIN INJ

## 2022-01-23 PROCEDURE — 63700000 HC SELF-ADMINISTRABLE DRUG: Performed by: INTERNAL MEDICINE

## 2022-01-23 PROCEDURE — 97116 GAIT TRAINING THERAPY: CPT | Mod: GP

## 2022-01-23 RX ADMIN — ACETAMINOPHEN 975 MG: 325 TABLET, FILM COATED ORAL at 05:43

## 2022-01-23 RX ADMIN — ESCITALOPRAM OXALATE 10 MG: 10 TABLET, FILM COATED ORAL at 08:00

## 2022-01-23 RX ADMIN — DABIGATRAN ETEXILATE MESYLATE 150 MG: 150 CAPSULE ORAL at 08:00

## 2022-01-23 RX ADMIN — FAMOTIDINE 20 MG: 20 TABLET ORAL at 20:00

## 2022-01-23 RX ADMIN — DABIGATRAN ETEXILATE MESYLATE 150 MG: 150 CAPSULE ORAL at 20:00

## 2022-01-23 RX ADMIN — CETIRIZINE HYDROCHLORIDE 5 MG: 5 TABLET ORAL at 20:00

## 2022-01-23 RX ADMIN — SENNOSIDES AND DOCUSATE SODIUM 1 TABLET: 50; 8.6 TABLET ORAL at 08:00

## 2022-01-23 RX ADMIN — SENNOSIDES AND DOCUSATE SODIUM 1 TABLET: 50; 8.6 TABLET ORAL at 20:00

## 2022-01-23 RX ADMIN — OXYCODONE HYDROCHLORIDE 10 MG: 5 TABLET ORAL at 16:12

## 2022-01-23 RX ADMIN — OXYCODONE HYDROCHLORIDE 10 MG: 5 TABLET ORAL at 01:01

## 2022-01-23 RX ADMIN — CYCLOBENZAPRINE HYDROCHLORIDE 10 MG: 10 TABLET, FILM COATED ORAL at 18:49

## 2022-01-23 RX ADMIN — CARVEDILOL 6.25 MG: 6.25 TABLET, FILM COATED ORAL at 08:00

## 2022-01-23 RX ADMIN — AMLODIPINE BESYLATE 5 MG: 5 TABLET ORAL at 08:00

## 2022-01-23 RX ADMIN — GUAIFENESIN 600 MG: 600 TABLET, EXTENDED RELEASE ORAL at 08:00

## 2022-01-23 RX ADMIN — CARVEDILOL 6.25 MG: 6.25 TABLET, FILM COATED ORAL at 16:13

## 2022-01-23 RX ADMIN — ACETAMINOPHEN 975 MG: 325 TABLET, FILM COATED ORAL at 13:01

## 2022-01-23 RX ADMIN — GUAIFENESIN 600 MG: 600 TABLET, EXTENDED RELEASE ORAL at 20:00

## 2022-01-23 RX ADMIN — AMLODIPINE BESYLATE 5 MG: 5 TABLET ORAL at 20:00

## 2022-01-23 RX ADMIN — FLUTICASONE PROPIONATE 2 SPRAY: 50 SPRAY, METERED NASAL at 08:04

## 2022-01-23 RX ADMIN — OXYCODONE HYDROCHLORIDE 10 MG: 5 TABLET ORAL at 11:15

## 2022-01-23 RX ADMIN — HYDROCHLOROTHIAZIDE 25 MG: 25 TABLET ORAL at 08:00

## 2022-01-23 RX ADMIN — LOSARTAN POTASSIUM 100 MG: 100 TABLET, FILM COATED ORAL at 16:13

## 2022-01-23 RX ADMIN — ACETAMINOPHEN 975 MG: 325 TABLET, FILM COATED ORAL at 21:14

## 2022-01-23 RX ADMIN — LIDOCAINE 1 PATCH: 246 PATCH TOPICAL at 08:05

## 2022-01-23 RX ADMIN — FAMOTIDINE 20 MG: 20 TABLET ORAL at 08:00

## 2022-01-23 RX ADMIN — OXYCODONE HYDROCHLORIDE 10 MG: 5 TABLET ORAL at 20:16

## 2022-01-23 RX ADMIN — ATORVASTATIN CALCIUM 40 MG: 40 TABLET, FILM COATED ORAL at 16:13

## 2022-01-23 NOTE — PLAN OF CARE
Plan of Care Review  Plan of Care Reviewed With: patient  Progress: improving  Outcome Summary: Pt. Calm and cooperative with care. Continent of bowel and bladder. [place], wears hearing aids. [place] AAT. Pt reported neck pain managed by position changes, oxycodone and tylenol. Turned and repositioned individually overnight. Call bell within reach. Safety maintained.

## 2022-01-23 NOTE — PROGRESS NOTES
Patient: Melanie Zelaya  Location: [facility] [address]  MRN: [id number]  Today's date: 1/23/2022    History of Present Illness  Melanie is a 71 y.o. male admitted on 1/11/2022 with S/P cervical spinal fusion [Z98.1]. Principal problem is S/P cervical spinal fusion.      Past Medical History  Melanie has a past medical history of Anxiety, Atrial fibrillation (CMS/HCC), Breast cancer (CMS/HCC), C1 cervical fracture (CMS/HCC), Colon polyp, COPD (chronic obstructive pulmonary disease) (CMS/HCC), Coronary artery calcification seen on CT scan, COVID-19 vaccine series completed, Depression, Diverticulosis, Diverticulosis, Fracture of pelvis (CMS/HCC) (1968), GERD (gastroesophageal reflux disease), Hearing loss, History of colon polyps, History of COVID-19 (2020), Hyperlipidemia, Hypertension, Left atrial enlargement, Lung cancer (CMS/HCC), Lung cancer (CMS/HCC), Pneumonia (2011), Seasonal allergies, and Wrist fracture.      PT Vitals    Date/Time Pulse HR Source BP BP Location BP Method Pt Position [facility]   01/23/22 0904 57 Monitor 128/61 Left upper arm Automatic Sitting DT   01/23/22 0932 55 Monitor 127/61 Left upper arm Automatic Sitting DT      PT Pain    Date/Time Pain Type Location Rating: Rest Interventions [facility]   01/23/22 0904 Pain Assessment neck 4 premedicated for activity DT   01/23/22 0932 Pain Assessment neck 4 premedicated for activity DT          Prior Living Environment      Most Recent Value   People in Home alone   Current Living Arrangements home   Home Accessibility not wheelchair accessible   Living Environment Comment 3SH, 3 VALERIA LHR, FF to bathroom then another FF to bedroom, tub shower w/ SC   Number of Stairs, Main Entrance 4   Surface of Stairs, Main Entrance concrete   Stair Railings, Main Entrance railing on left side (ascending)   Landing, Stairs, Main Entrance adequate turning radius   Number of Stairs, Second Entrance other (see comments)  [back door not utilized]   Location,  Kitchen first (main) floor   [place], Kitchen tile floor   Location, Laundry Room first (main) floor   [place], Laundry Room concrete floor   Laundry Room Access not wheelchair accessible   Laundry Room Access Comment down in basement,  with full flight and L hand rail   Location, Patient Bedroom other (see comments)  [3rd floor]   [place], Patient Bedroom carpeted floor   Location, Bathroom second floor, must negotiate stairs to access   [place], Bathroom tile floor   Bathroom Access not wheelchair accessible   Bathroom Access Comment Pt reports FB on 2nd floor. Pt has shower chair.   Number of Stairs, Within Home, Primary 12   Surface of Stairs, Within Home, Primary carpeting   Stair Railings, Within Home, Primary railings on both sides of stairs   Stairs Comment, Within Home, Primary to second floor for bathroom   Stairs, Within Home, Secondary 12   Surface of Stairs, Within Home, Secondary carpeting   Stair Railings, Within Home, Secondary railing on left side (ascending)   Stairs Comment, Within Home, Secondary to third floor for bed room          Prior Level of Function      Most Recent Value   Dominant Hand right   Ambulation assistive equipment   Transferring assistive equipment   Toileting independent   Bathing independent   Dressing assistive equipment  [shoe horn]   Eating independent   Prior Level of Function Comment Pt reports prior use of cane in home as needed for ambulation and use of a shower chair for bathing. Pt reports was completing BADLs independently.   Assistive Device Currently Used at Home cane, straight, shower chair           [facility] PT Evaluation and Treatment - 01/23/22 0903        PT Time Calculation    Start Time 0900     Stop Time 1000     Time Calculation (min) 60 min        Session Details    Document Type daily treatment/progress note     Mode of Treatment individual therapy;physical therapy        General Information    Patient Profile Reviewed yes     General Observations of  Patient received in therapy gym, willing to participate        Transfers    Transfers stand pivot transfer     Comment gait belt donned for mobility        Sit to Stand Transfer    [place], Sit to Stand Transfer supervision     Verbal Cues safety     Assistive Device walker, front-wheeled     Comment for safety        Stand to Sit Transfer    [place], Stand to Sit Transfer supervision     Verbal Cues safety     Assistive Device walker, front-wheeled     Comment for safety        Stand Pivot Transfer    [place], Stand Pivot/Stand Step Transfer supervision     Verbal Cues safety     Assistive Device walker, front-wheeled     Comment for safety        Gait Training    [place], Gait supervision     Assistive Device walker, front-wheeled     Distance in Feet 200 feet     Pattern (Gait) step-through     Deviations/Abnormal Patterns (Gait) base of support, narrow;gait speed decreased;weight shifting decreased     Comment (Gait/Stairs) + 200ft x5 with S for safety (+ 20ft x3 with Timed Up and Go)        10 Meter Walk Test (Self-Selected Velocity)    Trial One: Ten Meter Walk Test (sec) 11.85 seconds     Trial Two: Ten Meter Walk Test (sec) 10.85 seconds     Trial Three: Ten Meter Walk Test (sec) 9.75 seconds     Trial One: Gait Speed (m/s) 0.51 m/s     Trial Two: Gait Speed (m/s) 0.55 m/s     Trial Three: Gait Speed (m/s) 0.62 m/s     Average Gait Speed (m/s): Three Trials 0.56 m/s        Stairs Training    [place], Stairs close supervision     Assistive Device railing     Handrail Location (Stairs) left side (ascending);left side (descending)     Number of Stairs 12     Stair Height 6 inches     Ascending Stairs Technique step-over-step     Descending Stairs Technique step-over-step     Maintains Weight-bearing Status (Stairs) able to maintain     Comment with CL S for safety and BUE support on single hand rail        Timed Up and Go Test    Trial One: Timed Up and Go Test 17.57     Trial Two:  Timed Up and Go Test 16.93     Trial Three: Timed Up and Go Test 17.36     Mean of 3 Trials: Timed Up and Go Test [id number]     Comment, Timed Up and Go Test Pt result improved from 20.86 seconds to 17.28 seconds and result continues to indicate increased risk for falls        Lower Extremity (Therapeutic Exercise)    Comment seated: B LAQs 2x10 with 4#, B marches 2x10 with 4#, hip abduction 2x10 with 3 second hold, 2x10 hip abduction with pink tb        Spinal Orthosis Management    Type (Spinal Orthosis) cervical collar, hard     Fabrication Comment (Spinal Orthosis) digna santos donned t/o session        Daily Progress Summary (PT)    Daily Outcome Statement Pt completed TUG and result improved from 20.86 seconds to 17.28 seconds and result continues to indicate increased risk for falls.  Pt completed 10 MWT and result of 0.56 m/s is increased from previous attempt of 0.4 m/s however resutl continues to indicate increased risk for falls.  Anticipate performance day next session.                           [facility] PT Goals      Most Recent Value   Bed Mobility Goal 1    Activity/Assistive Device rolling to right, rolling to left at 01/12/2022 0831   [place] supervision required at 01/12/2022 0831   Time Frame short-term goal (STG), 5 - 7 days at 01/12/2022 0831   Progress/Outcome goal met at 01/15/2022 0905   Bed Mobility Goal 2    Activity/Assistive Device rolling to left, rolling to right at 01/12/2022 0831   [place] modified independence at 01/12/2022 0831   Time Frame long-term goal (LTG), 21 days or less at 01/12/2022 0831   Progress/Outcome goal ongoing at 01/17/2022 0800   Bed Mobility Goal 3    Activity/Assistive Device sit to supine/supine to sit at 01/12/2022 0831   [place] other (see comments)  [touching assist] at 01/12/2022 0831   Time Frame short-term goal (STG), 5 - 7 days at 01/12/2022 0831   Progress/Outcome goal met at 01/15/2022 0905   Bed Mobility Goal 4     Activity/Assistive Device sit to supine/supine to sit at 01/12/2022 0831   [place] modified independence at 01/12/2022 0831   Time Frame long-term goal (LTG), 21 days or less at 01/12/2022 0831   Progress/Outcome goal ongoing at 01/17/2022 0800   Transfer Goal 1    Activity/Assistive Device sit-to-stand/stand-to-sit at 01/12/2022 0831   [place] other (see comments)  [touching assist] at 01/12/2022 0831   Time Frame short-term goal (STG), 5 - 7 days at 01/12/2022 0831   Progress/Outcome goal met at 01/17/2022 0800   Transfer Goal 2    Activity/Assistive Device sit-to-stand/stand-to-sit at 01/12/2022 0831   [place] modified independence at 01/12/2022 0831   Time Frame 21 days or less, long-term goal (LTG) at 01/12/2022 0831   Progress/Outcome goal ongoing at 01/17/2022 0800   Transfer Goal 3    Activity/Assistive Device stand pivot at 01/12/2022 0831   [place] other (see comments)  [touching assist] at 01/12/2022 0831   Time Frame short-term goal (STG), 5 - 7 days at 01/12/2022 0831   Progress/Outcome goal met at 01/17/2022 0800   Transfer Goal 4    Activity/Assistive Device stand pivot at 01/12/2022 0831   [place] modified independence at 01/12/2022 0831   Time Frame long-term goal (LTG), 21 days or less at 01/12/2022 0831   Progress/Outcome goal ongoing at 01/17/2022 0800   Gait/Walking Locomotion Goal 1    Activity/Assistive Device gait (walking locomotion), assistive device use at 01/12/2022 0831   Distance 50 feet at 01/12/2022 0831   [place] other (see comments)  [touching assist] at 01/12/2022 0831   Time Frame short-term goal (STG), 5 - 7 days at 01/12/2022 0831   Progress/Outcome goal met at 01/17/2022 0800   Gait/Walking Locomotion Goal 2    Activity/Assistive Device gait (walking locomotion), assistive device use at 01/12/2022 0831   Distance 150 feet at 01/12/2022 0831   [place] modified independence at 01/12/2022 0831   Time Frame long-term goal (LTG), 21 days or less  at 01/12/2022 0831   Progress/Outcome goal ongoing at 01/17/2022 0800   Stairs Goal 1    Activity/Assistive Device ascending stairs, descending stairs at 01/15/2022 0905   Number of Stairs 12 at 01/15/2022 0905   [place] minimum assist (75% or more patient effort) at 01/15/2022 0905   Time Frame short-term goal (STG), 1 week at 01/15/2022 0905   Progress/Outcome goal met at 01/17/2022 0800   Stairs Goal 2    Activity/Assistive Device ascending stairs, descending stairs at 01/15/2022 0905   Number of Stairs 12 at 01/15/2022 0905   [place] modified independence at 01/15/2022 0905   Time Frame long-term goal (LTG), 21 days or less, by discharge at 01/15/2022 0905   Progress/Outcome goal ongoing at 01/17/2022 0800

## 2022-01-23 NOTE — PLAN OF CARE
Plan of Care Review  Plan of Care Reviewed With: patient  Progress: improving  Outcome Summary: Patient AAOx4, contintent of bowel and bladder. [place], wears b/l hearing aids.  Aspen collar worn AAT, may be removed for meals.  Oxy and tylenol utilized for pain.  Incision healing well with staples covered with a foam dressing.

## 2022-01-24 ENCOUNTER — APPOINTMENT (INPATIENT)
Dept: OCCUPATIONAL THERAPY | Facility: REHABILITATION | Age: 72
DRG: 565 | End: 2022-01-24
Payer: COMMERCIAL

## 2022-01-24 ENCOUNTER — APPOINTMENT (INPATIENT)
Dept: PHYSICAL THERAPY | Facility: REHABILITATION | Age: 72
DRG: 565 | End: 2022-01-24
Payer: COMMERCIAL

## 2022-01-24 PROCEDURE — 12800001 HC ROOM AND CARE SEMIPRIVATE REHAB-BRAIN INJ

## 2022-01-24 PROCEDURE — 97535 SELF CARE MNGMENT TRAINING: CPT | Mod: GO

## 2022-01-24 PROCEDURE — 97110 THERAPEUTIC EXERCISES: CPT | Mod: GP

## 2022-01-24 PROCEDURE — 97116 GAIT TRAINING THERAPY: CPT | Mod: GP

## 2022-01-24 PROCEDURE — 97530 THERAPEUTIC ACTIVITIES: CPT | Mod: GP

## 2022-01-24 PROCEDURE — 97530 THERAPEUTIC ACTIVITIES: CPT | Mod: GO

## 2022-01-24 PROCEDURE — 63700000 HC SELF-ADMINISTRABLE DRUG: Performed by: INTERNAL MEDICINE

## 2022-01-24 RX ORDER — OXYCODONE HYDROCHLORIDE 5 MG/1
5 TABLET ORAL EVERY 6 HOURS PRN
Qty: 30 TABLET | Refills: 0 | Status: SHIPPED | OUTPATIENT
Start: 2022-01-24 | End: 2022-01-25

## 2022-01-24 RX ORDER — CARVEDILOL 6.25 MG/1
6.25 TABLET ORAL 2 TIMES DAILY WITH MEALS
Qty: 60 TABLET | Refills: 0 | Status: SHIPPED
Start: 2022-01-24 | End: 2022-02-22 | Stop reason: SDUPTHER

## 2022-01-24 RX ORDER — ACETAMINOPHEN 325 MG/1
650 TABLET ORAL EVERY 6 HOURS PRN
COMMUNITY
Start: 2022-01-24 | End: 2022-02-23

## 2022-01-24 RX ORDER — CYCLOBENZAPRINE HCL 10 MG
10 TABLET ORAL 2 TIMES DAILY PRN
Qty: 14 TABLET | Refills: 0 | Status: SHIPPED
Start: 2022-01-24 | End: 2022-01-25

## 2022-01-24 RX ORDER — GUAIFENESIN 600 MG/1
600 TABLET, EXTENDED RELEASE ORAL 2 TIMES DAILY
Qty: 60 TABLET | Refills: 0 | COMMUNITY
Start: 2022-01-24 | End: 2022-02-23

## 2022-01-24 RX ORDER — ATORVASTATIN CALCIUM 40 MG/1
40 TABLET, FILM COATED ORAL
Qty: 30 TABLET | Refills: 0 | Status: SHIPPED
Start: 2022-01-24 | End: 2022-03-15 | Stop reason: SDUPTHER

## 2022-01-24 RX ORDER — FAMOTIDINE 20 MG/1
20 TABLET, FILM COATED ORAL 2 TIMES DAILY
Qty: 60 TABLET | Refills: 0 | COMMUNITY
Start: 2022-01-24 | End: 2022-03-15 | Stop reason: ALTCHOICE

## 2022-01-24 RX ORDER — LOSARTAN POTASSIUM 100 MG/1
100 TABLET ORAL
Qty: 30 TABLET | Refills: 0 | Status: SHIPPED
Start: 2022-01-24 | End: 2022-02-22 | Stop reason: SDUPTHER

## 2022-01-24 RX ORDER — HYDROCHLOROTHIAZIDE 25 MG/1
25 TABLET ORAL DAILY
Qty: 30 TABLET | Refills: 0 | Status: SHIPPED
Start: 2022-01-25 | End: 2022-03-15 | Stop reason: SDUPTHER

## 2022-01-24 RX ORDER — DABIGATRAN ETEXILATE 150 MG/1
150 CAPSULE ORAL 2 TIMES DAILY
Qty: 60 CAPSULE | Refills: 0 | Status: SHIPPED
Start: 2022-01-24 | End: 2025-01-15

## 2022-01-24 RX ORDER — AMLODIPINE BESYLATE 10 MG/1
10 TABLET ORAL NIGHTLY
Qty: 30 TABLET | Refills: 0 | Status: SHIPPED
Start: 2022-01-26 | End: 2022-03-15 | Stop reason: SDUPTHER

## 2022-01-24 RX ADMIN — FAMOTIDINE 20 MG: 20 TABLET ORAL at 20:23

## 2022-01-24 RX ADMIN — ACETAMINOPHEN 975 MG: 325 TABLET, FILM COATED ORAL at 21:38

## 2022-01-24 RX ADMIN — OXYCODONE HYDROCHLORIDE 10 MG: 5 TABLET ORAL at 12:03

## 2022-01-24 RX ADMIN — AMLODIPINE BESYLATE 5 MG: 5 TABLET ORAL at 07:24

## 2022-01-24 RX ADMIN — ACETAMINOPHEN 975 MG: 325 TABLET, FILM COATED ORAL at 06:15

## 2022-01-24 RX ADMIN — SENNOSIDES AND DOCUSATE SODIUM 1 TABLET: 50; 8.6 TABLET ORAL at 20:23

## 2022-01-24 RX ADMIN — ATORVASTATIN CALCIUM 40 MG: 40 TABLET, FILM COATED ORAL at 16:53

## 2022-01-24 RX ADMIN — DABIGATRAN ETEXILATE MESYLATE 150 MG: 150 CAPSULE ORAL at 20:24

## 2022-01-24 RX ADMIN — OXYCODONE HYDROCHLORIDE 10 MG: 5 TABLET ORAL at 07:23

## 2022-01-24 RX ADMIN — HYDROCHLOROTHIAZIDE 25 MG: 25 TABLET ORAL at 07:24

## 2022-01-24 RX ADMIN — OXYCODONE HYDROCHLORIDE 10 MG: 5 TABLET ORAL at 21:38

## 2022-01-24 RX ADMIN — OXYCODONE HYDROCHLORIDE 10 MG: 5 TABLET ORAL at 02:03

## 2022-01-24 RX ADMIN — CETIRIZINE HYDROCHLORIDE 5 MG: 5 TABLET ORAL at 20:23

## 2022-01-24 RX ADMIN — CARVEDILOL 6.25 MG: 6.25 TABLET, FILM COATED ORAL at 07:24

## 2022-01-24 RX ADMIN — DABIGATRAN ETEXILATE MESYLATE 150 MG: 150 CAPSULE ORAL at 07:29

## 2022-01-24 RX ADMIN — GUAIFENESIN 600 MG: 600 TABLET, EXTENDED RELEASE ORAL at 07:24

## 2022-01-24 RX ADMIN — CYCLOBENZAPRINE HYDROCHLORIDE 10 MG: 10 TABLET, FILM COATED ORAL at 21:38

## 2022-01-24 RX ADMIN — GUAIFENESIN 600 MG: 600 TABLET, EXTENDED RELEASE ORAL at 20:23

## 2022-01-24 RX ADMIN — ACETAMINOPHEN 975 MG: 325 TABLET, FILM COATED ORAL at 13:18

## 2022-01-24 RX ADMIN — LIDOCAINE 1 PATCH: 246 PATCH TOPICAL at 07:29

## 2022-01-24 RX ADMIN — FLUTICASONE PROPIONATE 2 SPRAY: 50 SPRAY, METERED NASAL at 07:25

## 2022-01-24 RX ADMIN — AMLODIPINE BESYLATE 5 MG: 5 TABLET ORAL at 20:23

## 2022-01-24 RX ADMIN — ESCITALOPRAM OXALATE 10 MG: 10 TABLET, FILM COATED ORAL at 07:29

## 2022-01-24 RX ADMIN — OXYCODONE HYDROCHLORIDE 10 MG: 5 TABLET ORAL at 16:53

## 2022-01-24 RX ADMIN — FAMOTIDINE 20 MG: 20 TABLET ORAL at 07:24

## 2022-01-24 RX ADMIN — LOSARTAN POTASSIUM 100 MG: 100 TABLET, FILM COATED ORAL at 16:53

## 2022-01-24 RX ADMIN — CARVEDILOL 6.25 MG: 6.25 TABLET, FILM COATED ORAL at 16:53

## 2022-01-24 RX ADMIN — SENNOSIDES AND DOCUSATE SODIUM 1 TABLET: 50; 8.6 TABLET ORAL at 07:24

## 2022-01-24 NOTE — PLAN OF CARE
Plan of Care Review  Plan of Care Reviewed With: patient  Outcome Summary: Pt AAOx4 cont of b/b using prn oxycodone and repositioning for pain has a Aspen collar on at all times. Patient can self reposition and has refused scds for the shift. Plan of care medications and fall precautions reviewed with patient.

## 2022-01-24 NOTE — PATIENT CARE CONFERENCE
Inpatient Rehabilitation Team Conference Note  Date: 1/24/2022  Time: 10:20 AM       Patient Name: Melanie Zelaya     Medical Record Number: [id number]   YOB: 1950  Sex: Male      Room/Bed: 107/107D  Payor Info: Payor: MOHAMUDARSENIO / Plan: [employer] MEDICARE ADVANTAGE / Product Type:  Managed Care /     Admitting Diagnosis: S/P cervical spinal fusion [Z98.1]   Admit Date/Time: 1/11/2022  9:33 PM  Admission Comments: No comment available     Primary Diagnosis: S/P cervical spinal fusion  Principal Problem: S/P cervical spinal fusion    Patient Active Problem List    Diagnosis Date Noted   • S/P cervical spinal fusion 01/11/2022   • Closed nondisplaced odontoid fracture with type II morphology and delayed healing 01/08/2022   • History of cervical fracture 01/06/2022   • H/O cervical fracture 01/06/2022   • Preop examination 01/03/2022   • Coronary artery calcification seen on CT scan    • COPD (chronic obstructive pulmonary disease) (CMS/HCC)    • Prediabetes    • Mild episode of recurrent major depressive disorder (CMS/[facility]) 05/03/2021   • Cervical spine fracture (CMS/[facility]) 04/23/2021   • Hypertension 04/23/2021   • Asthma 10/22/2019   • Other emphysema (CMS/HCC) 02/21/2019   • Gastroesophageal reflux disease without esophagitis 02/21/2019   • Multiple pulmonary nodules 10/24/2018   • Primary malignant neoplasm of left lower lobe of lung (CMS/[facility]) 11/08/2017   • Anxiety 05/23/2017   • Hearing loss 07/13/2016   • Chronic diastolic heart failure (CMS/[facility]) 09/08/2015   • Atrial fibrillation (CMS/HCC) 09/03/2015       Premorbid Status:  Dominant Hand: right  Ambulation: assistive equipment  Transferring: assistive equipment  Toileting: independent  Bathing: independent  Dressing: assistive equipment (shoe horn)  Eating: independent  Communication: understands/communicates without difficulty  Swallowing: swallows foods/liquids without difficulty  Baseline Diet/Method of Nutritional Intake: no diet  restrictions  Past History of Dysphagia: No hx of dysphagia  Assistive Device/Animal Currently Used at Home: cane, straight; shower chair  Prior Level of Function Comment: Pt reports prior use of cane in home as needed for ambulation and use of a shower chair for bathing. Pt reports was completing BADLs independently.      Current Functional Status:  Mobility  Gait  [place], Gait: supervision  Assistive Device: walker, front-wheeled  Comment (Gait/Stairs): + 200ft x5 with S for safety (+ 20ft x3 with Timed Up and Go)    Stairs  [place], Stairs: close supervision  Assistive Device: railing  Handrail Location (Stairs): left side (ascending); left side (descending)  Number of Stairs: 12  Stair Height: 6 inches  Comment: with CL S for safety and BUE support on single hand rail    Wheelchair  Method of Locomotion: bimanual (upper extremity) propulsion  [place], Forward Propulsion: modified independence  [place], Steering Activities: modified independence  [place], Turning Activities: modified independence  Distance Propelled in Feet: 200 feet  Activity Tolerance: able to tolerate short community activity distances (150 to 500 feet)  Comment, Wheelchair Mobility: recliner w/c ordered for comfort  Wheel Locks/Brake Management: modified independence  Management Activities: wheel locks/brakes management    Transfers  [place], Roll Left: close supervision  [place], Roll Right: close supervision  Verbal Cues (Roll Right): hand placement; safety; technique  [place], Supine to Sit: close supervision  Verbal Cues (Supine to Sit): preparatory posture  [place], Sit to Supine: close supervision  Assistive Device: none  Comment (Bed Mobility): OT: pt OOB at start of session  Maintains Weight-Bearing Status (Transfers): able to maintain  Comment: gait belt donned for mobility  [place], Bed to Chair: verbal cues; increased time to complete; safety considerations; 1 person assist;  touching/steadying assist (on mat in gym)  Verbal Cues: safety; technique  Assistive Device: walker, front-wheeled; wheelchair  Comment: Steady A x 1 SPT with walker with gait belt donned.  [place], Chair to Bed: verbal cues; increased time to complete; safety considerations; 1 person assist; touching/steadying assist (off of mat)  Verbal Cues: safety; technique  Assistive Device: gait belt; wheelchair; walker, front-wheeled  Comment: Steady A x 1 SPT with walker with verbal cues.  [place], Sit to Stand Transfer: modified independence  Verbal Cues: safety  Assistive Device: walker, front-wheeled  Comment: from w/c  [place], Stand to Sit Transfer: modified independence  Verbal Cues: safety  Assistive Device: walker, front-wheeled  Comment: to w/c  [place], Stand Pivot/Stand Step Transfer: modified independence  Verbal Cues: safety  Assistive Device: walker, front-wheeled  Comment: via amb approah to/from w/c in room    Transfer Technique: stand pivot  [place], Toilet Transfer: supervision  Verbal Cues: safety  Assistive Device: walker, front-wheeled; grab bars/safety frame  Comment: Transfer training in ILU. Ambulatory approach. OT recommending toilet safety frame, pt agreeable.  Transfer Technique: stand pivot  [place], Shower Transfer: modified independence  Verbal Cues: safety  Assistive Device: grab bars/tub rail; tub bench; walker, front-wheeled  Comment: via amb approach to tub bench in barrier free shower stall c RW  Transfer Technique: step over, left entry  [place], Tub Transfer: supervision  Verbal Cues: safety  Assistive Device: shower chair; walker, front-wheeled; grab bars/tub rail; gait belt  Comment: Transfer training in ILU. Pt completes 2 trials, 1st c Cl S progressing to S on 2nd trial. Good carryover of safe techniques noted. Pt reports having shower chair and horizontal grab bar at interior wall at home. OT recommending adding verticle grab bar on anterior wall  for increased safety.      Self Care  Self-Performance: obtains clothes; orthosis application; threads left arm, shirt; threads right arm, shirt; pulls shirt over head/around back; pulls shirt down/adjusts  [place] Assistance: pulls shirt down/adjusts  Adaptive Equipment: none  Comment: pt able to doff/don aspen + steven collar. Pt demo's good safety + ability to adhere to spinal precautions  Tasks: doff; don; shoes/slippers; socks  Self-Performance: obtains clothes; threads left leg, underpants; threads right leg, underpants; pulls underpants up or down; threads left leg, pants/shorts; threads right leg, pants/shorts; pulls pants/shorts up or down; dons/doffs left sock; dons/doffs right sock; dons/doffs left shoe; dons/doffs right shoe  [place] Assistance: dons/doffs left shoe; dons/doffs right shoe  Adaptive Equipment: long-handled shoe horn; reacher; sock aid  [place]: modified independence  Comment: pt able to complete LB dressing sitting on tub bench + in stance c unilateral support on grab bar/RW for balance PRN.  Self-Performance: chest; left arm; right arm; abdomen; front perineal area; buttocks; left upper leg; right upper leg; left lower leg, including foot; right lower leg, including foot  Adaptive Equipment: grab bar/tub rail; hand-held shower spray hose; long-handled sponge; tub bench  [place] Assistance: obtain supplies  Comment: pt preforms bathing seated on tub bench  [place]: modified independence  Adaptive Equipment: grab bar/safety frame  Setup Assistance: other (see comments) (not tested)  Comment: pt able to manage clothes + complete toileting c mod I  Self-Performance: washes, rinses and dries face; washes, rinses and dries hands; brushes/[place]rera hair; oral care (brushing teeth, cleaning dentures); applies deodorant; shaves face  Adaptive Equipment: none  Setup Assistance: obtain supplies  [place]: modified independence  Comment: completed all grooming in stance at sink c RW other than  "shaving face which pt completed seated in w/c.    Cognition  Affect/Mental Status (Cognition): WFL  Orientation Status (Cognition): oriented x 4  Follows Commands (Cognition): WFL  Cognitive Function: safety deficit  Attention Deficit (Cognition): minimal deficit; distractible in noisy environment; focused/sustained attention  Executive Function Deficit (Cognition): minimal deficit; information processing; insight/awareness of deficits  Safety Deficit (Cognition): minimal deficit; impulsivity; insight into deficits/self-awareness; judgment; problem-solving  Comment, Cognition: Educated on spinal precautions and provided acronym \"BLT\".    Communication       Frequency of Treatment (OT): 5-7 times per week,60-90 minutes per day  Frequency of Treatment (PT): 5-7 times per week,60-90 minutes per day    Weekly Outcome Summaries:  Weekly Progress Summary (PT)  Progress Toward Functional Goals (PT): progressing toward functional goals as expected  Weekly Outcome Statement: Pt is a 70 yo m with past medical history of Anxiety, Atrial fibrillation, Breast cancer, C1 cervical fracture, Colon polyp, COPD, Coronary artery calcification seen on CT scan, COVID-19 vaccine series completed, Depression, Diverticulosis, Fracture of pelvis, GERD, Hearing loss, History of colon polyps, History of COVID-19 (2020), Hyperlipidemia, Hypertension, Left atrial enlargement, Lung cancer, Pneumonia (2011), Seasonal allergies, and Wrist fracture. Principal problem is amb dysfunction s/p cervical fusion as result of fall.  Pt PLOF was independent with use of RW and with elevations, ambulation and transfers and all other ADLs.  Pt has been medically cleared for comprehensive IP at [facility].  Currently, pt was CL S for rolling L and R and CL S for sit <> supine. Pt is CL S for sit <> stand and CL S SPT with RW.  Pt is CL S for 200ft with RW.  Pt is CL S for 16x 6in steps with unilateral hand rail and lateral approach.  Pt is limited " by impaired balance, decreased strength, impaired endurance, impaired activity tolerance, and limited functional endurance.  Pt will benefit from homecare PT and anticipate d/c to home tomorrow.  Recommendations: PD next session  Weekly Progress Summary (OT)  Progress Toward Functional Goals (OT): progressing toward functional goals as expected  Weekly Outcome Statement: Pt has met all LTGs during morning ADL session at a mod I level for toilet/shower txfers, UB/LB dressing, + bathing with good safety awareness and ability to adhere to spinal precautions. Pt able to doff/don aspen and steven collar without complications and demonstrates good competency. Pt d/c set for tomorrow with necessary equipment in place for safe d/c to home with home care to increase functional independence within his natural environment.  Impairments Continuing to Limit Function: decreased strength,impaired balance,impaired motor control,pain  Recommendations: No need to continue skilled IP OT - recommend d/c to home c home OT to increase functional independence.  Weekly Outcome Summary (Nursing)  Weekly Progress Summary: progressing toward goals as expected  Weekly Outcome Statement: Patient AAOx4, [place] with b/l hearing aids.  [place] collar worn AAT, but can be removed when eating per order.  Incision healing well with staples, covered with mepilex. Mepilex also to left collar bone for protection due to previous injury.  On pradaxa for stroke prevention.  Takes oxy for pain management.  Patient HR can run sharifa at times, patient Asymptomatic.  Weekly Outcome Summary (Interdisciplinary)  Weekly Progress Summary: progressing toward goals as expected  Weekly Outcome Statement: Pt c bright affect and mood. feels he is improving.  Still has occipital HA but it is less intense (3-4 vs 6-8) and not constant. Endurance and LE strength improving. Misses driving and riding a motorcycle but hopes to resume both when collar is d/c. Open to using tools to  help c ADLs.  Feels less anxious re d/c but is concerned re steps. Lives [place]. can stay on 2nd floor prn.    Problem Resolution:  Coping/Stress/Tolerance  Do you feel stress - tense, restless, nervous, or anxious, or unable to sleep at night because your mind is troubled all the time - these days?: Not at all  Major Change/Loss/Stressor/Fears: deniesBladder Management: external device for management  Strategies/Techniques (Bowel Management): bowel agents for managementSelf-Care Promotion: safe use of adaptive equipment encouraged,independence encouraged  Identified Problems & Impairments: (not recorded)  Comment, Identified Problems & Impairments: (not recorded)  Resolved Problems and Impairments: (not recorded)  Comment, Resolved Problems & Impairments: (not recorded) Team Weekly Outcome Statement: Good progress, medicall stable.  Follow-up w/ doc on way home.  Oriented, contient, wears hearing aids.  Has aspen, will remove at meals.  INcisiion stable.  Pain manged with oxycontin and flexeril at times.   Reports he will feel better when his doc approves remova of colar. PT - performance day today, anticipating Mod I.   OT - Mod I goals.  Doing well. (01/24/22 1017)Comment, Barriers to Discharge: Son installing Large grab bar.   [facility].       Goals/Support System:  Patient/Family Goals  Patient's Goals For Discharge: return home,take care of myself at home  Family Goals For Discharge: patient able to return to all previous activities/roles  Family/Support System  Caregiver Engagement: active learner related to care delivery  Family/Support System Care: self-care encouraged  Caregiver Training  Caregiver(s) to be Trained: daughter(s),son(s)  Visit Schedule: to be arranged  Comment, Caregiver Training Plan: to be arranged    IRF PT Goals    Flowsheet Row Most Recent Value   Bed Mobility Goal 1    Activity/Assistive Device rolling to right, rolling to left at 01/12/2022 0831   [place] supervision required at  01/12/2022 0831   Time Frame short-term goal (STG), 5 - 7 days at 01/12/2022 0831   Progress/Outcome goal met at 01/24/2022 0826   Bed Mobility Goal 2    Activity/Assistive Device rolling to left, rolling to right at 01/12/2022 0831   [place] modified independence at 01/12/2022 0831   Time Frame long-term goal (LTG), 21 days or less at 01/12/2022 0831   Progress/Outcome goal ongoing at 01/24/2022 0826   Bed Mobility Goal 3    Activity/Assistive Device sit to supine/supine to sit at 01/12/2022 0831   [place] other (see comments) [touching assist] at 01/12/2022 0831   Time Frame short-term goal (STG), 5 - 7 days at 01/12/2022 0831   Progress/Outcome goal met at 01/24/2022 0826   Bed Mobility Goal 4    Activity/Assistive Device sit to supine/supine to sit at 01/12/2022 0831   [place] modified independence at 01/12/2022 0831   Time Frame long-term goal (LTG), 21 days or less at 01/12/2022 0831   Progress/Outcome goal ongoing at 01/24/2022 0826   Transfer Goal 1    Activity/Assistive Device sit-to-stand/stand-to-sit at 01/12/2022 0831   [place] other (see comments) [touching assist] at 01/12/2022 0831   Time Frame short-term goal (STG), 5 - 7 days at 01/12/2022 0831   Progress/Outcome goal met at 01/17/2022 0800   Transfer Goal 2    Activity/Assistive Device sit-to-stand/stand-to-sit at 01/12/2022 0831   [place] modified independence at 01/12/2022 0831   Time Frame 21 days or less, long-term goal (LTG) at 01/12/2022 0831   Progress/Outcome goal ongoing at 01/24/2022 0826   Transfer Goal 3    Activity/Assistive Device stand pivot at 01/12/2022 0831   [place] other (see comments) [touching assist] at 01/12/2022 0831   Time Frame short-term goal (STG), 5 - 7 days at 01/12/2022 0831   Progress/Outcome goal met at 01/17/2022 0800   Transfer Goal 4    Activity/Assistive Device stand pivot at 01/12/2022 0831   [place] modified independence at 01/12/2022 0831   Time Frame long-term goal (LTG),  21 days or less at 01/12/2022 0831   Progress/Outcome goal ongoing at 01/24/2022 0826   Gait/Walking Locomotion Goal 1    Activity/Assistive Device gait (walking locomotion), assistive device use at 01/12/2022 0831   Distance 50 feet at 01/12/2022 0831   [place] other (see comments) [touching assist] at 01/12/2022 0831   Time Frame short-term goal (STG), 5 - 7 days at 01/12/2022 0831   Progress/Outcome goal met at 01/17/2022 0800   Gait/Walking Locomotion Goal 2    Activity/Assistive Device gait (walking locomotion), assistive device use at 01/12/2022 0831   Distance 150 feet at 01/12/2022 0831   [place] modified independence at 01/12/2022 0831   Time Frame long-term goal (LTG), 21 days or less at 01/12/2022 0831   Progress/Outcome goal ongoing at 01/24/2022 0826   Stairs Goal 1    Activity/Assistive Device ascending stairs, descending stairs at 01/15/2022 0905   Number of Stairs 12 at 01/15/2022 0905   [place] minimum assist (75% or more patient effort) at 01/15/2022 0905   Time Frame short-term goal (STG), 1 week at 01/15/2022 0905   Progress/Outcome goal met at 01/17/2022 0800   Stairs Goal 2    Activity/Assistive Device ascending stairs, descending stairs at 01/15/2022 0905   Number of Stairs 12 at 01/15/2022 0905   [place] modified independence at 01/15/2022 0905   Time Frame long-term goal (LTG), 21 days or less, by discharge at 01/15/2022 0905   Progress/Outcome goal ongoing at 01/24/2022 0826        IRF OT Goals    Flowsheet Row Most Recent Value   Transfer Goal 1    Activity/Assistive Device toilet at 01/17/2022 0840   [place] supervision required at 01/17/2022 0840   Time Frame short-term goal (STG), 1 week at 01/17/2022 0840   Progress/Outcome goal met, goal revised this date at 01/17/2022 0840   Transfer Goal 2    Activity/Assistive Device toilet at 01/12/2022 0840   [place] modified independence at 01/12/2022 0840   Time Frame long-term goal (LTG), 2 weeks at 01/12/2022  0840   Progress/Outcome goal met at 01/24/2022 0726   Transfer Goal 3    Activity/Assistive Device shower at 01/17/2022 0840   [place] supervision required at 01/17/2022 0840   Time Frame short-term goal (STG), 1 week at 01/17/2022 0840   Progress/Outcome goal met, goal revised this date at 01/17/2022 0840   Transfer Goal 4    Activity/Assistive Device shower at 01/12/2022 0840   [place] modified independence at 01/12/2022 0840   Time Frame long-term goal (LTG), 2 weeks at 01/12/2022 0840   Progress/Outcome goal met at 01/24/2022 0726   Bathing Goal 1    Activity/Assistive Device bathing skills, all at 01/12/2022 0840   [place] supervision required at 01/17/2022 0840   Time Frame short-term goal (STG), 1 week at 01/17/2022 0840   Progress/Outcome goal met, goal revised this date at 01/17/2022 0840   Bathing Goal 2    Activity/Assistive Device bathing skills, all at 01/12/2022 0840   [place] modified independence at 01/12/2022 0840   Time Frame long-term goal (LTG), 2 weeks at 01/12/2022 0840   Progress/Outcome goal met at 01/24/2022 0726   UB Dressing Goal 1    Activity/Assistive Device upper body dressing at 01/12/2022 0840   [place] supervision required [Cl S] at 01/12/2022 0840   Time Frame short-term goal (STG), 1 week at 01/17/2022 0840   Strategies/Barriers including clothing retrieval at 01/17/2022 0840   Progress/Outcome goal ongoing at 01/17/2022 0840   UB Dressing Goal 2    Activity/Assistive Device upper body dressing at 01/12/2022 0840   [place] modified independence at 01/12/2022 0840   Time Frame long-term goal (LTG), 2 weeks at 01/12/2022 0840   Progress/Outcome goal met at 01/24/2022 0726   LB Dressing Goal 1    Activity/Assistive Device lower body dressing at 01/12/2022 0840   [place] supervision required [Cl S] at 01/17/2022 0840   Time Frame short-term goal (STG), 1 week at 01/17/2022 0840   Progress/Outcome goal met, goal revised this date at 01/17/2022 0840    LB Dressing Goal 2    Activity/Assistive Device lower body dressing at 01/12/2022 0840   [place] modified independence at 01/12/2022 0840   Time Frame long-term goal (LTG), 2 weeks at 01/12/2022 0840   Progress/Outcome goal met at 01/24/2022 0726   Grooming Goal 1    [place] supervision required at 01/17/2022 0840   Time Frame short-term goal (STG), 1 week at 01/17/2022 0840   Strategies/Barriers Standing at 01/12/2022 0840   Progress/Outcome goal met, goal revised this date at 01/17/2022 0840   Grooming Goal 2    Activity/Assistive Device grooming skills, all at 01/12/2022 0840   [place] modified independence at 01/12/2022 0840   Time Frame long-term goal (LTG), 2 weeks at 01/12/2022 0840   Progress/Outcome goal met at 01/24/2022 0726   Toileting Goal 1    Activity/Assistive Device toileting skills, all at 01/12/2022 0840   [place] supervision required at 01/17/2022 0840   Time Frame short-term goal (STG), 1 week at 01/17/2022 0840   Progress/Outcome goal met, goal revised this date at 01/17/2022 0840   Toileting Goal 2    Activity/Assistive Device toileting skills, all at 01/12/2022 0840   [place] modified independence at 01/12/2022 0840   Time Frame long-term goal (LTG), 2 weeks at 01/12/2022 0840   Progress/Outcome goal met at 01/24/2022 0726          Risk for Complications  Constipation: Moderate  Dehydration/Malnutrition: Moderate  DVT: Moderate  Falls: Moderate  Infection: Moderate  Skin Breakdown: Moderate      The patient's medical prognosis is good to achieve the stated goals below.    Expected Level of Function  Expected Functional Improvement: self-care; mobility; safety  Self-Care: Independent  Sphincter Control: Independent  Transfers: Independent  Locomotion: Independent  Communication: Independent  Social Cognition: Independent  Comment, Expected Level of Function at Discharge: some ADL assist       Discharge Planning:  Anticipated Discharge Disposition: home with home  health  Type of Home Care Services: home OT; home PT; nursing    Equipment/Device Needs at Discharge  Assistive Device/Animal Currently Used at Home: cane, straight,shower chair  Discharge Planning  Does the patient need discharge transport arranged?: No    Anticipated Discharge Date: 1/25/2022    Needs Identified:       Team Members Present:     Type Value    Rehab Attending Present:  Glenn Armstrong MD    Care Coordinator Present:  Lester Goff LSW    Nurse Present:  Gail Childers RN    OT Present:  Florence Heart OT    PT Present:  Gavino Contreras PT    Psychologist Present:  Mariel Newton, PhD        Next Team Conference Date: 01/31/22

## 2022-01-24 NOTE — PROGRESS NOTES
Patient: Melanie Zelaya  Location: [facility] [address]  MRN: [id number]  Today's date: 1/24/2022    History of Present Illness  Melanie is a 71 y.o. male admitted on 1/11/2022 with S/P cervical spinal fusion [Z98.1]. Principal problem is S/P cervical spinal fusion.      Past Medical History  Melanie has a past medical history of Anxiety, Atrial fibrillation (CMS/HCC), Breast cancer (CMS/HCC), C1 cervical fracture (CMS/HCC), Colon polyp, COPD (chronic obstructive pulmonary disease) (CMS/HCC), Coronary artery calcification seen on CT scan, COVID-19 vaccine series completed, Depression, Diverticulosis, Diverticulosis, Fracture of pelvis (CMS/HCC) (1968), GERD (gastroesophageal reflux disease), Hearing loss, History of colon polyps, History of COVID-19 (2020), Hyperlipidemia, Hypertension, Left atrial enlargement, Lung cancer (CMS/HCC), Lung cancer (CMS/HCC), Pneumonia (2011), Seasonal allergies, and Wrist fracture.      PT Vitals    Date/Time Pulse HR Source Pt Activity O2 Therapy BP BP Location BP Method Pt Position [facility]   01/24/22 1123 73 Monitor -- -- 125/86 Right upper arm Automatic Sitting DT   01/24/22 1125 57 Monitor -- -- 132/87 Left upper arm Automatic Sitting DT      PT Pain    Date/Time Pain Type Acceptable Pain Level Side/Orientation Location Rating: Rest Description Interventions Sleep/Rest/Relaxation [facility]   01/24/22 1123 Pain Assessment -- -- neck 3 -- diversional activity provided -- DT   01/24/22 1125 Pain Assessment -- -- neck 3 -- diversional activity provided -- DT          Prior Living Environment      Most Recent Value   People in Home alone   Current Living Arrangements home   Home Accessibility not wheelchair accessible   Living Environment Comment 3SH, 3 VALERIA LHR, FF to bathroom then another FF to bedroom, tub shower w/ SC   Number of Stairs, Main Entrance 4   Surface of Stairs, Main Entrance concrete   Stair Railings, Main Entrance railing on left side (ascending)   Landing,  Stairs, Main Entrance adequate turning radius   Number of Stairs, Second Entrance other (see comments)  [back door not utilized]   Location, Kitchen first (main) floor   [place], Kitchen tile floor   Location, Laundry Room first (main) floor   [place], Laundry Room concrete floor   Laundry Room Access not wheelchair accessible   Laundry Room Access Comment down in basement,  with full flight and L hand rail   Location, Patient Bedroom other (see comments)  [3rd floor]   [place], Patient Bedroom carpeted floor   Location, Bathroom second floor, must negotiate stairs to access   [place], Bathroom tile floor   Bathroom Access not wheelchair accessible   Bathroom Access Comment Pt reports FB on 2nd floor. Pt has shower chair.   Number of Stairs, Within Home, Primary 12   Surface of Stairs, Within Home, Primary carpeting   Stair Railings, Within Home, Primary railings on both sides of stairs   Stairs Comment, Within Home, Primary to second floor for bathroom   Stairs, Within Home, Secondary 12   Surface of Stairs, Within Home, Secondary carpeting   Stair Railings, Within Home, Secondary railing on left side (ascending)   Stairs Comment, Within Home, Secondary to third floor for bed room          Prior Level of Function      Most Recent Value   Dominant Hand right   Ambulation assistive equipment   Transferring assistive equipment   Toileting independent   Bathing independent   Dressing assistive equipment  [shoe horn]   Eating independent   Prior Level of Function Comment Pt reports prior use of cane in home as needed for ambulation and use of a shower chair for bathing. Pt reports was completing BADLs independently.   Assistive Device Currently Used at Home cane, straight, shower chair           IRF PT Evaluation and Treatment - 01/24/22 1035        PT Time Calculation    Start Time 1030     Stop Time 1200     Time Calculation (min) 90 min        Session Details    Document Type discharge evaluation     Mode of  Treatment individual therapy;physical therapy        General Information    Patient Profile Reviewed yes     General Observations of Patient received in therapy gym, willing to participate     Existing Precautions/Restrictions fall;cardiac;spinal   cervical collar worn at all times       Weight-bearing Status    Right UE Weight-Bearing Status --     Left LE Weight-Bearing Status weight-bearing as tolerated (WBAT)     Right LE Weight-Bearing Status weight-bearing as tolerated (WBAT)        Sensory Assessment (Somatosensory)    Left LE Sensory Assessment general sensation;light touch awareness;light touch localization;proprioception;intact   L3-S2 intact and proprioception intact at great toe    Right LE Sensory Assessment light touch awareness;general sensation;light touch localization;proprioception;intact   L3-S2 intact and proprioception intact at great toe       Range of Motion (ROM)    Left Lower Extremity (ROM) left LE ROM is WFL     Right Lower Extremity (ROM) right LE ROM is WFL        Strength (Manual Muscle Testing)    Hip, Left (Strength) flexion 4, extension 4, abd 4, add 4, IR 4, ER 4     Knee, Left (Strength) flexion 4, extension 4     Ankle, Left (Strength) DF 4, PF 4, Inv 4, Ev 4     Hip, Right (Strength) flexion 4, extension 4, abd 4, add 4, IR 4, ER 4     Knee, Right (Strength) flexion 4, extension 4     Ankle, Right (Strength) DF 4, PF 4, Inv 4, Ev 4        Bed Mobility    [place], Roll Left modified independence     [place], Roll Right modified independence     [place], Supine to Sit modified independence     [place], Sit to Supine modified independence     Comment (Bed Mobility) mod I on mat surfaces        Transfers    Comment gait belt donned for all mobility        Bed to Chair Transfer    [place], Bed to Chair modified independence     Assistive Device walker, front-wheeled        Chair to Bed Transfer    [place], Chair to Bed modified independence     Assistive  Device walker, front-wheeled        Sit to Stand Transfer    [place], Sit to Stand Transfer modified independence     Assistive Device walker, front-wheeled        Stand to Sit Transfer    [place], Stand to Sit Transfer modified independence     Assistive Device walker, front-wheeled        Stand Pivot Transfer    [place], Stand Pivot/Stand Step Transfer modified independence     Assistive Device walker, front-wheeled        Car Transfer    Transfer Technique stand pivot     [place], Car Transfer modified independence     Assistive Device walker, front-wheeled        Gait Training    [place], Gait modified independence     Assistive Device walker, front-wheeled     Distance in Feet 200 feet     Pattern (Gait) step-through     Deviations/Abnormal Patterns (Gait) base of support, narrow;gait speed decreased     [place], Picking Up Object modified independence   with reacher    Comment (Gait/Stairs) + 200ft x5 with RW        Curb Negotiation    [place] modified independence     Assistive Device walker, front-wheeled     Curb Height 6 inches     Comment up/down 6in+2in curb step with RLE leading to ascend and RLE leading to descend        Rough/Uneven Surface Gait Skills    [place] modified independence     Assistive Device walker, front-wheeled     Distance in Feet 100 feet     Comment at mod I over carpeted surfaces        Sloped Surface Gait Skills    [place] modified independence     Assistive Device walker, front-wheeled     Distance in Feet 10 feet     Comment up/down 5ft ramp with RW        Stairs Training    [place], Stairs modified independence     Assistive Device railing     Handrail Location (Stairs) left side (ascending);left side (descending)     Number of Stairs 12     Stair Height 6 inches     Ascending Stairs Technique step-to-step     Descending Stairs Technique step-to-step     Maintains Weight-bearing Status (Stairs) able to maintain     Comment mod I         Balance    Static Sitting Balance WFL     Dynamic Sitting Balance WFL     Static Standing Balance WFL     Dynamic Standing Balance mild impairment;supported;standing   generalized unsteadiness       Timed Up and Go Test    Results, Timed Up and Go Test (Balance) performed on 1/23/2022        Motor Skills    Motor Skills coordination;functional endurance;muscle tone;postural deviations     Coordination WFL;bilateral;lower extremity   heel to shin and BETHANY intact    Functional Endurance good     Muscle Tone bilateral;lower extremity(s);WNL        Postural Deviations    Head and Neck forward head     Shoulder left shoulder medial rotation;right shoulder medial rotation     Low Back flattened     Comment, Postural Deviations no additional overt postural deviations        Lower Extremity (Therapeutic Exercise)    Comment standing HEP: heel raises 2x10, standing hip flexion 2x10, stnading hip abd 2x10, standing hip extension 2x10, mini squats 2x10        Spinal Orthosis Management    Type (Spinal Orthosis) cervical collar, hard     Fabrication Comment (Spinal Orthosis) [place] donned t/o session     Functional Design (Spinal Orthosis) static orthosis     Therapeutic Indications Spinal Orthosis post-op positioning/protection     Wearing Schedule (Spinal Orthosis) wear full-time     Compliance/Wearing Issues (Spinal Orthosis) patient/caregiver comprehend strategies;patient/caregiver comprehend rationale for orthosis        Discharge Summary (PT)    Outcomes Achieved/Progress Made Upon Discharge (PT) all goals met within established timeframes     Transfer to Another Level of Care or Facility (PT) recommend continued therapy following discharge;recommend therapy via home health     Discharge Summary Statement (PT) Pt is a 70 yo m with past medical history of Anxiety, Atrial fibrillation, Breast cancer, C1 cervical fracture, Colon polyp, COPD, Coronary artery calcification seen on CT scan, COVID-19 vaccine series  completed, Depression, Diverticulosis, Fracture of pelvis, GERD, Hearing loss, History of colon polyps, History of COVID-19 (2020), Hyperlipidemia, Hypertension, Left atrial enlargement, Lung cancer, Pneumonia (2011), Seasonal allergies, and Wrist fracture. Principal problem is amb dysfunction s/p cervical fusion as result of fall.  Pt PLOF was independent with use of RW and with elevations, ambulation and transfers and all other ADLs.  Pt has been medically cleared for comprehensive IP at [facility].  Currently at d/c, pt is mod I for rolling L and R and Mod I for sit <> supine. Pt is Mod I for sit <> stand and Mod I with SPT with RW.  Pt is Mod I for 200ft with RW.  Pt is Mod I for 12x 6in steps with unilateral hand rail and lateral approach.  Pt completed TUG and result improved from 20.86 seconds to 17.28 seconds and result continues to indicate increased risk for falls.  Pt completed 10 MWT and result of 0.56 m/s is increased from previous attempt of 0.4 m/s however result continues to indicate increased risk for falls. Pt is limited by impaired balance, decreased strength, impaired endurance, impaired activity tolerance, and limited functional endurance.  Pt will benefit from homecare PT and anticipate d/c to home tomorrow.  Pt provided with HEP and RW.                      Education Documentation  Safety Techniques, taught by Gavino Contreras PT at 1/24/2022 11:08 AM.  Learner: Patient  Readiness: Acceptance  Method: Explanation  Response: Verbalizes Understanding  Comment: PT POC, role of homecare PT, need for AD, safety awareness, elevations and HEP    Mobility Aids/Assistive Devices, taught by Gavino Contreras PT at 1/24/2022 11:08 AM.  Learner: Patient  Readiness: Acceptance  Method: Explanation  Response: Verbalizes Understanding  Comment: PT POC, role of homecare PT, need for AD, safety awareness, elevations and HEP          IRF PT Goals      Most Recent Value   Bed Mobility Goal 1     Activity/Assistive Device rolling to right, rolling to left at 01/12/2022 0831   [place] supervision required at 01/12/2022 0831   Time Frame short-term goal (STG), 5 - 7 days at 01/12/2022 0831   Progress/Outcome goal met at 01/24/2022 1035   Bed Mobility Goal 2    Activity/Assistive Device rolling to left, rolling to right at 01/12/2022 0831   [place] modified independence at 01/12/2022 0831   Time Frame long-term goal (LTG), 21 days or less at 01/12/2022 0831   Progress/Outcome goal met at 01/24/2022 1035   Bed Mobility Goal 3    Activity/Assistive Device sit to supine/supine to sit at 01/12/2022 0831   [place] other (see comments)  [touching assist] at 01/12/2022 0831   Time Frame short-term goal (STG), 5 - 7 days at 01/12/2022 0831   Progress/Outcome goal met at 01/24/2022 1035   Bed Mobility Goal 4    Activity/Assistive Device sit to supine/supine to sit at 01/12/2022 0831   [place] modified independence at 01/12/2022 0831   Time Frame long-term goal (LTG), 21 days or less at 01/12/2022 0831   Progress/Outcome goal met at 01/24/2022 1035   Transfer Goal 1    Activity/Assistive Device sit-to-stand/stand-to-sit at 01/12/2022 0831   [place] other (see comments)  [touching assist] at 01/12/2022 0831   Time Frame short-term goal (STG), 5 - 7 days at 01/12/2022 0831   Progress/Outcome goal met at 01/24/2022 1035   Transfer Goal 2    Activity/Assistive Device sit-to-stand/stand-to-sit at 01/12/2022 0831   [place] modified independence at 01/12/2022 0831   Time Frame 21 days or less, long-term goal (LTG) at 01/12/2022 0831   Progress/Outcome goal met at 01/24/2022 1035   Transfer Goal 3    Activity/Assistive Device stand pivot at 01/12/2022 0831   [place] other (see comments)  [touching assist] at 01/12/2022 0831   Time Frame short-term goal (STG), 5 - 7 days at 01/12/2022 0831   Progress/Outcome goal met at 01/24/2022 1035   Transfer Goal 4    Activity/Assistive Device stand pivot  at 01/12/2022 0831   [place] modified independence at 01/12/2022 0831   Time Frame long-term goal (LTG), 21 days or less at 01/12/2022 0831   Progress/Outcome goal met at 01/24/2022 1035   Gait/Walking Locomotion Goal 1    Activity/Assistive Device gait (walking locomotion), assistive device use at 01/12/2022 0831   Distance 50 feet at 01/12/2022 0831   [place] other (see comments)  [touching assist] at 01/12/2022 0831   Time Frame short-term goal (STG), 5 - 7 days at 01/12/2022 0831   Progress/Outcome goal met at 01/24/2022 1035   Gait/Walking Locomotion Goal 2    Activity/Assistive Device gait (walking locomotion), assistive device use at 01/12/2022 0831   Distance 150 feet at 01/12/2022 0831   [place] modified independence at 01/12/2022 0831   Time Frame long-term goal (LTG), 21 days or less at 01/12/2022 0831   Progress/Outcome goal met at 01/24/2022 1035   Stairs Goal 1    Activity/Assistive Device ascending stairs, descending stairs at 01/15/2022 0905   Number of Stairs 12 at 01/15/2022 0905   [place] minimum assist (75% or more patient effort) at 01/15/2022 0905   Time Frame short-term goal (STG), 1 week at 01/15/2022 0905   Progress/Outcome goal met at 01/24/2022 1035   Stairs Goal 2    Activity/Assistive Device ascending stairs, descending stairs at 01/15/2022 0905   Number of Stairs 12 at 01/15/2022 0905   [place] modified independence at 01/15/2022 0905   Time Frame long-term goal (LTG), 21 days or less, by discharge at 01/15/2022 0905   Progress/Outcome goal met at 01/24/2022 1035

## 2022-01-24 NOTE — PLAN OF CARE
Spoke with Melanie and his son about DC.  Confirmed neurosurgery follow-up in [facility] tomorrow at 11:20.  We reviewed 10am  to attend appointment by scheduled time.  We reviewed referral to [facility] was made.  We discussed team would be out to assess frequency.   Team update also reviewed, DC confirmed tomorrow.  [place], SO will .

## 2022-01-24 NOTE — PLAN OF CARE
Plan of Care Review  Plan of Care Reviewed With: patient  Progress: improving  Outcome Summary: Patient AAOx4, continent of bowel and bladder.  [place] collar worn AAT, incision healing well with staples covered with a mepilex.  Pain managed with oxy and ATC tylenol.

## 2022-01-24 NOTE — PROGRESS NOTES
Patient: Melanie Zelaya  Location: [facility] [address]  MRN: [id number]  Today's date: 1/24/2022    History of Present Illness  Melanie is a 71 y.o. male admitted on 1/11/2022 with S/P cervical spinal fusion [Z98.1]. Principal problem is S/P cervical spinal fusion.      Past Medical History  Melanie has a past medical history of Anxiety, Atrial fibrillation (CMS/HCC), Breast cancer (CMS/HCC), C1 cervical fracture (CMS/HCC), Colon polyp, COPD (chronic obstructive pulmonary disease) (CMS/HCC), Coronary artery calcification seen on CT scan, COVID-19 vaccine series completed, Depression, Diverticulosis, Diverticulosis, Fracture of pelvis (CMS/HCC) (1968), GERD (gastroesophageal reflux disease), Hearing loss, History of colon polyps, History of COVID-19 (2020), Hyperlipidemia, Hypertension, Left atrial enlargement, Lung cancer (CMS/HCC), Lung cancer (CMS/HCC), Pneumonia (2011), Seasonal allergies, and Wrist fracture.      OT Vitals    Date/Time Pulse HR Source SpO2 Pt Activity O2 Therapy BP BP Location BP Method Pt Position [facility]   01/24/22 0728 70 Monitor 98 % At rest None (Room air) 140/68 Right upper arm Automatic Sitting       OT Pain    Date/Time Pain Type Location Rating: Rest Rating: Rest Interventions [facility]   01/24/22 0728 Pain Assessment neck 3 -- diversional activity provided    01/24/22 0813 Pain Reassessment neck 6  Meds given by RN SELAM          Prior Living Environment      Most Recent Value   People in Home alone   Current Living Arrangements home   Home Accessibility not wheelchair accessible   Living Environment Comment 3SH, 3 VALERIA LHR, FF to bathroom then another FF to bedroom, tub shower w/ SC   Number of Stairs, Main Entrance 4   Surface of Stairs, Main Entrance concrete   Stair Railings, Main Entrance railing on left side (ascending)   Landing, Stairs, Main Entrance adequate turning radius   Number of Stairs, Second Entrance other (see comments)  [back door not utilized]    Location, Kitchen first (main) floor   [place], Kitchen tile floor   Location, Laundry Room first (main) floor   [place], Laundry Room concrete floor   Laundry Room Access not wheelchair accessible   Laundry Room Access Comment down in basement,  with full flight and L hand rail   Location, Patient Bedroom other (see comments)  [3rd floor]   [place], Patient Bedroom carpeted floor   Location, Bathroom second floor, must negotiate stairs to access   [place], Bathroom tile floor   Bathroom Access not wheelchair accessible   Bathroom Access Comment Pt reports FB on 2nd floor. Pt has shower chair.   Number of Stairs, Within Home, Primary 12   Surface of Stairs, Within Home, Primary carpeting   Stair Railings, Within Home, Primary railings on both sides of stairs   Stairs Comment, Within Home, Primary to second floor for bathroom   Stairs, Within Home, Secondary 12   Surface of Stairs, Within Home, Secondary carpeting   Stair Railings, Within Home, Secondary railing on left side (ascending)   Stairs Comment, Within Home, Secondary to third floor for bed room          Prior Level of Function      Most Recent Value   Dominant Hand right   Ambulation assistive equipment   Transferring assistive equipment   Toileting independent   Bathing independent   Dressing assistive equipment  [shoe horn]   Eating independent   Prior Level of Function Comment Pt reports prior use of cane in home as needed for ambulation and use of a shower chair for bathing. Pt reports was completing BADLs independently.   Assistive Device Currently Used at Home cane, straight, shower chair          Occupational Profile      Most Recent Value   Successful Occupations Pt reports not a  within past year but would like to return to driving in future.   Occupational History/Life Experiences Pt reports living c  in [facility]. Pt has three (adult) children and grandchildren. Pt has good family support.          Stop Time 0855   Time Calculation  (min) 90 min   Session Details   Document Type discharge evaluation   Mode of Treatment occupational therapy;individual therapy   General Information   General Observations of Patient RN present at start of session - pt agreeable to OT session focusing on full wet shower. OT weekly update.   Bed Mobility   Comment (Bed Mobility) OT: pt OOB at start of session   Transfers   Transfers toilet transfer;shower transfer   Sit to Stand Transfer   [place], Sit to Stand Transfer modified independence   Assistive Device walker, front-wheeled   Comment from w/c   Stand to Sit Transfer   [place], Stand to Sit Transfer modified independence   Assistive Device walker, front-wheeled   Comment to w/c   Stand Pivot Transfer   [place], Stand Pivot/Stand Step Transfer modified independence   Assistive Device walker, front-wheeled   Comment via amb approah to/from w/c in room   Toilet Transfer   Transfer Technique stand pivot   [place], Toilet Transfer modified independence   Assistive Device walker, front-wheeled;gait belt   Comment via amb approach   Shower Transfer   Transfer Technique stand pivot   [place], Shower Transfer modified independence   Verbal Cues safety   Assistive Device grab bars/tub rail;tub bench;walker, front-wheeled   Comment via amb approach to tub bench in barrier free shower stall c RW   Safety Issues, Functional Mobility   Comment, Safety Issues/Impairments (Mobility) OT: mod I during short HHD to/from bathroom in room c RW   Balance   Comment, Balance mod I during LB dressing in stance + functional amb c RW for safety   Motor Skills   Results, 9 Hole Peg Test of Fine Motor Coordination 9-hole peg: R hand 40.13s, L hand 27.06s Box + blocks: RUE: 43 blocks LUE: 56 blocks. Pt showed improvements over all areas of FMC skills + assessments.   Therapeutic Interventions   Comment, Therapeutic Intervention Pt able to obtain clean clothes + dispose of dirty clothes via amb approach to/from  closet c good carry over of drapping techniques + use of walker bag c reacher/RW.   Basic Activities of Daily Living (BADLs)   Basic Activities of Daily Living bathing;upper body dressing;lower body dressing;grooming;toileting   Bathing   Self-Performance chest;left arm;right arm;abdomen;front perineal area;buttocks;left upper leg;right upper leg;left lower leg, including foot;right lower leg, including foot   [place] modified independence   Position supported sitting;supported standing   Setup Assistance obtain supplies   Adaptive Equipment grab bar/tub rail;hand-held shower spray hose;long-handled sponge;tub bench   Comment pt preforms bathing seated on tub bench   Upper Body Dressing   Self-Performance obtains clothes;orthosis application;threads left arm, shirt;threads right arm, shirt;pulls shirt over head/around back;pulls shirt down/adjusts   [place] modified independence   Position supported sitting   Adaptive Equipment none   Comment pt able to doff/don aspen + steven collar. Pt demo's good safety + ability to adhere to spinal precautions   Lower Body Dressing   Self-Performance obtains clothes;threads left leg, underpants;threads right leg, underpants;pulls underpants up or down;threads left leg, pants/shorts;threads right leg, pants/shorts;pulls pants/shorts up or down;dons/doffs left sock;dons/doffs right sock;dons/doffs left shoe;dons/doffs right shoe   [place] modified independence   Position supported sitting;supported standing   Adaptive Equipment long-handled shoe horn;reacher;sock aid   [place], Footwear modified independence   Comment pt able to complete LB dressing sitting on tub bench + in stance c unilateral support on grab bar/RW for balance PRN.   Grooming   Self-Performance washes, rinses and dries face;washes, rinses and dries hands;brushes/cabrera hair;oral care (brushing teeth, cleaning dentures);applies deodorant;shaves face   [place] modified independence    Position supported sitting;unsupported standing   Adaptive Equipment none   [place], Oral Hygiene modified independence   Comment completed all grooming in stance at sink c RW other than shaving face which pt completed seated in w/c.   Toileting   [place] modified independence   Position supported sitting   Adaptive Equipment grab bar/safety frame   Comment pt able to manage clothes + complete toileting c mod I   Transfer Goal 1   Progress/Outcome goal met   Transfer Goal 2   Progress/Outcome goal met   Transfer Goal 3   Progress/Outcome goal met   Transfer Goal 4   Progress/Outcome goal met   Bathing Goal 1   Progress/Outcome goal met   Bathing Goal 2   Progress/Outcome goal met   UB Dressing Goal 1   Progress/Outcome goal met   UB Dressing Goal 2   Progress/Outcome goal met   LB Dressing Goal 1   Progress/Outcome goal met   LB Dressing Goal 2   Progress/Outcome goal met   Grooming Goal 1   Progress/Outcome goal met   Grooming Goal 2   Progress/Outcome goal met   Toileting Goal 1   Progress/Outcome goal met   Toileting Goal 2   Progress/Outcome goal met   Weekly Progress Summary (OT)   Weekly Outcome Statement Pt has met all LTGs during morning ADL session at a mod I level for toilet/shower txfers, UB/LB dressing, + bathing with good safety awareness and ability to adhere to spinal precautions. Pt able to doff/don aspen and steven collar without complications and demonstrates good competency. Pt d/c set for tomorrow with necessary equipment in place for safe d/c to home with home care to increase functional independence within his natural environment.   Recommendations No need to continue skilled IP OT - recommend d/c to home c home OT to increase functional independence.   Discharge Summary (OT)   Outcomes Achieved Upon Discharge (OT) all goals met within established timeframes   Discharge Summary Statement (OT) Performance day completed in preparation for d/c pending 1/25/22. Pt has progressed and met  all IP OT LTGs of Mod I toilet/shower txfer, mod I bathing, and mod I for safety considerations c all other BADLs. OT educating pt on recommendations for safe performance of BADLs and assist level recommended upon d/c. OT educated pt on all recommendation including use of shower chair (pt owns) for safety c bathing, use of a toilet safety frame for increased safety with toielt txfef, and installation of a grab bar vertically at entrance of tub/shower. Pt agreeable to all recommendations and issued handout for reference of recommendations. Pt has been issued a [place] collar for bathing and demonstrates competency safely donning/doffing. Pt has been issued home exercise program for /pinch strengthening. Pt has been educated on no driving until cleared by physician and OT initiated handicap placard application process. Pt will continue to benefit from ongoing intervention via home care upon d/c to home.   Transfer to Another Level of Care or Facility (OT) recommend therapy via home health        IRF OT Goals      Most Recent Value   Transfer Goal 1    Activity/Assistive Device toilet at 01/17/2022 0840   [place] supervision required at 01/17/2022 0840   Time Frame short-term goal (STG), 1 week at 01/17/2022 0840   Progress/Outcome goal met at 01/24/2022 0726   Transfer Goal 2    Activity/Assistive Device toilet at 01/12/2022 0840   [place] modified independence at 01/12/2022 0840   Time Frame long-term goal (LTG), 2 weeks at 01/12/2022 0840   Progress/Outcome goal met at 01/24/2022 0726   Transfer Goal 3    Activity/Assistive Device shower at 01/17/2022 0840   [place] supervision required at 01/17/2022 0840   Time Frame short-term goal (STG), 1 week at 01/17/2022 0840   Progress/Outcome goal met at 01/24/2022 0726   Transfer Goal 4    Activity/Assistive Device shower at 01/12/2022 0840   [place] modified independence at 01/12/2022 0840   Time Frame long-term goal (LTG), 2 weeks at  01/12/2022 0840   Progress/Outcome goal met at 01/24/2022 0726   Bathing Goal 1    Activity/Assistive Device bathing skills, all at 01/12/2022 0840   [place] supervision required at 01/17/2022 0840   Time Frame short-term goal (STG), 1 week at 01/17/2022 0840   Progress/Outcome goal met at 01/24/2022 0726   Bathing Goal 2    Activity/Assistive Device bathing skills, all at 01/12/2022 0840   [place] modified independence at 01/12/2022 0840   Time Frame long-term goal (LTG), 2 weeks at 01/12/2022 0840   Progress/Outcome goal met at 01/24/2022 0726   UB Dressing Goal 1    Activity/Assistive Device upper body dressing at 01/12/2022 0840   [place] supervision required  [Cl S] at 01/12/2022 0840   Time Frame short-term goal (STG), 1 week at 01/17/2022 0840   Strategies/Barriers including clothing retrieval at 01/17/2022 0840   Progress/Outcome goal met at 01/24/2022 0726   UB Dressing Goal 2    Activity/Assistive Device upper body dressing at 01/12/2022 0840   [place] modified independence at 01/12/2022 0840   Time Frame long-term goal (LTG), 2 weeks at 01/12/2022 0840   Progress/Outcome goal met at 01/24/2022 0726   LB Dressing Goal 1    Activity/Assistive Device lower body dressing at 01/12/2022 0840   [place] supervision required  [Cl S] at 01/17/2022 0840   Time Frame short-term goal (STG), 1 week at 01/17/2022 0840   Progress/Outcome goal met at 01/24/2022 0726   LB Dressing Goal 2    Activity/Assistive Device lower body dressing at 01/12/2022 0840   [place] modified independence at 01/12/2022 0840   Time Frame long-term goal (LTG), 2 weeks at 01/12/2022 0840   Progress/Outcome goal met at 01/24/2022 0726   Grooming Goal 1    [place] supervision required at 01/17/2022 0840   Time Frame short-term goal (STG), 1 week at 01/17/2022 0840   Strategies/Barriers Standing at 01/12/2022 0840   Progress/Outcome goal met at 01/24/2022 0726   Grooming Goal 2    Activity/Assistive Device  grooming skills, all at 01/12/2022 0840   [place] modified independence at 01/12/2022 0840   Time Frame long-term goal (LTG), 2 weeks at 01/12/2022 0840   Progress/Outcome goal met at 01/24/2022 0726   Toileting Goal 1    Activity/Assistive Device toileting skills, all at 01/12/2022 0840   [place] supervision required at 01/17/2022 0840   Time Frame short-term goal (STG), 1 week at 01/17/2022 0840   Progress/Outcome goal met at 01/24/2022 0726   Toileting Goal 2    Activity/Assistive Device toileting skills, all at 01/12/2022 0840   [place] modified independence at 01/12/2022 0840   Time Frame long-term goal (LTG), 2 weeks at 01/12/2022 0840   Progress/Outcome goal met at 01/24/2022 0726

## 2022-01-24 NOTE — DISCHARGE INSTRUCTIONS
Follow-up with your primary physician, call for appointment in 1 week    Follow-up with Dr. Blaise Guallpa, thoracic surgeon at [facility], [address] [place] [address], medical office [address], [address], [facility], phone: [phone]    Follow-up with Dr. Jean-Pierre zavala, call for appointment tomorrow January 25, 2021, [phone]

## 2022-01-24 NOTE — PROGRESS NOTES
01/24/22 1200   Discharge Needs Assessment   Concerns to be Addressed care coordination/care conferences;basic needs   Patient/Family Anticipates Transition to home   Patient/Family Anticipated Services at Transition home health care   Transportation Concerns car, none   Transportation Anticipated family or friend will provide   Anticipated Changes Related to Illness none   Anticipated Discharge Disposition home with home health   Current Discharge Risk physical impairment   Caregiver Training   Caregiver(s) to be Trained other (see comments)  (Shena STEIN)   Discharge Planning   Type of Home Care Services home PT;home OT;nursing   Discharge Transportation   Does the patient need discharge transport arranged? Yes   Has discharge transport been arranged? Yes   Discharge Transportation Vendor private vehicle   What day is the transport expected? 01/25/22   What time is the transport expected? 1000   Plan   Plan Pt will DC after completing therapy goals.  Will contineu with [facility].  Will see neurosurgery tomorrow.   Patient/Family in Agreement with Plan yes   Discharge Review for Designated Lay Caregiver Designated Lay Caregiver available   Plan Comments spoke to Melanie, son, and pt to review DC.  Confirmed [facility] referral.  Reviewed  time for coordination of attending appt 11:20 at [facility].   Team Conference   Next Team Conference Date 01/31/22    Service   Is an  Needed/Used? N

## 2022-01-24 NOTE — PROGRESS NOTES
History of present illness:     71 y.o. male with a PMHx significant for atrial fibrillation, breast cancer, COPD, diverticulosis, acid reflux, history of COVID-19, hyperlipidemia, hypertension, lung cancer, history of right shoulder rotator cuff repair, and history of right-sided carpal tunnel syndrome, who fell from a ladder on 04/23/2021 associated with loss of consciousness. He was found to have fractures of the anterior and posterior arches of C1, base of the dens of C2, right scapula, and left occipital condyle.  He was placed in a hard cervical collar and has had ongoing neurosurgical and imaging follow-up.  While C1 was healing normally.  There is no appreciable osseous bridging of the fracture of the dens.    Patient underwent open reduction and internal fixation of C1 fractures, C2 odontoid fracture, associated with bilateral posterior lateral arthrodesis with fusion at C1-C3 in addition to C1-2 and C2-3 laminotomies. Surgery was done on 1/6/2022 by Dr. Morejon .  There are no intraoperative complications.  Neurophysiological monitoring was stable throughout the course of the decompression.        Patient was evaluated by orthopedics for right shoulder follow up. No acute surgical intervention at this time . New Imaging of R shoulder/scapula unremarkable for acute injury, RUE WBAT.  Recommend follow up with a shoulder/elbow orthopedic surgeon - Dr Shine as needed.     Patient was evaluated by PM&R deemed to be a good candidate for acute rehabilitation. Patient now transferred to [facility] for comprehensive acute inpatient rehabilitation admitted on 1/12/2022.    Scheduled Meds:  • acetaminophen  975 mg oral q8h   • amLODIPine  5 mg oral q12h MARIAN   • atorvastatin  40 mg oral Daily (6p)   • carvediloL  6.25 mg oral BID with meals   • cetirizine  5 mg oral Nightly   • dabigatran etexilate  150 mg oral BID   • escitalopram  10 mg oral Daily   • famotidine  20 mg oral BID   • fluticasone propionate  2 spray Each  "Nostril Daily   • guaiFENesin  600 mg oral BID   • hydrochlorothiazide  25 mg oral Daily   • lidocaine  1 patch Topical Daily   • losartan  100 mg oral Daily with dinner   • sennosides-docusate sodium  1 tablet oral BID   • umeclidinium-vilanteroL  1 puff inhalation Daily     Continuous Infusions:  PRN Meds:.albuterol HFA  •  bisacodyL  •  cyclobenzaprine  •  oxyCODONE  •  oxyCODONE  •  polyethylene glycol     Lab Results   Component Value Date    WBC 5.04 01/18/2022    HGB 14.3 01/18/2022    HCT 43.5 01/18/2022    MCV 89.3 01/18/2022     01/18/2022       Lab Results   Component Value Date    GLUCOSE 103 (H) 01/18/2022    CALCIUM 9.7 01/18/2022     01/18/2022    K 4.3 01/18/2022    CO2 29 01/18/2022    CL 97 (L) 01/18/2022    BUN 13 01/18/2022    CREATININE 0.9 01/18/2022       Subjective: Patient seen and examined no chest pain or shortness of breath, tolerating therapies, patient is making good progress in rehab therapies, patient will be discharged on January 25, 2022 after which he will see Dr. Morejon at that time staples will be removed. Uneventful weekend.  We will plan to discharge patient home tomorrow, discussed with patient for precautions and follow-ups.    Team 1/24:    Nursing: continent B&B, skin intact    Functional status:    PT : CS for transferss/amb RW, goal mod I    OT: Mod I for ADLs    Psych : feels better    Physical exam:  Physical examination today showed a 71-year-old man in no acute distress.  Patient awake alert obeys commands.     Blood pressure 140/68, pulse 70, temperature 36.3 °C (97.4 °F), temperature source Oral, resp. rate 16, height 1.676 m (5' 6\"), weight 89.7 kg (197 lb 12.8 oz), SpO2 98 %.       Abdominopelvic, skin negative, mucous brains moist.     Neck supple     Heart regular rate     Lungs decreased breath sounds on the right side     Examination was benign     Musculoskeletal exam: Range of motion within functional mid bilateral upper extremity and bilateral " extremity except decreased right shoulder abduction.  Patient does agree that he will not allow extremity.  Neuro exam: Mental status as above patient able to be commands.  Cranial nerve examination shows face symmetric, tongue midline, completely intact and visual fields are full.  Motor examination: Right upper extremity deltoid 3/5, biceps 5/5 and  3/5, finger extension 4/5.  Examination of the right lower extremity hip flexion 4 -/5, quad and ankle dorsiflexion 5/5.  Examination of the left upper extremity 5/5.  [place] of the left lower extremity hip flexion 4/5, quad and ankle dorsiflexion 5/5.  Sensory examination grossly normal.  Deep tendon reflexes are symmetrical.  There was no evidence of cerebellar signs and gait not tested at this time.    Skin: Incision dry clean and intact staples in place covered by Mepilex.     Impression:     Ambulatory ADL dysfunction due to:    History of fall last) with growth none displaced odontoid fracture with type II morphology and nonunion now status post ORIF C2, C1-3 laminectomy posterior cervical fusion at multiple levels.     Postoperative anemia     History of atrial fibrillation on Pradaxa     History of coronary artery disease     History of lung cancer status post left lower lobe resection     History of breast cancer     History of COPD/tobacco use     GERD     Hypertension on Norvasc, losartan and hydrochlorothiazide     History of COVID-19     Hyperlipidemia on Lipitor     History of right shoulder rotator cuff repair     History of carpal tunnel syndrome     Obesity followed by dietitian     History of anxiety/depression on Lexapro     Plan:     Patient will continue physical therapy, and Occupational Therapy.  We will consult Dr. Rivera for medical management, Dr. Pagan neurology, Dr. Boyd psychiatry and patient also be followed by cardiology.  Patient will be followed by psychology and case management for support.  Patient will continue to use heart  cervical collar all the times.  [place] patient precautions include cardiac, fall and orthopedic spinal precautions.  Patient will continue on Xarelto for history of atrial fibrillation.  We will monitor routine labs and additional monitoring healing of the posterior cervical incision closely.     Goals: To improve overall functioning for transfers, ambulation and ADLs     Extensive length of stay 1/25     Discussed with Dr. Rivera medical consultant, patient and nursing     This patient note has been dictated using speech recognition software.  Inadvertent speech recognition errors should be disregarded. Please do not hesitate to call my office for clarification.

## 2022-01-24 NOTE — PROGRESS NOTES
Psychiatry Progress Note    History of Present Illness   See initial consult.  History pertaining to psychosis, depression and anxiety and other psychiatric and psychologic conditions as well as general medical history detailed in initial consult.      Interval History:   Feels ok, no si or avh  Energy ok  Continues to sleep better.  Mood improving.  Anxiety and despondency in context of medical conditions manageable.   Sodium stable.  Vital signs stable.  Nursing reports no behavioral concerns.  No sundowning agitation.  Continues to make progress with respect to gaining insight into emotional state and medical conditions and the relationship between them.  Participatory in therapies    MENTAL STATUS EXAM  Appearance: well groomed  Gait and Motor: no abnormal movements  Speech: normal rate/rhythm/volume  Mood: depressed and anxious  Affect: constricted  Associations: coherent  Thought Process: goal-directed  Thought Content: no auditory or visual hallucinations.  Suicidality/Homicidality: denies  Judgement/Insight: acknowledges illness  Orientation: situation  Memory: knows current president  Attention: distracted  Knowledge: normal  Language: normal    Vital Signs for the last 24 hours:  Temp:  [36.3 °C (97.4 °F)-36.9 °C (98.4 °F)] 36.3 °C (97.4 °F)  Heart Rate:  [56-70] 70  Resp:  [16] 16  BP: (116-143)/(63-74) 140/68    Labs:  Labs below reviewed for pertinence to psychiatric condition  Lab Results   Component Value Date    GLUCOSE 103 (H) 01/18/2022    CALCIUM 9.7 01/18/2022     01/18/2022    K 4.3 01/18/2022    CO2 29 01/18/2022    CL 97 (L) 01/18/2022    BUN 13 01/18/2022    CREATININE 0.9 01/18/2022     Lab Results   Component Value Date    WBC 5.04 01/18/2022    HGB 14.3 01/18/2022    HCT 43.5 01/18/2022    MCV 89.3 01/18/2022     01/18/2022     Pain Management Panel    There is no flowsheet data to display.           Current Facility-Administered Medications   Medication Dose Route Frequency  Provider Last Rate Last Admin   • acetaminophen (TYLENOL) tablet 975 mg  975 mg oral q8h Fiona Rivera MD   975 mg at 01/24/22 0615   • albuterol HFA (VENTOLIN HFA) 90 mcg/actuation inhaler 2 puff  2 puff inhalation q6h PRN Jorge Edwards MD       • amLODIPine (NORVASC) tablet 5 mg  5 mg oral q12h MARIAN Fiona Rivera MD   5 mg at 01/24/22 0724   • atorvastatin (LIPITOR) tablet 40 mg  40 mg oral Daily (6p) Jorge Edwards MD   40 mg at 01/23/22 1613   • bisacodyL (DULCOLAX) 10 mg suppository 10 mg  10 mg rectal Daily PRN Jorge Edwards MD       • carvediloL (COREG) tablet 6.25 mg  6.25 mg oral BID with meals Fiona Rivera MD   6.25 mg at 01/24/22 0724   • cetirizine (ZyrTEC) tablet 5 mg  5 mg oral Nightly Jorge Edwards MD   5 mg at 01/23/22 2000   • cyclobenzaprine (FLEXERIL) tablet 10 mg  10 mg oral 3x daily PRN Jorge Edwards MD   10 mg at 01/23/22 1849   • dabigatran etexilate (PRADAXA) capsule 150 mg  150 mg oral BID Jorge Edwards MD   150 mg at 01/24/22 0729   • escitalopram (LEXAPRO) tablet 10 mg  10 mg oral Daily Jorge Edwards MD   10 mg at 01/24/22 0729   • famotidine (PEPCID) tablet 20 mg  20 mg oral BID Fiona Rivera MD   20 mg at 01/24/22 0724   • fluticasone propionate (FLONASE) 50 mcg/actuation nasal spray 2 spray  2 spray Each Nostril Daily Jorge Edwards MD   2 spray at 01/24/22 0725   • guaiFENesin (MUCINEX) 12 hr ER tablet 600 mg  600 mg oral BID Fiona Rivera MD   600 mg at 01/24/22 0724   • hydrochlorothiazide (HYDRODIURIL) tablet 25 mg  25 mg oral Daily Fiona Rivera MD   25 mg at 01/24/22 0724   • lidocaine (ASPERCREME) 4 % topical patch 1 patch  1 patch Topical Daily Jorge Edwards MD   1 patch at 01/24/22 0729   • losartan (COZAAR) tablet 100 mg  100 mg oral Daily with dinner Fiona Rivera MD   100 mg at 01/23/22 1613   • oxyCODONE (ROXICODONE) immediate release tablet 10 mg  10 mg oral q4h PRN Jorge Edwards MD   10  mg at 01/24/22 0723   • oxyCODONE (ROXICODONE) immediate release tablet 5 mg  5 mg oral q4h PRN Jorge Edwards MD   5 mg at 01/22/22 2059   • polyethylene glycol (MIRALAX) 17 gram packet 17 g  17 g oral Daily PRN Jorge Edwards MD       • sennosides-docusate sodium (SENOKOT-S) 8.6-50 mg per tablet 1 tablet  1 tablet oral BID Jorge Edwards MD   1 tablet at 01/24/22 0724   • umeclidinium-vilanteroL (ANORO ELLIPTA) 62.5-25 mcg/actuation inhaler 1 puff  1 puff inhalation Daily Jorge Edwards MD   1 puff at 01/24/22 0725        Patient Active Problem List   Diagnosis   • Anxiety   • Atrial fibrillation (CMS/HCC)   • Chronic diastolic heart failure (CMS/HCC)   • Hearing loss   • Primary malignant neoplasm of left lower lobe of lung (CMS/HCC)   • Multiple pulmonary nodules   • Other emphysema (CMS/HCC)   • Gastroesophageal reflux disease without esophagitis   • Asthma   • Cervical spine fracture (CMS/HCC)   • Hypertension   • Mild episode of recurrent major depressive disorder (CMS/HCC)   • Preop examination   • Coronary artery calcification seen on CT scan   • COPD (chronic obstructive pulmonary disease) (CMS/HCC)   • Prediabetes   • History of cervical fracture   • H/O cervical fracture   • Closed nondisplaced odontoid fracture with type II morphology and delayed healing   • S/P cervical spinal fusion           Assessment/Plan    Depression: CBT automatic anxious and negative thoughts  Adjustment Disorder to Disability: supportive therapy  Sleep:  Insight into illness: insight therapy/psychoeducation  Psychotherapy: supportive  Monitor: Mood, Speech, Appetite, Energy, Cognition, Behavioral, Impulsivity, Agitation  Family Support  Medications: monitor for side effects  - presently no gi, akathesia , ha or sedation  Sodium 138  Okay to continue Lexapro 10 mg daily-monitor for any side effects  Monitor sodium on Lexapro-currently 138  Support ATD  CBT automatic anxious and negative  thoughts  Clarify for patient not on porzac  cnt for anxiety  Continue lexapro and support mood  Declines increase in lexapro dose  cbt automatic thoughts - continue  Reviewed meds and cbt  Austen Boyd MD  1/24/2022

## 2022-01-24 NOTE — PROGRESS NOTES
[facility] Progress Note          Patient was seen and examined at bedside.    Subjective:     Stable, pleasant, his pain is manageable, his resp status is good, his PT/OT is progressing, his lungs sound good.   BP well controled. Provided with new complaints or concerns.   Plan to dc/ home tomorrow, will prepare for it.    Objective   Vital signs in last 24 hours:  Temp:  [36.3 °C (97.4 °F)-36.9 °C (98.4 °F)] 36.3 °C (97.4 °F)  Heart Rate:  [56-73] 57  Resp:  [16] 16  BP: (116-143)/(63-87) 132/87    No intake or output data in the 24 hours ending 01/24/22 1209  Intake/Output this shift:  No intake/output data recorded.   Review of Systems:  All other systems reviewed and negative except as noted in the HPI.   Objective      Labs  reviewed his labs thoroughly   Lab Results   Component Value Date    WBC 5.04 01/18/2022    HGB 14.3 01/18/2022    HCT 43.5 01/18/2022    MCV 89.3 01/18/2022     01/18/2022     Lab Results   Component Value Date    GLUCOSE 103 (H) 01/18/2022    CALCIUM 9.7 01/18/2022     01/18/2022    K 4.3 01/18/2022    CO2 29 01/18/2022    CL 97 (L) 01/18/2022    BUN 13 01/18/2022    CREATININE 0.9 01/18/2022       Imaging  OSH imaging study reports reviewed       Full Code    Physical Exam:  Head/Ear/Nose/Throat: normocephalic; atraumatic; moisture mouth mm, no oropharyngeal thrush noted.   Eyes: anicteric sclera, EOMI; PERRL.   Neck : supple, no JVD, no carotid bruits appeciated.   Respiratory: no evidence of labored breathing, lung sounds CTA b/l, good aeration bibasilar area, no w/r/c.   Cardiovascular: RRR; normal S1, S2; no m/r/g; no S3 or S4.   Gastrointestinal: soft; NT; BS normal; mildly distended; no CVAT b/l.   Genitourinary: no caballero.   Extremities : no c/c/e .   Neurological: AO x 3, fluent speeches, following commands, CNS II-XII grossly intact; no focal neurologic deficits.   Behavior/Emotional: in NAD, appropriate; cooperative.   Skin: clean, dry and  intact.     Plan of care was discussed with patient, RN, and PMR attending     Assessment     CC:S/p a mechanical fall, sustained traumatic cervical spinal fractures with myelopathy, s/p cervical spinal ORIF and decom.lami.fusion, ADL and ambulatory dysfunction.       71 y.o. male with a PMHx significant for atrial fibrillation, breast cancer, ex-tobacco smoking, COPD, diverticulosis, acid reflux, history of COVID-19, hyperlipidemia, hypertension, lung cancer s/p LLL resection, history of right shoulder rotator cuff repair, and history of right-sided carpal tunnel syndrome, who fell from a ladder on 04/23/2021 associated with loss of consciousness. He was found to have fractures of the anterior and posterior arches of C1, base of the dens of C2, right scapula, and left occipital condyle.  He was placed in a hard cervical collar and has had ongoing neurosurgical and imaging follow-up.  While C1 was healing normally.  There is no appreciable osseous bridging of the fracture of the dens.  He therefore was recommended to undergo ORIF which is performed by Dr. Morejon on 01/06/2020 at [facility] [place].      Patient underwent open reduction and internal fixation of C1 fractures, C2 odontoid fracture, associated with bilateral posterior lateral arthrodesis with fusion at C1-C3 in addition to C1-2 and C2-3 laminotomies.  There are no intraoperative complications.  Neurophysiological monitoring was stable throughout the course of the decompression.  PCA was d/c'd on 1/10 am.  +BM on 1/9.     Ortho was consulted on 1/7 for R shoulder follow up.  No acute surgical intervention at this time per Ortho.  New Imaging of R shoulder/scapula unremarkable for acute injury  RUE WBAT.  Recommend follow up with a shoulder/elbow orthopedic surgeon - Dr Shine as needed.   Functionally, he has ADL and ambulatory dysfunction, requires inpt acute rehab, transferred to [facility] on 1/11/22.       1. S/p a mechanical fall, sustained traumatic cervical spinal  fractures with myelopathy, s/p cervical spinal ORIF and decom.lami.fusion, ADL and ambulatory dysfunction : inpt comprehensive rehab, ADL, gait/balance training, pain/neuropathic pain management, fall precaution, regular neuro checks, DVT prophylaxis, surgical site wound care/dermal defense, f/u with spinal surgeon as scheduled.     R shoulder injury, but no acute surgical intervention at this time per Ortho, f/u with  a shoulder/elbow orthopedic surgeon - Dr Shine as needed.    2. Post op acute on chronic pain, paresthesia: pain management, regular pain scale evaluation, topical lidoderm patch, ice packs, consider Neurontin as indicated, prn muscle relaxant.    3. Pulm-COPD, h/o lung cancer s/p LLL resection, atelectasis: monitor SO2, prn NC O2, keep SO2>92%, incentive spirometry, bronchodilator inhaler prn, cont LABA/LAMA/flonase,  mucolytic agent, pulm toileting.    4. GI-constipation, GERD: provide constipation bowel regimen, po hydration, fiber intake, timed toilet visits. Pepcid for GERD and GI prophy.    5. DVT prophy: SCD, early ambulation, SQ heparin to resume pradaxa as planned , check LE venous DUS to r/o DVT.      6. PAF, HTN, Dyslipidemia: resume pradaxa on 1/20/22, monitor HR and rhythm, cont current HTN meds with holding parameter, monitor BP, titrate HTN meds as indicated, post op orthostatic hypotension precaution; cont statin.     7. - Neurogenic bladder, acute urinary retention s/p caballero: timed voiding trial with appropriate position and privacy, monitor PVR with cic, high risk of UTI , check UA/UCX.    8. Renal, electrolytes: monitor renal function, avoid nephrotoxic agents or low BP, monitor and keep electrolytes balance.     Billing code: [id number]  Diagnoses:  Patient Active Problem List   Diagnosis   • Anxiety   • Atrial fibrillation (CMS/HCC)   • Chronic diastolic heart failure (CMS/HCC)   • Hearing loss   • Primary malignant neoplasm of left lower lobe of lung (CMS/HCC)   • Multiple  pulmonary nodules   • Other emphysema (CMS/HCC)   • Gastroesophageal reflux disease without esophagitis   • Asthma   • Cervical spine fracture (CMS/HCC)   • Hypertension   • Mild episode of recurrent major depressive disorder (CMS/HCC)   • Preop examination   • Coronary artery calcification seen on CT scan   • COPD (chronic obstructive pulmonary disease) (CMS/HCC)   • Prediabetes   • History of cervical fracture   • H/O cervical fracture   • Closed nondisplaced odontoid fracture with type II morphology and delayed healing   • S/P cervical spinal fusion        Fiona Rivera MD  1/24/2022

## 2022-01-24 NOTE — PLAN OF CARE
Problem: Rehabilitation ([facility]) Plan of Care  Goal: Plan of Care Review  Flowsheets (Taken 1/24/2022 1522)  Progress: improving  Plan of Care Reviewed With: patient  Outcome Summary: OT performance day completed - met all LTGs + is safe for d/c tomorrow.     Problem: BADL (Basic Activities of Daily Living) Impairment  Goal: Optimal Safe BADL Performance  1/24/2022 1521 by Deidra Murphy, SHAHNAZ  Outcome: Met

## 2022-01-25 ENCOUNTER — OFFICE VISIT (OUTPATIENT)
Dept: NEUROSURGERY | Facility: CLINIC | Age: 72
End: 2022-01-25
Payer: COMMERCIAL

## 2022-01-25 ENCOUNTER — TELEPHONE (OUTPATIENT)
Dept: NEUROLOGY | Facility: CLINIC | Age: 72
End: 2022-01-25
Payer: COMMERCIAL

## 2022-01-25 VITALS
DIASTOLIC BLOOD PRESSURE: 67 MMHG | WEIGHT: 197.8 LBS | RESPIRATION RATE: 16 BRPM | BODY MASS INDEX: 31.79 KG/M2 | SYSTOLIC BLOOD PRESSURE: 151 MMHG | OXYGEN SATURATION: 97 % | TEMPERATURE: 97.5 F | HEART RATE: 64 BPM | HEIGHT: 66 IN

## 2022-01-25 VITALS — DIASTOLIC BLOOD PRESSURE: 60 MMHG | SYSTOLIC BLOOD PRESSURE: 114 MMHG | HEART RATE: 81 BPM | OXYGEN SATURATION: 98 %

## 2022-01-25 DIAGNOSIS — Z87.81 H/O CERVICAL FRACTURE: Primary | ICD-10-CM

## 2022-01-25 PROCEDURE — 63700000 HC SELF-ADMINISTRABLE DRUG: Performed by: INTERNAL MEDICINE

## 2022-01-25 PROCEDURE — 99024 POSTOP FOLLOW-UP VISIT: CPT | Performed by: PHYSICIAN ASSISTANT

## 2022-01-25 RX ORDER — CYCLOBENZAPRINE HCL 10 MG
10 TABLET ORAL 3 TIMES DAILY PRN
Qty: 90 TABLET | Refills: 0 | Status: SHIPPED | OUTPATIENT
Start: 2022-01-25 | End: 2022-02-14 | Stop reason: SDUPTHER

## 2022-01-25 RX ORDER — OXYCODONE HYDROCHLORIDE 5 MG/1
5 TABLET ORAL EVERY 4 HOURS PRN
Qty: 60 TABLET | Refills: 0 | Status: SHIPPED | OUTPATIENT
Start: 2022-01-25 | End: 2022-02-04

## 2022-01-25 RX ADMIN — FLUTICASONE PROPIONATE 2 SPRAY: 50 SPRAY, METERED NASAL at 09:49

## 2022-01-25 RX ADMIN — AMLODIPINE BESYLATE 5 MG: 5 TABLET ORAL at 08:09

## 2022-01-25 RX ADMIN — LIDOCAINE 1 PATCH: 246 PATCH TOPICAL at 08:08

## 2022-01-25 RX ADMIN — ESCITALOPRAM OXALATE 10 MG: 10 TABLET, FILM COATED ORAL at 08:09

## 2022-01-25 RX ADMIN — OXYCODONE HYDROCHLORIDE 10 MG: 5 TABLET ORAL at 09:35

## 2022-01-25 RX ADMIN — CARVEDILOL 6.25 MG: 6.25 TABLET, FILM COATED ORAL at 08:09

## 2022-01-25 RX ADMIN — ACETAMINOPHEN 975 MG: 325 TABLET, FILM COATED ORAL at 06:57

## 2022-01-25 RX ADMIN — DABIGATRAN ETEXILATE MESYLATE 150 MG: 150 CAPSULE ORAL at 08:09

## 2022-01-25 RX ADMIN — HYDROCHLOROTHIAZIDE 25 MG: 25 TABLET ORAL at 08:09

## 2022-01-25 RX ADMIN — FAMOTIDINE 20 MG: 20 TABLET ORAL at 08:09

## 2022-01-25 NOTE — PLAN OF CARE
Plan of Care Review  Plan of Care Reviewed With: patient  Progress: improving  Outcome Summary: Pt is adequate for discharge. All goals met. Pt being discharged to home.

## 2022-01-25 NOTE — PROGRESS NOTES
Psychiatry Progress Note    History of Present Illness   See initial consult.  History pertaining to psychosis, depression and anxiety and other psychiatric and psychologic conditions as well as general medical history detailed in initial consult.      Interval History:   Stable overnight  No si or avh  D/c today    MENTAL STATUS EXAM  Appearance: well groomed  Gait and Motor: no abnormal movements  Speech: normal rate/rhythm/volume  Mood: depressed and anxious  Affect: constricted  Associations: coherent  Thought Process: goal-directed  Thought Content: no auditory or visual hallucinations.  Suicidality/Homicidality: denies  Judgement/Insight: acknowledges illness  Orientation: situation  Memory: knows current president  Attention: distracted  Knowledge: normal  Language: normal    Vital Signs for the last 24 hours:  Temp:  [36.4 °C (97.5 °F)-36.9 °C (98.4 °F)] 36.4 °C (97.5 °F)  Heart Rate:  [50-73] 64  Resp:  [16-18] 16  BP: (125-152)/(67-87) 151/67    Labs:  Labs below reviewed for pertinence to psychiatric condition  Lab Results   Component Value Date    GLUCOSE 103 (H) 01/18/2022    CALCIUM 9.7 01/18/2022     01/18/2022    K 4.3 01/18/2022    CO2 29 01/18/2022    CL 97 (L) 01/18/2022    BUN 13 01/18/2022    CREATININE 0.9 01/18/2022     Lab Results   Component Value Date    WBC 5.04 01/18/2022    HGB 14.3 01/18/2022    HCT 43.5 01/18/2022    MCV 89.3 01/18/2022     01/18/2022     Pain Management Panel    There is no flowsheet data to display.           Current Facility-Administered Medications   Medication Dose Route Frequency Provider Last Rate Last Admin   • acetaminophen (TYLENOL) tablet 975 mg  975 mg oral q8h Fiona Rivera MD   975 mg at 01/25/22 0657   • albuterol HFA (VENTOLIN HFA) 90 mcg/actuation inhaler 2 puff  2 puff inhalation q6h PRN Jorge Edwards MD       • amLODIPine (NORVASC) tablet 5 mg  5 mg oral q12h MARIAN Fiona Rivera MD   5 mg at 01/25/22 0809   • atorvastatin  (LIPITOR) tablet 40 mg  40 mg oral Daily (6p) Jorge Edwards MD   40 mg at 01/24/22 1653   • bisacodyL (DULCOLAX) 10 mg suppository 10 mg  10 mg rectal Daily PRN Jorge Edwards MD       • carvediloL (COREG) tablet 6.25 mg  6.25 mg oral BID with meals Fiona Rivera MD   6.25 mg at 01/25/22 0809   • cetirizine (ZyrTEC) tablet 5 mg  5 mg oral Nightly Jorge Edwards MD   5 mg at 01/24/22 2023   • cyclobenzaprine (FLEXERIL) tablet 10 mg  10 mg oral 3x daily PRN Jorge Edwards MD   10 mg at 01/24/22 2138   • dabigatran etexilate (PRADAXA) capsule 150 mg  150 mg oral BID Jorge Edwards MD   150 mg at 01/25/22 0809   • escitalopram (LEXAPRO) tablet 10 mg  10 mg oral Daily Jorge Edwards MD   10 mg at 01/25/22 0809   • famotidine (PEPCID) tablet 20 mg  20 mg oral BID Fiona Rivera MD   20 mg at 01/25/22 0809   • fluticasone propionate (FLONASE) 50 mcg/actuation nasal spray 2 spray  2 spray Each Nostril Daily Jorge Edwards MD   2 spray at 01/24/22 0725   • guaiFENesin (MUCINEX) 12 hr ER tablet 600 mg  600 mg oral BID Fiona Rivera MD   600 mg at 01/24/22 2023   • hydrochlorothiazide (HYDRODIURIL) tablet 25 mg  25 mg oral Daily Fiona Rivera MD   25 mg at 01/25/22 0809   • lidocaine (ASPERCREME) 4 % topical patch 1 patch  1 patch Topical Daily Jorge Edwards MD   1 patch at 01/25/22 0808   • losartan (COZAAR) tablet 100 mg  100 mg oral Daily with dinner Fiona Rivera MD   100 mg at 01/24/22 1653   • oxyCODONE (ROXICODONE) immediate release tablet 10 mg  10 mg oral q4h PRN Jorge Edwards MD   10 mg at 01/24/22 2138   • oxyCODONE (ROXICODONE) immediate release tablet 5 mg  5 mg oral q4h PRN Jorge Edwards MD   5 mg at 01/22/22 2059   • polyethylene glycol (MIRALAX) 17 gram packet 17 g  17 g oral Daily PRN Jorge Edwards MD       • sennosides-docusate sodium (SENOKOT-S) 8.6-50 mg per tablet 1 tablet  1 tablet oral BID Jorge Edwards MD   1  tablet at 01/24/22 2023   • umeclidinium-vilanteroL (ANORO ELLIPTA) 62.5-25 mcg/actuation inhaler 1 puff  1 puff inhalation Daily Jorge Edwards MD   1 puff at 01/24/22 0725        Patient Active Problem List   Diagnosis   • Anxiety   • Atrial fibrillation (CMS/HCC)   • Chronic diastolic heart failure (CMS/[facility])   • Hearing loss   • Primary malignant neoplasm of left lower lobe of lung (CMS/[facility])   • Multiple pulmonary nodules   • Other emphysema (CMS/[facility])   • Gastroesophageal reflux disease without esophagitis   • Asthma   • Cervical spine fracture (CMS/[facility])   • Hypertension   • Mild episode of recurrent major depressive disorder (CMS/[facility])   • Preop examination   • Coronary artery calcification seen on CT scan   • COPD (chronic obstructive pulmonary disease) (CMS/[facility])   • Prediabetes   • History of cervical fracture   • H/O cervical fracture   • Closed nondisplaced odontoid fracture with type II morphology and delayed healing   • S/P cervical spinal fusion           Assessment/Plan    Depression: CBT automatic anxious and negative thoughts  Adjustment Disorder to Disability: supportive therapy  Sleep:  Insight into illness: insight therapy/psychoeducation  Psychotherapy: supportive  Monitor: Mood, Speech, Appetite, Energy, Cognition, Behavioral, Impulsivity, Agitation  Family Support  Medications: monitor for side effects  - presently no gi, akathesia , ha or sedation  Sodium 138  Okay to continue Lexapro 10 mg daily-monitor for any side effects  Monitor sodium on Lexapro-currently 138  Support ATD  CBT automatic anxious and negative thoughts  Clarify for patient not on porzac  cnt for anxiety  Continue lexapro and support mood  Declines increase in lexapro dose  cbt automatic thoughts - continue  Reviewed meds and cbt  Patient is stable for disposition.  Has no suicidal ideation, plan.  Has been educated on use of psychotropic medications potential side effects.  Educated on warning signs for recurrence of  mental health condition.  Plan in place for continued pharmacotherapy.  Austen Boyd MD  1/25/2022

## 2022-01-25 NOTE — DISCHARGE SUMMARY
Rehab Discharge Summary    BRIEF OVERVIEW  Admitting Provider: Glenn Armstrong MD  Discharge Provider: No att. providers found  Primary Care Physician at Discharge: Keisha Schultz CRNP [phone]     Admission Date: 1/11/2022     Discharge Date: 1/25/2022    Primary Discharge Diagnosis  History of fall last) with growth none displaced odontoid fracture with type II morphology and nonunion now status post ORIF C2, C1-3 laminectomy posterior cervical fusion at multiple levels.    Secondary Discharge Diagnosis    Postoperative anemia     History of atrial fibrillation on Pradaxa     History of coronary artery disease     History of lung cancer status post left lower lobe resection     History of breast cancer     History of COPD/tobacco use     GERD     Hypertension on Norvasc, losartan and hydrochlorothiazide     History of COVID-19     Hyperlipidemia on Lipitor     History of right shoulder rotator cuff repair     History of carpal tunnel syndrome     Obesity followed by dietitian     History of anxiety/depression on Lexapro  Discharge Disposition  Home   Code Status at Discharge: Prior    Discharge Medications     Medication List      START taking these medications    carvediloL 6.25 mg tablet  Commonly known as: COREG  Take 1 tablet (6.25 mg total) by mouth 2 (two) times a day with meals.  Dose: 6.25 mg     famotidine 20 mg tablet  Commonly known as: PEPCID  Take 1 tablet (20 mg total) by mouth 2 (two) times a day Indications: gastroesophageal reflux disease.  Dose: 20 mg     guaiFENesin 600 mg 12 hr tablet  Commonly known as: MUCINEX  Take 1 tablet (600 mg total) by mouth 2 (two) times a day.  Dose: 600 mg     losartan 100 mg tablet  Commonly known as: COZAAR  Take 1 tablet (100 mg total) by mouth daily with dinner.  Dose: 100 mg        CHANGE how you take these medications    acetaminophen 325 mg tablet  Commonly known as: TYLENOL  Take 2 tablets (650 mg total) by mouth every 6 (six) hours as needed for  mild pain.  Dose: 650 mg  What changed:   · how much to take  · when to take this  · reasons to take this     amLODIPine 10 mg tablet  Commonly known as: NORVASC  Start taking on: January 26, 2022  Take 1 tablet (10 mg total) by mouth nightly.  Dose: 10 mg  What changed:   · medication strength  · how much to take  · when to take this     atorvastatin 40 mg tablet  Commonly known as: LIPITOR  Take 1 tablet (40 mg total) by mouth daily.  Dose: 40 mg  What changed: when to take this     dabigatran etexilate 150 mg capsu  Commonly known as: PRADAXA  Take 1 capsule (150 mg total) by mouth 2 (two) times a day Indications: treatment to prevent blood clots in chronic atrial fibrillation.  Dose: 150 mg  What changed: additional instructions     hydrochlorothiazide 25 mg tablet  Commonly known as: HYDRODIURIL  Take 1 tablet (25 mg total) by mouth daily.  Dose: 25 mg  What changed:   · medication strength  · how much to take        CONTINUE taking these medications    albuterol HFA 90 mcg/actuation inhaler  Commonly known as: PROAIR HFA  Inhale 2 puffs 2 (two) times a day as needed for wheezing or shortness of breath.  Dose: 2 puff     ANORO ELLIPTA 62.5-25 mcg/actuation blister with device  Inhale 1 puff daily.  Dose: 1 puff  Generic drug: umeclidinium-vilanteroL     escitalopram 10 mg tablet  Commonly known as: LEXAPRO  Take 1 tablet (10 mg total) by mouth daily.  Dose: 10 mg     fluticasone propionate 50 mcg/actuation nasal spray  Commonly known as: FLONASE  Administer 2 sprays into each nostril daily.  Dose: 2 spray     multivitamin tablet  Commonly known as: THERAGRAN  Take 1 tablet by mouth daily. Hold for 2 weeks from surgery performed on 1/6/22  Dose: 1 tablet     sennosides-docusate sodium 8.6-50 mg  Commonly known as: SENOKOT-S  Take 1 tablet by mouth 2 (two) times a day.  Dose: 1 tablet        STOP taking these medications    irbesartan 300 mg tablet  Commonly known as: AVAPRO     loratadine 10 mg tablet  Commonly  known as: CLARITIN     oxyCODONE 5 mg immediate release tablet  Commonly known as: ROXICODONE     TAGAMET  mg tablet  Generic drug: cimetidine     XANAX ORAL        ASK your doctor about these medications    cyclobenzaprine 10 mg tablet  Commonly known as: FLEXERIL  Take 1 tablet (10 mg total) by mouth 3 (three) times a day as needed for muscle spasms.  Dose: 10 mg  Ask about: Which instructions should I use?          Lab Results   Component Value Date     WBC 5.04 01/18/2022     HGB 14.3 01/18/2022     HCT 43.5 01/18/2022     MCV 89.3 01/18/2022      01/18/2022         Lab Results   Component Value Date     GLUCOSE 103 (H) 01/18/2022     CALCIUM 9.7 01/18/2022      01/18/2022     K 4.3 01/18/2022     CO2 29 01/18/2022     CL 97 (L) 01/18/2022     BUN 13 01/18/2022     CREATININE 0.9 01/18/2022       Follow-up Appointments Arranged: Yes     Outpatient Follow-Up            In 3 weeks Chaparro Morejon MD [facility] Neurosurgery at [facility]    In 8 months Blaise Guallpa MD [facility] Thoracic Surgery at [facility]          Referrals and Follow-ups to Schedule    [facility]   [phone]  RN PT  assess for OT or aide.   They will call to schedule visits            DETAILS OF HOSPITAL STAY    Presenting Problem/History of Present Illness  71 y.o. male with a PMHx significant for atrial fibrillation, breast cancer, COPD, diverticulosis, acid reflux, history of COVID-19, hyperlipidemia, hypertension, lung cancer, history of right shoulder rotator cuff repair, and history of right-sided carpal tunnel syndrome, who fell from a ladder on 04/23/2021 associated with loss of consciousness. He was found to have fractures of the anterior and posterior arches of C1, base of the dens of C2, right scapula, and left occipital condyle.  He was placed in a hard cervical collar and has had ongoing neurosurgical and imaging follow-up.  While C1 was healing normally.   There is no appreciable osseous bridging of the fracture of the dens.    Patient underwent open reduction and internal fixation of C1 fractures, C2 odontoid fracture, associated with bilateral posterior lateral arthrodesis with fusion at C1-C3 in addition to C1-2 and C2-3 laminotomies. Surgery was done on 1/6/2022 by Dr. Morejon .  There are no intraoperative complications.  Neurophysiological monitoring was stable throughout the course of the decompression.        Patient was evaluated by orthopedics for right shoulder follow up. No acute surgical intervention at this time . New Imaging of R shoulder/scapula unremarkable for acute injury, RUE WBAT.  Recommend follow up with a shoulder/elbow orthopedic surgeon - Dr Shine as needed.     Patient was evaluated by PM&R deemed to be a good candidate for acute rehabilitation. Patient now transferred to [facility] for comprehensive acute inpatient rehabilitation admitted on 1/12/2022.  Hospital Course  While at [facility] also patient received physical therapy and Occupational Therapy.  He was followed by Dr. Rivera for medical management and Dr. Boyd psychiatry.  His hospital course was uneventful fact patient did make good functional gains.      Subjective: Patient seen and examined no chest pain or shortness of breath, tolerating therapies, patient is making good progress in rehab therapies, patient will be discharged on January 25, 2022 after which he will see Dr. Morejon at that time staples will be removed. Uneventful weekend.  We will plan to discharge patient home tomorrow, discussed with patient for precautions and follow-ups.     Team 1/24:     Nursing: continent B&B, skin intact     Functional status:     PT : CS for transferss/amb RW, goal mod I     OT: Mod I for ADLs     Psych : feels better     Physical exam:  Physical examination today showed a 71-year-old man in no acute distress.  Patient awake alert obeys commands.     Blood pressure 140/68, pulse 70,  "temperature 36.3 °C (97.4 °F), temperature source Oral, resp. rate 16, height 1.676 m (5' 6\"), weight 89.7 kg (197 lb 12.8 oz), SpO2 98 %.        Abdominopelvic, skin negative, mucous brains moist.     Neck supple     Heart regular rate     Lungs decreased breath sounds on the right side     Examination was benign     Musculoskeletal exam: Range of motion within functional mid bilateral upper extremity and bilateral extremity except decreased right shoulder abduction.  Patient does agree that he will not allow extremity.  Neuro exam: Mental status as above patient able to be commands.  Cranial nerve examination shows face symmetric, tongue midline, completely intact and visual fields are full.  Motor examination: Right upper extremity deltoid 3/5, biceps 5/5 and  3/5, finger extension 4/5.  Examination of the right lower extremity hip flexion 4 -/5, quad and ankle dorsiflexion 5/5.  Examination of the left upper extremity 5/5.  Radiant of the left lower extremity hip flexion 4/5, quad and ankle dorsiflexion 5/5.  Sensory examination grossly normal.  Deep tendon reflexes are symmetrical.  There was no evidence of cerebellar signs and gait not tested at this time.     Skin: Incision dry clean and intact staples in place covered by Mepilex.    Operative Procedures Performed    Consults: general medicine and psychiatry  Procedures: none    Patient was discharged home in satisfactory clinical condition.  Will follow up with Dr. Morejon neurosurgery prior to going home.  Instruction regarding his medications, fall precautions and follow-ups were given.    This patient note has been dictated using speech recognition software.  Inadvertent speech recognition errors should be disregarded. Please do not hesitate to call my office for clarification.  "

## 2022-01-25 NOTE — TELEPHONE ENCOUNTER
Spoke to patient. Order was placed by rehab yesterday but pharmacy is denying receiving order. New order placed. PDMP queried. No other  of oxycodone mediations. Order says confirmed receipt by pharmacy. He should call with further concerns.

## 2022-01-25 NOTE — TELEPHONE ENCOUNTER
Patient of Dr. Morejon called in regards to script sent for Oxycodone states he was seen in office today  on 1/25/2022 and the Pharmacy says not script has been sent he would like a return call  to discuss.

## 2022-01-25 NOTE — PLAN OF CARE
Plan of Care Review  Plan of Care Reviewed With: patient  Progress: improving  Outcome Summary: Pt aaox4, is continent of bowel/bladder, has aspen collar on at all times.  Pain managed with meds and change of positoon.  Pt to be discharged today.  Safety measures in place.

## 2022-01-25 NOTE — PROGRESS NOTES
[facility]  [facility]  [address], [address]  [place] [address]  Phone: [phone]  Fax: [phone]        22      Re: Melanie Zelaya  : 1950      Chief Complaint:  Neck injury, discuss surgery    History of Present Illness:   Melanie Zelaya is a 71 y.o. right handed male who presents in neurosurgical follow up consultation. The patient presented to [facility] after a fall from a ladder on 21.  He sustained loss of consciousness.  He was able to arise on his own volition thereafter.  He was triaged to the emergency department by his son.  On arrival he was at his neurologic baseline.  CT imaging of the cervical spine disclosed C1, C2 fractures.  He was rigidly immobilized in a hard cervical collar.      CT scan demonstrated osseous healing of C1 however the there is still minimal healing of the odontoid process.  He wishes to pursue surgical fixation of his fracture.  This was orchestrated on 2022 via a C1-C3 posterior lateral instrumented fusion, ORIF C2 fracture. His hospital course was uncomplicated and patient was ultimately discharged to [facility].    Since last being seen, he reports overall he is doing well. He remains with residual incisional discomfort at his posterior cervical spine. His average level of discomfort is rated a 4 out of 10. He is without additional features of radicular pain, paresthesias. He denies concern for his incision and denies fevers, chills, opening or drainage frmo his incision.    He remains at [facility] partaking in therapy daily. He is ambulating with the assist of a walker at present. He is without additional features of weakness, acute bladder or bowel dysfunction.     Medical History:  has a past medical history of Anxiety, Atrial fibrillation (CMS/HCC), Breast cancer (CMS/HCC), C1 cervical fracture (CMS/HCC), Colon polyp, COPD (chronic obstructive pulmonary  disease) (CMS/HCC), Coronary artery calcification seen on CT scan, COVID-19 vaccine series completed, Depression, Diverticulosis, Diverticulosis, Fracture of pelvis (CMS/HCC) (1968), GERD (gastroesophageal reflux disease), Hearing loss, History of colon polyps, History of COVID-19 (), Hyperlipidemia, Hypertension, Left atrial enlargement, Lung cancer (CMS/HCC), Lung cancer (CMS/HCC), Pneumonia (), Seasonal allergies, and Wrist fracture.    Surgical History:  has a past surgical history that includes Lung lobectomy (Left, ); Shoulder surgery (Right, ); Rotator cuff repair (Right, ); Tonsillectomy; Nasal polyp surgery; Hand surgery (Bilateral); and Colonoscopy.    Family History: family history includes Cancer in his nephew; Cirrhosis in his biological father; Colon cancer in his biological brother; Diabetes in his biological mother; Lung cancer in his biological brother; No Known Problems in his maternal grandfather, maternal grandmother, paternal grandfather, and paternal grandmother; Pancreatic cancer in his biological sister; Prostate cancer in his biological brother.  Family history non-contributory to neurological condition.    Social History:   Social History     Socioeconomic History   • Marital status: Legally      Spouse name: Not on file   • Number of children: 3   • Years of education: Not on file   • Highest education level: Not on file   Occupational History   • Not on file   Tobacco Use   • Smoking status: Former Smoker     Packs/day: 2.00     Types: Cigarettes     Quit date: 2010     Years since quittin.7   • Smokeless tobacco: Never Used   Vaping Use   • Vaping Use: Never used   Substance and Sexual Activity   • Alcohol use: No     Comment: RARE   • Drug use: No   • Sexual activity: Yes     Partners: Female     Birth control/protection: None   Other Topics Concern   • Not on file   Social History Narrative    Retired    Lives alone in a 3 story home     Social  Determinants of Health     Financial Resource Strain: Not on file   Food Insecurity: No Food Insecurity   • Worried About Running Out of Food in the Last Year: Never true   • Ran Out of Food in the Last Year: Never true   Transportation Needs: Not on file   Physical Activity: Not on file   Stress: No Stress Concern Present   • Feeling of Stress : Not at all   Social Connections: Not on file   Intimate Partner Violence: Not on file   Housing Stability: Not on file        Allergies: No Known Allergies    Medications:   No current facility-administered medications for this visit.     Current Outpatient Medications   Medication Sig Dispense Refill   • acetaminophen 325 mg tablet Take 2 tablets (650 mg total) by mouth every 6 (six) hours as needed for mild pain.     • [START ON 1/26/2022] amLODIPine 10 mg tablet Take 1 tablet (10 mg total) by mouth nightly. 30 tablet 0   • atorvastatin 40 mg tablet Take 1 tablet (40 mg total) by mouth daily. 30 tablet 0   • carvediloL 6.25 mg tablet Take 1 tablet (6.25 mg total) by mouth 2 (two) times a day with meals. 60 tablet 0   • cyclobenzaprine 10 mg tablet Take 1 tablet (10 mg total) by mouth 2 (two) times a day as needed for muscle spasms for up to 7 days. 14 tablet 0   • dabigatran etexilate 150 mg capsu Take 1 capsule (150 mg total) by mouth 2 (two) times a day Indications: treatment to prevent blood clots in chronic atrial fibrillation. 60 capsule 0   • famotidine 20 mg tablet Take 1 tablet (20 mg total) by mouth 2 (two) times a day Indications: gastroesophageal reflux disease. 60 tablet 0   • guaiFENesin 600 mg 12 hr tablet Take 1 tablet (600 mg total) by mouth 2 (two) times a day. 60 tablet 0   • hydrochlorothiazide 25 mg tablet Take 1 tablet (25 mg total) by mouth daily. 30 tablet 0   • losartan 100 mg tablet Take 1 tablet (100 mg total) by mouth daily with dinner. 30 tablet 0   • oxyCODONE 5 mg immediate release tablet Take 1 tablet (5 mg total) by mouth every 6 (six)  hours as needed for severe pain for up to 7 days. 30 tablet 0     Facility-Administered Medications Ordered in Other Visits   Medication Dose Route Frequency Provider Last Rate Last Admin   • acetaminophen (TYLENOL) tablet 975 mg  975 mg oral q8h Fiona Rivera MD   975 mg at 01/25/22 0657   • albuterol HFA (VENTOLIN HFA) 90 mcg/actuation inhaler 2 puff  2 puff inhalation q6h PRN Jorge Edwards MD       • amLODIPine (NORVASC) tablet 5 mg  5 mg oral q12h MARIAN Fiona Rivera MD   5 mg at 01/25/22 0809   • atorvastatin (LIPITOR) tablet 40 mg  40 mg oral Daily (6p) Jorge Edwards MD   40 mg at 01/24/22 1653   • bisacodyL (DULCOLAX) 10 mg suppository 10 mg  10 mg rectal Daily PRN Jorge Edwards MD       • carvediloL (COREG) tablet 6.25 mg  6.25 mg oral BID with meals Fiona Rivera MD   6.25 mg at 01/25/22 0809   • cetirizine (ZyrTEC) tablet 5 mg  5 mg oral Nightly Jorge Edwards MD   5 mg at 01/24/22 2023   • cyclobenzaprine (FLEXERIL) tablet 10 mg  10 mg oral 3x daily PRN Jorge Edwards MD   10 mg at 01/24/22 2138   • dabigatran etexilate (PRADAXA) capsule 150 mg  150 mg oral BID Jorge Edwards MD   150 mg at 01/25/22 0809   • escitalopram (LEXAPRO) tablet 10 mg  10 mg oral Daily Jorge Edwards MD   10 mg at 01/25/22 0809   • famotidine (PEPCID) tablet 20 mg  20 mg oral BID Fiona Rivera MD   20 mg at 01/25/22 0809   • fluticasone propionate (FLONASE) 50 mcg/actuation nasal spray 2 spray  2 spray Each Nostril Daily Jorge Edwards MD   2 spray at 01/24/22 0725   • guaiFENesin (MUCINEX) 12 hr ER tablet 600 mg  600 mg oral BID Fiona Rivera MD   600 mg at 01/24/22 2023   • hydrochlorothiazide (HYDRODIURIL) tablet 25 mg  25 mg oral Daily Fiona Rivera MD   25 mg at 01/25/22 0809   • lidocaine (ASPERCREME) 4 % topical patch 1 patch  1 patch Topical Daily Jorge Edwards MD   1 patch at 01/25/22 0808   • losartan (COZAAR) tablet 100 mg  100 mg oral Daily with  Fiona Yepez MD   100 mg at 01/24/22 1653   • oxyCODONE (ROXICODONE) immediate release tablet 10 mg  10 mg oral q4h PRN Jorge Edwards MD   10 mg at 01/24/22 2138   • oxyCODONE (ROXICODONE) immediate release tablet 5 mg  5 mg oral q4h PRN Jorge Edwards MD   5 mg at 01/22/22 2059   • polyethylene glycol (MIRALAX) 17 gram packet 17 g  17 g oral Daily PRN Jorge Edwards MD       • sennosides-docusate sodium (SENOKOT-S) 8.6-50 mg per tablet 1 tablet  1 tablet oral BID Jorge Edwards MD   1 tablet at 01/24/22 2023   • umeclidinium-vilanteroL (ANORO ELLIPTA) 62.5-25 mcg/actuation inhaler 1 puff  1 puff inhalation Daily Jorge Edwards MD   1 puff at 01/24/22 0725       Review of Systems:   A 14 point review of systems was performed and aside from what is mentioned above is otherwise negative.    General physical examination:  The patient is well appearing male, sitting upright in his chair in no acute distress, he appears his stated age.  His head is atraumatic, normocephalic. His neck is supple without obvious adenopathy. Oral mucosa is moist. His peripheral pulses are symmetric and palpable. Extremities without peripheral edema. His breathing is normal and unlabored.     Vital signs were reviewed and within normal limits.    Neurologic examination:  Mental status:  The patient is alert, attentive, and oriented. Speech is clear and fluent with good repetition, comprehension, and naming.  Remote and recent memory are normal as well as fund of knowledge.    Cranial nerves:  CN II: Visual fields are full to confrontation.  Pupils are equal and briskly reactive to light.   CN III, IV, VI: Extra-occular muscles are intact  CN V: Facial sensation is intact and equal in V1-V3 distributions bilaterally.   CN VII: Face is symmetric with normal eye closure and smile.  CN VIII: Hearing is normal to rubbing fingers  CN IX, X: Palate elevates symmetrically. Phonation is normal.  CN XI: Head  turning and shoulder shrug are intact  CN XII: Tongue is midline with normal movements and no atrophy.    Motor:  There is no pronator drift.  Muscle bulk and tone are normal.    Deltoid Biceps Triceps Wrist ext Hand  Finger Spread Hip flexion Knee ext Dorsi-  flexion EHL Plantar Flexion   L 5 5 5 5 5 5 5 5 5 5 5   R 4+ PL 5 5 5 5 5 5 5 5 5 5     Reflexes:  Reflexes are 2+ and symmetric at the biceps, brachialis, triceps, patellar, and Achilli's. There is no Romo's sign or ankle clonus.    Sensory:   Intact light touch, pinprick, and position sense, throughout all 4 extremities.    Coordination:  There is no dysmetria on finger-to-nose testing.  Romberg is absent.    Gait:  Gait is steady with normal steps.  Heel and toe walking are normal. Tandem gait is normal.    Skin: incision is healing well with staples in place, no evidence of infection or dehiscence     Data Review:  No interval imaging     Assessment and Plan:    In summary, Melanie Zelaya is a 71 y.o. male who fell on April 2021 sustained significant head, neck trauma.  He has radiographic evidence of C1, C2 fractures.  Specifically his C2 fracture involves the odontoid process.  There is mild distraction of the odontoid process from the body of C2 with posterior angulation.  CT scan demonstrated osseous healing of C1 however the there is still minimal healing of the odontoid process.  He wishes to pursue surgical fixation of his fracture.  This was orchestrated on January 6, 2022 via a C1-C3 posterior lateral instrumented fusion, ORIF C2 fracture.    He is doing well and following the expected trajectory of recovery at present. He was asked to continue his rehabilitative efforts at this time at [facility]. His staples were removed from his incision. There are no signs of symptoms of infection or dehiscence and is healing well. He was counseled on continued local wound care. I have provided him a script for flexeril. They will call for a refill of  narcotic medications when it is needed. He should continue his cervical collar at all times. He may remove for eating purposes.     I look forward to re-evaluating his progress in 4 weeks time with updated cervical x-rays.    The patient was counseled at length regarding natural history of his condition.  He was asked to continue with lifestyle modification, avoidance of excessive bending, twisting, overhead lifting.  In the interim should his symptomatology evolve or worsen, I have asked he return sooner for prompt reevaluation.    Thank you for referring eMlanie Zelaya  to my attention.  Please feel free to contact me anytime if I can be of further assistance.    Mariaa Davalos PA-C

## 2022-01-25 NOTE — PROGRESS NOTES
[facility] Internal Medicine Progress Note          Patient was seen and examined at bedside.    Subjective:   Clinically he remains stable, pleasant, in NAD; He has finished his inpt acute rehab with good functional recovery, plan to d/c home today, provided with no new complaints or concerns, no O/N acute events, reviewed his recent labs, spent 35 min to prepare for his d/c home today.    Objective   Vital signs in last 24 hours:  Temp:  [36.4 °C (97.5 °F)-36.9 °C (98.4 °F)] 36.4 °C (97.5 °F)  Heart Rate:  [50-73] 64  Resp:  [16-18] 16  BP: (125-152)/(67-87) 151/67    No intake or output data in the 24 hours ending 01/25/22 1002  Intake/Output this shift:  No intake/output data recorded.   Review of Systems:  All other systems reviewed and negative except as noted in the HPI.   Objective      Labs  reviewed his labs thoroughly   Lab Results   Component Value Date    WBC 5.04 01/18/2022    HGB 14.3 01/18/2022    HCT 43.5 01/18/2022    MCV 89.3 01/18/2022     01/18/2022     Lab Results   Component Value Date    GLUCOSE 103 (H) 01/18/2022    CALCIUM 9.7 01/18/2022     01/18/2022    K 4.3 01/18/2022    CO2 29 01/18/2022    CL 97 (L) 01/18/2022    BUN 13 01/18/2022    CREATININE 0.9 01/18/2022       Imaging  OSH imaging study reports reviewed       Full Code    Physical Exam:  Head/Ear/Nose/Throat: normocephalic; atraumatic; moisture mouth mm, no oropharyngeal thrush noted.   Eyes: anicteric sclera, EOMI; PERRL.   Neck : supple, no JVD, no carotid bruits appeciated.   Respiratory: no evidence of labored breathing, lung sounds CTA b/l, good aeration bibasilar area, no w/r/c.   Cardiovascular: RRR; normal S1, S2; no m/r/g; no S3 or S4.   Gastrointestinal: soft; NT; BS normal; mildly distended; no CVAT b/l.   Genitourinary: no caballero.   Extremities : no c/c/e .   Neurological: AO x 3, fluent speeches, following commands, CNS II-XII grossly intact; no focal neurologic deficits.   Behavior/Emotional: in  NAD, appropriate; cooperative.   Skin: clean, dry and intact.     Plan of care was discussed with patient, RN, and PMR attending     Assessment     CC:S/p a mechanical fall, sustained traumatic cervical spinal fractures with myelopathy, s/p cervical spinal ORIF and decom.lami.fusion, ADL and ambulatory dysfunction.       71 y.o. male with a PMHx significant for atrial fibrillation, breast cancer, ex-tobacco smoking, COPD, diverticulosis, acid reflux, history of COVID-19, hyperlipidemia, hypertension, lung cancer s/p LLL resection, history of right shoulder rotator cuff repair, and history of right-sided carpal tunnel syndrome, who fell from a ladder on 04/23/2021 associated with loss of consciousness. He was found to have fractures of the anterior and posterior arches of C1, base of the dens of C2, right scapula, and left occipital condyle.  He was placed in a hard cervical collar and has had ongoing neurosurgical and imaging follow-up.  While C1 was healing normally.  There is no appreciable osseous bridging of the fracture of the dens.  He therefore was recommended to undergo ORIF which is performed by Dr. Morejon on 01/06/2020 at [facility].      Patient underwent open reduction and internal fixation of C1 fractures, C2 odontoid fracture, associated with bilateral posterior lateral arthrodesis with fusion at C1-C3 in addition to C1-2 and C2-3 laminotomies.  There are no intraoperative complications.  Neurophysiological monitoring was stable throughout the course of the decompression.  PCA was d/c'd on 1/10 am.  +BM on 1/9.     Ortho was consulted on 1/7 for R shoulder follow up.  No acute surgical intervention at this time per Ortho.  New Imaging of R shoulder/scapula unremarkable for acute injury  RUE WBAT.  Recommend follow up with a shoulder/elbow orthopedic surgeon - Dr Shine as needed.   Functionally, he has ADL and ambulatory dysfunction, requires inpt acute rehab, transferred to [facility] on 1/11/22.       1. S/p a  mechanical fall, sustained traumatic cervical spinal fractures with myelopathy, s/p cervical spinal ORIF and decom.lami.fusion, ADL and ambulatory dysfunction : inpt comprehensive rehab, ADL, gait/balance training, pain/neuropathic pain management, fall precaution, regular neuro checks, DVT prophylaxis, surgical site wound care/dermal defense, f/u with spinal surgeon as scheduled.     R shoulder injury, but no acute surgical intervention at this time per Ortho, f/u with  a shoulder/elbow orthopedic surgeon - Dr Shine as needed.    2. Post op acute on chronic pain, paresthesia: pain management, regular pain scale evaluation, topical lidoderm patch, ice packs, consider Neurontin as indicated, prn muscle relaxant.    3. Pulm-COPD, h/o lung cancer s/p LLL resection, atelectasis: monitor SO2, prn NC O2, keep SO2>92%, incentive spirometry, bronchodilator inhaler prn, cont LABA/LAMA/flonase,  mucolytic agent, pulm toileting.    4. GI-constipation, GERD: provide constipation bowel regimen, po hydration, fiber intake, timed toilet visits. Pepcid for GERD and GI prophy.    5. DVT prophy: SCD, early ambulation, SQ heparin to resume pradaxa as planned , check LE venous DUS to r/o DVT.      6. PAF, HTN, Dyslipidemia: resume pradaxa on 1/20/22, monitor HR and rhythm, cont current HTN meds with holding parameter, monitor BP, titrate HTN meds as indicated, post op orthostatic hypotension precaution; cont statin.     7. - Neurogenic bladder, acute urinary retention s/p caballero: timed voiding trial with appropriate position and privacy, monitor PVR with cic, high risk of UTI , check UA/UCX.    8. Renal, electrolytes: monitor renal function, avoid nephrotoxic agents or low BP, monitor and keep electrolytes balance.     Billing code: [id number]  Diagnoses:  Patient Active Problem List   Diagnosis   • Anxiety   • Atrial fibrillation (CMS/HCC)   • Chronic diastolic heart failure (CMS/HCC)   • Hearing loss   • Primary malignant neoplasm of  left lower lobe of lung (CMS/HCC)   • Multiple pulmonary nodules   • Other emphysema (CMS/HCC)   • Gastroesophageal reflux disease without esophagitis   • Asthma   • Cervical spine fracture (CMS/HCC)   • Hypertension   • Mild episode of recurrent major depressive disorder (CMS/HCC)   • Preop examination   • Coronary artery calcification seen on CT scan   • COPD (chronic obstructive pulmonary disease) (CMS/HCC)   • Prediabetes   • History of cervical fracture   • H/O cervical fracture   • Closed nondisplaced odontoid fracture with type II morphology and delayed healing   • S/P cervical spinal fusion        Fiona Rivera MD  1/25/2022

## 2022-01-26 ENCOUNTER — PATIENT OUTREACH (OUTPATIENT)
Dept: CASE MANAGEMENT | Facility: CLINIC | Age: 72
End: 2022-01-26
Payer: COMMERCIAL

## 2022-01-26 ASSESSMENT — ENCOUNTER SYMPTOMS
DIZZINESS: 0
CHILLS: 0
DIAPHORESIS: 0
NUMBNESS: 0
SHORTNESS OF BREATH: 0
FATIGUE: 0
APPETITE CHANGE: 0
ACTIVITY CHANGE: 1
FEVER: 0

## 2022-01-26 NOTE — PROGRESS NOTES
NAME: Melanie Zelaya    MRN: [id number]    YOB: 1950    Event Review:    Initial TCM Patient Outreach Date: 01/26/22    Assessment completed with: Patient  Patient stated reason for hospitalization: Scheduled Back Surgery  Discharge Diagnosis: Cervical Spine Fracture    Patient readmitted in the last 30 days: No  Discharging Facility: [facility]  Date of Admission: 01/06/22  Date of Discharge: 01/11/22    Discharging Facilty SNF/Rehab: [facility]  Date of Admission: 01/11/22  Date of Discharge: 01/25/22         HPI:Spoke with Patient today who reports he feels well.   Scheduled cervical spinal fusion at [facility] on 1/16. Surgery related to recent fall from ladder. He tolerated procedure well. Pain managed. Evaluated by PT/OT and was approved/ discharged to [facility] for intensive physical and occupational therapy on POD 5.  Today, he reports using his walker to ambulate at times, otherwise ambulating short distance independently. Home care referral completed and Pt reports visiting nurse was there today. His cervical incision is open to air, clean dry and intact. He had his follow up Appt.  Yesterday with Neurosurgery, and staples were removed.   Mimvi will continue to work with him in his home. Denies any concerns.     Review of Systems   Constitutional: Positive for activity change. Negative for appetite change, chills, diaphoresis, fatigue and fever.   Respiratory: Negative for shortness of breath.    Neurological: Negative for dizziness, syncope and numbness.       Medication Review:          Reported by: Patient  Any new medications prescribed at discharge?: Yes  Is the patient having any side effects they believe may be caused by any medication additions or changes?: No  New prescriptions filled?: Yes     Do you have enough of your regularly prescribed medications?: Yes       Medication adherence problem?: No  Was a medication discrepancy indentified?: No       Nursing  Interventions: No intervention needed  Reconciled the current and discharge medications: Yes  Reviewed AVS (Discharge Instructions)?: Yes         Acute Pain:    Acute pain: Yes     Acute pain severity: 2  Acute pain timing: intermittent  Location of acute pain: Back    Chronic Pain:    Chronic pain: No                Diet/Nutrition:    Type of Diet: Regular  Diet Adherence: Adherent with diet  Barriers to diet adherence: other       Home Care Services:    Home Care Agency: [facility]  Type of Home Care Services: Home PT,Home Nursing  Home Care Interventions: No intervention required     Post-Discharge Durable Medical Equipment::    Durable Medical Equipment: Front wheeled walker  Oxygen Use: No              DME Interventions: No intervention required    Home Management:    Living Arrangement: Significant Other  Support System:: Family  Type of Residence: [address] [place] [address]  Home Monitoring: None  Any patient reported falls in the last 3 months?: Yes  Islam or spiritual beliefs that impact treatment?: No    Appointment Scheduling:                Patient Scheduling Dispositions: Pt will call office to schedule     Follow-Ups:       Relevant Specialist Follow-ups: Neurosurgery, Oncologist    Interventions/ Care Coordination:    Interventions/ Care Coordination: Addressed a knowledge deficit  General Education: Post-Op instructions,Falls/Home safety       Reviewed signs/symptoms of worsening condition or complication that necessitate a call to the Physician's office.  Educated patient on access to care.  RN phone number given for future care management.    Janice Fam, RN   [phone]

## 2022-02-03 NOTE — PLAN OF CARE
Plan of Care Review  Plan of Care Reviewed With: patient  Progress: improving  Outcome Summary: Alert and oriented x4. Continent of bowel and bladder. Aspen collar worn AATs. Cervical incision has foam dressing intact. Reported severe neck and shoulder pain at HS, stated he worked hard in therapy. Continues on scheduled Tylenol with PRN Oxycodone administered twice so far this shift, effective. Sleeping quietly in bed overnight. Fall and safety measures maintained.   Modify Regimen: Patient to get a water filter for the house and drink plenty of water Otc Regimen: Continue eucerin Initiate Treatment: triamcinolone acetonide 0.1 % topical ointment Bid\\nQuantity: 80.0 g  Days Supply: 30\\nSig: Apply to rash bid Render In Strict Bullet Format?: No Plan: avoid Ni exposures through topical sources, foods, laundry detergent skin care etc Detail Level: Zone

## 2022-02-14 RX ORDER — CYCLOBENZAPRINE HCL 10 MG
10 TABLET ORAL 3 TIMES DAILY PRN
Qty: 90 TABLET | Refills: 0 | Status: SHIPPED | OUTPATIENT
Start: 2022-02-14 | End: 2022-05-06 | Stop reason: SDUPTHER

## 2022-02-15 ENCOUNTER — TELEPHONE (OUTPATIENT)
Dept: NEUROSURGERY | Facility: CLINIC | Age: 72
End: 2022-02-15
Payer: COMMERCIAL

## 2022-02-15 NOTE — TELEPHONE ENCOUNTER
The pa request was sent to insurance today in process [id number]       Pa was submitted but was rejected the patient picked up patient through [facility] copay and got the medication 02/04/2022

## 2022-02-21 ENCOUNTER — HOSPITAL ENCOUNTER (OUTPATIENT)
Dept: RADIOLOGY | Facility: HOSPITAL | Age: 72
Discharge: HOME | End: 2022-02-21
Attending: PHYSICIAN ASSISTANT
Payer: COMMERCIAL

## 2022-02-21 ENCOUNTER — OFFICE VISIT (OUTPATIENT)
Dept: NEUROSURGERY | Facility: CLINIC | Age: 72
End: 2022-02-21
Payer: COMMERCIAL

## 2022-02-21 VITALS
TEMPERATURE: 97.6 F | WEIGHT: 197 LBS | HEIGHT: 66 IN | HEART RATE: 86 BPM | DIASTOLIC BLOOD PRESSURE: 97 MMHG | SYSTOLIC BLOOD PRESSURE: 154 MMHG | OXYGEN SATURATION: 98 % | BODY MASS INDEX: 31.66 KG/M2

## 2022-02-21 DIAGNOSIS — S12.000A CLOSED DISPLACED FRACTURE OF FIRST CERVICAL VERTEBRA, UNSPECIFIED FRACTURE MORPHOLOGY, INITIAL ENCOUNTER (CMS/HCC): Primary | ICD-10-CM

## 2022-02-21 DIAGNOSIS — Z87.81 H/O CERVICAL FRACTURE: ICD-10-CM

## 2022-02-21 PROCEDURE — 99024 POSTOP FOLLOW-UP VISIT: CPT | Performed by: NEUROLOGICAL SURGERY

## 2022-02-21 PROCEDURE — 72040 X-RAY EXAM NECK SPINE 2-3 VW: CPT

## 2022-02-21 NOTE — PROGRESS NOTES
[facility]  [address], [address]  [place] [address]  Phone: [phone]  Fax: [phone]        22      Re: Melanie Zelaya  : 1950      Chief Complaint:  Neck injury, discuss surgery    History of Present Illness:   Melanie Zelaya is a 71 y.o. right handed male who presents in neurosurgical follow up consultation. The patient presented to [facility] after a fall from a ladder on 21.  He sustained loss of consciousness.  He was able to arise on his own volition thereafter.  He was triaged to the emergency department by his son.  On arrival he was at his neurologic baseline.  CT imaging of the cervical spine disclosed C1, C2 fractures.  He was rigidly immobilized in a hard cervical collar.      CT scan demonstrated osseous healing of C1 however there was minimal healing of the odontoid process.  He wishes to pursue surgical fixation of his fracture.  This was orchestrated on 2022 via a C1-C3 posterior lateral instrumented fusion, ORIF C2 fracture. His hospital course was uncomplicated and patient was ultimately discharged to [facility].    Since last being seen, he reports overall he is doing well. His neck discomfort has decreased significantly. His average level of discomfort is rated a 1-2 out of 10. He continues to wear the hard collar when ambulating. He is without additional features of radicular pain, paresthesias.     Medical History:  has a past medical history of Anxiety, Atrial fibrillation (CMS/HCC), Breast cancer (CMS/HCC), C1 cervical fracture (CMS/HCC), Colon polyp, COPD (chronic obstructive pulmonary disease) (CMS/HCC), Coronary artery calcification seen on CT scan, COVID-19 vaccine series completed, Depression, Diverticulosis, Diverticulosis, Fracture of pelvis (CMS/HCC) (), GERD (gastroesophageal reflux disease), Hearing loss, History of colon polyps, History of COVID-19  (), Hyperlipidemia, Hypertension, Left atrial enlargement, Lung cancer (CMS/HCC), Lung cancer (CMS/HCC), Pneumonia (), Seasonal allergies, and Wrist fracture.    Surgical History:  has a past surgical history that includes Lung lobectomy (Left, ); Shoulder surgery (Right, ); Rotator cuff repair (Right, ); Tonsillectomy; Nasal polyp surgery; Hand surgery (Bilateral); and Colonoscopy.    Family History: family history includes Cancer in his nephew; Cirrhosis in his biological father; Colon cancer in his biological brother; Diabetes in his biological mother; Lung cancer in his biological brother; No Known Problems in his maternal grandfather, maternal grandmother, paternal grandfather, and paternal grandmother; Pancreatic cancer in his biological sister; Prostate cancer in his biological brother.  Family history non-contributory to neurological condition.    Social History:   Social History     Socioeconomic History   • Marital status: Legally      Spouse name: Not on file   • Number of children: 3   • Years of education: Not on file   • Highest education level: Not on file   Occupational History   • Not on file   Tobacco Use   • Smoking status: Former Smoker     Packs/day: 2.00     Types: Cigarettes     Quit date: 2010     Years since quittin.8   • Smokeless tobacco: Never Used   Vaping Use   • Vaping Use: Never used   Substance and Sexual Activity   • Alcohol use: No     Comment: RARE   • Drug use: No   • Sexual activity: Yes     Partners: Female     Birth control/protection: None   Other Topics Concern   • Not on file   Social History Narrative    Retired    Lives alone in a 3 story home     Social Determinants of Health     Financial Resource Strain: Not on file   Food Insecurity: No Food Insecurity   • Worried About Running Out of Food in the Last Year: Never true   • Ran Out of Food in the Last Year: Never true   Transportation Needs: Not on file   Physical Activity: Not on  file   Stress: No Stress Concern Present   • Feeling of Stress : Not at all   Social Connections: Not on file   Intimate Partner Violence: Not on file   Housing Stability: Not on file        Allergies: No Known Allergies    Medications:   Current Outpatient Medications   Medication Sig Dispense Refill   • acetaminophen 325 mg tablet Take 2 tablets (650 mg total) by mouth every 6 (six) hours as needed for mild pain.     • albuterol HFA (PROAIR HFA) 90 mcg/actuation inhaler Inhale 2 puffs 2 (two) times a day as needed for wheezing or shortness of breath. (Patient taking differently: Inhale 2 puffs as needed for wheezing or shortness of breath.  ) 1 Inhaler 3   • amLODIPine 10 mg tablet Take 1 tablet (10 mg total) by mouth nightly. 30 tablet 0   • atorvastatin 40 mg tablet Take 1 tablet (40 mg total) by mouth daily. 30 tablet 0   • carvediloL 6.25 mg tablet Take 1 tablet (6.25 mg total) by mouth 2 (two) times a day with meals. 60 tablet 0   • cyclobenzaprine (FLEXERIL) 10 mg tablet Take 1 tablet (10 mg total) by mouth 3 (three) times a day as needed for muscle spasms. 90 tablet 0   • dabigatran etexilate 150 mg capsu Take 1 capsule (150 mg total) by mouth 2 (two) times a day Indications: treatment to prevent blood clots in chronic atrial fibrillation. 60 capsule 0   • escitalopram (LEXAPRO) 10 mg tablet Take 1 tablet (10 mg total) by mouth daily. 90 tablet 1   • famotidine 20 mg tablet Take 1 tablet (20 mg total) by mouth 2 (two) times a day Indications: gastroesophageal reflux disease. 60 tablet 0   • fluticasone propionate (FLONASE) 50 mcg/actuation nasal spray Administer 2 sprays into each nostril daily. (Patient taking differently: Administer 2 sprays into each nostril daily as needed.  ) 16 g 2   • guaiFENesin 600 mg 12 hr tablet Take 1 tablet (600 mg total) by mouth 2 (two) times a day. 60 tablet 0   • hydrochlorothiazide 25 mg tablet Take 1 tablet (25 mg total) by mouth daily. 30 tablet 0   • losartan 100 mg  tablet Take 1 tablet (100 mg total) by mouth daily with dinner. 30 tablet 0   • multivitamin tablet Take 1 tablet by mouth daily. Hold for 2 weeks from surgery performed on 1/6/22 30 tablet 0   • sennosides-docusate sodium (SENOKOT-S) 8.6-50 mg Take 1 tablet by mouth 2 (two) times a day. (Patient taking differently: Take 1 tablet by mouth as needed.  ) 60 tablet 0   • umeclidinium-vilanteroL (ANORO ELLIPTA) 62.5-25 mcg/actuation blister with device Inhale 1 puff daily.         No current facility-administered medications for this visit.       Review of Systems:   A 14 point review of systems was performed and aside from what is mentioned above is otherwise negative.    General physical examination:  The patient is well appearing male, sitting upright in his chair in no acute distress, he appears his stated age.  His head is atraumatic, normocephalic. His neck is supple without obvious adenopathy. Oral mucosa is moist. His peripheral pulses are symmetric and palpable. Extremities without peripheral edema. His breathing is normal and unlabored.     Vital signs were reviewed and within normal limits.    Neurologic examination:  Mental status:  The patient is alert, attentive, and oriented. Speech is clear and fluent with good repetition, comprehension, and naming.  Remote and recent memory are normal as well as fund of knowledge.    Cranial nerves:  CN II: Visual fields are full to confrontation.  Pupils are equal and briskly reactive to light.   CN III, IV, VI: Extra-occular muscles are intact  CN V: Facial sensation is intact and equal in V1-V3 distributions bilaterally.   CN VII: Face is symmetric with normal eye closure and smile.  CN VIII: Hearing is normal to rubbing fingers  CN IX, X: Palate elevates symmetrically. Phonation is normal.  CN XI: Head turning and shoulder shrug are intact  CN XII: Tongue is midline with normal movements and no atrophy.    Motor:  There is no pronator drift.  Muscle bulk and tone  are normal.    Deltoid Biceps Triceps Wrist ext Hand  Finger Spread Hip flexion Knee ext Dorsi-  flexion EHL Plantar Flexion   L 5 5 5 5 5 5 5 5 5 5 5   R 4+ 5 5 5 5 5 5 5 5 5 5     Reflexes:  Reflexes are 2+ and symmetric at the biceps, brachialis, triceps, patellar, and Achilli's. There is no Romo's sign or ankle clonus.    Sensory:   Intact light touch, pinprick, and position sense, throughout all 4 extremities.    Coordination:  There is no dysmetria on finger-to-nose testing.  Romberg is absent.    Gait:  Gait is steady with normal steps.  Heel and toe walking are normal. Tandem gait is normal.    Skin: incision is well healed. There is no evidence of infection or dehiscence     Data Review:  Cervical X-ray (2/21/2022):   Status post posterior spinal fusion from C1 to C3 with hardware intact.       Assessment and Plan:    In summary, Melanie Zelaya is a 71 y.o. male who fell on April 2021 and sustained significant head, neck trauma.  He had radiographic evidence of C1, C2 fractures.  Specifically his C2 fracture involves the odontoid process.  There was mild distraction of the odontoid process from the body of C2 with posterior angulation.  CT scan demonstrated osseous healing of C1 however the there was still minimal healing of the odontoid process.  He wished to pursue surgical fixation of his fracture.  This was orchestrated on January 6, 2022 via a C1-C3 posterior lateral instrumented fusion, ORIF C2 fracture.    He is doing well and following the expected trajectory of recovery at present. He is slowly acclimating to his usual activities. He may slowly wean off the cervical collar in the next 3 weeks. He may resume driving in approximately 3 weeks in a gradual fashion once the collar has been discontinued. He was instructed to have an adult passenger with him for the first few driving sessions to help.     I look forward to re-evaluating his progress in 2 months time with updated cervical x-rays.  The patient was counseled at length regarding natural history of his condition.  He was asked to continue with lifestyle modification, avoidance of excessive bending, twisting, overhead lifting.  In the interim should his symptomatology evolve or worsen, I have asked he return sooner for prompt reevaluation.    Thank you for referring Melanie Zelaya  to my attention.  Please feel free to contact me anytime if I can be of further assistance.      I, John Ogden PA-C, am scribing for, and in the presence of Chaparro Morejon MD.    I, Chaparro Morejon MD, personally performed the services described in this documentation as scribed by John Ogden PA-C  in my presence, and it is accurate and complete.

## 2022-02-21 NOTE — LETTER
2022     SANTOS Giles  [address] [facility] [address]  [facility] [address]    Patient: Melanie Zelaya  YOB: 1950  Date of Visit: 2022      Dear Dr. Schultz:    Thank you for referring Melanie Zelaya to me for evaluation. Below are my notes for this consultation.    If you have questions, please do not hesitate to call me. I look forward to following your patient along with you.         Sincerely,        Chaparro Morejon MD        CC: Chaparro Schneider MD  2022  8:24 AM  Signed      [facility]  [facility]  Medical [address], [address]  [place] [address]  Phone: [phone]  Fax: [phone]        22      Re: Melanie Zelaya  : 1950      Chief Complaint:  Neck injury, discuss surgery    History of Present Illness:   Melanie Zelaya is a 71 y.o. right handed male who presents in neurosurgical follow up consultation. The patient presented to [facility] after a fall from a ladder on 21.  He sustained loss of consciousness.  He was able to arise on his own volition thereafter.  He was triaged to the emergency department by his son.  On arrival he was at his neurologic baseline.  CT imaging of the cervical spine disclosed C1, C2 fractures.  He was rigidly immobilized in a hard cervical collar.      CT scan demonstrated osseous healing of C1 however there was minimal healing of the odontoid process.  He wishes to pursue surgical fixation of his fracture.  This was orchestrated on 2022 via a C1-C3 posterior lateral instrumented fusion, ORIF C2 fracture. His hospital course was uncomplicated and patient was ultimately discharged to [facility].    Since last being seen, he reports overall he is doing well. His neck discomfort has decreased significantly. His average level of discomfort is rated a 1-2 out of 10. He continues to wear the hard collar when ambulating. He is  without additional features of radicular pain, paresthesias.     Medical History:  has a past medical history of Anxiety, Atrial fibrillation (CMS/HCC), Breast cancer (CMS/HCC), C1 cervical fracture (CMS/HCC), Colon polyp, COPD (chronic obstructive pulmonary disease) (CMS/HCC), Coronary artery calcification seen on CT scan, COVID-19 vaccine series completed, Depression, Diverticulosis, Diverticulosis, Fracture of pelvis (CMS/HCC) (), GERD (gastroesophageal reflux disease), Hearing loss, History of colon polyps, History of COVID-19 (), Hyperlipidemia, Hypertension, Left atrial enlargement, Lung cancer (CMS/HCC), Lung cancer (CMS/HCC), Pneumonia (), Seasonal allergies, and Wrist fracture.    Surgical History:  has a past surgical history that includes Lung lobectomy (Left, ); Shoulder surgery (Right, ); Rotator cuff repair (Right, ); Tonsillectomy; Nasal polyp surgery; Hand surgery (Bilateral); and Colonoscopy.    Family History: family history includes Cancer in his nephew; Cirrhosis in his biological father; Colon cancer in his biological brother; Diabetes in his biological mother; Lung cancer in his biological brother; No Known Problems in his maternal grandfather, maternal grandmother, paternal grandfather, and paternal grandmother; Pancreatic cancer in his biological sister; Prostate cancer in his biological brother.  Family history non-contributory to neurological condition.    Social History:   Social History     Socioeconomic History   • Marital status: Legally      Spouse name: Not on file   • Number of children: 3   • Years of education: Not on file   • Highest education level: Not on file   Occupational History   • Not on file   Tobacco Use   • Smoking status: Former Smoker     Packs/day: 2.00     Types: Cigarettes     Quit date: 2010     Years since quittin.8   • Smokeless tobacco: Never Used   Vaping Use   • Vaping Use: Never used   Substance and Sexual Activity    • Alcohol use: No     Comment: RARE   • Drug use: No   • Sexual activity: Yes     Partners: Female     Birth control/protection: None   Other Topics Concern   • Not on file   Social History Narrative    Retired    Lives alone in a 3 story home     Social Determinants of Health     Financial Resource Strain: Not on file   Food Insecurity: No Food Insecurity   • Worried About Running Out of Food in the Last Year: Never true   • Ran Out of Food in the Last Year: Never true   Transportation Needs: Not on file   Physical Activity: Not on file   Stress: No Stress Concern Present   • Feeling of Stress : Not at all   Social Connections: Not on file   Intimate Partner Violence: Not on file   Housing Stability: Not on file        Allergies: No Known Allergies    Medications:   Current Outpatient Medications   Medication Sig Dispense Refill   • acetaminophen 325 mg tablet Take 2 tablets (650 mg total) by mouth every 6 (six) hours as needed for mild pain.     • albuterol HFA (PROAIR HFA) 90 mcg/actuation inhaler Inhale 2 puffs 2 (two) times a day as needed for wheezing or shortness of breath. (Patient taking differently: Inhale 2 puffs as needed for wheezing or shortness of breath.  ) 1 Inhaler 3   • amLODIPine 10 mg tablet Take 1 tablet (10 mg total) by mouth nightly. 30 tablet 0   • atorvastatin 40 mg tablet Take 1 tablet (40 mg total) by mouth daily. 30 tablet 0   • carvediloL 6.25 mg tablet Take 1 tablet (6.25 mg total) by mouth 2 (two) times a day with meals. 60 tablet 0   • cyclobenzaprine (FLEXERIL) 10 mg tablet Take 1 tablet (10 mg total) by mouth 3 (three) times a day as needed for muscle spasms. 90 tablet 0   • dabigatran etexilate 150 mg capsu Take 1 capsule (150 mg total) by mouth 2 (two) times a day Indications: treatment to prevent blood clots in chronic atrial fibrillation. 60 capsule 0   • escitalopram (LEXAPRO) 10 mg tablet Take 1 tablet (10 mg total) by mouth daily. 90 tablet 1   • famotidine 20 mg tablet  Take 1 tablet (20 mg total) by mouth 2 (two) times a day Indications: gastroesophageal reflux disease. 60 tablet 0   • fluticasone propionate (FLONASE) 50 mcg/actuation nasal spray Administer 2 sprays into each nostril daily. (Patient taking differently: Administer 2 sprays into each nostril daily as needed.  ) 16 g 2   • guaiFENesin 600 mg 12 hr tablet Take 1 tablet (600 mg total) by mouth 2 (two) times a day. 60 tablet 0   • hydrochlorothiazide 25 mg tablet Take 1 tablet (25 mg total) by mouth daily. 30 tablet 0   • losartan 100 mg tablet Take 1 tablet (100 mg total) by mouth daily with dinner. 30 tablet 0   • multivitamin tablet Take 1 tablet by mouth daily. Hold for 2 weeks from surgery performed on 1/6/22 30 tablet 0   • sennosides-docusate sodium (SENOKOT-S) 8.6-50 mg Take 1 tablet by mouth 2 (two) times a day. (Patient taking differently: Take 1 tablet by mouth as needed.  ) 60 tablet 0   • umeclidinium-vilanteroL (ANORO ELLIPTA) 62.5-25 mcg/actuation blister with device Inhale 1 puff daily.         No current facility-administered medications for this visit.       Review of Systems:   A 14 point review of systems was performed and aside from what is mentioned above is otherwise negative.    General physical examination:  The patient is well appearing male, sitting upright in his chair in no acute distress, he appears his stated age.  His head is atraumatic, normocephalic. His neck is supple without obvious adenopathy. Oral mucosa is moist. His peripheral pulses are symmetric and palpable. Extremities without peripheral edema. His breathing is normal and unlabored.     Vital signs were reviewed and within normal limits.    Neurologic examination:  Mental status:  The patient is alert, attentive, and oriented. Speech is clear and fluent with good repetition, comprehension, and naming.  Remote and recent memory are normal as well as fund of knowledge.    Cranial nerves:  CN II: Visual fields are full to  confrontation.  Pupils are equal and briskly reactive to light.   CN III, IV, VI: Extra-occular muscles are intact  CN V: Facial sensation is intact and equal in V1-V3 distributions bilaterally.   CN VII: Face is symmetric with normal eye closure and smile.  CN VIII: Hearing is normal to rubbing fingers  CN IX, X: Palate elevates symmetrically. Phonation is normal.  CN XI: Head turning and shoulder shrug are intact  CN XII: Tongue is midline with normal movements and no atrophy.    Motor:  There is no pronator drift.  Muscle bulk and tone are normal.    Deltoid Biceps Triceps Wrist ext Hand  Finger Spread Hip flexion Knee ext Dorsi-  flexion EHL Plantar Flexion   L 5 5 5 5 5 5 5 5 5 5 5   R 4+ 5 5 5 5 5 5 5 5 5 5     Reflexes:  Reflexes are 2+ and symmetric at the biceps, brachialis, triceps, patellar, and Achilli's. There is no Romo's sign or ankle clonus.    Sensory:   Intact light touch, pinprick, and position sense, throughout all 4 extremities.    Coordination:  There is no dysmetria on finger-to-nose testing.  Romberg is absent.    Gait:  Gait is steady with normal steps.  Heel and toe walking are normal. Tandem gait is normal.    Skin: incision is well healed. There is no evidence of infection or dehiscence     Data Review:  Cervical X-ray (2/21/2022):   Status post posterior spinal fusion from C1 to C3 with hardware intact.       Assessment and Plan:    In summary, Melanie Zelaya is a 71 y.o. male who fell on April 2021 and sustained significant head, neck trauma.  He had radiographic evidence of C1, C2 fractures.  Specifically his C2 fracture involves the odontoid process.  There was mild distraction of the odontoid process from the body of C2 with posterior angulation.  CT scan demonstrated osseous healing of C1 however the there was still minimal healing of the odontoid process.  He wished to pursue surgical fixation of his fracture.  This was orchestrated on January 6, 2022 via a C1-C3 posterior  lateral instrumented fusion, ORIF C2 fracture.    He is doing well and following the expected trajectory of recovery at present. He is slowly acclimating to his usual activities. He may slowly wean off the cervical collar in the next 3 weeks. He may resume driving in approximately 3 weeks in a gradual fashion once the collar has been discontinued. He was instructed to have an adult passenger with him for the first few driving sessions to help.     I look forward to re-evaluating his progress in 2 months time with updated cervical x-rays. The patient was counseled at length regarding natural history of his condition.  He was asked to continue with lifestyle modification, avoidance of excessive bending, twisting, overhead lifting.  In the interim should his symptomatology evolve or worsen, I have asked he return sooner for prompt reevaluation.    Thank you for referring Melanie POLI Chengo  to my attention.  Please feel free to contact me anytime if I can be of further assistance.      I, John Ogden PA-C, am scribing for, and in the presence of Chaparro Morejon MD.    IChaparro MD, personally performed the services described in this documentation as scribed by John Ogden PA-C  in my presence, and it is accurate and complete.

## 2022-02-22 ENCOUNTER — TELEPHONE (OUTPATIENT)
Dept: PRIMARY CARE | Facility: CLINIC | Age: 72
End: 2022-02-22
Payer: COMMERCIAL

## 2022-02-22 RX ORDER — LOSARTAN POTASSIUM 100 MG/1
100 TABLET ORAL
Qty: 30 TABLET | Refills: 0 | Status: SHIPPED | OUTPATIENT
Start: 2022-02-22 | End: 2022-03-15 | Stop reason: SDUPTHER

## 2022-02-22 RX ORDER — CARVEDILOL 6.25 MG/1
6.25 TABLET ORAL 2 TIMES DAILY WITH MEALS
Qty: 60 TABLET | Refills: 0 | Status: SHIPPED | OUTPATIENT
Start: 2022-02-22 | End: 2022-03-15 | Stop reason: SDUPTHER

## 2022-02-22 NOTE — TELEPHONE ENCOUNTER
Melanie was prescribed medication from [facility] Dr. Miguel Cerrato and was not sure if he should continue with it . The medication is listed below. Please call patient back and advise.     Name of medication requested: losartan 100 mg tablet  Medication Strength:   Mediation Directions:   Quantity Requested (example 30/90): 30  Number of refills requested:     Name of medication requested: carvediloL 6.25 mg tablet  Medication Strength: 6.25  Mediation Directions:   Quantity Requested (example 30/90): 30  Number of refills requested:     [address] [address]  Phone: [phone] Fax: [phone]    Additional Comments:    Next Encounter with this provider: Visit date not found

## 2022-02-22 NOTE — TELEPHONE ENCOUNTER
Talked to Dr. Nix, will refill for 30 days and patient to continue to monitor blood pressures and will call to schedule patient for when Keisha returns. If anything changes patient to call for earlier appointment.

## 2022-03-07 ENCOUNTER — HOSPITAL ENCOUNTER (OUTPATIENT)
Dept: RADIOLOGY | Facility: HOSPITAL | Age: 72
Discharge: HOME | End: 2022-03-07
Attending: NEUROLOGICAL SURGERY
Payer: COMMERCIAL

## 2022-03-15 ENCOUNTER — OFFICE VISIT (OUTPATIENT)
Dept: PRIMARY CARE | Facility: CLINIC | Age: 72
End: 2022-03-15
Payer: COMMERCIAL

## 2022-03-15 ENCOUNTER — TRANSCRIBE ORDERS (OUTPATIENT)
Dept: REGISTRATION | Facility: CLINIC | Age: 72
End: 2022-03-15

## 2022-03-15 ENCOUNTER — TELEPHONE (OUTPATIENT)
Dept: PRIMARY CARE | Facility: CLINIC | Age: 72
End: 2022-03-15

## 2022-03-15 ENCOUNTER — APPOINTMENT (OUTPATIENT)
Dept: LAB | Facility: CLINIC | Age: 72
End: 2022-03-15
Attending: INTERNAL MEDICINE
Payer: COMMERCIAL

## 2022-03-15 VITALS
DIASTOLIC BLOOD PRESSURE: 88 MMHG | HEART RATE: 90 BPM | HEIGHT: 66 IN | SYSTOLIC BLOOD PRESSURE: 136 MMHG | RESPIRATION RATE: 18 BRPM | BODY MASS INDEX: 31.69 KG/M2 | WEIGHT: 197.2 LBS | TEMPERATURE: 97.5 F

## 2022-03-15 DIAGNOSIS — F41.9 ANXIETY: ICD-10-CM

## 2022-03-15 DIAGNOSIS — E78.00 PURE HYPERCHOLESTEROLEMIA, UNSPECIFIED: ICD-10-CM

## 2022-03-15 DIAGNOSIS — I48.0 PAROXYSMAL ATRIAL FIBRILLATION (CMS/HCC): ICD-10-CM

## 2022-03-15 DIAGNOSIS — J43.8 OTHER EMPHYSEMA (CMS/HCC): ICD-10-CM

## 2022-03-15 DIAGNOSIS — K21.9 GASTROESOPHAGEAL REFLUX DISEASE WITHOUT ESOPHAGITIS: ICD-10-CM

## 2022-03-15 DIAGNOSIS — J45.40 MODERATE PERSISTENT ASTHMA WITHOUT COMPLICATION: ICD-10-CM

## 2022-03-15 DIAGNOSIS — I48.21 PERMANENT ATRIAL FIBRILLATION (CMS/HCC): ICD-10-CM

## 2022-03-15 DIAGNOSIS — Z98.1 S/P CERVICAL SPINAL FUSION: ICD-10-CM

## 2022-03-15 DIAGNOSIS — I10 ESSENTIAL (PRIMARY) HYPERTENSION: ICD-10-CM

## 2022-03-15 DIAGNOSIS — I34.0 NONRHEUMATIC MITRAL (VALVE) INSUFFICIENCY: ICD-10-CM

## 2022-03-15 DIAGNOSIS — I50.32 CHRONIC DIASTOLIC HEART FAILURE (CMS/HCC): Chronic | ICD-10-CM

## 2022-03-15 DIAGNOSIS — C34.32 PRIMARY MALIGNANT NEOPLASM OF LEFT LOWER LOBE OF LUNG (CMS/HCC): Chronic | ICD-10-CM

## 2022-03-15 DIAGNOSIS — I34.0 NONRHEUMATIC MITRAL (VALVE) INSUFFICIENCY: Primary | ICD-10-CM

## 2022-03-15 DIAGNOSIS — J43.9 PULMONARY EMPHYSEMA, UNSPECIFIED EMPHYSEMA TYPE (CMS/HCC): Primary | ICD-10-CM

## 2022-03-15 DIAGNOSIS — E78.00 PURE HYPERCHOLESTEROLEMIA: ICD-10-CM

## 2022-03-15 DIAGNOSIS — I10 PRIMARY HYPERTENSION: ICD-10-CM

## 2022-03-15 PROBLEM — S12.112G: Status: RESOLVED | Noted: 2022-01-08 | Resolved: 2022-03-15

## 2022-03-15 PROBLEM — F33.0 MILD EPISODE OF RECURRENT MAJOR DEPRESSIVE DISORDER (CMS/HCC): Status: RESOLVED | Noted: 2021-05-03 | Resolved: 2022-03-15

## 2022-03-15 PROBLEM — S12.9XXA CERVICAL SPINE FRACTURE (CMS/HCC): Status: RESOLVED | Noted: 2021-04-23 | Resolved: 2022-03-15

## 2022-03-15 PROBLEM — Z87.81 H/O CERVICAL FRACTURE: Status: RESOLVED | Noted: 2022-01-06 | Resolved: 2022-03-15

## 2022-03-15 PROBLEM — Z01.818 PREOP EXAMINATION: Status: RESOLVED | Noted: 2022-01-03 | Resolved: 2022-03-15

## 2022-03-15 PROBLEM — Z87.81 HISTORY OF CERVICAL FRACTURE: Status: RESOLVED | Noted: 2022-01-06 | Resolved: 2022-03-15

## 2022-03-15 PROCEDURE — 30099997 SPECIMEN PROCESSING

## 2022-03-15 PROCEDURE — 99214 OFFICE O/P EST MOD 30 MIN: CPT | Performed by: NURSE PRACTITIONER

## 2022-03-15 PROCEDURE — 3008F BODY MASS INDEX DOCD: CPT | Performed by: NURSE PRACTITIONER

## 2022-03-15 RX ORDER — HYDROCHLOROTHIAZIDE 25 MG/1
25 TABLET ORAL DAILY
Qty: 90 TABLET | Refills: 1 | Status: SHIPPED | OUTPATIENT
Start: 2022-03-15 | End: 2022-09-19

## 2022-03-15 RX ORDER — GUAIFENESIN 200 MG/1
400 TABLET ORAL EVERY 4 HOURS PRN
COMMUNITY
End: 2022-03-15 | Stop reason: ALTCHOICE

## 2022-03-15 RX ORDER — LOSARTAN POTASSIUM 100 MG/1
100 TABLET ORAL
Qty: 90 TABLET | Refills: 1 | Status: SHIPPED | OUTPATIENT
Start: 2022-03-15 | End: 2022-09-19

## 2022-03-15 RX ORDER — SENNOSIDES 8.6 MG/1
1 TABLET ORAL DAILY
COMMUNITY
End: 2022-03-15 | Stop reason: ALTCHOICE

## 2022-03-15 RX ORDER — FAMOTIDINE 20 MG/1
20 TABLET, FILM COATED ORAL 2 TIMES DAILY
COMMUNITY
End: 2024-04-29 | Stop reason: ALTCHOICE

## 2022-03-15 RX ORDER — AMLODIPINE BESYLATE 10 MG/1
10 TABLET ORAL NIGHTLY
Qty: 90 TABLET | Refills: 1 | Status: SHIPPED | OUTPATIENT
Start: 2022-03-15 | End: 2022-08-01 | Stop reason: SDUPTHER

## 2022-03-15 RX ORDER — ATORVASTATIN CALCIUM 40 MG/1
40 TABLET, FILM COATED ORAL
Qty: 90 TABLET | Refills: 1 | Status: SHIPPED | OUTPATIENT
Start: 2022-03-15 | End: 2022-09-16

## 2022-03-15 RX ORDER — ESCITALOPRAM OXALATE 10 MG/1
10 TABLET ORAL DAILY
Qty: 90 TABLET | Refills: 1 | Status: SHIPPED | OUTPATIENT
Start: 2022-03-15 | End: 2022-10-20

## 2022-03-15 RX ORDER — CARVEDILOL 6.25 MG/1
6.25 TABLET ORAL 2 TIMES DAILY WITH MEALS
Qty: 180 TABLET | Refills: 1 | Status: SHIPPED | OUTPATIENT
Start: 2022-03-15 | End: 2022-09-16

## 2022-03-15 RX ORDER — ACETAMINOPHEN 325 MG/1
TABLET ORAL EVERY 6 HOURS PRN
COMMUNITY
End: 2022-03-15 | Stop reason: ALTCHOICE

## 2022-03-15 ASSESSMENT — ENCOUNTER SYMPTOMS
BRUISES/BLEEDS EASILY: 0
ADENOPATHY: 0
DYSURIA: 0
LIGHT-HEADEDNESS: 0
SHORTNESS OF BREATH: 0
ABDOMINAL PAIN: 0
COUGH: 0
FATIGUE: 0
FEVER: 0
DIZZINESS: 0
HEADACHES: 0
NERVOUS/ANXIOUS: 0
TROUBLE SWALLOWING: 0
PALPITATIONS: 0
CHILLS: 0
DYSPHORIC MOOD: 0
NECK STIFFNESS: 1

## 2022-03-15 NOTE — TELEPHONE ENCOUNTER
Pt called to advise date of his Moderna Booster ... Pt states he received his Moderna Booster 11/15/2021 @ [facility].

## 2022-03-15 NOTE — PATIENT INSTRUCTIONS
Current Outpatient Medications:     •  albuterol HFA (PROAIR HFA) 90 mcg/actuation inhaler, Inhale 2 puffs 2 (two) times a day as needed for wheezing or shortness of breath. (Patient taking differently: Inhale 2 puffs as needed for wheezing or shortness of breath.  ), Disp: 1 Inhaler, Rfl: 3    •  amLODIPine (NORVASC) 10 mg tablet, Take 1 tablet (10 mg total) by mouth nightly (blood pressure)    •  atorvastatin (LIPITOR) 40 mg tablet, Take 1 tablet (40 mg total) by mouth daily., (cholesterol)    •  carvediloL (COREG) 6.25 mg tablet, Take 1 tablet (6.25 mg total) by mouth 2 (two) times a day with meals (heart)    •  cyclobenzaprine (FLEXERIL) 10 mg tablet, Take 1 tablet (10 mg total) by mouth 3 (three) times a day as needed for muscle spasms    •  dabigatran etexilate 150 mg capsu, Take 1 capsule (150 mg total) by mouth 2 (two) times a day Indications: treatment to prevent blood clots in chronic atrial fibrillation    •  escitalopram (LEXAPRO) 10 mg tablet, Take 1 tablet (10 mg total) by mouth daily for mood    •  famotidine (PEPCID) 20 mg tablet, Take 20 mg by mouth 2 (two) times a day., Disp: as needed for heartburn    •  FISH OIL-omega-3 fatty acids (FISH OIL) 340-1,000 mg capsule, Take 2 capsules by mouth 2 (two) times a day., Disp: , Rfl:     •  fluticasone propionate (FLONASE) 50 mcg/actuation nasal spray, Administer 2 sprays into each nostril daily.     •  hydrochlorothiazide (HYDRODIURIL) 25 mg tablet, Take 1 tablet (25 mg total) by mouth daily (blood pressure)    •  losartan (COZAAR) 100 mg tablet, Take 1 tablet (100 mg total) by mouth daily with dinner (blood pressure)    •  multivit-min/FA/lycopen/lutein (CENTRUM SILVER MEN ORAL), Take by mouth.    •  umeclidinium-vilanteroL (ANORO ELLIPTA) 62.5-25 mcg/actuation blister with device, Inhale 1 puff daily.
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
## 2022-05-06 ENCOUNTER — TELEPHONE (OUTPATIENT)
Dept: NEUROLOGY | Facility: CLINIC | Age: 72
End: 2022-05-06
Payer: COMMERCIAL

## 2022-05-06 RX ORDER — CYCLOBENZAPRINE HCL 10 MG
10 TABLET ORAL 3 TIMES DAILY PRN
Qty: 90 TABLET | Refills: 0 | Status: SHIPPED | OUTPATIENT
Start: 2022-05-06 | End: 2022-08-31 | Stop reason: SDUPTHER

## 2022-05-06 RX ORDER — MELOXICAM 7.5 MG/1
7.5 TABLET ORAL DAILY
Qty: 30 TABLET | Refills: 0 | Status: SHIPPED | OUTPATIENT
Start: 2022-05-06 | End: 2022-12-12 | Stop reason: SDUPTHER

## 2022-05-06 NOTE — TELEPHONE ENCOUNTER
Patient called states he had Surgery in January and is experiencing a lot of neck pain today he would like to know if he cn have medication called in to knock the edge off. Patient can be reached at [phone].

## 2022-05-06 NOTE — TELEPHONE ENCOUNTER
Spoke with patient. Has been increasing his activity and with the weather he feels his pain has worsened. He denies radicular pain or weakness. Overall he feels he has improving well he just feels he overdid it.  Will call in meloxicam and flexeril for symptomatic relief. He understands. No further questions.

## 2022-05-11 ENCOUNTER — OFFICE VISIT (OUTPATIENT)
Dept: NEUROSURGERY | Facility: CLINIC | Age: 72
End: 2022-05-11
Payer: COMMERCIAL

## 2022-05-11 VITALS — DIASTOLIC BLOOD PRESSURE: 70 MMHG | OXYGEN SATURATION: 97 % | SYSTOLIC BLOOD PRESSURE: 128 MMHG | HEART RATE: 67 BPM

## 2022-05-11 DIAGNOSIS — Z98.1 S/P CERVICAL SPINAL FUSION: Primary | ICD-10-CM

## 2022-05-11 PROCEDURE — 99214 OFFICE O/P EST MOD 30 MIN: CPT | Performed by: NEUROLOGICAL SURGERY

## 2022-05-11 ASSESSMENT — PAIN SCALES - GENERAL: PAINLEVEL: 0-NO PAIN

## 2022-05-11 NOTE — PROGRESS NOTES
[facility]  [facility] [place], operated [facility]  [address], [address]  [place] [address]  Phone: [phone]  Fax: [phone]        22      Re: Melanie Zelaya  : 1950      Chief Complaint:  C2 fracture    History of Present Illness:   Melanie Zelaya is a 72 y.o. right handed male who presents in neurosurgical follow up consultation. He presented to [facility] after a fall from a ladder on 21.  He sustained loss of consciousness.  He was able to arise on his own volition thereafter.  He was triaged to the emergency department by his son. On arrival he was at his neurologic baseline.  CT imaging of the cervical spine disclosed C1, C2 fractures.  He was rigidly immobilized in a hard cervical collar.      CT scan demonstrated osseous healing of C1 however there was minimal healing of the odontoid process.  He wishes to pursue surgical fixation of his fracture.  This was orchestrated on 2022 via a C1-C3 posterior lateral instrumented fusion, ORIF C2 fracture. His hospital course was uncomplicated and patient was ultimately discharged to [facility].    Since last being seen, he feels well. His neck discomfort has decreased significantly. His average level of discomfort is rated a 1-2 out of 10. He continues to wear the hard collar when ambulating. He is without additional features of radicular pain, paresthesias, new changes in strength, sensation, bowel, bladder, gait function.  He ambulates without the need of an assistive device.    Medical History:  has a past medical history of Anxiety, Atrial fibrillation (CMS/HCC), Breast cancer (CMS/HCC), C1 cervical fracture (CMS/HCC), Colon polyp, COPD (chronic obstructive pulmonary disease) (CMS/HCC), Coronary artery calcification seen on CT scan, COVID-19 vaccine series completed, Depression, Diverticulosis, Diverticulosis, Fracture of pelvis (CMS/HCC) (), GERD (gastroesophageal reflux  disease), Hearing loss, History of colon polyps, History of COVID-19 (), Hyperlipidemia, Hypertension, Left atrial enlargement, Lung cancer (CMS/HCC), Lung cancer (CMS/HCC), Pneumonia (), Seasonal allergies, and Wrist fracture.    Surgical History:  has a past surgical history that includes Lung lobectomy (Left, 2016); Shoulder surgery (Right, ); Rotator cuff repair (Right, ); Tonsillectomy; Nasal polyp surgery; Hand surgery (Bilateral); and Colonoscopy.    Family History: family history includes Cancer in his nephew; Cirrhosis in his biological father; Colon cancer in his biological brother; Diabetes in his biological mother; Lung cancer in his biological brother; No Known Problems in his maternal grandfather, maternal grandmother, paternal grandfather, and paternal grandmother; Pancreatic cancer in his biological sister; Prostate cancer in his biological brother.  Family history non-contributory to neurological condition.    Social History:   Social History     Socioeconomic History   • Marital status: Legally      Spouse name: None   • Number of children: 3   • Years of education: None   • Highest education level: None   Tobacco Use   • Smoking status: Former Smoker     Packs/day: 2.00     Types: Cigarettes     Quit date: 2010     Years since quittin.0   • Smokeless tobacco: Never Used   Vaping Use   • Vaping Use: Never used   Substance and Sexual Activity   • Alcohol use: No     Comment: RARE   • Drug use: No   • Sexual activity: Yes     Partners: Female     Birth control/protection: None   Social History Narrative    Retired    Lives alone in a 3 story home     Social Determinants of Health     Food Insecurity: No Food Insecurity   • Worried About Running Out of Food in the Last Year: Never true   • Ran Out of Food in the Last Year: Never true   Stress: No Stress Concern Present   • Feeling of Stress : Not at all        Allergies: No Known Allergies    Medications:   Current  Outpatient Medications   Medication Sig Dispense Refill   • cyclobenzaprine (FLEXERIL) 10 mg tablet Take 1 tablet (10 mg total) by mouth 3 (three) times a day as needed for muscle spasms. 90 tablet 0   • escitalopram (LEXAPRO) 10 mg tablet Take 1 tablet (10 mg total) by mouth daily. 90 tablet 1   • famotidine (PEPCID) 20 mg tablet Take 20 mg by mouth 2 (two) times a day.     • FISH OIL-omega-3 fatty acids (FISH OIL) 340-1,000 mg capsule Take 2 capsules by mouth 2 (two) times a day.     • fluticasone propionate (FLONASE) 50 mcg/actuation nasal spray Administer 2 sprays into each nostril daily. 48 g 0   • meloxicam (MOBIC) 7.5 mg tablet Take 1 tablet (7.5 mg total) by mouth daily. 30 tablet 0   • multivit-min/FA/lycopen/lutein (CENTRUM SILVER MEN ORAL) Take by mouth.     • umeclidinium-vilanteroL (ANORO ELLIPTA) 62.5-25 mcg/actuation blister with device Inhale 1 puff daily.       • albuterol HFA (PROAIR HFA) 90 mcg/actuation inhaler Inhale 2 puffs 2 (two) times a day as needed for wheezing or shortness of breath. (Patient taking differently: Inhale 2 puffs as needed for wheezing or shortness of breath.  ) 1 Inhaler 3   • amLODIPine (NORVASC) 10 mg tablet Take 1 tablet (10 mg total) by mouth nightly. 90 tablet 1   • atorvastatin (LIPITOR) 40 mg tablet Take 1 tablet (40 mg total) by mouth daily. 90 tablet 1   • carvediloL (COREG) 6.25 mg tablet Take 1 tablet (6.25 mg total) by mouth 2 (two) times a day with meals. 180 tablet 1   • dabigatran etexilate 150 mg capsu Take 1 capsule (150 mg total) by mouth 2 (two) times a day Indications: treatment to prevent blood clots in chronic atrial fibrillation. 60 capsule 0   • hydrochlorothiazide (HYDRODIURIL) 25 mg tablet Take 1 tablet (25 mg total) by mouth daily. 90 tablet 1   • losartan (COZAAR) 100 mg tablet Take 1 tablet (100 mg total) by mouth daily with dinner. 90 tablet 1     No current facility-administered medications for this visit.       Review of Systems:   A 14 point  review of systems was performed and aside from what is mentioned above is otherwise negative.    General physical examination:  The patient is well appearing male, sitting upright in his chair in no acute distress, he appears his stated age.  His head is atraumatic, normocephalic. His neck is supple without obvious adenopathy. Oral mucosa is moist. His peripheral pulses are symmetric and palpable. Extremities without peripheral edema. His breathing is normal and unlabored.     Vital signs were reviewed and within normal limits.    Neurologic examination:  Mental status:  The patient is alert, attentive, and oriented. Speech is clear and fluent with good repetition, comprehension, and naming.  Remote and recent memory are normal as well as fund of knowledge.    Cranial nerves:  CN II: Visual fields are full to confrontation.  Pupils are equal and briskly reactive to light.   CN III, IV, VI: Extra-occular muscles are intact  CN V: Facial sensation is intact and equal in V1-V3 distributions bilaterally.   CN VII: Face is symmetric with normal eye closure and smile.  CN VIII: Hearing is normal to rubbing fingers  CN IX, X: Palate elevates symmetrically. Phonation is normal.  CN XI: Head turning and shoulder shrug are intact  CN XII: Tongue is midline with normal movements and no atrophy.    Motor:  There is no pronator drift.  Muscle bulk and tone are normal.    Deltoid Biceps Triceps Wrist ext Hand  Finger Spread Hip flexion Knee ext Dorsi-  flexion EHL Plantar Flexion   L 5 5 5 5 5 5 5 5 5 5 5   R 4+ 5 5 5 5 5 5 5 5 5 5     Reflexes:  Reflexes are 2+ and symmetric at the biceps, brachialis, triceps, patellar, and Achilli's. There is no Romo's sign or ankle clonus.    Sensory:   Intact light touch, pinprick, and position sense, throughout all 4 extremities.    Coordination:  There is no dysmetria on finger-to-nose testing.  Romberg is absent.    Gait:  Gait is steady with normal steps.  Heel and toe walking are  normal. Tandem gait is normal.    Skin: incision is well healed. There is no evidence of infection or dehiscence     Data Review:  Cervical X-ray (2/21/2022):   Status post posterior spinal fusion from C1 to C3 with hardware intact.     COMMENT: 3 views of the cervical spine. Posterior spinal fusion hardware from  C1-C3. Hardware appears intact. Redemonstration of dense fracture, grossly  similar to the prior study. Cervical alignment demonstrates minimal  anterolisthesis of C4 on C5. No evidence for acute fracture.     IMPRESSION: Status post C1-C3 posterior cervical fusion.    Assessment and Plan:    In summary, Mealnie Zelaya is a 72 y.o. male who fell on April 2021 and sustained significant head, neck trauma.  He had radiographic evidence of C1, C2 fractures.  Specifically his C2 fracture involves the odontoid process.  There was mild distraction of the odontoid process from the body of C2 with posterior angulation.  CT scan demonstrated osseous healing of C1 however the there was still minimal healing of the odontoid process.  He wished to pursue surgical fixation of his fracture.  This was orchestrated on January 6, 2022 via a C1-C3 posterolateral instrumented fusion, ORIF C2 fracture.    He is doing well and following the expected trajectory of recovery at present. He is slowly acclimating to his usual activities. He was counseled at length regarding natural history of his condition.  He was asked to continue with lifestyle modification, avoidance of excessive bending, twisting, overhead lifting.  I look forward to re-evaluating his progress in 3 months time with a new CT of the cervical spine. In the interim should his symptomatology evolve or worsen, I have asked he return sooner for prompt reevaluation.    Thank you for referring Melanie Zelaya  to my attention.  Please feel free to contact me anytime if I can be of further assistance.    I, Mariaa Davalos PA-C, am scribing for, and in the presence of  Chaparro Morejon MD.    I, Chaparro Morejon MD, personally performed the services described in this documentation as scribed by Mariaa Davalos PA-C  in my presence, and it is accurate and complete.    I spent 30 minutes on this date of service performing the following activities: obtaining history, performing examination, entering orders, documenting, preparing for visit, obtaining / reviewing records, providing counseling and education, independently reviewing study/studies, communicating results and coordinating care.

## 2022-05-11 NOTE — LETTER
May 11, 2022     SANTOS Giles  [address]  [place] [facility] [address]    Patient: Melanie Zelaya  YOB: 1950  Date of Visit: 2022      Dear Dr. Schultz:    Thank you for referring Melanie Zelaya to me for evaluation. Below are my notes for this consultation.    If you have questions, please do not hesitate to call me. I look forward to following your patient along with you.         Sincerely,        Chaparro Morejon MD        CC: Chaparro Schneider MD  2022  2:59 PM  Signed      [facility]  [address], [address]  [place] [address]  Phone: [phone]  Fax: [phone]        22      Re: Melanie Zelaya  : 1950      Chief Complaint:  C2 fracture    History of Present Illness:   Melanie Zelaya is a 72 y.o. right handed male who presents in neurosurgical follow up consultation. He presented to [facility] after a fall from a ladder on 21.  He sustained loss of consciousness.  He was able to arise on his own volition thereafter.  He was triaged to the emergency department by his son. On arrival he was at his neurologic baseline.  CT imaging of the cervical spine disclosed C1, C2 fractures.  He was rigidly immobilized in a hard cervical collar.      CT scan demonstrated osseous healing of C1 however there was minimal healing of the odontoid process.  He wishes to pursue surgical fixation of his fracture.  This was orchestrated on 2022 via a C1-C3 posterior lateral instrumented fusion, ORIF C2 fracture. His hospital course was uncomplicated and patient was ultimately discharged to [facility].    Since last being seen, he feels well. His neck discomfort has decreased significantly. His average level of discomfort is rated a 1-2 out of 10. He continues to wear the hard collar when ambulating. He is without additional features of radicular pain,  paresthesias, new changes in strength, sensation, bowel, bladder, gait function.  He ambulates without the need of an assistive device.    Medical History:  has a past medical history of Anxiety, Atrial fibrillation (CMS/HCC), Breast cancer (CMS/HCC), C1 cervical fracture (CMS/HCC), Colon polyp, COPD (chronic obstructive pulmonary disease) (CMS/HCC), Coronary artery calcification seen on CT scan, COVID-19 vaccine series completed, Depression, Diverticulosis, Diverticulosis, Fracture of pelvis (CMS/HCC) (), GERD (gastroesophageal reflux disease), Hearing loss, History of colon polyps, History of COVID-19 (), Hyperlipidemia, Hypertension, Left atrial enlargement, Lung cancer (CMS/HCC), Lung cancer (CMS/HCC), Pneumonia (), Seasonal allergies, and Wrist fracture.    Surgical History:  has a past surgical history that includes Lung lobectomy (Left, ); Shoulder surgery (Right, ); Rotator cuff repair (Right, ); Tonsillectomy; Nasal polyp surgery; Hand surgery (Bilateral); and Colonoscopy.    Family History: family history includes Cancer in his nephew; Cirrhosis in his biological father; Colon cancer in his biological brother; Diabetes in his biological mother; Lung cancer in his biological brother; No Known Problems in his maternal grandfather, maternal grandmother, paternal grandfather, and paternal grandmother; Pancreatic cancer in his biological sister; Prostate cancer in his biological brother.  Family history non-contributory to neurological condition.    Social History:   Social History     Socioeconomic History   • Marital status: Legally      Spouse name: None   • Number of children: 3   • Years of education: None   • Highest education level: None   Tobacco Use   • Smoking status: Former Smoker     Packs/day: 2.00     Types: Cigarettes     Quit date: 2010     Years since quittin.0   • Smokeless tobacco: Never Used   Vaping Use   • Vaping Use: Never used   Substance and  Sexual Activity   • Alcohol use: No     Comment: RARE   • Drug use: No   • Sexual activity: Yes     Partners: Female     Birth control/protection: None   Social History Narrative    Retired    Lives alone in a 3 story home     Social Determinants of Health     Food Insecurity: No Food Insecurity   • Worried About Running Out of Food in the Last Year: Never true   • Ran Out of Food in the Last Year: Never true   Stress: No Stress Concern Present   • Feeling of Stress : Not at all        Allergies: No Known Allergies    Medications:   Current Outpatient Medications   Medication Sig Dispense Refill   • cyclobenzaprine (FLEXERIL) 10 mg tablet Take 1 tablet (10 mg total) by mouth 3 (three) times a day as needed for muscle spasms. 90 tablet 0   • escitalopram (LEXAPRO) 10 mg tablet Take 1 tablet (10 mg total) by mouth daily. 90 tablet 1   • famotidine (PEPCID) 20 mg tablet Take 20 mg by mouth 2 (two) times a day.     • FISH OIL-omega-3 fatty acids (FISH OIL) 340-1,000 mg capsule Take 2 capsules by mouth 2 (two) times a day.     • fluticasone propionate (FLONASE) 50 mcg/actuation nasal spray Administer 2 sprays into each nostril daily. 48 g 0   • meloxicam (MOBIC) 7.5 mg tablet Take 1 tablet (7.5 mg total) by mouth daily. 30 tablet 0   • multivit-min/FA/lycopen/lutein (CENTRUM SILVER MEN ORAL) Take by mouth.     • umeclidinium-vilanteroL (ANORO ELLIPTA) 62.5-25 mcg/actuation blister with device Inhale 1 puff daily.       • albuterol HFA (PROAIR HFA) 90 mcg/actuation inhaler Inhale 2 puffs 2 (two) times a day as needed for wheezing or shortness of breath. (Patient taking differently: Inhale 2 puffs as needed for wheezing or shortness of breath.  ) 1 Inhaler 3   • amLODIPine (NORVASC) 10 mg tablet Take 1 tablet (10 mg total) by mouth nightly. 90 tablet 1   • atorvastatin (LIPITOR) 40 mg tablet Take 1 tablet (40 mg total) by mouth daily. 90 tablet 1   • carvediloL (COREG) 6.25 mg tablet Take 1 tablet (6.25 mg total) by mouth  2 (two) times a day with meals. 180 tablet 1   • dabigatran etexilate 150 mg capsu Take 1 capsule (150 mg total) by mouth 2 (two) times a day Indications: treatment to prevent blood clots in chronic atrial fibrillation. 60 capsule 0   • hydrochlorothiazide (HYDRODIURIL) 25 mg tablet Take 1 tablet (25 mg total) by mouth daily. 90 tablet 1   • losartan (COZAAR) 100 mg tablet Take 1 tablet (100 mg total) by mouth daily with dinner. 90 tablet 1     No current facility-administered medications for this visit.       Review of Systems:   A 14 point review of systems was performed and aside from what is mentioned above is otherwise negative.    General physical examination:  The patient is well appearing male, sitting upright in his chair in no acute distress, he appears his stated age.  His head is atraumatic, normocephalic. His neck is supple without obvious adenopathy. Oral mucosa is moist. His peripheral pulses are symmetric and palpable. Extremities without peripheral edema. His breathing is normal and unlabored.     Vital signs were reviewed and within normal limits.    Neurologic examination:  Mental status:  The patient is alert, attentive, and oriented. Speech is clear and fluent with good repetition, comprehension, and naming.  Remote and recent memory are normal as well as fund of knowledge.    Cranial nerves:  CN II: Visual fields are full to confrontation.  Pupils are equal and briskly reactive to light.   CN III, IV, VI: Extra-occular muscles are intact  CN V: Facial sensation is intact and equal in V1-V3 distributions bilaterally.   CN VII: Face is symmetric with normal eye closure and smile.  CN VIII: Hearing is normal to rubbing fingers  CN IX, X: Palate elevates symmetrically. Phonation is normal.  CN XI: Head turning and shoulder shrug are intact  CN XII: Tongue is midline with normal movements and no atrophy.    Motor:  There is no pronator drift.  Muscle bulk and tone are normal.    Deltoid Biceps  Triceps Wrist ext Hand  Finger Spread Hip flexion Knee ext Dorsi-  flexion EHL Plantar Flexion   L 5 5 5 5 5 5 5 5 5 5 5   R 4+ 5 5 5 5 5 5 5 5 5 5     Reflexes:  Reflexes are 2+ and symmetric at the biceps, brachialis, triceps, patellar, and Achilli's. There is no Romo's sign or ankle clonus.    Sensory:   Intact light touch, pinprick, and position sense, throughout all 4 extremities.    Coordination:  There is no dysmetria on finger-to-nose testing.  Romberg is absent.    Gait:  Gait is steady with normal steps.  Heel and toe walking are normal. Tandem gait is normal.    Skin: incision is well healed. There is no evidence of infection or dehiscence     Data Review:  Cervical X-ray (2/21/2022):   Status post posterior spinal fusion from C1 to C3 with hardware intact.     COMMENT: 3 views of the cervical spine. Posterior spinal fusion hardware from  C1-C3. Hardware appears intact. Redemonstration of dense fracture, grossly  similar to the prior study. Cervical alignment demonstrates minimal  anterolisthesis of C4 on C5. No evidence for acute fracture.     IMPRESSION: Status post C1-C3 posterior cervical fusion.    Assessment and Plan:    In summary, Melanie Zelaya is a 72 y.o. male who fell on April 2021 and sustained significant head, neck trauma.  He had radiographic evidence of C1, C2 fractures.  Specifically his C2 fracture involves the odontoid process.  There was mild distraction of the odontoid process from the body of C2 with posterior angulation.  CT scan demonstrated osseous healing of C1 however the there was still minimal healing of the odontoid process.  He wished to pursue surgical fixation of his fracture.  This was orchestrated on January 6, 2022 via a C1-C3 posterolateral instrumented fusion, ORIF C2 fracture.    He is doing well and following the expected trajectory of recovery at present. He is slowly acclimating to his usual activities. He was counseled at length regarding natural history  of his condition.  He was asked to continue with lifestyle modification, avoidance of excessive bending, twisting, overhead lifting.  I look forward to re-evaluating his progress in 3 months time with a new CT of the cervical spine. In the interim should his symptomatology evolve or worsen, I have asked he return sooner for prompt reevaluation.    Thank you for referring Melanie Zelaya  to my attention.  Please feel free to contact me anytime if I can be of further assistance.    I, Mariaa Davalos PA-C, am scribing for, and in the presence of Chaparro Morejon MD.    I, Chaparro Morejon MD, personally performed the services described in this documentation as scribed by Mariaa Davalos PA-C  in my presence, and it is accurate and complete.    I spent 30 minutes on this date of service performing the following activities: obtaining history, performing examination, entering orders, documenting, preparing for visit, obtaining / reviewing records, providing counseling and education, independently reviewing study/studies, communicating results and coordinating care.

## 2022-07-28 ENCOUNTER — TELEPHONE (OUTPATIENT)
Dept: NEUROLOGY | Facility: CLINIC | Age: 72
End: 2022-07-28

## 2022-07-28 ENCOUNTER — TELEPHONE (OUTPATIENT)
Dept: NEUROLOGY | Facility: CLINIC | Age: 72
End: 2022-07-28
Payer: COMMERCIAL

## 2022-07-28 NOTE — TELEPHONE ENCOUNTER
"PT CLD@9:57AM TODAY & WANTED TO MAKE SURE THAT OUR OFFICE CANCELLED THE 8/3/22 APPT. I ADV PT THAT OUR OFFICE RECD PT'S MESSAGE ON THE VOICEMAIL ROLLOVER ON 7/26/22 WHERE PT S/\"TO CANCEL THE 8/3/22 APPT\". APPT AS CANCELLED  "

## 2022-07-28 NOTE — TELEPHONE ENCOUNTER
Received a VM from patient stating that he has been having trouble getting his CT authorized for [facility]

## 2022-08-01 ENCOUNTER — TELEPHONE (OUTPATIENT)
Dept: PRIMARY CARE | Facility: CLINIC | Age: 72
End: 2022-08-01
Payer: COMMERCIAL

## 2022-08-01 RX ORDER — AMLODIPINE BESYLATE 10 MG/1
10 TABLET ORAL NIGHTLY
Qty: 90 TABLET | Refills: 0 | Status: SHIPPED | OUTPATIENT
Start: 2022-08-01 | End: 2022-09-16

## 2022-08-01 NOTE — TELEPHONE ENCOUNTER
amLODIPine (NORVASC) 10 mg tablet    [facility] [address] [place] [address] - [address]   [address] [facility] [address]   Phone:  [phone]  Fax:  [phone]

## 2022-08-30 RX ORDER — FLUTICASONE PROPIONATE 50 MCG
2 SPRAY, SUSPENSION (ML) NASAL DAILY
Qty: 48 G | Refills: 0 | Status: SHIPPED | OUTPATIENT
Start: 2022-08-30 | End: 2023-01-17

## 2022-08-30 NOTE — TELEPHONE ENCOUNTER
Medication Request   Patient PCP: Keisha Schultz CRNP  Next Office Visit: 10/19/2022  Has this provider prescribed this medication before?: yes  Medication Name: fluticasone propionate (FLONASE)   Medication Dose: 50 mcg/actuation nasal spray  Medication Frequency:Administer 2 sprays into each nostril daily.  Preferred Pharmacy:   [address] [address]  Phone: [phone] Fax: [phone]      Please allow 2 business days for your provider to send your medication request or to reach out to discuss.

## 2022-08-31 ENCOUNTER — TELEPHONE (OUTPATIENT)
Dept: NEUROLOGY | Facility: CLINIC | Age: 72
End: 2022-08-31

## 2022-08-31 DIAGNOSIS — Z98.1 S/P CERVICAL SPINAL FUSION: Primary | ICD-10-CM

## 2022-08-31 RX ORDER — CYCLOBENZAPRINE HCL 10 MG
10 TABLET ORAL 3 TIMES DAILY PRN
Qty: 90 TABLET | Refills: 1 | Status: SHIPPED | OUTPATIENT
Start: 2022-08-31 | End: 2023-07-14 | Stop reason: SDUPTHER

## 2022-08-31 NOTE — TELEPHONE ENCOUNTER
Patient called in reference to a script needed for a Ct of the cervical spine without contrast order number is [id number] no longer seen in to be ordered does that patient need to have this done? [place] [address].

## 2022-08-31 NOTE — TELEPHONE ENCOUNTER
Flexeril refilled. Spoke to patient. He does need to have CT before next appointment. I told him radiology will reach out after preauthorization completed.

## 2022-08-31 NOTE — TELEPHONE ENCOUNTER
Patient called for a medication refill 10 mg capsule for cyclobenz aprine(Flexeril) states he is going on vacation on 9/5/2022.

## 2022-09-19 RX ORDER — HYDROCHLOROTHIAZIDE 25 MG/1
TABLET ORAL
Qty: 90 TABLET | Refills: 0 | Status: SHIPPED | OUTPATIENT
Start: 2022-09-19 | End: 2023-01-03

## 2022-09-19 RX ORDER — LOSARTAN POTASSIUM 100 MG/1
TABLET ORAL
Qty: 90 TABLET | Refills: 0 | Status: SHIPPED | OUTPATIENT
Start: 2022-09-19 | End: 2022-12-16

## 2022-10-07 ENCOUNTER — HOSPITAL ENCOUNTER (OUTPATIENT)
Dept: RADIOLOGY | Facility: HOSPITAL | Age: 72
Discharge: HOME | End: 2022-10-07
Attending: THORACIC SURGERY (CARDIOTHORACIC VASCULAR SURGERY)
Payer: COMMERCIAL

## 2022-10-07 ENCOUNTER — HOSPITAL ENCOUNTER (OUTPATIENT)
Dept: RADIOLOGY | Facility: HOSPITAL | Age: 72
Discharge: HOME | End: 2022-10-07
Attending: NEUROLOGICAL SURGERY
Payer: COMMERCIAL

## 2022-10-07 DIAGNOSIS — C34.32 PRIMARY MALIGNANT NEOPLASM OF LEFT LOWER LOBE OF LUNG (CMS/HCC): Chronic | ICD-10-CM

## 2022-10-07 DIAGNOSIS — Z98.1 S/P CERVICAL SPINAL FUSION: ICD-10-CM

## 2022-10-07 PROCEDURE — 71250 CT THORAX DX C-: CPT | Mod: ME

## 2022-10-07 PROCEDURE — G1004 CDSM NDSC: HCPCS

## 2022-10-19 ENCOUNTER — APPOINTMENT (OUTPATIENT)
Dept: LAB | Facility: CLINIC | Age: 72
End: 2022-10-19
Attending: INTERNAL MEDICINE
Payer: COMMERCIAL

## 2022-10-19 ENCOUNTER — OFFICE VISIT (OUTPATIENT)
Dept: THORACIC SURGERY | Facility: CLINIC | Age: 72
End: 2022-10-19
Payer: COMMERCIAL

## 2022-10-19 ENCOUNTER — TRANSCRIBE ORDERS (OUTPATIENT)
Dept: REGISTRATION | Facility: CLINIC | Age: 72
End: 2022-10-19

## 2022-10-19 ENCOUNTER — OFFICE VISIT (OUTPATIENT)
Dept: PRIMARY CARE | Facility: CLINIC | Age: 72
End: 2022-10-19
Payer: COMMERCIAL

## 2022-10-19 VITALS
HEART RATE: 68 BPM | HEIGHT: 66 IN | BODY MASS INDEX: 30.12 KG/M2 | RESPIRATION RATE: 16 BRPM | WEIGHT: 187.4 LBS | DIASTOLIC BLOOD PRESSURE: 74 MMHG | SYSTOLIC BLOOD PRESSURE: 122 MMHG | OXYGEN SATURATION: 96 % | TEMPERATURE: 99 F

## 2022-10-19 VITALS
BODY MASS INDEX: 29.99 KG/M2 | HEIGHT: 66 IN | HEART RATE: 67 BPM | WEIGHT: 186.6 LBS | DIASTOLIC BLOOD PRESSURE: 66 MMHG | SYSTOLIC BLOOD PRESSURE: 132 MMHG | TEMPERATURE: 96.3 F | OXYGEN SATURATION: 95 %

## 2022-10-19 DIAGNOSIS — C34.32 PRIMARY MALIGNANT NEOPLASM OF LEFT LOWER LOBE OF LUNG (CMS/HCC): Chronic | ICD-10-CM

## 2022-10-19 DIAGNOSIS — K21.9 GASTROESOPHAGEAL REFLUX DISEASE WITHOUT ESOPHAGITIS: ICD-10-CM

## 2022-10-19 DIAGNOSIS — F41.9 ANXIETY: ICD-10-CM

## 2022-10-19 DIAGNOSIS — J43.8 OTHER EMPHYSEMA (CMS/HCC): ICD-10-CM

## 2022-10-19 DIAGNOSIS — I34.0 NONRHEUMATIC MITRAL (VALVE) INSUFFICIENCY: ICD-10-CM

## 2022-10-19 DIAGNOSIS — I11.0 HYPERTENSIVE HEART DISEASE WITH HEART FAILURE (CMS/HCC): ICD-10-CM

## 2022-10-19 DIAGNOSIS — C34.32 PRIMARY MALIGNANT NEOPLASM OF LEFT LOWER LOBE OF LUNG (CMS/HCC): Primary | Chronic | ICD-10-CM

## 2022-10-19 DIAGNOSIS — I48.21 PERMANENT ATRIAL FIBRILLATION (CMS/HCC): ICD-10-CM

## 2022-10-19 DIAGNOSIS — R73.01 IMPAIRED FASTING GLUCOSE: ICD-10-CM

## 2022-10-19 DIAGNOSIS — I50.32 CHRONIC DIASTOLIC HEART FAILURE (CMS/HCC): Primary | Chronic | ICD-10-CM

## 2022-10-19 DIAGNOSIS — I48.0 PAROXYSMAL ATRIAL FIBRILLATION (CMS/HCC): ICD-10-CM

## 2022-10-19 DIAGNOSIS — J45.40 MODERATE PERSISTENT ASTHMA WITHOUT COMPLICATION: ICD-10-CM

## 2022-10-19 DIAGNOSIS — J43.9 PULMONARY EMPHYSEMA, UNSPECIFIED EMPHYSEMA TYPE (CMS/HCC): ICD-10-CM

## 2022-10-19 DIAGNOSIS — Z98.1 S/P CERVICAL SPINAL FUSION: ICD-10-CM

## 2022-10-19 DIAGNOSIS — E78.00 PURE HYPERCHOLESTEROLEMIA, UNSPECIFIED: ICD-10-CM

## 2022-10-19 PROCEDURE — 90694 VACC AIIV4 NO PRSRV 0.5ML IM: CPT | Performed by: NURSE PRACTITIONER

## 2022-10-19 PROCEDURE — 30099997 SPECIMEN PROCESSING

## 2022-10-19 PROCEDURE — 3008F BODY MASS INDEX DOCD: CPT | Performed by: THORACIC SURGERY (CARDIOTHORACIC VASCULAR SURGERY)

## 2022-10-19 PROCEDURE — 99214 OFFICE O/P EST MOD 30 MIN: CPT | Mod: 25 | Performed by: NURSE PRACTITIONER

## 2022-10-19 PROCEDURE — 99213 OFFICE O/P EST LOW 20 MIN: CPT | Performed by: THORACIC SURGERY (CARDIOTHORACIC VASCULAR SURGERY)

## 2022-10-19 PROCEDURE — 3008F BODY MASS INDEX DOCD: CPT | Performed by: NURSE PRACTITIONER

## 2022-10-19 PROCEDURE — G0008 ADMIN INFLUENZA VIRUS VAC: HCPCS | Performed by: NURSE PRACTITIONER

## 2022-10-19 RX ORDER — COVID-19 MOLECULAR TEST ASSAY
KIT MISCELLANEOUS
COMMUNITY
Start: 2022-07-13 | End: 2022-10-19 | Stop reason: ALTCHOICE

## 2022-10-19 ASSESSMENT — ENCOUNTER SYMPTOMS
ADENOPATHY: 0
BRUISES/BLEEDS EASILY: 0
HEADACHES: 0
COUGH: 0
FATIGUE: 0
CHILLS: 0
SHORTNESS OF BREATH: 0
FEVER: 0
ABDOMINAL PAIN: 0
DYSURIA: 0
DIZZINESS: 0
PALPITATIONS: 0
TROUBLE SWALLOWING: 0
LIGHT-HEADEDNESS: 0

## 2022-10-19 NOTE — PROGRESS NOTES
Patient ID: Melanie Zelaya                              : 1950  MRN: [id number]                                            Visit Date: 10/19/2022  Encounter Provider: Blaise Guallpa  Referring Provider: No ref. provider found    Subjective      Patient ID: Melanie Zelaya is a 72 y.o. male.  Chief Complaint: Follow-up      Melanie Zelaya is a 72 y.o. 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  Lymphovascular invasion was indeterminate.  He had a neck injury we saw him last year and unfortunately needed a 3 level fusion which has been a bit of a tough recovery.  He has no fever, chills, sweats, cough, hemoptysis, dyspnea, new persistent bony or neurological complaints, significant weight loss over the last 3 months..     Past Medical History:  has a past medical history of Anxiety, Atrial fibrillation (CMS/HCC), Breast cancer (CMS/HCC), C1 cervical fracture (CMS/HCC), Colon polyp, COPD (chronic obstructive pulmonary disease) (CMS/HCC), Coronary artery calcification seen on CT scan, COVID-19 vaccine series completed, Depression, Diverticulosis, Diverticulosis, Fracture of pelvis (CMS/HCC) (), GERD (gastroesophageal reflux disease), Hearing loss, History of colon polyps, History of COVID-19 (), Hyperlipidemia, Hypertension, Left atrial enlargement, Lung cancer (CMS/HCC), Lung cancer (CMS/HCC), Pneumonia (), Seasonal allergies, and Wrist fracture.  Past Surgical History:  has a past surgical history that includes Lung lobectomy (Left, ); Shoulder surgery (Right, ); Rotator cuff repair (Right, ); Tonsillectomy; Nasal polyp surgery; Hand surgery (Bilateral); and Colonoscopy.  Social History:   Social History     Tobacco Use    Smoking status: Former     Packs/day: 2.00     Types: Cigarettes      Quit date: 2010     Years since quittin.5    Smokeless tobacco: Never   Vaping Use    Vaping Use: Never used   Substance Use Topics    Alcohol use: No     Comment: RARE    Drug use: No     Family History: family history includes Cancer in his nephew; Cirrhosis in his biological father; Colon cancer in his biological brother; Diabetes in his biological mother; Lung cancer in his biological brother; No Known Problems in his maternal grandfather, maternal grandmother, paternal grandfather, and paternal grandmother; Pancreatic cancer in his biological sister; Prostate cancer in his biological brother.  Medications:   Current Outpatient Medications:     albuterol HFA (PROAIR HFA) 90 mcg/actuation inhaler, Inhale 2 puffs 2 (two) times a day as needed for wheezing or shortness of breath. (Patient taking differently: Inhale 2 puffs as needed for wheezing or shortness of breath.), Disp: 1 Inhaler, Rfl: 3    amLODIPine (NORVASC) 10 mg tablet, Take 1 tablet by mouth nightly, Disp: 90 tablet, Rfl: 0    atorvastatin (LIPITOR) 40 mg tablet, Take 1 tablet by mouth once daily, Disp: 90 tablet, Rfl: 0    carvediloL (COREG) 6.25 mg tablet, TAKE 1 TABLET BY MOUTH TWICE DAILY WITH MEALS, Disp: 180 tablet, Rfl: 0    cyclobenzaprine (FLEXERIL) 10 mg tablet, Take 1 tablet (10 mg total) by mouth 3 (three) times a day as needed for muscle spasms., Disp: 90 tablet, Rfl: 1    dabigatran etexilate 150 mg capsu, Take 1 capsule (150 mg total) by mouth 2 (two) times a day Indications: treatment to prevent blood clots in chronic atrial fibrillation., Disp: 60 capsule, Rfl: 0    famotidine (PEPCID) 20 mg tablet, Take 20 mg by mouth 2 (two) times a day., Disp: , Rfl:     FISH OIL-omega-3 fatty acids (FISH OIL) 340-1,000 mg capsule, Take 2 capsules by mouth 2 (two) times a day., Disp: , Rfl:     fluticasone propionate (FLONASE) 50 mcg/actuation nasal spray, Administer 2 sprays into each nostril daily., Disp: 48 g, Rfl: 0     "hydrochlorothiazide (HYDRODIURIL) 25 mg tablet, Take 1 tablet by mouth once daily, Disp: 90 tablet, Rfl: 0    losartan (COZAAR) 100 mg tablet, TAKE 1 TABLET BY MOUTH ONCE DAILY WITH SUPPER, Disp: 90 tablet, Rfl: 0    meloxicam (MOBIC) 7.5 mg tablet, Take 1 tablet (7.5 mg total) by mouth daily., Disp: 30 tablet, Rfl: 0    multivitamin tablet, Take by mouth daily., Disp: , Rfl:     umeclidinium-vilanteroL (ANORO ELLIPTA) 62.5-25 mcg/actuation blister with device, Inhale 1 puff daily.  , Disp: , Rfl:     escitalopram (LEXAPRO) 10 mg tablet, Take 1 tablet (10 mg total) by mouth daily. (Patient not taking: Reported on 10/19/2022), Disp: 90 tablet, Rfl: 1  Allergies: has No Known Allergies.   E-cigarette/Vaping     Questions Responses    E-cigarette/Vaping Use Never User           The following have been reviewed and updated as appropriate in this visit:           Review of Systems   All other systems reviewed and are negative.      Objective   Vitals:   Visit Vitals  /66   Pulse 67   Temp (!) 35.7 °C (96.3 °F)   Ht 1.676 m (5' 6\")   Wt 84.6 kg (186 lb 9.6 oz)   SpO2 95%   BMI 30.12 kg/m²       Physical Exam  Vitals reviewed.   Constitutional:       Appearance: He is well-developed and well-nourished.   HENT:      Head: Normocephalic.   Eyes:      Extraocular Movements: EOM normal.      Pupils: Pupils are equal, round, and reactive to light.   Cardiovascular:      Rate and Rhythm: Normal rate and regular rhythm.   Pulmonary:      Effort: Pulmonary effort is normal.      Breath sounds: Normal breath sounds.   Musculoskeletal:         General: Normal range of motion.   Skin:     General: Skin is warm and dry.   Neurological:      Mental Status: He is alert.   Psychiatric:         Mood and Affect: Mood and affect normal.         Assessment/Plan   Independent review of all imaging was done by myself as well as review of the radiologists readings and comparison to prior films.  CAT scan of chest on October 7 shows " no evidence of any recurrence.  Minuscule nodules.  We will continue surveillance and see him back in a year with a CAT scan of chest without contrast using low-dose protocol.       Problem List Items Addressed This Visit        Respiratory    Primary malignant neoplasm of left lower lobe of lung (CMS/HCC) - Primary (Chronic)    Relevant Orders    CT CHEST WITHOUT IV CONTRAST         Blaise Guallpa MD

## 2022-10-19 NOTE — LETTER
2022     SANTOS Giles  [address]  [facility] [address]    Patient: Melanie Zelaya  YOB: 1950  Date of Visit: 10/19/2022      Dear Dr. Schultz:    Thank you for referring Melanie Zelaya to me for evaluation. Below are my notes for this consultation.    If you have questions, please do not hesitate to call me. I look forward to following your patient along with you.         Sincerely,        Blaise Guallpa MD        CC: MD Ramu Perera Michael J, MD  10/19/2022  1:20 PM  Sign when Signing Visit  Patient ID: Melanie Zelaya                              : 1950  MRN: [id number]                                            Visit Date: 10/19/2022  Encounter Provider: Blaise Guallpa  Referring Provider: No ref. provider found    Subjective       Patient ID: Melanie Zelaya is a 72 y.o. male.  Chief Complaint: Follow-up      Melanie Zelaya is a 72 y.o. 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  Lymphovascular invasion was indeterminate.  He had a neck injury we saw him last year and unfortunately needed a 3 level fusion which has been a bit of a tough recovery.  He has no fever, chills, sweats, cough, hemoptysis, dyspnea, new persistent bony or neurological complaints, significant weight loss over the last 3 months..     Past Medical History:  has a past medical history of Anxiety, Atrial fibrillation (CMS/HCC), Breast cancer (CMS/HCC), C1 cervical fracture (CMS/HCC), Colon polyp, COPD (chronic obstructive pulmonary disease) (CMS/HCC), Coronary artery calcification seen on CT scan, COVID-19 vaccine series completed, Depression, Diverticulosis, Diverticulosis, Fracture of pelvis (CMS/HCC) (), GERD (gastroesophageal reflux disease),  Hearing loss, History of colon polyps, History of COVID-19 (), Hyperlipidemia, Hypertension, Left atrial enlargement, Lung cancer (CMS/HCC), Lung cancer (CMS/HCC), Pneumonia (), Seasonal allergies, and Wrist fracture.  Past Surgical History:  has a past surgical history that includes Lung lobectomy (Left, 2016); Shoulder surgery (Right, ); Rotator cuff repair (Right, ); Tonsillectomy; Nasal polyp surgery; Hand surgery (Bilateral); and Colonoscopy.  Social History:   Social History     Tobacco Use    Smoking status: Former     Packs/day: 2.00     Types: Cigarettes     Quit date: 2010     Years since quittin.5    Smokeless tobacco: Never   Vaping Use    Vaping Use: Never used   Substance Use Topics    Alcohol use: No     Comment: RARE    Drug use: No     Family History: family history includes Cancer in his nephew; Cirrhosis in his biological father; Colon cancer in his biological brother; Diabetes in his biological mother; Lung cancer in his biological brother; No Known Problems in his maternal grandfather, maternal grandmother, paternal grandfather, and paternal grandmother; Pancreatic cancer in his biological sister; Prostate cancer in his biological brother.  Medications:   Current Outpatient Medications:     albuterol HFA (PROAIR HFA) 90 mcg/actuation inhaler, Inhale 2 puffs 2 (two) times a day as needed for wheezing or shortness of breath. (Patient taking differently: Inhale 2 puffs as needed for wheezing or shortness of breath.), Disp: 1 Inhaler, Rfl: 3    amLODIPine (NORVASC) 10 mg tablet, Take 1 tablet by mouth nightly, Disp: 90 tablet, Rfl: 0    atorvastatin (LIPITOR) 40 mg tablet, Take 1 tablet by mouth once daily, Disp: 90 tablet, Rfl: 0    carvediloL (COREG) 6.25 mg tablet, TAKE 1 TABLET BY MOUTH TWICE DAILY WITH MEALS, Disp: 180 tablet, Rfl: 0    cyclobenzaprine (FLEXERIL) 10 mg tablet, Take 1 tablet (10 mg total) by mouth 3 (three) times a day as needed for muscle  "spasms., Disp: 90 tablet, Rfl: 1    dabigatran etexilate 150 mg capsu, Take 1 capsule (150 mg total) by mouth 2 (two) times a day Indications: treatment to prevent blood clots in chronic atrial fibrillation., Disp: 60 capsule, Rfl: 0    famotidine (PEPCID) 20 mg tablet, Take 20 mg by mouth 2 (two) times a day., Disp: , Rfl:     FISH OIL-omega-3 fatty acids (FISH OIL) 340-1,000 mg capsule, Take 2 capsules by mouth 2 (two) times a day., Disp: , Rfl:     fluticasone propionate (FLONASE) 50 mcg/actuation nasal spray, Administer 2 sprays into each nostril daily., Disp: 48 g, Rfl: 0    hydrochlorothiazide (HYDRODIURIL) 25 mg tablet, Take 1 tablet by mouth once daily, Disp: 90 tablet, Rfl: 0    losartan (COZAAR) 100 mg tablet, TAKE 1 TABLET BY MOUTH ONCE DAILY WITH SUPPER, Disp: 90 tablet, Rfl: 0    meloxicam (MOBIC) 7.5 mg tablet, Take 1 tablet (7.5 mg total) by mouth daily., Disp: 30 tablet, Rfl: 0    multivitamin tablet, Take by mouth daily., Disp: , Rfl:     umeclidinium-vilanteroL (ANORO ELLIPTA) 62.5-25 mcg/actuation blister with device, Inhale 1 puff daily.  , Disp: , Rfl:     escitalopram (LEXAPRO) 10 mg tablet, Take 1 tablet (10 mg total) by mouth daily. (Patient not taking: Reported on 10/19/2022), Disp: 90 tablet, Rfl: 1  Allergies: has No Known Allergies.   E-cigarette/Vaping     Questions Responses    E-cigarette/Vaping Use Never User          The following have been reviewed and updated as appropriate in this visit:           Review of Systems   All other systems reviewed and are negative.      Objective    Vitals:   Visit Vitals  /66   Pulse 67   Temp (!) 35.7 °C (96.3 °F)   Ht 1.676 m (5' 6\")   Wt 84.6 kg (186 lb 9.6 oz)   SpO2 95%   BMI 30.12 kg/m²       Physical Exam  Vitals reviewed.   Constitutional:       Appearance: He is well-developed and well-nourished.   HENT:      Head: Normocephalic.   Eyes:      Extraocular Movements: EOM normal.      Pupils: Pupils are equal, round, and " reactive to light.   Cardiovascular:      Rate and Rhythm: Normal rate and regular rhythm.   Pulmonary:      Effort: Pulmonary effort is normal.      Breath sounds: Normal breath sounds.   Musculoskeletal:         General: Normal range of motion.   Skin:     General: Skin is warm and dry.   Neurological:      Mental Status: He is alert.   Psychiatric:         Mood and Affect: Mood and affect normal.         Assessment/Plan    Independent review of all imaging was done by myself as well as review of the radiologists readings and comparison to prior films.  CAT scan of chest on October 7 shows no evidence of any recurrence.  Minuscule nodules.  We will continue surveillance and see him back in a year with a CAT scan of chest without contrast using low-dose protocol.      Problem List Items Addressed This Visit        Respiratory    Primary malignant neoplasm of left lower lobe of lung (CMS/HCC) - Primary (Chronic)    Relevant Orders    CT CHEST WITHOUT IV CONTRAST         Blaise Guallpa MD

## 2022-10-19 NOTE — ASSESSMENT & PLAN NOTE
Followed by Dr. Morejon; has upcoming appointment next week.  Uses flexeril as needed and still has some limited ROM.
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
## 2022-10-20 ENCOUNTER — TELEPHONE (OUTPATIENT)
Dept: PRIMARY CARE | Facility: CLINIC | Age: 72
End: 2022-10-20

## 2022-10-20 NOTE — TELEPHONE ENCOUNTER
Patient saw Keisha this morning for 6 month follow up, forgot to ask about whether or not he needed pneumonia shot

## 2022-10-25 ENCOUNTER — TELEPHONE (OUTPATIENT)
Dept: NEUROLOGY | Facility: CLINIC | Age: 72
End: 2022-10-25

## 2022-10-25 RX ORDER — UMECLIDINIUM BROMIDE AND VILANTEROL TRIFENATATE 62.5; 25 UG/1; UG/1
1 POWDER RESPIRATORY (INHALATION) DAILY
Qty: 180 EACH | Refills: 0 | Status: SHIPPED | OUTPATIENT
Start: 2022-10-25 | End: 2023-01-16

## 2022-10-25 NOTE — TELEPHONE ENCOUNTER
Patient is requesting refill for Anoro Ellipta inhaler. Stated his PCP will not refill.    He is asking for a call back asap

## 2022-10-25 NOTE — TELEPHONE ENCOUNTER
Please let patient know that this comes from his pulmonary doctor, CECY LACEY. He needs to call their office for refill. Thank you

## 2022-10-25 NOTE — TELEPHONE ENCOUNTER
Talked to patient. He has been trying to get a hold of Dr. Vizcaino's office and has not been able to get his Anoro refilled. Patient requesting if Keisha can fill medication because he only has 2 puffs left.

## 2022-10-31 ENCOUNTER — OFFICE VISIT (OUTPATIENT)
Dept: NEUROSURGERY | Facility: CLINIC | Age: 72
End: 2022-10-31
Payer: COMMERCIAL

## 2022-10-31 DIAGNOSIS — S12.112K CLOSED NONDISPLACED ODONTOID FRACTURE WITH TYPE II MORPHOLOGY AND NONUNION, SUBSEQUENT ENCOUNTER: ICD-10-CM

## 2022-10-31 DIAGNOSIS — Z98.1 S/P CERVICAL SPINAL FUSION: Primary | ICD-10-CM

## 2022-10-31 PROCEDURE — 99214 OFFICE O/P EST MOD 30 MIN: CPT | Performed by: NEUROLOGICAL SURGERY

## 2022-10-31 RX ORDER — TRAMADOL HYDROCHLORIDE 50 MG/1
25 TABLET ORAL EVERY 6 HOURS PRN
Qty: 20 TABLET | Refills: 0 | Status: SHIPPED | OUTPATIENT
Start: 2022-10-31 | End: 2023-05-27 | Stop reason: ALTCHOICE

## 2022-10-31 NOTE — PROGRESS NOTES
[facility] Neurosurgery  [facility]  Medical Office [address], [address]  [place] [address]  Phone: [phone]  Fax: [phone]        10/31/22      Re: Melanie Zelaya  : 1950      Chief Complaint:  C2 fracture    History of Present Illness:   Melanie Zelaya is a 72 y.o. right handed male who presents in neurosurgical follow up consultation. He presented to [facility] after a fall from a ladder on 21.  He sustained loss of consciousness.  He was able to arise on his own volition thereafter.  He was triaged to the emergency department by his son. On arrival he was at his neurologic baseline. CT imaging of the cervical spine disclosed C1, C2 fractures. He was rigidly immobilized in a hard cervical collar.      CT scan demonstrated osseous healing of C1 however there was minimal healing of the odontoid process.  He wishes to pursue surgical fixation of his fracture. This was orchestrated on 2022 via a C1-C3 posterior lateral instrumented fusion, ORIF C2 fracture. His hospital course was uncomplicated and patient was ultimately discharged to [facility].    Since last being seen, he feels well. His neck discomfort has increased mildly. The pain worsens with increased neck mobility, especially when picking up objects.  There is no extension into his upper extremities.  His average level of discomfort is rated a 2-7 out of 10. He utilizes Flexeril, heating pad as needed. He is without additional features of radicular pain, new changes in strength, sensation, bowel, bladder, gait function. He ambulates without the need of an assistive device.    Of note, he reports baseline right 1st-3rd digit altered sensation secondary to carpal tunnel syndrome which was diagnosed about 2 years ago.    Medical History:  has a past medical history of Anxiety, Atrial fibrillation (CMS/HCC), Breast cancer (CMS/HCC), C1 cervical fracture (CMS/HCC), Colon polyp, COPD  (chronic obstructive pulmonary disease) (CMS/HCC), Coronary artery calcification seen on CT scan, COVID-19 vaccine series completed, Depression, Diverticulosis, Diverticulosis, Fracture of pelvis (CMS/HCC) (), GERD (gastroesophageal reflux disease), Hearing loss, History of colon polyps, History of COVID-19 (), Hyperlipidemia, Hypertension, Left atrial enlargement, Lung cancer (CMS/HCC), Lung cancer (CMS/HCC), Pneumonia (), Seasonal allergies, and Wrist fracture.    Surgical History:  has a past surgical history that includes Lung lobectomy (Left, ); Shoulder surgery (Right, ); Rotator cuff repair (Right, ); Tonsillectomy; Nasal polyp surgery; Hand surgery (Bilateral); and Colonoscopy.    Family History: family history includes Cancer in his nephew; Cirrhosis in his biological father; Colon cancer in his biological brother; Diabetes in his biological mother; Lung cancer in his biological brother; No Known Problems in his maternal grandfather, maternal grandmother, paternal grandfather, and paternal grandmother; Pancreatic cancer in his biological sister; Prostate cancer in his biological brother.  Family history non-contributory to neurological condition.    Social History:   Social History     Socioeconomic History    Marital status: Legally     Number of children: 3   Tobacco Use    Smoking status: Former     Packs/day: 2.00     Types: Cigarettes     Quit date: 2010     Years since quittin.5    Smokeless tobacco: Never   Vaping Use    Vaping Use: Never used   Substance and Sexual Activity    Alcohol use: No     Comment: RARE    Drug use: No    Sexual activity: Yes     Partners: Female     Birth control/protection: None   Social History Narrative    Retired    Lives alone in a 3 story home     Social Determinants of Health     Food Insecurity: No Food Insecurity    Worried About Running Out of Food in the Last Year: Never true    Ran Out of Food in the Last Year:  Never true   Stress: No Stress Concern Present    Feeling of Stress : Not at all        Allergies: No Known Allergies    Medications:   Current Outpatient Medications   Medication Sig Dispense Refill    albuterol HFA (PROAIR HFA) 90 mcg/actuation inhaler Inhale 2 puffs 2 (two) times a day as needed for wheezing or shortness of breath. (Patient taking differently: Inhale 2 puffs as needed for wheezing or shortness of breath.) 1 Inhaler 3    amLODIPine (NORVASC) 10 mg tablet Take 1 tablet by mouth nightly 90 tablet 0    ANORO ELLIPTA 62.5-25 mcg/actuation blister with device Inhale 1 puff daily. 180 each 0    atorvastatin (LIPITOR) 40 mg tablet Take 1 tablet by mouth once daily 90 tablet 0    carvediloL (COREG) 6.25 mg tablet TAKE 1 TABLET BY MOUTH TWICE DAILY WITH MEALS 180 tablet 0    cyclobenzaprine (FLEXERIL) 10 mg tablet Take 1 tablet (10 mg total) by mouth 3 (three) times a day as needed for muscle spasms. 90 tablet 1    dabigatran etexilate 150 mg capsu Take 1 capsule (150 mg total) by mouth 2 (two) times a day Indications: treatment to prevent blood clots in chronic atrial fibrillation. 60 capsule 0    escitalopram (LEXAPRO) 10 mg tablet Take 1 tablet by mouth once daily 90 tablet 1    famotidine (PEPCID) 20 mg tablet Take 20 mg by mouth 2 (two) times a day.      FISH OIL-omega-3 fatty acids (FISH OIL) 340-1,000 mg capsule Take 2 capsules by mouth 2 (two) times a day.      fluticasone propionate (FLONASE) 50 mcg/actuation nasal spray Administer 2 sprays into each nostril daily. 48 g 0    hydrochlorothiazide (HYDRODIURIL) 25 mg tablet Take 1 tablet by mouth once daily 90 tablet 0    losartan (COZAAR) 100 mg tablet TAKE 1 TABLET BY MOUTH ONCE DAILY WITH SUPPER 90 tablet 0    meloxicam (MOBIC) 7.5 mg tablet Take 1 tablet (7.5 mg total) by mouth daily. 30 tablet 0    multivitamin tablet Take by mouth daily.       No current facility-administered medications for this visit.       Review of Systems:    A 14 point review of systems was performed and aside from what is mentioned above is otherwise negative.    General physical examination:  The patient is well appearing male, sitting upright in his chair in no acute distress, he appears his stated age.  His head is atraumatic, normocephalic. His neck is supple without obvious adenopathy. Oral mucosa is moist. His peripheral pulses are symmetric and palpable. Extremities without peripheral edema. His breathing is normal and unlabored.     Vital signs were reviewed and within normal limits.    Neurologic examination:  Mental status:  The patient is alert, attentive, and oriented. Speech is clear and fluent with good repetition, comprehension, and naming.  Remote and recent memory are normal as well as fund of knowledge.    Cranial nerves:  CN II: Visual fields are full to confrontation.  Pupils are equal and briskly reactive to light.   CN III, IV, VI: Extra-occular muscles are intact  CN V: Facial sensation is intact and equal in V1-V3 distributions bilaterally.   CN VII: Face is symmetric with normal eye closure and smile.  CN VIII: Hearing is normal to rubbing fingers  CN IX, X: Palate elevates symmetrically. Phonation is normal.  CN XI: Head turning and shoulder shrug are intact  CN XII: Tongue is midline with normal movements and no atrophy.    Motor:  There is no pronator drift.  Muscle bulk and tone are normal.    Deltoid Biceps Triceps Wrist ext Hand  Finger Spread Hip flexion Knee ext Dorsi-  flexion EHL Plantar Flexion   L 5 5 5 5 5 5 5 5 5 5 5   R 4+ 5 5 5 5 5 5 5 5 5 5     Reflexes:  Reflexes are 2+ and symmetric at the biceps, brachialis, triceps, patellar, and Achilli's. There is no Romo's sign or ankle clonus.    Sensory:   Altered sensation of the right hand, 1st-3rd digits    Coordination:  There is no dysmetria on finger-to-nose testing.  Romberg is absent.    Gait:  Gait is steady with normal steps.  Heel and toe walking are normal. Tandem  gait is normal.    Skin: incision is well healed. There is no evidence of infection or dehiscence     Data Review:  CT CERVICAL SPINE WITHOUT IV CONTRAST (10/07/2022)  Narrative: CLINICAL HISTORY: Follow-up imaging. Underwent posterior C1-C3 fusion for prior  C2 fracture complicated by nonunion    COMMENT:  TECHNIQUE: CT of the cervical spine was performed without intravenous contrast.  Sagittal and coronal reformations were rendered.  CT DOSE:  One or more dose reduction techniques (e.g. automated exposure  control, adjustment of the mA and/or kV according to patient size, use of  iterative reconstruction technique) utilized for this examination.  COMPARISON: 1/6/2022    Findings:    Redemonstrated postsurgical changes from posterior fusion of C1-C3 with  bilateral transpedicular screws. Hardware appears intact. Of note, there is new  osseous lucency surrounding the left C1 screw. This screw is again noted to  partially traverse the left transverse foramen.    There is persistent nonunion of the dens fracture with unchanged slight anterior  displacement.    Examination is otherwise unchanged. Vertebral body heights are preserved, again  noting multilevel bridging osteophytes characteristic of diffuse idiopathic  skeletal hyperostosis (DISH). Redemonstrated multilevel neural foraminal  narrowing, moderate on the left at C3-C4 and C4-C5 and moderate on the right at  C3-C4.    Within limitations of CT technique, no significant spinal canal stenosis.  No prevertebral soft tissue swelling.    In accordance with [place] Act [place],  the patient will receive a letter notifying them  to follow up with their physician.  Impression: IMPRESSION:    1. Prior C1-C3 fusion. New lucency surrounding the left C1 screw, suggesting  loosening.    2. Redemonstrated nonunion of the dens fracture.  CT CHEST WITHOUT IV CONTRAST  Narrative: CLINICAL HISTORY: Primary malignant neoplasm of left lower lobe of lung  (CMS/HCC) [C34.32  (ICD-10-CM)]    COMMENT: CT examination of the chest was performed using contiguous 2.5 mm  transaxial sections. Reformats were made in the coronal and sagittal planes by  the technologist. Images were acquired without intravenous contrast.    CT DOSE:  One or more dose reduction techniques (e.g. automated exposure  control, adjustment of the mA and/or kV according to patient size, use of  iterative reconstruction technique) utilized for this examination.    Comparison Studies: CT chest 9/28/2021.    Lower Neck: Unremarkable.    Axilla: Negative.    Lung parenchyma: Pleural-parenchymal thickening at the lung apices. Status post  left lower lobectomy. No evidence of residual or recurrent tumor.    Pleura: Small subpleural blebs inferiorly on the left. No pleural effusion or  pneumothorax.    Mediastinum and patricia: Atherosclerotic calcifications of the thoracic aorta  without evidence of aneurysm. No pathologically enlarged lymph nodes.    Heart and pericardium: Coronary artery atherosclerotic calcifications. Small  pericardial effusion    Chest wall: No significant abnormality.    Bones: Multilevel thoracic spondylosis. No suspicious osteolytic or osteoblastic  lesions.    Visualized portions of Upper Abdomen: 1.6 cm cyst in the upper pole of the right  kidney.  Impression: IMPRESSION:  1. Stable appearance of the chest. No evidence of residual or recurrent tumor.  2. No pathologically enlarged mediastinal or hilar lymph nodes.  3. Small pericardial effusion.     Cervical X-ray (2/21/2022):   Status post posterior spinal fusion from C1 to C3 with hardware intact.     COMMENT: 3 views of the cervical spine. Posterior spinal fusion hardware from  C1-C3. Hardware appears intact. Redemonstration of dense fracture, grossly  similar to the prior study. Cervical alignment demonstrates minimal  anterolisthesis of C4 on C5. No evidence for acute fracture.     IMPRESSION: Status post C1-C3 posterior cervical  fusion.    Assessment and Plan:    In summary, Melanie Zelaya is a 72 y.o. male who fell on April 2021 and sustained significant head, neck trauma.  He had radiographic evidence of C1, C2 fractures.  Specifically his C2 fracture involves the odontoid process.  There was mild distraction of the odontoid process from the body of C2 with posterior angulation.  CT scan demonstrated osseous healing of C1 however the there was still minimal healing of the odontoid process.  He wished to pursue surgical fixation of his fracture.  This was orchestrated on January 6, 2022 via a C1-C3 posterolateral instrumented fusion, ORIF C2 fracture.    He has acclimated to his usual activities but notes increased neck discomfort over the past several weeks. His symptoms are secondary to increased by postural activities.    Updated CT images of the cervical spine obtained from October 7, 2022 disclose mild lucency around the left C1 lateral mass screw without changes in alignment.  There is suggestion of early fusion at the C1-C2 joints.  Likely his neck discomfort is on the basis of postural activities, overuse of his posterior cervical muscle band to maintain neutral gaze.  Lifestyle modification was recommended to improve upon his condition.     He was counseled at length regarding natural history of his condition. He was asked to continue with avoidance of excessive bending, twisting, overhead lifting.  I look forward to re-evaluating his progress in 6 months time with updated x-rays of the cervical spine.  In the interim should his symptomatology evolve or worsen, I have asked he return sooner for prompt reevaluation.    Thank you for referring Melanie Zelaya  to my attention.  Please feel free to contact me anytime if I can be of further assistance.    John HARDY PA-C, am scribing for, and in the presence of Chaparro Morejon MD.    Chaparro HARDY MD, personally performed the services described in this documentation as scribed by  John Ogden PA-C in my presence, and it is accurate and complete.    I spent 30 minutes on this date of service performing the following activities: obtaining history, performing examination, entering orders, documenting, preparing for visit, obtaining / reviewing records, providing counseling and education, independently reviewing study/studies, communicating results and coordinating care.

## 2022-10-31 NOTE — LETTER
2022     SANTOS Giles  [address] [facility] [address]  [facility] [address]    Patient: Melanie Zelaya  YOB: 1950  Date of Visit: 10/31/2022      Dear Dr. Schultz:    Thank you for referring Melanie Zelaya to me for evaluation. Below are my notes for this consultation.    If you have questions, please do not hesitate to call me. I look forward to following your patient along with you.         Sincerely,        Chaparro Morejon MD        CC: Chaparro Schneider MD  10/31/2022  8:19 AM  Signed      [facility]  [address], [address]  [place] [address]  Phone: [phone]  Fax: [phone]        10/31/22      Re: Melanie Zelaya  : 1950      Chief Complaint:  C2 fracture    History of Present Illness:   Melanie Zelaya is a 72 y.o. right handed male who presents in neurosurgical follow up consultation. He presented to [facility] after a fall from a ladder on 21.  He sustained loss of consciousness.  He was able to arise on his own volition thereafter.  He was triaged to the emergency department by his son. On arrival he was at his neurologic baseline. CT imaging of the cervical spine disclosed C1, C2 fractures. He was rigidly immobilized in a hard cervical collar.      CT scan demonstrated osseous healing of C1 however there was minimal healing of the odontoid process.  He wishes to pursue surgical fixation of his fracture. This was orchestrated on 2022 via a C1-C3 posterior lateral instrumented fusion, ORIF C2 fracture. His hospital course was uncomplicated and patient was ultimately discharged to [facility].    Since last being seen, he feels well. His neck discomfort has increased mildly. The pain worsens with increased neck mobility, especially when picking up objects.  There is no extension into his upper extremities.  His average level of discomfort is rated  a 2-7 out of 10. He utilizes Flexeril, heating pad as needed. He is without additional features of radicular pain, new changes in strength, sensation, bowel, bladder, gait function. He ambulates without the need of an assistive device.    Of note, he reports baseline right 1st-3rd digit altered sensation secondary to carpal tunnel syndrome which was diagnosed about 2 years ago.    Medical History:  has a past medical history of Anxiety, Atrial fibrillation (CMS/HCC), Breast cancer (CMS/HCC), C1 cervical fracture (CMS/HCC), Colon polyp, COPD (chronic obstructive pulmonary disease) (CMS/HCC), Coronary artery calcification seen on CT scan, COVID-19 vaccine series completed, Depression, Diverticulosis, Diverticulosis, Fracture of pelvis (CMS/HCC) (1968), GERD (gastroesophageal reflux disease), Hearing loss, History of colon polyps, History of COVID-19 (2020), Hyperlipidemia, Hypertension, Left atrial enlargement, Lung cancer (CMS/HCC), Lung cancer (CMS/HCC), Pneumonia (2011), Seasonal allergies, and Wrist fracture.    Surgical History:  has a past surgical history that includes Lung lobectomy (Left, 2016); Shoulder surgery (Right, 1998); Rotator cuff repair (Right, 2001); Tonsillectomy; Nasal polyp surgery; Hand surgery (Bilateral); and Colonoscopy.    Family History: family history includes Cancer in his nephew; Cirrhosis in his biological father; Colon cancer in his biological brother; Diabetes in his biological mother; Lung cancer in his biological brother; No Known Problems in his maternal grandfather, maternal grandmother, paternal grandfather, and paternal grandmother; Pancreatic cancer in his biological sister; Prostate cancer in his biological brother.  Family history non-contributory to neurological condition.    Social History:   Social History     Socioeconomic History    Marital status: Legally     Number of children: 3   Tobacco Use    Smoking status: Former     Packs/day: 2.00     Types:  Cigarettes     Quit date: 2010     Years since quittin.5    Smokeless tobacco: Never   Vaping Use    Vaping Use: Never used   Substance and Sexual Activity    Alcohol use: No     Comment: RARE    Drug use: No    Sexual activity: Yes     Partners: Female     Birth control/protection: None   Social History Narrative    Retired    Lives alone in a 3 story home     Social Determinants of Health     Food Insecurity: No Food Insecurity    Worried About Running Out of Food in the Last Year: Never true    Ran Out of Food in the Last Year: Never true   Stress: No Stress Concern Present    Feeling of Stress : Not at all        Allergies: No Known Allergies    Medications:   Current Outpatient Medications   Medication Sig Dispense Refill    albuterol HFA (PROAIR HFA) 90 mcg/actuation inhaler Inhale 2 puffs 2 (two) times a day as needed for wheezing or shortness of breath. (Patient taking differently: Inhale 2 puffs as needed for wheezing or shortness of breath.) 1 Inhaler 3    amLODIPine (NORVASC) 10 mg tablet Take 1 tablet by mouth nightly 90 tablet 0    ANORO ELLIPTA 62.5-25 mcg/actuation blister with device Inhale 1 puff daily. 180 each 0    atorvastatin (LIPITOR) 40 mg tablet Take 1 tablet by mouth once daily 90 tablet 0    carvediloL (COREG) 6.25 mg tablet TAKE 1 TABLET BY MOUTH TWICE DAILY WITH MEALS 180 tablet 0    cyclobenzaprine (FLEXERIL) 10 mg tablet Take 1 tablet (10 mg total) by mouth 3 (three) times a day as needed for muscle spasms. 90 tablet 1    dabigatran etexilate 150 mg capsu Take 1 capsule (150 mg total) by mouth 2 (two) times a day Indications: treatment to prevent blood clots in chronic atrial fibrillation. 60 capsule 0    escitalopram (LEXAPRO) 10 mg tablet Take 1 tablet by mouth once daily 90 tablet 1    famotidine (PEPCID) 20 mg tablet Take 20 mg by mouth 2 (two) times a day.      FISH OIL-omega-3 fatty acids (FISH OIL) 340-1,000 mg capsule Take 2 capsules by mouth 2 (two)  times a day.      fluticasone propionate (FLONASE) 50 mcg/actuation nasal spray Administer 2 sprays into each nostril daily. 48 g 0    hydrochlorothiazide (HYDRODIURIL) 25 mg tablet Take 1 tablet by mouth once daily 90 tablet 0    losartan (COZAAR) 100 mg tablet TAKE 1 TABLET BY MOUTH ONCE DAILY WITH SUPPER 90 tablet 0    meloxicam (MOBIC) 7.5 mg tablet Take 1 tablet (7.5 mg total) by mouth daily. 30 tablet 0    multivitamin tablet Take by mouth daily.       No current facility-administered medications for this visit.       Review of Systems:   A 14 point review of systems was performed and aside from what is mentioned above is otherwise negative.    General physical examination:  The patient is well appearing male, sitting upright in his chair in no acute distress, he appears his stated age.  His head is atraumatic, normocephalic. His neck is supple without obvious adenopathy. Oral mucosa is moist. His peripheral pulses are symmetric and palpable. Extremities without peripheral edema. His breathing is normal and unlabored.     Vital signs were reviewed and within normal limits.    Neurologic examination:  Mental status:  The patient is alert, attentive, and oriented. Speech is clear and fluent with good repetition, comprehension, and naming.  Remote and recent memory are normal as well as fund of knowledge.    Cranial nerves:  CN II: Visual fields are full to confrontation.  Pupils are equal and briskly reactive to light.   CN III, IV, VI: Extra-occular muscles are intact  CN V: Facial sensation is intact and equal in V1-V3 distributions bilaterally.   CN VII: Face is symmetric with normal eye closure and smile.  CN VIII: Hearing is normal to rubbing fingers  CN IX, X: Palate elevates symmetrically. Phonation is normal.  CN XI: Head turning and shoulder shrug are intact  CN XII: Tongue is midline with normal movements and no atrophy.    Motor:  There is no pronator drift.  Muscle bulk and tone are normal.     Deltoid Biceps Triceps Wrist ext Hand  Finger Spread Hip flexion Knee ext Dorsi-  flexion EHL Plantar Flexion   L 5 5 5 5 5 5 5 5 5 5 5   R 4+ 5 5 5 5 5 5 5 5 5 5     Reflexes:  Reflexes are 2+ and symmetric at the biceps, brachialis, triceps, patellar, and Achilli's. There is no Romo's sign or ankle clonus.    Sensory:   Altered sensation of the right hand, 1st-3rd digits    Coordination:  There is no dysmetria on finger-to-nose testing.  Romberg is absent.    Gait:  Gait is steady with normal steps.  Heel and toe walking are normal. Tandem gait is normal.    Skin: incision is well healed. There is no evidence of infection or dehiscence     Data Review:  CT CERVICAL SPINE WITHOUT IV CONTRAST (10/07/2022)  Narrative: CLINICAL HISTORY: Follow-up imaging. Underwent posterior C1-C3 fusion for prior  C2 fracture complicated by nonunion    COMMENT:  TECHNIQUE: CT of the cervical spine was performed without intravenous contrast.  Sagittal and coronal reformations were rendered.  CT DOSE:  One or more dose reduction techniques (e.g. automated exposure  control, adjustment of the mA and/or kV according to patient size, use of  iterative reconstruction technique) utilized for this examination.  COMPARISON: 1/6/2022    Findings:    Redemonstrated postsurgical changes from posterior fusion of C1-C3 with  bilateral transpedicular screws. Hardware appears intact. Of note, there is new  osseous lucency surrounding the left C1 screw. This screw is again noted to  partially traverse the left transverse foramen.    There is persistent nonunion of the dens fracture with unchanged slight anterior  displacement.    Examination is otherwise unchanged. Vertebral body heights are preserved, again  noting multilevel bridging osteophytes characteristic of diffuse idiopathic  skeletal hyperostosis (DISH). Redemonstrated multilevel neural foraminal  narrowing, moderate on the left at C3-C4 and C4-C5 and moderate on the right  at  C3-C4.    Within limitations of CT technique, no significant spinal canal stenosis.  No prevertebral soft tissue swelling.    In accordance with [place] Act [place],  the patient will receive a letter notifying them  to follow up with their physician.  Impression: IMPRESSION:    1. Prior C1-C3 fusion. New lucency surrounding the left C1 screw, suggesting  loosening.    2. Redemonstrated nonunion of the dens fracture.  CT CHEST WITHOUT IV CONTRAST  Narrative: CLINICAL HISTORY: Primary malignant neoplasm of left lower lobe of lung  (CMS/HCC) [C34.32 (ICD-10-CM)]    COMMENT: CT examination of the chest was performed using contiguous 2.5 mm  transaxial sections. Reformats were made in the coronal and sagittal planes by  the technologist. Images were acquired without intravenous contrast.    CT DOSE:  One or more dose reduction techniques (e.g. automated exposure  control, adjustment of the mA and/or kV according to patient size, use of  iterative reconstruction technique) utilized for this examination.    Comparison Studies: CT chest 9/28/2021.    Lower Neck: Unremarkable.    Axilla: Negative.    Lung parenchyma: Pleural-parenchymal thickening at the lung apices. Status post  left lower lobectomy. No evidence of residual or recurrent tumor.    Pleura: Small subpleural blebs inferiorly on the left. No pleural effusion or  pneumothorax.    Mediastinum and patricia: Atherosclerotic calcifications of the thoracic aorta  without evidence of aneurysm. No pathologically enlarged lymph nodes.    Heart and pericardium: Coronary artery atherosclerotic calcifications. Small  pericardial effusion    Chest wall: No significant abnormality.    Bones: Multilevel thoracic spondylosis. No suspicious osteolytic or osteoblastic  lesions.    Visualized portions of Upper Abdomen: 1.6 cm cyst in the upper pole of the right  kidney.  Impression: IMPRESSION:  1. Stable appearance of the chest. No evidence of residual or recurrent tumor.  2. No  pathologically enlarged mediastinal or hilar lymph nodes.  3. Small pericardial effusion.     Cervical X-ray (2/21/2022):   Status post posterior spinal fusion from C1 to C3 with hardware intact.     COMMENT: 3 views of the cervical spine. Posterior spinal fusion hardware from  C1-C3. Hardware appears intact. Redemonstration of dense fracture, grossly  similar to the prior study. Cervical alignment demonstrates minimal  anterolisthesis of C4 on C5. No evidence for acute fracture.     IMPRESSION: Status post C1-C3 posterior cervical fusion.    Assessment and Plan:    In summary, Melanie Zelaya is a 72 y.o. male who fell on April 2021 and sustained significant head, neck trauma.  He had radiographic evidence of C1, C2 fractures.  Specifically his C2 fracture involves the odontoid process.  There was mild distraction of the odontoid process from the body of C2 with posterior angulation.  CT scan demonstrated osseous healing of C1 however the there was still minimal healing of the odontoid process.  He wished to pursue surgical fixation of his fracture.  This was orchestrated on January 6, 2022 via a C1-C3 posterolateral instrumented fusion, ORIF C2 fracture.    He has acclimated to his usual activities but notes increased neck discomfort over the past several weeks. His symptoms are secondary to increased by postural activities.    Updated CT images of the cervical spine obtained from October 7, 2022 disclose mild lucency around the left C1 lateral mass screw without changes in alignment.  There is suggestion of early fusion at the C1-C2 joints.  Likely his neck discomfort is on the basis of postural activities, overuse of his posterior cervical muscle band to maintain neutral gaze.  Lifestyle modification was recommended to improve upon his condition.     He was counseled at length regarding natural history of his condition. He was asked to continue with avoidance of excessive bending, twisting, overhead lifting.  I  look forward to re-evaluating his progress in 6 months time with updated x-rays of the cervical spine.  In the interim should his symptomatology evolve or worsen, I have asked he return sooner for prompt reevaluation.    Thank you for referring Melanie Zelaya  to my attention.  Please feel free to contact me anytime if I can be of further assistance.    I, John Ogden PA-C, am scribing for, and in the presence of Chaparro Morejon MD.    I, Chaparro Morejon MD, personally performed the services described in this documentation as scribed by John Ogden PA-C in my presence, and it is accurate and complete.    I spent 30 minutes on this date of service performing the following activities: obtaining history, performing examination, entering orders, documenting, preparing for visit, obtaining / reviewing records, providing counseling and education, independently reviewing study/studies, communicating results and coordinating care.

## 2022-12-12 ENCOUNTER — TELEPHONE (OUTPATIENT)
Dept: PRIMARY CARE | Facility: CLINIC | Age: 72
End: 2022-12-12
Payer: COMMERCIAL

## 2022-12-12 RX ORDER — MELOXICAM 7.5 MG/1
7.5 TABLET ORAL DAILY
Qty: 30 TABLET | Refills: 0 | Status: SHIPPED | OUTPATIENT
Start: 2022-12-12 | End: 2024-04-29 | Stop reason: ALTCHOICE

## 2022-12-12 NOTE — TELEPHONE ENCOUNTER
Patient returned call. Patient was requesting meloxicam. Aware was sent to pharmacy today by Mariaa Davalos PA C.

## 2022-12-12 NOTE — TELEPHONE ENCOUNTER
Patient called and left message on voice mail requesting medication refill. Patient did not leave medication he needs refilled at this time. Called patient back to get more information and left voice message to return call to discuss medication refill. Awaiting call back.

## 2022-12-16 RX ORDER — LOSARTAN POTASSIUM 100 MG/1
TABLET ORAL
Qty: 90 TABLET | Refills: 0 | Status: SHIPPED | OUTPATIENT
Start: 2022-12-16 | End: 2023-03-17

## 2022-12-19 RX ORDER — CARVEDILOL 6.25 MG/1
TABLET ORAL
Qty: 180 TABLET | Refills: 0 | Status: SHIPPED | OUTPATIENT
Start: 2022-12-19 | End: 2023-03-16

## 2023-01-03 RX ORDER — HYDROCHLOROTHIAZIDE 25 MG/1
TABLET ORAL
Qty: 90 TABLET | Refills: 0 | Status: SHIPPED | OUTPATIENT
Start: 2023-01-03 | End: 2023-04-10

## 2023-01-16 RX ORDER — UMECLIDINIUM BROMIDE AND VILANTEROL TRIFENATATE 62.5; 25 UG/1; UG/1
POWDER RESPIRATORY (INHALATION)
Qty: 180 EACH | Refills: 0 | Status: SHIPPED | OUTPATIENT
Start: 2023-01-16 | End: 2023-04-10

## 2023-01-17 RX ORDER — FLUTICASONE PROPIONATE 50 MCG
SPRAY, SUSPENSION (ML) NASAL
Qty: 48 G | Refills: 0 | Status: SHIPPED | OUTPATIENT
Start: 2023-01-17 | End: 2023-05-30

## 2023-01-18 RX ORDER — ALBUTEROL SULFATE 90 UG/1
2 INHALANT RESPIRATORY (INHALATION) 2 TIMES DAILY PRN
Qty: 1 EACH | Refills: 3 | Status: SHIPPED | OUTPATIENT
Start: 2023-01-18

## 2023-02-06 RX ORDER — AMLODIPINE BESYLATE 10 MG/1
TABLET ORAL
Qty: 90 TABLET | Refills: 0 | Status: SHIPPED | OUTPATIENT
Start: 2023-02-06 | End: 2023-04-10

## 2023-02-06 RX ORDER — ATORVASTATIN CALCIUM 40 MG/1
TABLET, FILM COATED ORAL
Qty: 90 TABLET | Refills: 0 | Status: SHIPPED | OUTPATIENT
Start: 2023-02-06 | End: 2023-04-10

## 2023-03-16 RX ORDER — CARVEDILOL 6.25 MG/1
TABLET ORAL
Qty: 180 TABLET | Refills: 0 | Status: SHIPPED | OUTPATIENT
Start: 2023-03-16 | End: 2023-06-05

## 2023-03-17 RX ORDER — LOSARTAN POTASSIUM 100 MG/1
TABLET ORAL
Qty: 90 TABLET | Refills: 0 | Status: SHIPPED | OUTPATIENT
Start: 2023-03-17 | End: 2023-06-05

## 2023-04-10 RX ORDER — ATORVASTATIN CALCIUM 40 MG/1
TABLET, FILM COATED ORAL
Qty: 90 TABLET | Refills: 0 | Status: SHIPPED | OUTPATIENT
Start: 2023-04-10 | End: 2023-07-07

## 2023-04-10 RX ORDER — UMECLIDINIUM BROMIDE AND VILANTEROL TRIFENATATE 62.5; 25 UG/1; UG/1
POWDER RESPIRATORY (INHALATION)
Qty: 180 EACH | Refills: 0 | Status: SHIPPED | OUTPATIENT
Start: 2023-04-10 | End: 2023-07-07

## 2023-04-10 RX ORDER — ESCITALOPRAM OXALATE 10 MG/1
TABLET ORAL
Qty: 90 TABLET | Refills: 0 | Status: SHIPPED | OUTPATIENT
Start: 2023-04-10 | End: 2023-07-07

## 2023-04-10 RX ORDER — HYDROCHLOROTHIAZIDE 25 MG/1
TABLET ORAL
Qty: 90 TABLET | Refills: 0 | Status: SHIPPED | OUTPATIENT
Start: 2023-04-10 | End: 2023-07-07

## 2023-04-10 RX ORDER — AMLODIPINE BESYLATE 10 MG/1
TABLET ORAL
Qty: 90 TABLET | Refills: 0 | Status: SHIPPED | OUTPATIENT
Start: 2023-04-10 | End: 2023-07-07

## 2023-04-21 ENCOUNTER — OFFICE VISIT (OUTPATIENT)
Dept: PRIMARY CARE | Facility: CLINIC | Age: 73
End: 2023-04-21
Payer: COMMERCIAL

## 2023-04-21 ENCOUNTER — TRANSCRIBE ORDERS (OUTPATIENT)
Dept: REGISTRATION | Facility: CLINIC | Age: 73
End: 2023-04-21

## 2023-04-21 ENCOUNTER — APPOINTMENT (OUTPATIENT)
Dept: LAB | Facility: CLINIC | Age: 73
End: 2023-04-21
Attending: INTERNAL MEDICINE
Payer: COMMERCIAL

## 2023-04-21 VITALS
TEMPERATURE: 97.9 F | WEIGHT: 194.6 LBS | OXYGEN SATURATION: 96 % | SYSTOLIC BLOOD PRESSURE: 140 MMHG | DIASTOLIC BLOOD PRESSURE: 80 MMHG | HEART RATE: 73 BPM | BODY MASS INDEX: 31.41 KG/M2 | RESPIRATION RATE: 20 BRPM

## 2023-04-21 DIAGNOSIS — J43.9 PULMONARY EMPHYSEMA, UNSPECIFIED EMPHYSEMA TYPE (CMS/HCC): ICD-10-CM

## 2023-04-21 DIAGNOSIS — I10 PRIMARY HYPERTENSION: ICD-10-CM

## 2023-04-21 DIAGNOSIS — R41.3 MEMORY LOSS: ICD-10-CM

## 2023-04-21 DIAGNOSIS — F41.9 ANXIETY: ICD-10-CM

## 2023-04-21 DIAGNOSIS — C34.32 PRIMARY MALIGNANT NEOPLASM OF LEFT LOWER LOBE OF LUNG (CMS/HCC): Chronic | ICD-10-CM

## 2023-04-21 DIAGNOSIS — J43.8 OTHER EMPHYSEMA (CMS/HCC): ICD-10-CM

## 2023-04-21 DIAGNOSIS — K21.9 GASTROESOPHAGEAL REFLUX DISEASE WITHOUT ESOPHAGITIS: ICD-10-CM

## 2023-04-21 DIAGNOSIS — I50.32 CHRONIC DIASTOLIC HEART FAILURE (CMS/HCC): Chronic | ICD-10-CM

## 2023-04-21 DIAGNOSIS — J45.40 MODERATE PERSISTENT ASTHMA WITHOUT COMPLICATION: ICD-10-CM

## 2023-04-21 DIAGNOSIS — I48.21 PERMANENT ATRIAL FIBRILLATION (CMS/HCC): ICD-10-CM

## 2023-04-21 DIAGNOSIS — I34.0 NONRHEUMATIC MITRAL (VALVE) INSUFFICIENCY: ICD-10-CM

## 2023-04-21 DIAGNOSIS — E78.00 PURE HYPERCHOLESTEROLEMIA, UNSPECIFIED: ICD-10-CM

## 2023-04-21 DIAGNOSIS — Z00.00 MEDICARE ANNUAL WELLNESS VISIT, SUBSEQUENT: Primary | ICD-10-CM

## 2023-04-21 PROCEDURE — 30099997 SPECIMEN PROCESSING

## 2023-04-21 PROCEDURE — 90677 PCV20 VACCINE IM: CPT | Performed by: NURSE PRACTITIONER

## 2023-04-21 PROCEDURE — G0439 PPPS, SUBSEQ VISIT: HCPCS | Performed by: NURSE PRACTITIONER

## 2023-04-21 PROCEDURE — G0009 ADMIN PNEUMOCOCCAL VACCINE: HCPCS | Performed by: NURSE PRACTITIONER

## 2023-04-21 ASSESSMENT — ENCOUNTER SYMPTOMS
BACK PAIN: 0
LIGHT-HEADEDNESS: 0
DYSURIA: 0
NECK STIFFNESS: 0
CHEST TIGHTNESS: 0
TROUBLE SWALLOWING: 0
HEADACHES: 0
STRIDOR: 0
FEVER: 0
DIZZINESS: 0
ABDOMINAL PAIN: 0
CHILLS: 0
ADENOPATHY: 0
BRUISES/BLEEDS EASILY: 0
ARTHRALGIAS: 0
PALPITATIONS: 0
NECK PAIN: 0
FATIGUE: 1
SHORTNESS OF BREATH: 1

## 2023-04-21 ASSESSMENT — ACTIVITIES OF DAILY LIVING (ADL)
ADEQUATE_TO_COMPLETE_ADL: YES
PATIENT'S MEMORY ADEQUATE TO SAFELY COMPLETE DAILY ACTIVITIES?: YES

## 2023-04-21 ASSESSMENT — PATIENT HEALTH QUESTIONNAIRE - PHQ9: SUM OF ALL RESPONSES TO PHQ9 QUESTIONS 1 & 2: 0

## 2023-04-21 ASSESSMENT — MINI COG
TOTAL SCORE: 5
COMPLETED: YES

## 2023-04-21 NOTE — PATIENT INSTRUCTIONS
Your Personalized Prevention Plan Services (PPPS)    Preventive Services Checklist (Assumes Average Risk Unless Otherwise Noted):    Health Maintenance Topics with due status: Overdue       Topic Date Due    Depression Screening Never done    Abdominal Aortic Aneurysm (AAA) Screen Never done    Falls Risk Screening Never done    COVID-19 Vaccine 01/10/2022    Medicare Annual Wellness Visit 09/29/2022     Health Maintenance Topics with due status: Not Due       Topic Last Completion Date    DTaP, Tdap, and Td Vaccines 04/23/2021    Colorectal Cancer Screening 09/02/2021     Health Maintenance Topics with due status: Completed       Topic Last Completion Date    Pneumococcal (65 years and older) 10/31/2016    Hepatitis C Screening 02/21/2019    Zoster Vaccine 12/28/2019    Influenza Vaccine 10/19/2022     Health Maintenance Topics with due status: Aged Out       Topic Date Due    Meningococcal ACWY Aged Out    HIB Vaccines Aged Out    Hepatitis B Vaccines Aged Out    IPV Vaccines Aged Out    HPV Vaccines Aged Out       You May Be Eligible for These Additional Preventive Services   (Assumes Average Risk Unless Otherwise Noted)  Diabetes Screening Any 1 risk factor: hypertension, dyslipidemia, obesity, high glucose; or Any 2 risk factors: >=64yo, overweight, family history diabetes (covered every 6 months)   Hepatitis C Screening Any 1 risk factor: 1) blood transfusion before 1992,   2) current or past injection drug use (annually for high risk; if born between [id number], see above for status).   Vaccine: Hepatitis B As necessary if at-risk: hemophilia, ESRD, diabetes, living with individual infected with hep B, healthcare worker with frequent contact with blood/bodily fluids (series covered once)   Sexually Transmitted Diseases (STDs) As necessary chlamydia, gonorrhea, syphilis, hepatitis B (covered annually)  HIV if any 1 risk factor present: 1) <14yo or >64yo and at increased risk or 2) 15-64yo  and ask for it (covered annually)   Lung Cancer Screening Low dose chest CT if all three risk factors: 1) 50-78yo, 2) smoker or quit within last 15y, 3) >=20 pack years (covered annually).  No results found for this or any previous visit.       Cholesterol Screening No signs or symptoms of cardiovascular disease (screening covered every 5 years).     Prostate Cancer Screening >= 51yo if patient desires test after weighting potential harms and benefits (covered annually)     Abdominal Aortic Aneurysm Screening Any 1 risk factor: 1) family history of AAA or 2) 65-74yo and smoked 100+ cigarettes in a lifetime (covered once).   No results found for this or any previous visit.   No results found for this or any previous visit.           Health Risk Factors with Personalized Education:  ----------------------------------------------------------------------------------------------------------------------  Stress management:  Understanding Your Stress  · Think about how you know when you’re stressed.  · Think about how your thoughts or behaviors are different when you’re stressed.  · Think about what triggers stress for you.  · Think about how you handle stress, and whether you’re making unhealthy choices in response to stress (like smoking, drinking alcohol or overeating).  Managing Stress  · Take a break from the stressful situation.  · Reduce your stress levels by exercising, talking with family/friends, ensuring adequate sleep.  · Consider meditation or yoga.  · Make sure you plan time to do things you enjoy.  · Let your PCP know if you’re having problems limiting your stress.  ----------------------------------------------------------------------------------------------------------------------  Controlling Your Blood Pressure  · Maintain a normal weight (body mass index between 18.5 and 24.9).  · Eat more fruit, vegetables and low-fat dairy.  · Eat less saturated fat and total fat.  · Lower your sodium (salt) intake.   Try to stay under 1500 mg per day, but if you cannot get your intake to be that low, at least lower it by 1000 mg.  · Stay active.  Try to get at least 90 to 150 minutes of exercise per week.  Try brisk walking, swimming, bicycling or dancing.  · Limit alcohol intake.  When you do consume alcohol, drink no more than 2 drinks per day.  · If you have been prescribed medication, take it regularly and exactly as prescribed.  Let your PCP know if you have any problems or questions about your medication.  · Check your blood pressure at home or at the store.  Write down your readings and share them with your PCP  ----------------------------------------------------------------------------------------------------------------------  Controlling Your Glucose (Sugar) Levels  · Stress can raise your blood sugar.  Learn what causes your stress.  Learn to lower your stress by spending time with family and friends, exercising, deep breathing, trying meditation or yoga, enjoying hobbies and getting enough rest.  Let your PCP know if you’re having problems limiting your stress.  · Maintain a healthy weight by balancing your diet and exercise.  · Choose foods that are lower in calories, saturated fat, trans fat, sugar and salt.  Eat foods with more fiber, like whole grain cereals, bread, crackers, rice or pasta.  Choose to eat fruit, vegetables, and low-fat or fat-free/skim dairy.  · Reduce the portion size of your meals.  Make half of your meal vegetables and fruit, and divide the other half between lean protein and whole grains.  · Drink water instead of juice and regular soda.  · Spend at least some time being active on most days of the week.  · Work to increase your muscle strength at least twice per week.  Try things like light weights, stretch bands, yoga, heavy gardening (digging, planting with tools) or  push-ups  ----------------------------------------------------------------------------------------------------------------------  Controlling Your Cholesterol  · Reduce the amount of saturated and trans fat in your diet.  Limit intake of red meat.  Consume only low-fat or non-fat/skim dairy.  Limit fried food.  Cook with vegetable oils.  · Reduce your intake of sugary foods, sugary drinks and alcohol.  · Eat a diet high in fruit, vegetables and whole grains.  · Get protein from fish, poultry and a small portion of nuts.  · Stay active.  Try to get at least 90 to 150 minutes of exercise per week.  Try brisk walking, swimming, bicycling or dancing.  · Maintain a healthy weight by balancing your diet and exercise.  · If you have been prescribed medication, take it regularly and exactly as prescribed. Let your PCP know if you have any problems or questions about your medication.  · It’s important to know your cholesterol numbers.  When recommended by your PCP, get the cholesterol blood test.  ----------------------------------------------------------------------------------------------------------------------  Reducing Your Risk of Falls  · Tell your PCP if any of your medications make you feel tired, dizzy, lightheaded or off-balance.  · Maintain coordination, flexibility and balance by ensuring regular physical activity.  · Limit alcohol intake to 1 drink per day.  Consider avoiding all alcohol intake.  · Ensure good vision.  Visit an ophthalmologist or optometrist regularly for vision screening or to make sure your glasses / contact lens prescription is correct.  If you need glasses or contacts, wear them.  When you get new glasses or contacts, take time to get used to them.  Do not wear sunglasses or tinted lenses when indoors.  · Ensure good hearing.  Have your hearing checked if you are having trouble hearing, or family and friends think you cannot hear them.  If you need a hearing aid, be sure it fits well and  wear it.  · Get enough rest.  Ensure about 7-9 hours of sleep every day.  · Get up slowly from your bed or chairs.  Do not start walking until you are sure you feel steady.  · Wear non-skid, rubber-soled, low-heeled shoes.  Do not walk in socks, or in shoes and slippers with smooth soles.  · If your PCP or therapist recommends using a cane or walker, use it regularly.  · Make your home safer.  Increase lighting throughout the house, especially at the top and bottom of stairs.  Ensure lighting is easily turned on when getting up in the middle of the night.  Make sure there are two secure rails on all stairs.  Install grab bars in the bathtub / shower and near the toilet.  Consider using a shower chair and / or a hand-held shower.  · Spread sand or salt on icy surfaces.  Beware of wet surfaces, which can be icy.  · Tell your PCP if you have fallen.
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
## 2023-04-22 LAB
25(OH)D3 SERPL-MCNC: 27 NG/ML (ref 30–100)
ALBUMIN SERPL-MCNC: 4.8 G/DL (ref 3.6–5.1)
ALBUMIN/GLOB SERPL: 2.2 (CALC) (ref 1–2.5)
ALP SERPL-CCNC: 65 U/L (ref 35–144)
ALT SERPL-CCNC: 21 U/L (ref 9–46)
AST SERPL-CCNC: 29 U/L (ref 10–35)
BILIRUB SERPL-MCNC: 1.3 MG/DL (ref 0.2–1.2)
BUN SERPL-MCNC: 17 MG/DL (ref 7–25)
BUN/CREAT SERPL: ABNORMAL (CALC) (ref 6–22)
CALCIUM SERPL-MCNC: 9.8 MG/DL (ref 8.6–10.3)
CHLORIDE SERPL-SCNC: 102 MMOL/L (ref 98–110)
CHOLEST SERPL-MCNC: 153 MG/DL
CHOLEST/HDLC SERPL: 3.3 (CALC)
CO2 SERPL-SCNC: 27 MMOL/L (ref 20–32)
CREAT SERPL-MCNC: 0.89 MG/DL (ref 0.7–1.28)
EGFRCR SERPLBLD CKD-EPI 2021: 89 ML/MIN/1.73M2
ERYTHROCYTE [DISTWIDTH] IN BLOOD BY AUTOMATED COUNT: 14.3 % (ref 11–15)
GLOBULIN SER CALC-MCNC: 2.2 G/DL (CALC) (ref 1.9–3.7)
GLUCOSE SERPL-MCNC: 98 MG/DL (ref 65–99)
HCT VFR BLD AUTO: 43.9 % (ref 38.5–50)
HDLC SERPL-MCNC: 46 MG/DL
HGB BLD-MCNC: 15.1 G/DL (ref 13.2–17.1)
LDLC SERPL CALC-MCNC: 87 MG/DL (CALC)
MCH RBC QN AUTO: 30.1 PG (ref 27–33)
MCHC RBC AUTO-ENTMCNC: 34.4 G/DL (ref 32–36)
MCV RBC AUTO: 87.5 FL (ref 80–100)
NONHDLC SERPL-MCNC: 107 MG/DL (CALC)
PLATELET # BLD AUTO: 223 THOUSAND/UL (ref 140–400)
PMV BLD REES-ECKER: 10.2 FL (ref 7.5–12.5)
POTASSIUM SERPL-SCNC: 4 MMOL/L (ref 3.5–5.3)
PROT SERPL-MCNC: 7 G/DL (ref 6.1–8.1)
RBC # BLD AUTO: 5.02 MILLION/UL (ref 4.2–5.8)
SODIUM SERPL-SCNC: 138 MMOL/L (ref 135–146)
TRIGL SERPL-MCNC: 105 MG/DL
TSH SERPL-ACNC: 2.31 MIU/L (ref 0.4–4.5)
VIT B12 SERPL-MCNC: 588 PG/ML (ref 200–1100)
WBC # BLD AUTO: 6 THOUSAND/UL (ref 3.8–10.8)

## 2023-04-25 ENCOUNTER — TELEPHONE (OUTPATIENT)
Dept: PRIMARY CARE | Facility: CLINIC | Age: 73
End: 2023-04-25
Payer: COMMERCIAL

## 2023-04-25 NOTE — TELEPHONE ENCOUNTER
Patient LVM to get more information on the over the counter supplement he should be starting.    Please advise

## 2023-05-11 ENCOUNTER — TELEPHONE (OUTPATIENT)
Dept: PRIMARY CARE | Facility: CLINIC | Age: 73
End: 2023-05-11
Payer: COMMERCIAL

## 2023-05-27 ENCOUNTER — HOSPITAL ENCOUNTER (EMERGENCY)
Facility: HOSPITAL | Age: 73
Discharge: HOME | End: 2023-05-27
Attending: EMERGENCY MEDICINE
Payer: COMMERCIAL

## 2023-05-27 ENCOUNTER — APPOINTMENT (EMERGENCY)
Dept: RADIOLOGY | Facility: HOSPITAL | Age: 73
End: 2023-05-27
Payer: COMMERCIAL

## 2023-05-27 VITALS
DIASTOLIC BLOOD PRESSURE: 67 MMHG | SYSTOLIC BLOOD PRESSURE: 115 MMHG | HEART RATE: 55 BPM | OXYGEN SATURATION: 99 % | WEIGHT: 193 LBS | RESPIRATION RATE: 18 BRPM | BODY MASS INDEX: 31.02 KG/M2 | TEMPERATURE: 97.8 F | HEIGHT: 66 IN

## 2023-05-27 DIAGNOSIS — Z98.890 SHOULDER PAIN WITH HISTORY OF REPAIR OF ROTATOR CUFF: ICD-10-CM

## 2023-05-27 DIAGNOSIS — M75.21 BICIPITAL TENDONITIS OF RIGHT SHOULDER: Primary | ICD-10-CM

## 2023-05-27 DIAGNOSIS — M25.519 SHOULDER PAIN WITH HISTORY OF REPAIR OF ROTATOR CUFF: ICD-10-CM

## 2023-05-27 PROCEDURE — 63700000 HC SELF-ADMINISTRABLE DRUG: Performed by: STUDENT IN AN ORGANIZED HEALTH CARE EDUCATION/TRAINING PROGRAM

## 2023-05-27 PROCEDURE — 73030 X-RAY EXAM OF SHOULDER: CPT | Mod: RT

## 2023-05-27 PROCEDURE — 99283 EMERGENCY DEPT VISIT LOW MDM: CPT

## 2023-05-27 RX ORDER — OXYCODONE AND ACETAMINOPHEN 5; 325 MG/1; MG/1
1 TABLET ORAL EVERY 6 HOURS PRN
Qty: 12 TABLET | Refills: 0 | Status: SHIPPED | OUTPATIENT
Start: 2023-05-27 | End: 2024-04-29 | Stop reason: ALTCHOICE

## 2023-05-27 RX ORDER — OXYCODONE AND ACETAMINOPHEN 5; 325 MG/1; MG/1
1 TABLET ORAL EVERY 4 HOURS PRN
Qty: 12 TABLET | Refills: 0 | Status: SHIPPED | OUTPATIENT
Start: 2023-05-27 | End: 2023-05-27 | Stop reason: SDUPTHER

## 2023-05-27 RX ORDER — OXYCODONE AND ACETAMINOPHEN 5; 325 MG/1; MG/1
1 TABLET ORAL EVERY 6 HOURS PRN
Status: DISCONTINUED | OUTPATIENT
Start: 2023-05-27 | End: 2023-05-27 | Stop reason: HOSPADM

## 2023-05-27 RX ADMIN — OXYCODONE HYDROCHLORIDE AND ACETAMINOPHEN 1 TABLET: 5; 325 TABLET ORAL at 12:10

## 2023-05-27 ASSESSMENT — ENCOUNTER SYMPTOMS
JOINT SWELLING: 0
NECK PAIN: 0
BACK PAIN: 0
ABDOMINAL PAIN: 0
SHORTNESS OF BREATH: 0
ARTHRALGIAS: 1
ACTIVITY CHANGE: 1
NECK STIFFNESS: 0
MYALGIAS: 1

## 2023-05-27 NOTE — DISCHARGE INSTRUCTIONS
You were seen in the emergency department today for right shoulder pain.  Your x-ray did not reveal any acute bony abnormality.  This most likely is related to the rotator cuff and/or biceps tendon and recommend follow-up with orthopedics for further evaluation and management.  He can continue to use over-the-counter pain medications such as Tylenol for pain relief.  We did give you a dose of Percocet in the emergency department today.  We are also prescribing a small supply of this outpatient.  Please do not drive, drink alcohol, operate heavy machinery, go up heights while on this.  This medication also contains Tylenol, do not combine Tylenol with this medication.    We recommend following up with your orthopedist that is a new surgery in the past.  If unavailable we have provided contact information for another orthopedist group.    You are also given a prescription for physical therapy today please call the office prior to make an appointment.      Please return to the emergency department any new or worsening symptoms.
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
## 2023-05-28 NOTE — ED ATTESTATION NOTE
Procedures  Physical Exam  Review of Systems  [facility]    5/28/20238:44 AM  I have personally seen and examined the patient. I was involved in the care and medical decision making for this patient.    I reviewed and agree with the PA/NP/Resident's assessment and plan of care, with any exceptions as documented below.    My focused history, examination, assessment, and plan of care of Melanie Zelaya is as follows:    The patient presents with right shoulder pain.  He has had pain for about a week and then yesterday he turned the key to the car and the pain suddenly got extremely worse.  Hurts to move in pre much every direction.  Has had a previous rotator cuff surgery approximately 20 years ago.  Also has had atrial fibrillation, breast cancer, C1 fracture, COPD and a history of lung cancer.     Exam: Patient is awake alert and oriented.  Tenderness is present in the right anterior shoulder near the bicipital groove area.  There is no discoloration.  There is severely decreased range of motion causing pain which is present with passive motion and somewhat more severe with active motion.  Strength is 4 out of 5 in most every direction.  Distally touch sensation, capillary refill, pulses, movement are all intact.  Neck is nontender to palpation and movement of the neck does not cause any discomfort.  Lungs are clear.  Heart S1-S2 without any murmur.    Impression/Plan/Medical Decision Making: X-rays of the right shoulder are unremarkable.  Possible that there may be a rotator cuff tear or that there might be a bicipital tendinitis.  Patient has been placed in a sling for comfort.  He has been asked to move his shoulder with some release passively so that he does not get progressively stiffer.  Should follow-up with an orthopedist.  Has also been given the name of a physical therapist.  His insurance may require him to get the referral from the orthopedist or his primary doctor for physical therapy.  Agree with PA  management and discharge.    Vital Signs Review: Vital signs have been reviewed. The oxygen saturation is  SpO2: 99 % which is normal.    This document was created using Dragon dictation software.  There might be some typographical errors due to this technology.    We are in a period of time with increased volumes and decreased capacity.      Ed Yu MD  05/28/23 0880

## 2023-05-30 RX ORDER — FLUTICASONE PROPIONATE 50 MCG
SPRAY, SUSPENSION (ML) NASAL
Qty: 48 G | Refills: 0 | Status: SHIPPED | OUTPATIENT
Start: 2023-05-30 | End: 2023-09-27

## 2023-06-04 DIAGNOSIS — I10 PRIMARY HYPERTENSION: Primary | ICD-10-CM

## 2023-06-05 RX ORDER — CARVEDILOL 6.25 MG/1
TABLET ORAL
Qty: 180 TABLET | Refills: 1 | Status: SHIPPED | OUTPATIENT
Start: 2023-06-05 | End: 2023-07-07

## 2023-06-05 RX ORDER — LOSARTAN POTASSIUM 100 MG/1
TABLET ORAL
Qty: 90 TABLET | Refills: 1 | Status: SHIPPED | OUTPATIENT
Start: 2023-06-05 | End: 2023-07-07

## 2023-06-05 NOTE — TELEPHONE ENCOUNTER
Fax from pharmacy requesting refill      losartan (COZAAR) 100 mg tablet  carvediloL (COREG) 6.25 mg tablet  Refill Request    Last Office: 4/21/2023   Last Consult Visit: 1/4/2022  Last Telemedicine Visit: 5/3/2021 Keisha Schultz CRNP    Next Appointment: 10/27/2023      Current Outpatient Medications:   •  albuterol HFA (PROAIR HFA) 90 mcg/actuation inhaler, Inhale 2 puffs 2 (two) times a day as needed for wheezing or shortness of breath., Disp: 1 each, Rfl: 3  •  amLODIPine (NORVASC) 10 mg tablet, Take 1 tablet by mouth nightly, Disp: 90 tablet, Rfl: 0  •  ANORO ELLIPTA 62.5-25 mcg/actuation blister with device, Inhale 1 puff by mouth once daily, Disp: 180 each, Rfl: 0  •  atorvastatin (LIPITOR) 40 mg tablet, Take 1 tablet by mouth once daily, Disp: 90 tablet, Rfl: 0  •  carvediloL (COREG) 6.25 mg tablet, TAKE 1 TABLET BY MOUTH TWICE DAILY WITH MEALS, Disp: 180 tablet, Rfl: 0  •  cyclobenzaprine (FLEXERIL) 10 mg tablet, Take 1 tablet (10 mg total) by mouth 3 (three) times a day as needed for muscle spasms., Disp: 90 tablet, Rfl: 1  •  dabigatran etexilate 150 mg capsu, Take 1 capsule (150 mg total) by mouth 2 (two) times a day Indications: treatment to prevent blood clots in chronic atrial fibrillation., Disp: 60 capsule, Rfl: 0  •  escitalopram (LEXAPRO) 10 mg tablet, Take 1 tablet by mouth once daily, Disp: 90 tablet, Rfl: 0  •  famotidine (PEPCID) 20 mg tablet, Take 20 mg by mouth 2 (two) times a day., Disp: , Rfl:   •  FISH OIL-omega-3 fatty acids (FISH OIL) 340-1,000 mg capsule, Take 2 capsules by mouth 2 (two) times a day., Disp: , Rfl:   •  fluticasone propionate (FLONASE) 50 mcg/actuation nasal spray, Use 2 spray(s) in each nostril once daily, Disp: 48 g, Rfl: 0  •  hydrochlorothiazide (HYDRODIURIL) 25 mg tablet, Take 1 tablet by mouth once daily, Disp: 90 tablet, Rfl: 0  •  losartan (COZAAR) 100 mg tablet, TAKE 1 TABLET BY MOUTH ONCE DAILY WITH SUPPER, Disp: 90 tablet, Rfl: 0  •  meloxicam (MOBIC)  7.5 mg tablet, Take 1 tablet (7.5 mg total) by mouth daily., Disp: 30 tablet, Rfl: 0  •  multivitamin tablet, Take by mouth daily., Disp: , Rfl:   •  oxyCODONE-acetaminophen (PERCOCET) 5-325 mg per tablet, Take 1 tablet by mouth every 6 (six) hours as needed for moderate pain. NO driving or operating heavy machinery when taking., Disp: 12 tablet, Rfl: 0      BP Readings from Last 3 Encounters:   05/27/23 115/67   04/21/23 140/80   10/19/22 132/66       Recent Lab results:  Component Ref Range & Units 4/21/23 1419 1/18/22 0536 1/12/22 0604 1/9/22 0917 1/8/22 0820 1/7/22 0505 1/6/22 1531     Glucose 65 - 99 mg/dL 98  103 High  R  102 High  R  110 High  R  91 R  148 High  R  160 High  R    Comment:                Fasting reference interval         BUN 7 - 25 mg/dL 17  13 R  15 R  10 R  19 R  15 R  17 R     Creatinine 0.70 - 1.28 mg/dL 0.89  0.9 R  0.8 R  0.9 R  0.9 R  0.9 R  0.8 R     EGFR > OR = 60 mL/min/1.73m2 89  >60.0 R  >60.0 R  >60.0 R  >60.0 R  >60.0 R  >60.0 R    Comment: The eGFR is based on the CKD-EPI 2021 equation. To calculate   the new eGFR from a previous Creatinine or Cystatin C   result, go to https://www.kidney.org/professionals/   kdoqi/gfr%5Fcalculator     Bun/Creatinine Ratio 6 - 22 (calc) NOT APPLICABLE           Sodium 135 - 146 mmol/L 138  138 R  138 R  137 R  138 R  138 R  140 R     Potassium 3.5 - 5.3 mmol/L 4.0  4.3 R  4.4 R  3.7 R  3.9 R  4.6 R  4.3 R     Chloride 98 - 110 mmol/L 102  97 Low  R  98 R  95 Low  R  101 R  103 R  105 R     Carbon Dioxide 20 - 32 mmol/L 27  29 R  31 R  32 R  28 R  24 R  23 R     Calcium 8.6 - 10.3 mg/dL 9.8  9.7 R  9.2 R  9.1 R  8.8 Low  R  9.0 R  8.9 R     Protein, Total 6.1 - 8.1 g/dL 7.0           Albumin 3.6 - 5.1 g/dL 4.8           Globulin 1.9 - 3.7 g/dL (calc) 2.2           Albumin/Globulin Ratio 1.0 - 2.5 (calc) 2.2           Bilirubin, Total 0.2 - 1.2 mg/dL 1.3 High            Alkaline Phosphatase 35 - 144 U/L 65           Ast 10 - 35 U/L 29            Alt 9 - 46 U/L 21

## 2023-07-05 DIAGNOSIS — I10 PRIMARY HYPERTENSION: ICD-10-CM

## 2023-07-07 ENCOUNTER — TELEPHONE (OUTPATIENT)
Dept: PRIMARY CARE | Facility: CLINIC | Age: 73
End: 2023-07-07
Payer: COMMERCIAL

## 2023-07-07 RX ORDER — CARVEDILOL 6.25 MG/1
TABLET ORAL
Qty: 180 TABLET | Refills: 1 | Status: SHIPPED | OUTPATIENT
Start: 2023-07-07 | End: 2024-05-28

## 2023-07-07 RX ORDER — HYDROCHLOROTHIAZIDE 25 MG/1
TABLET ORAL
Qty: 90 TABLET | Refills: 1 | Status: SHIPPED | OUTPATIENT
Start: 2023-07-07 | End: 2023-09-27

## 2023-07-07 RX ORDER — UMECLIDINIUM BROMIDE AND VILANTEROL TRIFENATATE 62.5; 25 UG/1; UG/1
POWDER RESPIRATORY (INHALATION)
Qty: 180 EACH | Refills: 1 | Status: SHIPPED | OUTPATIENT
Start: 2023-07-07 | End: 2023-12-27

## 2023-07-07 RX ORDER — ESCITALOPRAM OXALATE 10 MG/1
TABLET ORAL
Qty: 90 TABLET | Refills: 1 | Status: SHIPPED | OUTPATIENT
Start: 2023-07-07 | End: 2023-12-21

## 2023-07-07 RX ORDER — AMLODIPINE BESYLATE 10 MG/1
TABLET ORAL
Qty: 90 TABLET | Refills: 1 | Status: SHIPPED | OUTPATIENT
Start: 2023-07-07 | End: 2024-01-03

## 2023-07-07 RX ORDER — ATORVASTATIN CALCIUM 40 MG/1
TABLET, FILM COATED ORAL
Qty: 90 TABLET | Refills: 1 | Status: SHIPPED | OUTPATIENT
Start: 2023-07-07 | End: 2024-01-17

## 2023-07-07 RX ORDER — LOSARTAN POTASSIUM 100 MG/1
TABLET ORAL
Qty: 90 TABLET | Refills: 1 | Status: SHIPPED | OUTPATIENT
Start: 2023-07-07 | End: 2024-05-22 | Stop reason: SDUPTHER

## 2023-07-07 NOTE — TELEPHONE ENCOUNTER
Patient called for refills on his medication. But refills was already sent over to pharmacy.    Pt needs refill on the following medications    hydrochlorothiazide (HYDRODIURIL) 25 mg tablet    escitalopram (LEXAPRO) 10 mg tablet    amLODIPine (NORVASC) 10 mg tablet      [address]  [phone]

## 2023-07-14 RX ORDER — CYCLOBENZAPRINE HCL 10 MG
10 TABLET ORAL 3 TIMES DAILY PRN
Qty: 30 TABLET | Refills: 1 | Status: SHIPPED | OUTPATIENT
Start: 2023-07-14 | End: 2023-10-27 | Stop reason: SDUPTHER

## 2023-07-14 NOTE — TELEPHONE ENCOUNTER
Pt called requesting a refill on the muscle relaxer, you did not originally prescribe. He states he had an injury to his spine and he uses this from time to time. He has been more active lately.

## 2023-08-29 NOTE — PROGRESS NOTES
History of present illness:     71 y.o. male with a PMHx significant for atrial fibrillation, breast cancer, COPD, diverticulosis, acid reflux, history of COVID-19, hyperlipidemia, hypertension, lung cancer, history of right shoulder rotator cuff repair, and history of right-sided carpal tunnel syndrome, who fell from a ladder on 04/23/2021 associated with loss of consciousness. He was found to have fractures of the anterior and posterior arches of C1, base of the dens of C2, right scapula, and left occipital condyle.  He was placed in a hard cervical collar and has had ongoing neurosurgical and imaging follow-up.  While C1 was healing normally.  There is no appreciable osseous bridging of the fracture of the dens.    Patient underwent open reduction and internal fixation of C1 fractures, C2 odontoid fracture, associated with bilateral posterior lateral arthrodesis with fusion at C1-C3 in addition to C1-2 and C2-3 laminotomies. Surgery was done on 1/6/2022 by Dr. Morejon .  There are no intraoperative complications.  Neurophysiological monitoring was stable throughout the course of the decompression.        Patient was evaluated by orthopedics for right shoulder follow up. No acute surgical intervention at this time . New Imaging of R shoulder/scapula unremarkable for acute injury, RUE WBAT.  Recommend follow up with a shoulder/elbow orthopedic surgeon - Dr Shine as needed.     Patient was evaluated by PM&R deemed to be a good candidate for acute rehabilitation. Patient now transferred to [facility] for comprehensive acute inpatient rehabilitation admitted on 1/12/2022.    Scheduled Meds:  • acetaminophen  975 mg oral q8h   • amLODIPine  5 mg oral q12h MARIAN   • atorvastatin  40 mg oral Daily (6p)   • cetirizine  5 mg oral Nightly   • [START ON 1/20/2022] dabigatran etexilate  150 mg oral BID   • escitalopram  10 mg oral Daily   • famotidine  20 mg oral BID   • fluticasone propionate  2 spray Each Nostril Daily   •  Family at bedside updated per pt request.     "guaiFENesin  600 mg oral BID   • heparin (porcine)  5,000 Units subcutaneous q8h MARIAN   • hydrochlorothiazide  12.5 mg oral Daily   • lidocaine  1 patch Topical Daily   • losartan  100 mg oral Daily with dinner   • sennosides-docusate sodium  1 tablet oral BID   • umeclidinium-vilanteroL  1 puff inhalation Daily     Continuous Infusions:  PRN Meds:.albuterol HFA  •  bisacodyL  •  cyclobenzaprine  •  oxyCODONE  •  oxyCODONE  •  polyethylene glycol     Lab Results   Component Value Date    WBC 5.78 01/12/2022    HGB 13.0 (L) 01/12/2022    HCT 39.2 (L) 01/12/2022    MCV 88.9 01/12/2022     01/12/2022       Lab Results   Component Value Date    GLUCOSE 102 (H) 01/12/2022    CALCIUM 9.2 01/12/2022     01/12/2022    K 4.4 01/12/2022    CO2 31 01/12/2022    CL 98 01/12/2022    BUN 15 01/12/2022    CREATININE 0.8 01/12/2022       Subjective: Patient seen and examined no chest pain or shortness of breath, tolerating therapies, had good nasal BP. Uneventful weekend.    Team 1/17:    Nursing: continent B&B, skin intact    Functional status:    PT : CS fortransfers , touching assist for amb 200f RW    OT: ADLs: transfers touching assist, using adaptive equipment, CS for toileting, min A for UE dressing/colar managment    Psych : mild memory issues, anxious about discharge,     Physical exam:  Physical examination today showed a 71-year-old man in no acute distress.  Patient awake alert obeys commands.     Blood pressure (!) 150/71, pulse 81, temperature 36.6 °C (97.9 °F), temperature source Oral, resp. rate 16, height 1.676 m (5' 6\"), weight 89.7 kg (197 lb 12.8 oz), SpO2 95 %.       Abdominopelvic, skin negative, mucous brains moist.     Neck supple     Heart regular rate     Lungs decreased breath sounds on the right side     Examination was benign     Musculoskeletal exam: Range of motion within functional mid bilateral upper extremity and bilateral extremity except decreased right shoulder abduction.  Patient " does agree that he will not allow extremity.  Neuro exam: Mental status as above patient able to be commands.  Cranial nerve examination shows face symmetric, tongue midline, completely intact and visual fields are full.  Motor examination: Right upper extremity deltoid 3/5, biceps 5/5 and  3/5, finger extension 4/5.  Examination of the right lower extremity hip flexion 4 -/5, quad and ankle dorsiflexion 5/5.  Examination of the left upper extremity 5/5.  [place] of the left lower extremity hip flexion 4/5, quad and ankle dorsiflexion 5/5.  Sensory examination grossly normal.  Deep tendon reflexes are symmetrical.  There was no evidence of cerebellar signs and gait not tested at this time.     Impression:     Ambulatory ADL dysfunction due to:    History of fall last) with growth none displaced odontoid fracture with type II morphology and nonunion now status post ORIF C2, C1-3 laminectomy posterior cervical fusion at multiple levels.     Postoperative anemia     History of atrial fibrillation on Pradaxa     History of coronary artery disease     History of lung cancer status post left lower lobe resection     History of breast cancer     History of COPD/tobacco use     GERD     Hypertension on Norvasc, losartan and hydrochlorothiazide     History of COVID-19     Hyperlipidemia on Lipitor     History of right shoulder rotator cuff repair     History of carpal tunnel syndrome     Obesity followed by dietitian     History of anxiety/depression on Lexapro     Plan:     Patient will continue physical therapy, and Occupational Therapy.  We will consult Dr. Rivera for medical management,  [place] neurology, Dr. Boyd psychiatry and patient also be followed by cardiology.  Patient will be followed by psychology and case management for support.  Patient will continue to use heart cervical collar all the times.  [place] patient precautions include cardiac, fall and orthopedic spinal precautions.  Patient will continue on  Xarelto for history of atrial fibrillation.  We will monitor routine labs and additional monitoring healing of the posterior cervical incision closely.     Goals: To improve overall functioning for transfers, ambulation and ADLs     Extensive length of stay 1/25     Discussed with Dr. Rivera medical consultant, patient and nursing     This patient note has been dictated using speech recognition software.  Inadvertent speech recognition errors should be disregarded. Please do not hesitate to call my office for clarification.

## 2023-09-27 RX ORDER — FLUTICASONE PROPIONATE 50 MCG
SPRAY, SUSPENSION (ML) NASAL
Qty: 48 G | Refills: 0 | Status: SHIPPED | OUTPATIENT
Start: 2023-09-27 | End: 2024-02-21

## 2023-09-27 RX ORDER — HYDROCHLOROTHIAZIDE 25 MG/1
TABLET ORAL
Qty: 90 TABLET | Refills: 0 | Status: SHIPPED | OUTPATIENT
Start: 2023-09-27 | End: 2024-03-28

## 2023-10-05 ENCOUNTER — HOSPITAL ENCOUNTER (OUTPATIENT)
Dept: RADIOLOGY | Age: 73
Discharge: HOME | End: 2023-10-05
Attending: THORACIC SURGERY (CARDIOTHORACIC VASCULAR SURGERY)
Payer: COMMERCIAL

## 2023-10-05 DIAGNOSIS — C34.32 PRIMARY MALIGNANT NEOPLASM OF LEFT LOWER LOBE OF LUNG (CMS/HCC): Chronic | ICD-10-CM

## 2023-10-05 PROCEDURE — 71250 CT THORAX DX C-: CPT | Mod: ME

## 2023-10-25 ENCOUNTER — OFFICE VISIT (OUTPATIENT)
Dept: THORACIC SURGERY | Facility: CLINIC | Age: 73
End: 2023-10-25
Payer: COMMERCIAL

## 2023-10-25 VITALS
HEART RATE: 59 BPM | SYSTOLIC BLOOD PRESSURE: 146 MMHG | HEIGHT: 66 IN | OXYGEN SATURATION: 97 % | WEIGHT: 195 LBS | DIASTOLIC BLOOD PRESSURE: 80 MMHG | TEMPERATURE: 96.2 F | BODY MASS INDEX: 31.34 KG/M2

## 2023-10-25 DIAGNOSIS — C34.32 PRIMARY MALIGNANT NEOPLASM OF LEFT LOWER LOBE OF LUNG (CMS/HCC): Primary | Chronic | ICD-10-CM

## 2023-10-25 PROCEDURE — 3079F DIAST BP 80-89 MM HG: CPT | Performed by: THORACIC SURGERY (CARDIOTHORACIC VASCULAR SURGERY)

## 2023-10-25 PROCEDURE — 3077F SYST BP >= 140 MM HG: CPT | Performed by: THORACIC SURGERY (CARDIOTHORACIC VASCULAR SURGERY)

## 2023-10-25 PROCEDURE — 99213 OFFICE O/P EST LOW 20 MIN: CPT | Performed by: THORACIC SURGERY (CARDIOTHORACIC VASCULAR SURGERY)

## 2023-10-25 PROCEDURE — 3008F BODY MASS INDEX DOCD: CPT | Performed by: THORACIC SURGERY (CARDIOTHORACIC VASCULAR SURGERY)

## 2023-10-25 NOTE — LETTER
2023     SANTOS Giles  [address] [facility] [address]  [facility] [address]    Patient: Melanie Zelaya  YOB: 1950  Date of Visit: 10/25/2023      Dear Dr. Schultz:    Thank you for referring Melanie Zelaya to me for evaluation. Below are my notes for this consultation.    If you have questions, please do not hesitate to call me. I look forward to following your patient along with you.         Sincerely,        Blaise Guallpa MD        CC: MD Pancho Perera DO Walker, Michael J, MD  10/25/2023  1:24 PM  Sign when Signing Visit  Patient ID: Melanie Zelaya                              : 1950  MRN: [id number]                                            Visit Date: 10/25/2023  Encounter Provider: Blaise Guallpa  Referring Provider: No ref. provider found    Subjective      Patient ID: Melanie Zelaya is a 73 y.o. male.  Chief Complaint: Follow-up      Melanie Zelaya is a 73 y.o. 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  Lymphovascular invasion was indeterminate.  There is no lymph nodes involved.  Skin little bit of a cough now with clear sputum but no hemoptysis.  No significant weight loss over the last 3 months.  No fever, chills, sweats, significant weight loss over the last 3 months, new persistent bony or neurological complaints outside of his degenerative joint disease.  He is going down to [place] for a month to visit his daughter and he is going to do some fishing.    Past Medical History:  has a past medical history of Anxiety, Atrial fibrillation (CMS/HCC), Breast cancer (CMS/HCC), C1 cervical fracture (CMS/HCC), Colon polyp, COPD (chronic obstructive pulmonary disease) (CMS/HCC), Coronary artery calcification seen on CT scan, COVID-19 vaccine series  completed, Depression, Diverticulosis, Diverticulosis, Fracture of pelvis (CMS/HCC) (1968), GERD (gastroesophageal reflux disease), Hearing loss, History of colon polyps, History of COVID-19 (), Hyperlipidemia, Hypertension, Left atrial enlargement, Lung cancer (CMS/HCC), Lung cancer (CMS/[facility]), Pneumonia (), Seasonal allergies, and Wrist fracture.  Past Surgical History:  has a past surgical history that includes Lung lobectomy (Left, ); Shoulder surgery (Right, ); Rotator cuff repair (Right, ); Tonsillectomy; Nasal polyp surgery; Hand surgery (Bilateral); and Colonoscopy.  Social History:   Social History     Tobacco Use    Smoking status: Former     Packs/day: 2     Types: Cigarettes     Quit date: 2010     Years since quittin.5    Smokeless tobacco: Never   Vaping Use    Vaping Use: Never used   Substance Use Topics    Alcohol use: No     Comment: RARE    Drug use: No     Family History: family history includes Cancer in his nephew; Cirrhosis in his biological father; Colon cancer in his biological brother; Diabetes in his biological mother; Lung cancer in his biological brother; No Known Problems in his maternal grandfather, maternal grandmother, paternal grandfather, and paternal grandmother; Pancreatic cancer in his biological sister; Prostate cancer in his biological brother.  Medications:   Current Outpatient Medications:     albuterol HFA (PROAIR HFA) 90 mcg/actuation inhaler, Inhale 2 puffs 2 (two) times a day as needed for wheezing or shortness of breath., Disp: 1 each, Rfl: 3    amLODIPine (NORVASC) 10 mg tablet, Take 1 tablet by mouth nightly, Disp: 90 tablet, Rfl: 1    ANORO ELLIPTA 62.5-25 mcg/actuation blister with device, Inhale 1 puff by mouth once daily, Disp: 180 each, Rfl: 1    atorvastatin (LIPITOR) 40 mg tablet, Take 1 tablet by mouth once daily, Disp: 90 tablet, Rfl: 1    carvediloL (COREG) 6.25 mg tablet, TAKE 1 TABLET BY MOUTH TWICE DAILY WITH MEALS,  "Disp: 180 tablet, Rfl: 1    cyclobenzaprine (FLEXERIL) 10 mg tablet, Take 1 tablet (10 mg total) by mouth 3 (three) times a day as needed for muscle spasms., Disp: 30 tablet, Rfl: 1    dabigatran etexilate 150 mg capsu, Take 1 capsule (150 mg total) by mouth 2 (two) times a day Indications: treatment to prevent blood clots in chronic atrial fibrillation., Disp: 60 capsule, Rfl: 0    escitalopram (LEXAPRO) 10 mg tablet, Take 1 tablet by mouth once daily, Disp: 90 tablet, Rfl: 1    famotidine (PEPCID) 20 mg tablet, Take 20 mg by mouth 2 (two) times a day., Disp: , Rfl:     FISH OIL-omega-3 fatty acids (FISH OIL) 340-1,000 mg capsule, Take 2 capsules by mouth 2 (two) times a day., Disp: , Rfl:     fluticasone propionate (FLONASE) 50 mcg/actuation nasal spray, Use 2 spray(s) in each nostril once daily, Disp: 48 g, Rfl: 0    hydrochlorothiazide (HYDRODIURIL) 25 mg tablet, Take 1 tablet by mouth once daily, Disp: 90 tablet, Rfl: 0    losartan (COZAAR) 100 mg tablet, TAKE 1 TABLET BY MOUTH ONCE DAILY WITH SUPPER, Disp: 90 tablet, Rfl: 1    multivitamin tablet, Take by mouth daily., Disp: , Rfl:     oxyCODONE-acetaminophen (PERCOCET) 5-325 mg per tablet, Take 1 tablet by mouth every 6 (six) hours as needed for moderate pain. NO driving or operating heavy machinery when taking., Disp: 12 tablet, Rfl: 0    meloxicam (MOBIC) 7.5 mg tablet, Take 1 tablet (7.5 mg total) by mouth daily., Disp: 30 tablet, Rfl: 0  Allergies: has No Known Allergies.   E-cigarette/Vaping     Questions Responses    E-cigarette/Vaping Use Never User          The following have been reviewed and updated as appropriate in this visit:           Review of Systems   All other systems reviewed and are negative.      Objective   Vitals:   Visit Vitals  BP (!) 146/80 (BP Location: Right upper arm, Patient Position: Sitting)   Pulse (!) 59   Temp (!) 35.7 °C (96.2 °F)   Ht 1.676 m (5' 6\")   Wt 88.5 kg (195 lb)   SpO2 97%   BMI 31.47 kg/m² "       Physical Exam  Vitals reviewed.   Constitutional:       Appearance: He is well-developed.   HENT:      Head: Normocephalic.   Eyes:      Pupils: Pupils are equal, round, and reactive to light.   Cardiovascular:      Rate and Rhythm: Normal rate and regular rhythm.   Pulmonary:      Effort: Pulmonary effort is normal.      Breath sounds: Normal breath sounds.   Musculoskeletal:         General: Normal range of motion.   Skin:     General: Skin is warm and dry.   Neurological:      Mental Status: He is alert.         Assessment/Plan   Independent review of all imaging was done by myself as well as review of the radiologists readings and comparison to prior films.  CAT scan of the chest on October 5 shows no evidence of any recurrence.  We will see him back in a year with a CAT scan of chest without contrast.  I told him to say hi to his daughter who worked in the PACU at [facility].      Problem List Items Addressed This Visit        Respiratory    Primary malignant neoplasm of left lower lobe of lung (CMS/HCC) - Primary (Chronic)    Relevant Orders    CT CHEST WITHOUT IV CONTRAST         Blaise Guallpa MD

## 2023-10-25 NOTE — PROGRESS NOTES
Patient ID: Melanie Zelaya                              : 1950  MRN: [id number]                                            Visit Date: 10/25/2023  Encounter Provider: Blaise Gaullpa  Referring Provider: No ref. provider found    Subjective      Patient ID: Melanie Zelaya is a 73 y.o. male.  Chief Complaint: Follow-up      Melanie Zelaya is a 73 y.o. 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  Lymphovascular invasion was indeterminate.  There is no lymph nodes involved.  Skin little bit of a cough now with clear sputum but no hemoptysis.  No significant weight loss over the last 3 months.  No fever, chills, sweats, significant weight loss over the last 3 months, new persistent bony or neurological complaints outside of his degenerative joint disease.  He is going down to [place] for a month to visit his daughter and he is going to do some fishing.    Past Medical History:  has a past medical history of Anxiety, Atrial fibrillation (CMS/HCC), Breast cancer (CMS/HCC), C1 cervical fracture (CMS/HCC), Colon polyp, COPD (chronic obstructive pulmonary disease) (CMS/HCC), Coronary artery calcification seen on CT scan, COVID-19 vaccine series completed, Depression, Diverticulosis, Diverticulosis, Fracture of pelvis (CMS/HCC) (), GERD (gastroesophageal reflux disease), Hearing loss, History of colon polyps, History of COVID-19 (), Hyperlipidemia, Hypertension, Left atrial enlargement, Lung cancer (CMS/HCC), Lung cancer (CMS/HCC), Pneumonia (), Seasonal allergies, and Wrist fracture.  Past Surgical History:  has a past surgical history that includes Lung lobectomy (Left, ); Shoulder surgery (Right, ); Rotator cuff repair (Right, ); Tonsillectomy; Nasal polyp surgery; Hand surgery (Bilateral); and Colonoscopy.  Social History:    Social History     Tobacco Use    Smoking status: Former     Packs/day: 2     Types: Cigarettes     Quit date: 2010     Years since quittin.5    Smokeless tobacco: Never   Vaping Use    Vaping Use: Never used   Substance Use Topics    Alcohol use: No     Comment: RARE    Drug use: No     Family History: family history includes Cancer in his nephew; Cirrhosis in his biological father; Colon cancer in his biological brother; Diabetes in his biological mother; Lung cancer in his biological brother; No Known Problems in his maternal grandfather, maternal grandmother, paternal grandfather, and paternal grandmother; Pancreatic cancer in his biological sister; Prostate cancer in his biological brother.  Medications:   Current Outpatient Medications:     albuterol HFA (PROAIR HFA) 90 mcg/actuation inhaler, Inhale 2 puffs 2 (two) times a day as needed for wheezing or shortness of breath., Disp: 1 each, Rfl: 3    amLODIPine (NORVASC) 10 mg tablet, Take 1 tablet by mouth nightly, Disp: 90 tablet, Rfl: 1    ANORO ELLIPTA 62.5-25 mcg/actuation blister with device, Inhale 1 puff by mouth once daily, Disp: 180 each, Rfl: 1    atorvastatin (LIPITOR) 40 mg tablet, Take 1 tablet by mouth once daily, Disp: 90 tablet, Rfl: 1    carvediloL (COREG) 6.25 mg tablet, TAKE 1 TABLET BY MOUTH TWICE DAILY WITH MEALS, Disp: 180 tablet, Rfl: 1    cyclobenzaprine (FLEXERIL) 10 mg tablet, Take 1 tablet (10 mg total) by mouth 3 (three) times a day as needed for muscle spasms., Disp: 30 tablet, Rfl: 1    dabigatran etexilate 150 mg capsu, Take 1 capsule (150 mg total) by mouth 2 (two) times a day Indications: treatment to prevent blood clots in chronic atrial fibrillation., Disp: 60 capsule, Rfl: 0    escitalopram (LEXAPRO) 10 mg tablet, Take 1 tablet by mouth once daily, Disp: 90 tablet, Rfl: 1    famotidine (PEPCID) 20 mg tablet, Take 20 mg by mouth 2 (two) times a day., Disp: , Rfl:     FISH OIL-omega-3 fatty acids (FISH  "OIL) 340-1,000 mg capsule, Take 2 capsules by mouth 2 (two) times a day., Disp: , Rfl:     fluticasone propionate (FLONASE) 50 mcg/actuation nasal spray, Use 2 spray(s) in each nostril once daily, Disp: 48 g, Rfl: 0    hydrochlorothiazide (HYDRODIURIL) 25 mg tablet, Take 1 tablet by mouth once daily, Disp: 90 tablet, Rfl: 0    losartan (COZAAR) 100 mg tablet, TAKE 1 TABLET BY MOUTH ONCE DAILY WITH SUPPER, Disp: 90 tablet, Rfl: 1    multivitamin tablet, Take by mouth daily., Disp: , Rfl:     oxyCODONE-acetaminophen (PERCOCET) 5-325 mg per tablet, Take 1 tablet by mouth every 6 (six) hours as needed for moderate pain. NO driving or operating heavy machinery when taking., Disp: 12 tablet, Rfl: 0    meloxicam (MOBIC) 7.5 mg tablet, Take 1 tablet (7.5 mg total) by mouth daily., Disp: 30 tablet, Rfl: 0  Allergies: has No Known Allergies.   E-cigarette/Vaping     Questions Responses    E-cigarette/Vaping Use Never User           The following have been reviewed and updated as appropriate in this visit:           Review of Systems   All other systems reviewed and are negative.      Objective   Vitals:   Visit Vitals  BP (!) 146/80 (BP Location: Right upper arm, Patient Position: Sitting)   Pulse (!) 59   Temp (!) 35.7 °C (96.2 °F)   Ht 1.676 m (5' 6\")   Wt 88.5 kg (195 lb)   SpO2 97%   BMI 31.47 kg/m²       Physical Exam  Vitals reviewed.   Constitutional:       Appearance: He is well-developed.   HENT:      Head: Normocephalic.   Eyes:      Pupils: Pupils are equal, round, and reactive to light.   Cardiovascular:      Rate and Rhythm: Normal rate and regular rhythm.   Pulmonary:      Effort: Pulmonary effort is normal.      Breath sounds: Normal breath sounds.   Musculoskeletal:         General: Normal range of motion.   Skin:     General: Skin is warm and dry.   Neurological:      Mental Status: He is alert.         Assessment/Plan   Independent review of all imaging was done by myself as well as review of the " radiologists readings and comparison to prior films.  CAT scan of the chest on October 5 shows no evidence of any recurrence.  We will see him back in a year with a CAT scan of chest without contrast.  I told him to say hi to his daughter who worked in the PACU at [facility].       Problem List Items Addressed This Visit        Respiratory    Primary malignant neoplasm of left lower lobe of lung (CMS/HCC) - Primary (Chronic)    Relevant Orders    CT CHEST WITHOUT IV CONTRAST         Blaise Guallpa MD

## 2023-10-27 ENCOUNTER — TRANSCRIBE ORDERS (OUTPATIENT)
Dept: REGISTRATION | Facility: CLINIC | Age: 73
End: 2023-10-27

## 2023-10-27 ENCOUNTER — OFFICE VISIT (OUTPATIENT)
Dept: PRIMARY CARE | Facility: CLINIC | Age: 73
End: 2023-10-27
Payer: COMMERCIAL

## 2023-10-27 ENCOUNTER — APPOINTMENT (OUTPATIENT)
Dept: LAB | Facility: CLINIC | Age: 73
End: 2023-10-27
Attending: NURSE PRACTITIONER
Payer: COMMERCIAL

## 2023-10-27 VITALS
HEART RATE: 64 BPM | SYSTOLIC BLOOD PRESSURE: 122 MMHG | WEIGHT: 195.2 LBS | BODY MASS INDEX: 31.51 KG/M2 | OXYGEN SATURATION: 96 % | RESPIRATION RATE: 18 BRPM | DIASTOLIC BLOOD PRESSURE: 78 MMHG | TEMPERATURE: 97.5 F

## 2023-10-27 DIAGNOSIS — I48.0 PAROXYSMAL ATRIAL FIBRILLATION (CMS/HCC): ICD-10-CM

## 2023-10-27 DIAGNOSIS — E55.9 VITAMIN D DEFICIENCY: ICD-10-CM

## 2023-10-27 DIAGNOSIS — J43.9 PULMONARY EMPHYSEMA, UNSPECIFIED EMPHYSEMA TYPE (CMS/HCC): ICD-10-CM

## 2023-10-27 DIAGNOSIS — J45.40 MODERATE PERSISTENT ASTHMA WITHOUT COMPLICATION: ICD-10-CM

## 2023-10-27 DIAGNOSIS — M54.2 CHRONIC NECK PAIN: ICD-10-CM

## 2023-10-27 DIAGNOSIS — I10 ESSENTIAL (PRIMARY) HYPERTENSION: ICD-10-CM

## 2023-10-27 DIAGNOSIS — F41.9 ANXIETY: ICD-10-CM

## 2023-10-27 DIAGNOSIS — I10 PRIMARY HYPERTENSION: ICD-10-CM

## 2023-10-27 DIAGNOSIS — I48.21 PERMANENT ATRIAL FIBRILLATION (CMS/HCC): ICD-10-CM

## 2023-10-27 DIAGNOSIS — R91.8 MULTIPLE PULMONARY NODULES: ICD-10-CM

## 2023-10-27 DIAGNOSIS — J43.8 OTHER EMPHYSEMA (CMS/HCC): ICD-10-CM

## 2023-10-27 DIAGNOSIS — E78.00 PURE HYPERCHOLESTEROLEMIA: Primary | ICD-10-CM

## 2023-10-27 DIAGNOSIS — E78.00 PURE HYPERCHOLESTEROLEMIA: ICD-10-CM

## 2023-10-27 DIAGNOSIS — K21.9 GASTROESOPHAGEAL REFLUX DISEASE WITHOUT ESOPHAGITIS: ICD-10-CM

## 2023-10-27 DIAGNOSIS — C34.32 PRIMARY MALIGNANT NEOPLASM OF LEFT LOWER LOBE OF LUNG (CMS/HCC): Primary | Chronic | ICD-10-CM

## 2023-10-27 DIAGNOSIS — G89.29 CHRONIC NECK PAIN: ICD-10-CM

## 2023-10-27 PROBLEM — Z98.1 S/P CERVICAL SPINAL FUSION: Status: RESOLVED | Noted: 2022-01-11 | Resolved: 2023-10-27

## 2023-10-27 LAB
25(OH)D3 SERPL-MCNC: 31 NG/ML (ref 30–100)
ALBUMIN SERPL-MCNC: 4.7 G/DL (ref 3.5–5.7)
ALP SERPL-CCNC: 56 IU/L (ref 34–125)
ALT SERPL-CCNC: 20 IU/L (ref 7–52)
ANION GAP SERPL CALC-SCNC: 9 MEQ/L (ref 3–15)
AST SERPL-CCNC: 22 IU/L (ref 13–39)
BILIRUB SERPL-MCNC: 1 MG/DL (ref 0.3–1.2)
BUN SERPL-MCNC: 18 MG/DL (ref 7–25)
CALCIUM SERPL-MCNC: 9.5 MG/DL (ref 8.6–10.3)
CHLORIDE SERPL-SCNC: 104 MEQ/L (ref 98–107)
CHOLEST SERPL-MCNC: 137 MG/DL
CO2 SERPL-SCNC: 27 MEQ/L (ref 21–31)
CREAT SERPL-MCNC: 0.9 MG/DL (ref 0.7–1.3)
ERYTHROCYTE [DISTWIDTH] IN BLOOD BY AUTOMATED COUNT: 13.2 % (ref 11.6–14.4)
EST. AVERAGE GLUCOSE BLD GHB EST-MCNC: 123 MG/DL
GFR SERPL CREATININE-BSD FRML MDRD: >60 ML/MIN/1.73M*2
GLUCOSE SERPL-MCNC: 111 MG/DL (ref 70–99)
HBA1C MFR BLD: 5.9 %
HCT VFR BLDCO AUTO: 42.2 % (ref 40.1–51)
HDLC SERPL-MCNC: 44 MG/DL
HDLC SERPL: 3.1 {RATIO}
HGB BLD-MCNC: 14.4 G/DL (ref 13.7–17.5)
LDLC SERPL CALC-MCNC: 74 MG/DL
MCH RBC QN AUTO: 29.9 PG (ref 28–33.2)
MCHC RBC AUTO-ENTMCNC: 34.1 G/DL (ref 32.2–36.5)
MCV RBC AUTO: 87.6 FL (ref 83–98)
NONHDLC SERPL-MCNC: 93 MG/DL
PDW BLD AUTO: 10.5 FL (ref 9.4–12.4)
PLATELET # BLD AUTO: 198 K/UL (ref 150–350)
POTASSIUM SERPL-SCNC: 3.8 MEQ/L (ref 3.5–5.1)
PROT SERPL-MCNC: 6.9 G/DL (ref 6–8.2)
RBC # BLD AUTO: 4.82 M/UL (ref 4.5–5.8)
SODIUM SERPL-SCNC: 140 MEQ/L (ref 136–145)
TRIGL SERPL-MCNC: 93 MG/DL
TSH SERPL DL<=0.05 MIU/L-ACNC: 1.73 MIU/L (ref 0.34–5.6)
WBC # BLD AUTO: 5.03 K/UL (ref 3.8–10.5)

## 2023-10-27 PROCEDURE — 99214 OFFICE O/P EST MOD 30 MIN: CPT | Mod: 25 | Performed by: NURSE PRACTITIONER

## 2023-10-27 PROCEDURE — 83036 HEMOGLOBIN GLYCOSYLATED A1C: CPT

## 2023-10-27 PROCEDURE — 85027 COMPLETE CBC AUTOMATED: CPT

## 2023-10-27 PROCEDURE — G0008 ADMIN INFLUENZA VIRUS VAC: HCPCS | Performed by: NURSE PRACTITIONER

## 2023-10-27 PROCEDURE — 3008F BODY MASS INDEX DOCD: CPT | Performed by: NURSE PRACTITIONER

## 2023-10-27 PROCEDURE — 3074F SYST BP LT 130 MM HG: CPT | Performed by: NURSE PRACTITIONER

## 2023-10-27 PROCEDURE — 80053 COMPREHEN METABOLIC PANEL: CPT

## 2023-10-27 PROCEDURE — 80061 LIPID PANEL: CPT

## 2023-10-27 PROCEDURE — 82306 VITAMIN D 25 HYDROXY: CPT

## 2023-10-27 PROCEDURE — 3078F DIAST BP <80 MM HG: CPT | Performed by: NURSE PRACTITIONER

## 2023-10-27 PROCEDURE — 84443 ASSAY THYROID STIM HORMONE: CPT

## 2023-10-27 PROCEDURE — 36415 COLL VENOUS BLD VENIPUNCTURE: CPT

## 2023-10-27 PROCEDURE — 90694 VACC AIIV4 NO PRSRV 0.5ML IM: CPT | Performed by: NURSE PRACTITIONER

## 2023-10-27 RX ORDER — CYCLOBENZAPRINE HCL 10 MG
10 TABLET ORAL 3 TIMES DAILY PRN
Qty: 30 TABLET | Refills: 1 | Status: SHIPPED | OUTPATIENT
Start: 2023-10-27 | End: 2024-04-29 | Stop reason: SDUPTHER

## 2023-10-27 RX ORDER — CYANOCOBALAMIN (VITAMIN B-12) 500 MCG
TABLET ORAL DAILY
COMMUNITY

## 2023-10-27 ASSESSMENT — ENCOUNTER SYMPTOMS
DIZZINESS: 0
HEADACHES: 0
PALPITATIONS: 0
CHILLS: 0
COUGH: 0
ADENOPATHY: 0
ABDOMINAL PAIN: 0
LIGHT-HEADEDNESS: 0
FEVER: 0
NECK PAIN: 1
TROUBLE SWALLOWING: 0
BRUISES/BLEEDS EASILY: 0
SHORTNESS OF BREATH: 0
FATIGUE: 0
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
## 2023-11-02 ENCOUNTER — TELEPHONE (OUTPATIENT)
Dept: PRIMARY CARE | Facility: CLINIC | Age: 73
End: 2023-11-02
Payer: COMMERCIAL

## 2023-11-02 NOTE — TELEPHONE ENCOUNTER
"Spoke to Mr Nathalie who was inquiring about he \"pneumonia shot\". Received   Pneumococcal Conjugate 20-Valent 4/21/2023     He acknowledges.  "

## 2023-12-04 ENCOUNTER — TELEPHONE (OUTPATIENT)
Dept: PRIMARY CARE | Facility: CLINIC | Age: 73
End: 2023-12-04
Payer: COMMERCIAL

## 2023-12-04 NOTE — TELEPHONE ENCOUNTER
Pt called about his medication. [place] all of his medication except his: Vitamin B, Hydrochlorothiazide and his Flonase, but is missing all the other medication. Pt would like to know if it is alright that he missed taking his medications for 1-2 days? Pt would like a call back to discuss. Please advise.

## 2023-12-04 NOTE — TELEPHONE ENCOUNTER
Spoke with pt and partner, they are currently driving home from [place], pt left medications. Advised to resume taking his medications as prescribed once he gets back home and to call with any issues

## 2023-12-06 NOTE — PLAN OF CARE
Problem: Adult Inpatient Plan of Care  Goal: Plan of Care Review  Outcome: Progressing  Flowsheets (Taken 1/7/2022 1104)  Outcome Summary: Pt with planned admission for surgery. Patient is now s/p C1-C3 posterior fusion, ORIF C2.  Goal: Readiness for Transition of Care  Outcome: Progressing  Intervention: Mutually Develop Transition Plan  Flowsheets (Taken 1/7/2022 1104)  Anticipated Discharge Disposition: acute rehab/Inpatient Rehab Facility  Equipment Needed After Discharge: (TBD) other (see comments)  Assistive Device/Animal Currently Used at Home: (Brace)   walker, front-wheeled   other (see comments)  Anticipated Changes Related to Illness: inability to care for self  Outpatient/Agency/Support Group Needs: inpatient rehabilitation facility  Readmission Within the Last 30 Days: no previous admission in last 30 days  Patient/Family Anticipates Transition to: inpatient rehabilitation facility  Transportation Anticipated: health plan transportation  Concerns to be Addressed:   care coordination/care conferences   discharge planning  Offered/Gave Vendor List: yes   CM spoke with Pt to complete admission assessment. Pt verified all demographic info. Pt lives alone but does have a friend to help out sometimes.     PLOF: Pt was independent with brace and walker, states it was getting harder and harder to move around recently. Pt denies having HC or previous SNF stays. Pt aware he needs to go inpatient upon D/C, ok with referral to [facility] ([facility] liaison notified).     Pt states his POA is his daughter Darshan [phone]. He adds secondary contact as his son Melanie [phone]. Pt states both of his children live far away and are not around to help him usually. Daughter is here visiting currently.     PCP: Dr. Keisha Fitzpatrick  Pharmacy: [facility]    Pt denies D&A use  ETOH/UDS not done    Pt is not a     Pt is full covid vaccinated with Moderna +booster.    Home

## 2023-12-07 ENCOUNTER — TELEPHONE (OUTPATIENT)
Dept: PRIMARY CARE | Facility: CLINIC | Age: 73
End: 2023-12-07

## 2023-12-07 NOTE — TELEPHONE ENCOUNTER
Patient is hoping that someone could give him a call asap about his medication. He is currently at the pharmacy and they no longer carry it. He wasn't sure of the name but its a blood thinner medication.

## 2023-12-07 NOTE — TELEPHONE ENCOUNTER
Spoke with pt and he is having an issue with his Pradaxa, the pharmacy has a call into the cardiologist. Advised pt that we are not the prescribers so he should call cardiology for update on medication.

## 2023-12-21 RX ORDER — ESCITALOPRAM OXALATE 10 MG/1
TABLET ORAL
Qty: 90 TABLET | Refills: 0 | Status: SHIPPED | OUTPATIENT
Start: 2023-12-21 | End: 2024-03-18

## 2023-12-27 RX ORDER — UMECLIDINIUM BROMIDE AND VILANTEROL TRIFENATATE 62.5; 25 UG/1; UG/1
POWDER RESPIRATORY (INHALATION)
Qty: 180 EACH | Refills: 0 | Status: SHIPPED | OUTPATIENT
Start: 2023-12-27 | End: 2024-03-14

## 2024-01-03 RX ORDER — AMLODIPINE BESYLATE 10 MG/1
TABLET ORAL
Qty: 90 TABLET | Refills: 0 | Status: SHIPPED | OUTPATIENT
Start: 2024-01-03 | End: 2024-04-01

## 2024-01-17 RX ORDER — ATORVASTATIN CALCIUM 40 MG/1
TABLET, FILM COATED ORAL
Qty: 90 TABLET | Refills: 0 | Status: SHIPPED | OUTPATIENT
Start: 2024-01-17 | End: 2024-04-11

## 2024-02-16 ENCOUNTER — TELEPHONE (OUTPATIENT)
Dept: PRIMARY CARE | Facility: CLINIC | Age: 74
End: 2024-02-16
Payer: COMMERCIAL

## 2024-02-16 NOTE — TELEPHONE ENCOUNTER
Spoke with pt, he wanted to report that he had the covid booster. Unsure which manufacture he had but he will call back with info or send [employer] message

## 2024-02-19 NOTE — TELEPHONE ENCOUNTER
Pt called back to speak with JASON Cyr, however the call dropped. I lvm stating that MA will call pt back to get information about Covid vaccine.

## 2024-02-21 RX ORDER — FLUTICASONE PROPIONATE 50 MCG
SPRAY, SUSPENSION (ML) NASAL
Qty: 48 G | Refills: 0 | Status: SHIPPED | OUTPATIENT
Start: 2024-02-21 | End: 2024-06-17

## 2024-03-14 RX ORDER — UMECLIDINIUM BROMIDE AND VILANTEROL TRIFENATATE 62.5; 25 UG/1; UG/1
POWDER RESPIRATORY (INHALATION)
Qty: 180 EACH | Refills: 0 | Status: SHIPPED | OUTPATIENT
Start: 2024-03-14 | End: 2024-03-28

## 2024-03-18 RX ORDER — ESCITALOPRAM OXALATE 10 MG/1
TABLET ORAL
Qty: 90 TABLET | Refills: 0 | Status: SHIPPED | OUTPATIENT
Start: 2024-03-18 | End: 2024-06-11

## 2024-03-24 DIAGNOSIS — I10 PRIMARY HYPERTENSION: Primary | ICD-10-CM

## 2024-03-24 DIAGNOSIS — J45.40 MODERATE PERSISTENT ASTHMA WITHOUT COMPLICATION: ICD-10-CM

## 2024-03-28 RX ORDER — HYDROCHLOROTHIAZIDE 25 MG/1
TABLET ORAL
Qty: 90 TABLET | Refills: 0 | Status: SHIPPED | OUTPATIENT
Start: 2024-03-28 | End: 2024-06-25

## 2024-03-28 RX ORDER — UMECLIDINIUM BROMIDE AND VILANTEROL TRIFENATATE 62.5; 25 UG/1; UG/1
POWDER RESPIRATORY (INHALATION)
Qty: 180 EACH | Refills: 0 | Status: SHIPPED | OUTPATIENT
Start: 2024-03-28 | End: 2024-04-01 | Stop reason: ALTCHOICE

## 2024-04-01 ENCOUNTER — TELEPHONE (OUTPATIENT)
Dept: PRIMARY CARE | Facility: CLINIC | Age: 74
End: 2024-04-01
Payer: COMMERCIAL

## 2024-04-01 RX ORDER — AMLODIPINE BESYLATE 10 MG/1
TABLET ORAL
Qty: 90 TABLET | Refills: 0 | Status: SHIPPED | OUTPATIENT
Start: 2024-04-01 | End: 2024-06-25

## 2024-04-01 NOTE — TELEPHONE ENCOUNTER
Pt LVM requesting a return call to discuss his ANORO ELLIPTA 62.5-25 mcg/actuation blister with device  Please advise

## 2024-04-01 NOTE — TELEPHONE ENCOUNTER
Spoke w/pt. He is no longer taking Anoro Ellipta. Pulmonologist changed pt's therapy to Trelegy. Pt's medication list has been reconciled.

## 2024-04-11 RX ORDER — ATORVASTATIN CALCIUM 40 MG/1
TABLET, FILM COATED ORAL
Qty: 90 TABLET | Refills: 0 | Status: SHIPPED | OUTPATIENT
Start: 2024-04-11 | End: 2024-07-08

## 2024-04-29 ENCOUNTER — OFFICE VISIT (OUTPATIENT)
Dept: PRIMARY CARE | Facility: CLINIC | Age: 74
End: 2024-04-29
Payer: COMMERCIAL

## 2024-04-29 VITALS
OXYGEN SATURATION: 97 % | RESPIRATION RATE: 18 BRPM | SYSTOLIC BLOOD PRESSURE: 126 MMHG | DIASTOLIC BLOOD PRESSURE: 78 MMHG | BODY MASS INDEX: 30.25 KG/M2 | TEMPERATURE: 97.3 F | WEIGHT: 187.4 LBS | HEART RATE: 73 BPM

## 2024-04-29 DIAGNOSIS — E55.9 VITAMIN D DEFICIENCY: ICD-10-CM

## 2024-04-29 DIAGNOSIS — G89.29 CHRONIC PAIN OF BOTH KNEES: ICD-10-CM

## 2024-04-29 DIAGNOSIS — M25.552 BILATERAL HIP PAIN: ICD-10-CM

## 2024-04-29 DIAGNOSIS — M25.551 BILATERAL HIP PAIN: ICD-10-CM

## 2024-04-29 DIAGNOSIS — M25.50 ARTHRALGIA, UNSPECIFIED JOINT: ICD-10-CM

## 2024-04-29 DIAGNOSIS — M25.562 CHRONIC PAIN OF BOTH KNEES: ICD-10-CM

## 2024-04-29 DIAGNOSIS — K21.9 GASTROESOPHAGEAL REFLUX DISEASE WITHOUT ESOPHAGITIS: ICD-10-CM

## 2024-04-29 DIAGNOSIS — M54.50 LOW BACK PAIN WITHOUT SCIATICA, UNSPECIFIED BACK PAIN LATERALITY, UNSPECIFIED CHRONICITY: ICD-10-CM

## 2024-04-29 DIAGNOSIS — I10 PRIMARY HYPERTENSION: ICD-10-CM

## 2024-04-29 DIAGNOSIS — F41.9 ANXIETY: ICD-10-CM

## 2024-04-29 DIAGNOSIS — Z12.5 SCREENING PSA (PROSTATE SPECIFIC ANTIGEN): ICD-10-CM

## 2024-04-29 DIAGNOSIS — J45.40 MODERATE PERSISTENT ASTHMA WITHOUT COMPLICATION: Primary | ICD-10-CM

## 2024-04-29 DIAGNOSIS — J43.9 PULMONARY EMPHYSEMA, UNSPECIFIED EMPHYSEMA TYPE (CMS/HCC): ICD-10-CM

## 2024-04-29 DIAGNOSIS — I48.21 PERMANENT ATRIAL FIBRILLATION (CMS/HCC): ICD-10-CM

## 2024-04-29 DIAGNOSIS — C34.32 PRIMARY MALIGNANT NEOPLASM OF LEFT LOWER LOBE OF LUNG (CMS/HCC): ICD-10-CM

## 2024-04-29 DIAGNOSIS — M25.561 CHRONIC PAIN OF BOTH KNEES: ICD-10-CM

## 2024-04-29 PROBLEM — J43.8 OTHER EMPHYSEMA (CMS/HCC): Status: RESOLVED | Noted: 2019-02-21 | Resolved: 2024-04-29

## 2024-04-29 PROCEDURE — 99214 OFFICE O/P EST MOD 30 MIN: CPT | Performed by: NURSE PRACTITIONER

## 2024-04-29 PROCEDURE — 3078F DIAST BP <80 MM HG: CPT | Performed by: NURSE PRACTITIONER

## 2024-04-29 PROCEDURE — 3008F BODY MASS INDEX DOCD: CPT | Performed by: NURSE PRACTITIONER

## 2024-04-29 PROCEDURE — 3074F SYST BP LT 130 MM HG: CPT | Performed by: NURSE PRACTITIONER

## 2024-04-29 RX ORDER — TRAVOPROST OPHTHALMIC SOLUTION 0.04 MG/ML
SOLUTION OPHTHALMIC
COMMUNITY
Start: 2024-04-25

## 2024-04-29 RX ORDER — CYCLOBENZAPRINE HCL 10 MG
10 TABLET ORAL 3 TIMES DAILY PRN
Qty: 30 TABLET | Refills: 1 | Status: SHIPPED | OUTPATIENT
Start: 2024-04-29 | End: 2024-09-17 | Stop reason: SDUPTHER

## 2024-04-29 ASSESSMENT — ENCOUNTER SYMPTOMS
HEADACHES: 0
CHILLS: 0
FEVER: 0
LIGHT-HEADEDNESS: 0
ARTHRALGIAS: 1
BRUISES/BLEEDS EASILY: 0
BACK PAIN: 1
NECK PAIN: 1
TROUBLE SWALLOWING: 0
ADENOPATHY: 0
DYSURIA: 0
COUGH: 0
SHORTNESS OF BREATH: 0
PALPITATIONS: 0
ABDOMINAL PAIN: 0
DIZZINESS: 0
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
## 2024-05-01 ENCOUNTER — APPOINTMENT (OUTPATIENT)
Dept: LAB | Age: 74
End: 2024-05-01
Attending: NURSE PRACTITIONER
Payer: COMMERCIAL

## 2024-05-01 ENCOUNTER — HOSPITAL ENCOUNTER (OUTPATIENT)
Dept: RADIOLOGY | Age: 74
Discharge: HOME | End: 2024-05-01
Attending: NURSE PRACTITIONER
Payer: COMMERCIAL

## 2024-05-01 DIAGNOSIS — G89.29 CHRONIC PAIN OF BOTH KNEES: ICD-10-CM

## 2024-05-01 DIAGNOSIS — M54.50 LOW BACK PAIN WITHOUT SCIATICA, UNSPECIFIED BACK PAIN LATERALITY, UNSPECIFIED CHRONICITY: ICD-10-CM

## 2024-05-01 DIAGNOSIS — M25.50 ARTHRALGIA, UNSPECIFIED JOINT: ICD-10-CM

## 2024-05-01 DIAGNOSIS — J45.40 MODERATE PERSISTENT ASTHMA WITHOUT COMPLICATION: ICD-10-CM

## 2024-05-01 DIAGNOSIS — M25.552 BILATERAL HIP PAIN: ICD-10-CM

## 2024-05-01 DIAGNOSIS — M25.561 CHRONIC PAIN OF BOTH KNEES: ICD-10-CM

## 2024-05-01 DIAGNOSIS — M25.562 CHRONIC PAIN OF BOTH KNEES: ICD-10-CM

## 2024-05-01 DIAGNOSIS — M25.551 BILATERAL HIP PAIN: ICD-10-CM

## 2024-05-01 DIAGNOSIS — K21.9 GASTROESOPHAGEAL REFLUX DISEASE WITHOUT ESOPHAGITIS: ICD-10-CM

## 2024-05-01 DIAGNOSIS — E55.9 VITAMIN D DEFICIENCY: ICD-10-CM

## 2024-05-01 DIAGNOSIS — Z12.5 SCREENING PSA (PROSTATE SPECIFIC ANTIGEN): ICD-10-CM

## 2024-05-01 DIAGNOSIS — I10 PRIMARY HYPERTENSION: ICD-10-CM

## 2024-05-01 LAB
25(OH)D3 SERPL-MCNC: 25 NG/ML (ref 30–100)
ALBUMIN SERPL-MCNC: 4.8 G/DL (ref 3.5–5.7)
ALP SERPL-CCNC: 56 IU/L (ref 34–125)
ALT SERPL-CCNC: 18 IU/L (ref 7–52)
ANION GAP SERPL CALC-SCNC: 8 MEQ/L (ref 3–15)
AST SERPL-CCNC: 19 IU/L (ref 13–39)
BASOPHILS # BLD: 0.02 K/UL (ref 0.01–0.1)
BASOPHILS NFR BLD: 0.3 %
BILIRUB SERPL-MCNC: 1 MG/DL (ref 0.3–1.2)
BUN SERPL-MCNC: 19 MG/DL (ref 7–25)
CALCIUM SERPL-MCNC: 9.7 MG/DL (ref 8.6–10.3)
CHLORIDE SERPL-SCNC: 104 MEQ/L (ref 98–107)
CO2 SERPL-SCNC: 28 MEQ/L (ref 21–31)
CREAT SERPL-MCNC: 0.8 MG/DL (ref 0.7–1.3)
CRP SERPL-MCNC: 4.4 MG/L
DIFFERENTIAL METHOD BLD: ABNORMAL
EGFRCR SERPLBLD CKD-EPI 2021: >60 ML/MIN/1.73M*2
EOSINOPHIL # BLD: 0.02 K/UL (ref 0.04–0.54)
EOSINOPHIL NFR BLD: 0.3 %
ERYTHROCYTE [DISTWIDTH] IN BLOOD BY AUTOMATED COUNT: 14.6 % (ref 11.6–14.4)
ERYTHROCYTE [SEDIMENTATION RATE] IN BLOOD BY WESTERGREN METHOD: 13 MM/HR
GLUCOSE SERPL-MCNC: 76 MG/DL (ref 70–99)
HCT VFR BLD AUTO: 44.2 % (ref 40.1–51)
HGB BLD-MCNC: 14.8 G/DL (ref 13.7–17.5)
IMM GRANULOCYTES # BLD AUTO: 0.02 K/UL (ref 0–0.08)
IMM GRANULOCYTES NFR BLD AUTO: 0.3 %
LYMPHOCYTES # BLD: 1.35 K/UL (ref 1.2–3.5)
LYMPHOCYTES NFR BLD: 22.7 %
MCH RBC QN AUTO: 30 PG (ref 28–33.2)
MCHC RBC AUTO-ENTMCNC: 33.5 G/DL (ref 32.2–36.5)
MCV RBC AUTO: 89.7 FL (ref 83–98)
MONOCYTES # BLD: 0.67 K/UL (ref 0.3–1)
MONOCYTES NFR BLD: 11.3 %
NEUTROPHILS # BLD: 3.87 K/UL (ref 1.7–7)
NEUTS SEG NFR BLD: 65.1 %
NRBC BLD-RTO: 0 %
PDW BLD AUTO: 10.3 FL (ref 9.4–12.4)
PLATELET # BLD AUTO: 218 K/UL (ref 150–350)
POTASSIUM SERPL-SCNC: 4 MEQ/L (ref 3.5–5.1)
PROT SERPL-MCNC: 6.9 G/DL (ref 6–8.2)
PSA SERPL-MCNC: 1.61 NG/ML
RBC # BLD AUTO: 4.93 M/UL (ref 4.5–5.8)
SODIUM SERPL-SCNC: 140 MEQ/L (ref 136–145)
VIT B12 SERPL-MCNC: 472 PG/ML (ref 180–914)
WBC # BLD AUTO: 5.95 K/UL (ref 3.8–10.5)

## 2024-05-01 PROCEDURE — 86431 RHEUMATOID FACTOR QUANT: CPT

## 2024-05-01 PROCEDURE — 73030 X-RAY EXAM OF SHOULDER: CPT | Mod: 50

## 2024-05-01 PROCEDURE — 82306 VITAMIN D 25 HYDROXY: CPT

## 2024-05-01 PROCEDURE — 80053 COMPREHEN METABOLIC PANEL: CPT

## 2024-05-01 PROCEDURE — 85025 COMPLETE CBC W/AUTO DIFF WBC: CPT

## 2024-05-01 PROCEDURE — 82607 VITAMIN B-12: CPT

## 2024-05-01 PROCEDURE — 85652 RBC SED RATE AUTOMATED: CPT

## 2024-05-01 PROCEDURE — 73565 X-RAY EXAM OF KNEES: CPT

## 2024-05-01 PROCEDURE — 86617 LYME DISEASE ANTIBODY: CPT

## 2024-05-01 PROCEDURE — 86140 C-REACTIVE PROTEIN: CPT

## 2024-05-01 PROCEDURE — 73564 X-RAY EXAM KNEE 4 OR MORE: CPT | Mod: 50

## 2024-05-01 PROCEDURE — 36415 COLL VENOUS BLD VENIPUNCTURE: CPT

## 2024-05-01 PROCEDURE — 72110 X-RAY EXAM L-2 SPINE 4/>VWS: CPT

## 2024-05-01 PROCEDURE — 86038 ANTINUCLEAR ANTIBODIES: CPT

## 2024-05-01 PROCEDURE — 73521 X-RAY EXAM HIPS BI 2 VIEWS: CPT

## 2024-05-01 PROCEDURE — 84153 ASSAY OF PSA TOTAL: CPT

## 2024-05-02 ENCOUNTER — TELEPHONE (OUTPATIENT)
Dept: PRIMARY CARE | Facility: CLINIC | Age: 74
End: 2024-05-02
Payer: COMMERCIAL

## 2024-05-02 LAB
ANA SER QL IA: NEGATIVE
RHEUMATOID FACT SERPL-ACNC: 9 IU/ML

## 2024-05-03 LAB
B BURGDOR IGG SER QL IB: NEGATIVE
B BURGDOR IGM SER QL IB: NEGATIVE
B BURGDOR18KD IGG SER QL IB: ABNORMAL
B BURGDOR23KD IGG SER QL IB: ABNORMAL
B BURGDOR23KD IGM SER QL IB: ABNORMAL
B BURGDOR28KD IGG SER QL IB: ABNORMAL
B BURGDOR30KD IGG SER QL IB: ABNORMAL
B BURGDOR39KD IGG SER QL IB: ABNORMAL
B BURGDOR39KD IGM SER QL IB: ABNORMAL
B BURGDOR41KD IGG SER QL IB: ABNORMAL
B BURGDOR41KD IGM SER QL IB: ABNORMAL
B BURGDOR45KD IGG SER QL IB: ABNORMAL
B BURGDOR58KD IGG SER QL IB: REACTIVE
B BURGDOR66KD IGG SER QL IB: ABNORMAL
B BURGDOR93KD IGG SER QL IB: ABNORMAL

## 2024-05-22 ENCOUNTER — TELEPHONE (OUTPATIENT)
Dept: PRIMARY CARE | Facility: CLINIC | Age: 74
End: 2024-05-22
Payer: COMMERCIAL

## 2024-05-22 DIAGNOSIS — I10 PRIMARY HYPERTENSION: ICD-10-CM

## 2024-05-22 RX ORDER — LOSARTAN POTASSIUM 100 MG/1
TABLET ORAL
Qty: 90 TABLET | Refills: 0 | OUTPATIENT
Start: 2024-05-22

## 2024-05-22 RX ORDER — LOSARTAN POTASSIUM 100 MG/1
TABLET ORAL
Qty: 90 TABLET | Refills: 1 | Status: SHIPPED | OUTPATIENT
Start: 2024-05-22 | End: 2024-11-12 | Stop reason: SDUPTHER

## 2024-05-22 NOTE — TELEPHONE ENCOUNTER
Pt called stating that he needs to have a script for a colonoscopy. Please see the 9/2/2021 order placed by Dr. Zavala for pt to repeat colonoscopy in three years. Please advise.

## 2024-05-23 DIAGNOSIS — Z12.11 SCREENING FOR COLON CANCER: Primary | ICD-10-CM

## 2024-05-26 DIAGNOSIS — I10 PRIMARY HYPERTENSION: ICD-10-CM

## 2024-05-28 RX ORDER — CARVEDILOL 6.25 MG/1
6.25 TABLET ORAL 2 TIMES DAILY WITH MEALS
Qty: 180 TABLET | Refills: 1 | Status: SHIPPED | OUTPATIENT
Start: 2024-05-28 | End: 2024-11-12 | Stop reason: SDUPTHER

## 2024-06-11 DIAGNOSIS — F41.9 ANXIETY: Primary | ICD-10-CM

## 2024-06-11 RX ORDER — ESCITALOPRAM OXALATE 10 MG/1
10 TABLET ORAL DAILY
Qty: 90 TABLET | Refills: 1 | Status: SHIPPED | OUTPATIENT
Start: 2024-06-11 | End: 2024-12-03 | Stop reason: SDUPTHER

## 2024-06-17 RX ORDER — FLUTICASONE PROPIONATE 50 MCG
SPRAY, SUSPENSION (ML) NASAL
Qty: 48 G | Refills: 1 | Status: SHIPPED | OUTPATIENT
Start: 2024-06-17 | End: 2025-03-04 | Stop reason: SDUPTHER

## 2024-06-18 ENCOUNTER — APPOINTMENT (RX ONLY)
Dept: URBAN - METROPOLITAN AREA CLINIC 374 | Facility: CLINIC | Age: 74
Setting detail: DERMATOLOGY
End: 2024-06-18

## 2024-06-18 DIAGNOSIS — L57.0 ACTINIC KERATOSIS: ICD-10-CM | Status: INADEQUATELY CONTROLLED

## 2024-06-18 PROCEDURE — ? COUNSELING

## 2024-06-18 PROCEDURE — 17000 DESTRUCT PREMALG LESION: CPT

## 2024-06-18 PROCEDURE — ? LIQUID NITROGEN

## 2024-06-18 PROCEDURE — 17003 DESTRUCT PREMALG LES 2-14: CPT

## 2024-06-18 ASSESSMENT — LOCATION DETAILED DESCRIPTION DERM
LOCATION DETAILED: RIGHT CENTRAL PARIETAL SCALP
LOCATION DETAILED: LEFT SUPERIOR OCCIPITAL SCALP
LOCATION DETAILED: LEFT SUPERIOR PARIETAL SCALP
LOCATION DETAILED: LEFT CENTRAL PARIETAL SCALP

## 2024-06-18 ASSESSMENT — LOCATION SIMPLE DESCRIPTION DERM
LOCATION SIMPLE: SCALP
LOCATION SIMPLE: LEFT OCCIPITAL SCALP

## 2024-06-18 ASSESSMENT — LOCATION ZONE DERM: LOCATION ZONE: SCALP

## 2024-06-18 NOTE — HPI: OTHER
Condition:: \"Bumps\" on the scalp
Please Describe Your Condition:: \"Bumps\" on the scalp, noted several months ago. Occasionally associated with pruritus and pain when traumatized. Mild to moderate in severity. No obvious inciting or modifying factors. No previous treatments.\\nPertinent additional information:\\n-He denies using sunscreen SPF>30 in a daily basis\\n-He denies personal or family history of skin cancer

## 2024-06-18 NOTE — PROCEDURE: LIQUID NITROGEN
Render Post-Care Instructions In Note?: yes
Detail Level: Detailed
Number Of Freeze-Thaw Cycles: 2 freeze-thaw cycles
Consent: The patient's consent was obtained including but not limited to risks of crusting, scabbing, blistering, scarring, darker or lighter pigmentary change, recurrence, incomplete removal and infection.
Render Note In Bullet Format When Appropriate: No
Duration Of Freeze Thaw-Cycle (Seconds): 15
Post-Care Instructions: I reviewed with the patient in detail post-care instructions. Patient is to wear sunprotection, and avoid picking at any of the treated lesions. Pt may apply Vaseline to crusted or scabbing areas.

## 2024-06-25 DIAGNOSIS — I10 PRIMARY HYPERTENSION: ICD-10-CM

## 2024-06-25 RX ORDER — HYDROCHLOROTHIAZIDE 25 MG/1
TABLET ORAL
Qty: 90 TABLET | Refills: 1 | Status: SHIPPED | OUTPATIENT
Start: 2024-06-25 | End: 2024-12-13 | Stop reason: SDUPTHER

## 2024-06-25 RX ORDER — AMLODIPINE BESYLATE 10 MG/1
TABLET ORAL
Qty: 90 TABLET | Refills: 1 | Status: SHIPPED | OUTPATIENT
Start: 2024-06-25 | End: 2024-12-13 | Stop reason: SDUPTHER

## 2024-07-08 ENCOUNTER — HOSPITAL ENCOUNTER (OUTPATIENT)
Dept: RADIOLOGY | Facility: CLINIC | Age: 74
Discharge: HOME | End: 2024-07-08
Attending: NURSE PRACTITIONER
Payer: COMMERCIAL

## 2024-07-08 ENCOUNTER — OFFICE VISIT (OUTPATIENT)
Dept: PRIMARY CARE | Facility: CLINIC | Age: 74
End: 2024-07-08
Payer: COMMERCIAL

## 2024-07-08 VITALS
RESPIRATION RATE: 18 BRPM | HEART RATE: 64 BPM | TEMPERATURE: 97.6 F | WEIGHT: 184.6 LBS | BODY MASS INDEX: 29.8 KG/M2 | OXYGEN SATURATION: 96 % | DIASTOLIC BLOOD PRESSURE: 78 MMHG | SYSTOLIC BLOOD PRESSURE: 138 MMHG

## 2024-07-08 DIAGNOSIS — R05.9 COUGH, UNSPECIFIED TYPE: ICD-10-CM

## 2024-07-08 DIAGNOSIS — R05.9 COUGH, UNSPECIFIED TYPE: Primary | ICD-10-CM

## 2024-07-08 PROCEDURE — 3075F SYST BP GE 130 - 139MM HG: CPT | Performed by: NURSE PRACTITIONER

## 2024-07-08 PROCEDURE — 3078F DIAST BP <80 MM HG: CPT | Performed by: NURSE PRACTITIONER

## 2024-07-08 PROCEDURE — 99213 OFFICE O/P EST LOW 20 MIN: CPT | Performed by: NURSE PRACTITIONER

## 2024-07-08 PROCEDURE — 71046 X-RAY EXAM CHEST 2 VIEWS: CPT

## 2024-07-08 PROCEDURE — 3008F BODY MASS INDEX DOCD: CPT | Performed by: NURSE PRACTITIONER

## 2024-07-08 RX ORDER — DOXYCYCLINE 100 MG/1
100 CAPSULE ORAL 2 TIMES DAILY
Qty: 20 CAPSULE | Refills: 0 | Status: SHIPPED | OUTPATIENT
Start: 2024-07-08 | End: 2024-07-18

## 2024-07-08 RX ORDER — ATORVASTATIN CALCIUM 40 MG/1
TABLET, FILM COATED ORAL
Qty: 90 TABLET | Refills: 0 | Status: SHIPPED | OUTPATIENT
Start: 2024-07-08 | End: 2024-08-19

## 2024-07-08 ASSESSMENT — ENCOUNTER SYMPTOMS
SHORTNESS OF BREATH: 1
FEVER: 0
COUGH: 1
RHINORRHEA: 0
CHILLS: 0
DIZZINESS: 0
HEADACHES: 0
GASTROINTESTINAL NEGATIVE: 1
LIGHT-HEADEDNESS: 0
FATIGUE: 0
ADENOPATHY: 0
CHEST TIGHTNESS: 0
PALPITATIONS: 0
SINUS PRESSURE: 0
BRUISES/BLEEDS EASILY: 0
SORE THROAT: 0
SINUS PAIN: 0
WHEEZING: 0
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
## 2024-07-09 ENCOUNTER — TELEPHONE (OUTPATIENT)
Dept: PRIMARY CARE | Facility: CLINIC | Age: 74
End: 2024-07-09
Payer: COMMERCIAL

## 2024-07-09 NOTE — TELEPHONE ENCOUNTER
Pt called and stated that he missed a call regarding his Xray. Please advise he can be reached at [phone]

## 2024-07-10 ENCOUNTER — TELEPHONE (OUTPATIENT)
Dept: PRIMARY CARE | Facility: CLINIC | Age: 74
End: 2024-07-10

## 2024-07-10 NOTE — TELEPHONE ENCOUNTER
Spoke with pt and he can not remember the medication in question. He will call back with all the information

## 2024-07-11 NOTE — TELEPHONE ENCOUNTER
Pt returning call about the medication that he is taking.     Medication: OTC: turmeric curcumin 500mg supplement    Pt wanted PCP to know that he is taking this supplement and it is picked up at the [facility] on file. Pt is available for call back if more information is needed.

## 2024-08-13 ENCOUNTER — APPOINTMENT (RX ONLY)
Dept: URBAN - METROPOLITAN AREA CLINIC 374 | Facility: CLINIC | Age: 74
Setting detail: DERMATOLOGY
End: 2024-08-13

## 2024-08-13 DIAGNOSIS — D22 MELANOCYTIC NEVI: ICD-10-CM | Status: UNCHANGED

## 2024-08-13 DIAGNOSIS — L82.1 OTHER SEBORRHEIC KERATOSIS: ICD-10-CM | Status: UNCHANGED

## 2024-08-13 DIAGNOSIS — Z71.89 OTHER SPECIFIED COUNSELING: ICD-10-CM

## 2024-08-13 DIAGNOSIS — D18.0 HEMANGIOMA: ICD-10-CM | Status: UNCHANGED

## 2024-08-13 DIAGNOSIS — L81.5 LEUKODERMA, NOT ELSEWHERE CLASSIFIED: ICD-10-CM | Status: UNCHANGED

## 2024-08-13 DIAGNOSIS — L81.4 OTHER MELANIN HYPERPIGMENTATION: ICD-10-CM | Status: UNCHANGED

## 2024-08-13 DIAGNOSIS — L91.8 OTHER HYPERTROPHIC DISORDERS OF THE SKIN: ICD-10-CM | Status: UNCHANGED

## 2024-08-13 PROBLEM — D22.5 MELANOCYTIC NEVI OF TRUNK: Status: ACTIVE | Noted: 2024-08-13

## 2024-08-13 PROBLEM — D18.01 HEMANGIOMA OF SKIN AND SUBCUTANEOUS TISSUE: Status: ACTIVE | Noted: 2024-08-13

## 2024-08-13 PROCEDURE — ? COUNSELING

## 2024-08-13 PROCEDURE — 99213 OFFICE O/P EST LOW 20 MIN: CPT

## 2024-08-13 PROCEDURE — ? FULL BODY SKIN EXAM

## 2024-08-13 ASSESSMENT — LOCATION DETAILED DESCRIPTION DERM
LOCATION DETAILED: RIGHT AXILLARY VAULT
LOCATION DETAILED: EPIGASTRIC SKIN
LOCATION DETAILED: RIGHT INFERIOR LATERAL NECK
LOCATION DETAILED: LEFT RADIAL DORSAL HAND
LOCATION DETAILED: RIGHT RADIAL DORSAL HAND
LOCATION DETAILED: RIGHT MEDIAL UPPER BACK
LOCATION DETAILED: LEFT PROXIMAL PRETIBIAL REGION
LOCATION DETAILED: INFERIOR THORACIC SPINE
LOCATION DETAILED: RIGHT DISTAL DORSAL FOREARM
LOCATION DETAILED: LEFT DISTAL DORSAL FOREARM
LOCATION DETAILED: LEFT AXILLARY VAULT
LOCATION DETAILED: SUPERIOR THORACIC SPINE
LOCATION DETAILED: LEFT INFERIOR LATERAL NECK
LOCATION DETAILED: RIGHT PROXIMAL PRETIBIAL REGION
LOCATION DETAILED: LEFT INFERIOR MEDIAL MALAR CHEEK

## 2024-08-13 ASSESSMENT — LOCATION SIMPLE DESCRIPTION DERM
LOCATION SIMPLE: LEFT PRETIBIAL REGION
LOCATION SIMPLE: RIGHT ANTERIOR NECK
LOCATION SIMPLE: UPPER BACK
LOCATION SIMPLE: LEFT FOREARM
LOCATION SIMPLE: LEFT ANTERIOR NECK
LOCATION SIMPLE: LEFT HAND
LOCATION SIMPLE: LEFT CHEEK
LOCATION SIMPLE: ABDOMEN
LOCATION SIMPLE: RIGHT UPPER BACK
LOCATION SIMPLE: RIGHT AXILLARY VAULT
LOCATION SIMPLE: RIGHT PRETIBIAL REGION
LOCATION SIMPLE: LEFT AXILLARY VAULT
LOCATION SIMPLE: RIGHT FOREARM
LOCATION SIMPLE: RIGHT HAND

## 2024-08-13 ASSESSMENT — LOCATION ZONE DERM
LOCATION ZONE: LEG
LOCATION ZONE: AXILLAE
LOCATION ZONE: HAND
LOCATION ZONE: TRUNK
LOCATION ZONE: ARM
LOCATION ZONE: FACE
LOCATION ZONE: NECK

## 2024-08-19 RX ORDER — ATORVASTATIN CALCIUM 40 MG/1
TABLET, FILM COATED ORAL
Qty: 90 TABLET | Refills: 0 | Status: SHIPPED | OUTPATIENT
Start: 2024-08-19 | End: 2025-01-20 | Stop reason: SDUPTHER

## 2024-09-05 ENCOUNTER — TRANSCRIBE ORDERS (OUTPATIENT)
Dept: REGISTRATION | Facility: CLINIC | Age: 74
End: 2024-09-05

## 2024-09-05 DIAGNOSIS — I34.0 NONRHEUMATIC MITRAL (VALVE) INSUFFICIENCY: ICD-10-CM

## 2024-09-05 DIAGNOSIS — E78.00 PURE HYPERCHOLESTEROLEMIA: ICD-10-CM

## 2024-09-05 DIAGNOSIS — I48.0 PAROXYSMAL ATRIAL FIBRILLATION (CMS/HCC): ICD-10-CM

## 2024-09-05 DIAGNOSIS — R73.01 IMPAIRED FASTING GLUCOSE: Primary | ICD-10-CM

## 2024-09-05 DIAGNOSIS — I10 ESSENTIAL (PRIMARY) HYPERTENSION: ICD-10-CM

## 2024-09-06 ENCOUNTER — APPOINTMENT (OUTPATIENT)
Dept: LAB | Facility: CLINIC | Age: 74
End: 2024-09-06
Payer: COMMERCIAL

## 2024-09-06 DIAGNOSIS — R73.01 IMPAIRED FASTING GLUCOSE: Primary | ICD-10-CM

## 2024-09-06 LAB
ERYTHROCYTE [DISTWIDTH] IN BLOOD BY AUTOMATED COUNT: 14.6 % (ref 11.6–14.4)
HCT VFR BLD AUTO: 42.9 % (ref 40.1–51)
HGB BLD-MCNC: 14.3 G/DL (ref 13.7–17.5)
MCH RBC QN AUTO: 29.6 PG (ref 28–33.2)
MCHC RBC AUTO-ENTMCNC: 33.3 G/DL (ref 32.2–36.5)
MCV RBC AUTO: 88.8 FL (ref 83–98)
PDW BLD AUTO: 10.3 FL (ref 9.4–12.4)
PLATELET # BLD AUTO: 185 K/UL (ref 150–350)
RBC # BLD AUTO: 4.83 M/UL (ref 4.5–5.8)
WBC # BLD AUTO: 3.98 K/UL (ref 3.8–10.5)

## 2024-09-06 PROCEDURE — 85027 COMPLETE CBC AUTOMATED: CPT

## 2024-09-17 ENCOUNTER — TELEPHONE (OUTPATIENT)
Dept: PRIMARY CARE | Facility: CLINIC | Age: 74
End: 2024-09-17
Payer: COMMERCIAL

## 2024-09-17 NOTE — TELEPHONE ENCOUNTER
Medicine Refill Request    Last Office Visit: 7/8/2024   Last Consult Visit: 1/4/2022  Last Telemedicine Visit: 5/3/2021 Keisha Schultz CRNP    Next Appointment: 10/30/2024 for [place]

## 2024-09-17 NOTE — TELEPHONE ENCOUNTER
Medication Request   Patient PCP: Catrachita Blanchard PA C  Next Office Visit: 10/30/2024  Has this provider prescribed this medication before?: last refilled by Keisha Schultz  Medication Name: cyclobenzaprine  Medication Dose: 10mg  Medication Frequency: 1 tablet po prn for muscle spasms      Preferred Pharmacy:   [facility] [address] [place] [address]  Phone: [phone] Fax: [phone]    [id number] [address] [address]  Phone: [phone] Fax: [phone]      Please allow 2 business days for your provider to send your medication request or to reach out to discuss.

## 2024-09-18 RX ORDER — CYCLOBENZAPRINE HCL 10 MG
10 TABLET ORAL DAILY PRN
Qty: 30 TABLET | Refills: 0 | Status: SHIPPED | OUTPATIENT
Start: 2024-09-18 | End: 2025-01-17 | Stop reason: SDUPTHER

## 2024-10-04 ENCOUNTER — HOSPITAL ENCOUNTER (OUTPATIENT)
Dept: RADIOLOGY | Age: 74
Discharge: HOME | End: 2024-10-04
Attending: THORACIC SURGERY (CARDIOTHORACIC VASCULAR SURGERY)
Payer: COMMERCIAL

## 2024-10-04 DIAGNOSIS — C34.32 PRIMARY MALIGNANT NEOPLASM OF LEFT LOWER LOBE OF LUNG (CMS/HCC): Chronic | ICD-10-CM

## 2024-10-04 PROCEDURE — 71250 CT THORAX DX C-: CPT

## 2024-10-30 ENCOUNTER — OFFICE VISIT (OUTPATIENT)
Dept: THORACIC SURGERY | Facility: CLINIC | Age: 74
End: 2024-10-30
Payer: COMMERCIAL

## 2024-10-30 VITALS
SYSTOLIC BLOOD PRESSURE: 141 MMHG | OXYGEN SATURATION: 96 % | WEIGHT: 184.4 LBS | HEIGHT: 65 IN | DIASTOLIC BLOOD PRESSURE: 76 MMHG | BODY MASS INDEX: 30.72 KG/M2 | HEART RATE: 55 BPM | TEMPERATURE: 98.6 F

## 2024-10-30 DIAGNOSIS — C34.32 PRIMARY MALIGNANT NEOPLASM OF LEFT LOWER LOBE OF LUNG (CMS/HCC): Primary | Chronic | ICD-10-CM

## 2024-10-30 PROCEDURE — 99213 OFFICE O/P EST LOW 20 MIN: CPT | Performed by: THORACIC SURGERY (CARDIOTHORACIC VASCULAR SURGERY)

## 2024-10-30 PROCEDURE — 3077F SYST BP >= 140 MM HG: CPT | Performed by: THORACIC SURGERY (CARDIOTHORACIC VASCULAR SURGERY)

## 2024-10-30 PROCEDURE — 3078F DIAST BP <80 MM HG: CPT | Performed by: THORACIC SURGERY (CARDIOTHORACIC VASCULAR SURGERY)

## 2024-10-30 PROCEDURE — 3008F BODY MASS INDEX DOCD: CPT | Performed by: THORACIC SURGERY (CARDIOTHORACIC VASCULAR SURGERY)

## 2024-10-30 ASSESSMENT — PAIN SCALES - GENERAL: PAINLEVEL_OUTOF10: 5

## 2024-10-30 NOTE — PROGRESS NOTES
Patient ID: Melanie Zelaya                              : 1950  MRN: [id number]                                            Visit Date: 10/30/2024  Encounter Provider: Blaise Guallpa  Referring Provider: No ref. provider found    Subjective      Patient ID: Melanie Zelaya is a 74 y.o. male.  Chief Complaint: Follow-up      Melanie Zelaya is a 74 y.o. 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  Lymphovascular invasion was indeterminant.  He has had a bit of a cough of clear sputum but no hemoptysis.  He is taking Mucinex for it but it does not seem to do anything.  He had a purposeful 15 pound weight loss with a new  diet.  No fever, chills, sweats, new persistent neurological or bony complaints..     Past Medical History:  has a past medical history of Anxiety, Atrial fibrillation (CMS/HCC), Breast cancer (CMS/HCC), C1 cervical fracture (CMS/HCC), Colon polyp, COPD (chronic obstructive pulmonary disease) (CMS/HCC), Coronary artery calcification seen on CT scan, COVID-19 vaccine series completed, Depression, Diverticulosis, Diverticulosis, Fracture of pelvis (CMS/HCC) (), GERD (gastroesophageal reflux disease), Hearing loss, History of colon polyps, History of COVID-19 (), Hyperlipidemia, Hypertension, Left atrial enlargement, Lung cancer (CMS/HCC), Lung cancer (CMS/HCC), Pneumonia (), Seasonal allergies, and Wrist fracture.  Past Surgical History:  has a past surgical history that includes Lung lobectomy (Left, ); Shoulder surgery (Right, ); Rotator cuff repair (Right, ); Tonsillectomy; Nasal polyp surgery; Hand surgery (Bilateral); and Colonoscopy.  Social History:   Social History     Tobacco Use    Smoking status: Former     Current packs/day: 0.00     Types: Cigarettes     Quit  date: 2010     Years since quittin.5    Smokeless tobacco: Never   Vaping Use    Vaping status: Never Used   Substance Use Topics    Alcohol use: No     Comment: RARE    Drug use: No     Family History: family history includes Cancer in his nephew; Cirrhosis in his biological father; Colon cancer in his biological brother; Diabetes in his biological mother; Lung cancer in his biological brother; No Known Problems in his maternal grandfather, maternal grandmother, paternal grandfather, and paternal grandmother; Pancreatic cancer in his biological sister; Prostate cancer in his biological brother.  Medications:   Current Outpatient Medications:     albuterol HFA (PROAIR HFA) 90 mcg/actuation inhaler, Inhale 2 puffs 2 (two) times a day as needed for wheezing or shortness of breath., Disp: 1 each, Rfl: 3    amLODIPine (NORVASC) 10 mg tablet, Take 1 tablet by mouth nightly, Disp: 90 tablet, Rfl: 1    atorvastatin (LIPITOR) 40 mg tablet, Take 1 tablet by mouth once daily, Disp: 90 tablet, Rfl: 0    carvediloL (COREG) 6.25 mg tablet, Take 1 tablet (6.25 mg total) by mouth 2 (two) times a day with meals., Disp: 180 tablet, Rfl: 1    cholecalciferol, vitamin D3, 400 unit (10 mcg) tablet tablet, Take by mouth daily., Disp: , Rfl:     cyclobenzaprine (FLEXERIL) 10 mg tablet, Take 1 tablet (10 mg total) by mouth daily as needed for muscle spasms., Disp: 30 tablet, Rfl: 0    dabigatran etexilate 150 mg capsu, Take 1 capsule (150 mg total) by mouth 2 (two) times a day Indications: treatment to prevent blood clots in chronic atrial fibrillation., Disp: 60 capsule, Rfl: 0    escitalopram (LEXAPRO) 10 mg tablet, Take 1 tablet (10 mg total) by mouth daily., Disp: 90 tablet, Rfl: 1    FISH OIL-omega-3 fatty acids (FISH OIL) 340-1,000 mg capsule, Take 2 capsules by mouth 2 (two) times a day., Disp: , Rfl:     fluticasone propionate (FLONASE) 50 mcg/actuation nasal spray, Use 2 spray(s) in each nostril once daily, Disp: 48 g,  "Rfl: 1    fluticasone-umeclidinium-vilanterol (TRELEGY ELLIPTA) 100-62.5-25 mcg blister with device powder for inhalation, Inhale 1 puff daily., Disp: , Rfl:     hydrochlorothiazide (HYDRODIURIL) 25 mg tablet, Take 1 tablet by mouth once daily, Disp: 90 tablet, Rfl: 1    losartan (COZAAR) 100 mg tablet, TAKE 1 TABLET BY MOUTH ONCE DAILY WITH SUPPER, Disp: 90 tablet, Rfl: 1    multivitamin tablet, Take by mouth daily., Disp: , Rfl:     travoprost 0.004 % drops, INSTILL 1 DROP INTO EACH EYE ONCE DAILY AT BEDTIME, Disp: , Rfl:     TURMERIC ORAL, Take 500 mg by mouth., Disp: , Rfl:   Allergies: has No Known Allergies.   E-cigarette/Vaping       Questions Responses    E-cigarette/Vaping Use Never User             The following have been reviewed and updated as appropriate in this visit:           Review of Systems   All other systems reviewed and are negative.      Objective   Vitals: Visit Vitals  BP (!) 141/76 (BP Location: Left upper arm, Patient Position: Sitting)   Pulse (!) 55   Temp 37 °C (98.6 °F) (Temporal)   Ht 1.651 m (5' 5\")   Wt 83.6 kg (184 lb 6.4 oz)   SpO2 96%   BMI 30.69 kg/m²       Physical Exam  Vitals reviewed.   Constitutional:       Appearance: He is well-developed.   HENT:      Head: Normocephalic.   Eyes:      Pupils: Pupils are equal, round, and reactive to light.   Cardiovascular:      Rate and Rhythm: Normal rate and regular rhythm.   Pulmonary:      Effort: Pulmonary effort is normal.      Breath sounds: Normal breath sounds.   Abdominal:      General: Abdomen is flat.   Musculoskeletal:         General: Normal range of motion.      Cervical back: Neck supple.   Skin:     General: Skin is warm and dry.   Neurological:      General: No focal deficit present.      Mental Status: He is alert.   Psychiatric:         Mood and Affect: Mood normal.         Assessment/Plan   Independent review of all imaging was done by myself as well as review of the radiologists readings and comparison to prior " films.  CAT scan of the chest in October for Concha some few small subcentimeter solid and groundglass abnormalities which look like either mucous plugging or some type of infectious area.  I do not think there malignant but we have to be safe so I will see him back with another CAT scan of the chest without contrast in 3 months.       Problem List Items Addressed This Visit       Primary malignant neoplasm of left lower lobe of lung (CMS/HCC) - Primary (Chronic)    Relevant Orders    CT CHEST WITHOUT IV CONTRAST         Blaise Guallpa MD

## 2024-10-30 NOTE — LETTER
2024     Pancho Monterroso, DO  [address]  [address]  [place] [address]    Patient: Melanie Zelaya  YOB: 1950  Date of Visit: 10/30/2024      Dear Dr. Monterroso:    Thank you for referring Melanie Zelaya to me for evaluation. Below are my notes for this consultation.    If you have questions, please do not hesitate to call me. I look forward to following your patient along with you.         Sincerely,        Blaise Guallpa MD        CC: YESENIA Angel Michael J, MD  10/30/2024  1:10 PM  Sign when Signing Visit  Patient ID: Melanie Zelaya                              : 1950  MRN: [id number]                                            Visit Date: 10/30/2024  Encounter Provider: Blaise Guallpa  Referring Provider: No ref. provider found    Subjective     Patient ID: Melanie Zelaya is a 74 y.o. male.  Chief Complaint: Follow-up      Melanie Zelaya is a 74 y.o. 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  Lymphovascular invasion was indeterminant.  He has had a bit of a cough of clear sputum but no hemoptysis.  He is taking Mucinex for it but it does not seem to do anything.  He had a purposeful 15 pound weight loss with a new  diet.  No fever, chills, sweats, new persistent neurological or bony complaints..     Past Medical History:  has a past medical history of Anxiety, Atrial fibrillation (CMS/HCC), Breast cancer (CMS/HCC), C1 cervical fracture (CMS/HCC), Colon polyp, COPD (chronic obstructive pulmonary disease) (CMS/HCC), Coronary artery calcification seen on CT scan, COVID-19 vaccine series completed, Depression, Diverticulosis, Diverticulosis, Fracture of pelvis (CMS/HCC) (), GERD (gastroesophageal reflux disease), Hearing loss,  History of colon polyps, History of COVID-19 (), Hyperlipidemia, Hypertension, Left atrial enlargement, Lung cancer (CMS/HCC), Lung cancer (CMS/HCC), Pneumonia (), Seasonal allergies, and Wrist fracture.  Past Surgical History:  has a past surgical history that includes Lung lobectomy (Left, ); Shoulder surgery (Right, ); Rotator cuff repair (Right, ); Tonsillectomy; Nasal polyp surgery; Hand surgery (Bilateral); and Colonoscopy.  Social History:   Social History     Tobacco Use   • Smoking status: Former     Current packs/day: 0.00     Types: Cigarettes     Quit date: 2010     Years since quittin.5   • Smokeless tobacco: Never   Vaping Use   • Vaping status: Never Used   Substance Use Topics   • Alcohol use: No     Comment: RARE   • Drug use: No     Family History: family history includes Cancer in his nephew; Cirrhosis in his biological father; Colon cancer in his biological brother; Diabetes in his biological mother; Lung cancer in his biological brother; No Known Problems in his maternal grandfather, maternal grandmother, paternal grandfather, and paternal grandmother; Pancreatic cancer in his biological sister; Prostate cancer in his biological brother.  Medications:   Current Outpatient Medications:   •  albuterol HFA (PROAIR HFA) 90 mcg/actuation inhaler, Inhale 2 puffs 2 (two) times a day as needed for wheezing or shortness of breath., Disp: 1 each, Rfl: 3  •  amLODIPine (NORVASC) 10 mg tablet, Take 1 tablet by mouth nightly, Disp: 90 tablet, Rfl: 1  •  atorvastatin (LIPITOR) 40 mg tablet, Take 1 tablet by mouth once daily, Disp: 90 tablet, Rfl: 0  •  carvediloL (COREG) 6.25 mg tablet, Take 1 tablet (6.25 mg total) by mouth 2 (two) times a day with meals., Disp: 180 tablet, Rfl: 1  •  cholecalciferol, vitamin D3, 400 unit (10 mcg) tablet tablet, Take by mouth daily., Disp: , Rfl:   •  cyclobenzaprine (FLEXERIL) 10 mg tablet, Take 1 tablet (10 mg total) by mouth daily as needed  "for muscle spasms., Disp: 30 tablet, Rfl: 0  •  dabigatran etexilate 150 mg capsu, Take 1 capsule (150 mg total) by mouth 2 (two) times a day Indications: treatment to prevent blood clots in chronic atrial fibrillation., Disp: 60 capsule, Rfl: 0  •  escitalopram (LEXAPRO) 10 mg tablet, Take 1 tablet (10 mg total) by mouth daily., Disp: 90 tablet, Rfl: 1  •  FISH OIL-omega-3 fatty acids (FISH OIL) 340-1,000 mg capsule, Take 2 capsules by mouth 2 (two) times a day., Disp: , Rfl:   •  fluticasone propionate (FLONASE) 50 mcg/actuation nasal spray, Use 2 spray(s) in each nostril once daily, Disp: 48 g, Rfl: 1  •  fluticasone-umeclidinium-vilanterol (TRELEGY ELLIPTA) 100-62.5-25 mcg blister with device powder for inhalation, Inhale 1 puff daily., Disp: , Rfl:   •  hydrochlorothiazide (HYDRODIURIL) 25 mg tablet, Take 1 tablet by mouth once daily, Disp: 90 tablet, Rfl: 1  •  losartan (COZAAR) 100 mg tablet, TAKE 1 TABLET BY MOUTH ONCE DAILY WITH SUPPER, Disp: 90 tablet, Rfl: 1  •  multivitamin tablet, Take by mouth daily., Disp: , Rfl:   •  travoprost 0.004 % drops, INSTILL 1 DROP INTO EACH EYE ONCE DAILY AT BEDTIME, Disp: , Rfl:   •  TURMERIC ORAL, Take 500 mg by mouth., Disp: , Rfl:   Allergies: has No Known Allergies.   E-cigarette/Vaping       Questions Responses    E-cigarette/Vaping Use Never User             The following have been reviewed and updated as appropriate in this visit:           Review of Systems   All other systems reviewed and are negative.      Objective  Vitals: Visit Vitals  BP (!) 141/76 (BP Location: Left upper arm, Patient Position: Sitting)   Pulse (!) 55   Temp 37 °C (98.6 °F) (Temporal)   Ht 1.651 m (5' 5\")   Wt 83.6 kg (184 lb 6.4 oz)   SpO2 96%   BMI 30.69 kg/m²       Physical Exam  Vitals reviewed.   Constitutional:       Appearance: He is well-developed.   HENT:      Head: Normocephalic.   Eyes:      Pupils: Pupils are equal, round, and reactive to light.   Cardiovascular:      Rate and " Rhythm: Normal rate and regular rhythm.   Pulmonary:      Effort: Pulmonary effort is normal.      Breath sounds: Normal breath sounds.   Abdominal:      General: Abdomen is flat.   Musculoskeletal:         General: Normal range of motion.      Cervical back: Neck supple.   Skin:     General: Skin is warm and dry.   Neurological:      General: No focal deficit present.      Mental Status: He is alert.   Psychiatric:         Mood and Affect: Mood normal.         Assessment/Plan  Independent review of all imaging was done by myself as well as review of the radiologists readings and comparison to prior films.  CAT scan of the chest in October for Concha some few small subcentimeter solid and groundglass abnormalities which look like either mucous plugging or some type of infectious area.  I do not think there malignant but we have to be safe so I will see him back with another CAT scan of the chest without contrast in 3 months.       Problem List Items Addressed This Visit       Primary malignant neoplasm of left lower lobe of lung (CMS/HCC) - Primary (Chronic)    Relevant Orders    CT CHEST WITHOUT IV CONTRAST         Blaise Guallpa MD

## 2024-11-12 DIAGNOSIS — I10 PRIMARY HYPERTENSION: ICD-10-CM

## 2024-11-12 RX ORDER — CARVEDILOL 6.25 MG/1
6.25 TABLET ORAL 2 TIMES DAILY WITH MEALS
Qty: 60 TABLET | Refills: 0 | Status: SHIPPED | OUTPATIENT
Start: 2024-11-12 | End: 2025-01-16 | Stop reason: SDUPTHER

## 2024-11-12 RX ORDER — LOSARTAN POTASSIUM 100 MG/1
TABLET ORAL
Qty: 30 TABLET | Refills: 0 | Status: SHIPPED | OUTPATIENT
Start: 2024-11-12 | End: 2025-01-20 | Stop reason: SDUPTHER

## 2024-11-19 ENCOUNTER — TELEPHONE (OUTPATIENT)
Dept: PRIMARY CARE | Facility: CLINIC | Age: 74
End: 2024-11-19
Payer: COMMERCIAL

## 2024-11-19 NOTE — TELEPHONE ENCOUNTER
Spoke w/pt. He was advised that 90 day refills will be sent at upcoming visit. Pt verbalized understanding.

## 2024-11-19 NOTE — TELEPHONE ENCOUNTER
Pt said he always gets 90 day refills and would like the same for his losartan (COZAAR) 100 mg tablet and carvediloL (COREG) 6.25 mg tablet he was only given 30 days

## 2024-12-03 ENCOUNTER — TELEPHONE (OUTPATIENT)
Dept: PRIMARY CARE | Facility: CLINIC | Age: 74
End: 2024-12-03
Payer: COMMERCIAL

## 2024-12-03 DIAGNOSIS — F41.9 ANXIETY: ICD-10-CM

## 2024-12-03 RX ORDER — ESCITALOPRAM OXALATE 10 MG/1
10 TABLET ORAL DAILY
Qty: 90 TABLET | Refills: 0 | Status: SHIPPED | OUTPATIENT
Start: 2024-12-03 | End: 2025-02-28

## 2024-12-03 NOTE — TELEPHONE ENCOUNTER
Pt called in requesting to be assessed for possible divertic flare up. Pt mentioned lower left groin pain (not sharp). Pt agreeable to going to ED if sx worsen. Pt scheduled with Katie 12/5/24

## 2024-12-05 ENCOUNTER — OFFICE VISIT (OUTPATIENT)
Dept: PRIMARY CARE | Facility: CLINIC | Age: 74
End: 2024-12-05
Payer: COMMERCIAL

## 2024-12-05 VITALS
WEIGHT: 183 LBS | HEIGHT: 65 IN | BODY MASS INDEX: 30.49 KG/M2 | TEMPERATURE: 97.9 F | DIASTOLIC BLOOD PRESSURE: 70 MMHG | HEART RATE: 70 BPM | SYSTOLIC BLOOD PRESSURE: 120 MMHG | OXYGEN SATURATION: 97 % | RESPIRATION RATE: 16 BRPM

## 2024-12-05 DIAGNOSIS — K57.92 DIVERTICULITIS: Primary | ICD-10-CM

## 2024-12-05 PROCEDURE — 3008F BODY MASS INDEX DOCD: CPT | Performed by: STUDENT IN AN ORGANIZED HEALTH CARE EDUCATION/TRAINING PROGRAM

## 2024-12-05 PROCEDURE — 99214 OFFICE O/P EST MOD 30 MIN: CPT | Performed by: STUDENT IN AN ORGANIZED HEALTH CARE EDUCATION/TRAINING PROGRAM

## 2024-12-05 PROCEDURE — 3078F DIAST BP <80 MM HG: CPT | Performed by: STUDENT IN AN ORGANIZED HEALTH CARE EDUCATION/TRAINING PROGRAM

## 2024-12-05 PROCEDURE — 3074F SYST BP LT 130 MM HG: CPT | Performed by: STUDENT IN AN ORGANIZED HEALTH CARE EDUCATION/TRAINING PROGRAM

## 2024-12-05 RX ORDER — AMOXICILLIN AND CLAVULANATE POTASSIUM 875; 125 MG/1; MG/1
1 TABLET, FILM COATED ORAL 2 TIMES DAILY
Qty: 14 TABLET | Refills: 0 | Status: SHIPPED | OUTPATIENT
Start: 2024-12-05 | End: 2024-12-12

## 2024-12-05 RX ORDER — METRONIDAZOLE 500 MG/1
500 TABLET ORAL 3 TIMES DAILY
Qty: 21 TABLET | Refills: 0 | Status: SHIPPED | OUTPATIENT
Start: 2024-12-05 | End: 2024-12-12

## 2024-12-05 ASSESSMENT — ENCOUNTER SYMPTOMS
EYES NEGATIVE: 1
RESPIRATORY NEGATIVE: 1
CARDIOVASCULAR NEGATIVE: 1
CONSTITUTIONAL NEGATIVE: 1
MUSCULOSKELETAL NEGATIVE: 1
ALLERGIC/IMMUNOLOGIC NEGATIVE: 1
PSYCHIATRIC NEGATIVE: 1
ENDOCRINE NEGATIVE: 1
HEMATOLOGIC/LYMPHATIC NEGATIVE: 1
NEUROLOGICAL NEGATIVE: 1

## 2024-12-05 NOTE — ASSESSMENT & PLAN NOTE
Left lower quad pain in a patient with known history of diverticulosis    CT abdomen pelvis with IV contrast pending  - Have BMP completed within 30 days  -Start empiric Augmentin and metronidazole   -Will need to connect with GI at least 6 weeks after resolution, likely will need a C-scope.  -Strict ER precautions discussed in great detail, patient in agreement

## 2024-12-05 NOTE — PATIENT INSTRUCTIONS
Call and schedule ultrasound of the abdomen.  This test is less accurate than the CT scan but we will be able to complete it quicker.    Call and schedule CT abdomen pelvis with IV contrast once you get the causing your insurance has approved the scan.  - Go to lab to have CBC completed to check for increased white blood cells that could indicate an infection  - Go to the lab to have your BMP completed to assess kidney function within 30 days of the CT scan.    Start metronidazole 500 mg 3 times daily for 7 days and Augmentin twice daily x 7 to treat infection    If after antibiotics symptoms resolve and you have not had the CT scan completed then you do not necessarily have to have the CT completed.    If having fevers and chills, worsening abdominal pain, no improvement despite the antibiotics patient should go to the ER for further evaluation and management as well as to make sure there is not an abscess that potentially formed that requires further management.    After diverticulitis flare you should call your gastroenterologist and let them know you had an episode of diverticulitis and that your primary care doctor said you may need a colonoscopy.  They will determine at that point if it is needed or not.

## 2024-12-05 NOTE — PROGRESS NOTES
Subjective      Patient ID: Melanie Zelaya is a 74 y.o. male here for the following:   Diverticulitis (Pt is here for possible diverticulitis flare up. Has pain on left side of groin. Had appt with NP on the . Says that he picked up a basket of towels and now pain is worse. )      HPI    #LLQ  -Onset: couple days   -Left lower quad pain  -Denies fevers, chills  -Denies blood in stool    Has a history of diverticulosis    The following have been reviewed and updated as appropriate in this visit:      Allergies  Meds  Problems  Social History      Patient Active Problem List   Diagnosis    Anxiety    Atrial fibrillation (CMS/HCC)    Primary malignant neoplasm of left lower lobe of lung (CMS/HCC)    Multiple pulmonary nodules    Asthma    Hypertension    Coronary artery calcification seen on CT scan    COPD (chronic obstructive pulmonary disease) (CMS/HCC)    Diverticulitis    .    Social History     Tobacco Use    Smoking status: Former     Current packs/day: 0.00     Types: Cigarettes     Quit date: 2010     Years since quittin.6    Smokeless tobacco: Never   Vaping Use    Vaping status: Never Used   Substance Use Topics    Alcohol use: No     Comment: RARE    Drug use: No       Allergies as of 2024    (No Known Allergies)         Current Outpatient Medications:     amoxicillin-pot clavulanate (AUGMENTIN) 875-125 mg per tablet, Take 1 tablet by mouth 2 (two) times a day for 7 days., Disp: 14 tablet, Rfl: 0    metroNIDAZOLE (FLAGYL) 500 mg tablet, Take 1 tablet (500 mg total) by mouth 3 (three) times a day for 7 days., Disp: 21 tablet, Rfl: 0    albuterol HFA (PROAIR HFA) 90 mcg/actuation inhaler, Inhale 2 puffs 2 (two) times a day as needed for wheezing or shortness of breath., Disp: 1 each, Rfl: 3    amLODIPine (NORVASC) 10 mg tablet, Take 1 tablet by mouth nightly, Disp: 90 tablet, Rfl: 1    atorvastatin (LIPITOR) 40 mg tablet, Take 1 tablet by mouth once daily, Disp: 90 tablet, Rfl: 0     carvediloL (COREG) 6.25 mg tablet, Take 1 tablet (6.25 mg total) by mouth 2 (two) times a day with meals., Disp: 60 tablet, Rfl: 0    cholecalciferol, vitamin D3, 400 unit (10 mcg) tablet tablet, Take by mouth daily., Disp: , Rfl:     cyclobenzaprine (FLEXERIL) 10 mg tablet, Take 1 tablet (10 mg total) by mouth daily as needed for muscle spasms., Disp: 30 tablet, Rfl: 0    dabigatran etexilate 150 mg capsu, Take 1 capsule (150 mg total) by mouth 2 (two) times a day Indications: treatment to prevent blood clots in chronic atrial fibrillation., Disp: 60 capsule, Rfl: 0    escitalopram (LEXAPRO) 10 mg tablet, Take 1 tablet (10 mg total) by mouth daily., Disp: 90 tablet, Rfl: 0    FISH OIL-omega-3 fatty acids (FISH OIL) 340-1,000 mg capsule, Take 2 capsules by mouth 2 (two) times a day., Disp: , Rfl:     fluticasone propionate (FLONASE) 50 mcg/actuation nasal spray, Use 2 spray(s) in each nostril once daily, Disp: 48 g, Rfl: 1    fluticasone-umeclidinium-vilanterol (TRELEGY ELLIPTA) 100-62.5-25 mcg blister with device powder for inhalation, Inhale 1 puff daily., Disp: , Rfl:     hydrochlorothiazide (HYDRODIURIL) 25 mg tablet, Take 1 tablet by mouth once daily, Disp: 90 tablet, Rfl: 1    losartan (COZAAR) 100 mg tablet, TAKE 1 TABLET BY MOUTH ONCE DAILY WITH SUPPER, Disp: 30 tablet, Rfl: 0    multivitamin tablet, Take by mouth daily., Disp: , Rfl:     travoprost 0.004 % drops, INSTILL 1 DROP INTO EACH EYE ONCE DAILY AT BEDTIME, Disp: , Rfl:     TURMERIC ORAL, Take 500 mg by mouth., Disp: , Rfl:       Review of systems as per HPI and below    PHQ-9 Results  No data recorded  No data recorded  No data recorded  No data recorded  No data recorded  No data recorded  No data recorded  No data recorded  No data recorded  No data recorded  No data recorded  No data recorded  No data recorded  No data recorded    [place] Suicide Severity Rating Scale  No data recorded  No data recorded  No data recorded  No data recorded  No  "data recorded  No data recorded  No data recorded      Review of Systems   Constitutional: Negative.    HENT: Negative.     Eyes: Negative.    Respiratory: Negative.     Cardiovascular: Negative.    Gastrointestinal:  Positive for abdominal pain.   Endocrine: Negative.    Genitourinary: Negative.    Musculoskeletal: Negative.    Skin: Negative.    Allergic/Immunologic: Negative.    Neurological: Negative.    Hematological: Negative.    Psychiatric/Behavioral: Negative.       Objective   Vitals:   Vitals:    12/05/24 1611   BP: 120/70   BP Location: Left upper arm   Patient Position: Sitting   Pulse: 70   Resp: 16   Temp: 36.6 °C (97.9 °F)   TempSrc: Temporal   SpO2: 97%   Weight: 83 kg (183 lb)   Height: 1.651 m (5' 5\")     Body mass index is 30.45 kg/m².    Physical Exam  Constitutional:       General: He is not in acute distress.     Appearance: He is not ill-appearing.   Abdominal:      General: There is no distension.      Palpations: There is no mass.      Tenderness: There is abdominal tenderness. There is no right CVA tenderness, left CVA tenderness, guarding or rebound.      Hernia: No hernia is present.         ASSESSMENT & PLAN    Problem List Items Addressed This Visit       Diverticulitis - Primary     Left lower quad pain in a patient with known history of diverticulosis    CT abdomen pelvis with IV contrast pending  - Have BMP completed within 30 days  -Start empiric Augmentin and metronidazole   -Will need to connect with GI at least 6 weeks after resolution, likely will need a C-scope.  -Strict ER precautions discussed in great detail, patient in agreement         Relevant Medications    metroNIDAZOLE (FLAGYL) 500 mg tablet    amoxicillin-pot clavulanate (AUGMENTIN) 875-125 mg per tablet    Other Relevant Orders    CT ABDOMEN PELVIS WITH IV CONTRAST    Basic metabolic panel    ULTRASOUND ABDOMEN COMPLETE       Orders Placed This Encounter   Procedures    CT ABDOMEN PELVIS WITH IV CONTRAST     " Standing Status:   Future     Standing Expiration Date:   12/5/2025     Order Specific Question:   Should the patient receive oral contrast?     Answer:   No     Order Specific Question:   Release to patient     Answer:   Immediate [1]    ULTRASOUND ABDOMEN COMPLETE     Standing Status:   Future     Standing Expiration Date:   12/5/2025     Order Specific Question:   Release to patient     Answer:   Immediate [1]    Basic metabolic panel     Standing Status:   Future     Number of Occurrences:   1     Standing Expiration Date:   12/5/2025     Order Specific Question:   Release to patient     Answer:   Immediate [1]           _____________________  Tanna Wilson MD  12/06/24

## 2024-12-06 ENCOUNTER — TELEPHONE (OUTPATIENT)
Dept: PRIMARY CARE | Facility: CLINIC | Age: 74
End: 2024-12-06
Payer: COMMERCIAL

## 2024-12-06 DIAGNOSIS — K57.92 DIVERTICULITIS: Primary | ICD-10-CM

## 2024-12-06 ASSESSMENT — ENCOUNTER SYMPTOMS: ABDOMINAL PAIN: 1

## 2024-12-06 NOTE — TELEPHONE ENCOUNTER
Spoke to patient and he stated that he is feeling better but still feeling some discomfort.  stated that the dates for him to obtain his imaging is okay. Also advised patient if symptoms worsen, to go to the ERr

## 2024-12-06 NOTE — TELEPHONE ENCOUNTER
Pt called and stated he is scheduled for the first avaialbl 12/20/24 for the CT and US. He wants to know if Dr. Wilson is okay with that or if he can expedite it

## 2024-12-09 ENCOUNTER — APPOINTMENT (OUTPATIENT)
Dept: LAB | Facility: CLINIC | Age: 74
End: 2024-12-09
Attending: STUDENT IN AN ORGANIZED HEALTH CARE EDUCATION/TRAINING PROGRAM
Payer: COMMERCIAL

## 2024-12-09 DIAGNOSIS — K57.92 DIVERTICULITIS: ICD-10-CM

## 2024-12-09 PROCEDURE — 36415 COLL VENOUS BLD VENIPUNCTURE: CPT

## 2024-12-09 PROCEDURE — 80048 BASIC METABOLIC PNL TOTAL CA: CPT

## 2024-12-10 ENCOUNTER — TELEPHONE (OUTPATIENT)
Dept: PRIMARY CARE | Facility: CLINIC | Age: 74
End: 2024-12-10
Payer: COMMERCIAL

## 2024-12-10 DIAGNOSIS — I10 PRIMARY HYPERTENSION: ICD-10-CM

## 2024-12-10 LAB
ANION GAP SERPL CALC-SCNC: 7 MEQ/L (ref 3–15)
BUN SERPL-MCNC: 16 MG/DL (ref 7–25)
CALCIUM SERPL-MCNC: 10.1 MG/DL (ref 8.6–10.3)
CHLORIDE SERPL-SCNC: 102 MEQ/L (ref 98–107)
CO2 SERPL-SCNC: 29 MEQ/L (ref 21–31)
CREAT SERPL-MCNC: 0.9 MG/DL (ref 0.7–1.3)
EGFRCR SERPLBLD CKD-EPI 2021: >60 ML/MIN/1.73M*2
GLUCOSE SERPL-MCNC: 70 MG/DL (ref 70–99)
POTASSIUM SERPL-SCNC: 4.2 MEQ/L (ref 3.5–5.1)
SODIUM SERPL-SCNC: 138 MEQ/L (ref 136–145)

## 2024-12-13 RX ORDER — AMLODIPINE BESYLATE 10 MG/1
10 TABLET ORAL NIGHTLY
Qty: 90 TABLET | Refills: 1 | Status: SHIPPED | OUTPATIENT
Start: 2024-12-13

## 2024-12-13 RX ORDER — HYDROCHLOROTHIAZIDE 25 MG/1
25 TABLET ORAL DAILY
Qty: 90 TABLET | Refills: 1 | Status: SHIPPED | OUTPATIENT
Start: 2024-12-13

## 2024-12-20 ENCOUNTER — HOSPITAL ENCOUNTER (OUTPATIENT)
Dept: RADIOLOGY | Age: 74
Discharge: HOME | End: 2024-12-20
Attending: STUDENT IN AN ORGANIZED HEALTH CARE EDUCATION/TRAINING PROGRAM
Payer: COMMERCIAL

## 2024-12-20 DIAGNOSIS — K57.92 DIVERTICULITIS: ICD-10-CM

## 2024-12-20 PROCEDURE — 63600105 HC IODINE BASED CONTRAST: Mod: JZ | Performed by: STUDENT IN AN ORGANIZED HEALTH CARE EDUCATION/TRAINING PROGRAM

## 2024-12-20 PROCEDURE — 76700 US EXAM ABDOM COMPLETE: CPT

## 2024-12-20 PROCEDURE — 74177 CT ABD & PELVIS W/CONTRAST: CPT

## 2024-12-20 RX ORDER — IOPAMIDOL 755 MG/ML
100 INJECTION, SOLUTION INTRAVASCULAR ONCE
Status: COMPLETED | OUTPATIENT
Start: 2024-12-20 | End: 2024-12-20

## 2024-12-20 RX ADMIN — IOPAMIDOL 100 ML: 755 INJECTION, SOLUTION INTRAVENOUS at 09:44

## 2024-12-20 NOTE — PROGRESS NOTES
"Melanie Zelaya   [id number]    Your doctor has referred you for a CT ABDOMEN PELVIS W IV CONTRAST that requires the injection of an iodinated contrast material into your bloodstream. This iodinated contrast material, sometimes referred to as \"x-ray dye\" allows for better interpretation of the x-ray films or CT images and results in a more accurate interpretation of the examination.     Without the use of iodinated contrast (x-ray dye), the examination may be less informative and result in a suboptimal examination, and possibly a delay in diagnosis and, if needed, treatment.     The iodinated contrast material is given through a small needle or catheter placed into a vein, usually on the inside of the elbow, on the back of hand, or in a vein in the foot or lower leg.    The most common, though still rare, potential reaction to an intravenous contrast injection is an allergic-like reaction. Most allergic-like reactions are mild, though a small subset of people can have more severe reactions. Mild reactions include mild / scattered hives, itching, scratchy throat, sneezing and nasal congestion. More severe reactions include facial swelling, severe difficulty breathing, wheezing and anaphylactic shock. Those with a prior history allergic-like reaction to the same class of contrast media (such as CT contrast or MRI contrast) are at the highest risk for an allergic reaction.     If you believe you had an allergic reaction to contrast in the past, please let our staff know. We can determine if this increases your risk for a future reaction and provide steps to decrease the risk. This may delay your examination, but it decreases the risk of having a new and possibly more severe reaction to the contrast injection.    People with a history of prior allergic reactions to other substances (such as unrelated medications and food) and patients with a history of asthma have slightly increased risk for an allergic reaction from " contrast material when compared with the general population, but do not require to be pretreated prior to a contrast injection.    You should notify the physician, nurse or technologist if you have ever had any of these conditions or if you have any questions.

## 2024-12-26 ENCOUNTER — TELEPHONE (OUTPATIENT)
Dept: PRIMARY CARE | Facility: CLINIC | Age: 74
End: 2024-12-26
Payer: COMMERCIAL

## 2024-12-27 NOTE — TELEPHONE ENCOUNTER
Reviewed imaging results discussed on 12/24. Pt to follow up with Urology if urinary sx present. Pt verbalized understanding. List provided to pt with Urology providers via [facility].

## 2025-01-07 ENCOUNTER — HOSPITAL ENCOUNTER (OUTPATIENT)
Dept: RADIOLOGY | Age: 75
Discharge: HOME | End: 2025-01-07
Attending: THORACIC SURGERY (CARDIOTHORACIC VASCULAR SURGERY)
Payer: COMMERCIAL

## 2025-01-07 DIAGNOSIS — C34.32 PRIMARY MALIGNANT NEOPLASM OF LEFT LOWER LOBE OF LUNG (CMS/HCC): Chronic | ICD-10-CM

## 2025-01-07 PROCEDURE — 71250 CT THORAX DX C-: CPT

## 2025-01-10 NOTE — TELEPHONE ENCOUNTER
Melanie called and asked for a refill of his lexapro. He also said that he saw Dr Guallpa recently and he would like you to just review his note and let him know if he should be concerned. He says that he values your opinion and just needs reassurance from you that everything is ok.    show

## 2025-01-15 ENCOUNTER — OFFICE VISIT (OUTPATIENT)
Dept: THORACIC SURGERY | Facility: CLINIC | Age: 75
End: 2025-01-15
Payer: COMMERCIAL

## 2025-01-15 VITALS
OXYGEN SATURATION: 97 % | BODY MASS INDEX: 29.41 KG/M2 | HEIGHT: 66 IN | HEART RATE: 68 BPM | SYSTOLIC BLOOD PRESSURE: 132 MMHG | DIASTOLIC BLOOD PRESSURE: 74 MMHG | WEIGHT: 183 LBS | RESPIRATION RATE: 18 BRPM

## 2025-01-15 DIAGNOSIS — C34.32 PRIMARY MALIGNANT NEOPLASM OF LEFT LOWER LOBE OF LUNG (CMS/HCC): Primary | Chronic | ICD-10-CM

## 2025-01-15 PROCEDURE — 1157F ADVNC CARE PLAN IN RCRD: CPT | Performed by: THORACIC SURGERY (CARDIOTHORACIC VASCULAR SURGERY)

## 2025-01-15 PROCEDURE — 99213 OFFICE O/P EST LOW 20 MIN: CPT | Performed by: THORACIC SURGERY (CARDIOTHORACIC VASCULAR SURGERY)

## 2025-01-15 PROCEDURE — 3078F DIAST BP <80 MM HG: CPT | Performed by: THORACIC SURGERY (CARDIOTHORACIC VASCULAR SURGERY)

## 2025-01-15 PROCEDURE — 3075F SYST BP GE 130 - 139MM HG: CPT | Performed by: THORACIC SURGERY (CARDIOTHORACIC VASCULAR SURGERY)

## 2025-01-15 PROCEDURE — 3008F BODY MASS INDEX DOCD: CPT | Performed by: THORACIC SURGERY (CARDIOTHORACIC VASCULAR SURGERY)

## 2025-01-15 NOTE — LETTER
January 15, 2025     Pancho Monterroso DO  [address]  [address]  [place] [address]    Patient: Melanie Zelaya  YOB: 1950  Date of Visit: 1/15/2025      Dear Dr. Monterroso:    Thank you for referring Melanie Zelaya to me for evaluation. Below are my notes for this consultation.    If you have questions, please do not hesitate to call me. I look forward to following your patient along with you.         Sincerely,        Blaise Guallpa MD        CC: YESENIA Angel DO Walker, Michael J, MD  1/15/2025  2:38 PM  Sign when Signing Visit  Patient ID: Melanie Zelaya                              : 1950  MRN: [id number]                                            Visit Date: 1/15/2025  Encounter Provider: Blaise Guallpa  Referring Provider: No ref. provider found    Subjective     Patient ID: Melanie Zelaya is a 74 y.o. male.  Chief Complaint: Follow-up (3 month check with CT 25)      Melanie Zelaya is a 74 y.o. 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  Lymphovascular invasion was indeterminant.  We saw him last October and his scan shows some waxing and waning nodules which she has had previously so were doing a short interval 3-month scan.  He is a little bit of cough of clear yellow sputum but no hemoptysis.  He is taking Mucinex for this.  Dr. Paris placed him on Trelegy and his breathing is markedly better and it does sounds like he has not needed his rescue inhaler.  No significant weight loss over the last 3 months, fever, chills, sweats, new persistent neurological or bony complaints.    Past Medical History:  has a past medical history of Anxiety, Atrial fibrillation (CMS/HCC), Breast cancer (CMS/HCC), C1 cervical fracture (CMS/HCC), Colon  polyp, COPD (chronic obstructive pulmonary disease) (CMS/HCC), Coronary artery calcification seen on CT scan, COVID-19 vaccine series completed, Depression, Diverticulosis, Diverticulosis, Fracture of pelvis (CMS/HCC) (), GERD (gastroesophageal reflux disease), Hearing loss, History of colon polyps, History of COVID-19 (), Hyperlipidemia, Hypertension, Left atrial enlargement, Lung cancer (CMS/HCC), Lung cancer (CMS/HCC), Pneumonia (), Seasonal allergies, and Wrist fracture.  Past Surgical History:  has a past surgical history that includes Lung lobectomy (Left, ); Shoulder surgery (Right, ); Rotator cuff repair (Right, ); Tonsillectomy; Nasal polyp surgery; Hand surgery (Bilateral); and Colonoscopy.  Social History:   Social History     Tobacco Use   • Smoking status: Former     Current packs/day: 0.00     Types: Cigarettes     Quit date: 2010     Years since quittin.7   • Smokeless tobacco: Never   Vaping Use   • Vaping status: Never Used   Substance Use Topics   • Alcohol use: No     Comment: RARE   • Drug use: No     Family History: family history includes Cancer in his nephew; Cirrhosis in his biological father; Colon cancer in his biological brother; Diabetes in his biological mother; Lung cancer in his biological brother; No Known Problems in his maternal grandfather, maternal grandmother, paternal grandfather, and paternal grandmother; Pancreatic cancer in his biological sister; Prostate cancer in his biological brother.  Medications:   Current Outpatient Medications:   •  albuterol HFA (PROAIR HFA) 90 mcg/actuation inhaler, Inhale 2 puffs 2 (two) times a day as needed for wheezing or shortness of breath., Disp: 1 each, Rfl: 3  •  amLODIPine (NORVASC) 10 mg tablet, Take 1 tablet (10 mg total) by mouth nightly., Disp: 90 tablet, Rfl: 1  •  atorvastatin (LIPITOR) 40 mg tablet, Take 1 tablet by mouth once daily, Disp: 90 tablet, Rfl: 0  •  carvediloL (COREG) 6.25 mg tablet,  "Take 1 tablet (6.25 mg total) by mouth 2 (two) times a day with meals., Disp: 60 tablet, Rfl: 0  •  cholecalciferol, vitamin D3, 400 unit (10 mcg) tablet tablet, Take by mouth daily., Disp: , Rfl:   •  cyclobenzaprine (FLEXERIL) 10 mg tablet, Take 1 tablet (10 mg total) by mouth daily as needed for muscle spasms., Disp: 30 tablet, Rfl: 0  •  dabigatran etexilate 150 mg capsu, Take 1 capsule (150 mg total) by mouth 2 (two) times a day Indications: treatment to prevent blood clots in chronic atrial fibrillation., Disp: 60 capsule, Rfl: 0  •  escitalopram (LEXAPRO) 10 mg tablet, Take 1 tablet (10 mg total) by mouth daily., Disp: 90 tablet, Rfl: 0  •  FISH OIL-omega-3 fatty acids (FISH OIL) 340-1,000 mg capsule, Take 2 capsules by mouth 2 (two) times a day., Disp: , Rfl:   •  fluticasone propionate (FLONASE) 50 mcg/actuation nasal spray, Use 2 spray(s) in each nostril once daily, Disp: 48 g, Rfl: 1  •  fluticasone-umeclidinium-vilanterol (TRELEGY ELLIPTA) 100-62.5-25 mcg blister with device powder for inhalation, Inhale 1 puff daily., Disp: , Rfl:   •  hydrochlorothiazide (HYDRODIURIL) 25 mg tablet, Take 1 tablet (25 mg total) by mouth daily., Disp: 90 tablet, Rfl: 1  •  losartan (COZAAR) 100 mg tablet, TAKE 1 TABLET BY MOUTH ONCE DAILY WITH SUPPER, Disp: 30 tablet, Rfl: 0  •  multivitamin tablet, Take by mouth daily., Disp: , Rfl:   •  travoprost 0.004 % drops, INSTILL 1 DROP INTO EACH EYE ONCE DAILY AT BEDTIME, Disp: , Rfl:   •  TURMERIC ORAL, Take 500 mg by mouth., Disp: , Rfl:   Allergies: has No Known Allergies.   E-cigarette/Vaping       Questions Responses    E-cigarette/Vaping Use Never User             The following have been reviewed and updated as appropriate in this visit:           Review of Systems   All other systems reviewed and are negative.      Objective  Vitals: Visit Vitals  /74 (BP Location: Left upper arm, Patient Position: Sitting)   Pulse 68   Resp 18   Ht 1.676 m (5' 6\")   Wt 83 kg (183 " lb)   SpO2 97%   BMI 29.54 kg/m²       Physical Exam  Vitals reviewed.   Constitutional:       Appearance: He is well-developed.   HENT:      Head: Normocephalic.   Eyes:      Pupils: Pupils are equal, round, and reactive to light.   Cardiovascular:      Rate and Rhythm: Normal rate and regular rhythm.   Pulmonary:      Effort: Pulmonary effort is normal.      Breath sounds: Normal breath sounds.   Abdominal:      General: Abdomen is flat.   Musculoskeletal:         General: Normal range of motion.      Cervical back: Neck supple.   Skin:     General: Skin is warm and dry.   Neurological:      General: No focal deficit present.      Mental Status: He is alert.   Psychiatric:         Mood and Affect: Mood normal.         Assessment/Plan  Independent review of all imaging was done by myself as well as review of the radiologists readings and comparison to prior films.  CAT scan of the chest on January 7 shows resolution of the groundglass opacities that we saw previously.  There is a couple new areas of infiltrate or nodular areas which likely are not malignant since they develop in 3 months so we will do a short-term 6-month follow-up and see him back at that time.  Hopefully if everything remains stable we can go back to her normal yearly follow-up but he has had waxing and waning nodules for quite some time.       Problem List Items Addressed This Visit       Primary malignant neoplasm of left lower lobe of lung (CMS/HCC) - Primary (Chronic)    Relevant Orders    CT CHEST WITHOUT IV CONTRAST         Blaise Guallpa MD

## 2025-01-15 NOTE — PROGRESS NOTES
Patient ID: Melanie Zelaya                              : 1950  MRN: [id number]                                            Visit Date: 1/15/2025  Encounter Provider: Blaise Guallpa  Referring Provider: No ref. provider found    Subjective      Patient ID: Melanie Zelaya is a 74 y.o. male.  Chief Complaint: Follow-up (3 month check with CT 25)      Melanie Zelaya is a 74 y.o. 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  Lymphovascular invasion was indeterminant.  We saw him last October and his scan shows some waxing and waning nodules which she has had previously so were doing a short interval 3-month scan.  He is a little bit of cough of clear yellow sputum but no hemoptysis.  He is taking Mucinex for this.  Dr. Paris placed him on Trelegy and his breathing is markedly better and it does sounds like he has not needed his rescue inhaler.  No significant weight loss over the last 3 months, fever, chills, sweats, new persistent neurological or bony complaints.    Past Medical History:  has a past medical history of Anxiety, Atrial fibrillation (CMS/HCC), Breast cancer (CMS/HCC), C1 cervical fracture (CMS/HCC), Colon polyp, COPD (chronic obstructive pulmonary disease) (CMS/HCC), Coronary artery calcification seen on CT scan, COVID-19 vaccine series completed, Depression, Diverticulosis, Diverticulosis, Fracture of pelvis (CMS/HCC) (), GERD (gastroesophageal reflux disease), Hearing loss, History of colon polyps, History of COVID-19 (), Hyperlipidemia, Hypertension, Left atrial enlargement, Lung cancer (CMS/HCC), Lung cancer (CMS/HCC), Pneumonia (), Seasonal allergies, and Wrist fracture.  Past Surgical History:  has a past surgical history that includes Lung lobectomy (Left, ); Shoulder surgery  (Right, ); Rotator cuff repair (Right, ); Tonsillectomy; Nasal polyp surgery; Hand surgery (Bilateral); and Colonoscopy.  Social History:   Social History     Tobacco Use    Smoking status: Former     Current packs/day: 0.00     Types: Cigarettes     Quit date: 2010     Years since quittin.7    Smokeless tobacco: Never   Vaping Use    Vaping status: Never Used   Substance Use Topics    Alcohol use: No     Comment: RARE    Drug use: No     Family History: family history includes Cancer in his nephew; Cirrhosis in his biological father; Colon cancer in his biological brother; Diabetes in his biological mother; Lung cancer in his biological brother; No Known Problems in his maternal grandfather, maternal grandmother, paternal grandfather, and paternal grandmother; Pancreatic cancer in his biological sister; Prostate cancer in his biological brother.  Medications:   Current Outpatient Medications:     albuterol HFA (PROAIR HFA) 90 mcg/actuation inhaler, Inhale 2 puffs 2 (two) times a day as needed for wheezing or shortness of breath., Disp: 1 each, Rfl: 3    amLODIPine (NORVASC) 10 mg tablet, Take 1 tablet (10 mg total) by mouth nightly., Disp: 90 tablet, Rfl: 1    atorvastatin (LIPITOR) 40 mg tablet, Take 1 tablet by mouth once daily, Disp: 90 tablet, Rfl: 0    carvediloL (COREG) 6.25 mg tablet, Take 1 tablet (6.25 mg total) by mouth 2 (two) times a day with meals., Disp: 60 tablet, Rfl: 0    cholecalciferol, vitamin D3, 400 unit (10 mcg) tablet tablet, Take by mouth daily., Disp: , Rfl:     cyclobenzaprine (FLEXERIL) 10 mg tablet, Take 1 tablet (10 mg total) by mouth daily as needed for muscle spasms., Disp: 30 tablet, Rfl: 0    dabigatran etexilate 150 mg capsu, Take 1 capsule (150 mg total) by mouth 2 (two) times a day Indications: treatment to prevent blood clots in chronic atrial fibrillation., Disp: 60 capsule, Rfl: 0    escitalopram (LEXAPRO) 10 mg tablet, Take 1 tablet (10 mg total) by mouth  "daily., Disp: 90 tablet, Rfl: 0    FISH OIL-omega-3 fatty acids (FISH OIL) 340-1,000 mg capsule, Take 2 capsules by mouth 2 (two) times a day., Disp: , Rfl:     fluticasone propionate (FLONASE) 50 mcg/actuation nasal spray, Use 2 spray(s) in each nostril once daily, Disp: 48 g, Rfl: 1    fluticasone-umeclidinium-vilanterol (TRELEGY ELLIPTA) 100-62.5-25 mcg blister with device powder for inhalation, Inhale 1 puff daily., Disp: , Rfl:     hydrochlorothiazide (HYDRODIURIL) 25 mg tablet, Take 1 tablet (25 mg total) by mouth daily., Disp: 90 tablet, Rfl: 1    losartan (COZAAR) 100 mg tablet, TAKE 1 TABLET BY MOUTH ONCE DAILY WITH SUPPER, Disp: 30 tablet, Rfl: 0    multivitamin tablet, Take by mouth daily., Disp: , Rfl:     travoprost 0.004 % drops, INSTILL 1 DROP INTO EACH EYE ONCE DAILY AT BEDTIME, Disp: , Rfl:     TURMERIC ORAL, Take 500 mg by mouth., Disp: , Rfl:   Allergies: has No Known Allergies.   E-cigarette/Vaping       Questions Responses    E-cigarette/Vaping Use Never User             The following have been reviewed and updated as appropriate in this visit:           Review of Systems   All other systems reviewed and are negative.      Objective   Vitals: Visit Vitals  /74 (BP Location: Left upper arm, Patient Position: Sitting)   Pulse 68   Resp 18   Ht 1.676 m (5' 6\")   Wt 83 kg (183 lb)   SpO2 97%   BMI 29.54 kg/m²       Physical Exam  Vitals reviewed.   Constitutional:       Appearance: He is well-developed.   HENT:      Head: Normocephalic.   Eyes:      Pupils: Pupils are equal, round, and reactive to light.   Cardiovascular:      Rate and Rhythm: Normal rate and regular rhythm.   Pulmonary:      Effort: Pulmonary effort is normal.      Breath sounds: Normal breath sounds.   Abdominal:      General: Abdomen is flat.   Musculoskeletal:         General: Normal range of motion.      Cervical back: Neck supple.   Skin:     General: Skin is warm and dry.   Neurological:      General: No focal deficit " present.      Mental Status: He is alert.   Psychiatric:         Mood and Affect: Mood normal.         Assessment/Plan   Independent review of all imaging was done by myself as well as review of the radiologists readings and comparison to prior films.  CAT scan of the chest on January 7 shows resolution of the groundglass opacities that we saw previously.  There is a couple new areas of infiltrate or nodular areas which likely are not malignant since they develop in 3 months so we will do a short-term 6-month follow-up and see him back at that time.  Hopefully if everything remains stable we can go back to her normal yearly follow-up but he has had waxing and waning nodules for quite some time.       Problem List Items Addressed This Visit       Primary malignant neoplasm of left lower lobe of lung (CMS/HCC) - Primary (Chronic)    Relevant Orders    CT CHEST WITHOUT IV CONTRAST         Blaise Guallpa MD

## 2025-01-16 ENCOUNTER — TELEPHONE (OUTPATIENT)
Dept: PRIMARY CARE | Facility: CLINIC | Age: 75
End: 2025-01-16
Payer: COMMERCIAL

## 2025-01-16 DIAGNOSIS — I10 PRIMARY HYPERTENSION: ICD-10-CM

## 2025-01-16 RX ORDER — CARVEDILOL 6.25 MG/1
6.25 TABLET ORAL 2 TIMES DAILY WITH MEALS
Qty: 180 TABLET | Refills: 1 | Status: SHIPPED | OUTPATIENT
Start: 2025-01-16

## 2025-01-17 ENCOUNTER — OFFICE VISIT (OUTPATIENT)
Dept: PRIMARY CARE | Facility: CLINIC | Age: 75
End: 2025-01-17
Payer: COMMERCIAL

## 2025-01-17 VITALS
WEIGHT: 189 LBS | SYSTOLIC BLOOD PRESSURE: 138 MMHG | OXYGEN SATURATION: 97 % | HEART RATE: 56 BPM | TEMPERATURE: 98.1 F | HEIGHT: 65 IN | RESPIRATION RATE: 20 BRPM | BODY MASS INDEX: 31.49 KG/M2 | DIASTOLIC BLOOD PRESSURE: 72 MMHG

## 2025-01-17 DIAGNOSIS — I10 PRIMARY HYPERTENSION: ICD-10-CM

## 2025-01-17 DIAGNOSIS — E55.9 VITAMIN D DEFICIENCY: ICD-10-CM

## 2025-01-17 DIAGNOSIS — R73.09 ELEVATED GLUCOSE LEVEL: ICD-10-CM

## 2025-01-17 DIAGNOSIS — Z12.11 ENCOUNTER FOR COLORECTAL CANCER SCREENING: ICD-10-CM

## 2025-01-17 DIAGNOSIS — E78.00 PURE HYPERCHOLESTEROLEMIA: ICD-10-CM

## 2025-01-17 DIAGNOSIS — F41.9 ANXIETY: ICD-10-CM

## 2025-01-17 DIAGNOSIS — M54.2 NECK PAIN: ICD-10-CM

## 2025-01-17 DIAGNOSIS — J43.9 PULMONARY EMPHYSEMA, UNSPECIFIED EMPHYSEMA TYPE (CMS/HCC): ICD-10-CM

## 2025-01-17 DIAGNOSIS — K57.92 DIVERTICULITIS: ICD-10-CM

## 2025-01-17 DIAGNOSIS — Z00.00 WELCOME TO MEDICARE PREVENTIVE VISIT: Primary | ICD-10-CM

## 2025-01-17 DIAGNOSIS — Z12.12 ENCOUNTER FOR COLORECTAL CANCER SCREENING: ICD-10-CM

## 2025-01-17 DIAGNOSIS — Z23 NEED FOR INFLUENZA VACCINATION: ICD-10-CM

## 2025-01-17 DIAGNOSIS — I48.21 PERMANENT ATRIAL FIBRILLATION (CMS/HCC): ICD-10-CM

## 2025-01-17 PROCEDURE — 90662 IIV NO PRSV INCREASED AG IM: CPT

## 2025-01-17 PROCEDURE — G0008 ADMIN INFLUENZA VIRUS VAC: HCPCS

## 2025-01-17 PROCEDURE — G0439 PPPS, SUBSEQ VISIT: HCPCS

## 2025-01-17 PROCEDURE — 1157F ADVNC CARE PLAN IN RCRD: CPT

## 2025-01-17 RX ORDER — CYCLOBENZAPRINE HCL 10 MG
10 TABLET ORAL DAILY PRN
Qty: 30 TABLET | Refills: 0 | Status: SHIPPED | OUTPATIENT
Start: 2025-01-17 | End: 2025-02-16

## 2025-01-17 ASSESSMENT — ENCOUNTER SYMPTOMS
FATIGUE: 0
WHEEZING: 0
SHORTNESS OF BREATH: 0
PALPITATIONS: 0
SLEEP DISTURBANCE: 0
TROUBLE SWALLOWING: 0
DIARRHEA: 0
CONSTIPATION: 0
VOMITING: 0
DIZZINESS: 0
WOUND: 0
HEADACHES: 0
LIGHT-HEADEDNESS: 0
FEVER: 0
CHILLS: 0
DIFFICULTY URINATING: 0
SINUS PRESSURE: 0
COUGH: 0
SORE THROAT: 0
NAUSEA: 0
ABDOMINAL PAIN: 0

## 2025-01-17 ASSESSMENT — ACTIVITIES OF DAILY LIVING (ADL)
PATIENT'S MEMORY ADEQUATE TO SAFELY COMPLETE DAILY ACTIVITIES?: YES
ADEQUATE_TO_COMPLETE_ADL: YES

## 2025-01-17 ASSESSMENT — PATIENT HEALTH QUESTIONNAIRE - PHQ9: SUM OF ALL RESPONSES TO PHQ9 QUESTIONS 1 & 2: 0

## 2025-01-17 ASSESSMENT — MINI COG
TOTAL SCORE: 5
COMPLETED: YES

## 2025-01-17 ASSESSMENT — PAIN SCALES - GENERAL: PAINLEVEL_OUTOF10: 4
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
## 2025-01-17 NOTE — PATIENT INSTRUCTIONS
Obtain fasting labs when able.  Call and schedule your colonoscopy.                          Your Personalized Prevention Plan Services (PPPS)    Preventive Services Checklist (Assumes Average Risk Unless Otherwise Noted):    Health Maintenance Topics with due status: Overdue       Topic Date Due    Depression Screening Never done    Falls Risk Screening Never done    RSV Vaccine Never done    Influenza Vaccine 08/01/2024    COVID-19 Vaccine 09/01/2024     Health Maintenance Topics with due status: Not Due       Topic Last Completion Date    DTaP, Tdap, and Td Vaccines 04/23/2021    Medicare Annual Wellness Visit 01/17/2025     Health Maintenance Topics with due status: Completed       Topic Last Completion Date    Hepatitis C Screening 02/21/2019    Zoster Vaccine 12/28/2019    Pneumococcal (65 years and older) 04/21/2023     Health Maintenance Topics with due status: Aged Out       Topic Date Due    Meningococcal ACWY Aged Out    RSV <20 months Aged Out    HIB Vaccines Aged Out    Hepatitis B Vaccines Aged Out    IPV Vaccines Aged Out    Meningococcal B Aged Out    HPV Vaccines Aged Out     Health Maintenance Topics with due status: Discontinued       Topic Date Due    Colorectal Cancer Screening Discontinued       You May Be Eligible for These Additional Preventive Services   (Assumes Average Risk Unless Otherwise Noted)  Diabetes Screening Any 1 risk factor: hypertension, dyslipidemia, obesity, high glucose; or Any 2 risk factors: >=66yo, overweight, family history diabetes (covered every 6 months)   Hepatitis C Screening Any 1 risk factor: 1) blood transfusion before 1992,   2) current or past injection drug use (annually for high risk; if born between 4702-9198, see above for status).   Vaccine: Hepatitis B As necessary if at-risk: hemophilia, ESRD, diabetes, living with individual infected with hep B, healthcare worker with frequent contact with blood/bodily fluids (series covered once)   Sexually Transmitted  Diseases (STDs) As necessary chlamydia, gonorrhea, syphilis, hepatitis B (covered annually)  HIV if any 1 risk factor present: 1) <14yo or >64yo and at increased risk or 2) 15-64yo and ask for it (covered annually)   Lung Cancer Screening Low dose chest CT if all three risk factors: 1) 50-78yo, 2) smoker or quit within last 15y, 3) >=20 pack years (covered annually).  No results found for this or any previous visit.       Cholesterol Screening No signs or symptoms of cardiovascular disease (screening covered every 5 years).   Prostate Cancer Screening >= 49yo if patient desires test after weighting potential harms and benefits (covered annually)       Health Risk Factors with Personalized Education:  ----------------------------------------------------------------------------------------------------------------------  Stress management:  Understanding Your Stress  Think about how you know when you’re stressed.  Think about how your thoughts or behaviors are different when you’re stressed.  Think about what triggers stress for you.  Think about how you handle stress, and whether you’re making unhealthy choices in response to stress (like smoking, drinking alcohol or overeating).  Managing Stress  Take a break from the stressful situation.  Reduce your stress levels by exercising, talking with family/friends, ensuring adequate sleep.  Consider meditation or yoga.  Make sure you plan time to do things you enjoy.  Let your PCP know if you’re having problems limiting your stress.  ----------------------------------------------------------------------------------------------------------------------  Controlling Your Blood Pressure  Maintain a normal weight (body mass index between 18.5 and 24.9).  Eat more fruit, vegetables and low-fat dairy.  Eat less saturated fat and total fat.  Lower your sodium (salt) intake.  Try to stay under 1500 mg per day, but if you cannot get your intake to be that low, at least lower it by  1000 mg.  Stay active.  Try to get at least 90 to 150 minutes of exercise per week.  Try brisk walking, swimming, bicycling or dancing.  Limit alcohol intake.  When you do consume alcohol, drink no more than 2 drinks per day.  If you have been prescribed medication, take it regularly and exactly as prescribed.  Let your PCP know if you have any problems or questions about your medication.  Check your blood pressure at home or at the store.  Write down your readings and share them with your PCP  ----------------------------------------------------------------------------------------------------------------------  Controlling Your Cholesterol  Reduce the amount of saturated and trans fat in your diet.  Limit intake of red meat.  Consume only low-fat or non-fat/skim dairy.  Limit fried food.  Cook with vegetable oils.  Reduce your intake of sugary foods, sugary drinks and alcohol.  Eat a diet high in fruit, vegetables and whole grains.  Get protein from fish, poultry and a small portion of nuts.  Stay active.  Try to get at least 90 to 150 minutes of exercise per week.  Try brisk walking, swimming, bicycling or dancing.  Maintain a healthy weight by balancing your diet and exercise.  If you have been prescribed medication, take it regularly and exactly as prescribed. Let your PCP know if you have any problems or questions about your medication.  It’s important to know your cholesterol numbers.  When recommended by your PCP, get the cholesterol blood test.  ----------------------------------------------------------------------------------------------------------------------  Reducing Your Risk of Falls  Tell your PCP if any of your medications make you feel tired, dizzy, lightheaded or off-balance.  Maintain coordination, flexibility and balance by ensuring regular physical activity.  Limit alcohol intake to 1 drink per day.  Consider avoiding all alcohol intake.  Ensure good vision.  Visit an ophthalmologist or  optometrist regularly for vision screening or to make sure your glasses / contact lens prescription is correct.  If you need glasses or contacts, wear them.  When you get new glasses or contacts, take time to get used to them.  Do not wear sunglasses or tinted lenses when indoors.  Ensure good hearing.  Have your hearing checked if you are having trouble hearing, or family and friends think you cannot hear them.  If you need a hearing aid, be sure it fits well and wear it.  Get enough rest.  Ensure about 7-9 hours of sleep every day.  Get up slowly from your bed or chairs.  Do not start walking until you are sure you feel steady.  Wear non-skid, rubber-soled, low-heeled shoes.  Do not walk in socks, or in shoes and slippers with smooth soles.  If your PCP or therapist recommends using a cane or walker, use it regularly.  Make your home safer.  Increase lighting throughout the house, especially at the top and bottom of stairs.  Ensure lighting is easily turned on when getting up in the middle of the night.  Make sure there are two secure rails on all stairs.  Install grab bars in the bathtub / shower and near the toilet.  Consider using a shower chair and / or a hand-held shower.  Spread sand or salt on icy surfaces.  Beware of wet surfaces, which can be icy.  Tell your PCP if you have fallen.

## 2025-01-19 PROBLEM — Z00.00 WELCOME TO MEDICARE PREVENTIVE VISIT: Status: ACTIVE | Noted: 2025-01-19

## 2025-01-20 ENCOUNTER — TELEPHONE (OUTPATIENT)
Dept: PRIMARY CARE | Facility: CLINIC | Age: 75
End: 2025-01-20
Payer: COMMERCIAL

## 2025-01-20 DIAGNOSIS — I10 PRIMARY HYPERTENSION: ICD-10-CM

## 2025-01-20 DIAGNOSIS — I25.10 CORONARY ARTERY CALCIFICATION SEEN ON CT SCAN: Primary | ICD-10-CM

## 2025-01-20 RX ORDER — LOSARTAN POTASSIUM 100 MG/1
100 TABLET ORAL
Qty: 90 TABLET | Refills: 1 | Status: SHIPPED | OUTPATIENT
Start: 2025-01-20 | End: 2025-01-22 | Stop reason: SDUPTHER

## 2025-01-20 RX ORDER — ATORVASTATIN CALCIUM 40 MG/1
40 TABLET, FILM COATED ORAL DAILY
Qty: 90 TABLET | Refills: 1 | Status: SHIPPED | OUTPATIENT
Start: 2025-01-20

## 2025-01-20 NOTE — ASSESSMENT & PLAN NOTE
Age appropriate exam  Age appropriate AG provided  Immunizations - UTD; declines covid vaccine at this time  Screenings: UTD; fall risk screening; advanced care planning, medicare screenings completed  Recommended: Routine blood work, bi-annual dental exam, yearly eye exam,  Regular exercise most of the days of the week 30 min as tolerated,   healthy diet rich with fibers vitamins and fluids, safe driving,  Counseled RE: stress management, cancer prevention.  Patient regularly obtains EKG's at cardiology visits  Labs ordered; meds reviewed; assess for needs at home

## 2025-01-20 NOTE — TELEPHONE ENCOUNTER
Pt called needs a refill on losartan (COZAAR) 100 mg tablet and atorvastatin (LIPITOR) 40 mg tablet send to [place] [id number], Pt wants 90 day supply for both

## 2025-01-21 ENCOUNTER — TELEPHONE (OUTPATIENT)
Dept: PRIMARY CARE | Facility: CLINIC | Age: 75
End: 2025-01-21
Payer: COMMERCIAL

## 2025-01-21 DIAGNOSIS — E55.9 VITAMIN D DEFICIENCY: ICD-10-CM

## 2025-01-21 DIAGNOSIS — Z00.00 WELCOME TO MEDICARE PREVENTIVE VISIT: ICD-10-CM

## 2025-01-21 DIAGNOSIS — Z00.00 ANNUAL WELLNESS VISIT: Primary | ICD-10-CM

## 2025-01-21 DIAGNOSIS — I10 PRIMARY HYPERTENSION: ICD-10-CM

## 2025-01-21 DIAGNOSIS — E78.00 PURE HYPERCHOLESTEROLEMIA: ICD-10-CM

## 2025-01-21 DIAGNOSIS — K57.92 DIVERTICULITIS: ICD-10-CM

## 2025-01-21 DIAGNOSIS — R73.09 ELEVATED GLUCOSE LEVEL: ICD-10-CM

## 2025-01-21 DIAGNOSIS — I48.21 PERMANENT ATRIAL FIBRILLATION (CMS/HCC): ICD-10-CM

## 2025-01-22 DIAGNOSIS — I10 PRIMARY HYPERTENSION: ICD-10-CM

## 2025-01-22 RX ORDER — LOSARTAN POTASSIUM 100 MG/1
100 TABLET ORAL
Qty: 90 TABLET | Refills: 1 | Status: SHIPPED | OUTPATIENT
Start: 2025-01-22

## 2025-02-28 DIAGNOSIS — F41.9 ANXIETY: ICD-10-CM

## 2025-02-28 RX ORDER — ESCITALOPRAM OXALATE 10 MG/1
10 TABLET ORAL DAILY
Qty: 90 TABLET | Refills: 0 | Status: SHIPPED | OUTPATIENT
Start: 2025-02-28

## 2025-03-04 ENCOUNTER — TELEPHONE (OUTPATIENT)
Dept: PRIMARY CARE | Facility: CLINIC | Age: 75
End: 2025-03-04
Payer: COMMERCIAL

## 2025-03-04 RX ORDER — FLUTICASONE PROPIONATE 50 MCG
2 SPRAY, SUSPENSION (ML) NASAL DAILY
Qty: 48 G | Refills: 1 | Status: SHIPPED | OUTPATIENT
Start: 2025-03-04

## 2025-03-04 NOTE — TELEPHONE ENCOUNTER
Received call from pt requesting refill on Flonase prescription. Pt states he would like it to be sent to the [facility] on file.

## 2025-03-04 NOTE — TELEPHONE ENCOUNTER
Medicine Refill Request    Last Office Visit: 1/17/2025   Last Consult Visit: 1/4/2022  Last Telemedicine Visit: 5/3/2021 Keisha Schultz CRNP    Next Appointment: 4/17/2025      Current Outpatient Medications:     albuterol HFA (PROAIR HFA) 90 mcg/actuation inhaler, Inhale 2 puffs 2 (two) times a day as needed for wheezing or shortness of breath., Disp: 1 each, Rfl: 3    amLODIPine (NORVASC) 10 mg tablet, Take 1 tablet (10 mg total) by mouth nightly., Disp: 90 tablet, Rfl: 1    atorvastatin (LIPITOR) 40 mg tablet, Take 1 tablet (40 mg total) by mouth daily., Disp: 90 tablet, Rfl: 1    carvediloL (COREG) 6.25 mg tablet, Take 1 tablet (6.25 mg total) by mouth 2 (two) times a day with meals., Disp: 180 tablet, Rfl: 1    cholecalciferol, vitamin D3, 400 unit (10 mcg) tablet tablet, Take by mouth daily., Disp: , Rfl:     cyclobenzaprine (FLEXERIL) 10 mg tablet, Take 1 tablet (10 mg total) by mouth daily as needed for muscle spasms., Disp: 30 tablet, Rfl: 0    dabigatran etexilate 150 mg capsu, Take 1 capsule (150 mg total) by mouth 2 (two) times a day Indications: treatment to prevent blood clots in chronic atrial fibrillation., Disp: 60 capsule, Rfl: 0    escitalopram (LEXAPRO) 10 mg tablet, Take 1 tablet by mouth once daily, Disp: 90 tablet, Rfl: 0    FISH OIL-omega-3 fatty acids (FISH OIL) 340-1,000 mg capsule, Take 2 capsules by mouth 2 (two) times a day., Disp: , Rfl:     fluticasone propionate (FLONASE) 50 mcg/actuation nasal spray, Use 2 spray(s) in each nostril once daily, Disp: 48 g, Rfl: 1    fluticasone-umeclidinium-vilanterol (TRELEGY ELLIPTA) 100-62.5-25 mcg blister with device powder for inhalation, Inhale 1 puff daily., Disp: , Rfl:     hydrochlorothiazide (HYDRODIURIL) 25 mg tablet, Take 1 tablet (25 mg total) by mouth daily., Disp: 90 tablet, Rfl: 1    losartan (COZAAR) 100 mg tablet, Take 1 tablet (100 mg total) by mouth daily with dinner., Disp: 90 tablet, Rfl: 1    multivitamin tablet, Take by  mouth daily., Disp: , Rfl:     travoprost 0.004 % drops, INSTILL 1 DROP INTO EACH EYE ONCE DAILY AT BEDTIME, Disp: , Rfl:     TURMERIC ORAL, Take 500 mg by mouth., Disp: , Rfl:     BP Readings from Last 3 Encounters:   01/17/25 138/72   01/15/25 132/74   12/05/24 120/70       Recent Lab results:  Lab Results   Component Value Date    CHOL 141 09/06/2024   ,   Lab Results   Component Value Date    HDL 47 09/06/2024   ,   Lab Results   Component Value Date    LDLCALC 77 09/06/2024   ,   Lab Results   Component Value Date    TRIG 83 09/06/2024        Lab Results   Component Value Date    GLUCOSE 70 12/09/2024   ,   Lab Results   Component Value Date    HGBA1C 5.7 (H) 09/06/2024         Lab Results   Component Value Date    CREATININE 0.9 12/09/2024       Lab Results   Component Value Date    TSH 1.73 10/27/2023           Lab Results   Component Value Date    HGBA1C 5.7 (H) 09/06/2024

## 2025-03-06 ENCOUNTER — APPOINTMENT (OUTPATIENT)
Dept: LAB | Facility: CLINIC | Age: 75
End: 2025-03-06
Attending: INTERNAL MEDICINE
Payer: COMMERCIAL

## 2025-03-06 ENCOUNTER — TRANSCRIBE ORDERS (OUTPATIENT)
Dept: REGISTRATION | Facility: CLINIC | Age: 75
End: 2025-03-06

## 2025-03-06 DIAGNOSIS — E78.00 PURE HYPERCHOLESTEROLEMIA: ICD-10-CM

## 2025-03-06 DIAGNOSIS — I34.0 NONRHEUMATIC MITRAL (VALVE) INSUFFICIENCY: ICD-10-CM

## 2025-03-06 DIAGNOSIS — I34.0 NONRHEUMATIC MITRAL (VALVE) INSUFFICIENCY: Primary | ICD-10-CM

## 2025-03-06 DIAGNOSIS — I10 ESSENTIAL (PRIMARY) HYPERTENSION: ICD-10-CM

## 2025-03-06 LAB
ALBUMIN SERPL-MCNC: 4.5 G/DL (ref 3.5–5.7)
ALP SERPL-CCNC: 62 IU/L (ref 34–125)
ALT SERPL-CCNC: 19 IU/L (ref 7–52)
ANION GAP SERPL CALC-SCNC: 9 MEQ/L (ref 3–15)
AST SERPL-CCNC: 20 IU/L (ref 13–39)
BILIRUB SERPL-MCNC: 1.3 MG/DL (ref 0.3–1.2)
BUN SERPL-MCNC: 19 MG/DL (ref 7–25)
CALCIUM SERPL-MCNC: 9.3 MG/DL (ref 8.6–10.3)
CHLORIDE SERPL-SCNC: 103 MEQ/L (ref 98–107)
CHOLEST SERPL-MCNC: 155 MG/DL
CO2 SERPL-SCNC: 28 MEQ/L (ref 21–31)
CREAT SERPL-MCNC: 1 MG/DL (ref 0.7–1.3)
EGFRCR SERPLBLD CKD-EPI 2021: >60 ML/MIN/1.73M*2
ERYTHROCYTE [DISTWIDTH] IN BLOOD BY AUTOMATED COUNT: 14.2 % (ref 11.6–14.4)
GLUCOSE SERPL-MCNC: 113 MG/DL (ref 70–99)
HCT VFR BLD AUTO: 44.9 % (ref 40.1–51)
HDLC SERPL-MCNC: 42 MG/DL
HDLC SERPL: 3.7 {RATIO}
HGB BLD-MCNC: 15.5 G/DL (ref 13.7–17.5)
LDLC SERPL CALC-MCNC: 91 MG/DL
MCH RBC QN AUTO: 30.3 PG (ref 28–33.2)
MCHC RBC AUTO-ENTMCNC: 34.5 G/DL (ref 32.2–36.5)
MCV RBC AUTO: 87.7 FL (ref 83–98)
NONHDLC SERPL-MCNC: 113 MG/DL
PLATELET # BLD AUTO: 210 K/UL (ref 150–350)
PMV BLD AUTO: 10.5 FL (ref 9.4–12.4)
POTASSIUM SERPL-SCNC: 4 MEQ/L (ref 3.5–5.1)
PROT SERPL-MCNC: 7.1 G/DL (ref 6–8.2)
RBC # BLD AUTO: 5.12 M/UL (ref 4.5–5.8)
SODIUM SERPL-SCNC: 140 MEQ/L (ref 136–145)
TRIGL SERPL-MCNC: 109 MG/DL
WBC # BLD AUTO: 5.95 K/UL (ref 3.8–10.5)

## 2025-03-06 PROCEDURE — 36415 COLL VENOUS BLD VENIPUNCTURE: CPT

## 2025-03-06 PROCEDURE — 85027 COMPLETE CBC AUTOMATED: CPT

## 2025-03-06 PROCEDURE — 80053 COMPREHEN METABOLIC PANEL: CPT

## 2025-03-06 PROCEDURE — 80061 LIPID PANEL: CPT

## 2025-03-31 ENCOUNTER — TELEPHONE (OUTPATIENT)
Dept: PRIMARY CARE | Facility: CLINIC | Age: 75
End: 2025-03-31
Payer: COMMERCIAL

## 2025-03-31 DIAGNOSIS — J45.40 MODERATE PERSISTENT ASTHMA WITHOUT COMPLICATION: Primary | ICD-10-CM

## 2025-03-31 NOTE — TELEPHONE ENCOUNTER
Patient left voicemail in regard to this medication refill. Called patient back to get more information and was unable to reach the patient. Left voicemail for patient to call back.

## 2025-04-01 RX ORDER — ALBUTEROL SULFATE 90 UG/1
2 INHALANT RESPIRATORY (INHALATION) 2 TIMES DAILY PRN
Qty: 1 EACH | Refills: 3 | Status: SHIPPED | OUTPATIENT
Start: 2025-04-01 | End: 2025-06-30

## 2025-04-02 ENCOUNTER — APPOINTMENT (OUTPATIENT)
Dept: LAB | Facility: CLINIC | Age: 75
End: 2025-04-02
Payer: COMMERCIAL

## 2025-04-02 DIAGNOSIS — E55.9 VITAMIN D DEFICIENCY: ICD-10-CM

## 2025-04-02 DIAGNOSIS — Z00.00 ANNUAL WELLNESS VISIT: ICD-10-CM

## 2025-04-02 LAB
25(OH)D3 SERPL-MCNC: 36 NG/ML (ref 30–100)
ALBUMIN SERPL-MCNC: 4.6 G/DL (ref 3.5–5.7)
ALP SERPL-CCNC: 60 IU/L (ref 34–125)
ALT SERPL-CCNC: 16 IU/L (ref 7–52)
ANION GAP SERPL CALC-SCNC: 8 MEQ/L (ref 3–15)
AST SERPL-CCNC: 18 IU/L (ref 13–39)
BACTERIA URNS QL MICRO: ABNORMAL /HPF
BILIRUB SERPL-MCNC: 1.1 MG/DL (ref 0.3–1.2)
BILIRUB UR QL STRIP.AUTO: NEGATIVE MG/DL
BUN SERPL-MCNC: 18 MG/DL (ref 7–25)
CALCIUM SERPL-MCNC: 9.6 MG/DL (ref 8.6–10.3)
CHLORIDE SERPL-SCNC: 102 MEQ/L (ref 98–107)
CHOLEST SERPL-MCNC: 143 MG/DL
CLARITY UR REFRACT.AUTO: CLEAR
CO2 SERPL-SCNC: 31 MEQ/L (ref 21–31)
COLOR UR AUTO: YELLOW
CREAT SERPL-MCNC: 0.8 MG/DL (ref 0.7–1.3)
EGFRCR SERPLBLD CKD-EPI 2021: >60 ML/MIN/1.73M*2
ERYTHROCYTE [DISTWIDTH] IN BLOOD BY AUTOMATED COUNT: 14.1 % (ref 11.6–14.4)
EST. AVERAGE GLUCOSE BLD GHB EST-MCNC: 123 MG/DL
GLUCOSE SERPL-MCNC: 149 MG/DL (ref 70–99)
GLUCOSE UR STRIP.AUTO-MCNC: NEGATIVE MG/DL
HBA1C MFR BLD: 5.9 %
HCT VFR BLD AUTO: 42.5 % (ref 40.1–51)
HDLC SERPL-MCNC: 47 MG/DL
HDLC SERPL: 3 {RATIO}
HGB BLD-MCNC: 14.5 G/DL (ref 13.7–17.5)
HGB UR QL STRIP.AUTO: NEGATIVE
HYALINE CASTS #/AREA URNS LPF: ABNORMAL /LPF
KETONES UR STRIP.AUTO-MCNC: NEGATIVE MG/DL
LDLC SERPL CALC-MCNC: 81 MG/DL
LEUKOCYTE ESTERASE UR QL STRIP.AUTO: NEGATIVE
MCH RBC QN AUTO: 30.1 PG (ref 28–33.2)
MCHC RBC AUTO-ENTMCNC: 34.1 G/DL (ref 32.2–36.5)
MCV RBC AUTO: 88.4 FL (ref 83–98)
MUCOUS THREADS URNS QL MICRO: ABNORMAL /LPF
NITRITE UR QL STRIP.AUTO: NEGATIVE
NONHDLC SERPL-MCNC: 96 MG/DL
PH UR STRIP.AUTO: 6 [PH]
PLATELET # BLD AUTO: 197 K/UL (ref 150–350)
PMV BLD AUTO: 10.5 FL (ref 9.4–12.4)
POTASSIUM SERPL-SCNC: 3.9 MEQ/L (ref 3.5–5.1)
PROT SERPL-MCNC: 6.8 G/DL (ref 6–8.2)
PROT UR QL STRIP.AUTO: 1
PSA SERPL-MCNC: 1.48 NG/ML
RBC # BLD AUTO: 4.81 M/UL (ref 4.5–5.8)
RBC #/AREA URNS HPF: ABNORMAL /HPF
SODIUM SERPL-SCNC: 141 MEQ/L (ref 136–145)
SP GR UR REFRACT.AUTO: 1.02
SQUAMOUS URNS QL MICRO: ABNORMAL /HPF
TRIGL SERPL-MCNC: 77 MG/DL
UROBILINOGEN UR STRIP-ACNC: 0.2 EU/DL
WBC # BLD AUTO: 5.23 K/UL (ref 3.8–10.5)
WBC #/AREA URNS HPF: ABNORMAL /HPF

## 2025-04-02 PROCEDURE — 83036 HEMOGLOBIN GLYCOSYLATED A1C: CPT

## 2025-04-02 PROCEDURE — 36415 COLL VENOUS BLD VENIPUNCTURE: CPT

## 2025-04-02 PROCEDURE — 82306 VITAMIN D 25 HYDROXY: CPT

## 2025-04-02 PROCEDURE — 85027 COMPLETE CBC AUTOMATED: CPT

## 2025-04-02 PROCEDURE — 80053 COMPREHEN METABOLIC PANEL: CPT

## 2025-04-02 PROCEDURE — 80061 LIPID PANEL: CPT

## 2025-04-02 PROCEDURE — 81001 URINALYSIS AUTO W/SCOPE: CPT

## 2025-04-02 PROCEDURE — 84153 ASSAY OF PSA TOTAL: CPT

## 2025-04-05 ENCOUNTER — RESULTS FOLLOW-UP (OUTPATIENT)
Dept: PRIMARY CARE | Facility: CLINIC | Age: 75
End: 2025-04-05

## 2025-04-05 NOTE — RESULT ENCOUNTER NOTE
Reached out to patient via portal with results   Lab Results:  CMP: Liver, kidney, electrolytes are normal.   CBC: no signs of anemia or infection  A1C: increased to 5.9. Your glucose is 149. It is important to limit the amount of carbs and sweets you intake.  Lipid panel: Your cholesterol levels are normal.  Vitamin D: Is normal.   PSA: Normal      Prediabetes Diet:  Dietary modifications for prediabetes include reducing sugars and carbohydrates.  You want to reduce consumption of sweets, baked goods, and candies and most importantly soda/sugary beverages!   Reduce carbs such as breads, rice, pasta, flour, chips, pretzels.    Carbs are metabolized and broken down into the same sugar compounds that are found in sweets.

## 2025-04-07 RX ORDER — ALBUTEROL SULFATE 90 UG/1
2 INHALANT RESPIRATORY (INHALATION) 2 TIMES DAILY PRN
Qty: 54 G | Refills: 3 | Status: SHIPPED | OUTPATIENT
Start: 2025-04-07

## 2025-04-17 ENCOUNTER — OFFICE VISIT (OUTPATIENT)
Dept: PRIMARY CARE | Facility: CLINIC | Age: 75
End: 2025-04-17
Payer: COMMERCIAL

## 2025-04-17 ENCOUNTER — TELEPHONE (OUTPATIENT)
Dept: PRIMARY CARE | Facility: CLINIC | Age: 75
End: 2025-04-17

## 2025-04-17 VITALS
TEMPERATURE: 97.7 F | OXYGEN SATURATION: 99 % | SYSTOLIC BLOOD PRESSURE: 100 MMHG | RESPIRATION RATE: 18 BRPM | BODY MASS INDEX: 32.07 KG/M2 | HEART RATE: 42 BPM | WEIGHT: 194.2 LBS | DIASTOLIC BLOOD PRESSURE: 62 MMHG

## 2025-04-17 DIAGNOSIS — I10 PRIMARY HYPERTENSION: ICD-10-CM

## 2025-04-17 DIAGNOSIS — M25.551 BILATERAL HIP PAIN: ICD-10-CM

## 2025-04-17 DIAGNOSIS — M54.2 NECK PAIN: Primary | ICD-10-CM

## 2025-04-17 DIAGNOSIS — G89.29 CHRONIC PAIN OF BOTH KNEES: ICD-10-CM

## 2025-04-17 DIAGNOSIS — R91.8 MULTIPLE PULMONARY NODULES: ICD-10-CM

## 2025-04-17 DIAGNOSIS — E78.5 HYPERLIPIDEMIA, UNSPECIFIED HYPERLIPIDEMIA TYPE: ICD-10-CM

## 2025-04-17 DIAGNOSIS — M25.561 CHRONIC PAIN OF BOTH KNEES: ICD-10-CM

## 2025-04-17 DIAGNOSIS — F41.9 ANXIETY: ICD-10-CM

## 2025-04-17 DIAGNOSIS — M25.552 BILATERAL HIP PAIN: ICD-10-CM

## 2025-04-17 DIAGNOSIS — K57.92 DIVERTICULITIS: ICD-10-CM

## 2025-04-17 DIAGNOSIS — M54.50 LOW BACK PAIN WITHOUT SCIATICA, UNSPECIFIED BACK PAIN LATERALITY, UNSPECIFIED CHRONICITY: ICD-10-CM

## 2025-04-17 DIAGNOSIS — M54.2 CHRONIC NECK PAIN: ICD-10-CM

## 2025-04-17 DIAGNOSIS — M25.562 CHRONIC PAIN OF BOTH KNEES: ICD-10-CM

## 2025-04-17 DIAGNOSIS — J43.9 PULMONARY EMPHYSEMA, UNSPECIFIED EMPHYSEMA TYPE (CMS/HCC): ICD-10-CM

## 2025-04-17 DIAGNOSIS — I48.21 PERMANENT ATRIAL FIBRILLATION (CMS/HCC): ICD-10-CM

## 2025-04-17 DIAGNOSIS — H90.3 SENSORINEURAL HEARING LOSS, BILATERAL: ICD-10-CM

## 2025-04-17 DIAGNOSIS — G89.29 CHRONIC NECK PAIN: ICD-10-CM

## 2025-04-17 DIAGNOSIS — K21.9 GASTROESOPHAGEAL REFLUX DISEASE, UNSPECIFIED WHETHER ESOPHAGITIS PRESENT: ICD-10-CM

## 2025-04-17 PROCEDURE — 3008F BODY MASS INDEX DOCD: CPT

## 2025-04-17 PROCEDURE — 99214 OFFICE O/P EST MOD 30 MIN: CPT

## 2025-04-17 PROCEDURE — 3074F SYST BP LT 130 MM HG: CPT

## 2025-04-17 PROCEDURE — 3078F DIAST BP <80 MM HG: CPT

## 2025-04-17 RX ORDER — FAMOTIDINE 40 MG/1
TABLET, FILM COATED ORAL
COMMUNITY
Start: 2025-03-27

## 2025-04-17 ASSESSMENT — ENCOUNTER SYMPTOMS
DIZZINESS: 0
FEVER: 0
CONSTIPATION: 0
TROUBLE SWALLOWING: 0
WHEEZING: 0
FATIGUE: 0
NAUSEA: 0
SINUS PRESSURE: 0
HEADACHES: 0
ARTHRALGIAS: 1
DIARRHEA: 0
ABDOMINAL PAIN: 0
CHILLS: 0
WOUND: 0
VOMITING: 0
SHORTNESS OF BREATH: 1
DIFFICULTY URINATING: 0
COUGH: 0
LIGHT-HEADEDNESS: 0
SORE THROAT: 0
SLEEP DISTURBANCE: 0
PALPITATIONS: 0
NECK PAIN: 1

## 2025-04-17 ASSESSMENT — PAIN SCALES - GENERAL: PAINLEVEL_OUTOF10: 0-NO PAIN

## 2025-04-17 NOTE — PATIENT INSTRUCTIONS
Schedule with Sports medicine specialist for long term pain management.   2. Follow a low carb diet and limit the sweets to reduce your glucose level. Today we discussed metformin in unable to reduce glucose levels with lifestyle interventions.   3. Reach out if you are unable to obtain your albuterol inhaler.     Prediabetes Diet:  Dietary modifications for prediabetes include reducing sugars and carbohydrates.  You want to reduce consumption of sweets, baked goods, and candies and most importantly soda/sugary beverages! Third, reduce carbs such as breads, rice, pasta, flour, chips, pretzels.    Carbs are metabolized and broken down into the same sugar compounds that are found in sweets.
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
## 2025-04-18 ENCOUNTER — TELEPHONE (OUTPATIENT)
Dept: NEUROLOGY | Facility: CLINIC | Age: 75
End: 2025-04-18
Payer: COMMERCIAL

## 2025-04-18 NOTE — TELEPHONE ENCOUNTER
Pt left a vm requesting a call back. He says his surgery. He says every time he goes through an metal detector it goes off and wants to know if that's the reason why. Please call

## 2025-04-21 ENCOUNTER — TELEPHONE (OUTPATIENT)
Dept: ORTHOPEDICS | Facility: CLINIC | Age: 75
End: 2025-04-21
Payer: COMMERCIAL

## 2025-04-21 PROBLEM — M54.50 LOW BACK PAIN WITHOUT SCIATICA: Status: ACTIVE | Noted: 2025-04-21

## 2025-04-21 PROBLEM — H90.3 SENSORINEURAL HEARING LOSS, BILATERAL: Status: ACTIVE | Noted: 2025-04-21

## 2025-04-21 PROBLEM — G89.29 CHRONIC PAIN OF BOTH KNEES: Status: ACTIVE | Noted: 2025-04-21

## 2025-04-21 PROBLEM — M25.561 CHRONIC PAIN OF BOTH KNEES: Status: ACTIVE | Noted: 2025-04-21

## 2025-04-21 PROBLEM — M25.562 CHRONIC PAIN OF BOTH KNEES: Status: ACTIVE | Noted: 2025-04-21

## 2025-04-21 PROBLEM — M54.2 NECK PAIN: Status: ACTIVE | Noted: 2025-04-21

## 2025-04-21 PROBLEM — M25.552 BILATERAL HIP PAIN: Status: ACTIVE | Noted: 2025-04-21

## 2025-04-21 PROBLEM — M25.551 BILATERAL HIP PAIN: Status: ACTIVE | Noted: 2025-04-21

## 2025-04-21 NOTE — ASSESSMENT & PLAN NOTE
Follows with cardiology every 6 months, Dr. Kern, associated with [facility]  Improving on statin therapy, currently on atorvastatin 40 mg  Has coronary artery calcifications visualized on imaging  Has reported back of thigh pain to cardiologist, was considered not related to ischemia as he has good pulses.

## 2025-04-21 NOTE — ASSESSMENT & PLAN NOTE
Follows with cardiology Dr. Kern  Thoracic surgeon, Dr. Guallpa  Stable on amlodipine 10 mg, carvedilol 6.25 BID, HCTZ 225 mg, losartan 100 mg daily

## 2025-04-21 NOTE — ASSESSMENT & PLAN NOTE
Follows with Dr. Monterroso every 6 months  Continue trelegy. Albuterol as needed. Continue exercise and weight loss.  Was encouraged to join gym and use silver sneaker benefits. Stay up to date on vaccinations, recommended RSV vaccine. Call for any signs/symptoms of exacerbation.

## 2025-04-21 NOTE — ASSESSMENT & PLAN NOTE
Well controlled; follows with Dr. Kern  Continue carvedilol, anticoagulant - dabigatran etexilate (pradaxa)

## 2025-04-21 NOTE — ASSESSMENT & PLAN NOTE
Chronic; recommended for patient to establish with a specialist for chronic pain management  History of cervical spinal fusion

## 2025-04-21 NOTE — ASSESSMENT & PLAN NOTE
Follows with ENT specialist; Dr. Yolanda Verdin DO, last seen Feb. 28, 2025  Started on famotidine 40 mg nightly  Educated on diet, interventions to reduce symptoms  Has follow up in 2 months

## 2025-04-21 NOTE — TELEPHONE ENCOUNTER
Called patient regarding referral order to schedule appointment.  Call Outcome: Declined appointment - patient declined appointment.  Pt is having some dental work completed and will call back when he is ready to schedule for his ortho issues. He was given the phone number and scheduling instructions.

## 2025-04-21 NOTE — ASSESSMENT & PLAN NOTE
Stable since last flare; is not established with GI  Has a colonoscopy scheduled May 2025    Last diverticulitis 12/2024- plan  CT abdomen pelvis with IV contrast pending  - Have BMP completed within 30 days  -Start empiric Augmentin and metronidazole   -Will need to connect with GI at least 6 weeks after resolution, likely will need a C-scope.  -Strict ER precautions discussed in great detail, patient in agreement

## 2025-04-21 NOTE — ASSESSMENT & PLAN NOTE
Follows with pulm  Last CT Chest 1/07/2025: stable; Resolution of groundglass nodules.       History of 3.2cm poorly differential squamous cell lung cancer s/p left lower lobectomy in 2016 with Dr. Guallpa.

## 2025-05-01 ENCOUNTER — TELEPHONE (OUTPATIENT)
Dept: PRIMARY CARE | Facility: CLINIC | Age: 75
End: 2025-05-01
Payer: COMMERCIAL

## 2025-05-21 ENCOUNTER — TELEPHONE (OUTPATIENT)
Dept: PRIMARY CARE | Facility: CLINIC | Age: 75
End: 2025-05-21
Payer: COMMERCIAL

## 2025-05-23 ENCOUNTER — HOSPITAL ENCOUNTER (OUTPATIENT)
Facility: HOSPITAL | Age: 75
Discharge: HOME | End: 2025-05-23
Attending: INTERNAL MEDICINE | Admitting: INTERNAL MEDICINE
Payer: COMMERCIAL

## 2025-05-23 ENCOUNTER — ANESTHESIA EVENT (OUTPATIENT)
Dept: ENDOSCOPY | Facility: HOSPITAL | Age: 75
End: 2025-05-23
Payer: COMMERCIAL

## 2025-05-23 ENCOUNTER — ANESTHESIA (OUTPATIENT)
Dept: ENDOSCOPY | Facility: HOSPITAL | Age: 75
End: 2025-05-23
Payer: COMMERCIAL

## 2025-05-23 VITALS
HEIGHT: 66 IN | WEIGHT: 181 LBS | SYSTOLIC BLOOD PRESSURE: 101 MMHG | HEART RATE: 48 BPM | RESPIRATION RATE: 18 BRPM | DIASTOLIC BLOOD PRESSURE: 56 MMHG | BODY MASS INDEX: 29.09 KG/M2 | TEMPERATURE: 98 F | OXYGEN SATURATION: 99 %

## 2025-05-23 DIAGNOSIS — Z85.038 PERSONAL HISTORY OF COLON CANCER: ICD-10-CM

## 2025-05-23 PROCEDURE — 88305 TISSUE EXAM BY PATHOLOGIST: CPT | Performed by: INTERNAL MEDICINE

## 2025-05-23 PROCEDURE — 75000020 HC COLONSCOPY SNARE: Performed by: INTERNAL MEDICINE

## 2025-05-23 PROCEDURE — 71000011 HC PACU PHASE 1 EA ADDL MIN: Performed by: INTERNAL MEDICINE

## 2025-05-23 PROCEDURE — 0DBH8ZZ EXCISION OF CECUM, VIA NATURAL OR ARTIFICIAL OPENING ENDOSCOPIC: ICD-10-PCS | Performed by: INTERNAL MEDICINE

## 2025-05-23 PROCEDURE — 37000002 HC ANESTHESIA MAC: Performed by: INTERNAL MEDICINE

## 2025-05-23 PROCEDURE — 0DBM8ZZ EXCISION OF DESCENDING COLON, VIA NATURAL OR ARTIFICIAL OPENING ENDOSCOPIC: ICD-10-PCS | Performed by: INTERNAL MEDICINE

## 2025-05-23 PROCEDURE — C1773 RET DEV, INSERTABLE: HCPCS | Performed by: INTERNAL MEDICINE

## 2025-05-23 PROCEDURE — 71000001 HC PACU PHASE 1 INITIAL 30MIN: Performed by: INTERNAL MEDICINE

## 2025-05-23 PROCEDURE — 0DBK8ZZ EXCISION OF ASCENDING COLON, VIA NATURAL OR ARTIFICIAL OPENING ENDOSCOPIC: ICD-10-PCS | Performed by: INTERNAL MEDICINE

## 2025-05-23 RX ORDER — DABIGATRAN ETEXILATE 150 MG/1
150 CAPSULE ORAL 2 TIMES DAILY
Qty: 60 CAPSULE | Refills: 0 | Status: SHIPPED | OUTPATIENT
Start: 2025-05-24 | End: 2025-06-23

## 2025-05-23 RX ORDER — SODIUM CHLORIDE 9 MG/ML
INJECTION, SOLUTION INTRAVENOUS CONTINUOUS
Status: DISCONTINUED | OUTPATIENT
Start: 2025-05-23 | End: 2025-05-23 | Stop reason: HOSPADM
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
## 2025-05-23 NOTE — OP NOTE
_______________________________________________________________________________  Patient Name: Melanie Zelaya           Procedure Date: 5/23/2025 7:30 AM  MRN: [id number]                     Account Number: [id number]  YOB: 1950              Age: 75  Gender: Male                          Note Status: Finalized  Attending MD: SANTOS BOYD,  _______________________________________________________________________________  Procedure:             Colonoscopy  Indications:           High risk colon cancer surveillance: Personal history  of colonic polyps  Providers:             SANTOS ARIAS (Doctor)  Referring MD:          SANTOS JAMES  Requesting Provider:  Medicines:             See the Anesthesia note for documentation of the  administered medications  Complications:         No immediate complications. Estimated blood loss: None.  _______________________________________________________________________________  Procedure:             After I obtained informed consent, the scope was  passed under direct vision. Throughout the procedure,  the patient's blood pressure, pulse, and oxygen  saturations were monitored continuously. The  Colonoscope was introduced through the anus and  advanced to the cecum, identified by appendiceal  orifice and ileocecal valve. The colonoscopy was  performed without difficulty. The patient tolerated  the procedure well. The quality of the bowel  preparation was good. The ileocecal valve, appendiceal  orifice, and rectum were photographed.  Estimated Blood Loss:  Estimated blood loss: none.  Findings:  A 7 mm polyp was found in the cecum. The polyp was sessile. The polyp  was removed with a cold snare. Resection and retrieval were complete.  Estimated blood loss was minimal.  Two sessile polyps were found in the ascending colon. The polyps were 4  to 6 mm in size. These polyps were removed with a cold snare. Resection  and retrieval were  complete. Estimated blood loss was minimal.  Two sessile polyps were found in the descending colon. The polyps were 5  to 6 mm in size. These polyps were removed with a cold snare. Resection  and retrieval were complete. Estimated blood loss was minimal.  Scattered medium-mouthed and small-mouthed diverticula were found in the  sigmoid colon, descending colon and transverse colon.  Non-bleeding internal hemorrhoids were found during retroflexion. The  hemorrhoids were small and Grade I (internal hemorrhoids that do not  prolapse).  No other significant abnormalities were identified in a careful  examination of the remainder of the colon.  The exam was otherwise without abnormality on direct and retroflexion  views.  Impression:            - One 7 mm polyp in the cecum, removed with a cold  snare. Resected and retrieved.  - Two 4 to 6 mm polyps in the ascending colon, removed  with a cold snare. Resected and retrieved.  - Two 5 to 6 mm polyps in the descending colon,  removed with a cold snare. Resected and retrieved.  - Diverticulosis in the sigmoid colon, in the  descending colon and in the transverse colon.  - Non-bleeding internal hemorrhoids.  - The examination was otherwise normal on direct and  retroflexion views.  Recommendation:        - Patient has a contact number available for  emergencies. The signs and symptoms of potential  delayed complications were discussed with the patient.  Return to normal activities tomorrow. Written  discharge instructions were provided to the patient.  - High fiber diet daily.  - Repeat colonoscopy in 3 years for surveillance.  - Resume Pradaxa (dabigatran) at prior dose tomorrow.  Procedure Code(s):     --- Professional ---  47211, Colonoscopy, flexible; with removal of  tumor(s), polyp(s), or other lesion(s) by snare  technique  Diagnosis Code(s):     --- Professional ---  Z86.010, Personal history of colonic polyps  D12.0, Benign neoplasm of cecum  D12.2, Benign neoplasm of  ascending colon  D12.4, Benign neoplasm of descending colon  K64.0, First degree hemorrhoids  K57.30, Diverticulosis of large intestine without  perforation or abscess without bleeding  CPT copyright 2023 [employer]. All rights reserved.  The codes documented in this report are preliminary and upon  review may  be revised to meet current compliance requirements.  ___________________________  MARY KAY FLEMING DO~BERNA[place]TE  5/23/2025 7:56:32 AM  This report has been signed electronically.  Number of Addenda: 0  Note Initiated On: 5/23/2025 7:30 AM

## 2025-05-24 DIAGNOSIS — F41.9 ANXIETY: ICD-10-CM

## 2025-05-27 RX ORDER — ESCITALOPRAM OXALATE 10 MG/1
10 TABLET ORAL DAILY
Qty: 90 TABLET | Refills: 0 | Status: SHIPPED | OUTPATIENT
Start: 2025-05-27

## 2025-05-30 DIAGNOSIS — I10 PRIMARY HYPERTENSION: ICD-10-CM

## 2025-05-30 RX ORDER — HYDROCHLOROTHIAZIDE 25 MG/1
25 TABLET ORAL DAILY
Qty: 90 TABLET | Refills: 0 | Status: SHIPPED | OUTPATIENT
Start: 2025-05-30

## 2025-05-30 RX ORDER — AMLODIPINE BESYLATE 10 MG/1
10 TABLET ORAL NIGHTLY
Qty: 90 TABLET | Refills: 0 | Status: SHIPPED | OUTPATIENT
Start: 2025-05-30

## 2025-06-18 ENCOUNTER — TELEPHONE (OUTPATIENT)
Dept: PRIMARY CARE | Facility: CLINIC | Age: 75
End: 2025-06-18
Payer: COMMERCIAL

## 2025-07-05 DIAGNOSIS — I10 PRIMARY HYPERTENSION: ICD-10-CM

## 2025-07-07 ENCOUNTER — HOSPITAL ENCOUNTER (OUTPATIENT)
Dept: RADIOLOGY | Age: 75
Discharge: HOME | End: 2025-07-07
Attending: THORACIC SURGERY (CARDIOTHORACIC VASCULAR SURGERY)
Payer: COMMERCIAL

## 2025-07-07 DIAGNOSIS — C34.32 PRIMARY MALIGNANT NEOPLASM OF LEFT LOWER LOBE OF LUNG (CMS/HCC): Chronic | ICD-10-CM

## 2025-07-07 PROCEDURE — 71250 CT THORAX DX C-: CPT

## 2025-07-07 RX ORDER — CARVEDILOL 6.25 MG/1
6.25 TABLET ORAL
Qty: 180 TABLET | Refills: 0 | Status: SHIPPED | OUTPATIENT
Start: 2025-07-07

## 2025-07-09 DIAGNOSIS — I10 PRIMARY HYPERTENSION: ICD-10-CM

## 2025-07-09 RX ORDER — LOSARTAN POTASSIUM 100 MG/1
TABLET ORAL
Qty: 90 TABLET | Refills: 0 | Status: SHIPPED | OUTPATIENT
Start: 2025-07-09

## 2025-07-09 RX ORDER — CARVEDILOL 6.25 MG/1
6.25 TABLET ORAL
Qty: 180 TABLET | Refills: 0 | OUTPATIENT
Start: 2025-07-09

## 2025-07-09 RX ORDER — FLUTICASONE PROPIONATE 50 MCG
SPRAY, SUSPENSION (ML) NASAL
Qty: 48 G | Refills: 0 | Status: SHIPPED | OUTPATIENT
Start: 2025-07-09

## 2025-07-11 DIAGNOSIS — I25.10 CORONARY ARTERY CALCIFICATION SEEN ON CT SCAN: ICD-10-CM

## 2025-07-11 RX ORDER — ATORVASTATIN CALCIUM 40 MG/1
40 TABLET, FILM COATED ORAL DAILY
Qty: 90 TABLET | Refills: 0 | Status: SHIPPED | OUTPATIENT
Start: 2025-07-11

## 2025-07-30 ENCOUNTER — OFFICE VISIT (OUTPATIENT)
Dept: THORACIC SURGERY | Facility: CLINIC | Age: 75
End: 2025-07-30
Payer: COMMERCIAL

## 2025-07-30 VITALS
OXYGEN SATURATION: 97 % | HEART RATE: 53 BPM | BODY MASS INDEX: 29.09 KG/M2 | DIASTOLIC BLOOD PRESSURE: 74 MMHG | RESPIRATION RATE: 16 BRPM | WEIGHT: 181 LBS | SYSTOLIC BLOOD PRESSURE: 120 MMHG | HEIGHT: 66 IN

## 2025-07-30 DIAGNOSIS — C34.32 PRIMARY MALIGNANT NEOPLASM OF LEFT LOWER LOBE OF LUNG (CMS/HCC): Primary | Chronic | ICD-10-CM

## 2025-07-30 PROCEDURE — 3008F BODY MASS INDEX DOCD: CPT | Performed by: THORACIC SURGERY (CARDIOTHORACIC VASCULAR SURGERY)

## 2025-07-30 PROCEDURE — 99213 OFFICE O/P EST LOW 20 MIN: CPT | Performed by: THORACIC SURGERY (CARDIOTHORACIC VASCULAR SURGERY)

## 2025-07-30 PROCEDURE — 3078F DIAST BP <80 MM HG: CPT | Performed by: THORACIC SURGERY (CARDIOTHORACIC VASCULAR SURGERY)

## 2025-07-30 PROCEDURE — 3074F SYST BP LT 130 MM HG: CPT | Performed by: THORACIC SURGERY (CARDIOTHORACIC VASCULAR SURGERY)

## 2025-07-30 NOTE — LETTER
2025     Panchocameliatramaine Monterroso, DO  [address]  [address]  [place] [id number]    Patient: Melanie Zelaya  YOB: 1950  Date of Visit: 2025      Dear Dr. Monterroso:    Thank you for referring Melanie Zelaya to me for evaluation. Below are my notes for this consultation.    If you have questions, please do not hesitate to call me. I look forward to following your patient along with you.         Sincerely,        Blaise Guallpa MD        CC: YESENIA Angel CRNP Walker, Michael J, MD  2025  3:18 PM  Sign when Signing Visit  Patient ID: Melanie Zelaya                              : 1950  MRN: [id number]                                            Visit Date: 2025  Encounter Provider: Blaise Guallpa  Referring Provider: No ref. provider found    Subjective     Patient ID: Melanie Zelaya is a 75 y.o. male.  Chief Complaint: Follow-up (Last ct scan from 2025)      Melanie Zelaya is a 75 y.o. 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  Lymphovascular involvement was indeterminate.  He has had known waxing and waning pulmonary nodules over the years.  He is having some knee arthritis and then get injections.  Chronic cough with sounds to be thick sputum but his wife says he is hacking but can get it up which is chronic.  No hemoptysis, fever, chills, sweats, new persistent bony or neurological complaints outside of what is mentioned above.  Intended 10 pound weight loss.    Past Medical History:  has a past medical history of Anxiety, Atrial fibrillation (CMS/HCC), Breast cancer (CMS/HCC), C1 cervical fracture (CMS/HCC), Colon polyp, COPD (chronic obstructive pulmonary disease) (CMS/HCC), Coronary artery calcification seen on CT scan,  COVID-19 vaccine series completed, Depression, Diverticulosis, Diverticulosis, Fracture of pelvis (CMS/HCC) (1968), GERD (gastroesophageal reflux disease), Hearing loss, History of colon polyps, History of COVID-19 (2020), Hyperlipidemia, Hypertension, Left atrial enlargement, Lung cancer (CMS/HCC), Lung cancer (CMS/HCC), Pneumonia (2011), Seasonal allergies, and Wrist fracture.    He has no past medical history of PONV (postoperative nausea and vomiting).  Past Surgical History:  has a past surgical history that includes Lung lobectomy (Left, 2016); Shoulder surgery (Right, 1998); Rotator cuff repair (Right, 2001); Tonsillectomy; Nasal polyp surgery; Hand surgery (Bilateral); and Colonoscopy.  Social History:   Social History     Tobacco Use   • Smoking status: Former     Current packs/day: 0.00     Types: Cigarettes     Quit date: 4/19/2010     Years since quitting: 15.2   • Smokeless tobacco: Never   Vaping Use   • Vaping status: Never Used   Substance Use Topics   • Alcohol use: No     Comment: RARE   • Drug use: No     Family History: family history includes Cancer in his nephew; Cirrhosis in his biological father; Colon cancer in his biological brother; Diabetes in his biological mother; Lung cancer in his biological brother; No Known Problems in his maternal grandfather, maternal grandmother, paternal grandfather, and paternal grandmother; Pancreatic cancer in his biological sister; Prostate cancer in his biological brother.  Medications:   Current Outpatient Medications:   •  albuterol HFA (PROAIR HFA) 90 mcg/actuation inhaler, Inhale 2 puffs 2 (two) times a day as needed for wheezing or shortness of breath., Disp: 54 g, Rfl: 3  •  amLODIPine (NORVASC) 10 mg tablet, Take 1 tablet by mouth nightly, Disp: 90 tablet, Rfl: 0  •  atorvastatin (LIPITOR) 40 mg tablet, Take 1 tablet by mouth once daily, Disp: 90 tablet, Rfl: 0  •  carvediloL (COREG) 6.25 mg tablet, TAKE 1 TABLET BY MOUTH TWICE DAILY WITH MEALS,  Disp: 180 tablet, Rfl: 0  •  cholecalciferol, vitamin D3, 400 unit (10 mcg) tablet tablet, Take by mouth daily., Disp: , Rfl:   •  cyclobenzaprine (FLEXERIL) 10 mg tablet, Take 1 tablet (10 mg total) by mouth daily as needed for muscle spasms., Disp: 30 tablet, Rfl: 0  •  dabigatran etexilate (PRADAXA) 150 mg capsule, Take 1 capsule (150 mg total) by mouth 2 (two) times a day Indications: treatment to prevent blood clots in chronic atrial fibrillation., Disp: 60 capsule, Rfl: 0  •  escitalopram (LEXAPRO) 10 mg tablet, Take 1 tablet by mouth once daily, Disp: 90 tablet, Rfl: 0  •  famotidine (PEPCID) 40 mg tablet, TAKE 1 TABLET BY MOUTH ONCE DAILY AT NIGHT AT BEDTIME, Disp: , Rfl:   •  FISH OIL-omega-3 fatty acids (FISH OIL) 340-1,000 mg capsule, Take 2 capsules by mouth 2 (two) times a day., Disp: , Rfl:   •  fluticasone propionate (FLONASE) 50 mcg/actuation nasal spray, Use 2 spray(s) in each nostril once daily, Disp: 48 g, Rfl: 0  •  fluticasone-umeclidinium-vilanterol (TRELEGY ELLIPTA) 100-62.5-25 mcg blister with device powder for inhalation, Inhale 1 puff daily., Disp: , Rfl:   •  hydrochlorothiazide (HYDRODIURIL) 25 mg tablet, Take 1 tablet by mouth once daily, Disp: 90 tablet, Rfl: 0  •  losartan (COZAAR) 100 mg tablet, TAKE 1 TABLET BY MOUTH ONCE DAILY WITH SUPPER, Disp: 90 tablet, Rfl: 0  •  multivitamin tablet, Take by mouth daily., Disp: , Rfl:   •  travoprost 0.004 % drops, INSTILL 1 DROP INTO EACH EYE ONCE DAILY AT BEDTIME, Disp: , Rfl:   •  TURMERIC ORAL, Take 500 mg by mouth., Disp: , Rfl:   •  albuterol HFA (PROAIR HFA) 90 mcg/actuation inhaler, Inhale 2 puffs 2 (two) times a day as needed for wheezing or shortness of breath., Disp: 1 each, Rfl: 3  Allergies: has no known allergies.   E-cigarette/Vaping       Questions Responses    E-cigarette/Vaping Use Never User             The following have been reviewed and updated as appropriate in this visit:           Review of Systems   All other systems  "reviewed and are negative.      Objective  Vitals: Visit Vitals  /74   Pulse (!) 53   Resp 16   Ht 1.676 m (5' 6\")   Wt 82.1 kg (181 lb)   SpO2 97%   BMI 29.21 kg/m²       Physical Exam  Vitals reviewed.   Constitutional:       Appearance: He is well-developed.   HENT:      Head: Normocephalic.   Eyes:      Pupils: Pupils are equal, round, and reactive to light.   Cardiovascular:      Rate and Rhythm: Normal rate and regular rhythm.   Pulmonary:      Effort: Pulmonary effort is normal.      Breath sounds: Normal breath sounds.   Abdominal:      General: Abdomen is flat.   Musculoskeletal:         General: Normal range of motion.      Cervical back: Neck supple.   Skin:     General: Skin is warm and dry.   Neurological:      General: No focal deficit present.      Mental Status: He is alert.   Psychiatric:         Mood and Affect: Mood normal.         Assessment/Plan  Independent review of all imaging was done by myself as well as review of the radiologists readings and comparison to prior films.  CAT scan of the chest on July 7 shows some new abnormalities in the right upper lobe as well as the left upper lobe.  They develop over 6 months.  Previously he had some waxing and waning nodules that developed over 3 months so I thought these were infectious or inflammatory.  The right upper lobe abnormality is a groundglass opacity measuring 2 x 2 cm which most likely is inflammatory infectious as well as the other nodules.  He has had some nodular abnormalities in the remaining inferior left upper lobe which look a little worse probably due to small vessel disease.  I do not think these are malignant.  I think we will do a short interval cat scan of the chest without contrast in 3 months and see him back at that time.       Problem List Items Addressed This Visit       Primary malignant neoplasm of left lower lobe of lung (CMS/HCC) - Primary (Chronic)    Relevant Orders    CT CHEST WITHOUT IV CONTRAST "         Blaise Guallpa MD

## 2025-07-30 NOTE — PROGRESS NOTES
Patient ID: Melanie Zelaya                              : 1950  MRN: [id number]                                            Visit Date: 2025  Encounter Provider: Blaise Guallpa  Referring Provider: No ref. provider found    Subjective      Patient ID: Melanie Zelaya is a 75 y.o. male.  Chief Complaint: Follow-up (Last ct scan from 2025)      Melanie Zelaya is a 75 y.o. 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  Lymphovascular involvement was indeterminate.  He has had known waxing and waning pulmonary nodules over the years.  He is having some knee arthritis and then get injections.  Chronic cough with sounds to be thick sputum but his wife says he is hacking but can get it up which is chronic.  No hemoptysis, fever, chills, sweats, new persistent bony or neurological complaints outside of what is mentioned above.  Intended 10 pound weight loss.    Past Medical History:  has a past medical history of Anxiety, Atrial fibrillation (CMS/HCC), Breast cancer (CMS/HCC), C1 cervical fracture (CMS/HCC), Colon polyp, COPD (chronic obstructive pulmonary disease) (CMS/HCC), Coronary artery calcification seen on CT scan, COVID-19 vaccine series completed, Depression, Diverticulosis, Diverticulosis, Fracture of pelvis (CMS/HCC) (), GERD (gastroesophageal reflux disease), Hearing loss, History of colon polyps, History of COVID-19 (), Hyperlipidemia, Hypertension, Left atrial enlargement, Lung cancer (CMS/HCC), Lung cancer (CMS/HCC), Pneumonia (), Seasonal allergies, and Wrist fracture.    He has no past medical history of PONV (postoperative nausea and vomiting).  Past Surgical History:  has a past surgical history that includes Lung lobectomy (Left, ); Shoulder surgery (Right, ); Rotator cuff  repair (Right, 2001); Tonsillectomy; Nasal polyp surgery; Hand surgery (Bilateral); and Colonoscopy.  Social History:   Social History     Tobacco Use    Smoking status: Former     Current packs/day: 0.00     Types: Cigarettes     Quit date: 4/19/2010     Years since quitting: 15.2    Smokeless tobacco: Never   Vaping Use    Vaping status: Never Used   Substance Use Topics    Alcohol use: No     Comment: RARE    Drug use: No     Family History: family history includes Cancer in his nephew; Cirrhosis in his biological father; Colon cancer in his biological brother; Diabetes in his biological mother; Lung cancer in his biological brother; No Known Problems in his maternal grandfather, maternal grandmother, paternal grandfather, and paternal grandmother; Pancreatic cancer in his biological sister; Prostate cancer in his biological brother.  Medications:   Current Outpatient Medications:     albuterol HFA (PROAIR HFA) 90 mcg/actuation inhaler, Inhale 2 puffs 2 (two) times a day as needed for wheezing or shortness of breath., Disp: 54 g, Rfl: 3    amLODIPine (NORVASC) 10 mg tablet, Take 1 tablet by mouth nightly, Disp: 90 tablet, Rfl: 0    atorvastatin (LIPITOR) 40 mg tablet, Take 1 tablet by mouth once daily, Disp: 90 tablet, Rfl: 0    carvediloL (COREG) 6.25 mg tablet, TAKE 1 TABLET BY MOUTH TWICE DAILY WITH MEALS, Disp: 180 tablet, Rfl: 0    cholecalciferol, vitamin D3, 400 unit (10 mcg) tablet tablet, Take by mouth daily., Disp: , Rfl:     cyclobenzaprine (FLEXERIL) 10 mg tablet, Take 1 tablet (10 mg total) by mouth daily as needed for muscle spasms., Disp: 30 tablet, Rfl: 0    dabigatran etexilate (PRADAXA) 150 mg capsule, Take 1 capsule (150 mg total) by mouth 2 (two) times a day Indications: treatment to prevent blood clots in chronic atrial fibrillation., Disp: 60 capsule, Rfl: 0    escitalopram (LEXAPRO) 10 mg tablet, Take 1 tablet by mouth once daily, Disp: 90 tablet, Rfl: 0    famotidine (PEPCID) 40 mg tablet,  "TAKE 1 TABLET BY MOUTH ONCE DAILY AT NIGHT AT BEDTIME, Disp: , Rfl:     FISH OIL-omega-3 fatty acids (FISH OIL) 340-1,000 mg capsule, Take 2 capsules by mouth 2 (two) times a day., Disp: , Rfl:     fluticasone propionate (FLONASE) 50 mcg/actuation nasal spray, Use 2 spray(s) in each nostril once daily, Disp: 48 g, Rfl: 0    fluticasone-umeclidinium-vilanterol (TRELEGY ELLIPTA) 100-62.5-25 mcg blister with device powder for inhalation, Inhale 1 puff daily., Disp: , Rfl:     hydrochlorothiazide (HYDRODIURIL) 25 mg tablet, Take 1 tablet by mouth once daily, Disp: 90 tablet, Rfl: 0    losartan (COZAAR) 100 mg tablet, TAKE 1 TABLET BY MOUTH ONCE DAILY WITH SUPPER, Disp: 90 tablet, Rfl: 0    multivitamin tablet, Take by mouth daily., Disp: , Rfl:     travoprost 0.004 % drops, INSTILL 1 DROP INTO EACH EYE ONCE DAILY AT BEDTIME, Disp: , Rfl:     TURMERIC ORAL, Take 500 mg by mouth., Disp: , Rfl:     albuterol HFA (PROAIR HFA) 90 mcg/actuation inhaler, Inhale 2 puffs 2 (two) times a day as needed for wheezing or shortness of breath., Disp: 1 each, Rfl: 3  Allergies: has no known allergies.   E-cigarette/Vaping       Questions Responses    E-cigarette/Vaping Use Never User             The following have been reviewed and updated as appropriate in this visit:           Review of Systems   All other systems reviewed and are negative.      Objective   Vitals: Visit Vitals  /74   Pulse (!) 53   Resp 16   Ht 1.676 m (5' 6\")   Wt 82.1 kg (181 lb)   SpO2 97%   BMI 29.21 kg/m²       Physical Exam  Vitals reviewed.   Constitutional:       Appearance: He is well-developed.   HENT:      Head: Normocephalic.   Eyes:      Pupils: Pupils are equal, round, and reactive to light.   Cardiovascular:      Rate and Rhythm: Normal rate and regular rhythm.   Pulmonary:      Effort: Pulmonary effort is normal.      Breath sounds: Normal breath sounds.   Abdominal:      General: Abdomen is flat.   Musculoskeletal:         General: Normal range " of motion.      Cervical back: Neck supple.   Skin:     General: Skin is warm and dry.   Neurological:      General: No focal deficit present.      Mental Status: He is alert.   Psychiatric:         Mood and Affect: Mood normal.         Assessment/Plan   Independent review of all imaging was done by myself as well as review of the radiologists readings and comparison to prior films.  CAT scan of the chest on July 7 shows some new abnormalities in the right upper lobe as well as the left upper lobe.  They develop over 6 months.  Previously he had some waxing and waning nodules that developed over 3 months so I thought these were infectious or inflammatory.  The right upper lobe abnormality is a groundglass opacity measuring 2 x 2 cm which most likely is inflammatory infectious as well as the other nodules.  He has had some nodular abnormalities in the remaining inferior left upper lobe which look a little worse probably due to small vessel disease.  I do not think these are malignant.  I think we will do a short interval cat scan of the chest without contrast in 3 months and see him back at that time.       Problem List Items Addressed This Visit       Primary malignant neoplasm of left lower lobe of lung (CMS/HCC) - Primary (Chronic)    Relevant Orders    CT CHEST WITHOUT IV CONTRAST         Blaise Guallpa MD

## 2025-07-31 ENCOUNTER — APPOINTMENT (OUTPATIENT)
Dept: URBAN - METROPOLITAN AREA CLINIC 374 | Facility: CLINIC | Age: 75
Setting detail: DERMATOLOGY
End: 2025-07-31

## 2025-07-31 DIAGNOSIS — L81.5 LEUKODERMA, NOT ELSEWHERE CLASSIFIED: ICD-10-CM | Status: UNCHANGED

## 2025-07-31 DIAGNOSIS — D22 MELANOCYTIC NEVI: ICD-10-CM | Status: UNCHANGED

## 2025-07-31 DIAGNOSIS — L81.4 OTHER MELANIN HYPERPIGMENTATION: ICD-10-CM | Status: UNCHANGED

## 2025-07-31 DIAGNOSIS — L82.1 OTHER SEBORRHEIC KERATOSIS: ICD-10-CM | Status: UNCHANGED

## 2025-07-31 DIAGNOSIS — L91.8 OTHER HYPERTROPHIC DISORDERS OF THE SKIN: ICD-10-CM | Status: UNCHANGED

## 2025-07-31 DIAGNOSIS — D18.0 HEMANGIOMA: ICD-10-CM | Status: UNCHANGED

## 2025-07-31 DIAGNOSIS — Z71.89 OTHER SPECIFIED COUNSELING: ICD-10-CM

## 2025-07-31 DIAGNOSIS — L57.0 ACTINIC KERATOSIS: ICD-10-CM | Status: INADEQUATELY CONTROLLED

## 2025-07-31 PROBLEM — D22.5 MELANOCYTIC NEVI OF TRUNK: Status: ACTIVE | Noted: 2025-07-31

## 2025-07-31 PROBLEM — D18.01 HEMANGIOMA OF SKIN AND SUBCUTANEOUS TISSUE: Status: ACTIVE | Noted: 2025-07-31

## 2025-07-31 PROCEDURE — ? FULL BODY SKIN EXAM

## 2025-07-31 PROCEDURE — ? COUNSELING

## 2025-07-31 PROCEDURE — ? LIQUID NITROGEN

## 2025-07-31 PROCEDURE — ? TREATMENT REGIMEN

## 2025-07-31 ASSESSMENT — LOCATION SIMPLE DESCRIPTION DERM
LOCATION SIMPLE: UPPER BACK
LOCATION SIMPLE: RIGHT SCALP
LOCATION SIMPLE: LEFT AXILLARY VAULT
LOCATION SIMPLE: RIGHT ANTERIOR NECK
LOCATION SIMPLE: RIGHT UPPER BACK
LOCATION SIMPLE: LEFT SCALP
LOCATION SIMPLE: RIGHT FOREARM
LOCATION SIMPLE: LEFT PRETIBIAL REGION
LOCATION SIMPLE: RIGHT AXILLARY VAULT
LOCATION SIMPLE: LEFT ANTERIOR NECK
LOCATION SIMPLE: RIGHT FOREHEAD
LOCATION SIMPLE: RIGHT PRETIBIAL REGION
LOCATION SIMPLE: LEFT FOREHEAD
LOCATION SIMPLE: LEFT FOREARM
LOCATION SIMPLE: ABDOMEN
LOCATION SIMPLE: LEFT CHEEK
LOCATION SIMPLE: RIGHT HAND
LOCATION SIMPLE: LEFT HAND

## 2025-07-31 ASSESSMENT — LOCATION ZONE DERM
LOCATION ZONE: FACE
LOCATION ZONE: HAND
LOCATION ZONE: LEG
LOCATION ZONE: AXILLAE
LOCATION ZONE: ARM
LOCATION ZONE: TRUNK
LOCATION ZONE: SCALP
LOCATION ZONE: NECK

## 2025-07-31 ASSESSMENT — LOCATION DETAILED DESCRIPTION DERM
LOCATION DETAILED: SUPERIOR THORACIC SPINE
LOCATION DETAILED: LEFT AXILLARY VAULT
LOCATION DETAILED: EPIGASTRIC SKIN
LOCATION DETAILED: LEFT RADIAL DORSAL HAND
LOCATION DETAILED: RIGHT CENTRAL FRONTAL SCALP
LOCATION DETAILED: LEFT INFERIOR LATERAL NECK
LOCATION DETAILED: LEFT DISTAL DORSAL FOREARM
LOCATION DETAILED: RIGHT SUPERIOR FOREHEAD
LOCATION DETAILED: LEFT CENTRAL FRONTAL SCALP
LOCATION DETAILED: RIGHT INFERIOR LATERAL NECK
LOCATION DETAILED: RIGHT RADIAL DORSAL HAND
LOCATION DETAILED: LEFT PROXIMAL PRETIBIAL REGION
LOCATION DETAILED: RIGHT LATERAL FOREHEAD
LOCATION DETAILED: RIGHT DISTAL DORSAL FOREARM
LOCATION DETAILED: RIGHT PROXIMAL PRETIBIAL REGION
LOCATION DETAILED: LEFT INFERIOR MEDIAL MALAR CHEEK
LOCATION DETAILED: RIGHT AXILLARY VAULT
LOCATION DETAILED: LEFT SUPERIOR FOREHEAD
LOCATION DETAILED: INFERIOR THORACIC SPINE
LOCATION DETAILED: RIGHT MEDIAL UPPER BACK

## 2025-07-31 NOTE — PROCEDURE: LIQUID NITROGEN
Show Aperture Variable?: Yes
Consent: The patient's consent was obtained including but not limited to risks of crusting, scabbing, blistering, scarring, darker or lighter pigmentary change, recurrence, incomplete removal and infection.
Duration Of Freeze Thaw-Cycle (Seconds): 15
Post-Care Instructions: I reviewed with the patient in detail post-care instructions. Patient is to wear sunprotection, and avoid picking at any of the treated lesions. Pt may apply Vaseline to crusted or scabbing areas.
Render Note In Bullet Format When Appropriate: No
Detail Level: Detailed
Number Of Freeze-Thaw Cycles: 2 freeze-thaw cycles

## 2025-08-20 DIAGNOSIS — F41.9 ANXIETY: ICD-10-CM

## 2025-08-20 RX ORDER — ESCITALOPRAM OXALATE 10 MG/1
10 TABLET ORAL DAILY
Qty: 90 TABLET | Refills: 0 | Status: SHIPPED | OUTPATIENT
Start: 2025-08-20

## 2025-08-23 DIAGNOSIS — I10 PRIMARY HYPERTENSION: ICD-10-CM

## 2025-08-25 RX ORDER — HYDROCHLOROTHIAZIDE 25 MG/1
25 TABLET ORAL DAILY
Qty: 90 TABLET | Refills: 1 | Status: SHIPPED | OUTPATIENT
Start: 2025-08-25

## 2025-08-25 RX ORDER — AMLODIPINE BESYLATE 10 MG/1
10 TABLET ORAL NIGHTLY
Qty: 90 TABLET | Refills: 1 | Status: SHIPPED | OUTPATIENT
Start: 2025-08-25

## (undated) DEVICE — NEEDLE DISP HYPO 18GX1-1/2IN

## (undated) DEVICE — GAUZE 8X4 12 PLY RFID DOUBLE XRAY

## (undated) DEVICE — Device

## (undated) DEVICE — SOLN IRRIG .9%SOD 1000ML

## (undated) DEVICE — KIT APPLICATOR MICROMYST F/ DURASEAL

## (undated) DEVICE — STAPLER SKIN

## (undated) DEVICE — ***USE 127961*** SUTURE VICRYL 0 J740D CR CT-1 18IN VIOLET

## (undated) DEVICE — SUTURE ETHILON 3-0   663G

## (undated) DEVICE — FORCEP COLD RADIAL JAW

## (undated) DEVICE — DRAPE HALF STERILE

## (undated) DEVICE — CLEANSER SKIN CHG 4%

## (undated) DEVICE — SPONGE SURGIFOAM ABSORBABLE GELATIN 100 8.5CM X 12CM X 10MM

## (undated) DEVICE — E-TRAP POLYECTOMY

## (undated) DEVICE — SEPPS BENZOIN TINCTURE

## (undated) DEVICE — GLOVE SZ 7.5 LINER PROTEXIS PI BL

## (undated) DEVICE — SNARE COLD EXACTO9MM

## (undated) DEVICE — SYRINGE DISP LUER-LOK 30 CC

## (undated) DEVICE — SEALANT SYSTEM DURASEAL EXACT 3ML

## (undated) DEVICE — DRAPE C-ARM X-RAY EQUIPMENT IMAGE

## (undated) DEVICE — GOWN SURGICAL REINFORCED X-LAR

## (undated) DEVICE — SNARE POLYPECTOMY 15MM

## (undated) DEVICE — SPONGE DRESSING DRAIN STERILE EXCILON 2S

## (undated) DEVICE — CUSTOM PROCEDURE KIT

## (undated) DEVICE — APPLICATOR CHLORAPREP 26ML ORANGE TINT

## (undated) DEVICE — PACK UNIVERSAL

## (undated) DEVICE — TUBE SUCTION 1/4INX20FT STERILE

## (undated) DEVICE — SYRINGE DISP LUER-LOK 20 CC

## (undated) DEVICE — DRESSING MEPILEX BORDER AG 4X8

## (undated) DEVICE — CLIP QUICKCLIP 230CM 2.8MM

## (undated) DEVICE — ADHESIVE SKIN DERMABOND ADVANCED 0.7ML

## (undated) DEVICE — GLOVE SZ 7.5 PROTEXIS CLASSIC LATEX

## (undated) DEVICE — ANGIOCATH 14GAX3 1/4

## (undated) DEVICE — SEALANT SURGICAL FLOSEAL 10ML STERILE PREP

## (undated) DEVICE — PACK RFID BASIC NEURO CUSTOM

## (undated) DEVICE — DRESSING MEPILEX 4X8 BORDER

## (undated) DEVICE — GOWN SIRUS POLYRNF BRTHSLV LG 30/CS

## (undated) DEVICE — PINS SKULL DISPOSABLE

## (undated) DEVICE — KIT EVAC DRAIN 10FR 400CC 1/8IN

## (undated) DEVICE — TIP BOVIE BLADE COATED INSULATED 2.75IN

## (undated) DEVICE — SUTURE VICRYL 2-0  J762D CR CP-2 18IN

## (undated) DEVICE — MANIFOLD FOUR PORT NEPTUNE

## (undated) DEVICE — BURR LONG MIDAS AM-8